# Patient Record
Sex: FEMALE | Race: WHITE | NOT HISPANIC OR LATINO | Employment: OTHER | URBAN - METROPOLITAN AREA
[De-identification: names, ages, dates, MRNs, and addresses within clinical notes are randomized per-mention and may not be internally consistent; named-entity substitution may affect disease eponyms.]

---

## 2017-01-03 ENCOUNTER — TELEPHONE (OUTPATIENT)
Dept: ENDOCRINOLOGY | Facility: CLINIC | Age: 67
End: 2017-01-03

## 2017-01-03 NOTE — TELEPHONE ENCOUNTER
Spoke to patient and let her know that her labs were not in yet, we will call as soon as they come in.

## 2017-01-03 NOTE — TELEPHONE ENCOUNTER
----- Message from David Amin sent at 1/3/2017  2:10 PM CST -----  Contact: Pt   Pt would like a call back from nurse in ref to results for most recent biopsy    Can be reached at [phone]

## 2017-01-03 NOTE — TELEPHONE ENCOUNTER
Please call pathology and ask for FNA result.  Also pleasse call patient and let know result has not yet returned

## 2017-01-04 ENCOUNTER — TELEPHONE (OUTPATIENT)
Dept: ENDOCRINOLOGY | Facility: CLINIC | Age: 67
End: 2017-01-04

## 2017-01-04 DIAGNOSIS — E04.1 THYROID NODULE: Primary | ICD-10-CM

## 2017-01-04 NOTE — TELEPHONE ENCOUNTER
----- Message from Gardenia Sánchez sent at 1/4/2017  3:55 PM CST -----  Contact: pt  Biopsy  Results      Pt is very very upset and  Worried    -  Biopsy done 12/23     Has called multiple times  To get results   Said she has had no  Response        Also another biopsy schedule for 1/11          Pt is very very  Upset     Please call   thanks

## 2017-01-04 NOTE — TELEPHONE ENCOUNTER
Contacted patient and notified about cytology results.  FLUS  15% risk of malignancy in published series  [facility] may be as high as 30%.  Options  Surgery, Repeat FNA and genetic studies  Patient is very concerned about malignancy and desires surgery  Please schedule appt ASAP  Have me call her with exact time tomorrow

## 2017-01-05 ENCOUNTER — TELEPHONE (OUTPATIENT)
Dept: ENDOCRINOLOGY | Facility: CLINIC | Age: 67
End: 2017-01-05

## 2017-01-09 ENCOUNTER — OFFICE VISIT (OUTPATIENT)
Dept: PULMONOLOGY | Facility: CLINIC | Age: 67
End: 2017-01-09
Payer: MEDICARE

## 2017-01-09 ENCOUNTER — INFUSION (OUTPATIENT)
Dept: INFECTIOUS DISEASES | Facility: HOSPITAL | Age: 67
End: 2017-01-09
Attending: INTERNAL MEDICINE
Payer: MEDICARE

## 2017-01-09 VITALS
BODY MASS INDEX: 14.34 KG/M2 | HEART RATE: 72 BPM | SYSTOLIC BLOOD PRESSURE: 122 MMHG | DIASTOLIC BLOOD PRESSURE: 66 MMHG | HEIGHT: 64 IN | WEIGHT: 84 LBS | RESPIRATION RATE: 14 BRPM | OXYGEN SATURATION: 99 %

## 2017-01-09 VITALS — HEIGHT: 64 IN | BODY MASS INDEX: 14 KG/M2 | WEIGHT: 82 LBS

## 2017-01-09 DIAGNOSIS — M81.0 SENILE OSTEOPOROSIS: Primary | ICD-10-CM

## 2017-01-09 DIAGNOSIS — J47.9 ADULT BRONCHIECTASIS: Primary | ICD-10-CM

## 2017-01-09 DIAGNOSIS — E04.1 RIGHT THYROID NODULE: ICD-10-CM

## 2017-01-09 DIAGNOSIS — I47.10 SVT (SUPRAVENTRICULAR TACHYCARDIA): ICD-10-CM

## 2017-01-09 DIAGNOSIS — D80.1 HYPOGAMMAGLOBULINEMIA: ICD-10-CM

## 2017-01-09 DIAGNOSIS — J47.9 ADULT BRONCHIECTASIS: ICD-10-CM

## 2017-01-09 PROCEDURE — 96365 THER/PROPH/DIAG IV INF INIT: CPT

## 2017-01-09 PROCEDURE — 99213 OFFICE O/P EST LOW 20 MIN: CPT | Mod: S$PBB,,, | Performed by: INTERNAL MEDICINE

## 2017-01-09 PROCEDURE — 96366 THER/PROPH/DIAG IV INF ADDON: CPT

## 2017-01-09 PROCEDURE — 99999 PR PBB SHADOW E&M-EST. PATIENT-LVL III: CPT | Mod: PBBFAC,,, | Performed by: INTERNAL MEDICINE

## 2017-01-09 PROCEDURE — 63600175 PHARM REV CODE 636 W HCPCS: Performed by: INTERNAL MEDICINE

## 2017-01-09 RX ADMIN — HUMAN IMMUNOGLOBULIN G 20 G: 20 LIQUID INTRAVENOUS at 10:01

## 2017-01-09 NOTE — PLAN OF CARE
Problem: Patient Care Overview  Goal: Individualization & Mutuality  Outcome: Ongoing (interventions implemented as appropriate)  Pt educated on hand washing and infection control.  Pt verbalized understanding.

## 2017-01-09 NOTE — PROGRESS NOTES
Pt received dose of IVIG.  Infusion completed at a rate of 99cc/hr.  Pt tolerated well and left in NAD.

## 2017-01-10 NOTE — PROGRESS NOTES
Subjective:       Patient ID: Yvette Crews is a 66 y.o. female.    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  She has been followed in this   clinic for management of bronchiectasis.  She is status post treatment of ANA CRISTINA   lung infection.  She also has a chronic immunoglobulin deficiency and is   currently maintained on replacement therapy.  Finally, she has the history of   paroxysmal supraventricular tachycardia.  This was controlled by radiofrequency   ablation.     Eloenora explains that at the time of her recent evaluation in the   Endocrinology Clinic, there was the finding of a nodule within the right side of   her thyroid.  She had a needle aspiration, which is reported to be   inconclusive.  There were atypical cells noted in this aspirate.  She has an   appointment later this week in the Surgery Clinic to discuss possible surgical   resection.  She has not decided how she wishes to manage this problem,   but is leaning toward having surgical resection.    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  She remains relatively active despite her multiple medical problems.      CB/HN  dd: 01/09/2017 20:52:24 (CST)  td: 01/10/2017 10:44:43 (CST)  Doc ID   #[id number]  Job ID #[id number]    CC:       Review of Systems    Objective:      Physical Exam  Personal Diagnostic Review    No flowsheet data found.      Assessment:       1. Adult bronchiectasis    2. Hypogammaglobulinemia    3. SVT (supraventricular tachycardia)    4. Right thyroid nodule        Outpatient Encounter Prescriptions as of 1/9/2017   Medication Sig Dispense Refill    calcium carbonate-vit D3-min (CALTRATE 600+D PLUS MINERALS) 600 mg calcium- 400 unit Tab Take by mouth. 1 Tablet Oral Twice a day      guaifenesin (MUCINEX) 600 mg 12 hr tablet Take 1,200 mg  by mouth 2 (two) times daily.        immune globulin, human, (POLYGAM) 5 gram injection Inject into the vein.        levothyroxine (SYNTHROID) 25 MCG tablet TAKE 1 AND 1/2 TABLET BY MOUTH ONCE DAILY BEFORE BREAKFAST 135 tablet 3    montelukast (SINGULAIR) 10 mg tablet Take 1 tablet (10 mg total) by mouth every evening. 90 tablet 4    neomycin-polymyxin-hydrocortisone (CORTISPORIN) otic solution   0    nitroGLYCERIN (NITROSTAT) 0.4 MG SL tablet Place 0.4 mg under the tongue every 5 (five) minutes as needed.        oxycodone-acetaminophen (PERCOCET) 5-325 mg per tablet Take 1 tablet by mouth every 4 (four) hours as needed for Pain. 20 tablet 0    polymyxin B sulf-trimethoprim (POLYTRIM) 10,000 unit- 1 mg/mL Drop   2    simethicone (MYLICON) 80 MG chewable tablet Take by mouth. 1 tablet, chewable Oral Before meals and at bedtime.  chew tablets       Facility-Administered Encounter Medications as of 1/9/2017   Medication Dose Route Frequency Provider Last Rate Last Dose    [COMPLETED] Immune Globulin G (IGG)-PRO-IGA 10 % injection (Privigen) 10 % injection 20 g  20 g Intravenous 1 time in Clinic/HOD Katherine L. Baumgarten, MD   Stopped at 01/09/17 1230    sodium chloride 0.9% flush 10 mL  10 mL Intravenous PRN Ashley Washington MD         No orders of the defined types were placed in this encounter.    Plan:     Patient encouraged to keep appointment for consult in surgery to discuss resection of the thyroid nodule.    NOTE:  Bronchiectasis and immunodeficiency are stable with current medical therapy,   and do not preclude consideration for anesthesia and surgery if this course chosen.       22 min visit with all time counseling regarding above issues.

## 2017-01-12 ENCOUNTER — INITIAL CONSULT (OUTPATIENT)
Dept: SURGERY | Facility: CLINIC | Age: 67
End: 2017-01-12
Payer: MEDICARE

## 2017-01-12 VITALS
HEIGHT: 64 IN | TEMPERATURE: 98 F | SYSTOLIC BLOOD PRESSURE: 130 MMHG | HEART RATE: 72 BPM | BODY MASS INDEX: 13.97 KG/M2 | DIASTOLIC BLOOD PRESSURE: 77 MMHG | WEIGHT: 81.81 LBS

## 2017-01-12 DIAGNOSIS — E04.1 THYROID NODULE: Primary | ICD-10-CM

## 2017-01-12 PROCEDURE — 99999 PR PBB SHADOW E&M-EST. PATIENT-LVL IV: CPT | Mod: PBBFAC,,, | Performed by: SURGERY

## 2017-01-12 PROCEDURE — 99204 OFFICE O/P NEW MOD 45 MIN: CPT | Mod: S$PBB,,, | Performed by: SURGERY

## 2017-01-12 PROCEDURE — 99214 OFFICE O/P EST MOD 30 MIN: CPT | Mod: PBBFAC | Performed by: SURGERY

## 2017-01-12 NOTE — LETTER
Endocrine/General Surgery  [address] [place] [address]  Phone: [phone]  Fax: [phone] January 16, 2017         Miah Gonzalez MD  [address] [facility] [address]    Patient: Yvette Crews   YOB: 1950   Date of Visit: 1/12/2017     Dear Dr. Gonzalez:    I saw Ms. Crews in clinic. Attached you will find a copy of my note.    As you know, she is a 66 y.o. female who presented with and dominant indeterminate thyroid nodule. I was able to discuss the treatment options including surgery and the expected roxann-operative course if surgery is chosen. The current plan includes thyroid surgery once the patient is medically optimized.  I will follow-up with you as things progress.    If you have any questions or concerns, please don't hesitate to contact my office. Again, thank you kindly for this referral.    Regards,    Tri Wiggins MD    CC  Sally Green MD  [address]  [place] [address]  VIA In Basket

## 2017-01-12 NOTE — Clinical Note
January 16, 2017      Miah Gonzalez MD  [address] [facility] [address]           [facility]  [address] [facility]  [facility] [address]  Phone: [phone]          Patient: Yvette Crews   MR Number: [id number]   YOB: 1950   Date of Visit: 1/12/2017       Dear Dr. Miah Gonzalez:    Thank you for referring Yvette Crews to me for evaluation. Attached you will find relevant portions of my assessment and plan of care.    If you have questions, please do not hesitate to call me. I look forward to following Yvette Crews along with you.    Sincerely,    Tri Wiggins MD    Enclosure  CC:  No Recipients    If you would like to receive this communication electronically, please contact [email or web address] or [phone] to request more information on [employer] access.    For providers and/or their staff who would like to refer a patient to [facility], please contact us through our one-stop-shop provider referral line, [facility], at [phone].    If you feel you have received this communication in error or would no longer like to receive these types of communications, please e-mail [email or web address]

## 2017-01-13 ENCOUNTER — TELEPHONE (OUTPATIENT)
Dept: SURGERY | Facility: CLINIC | Age: 67
End: 2017-01-13

## 2017-01-16 RX ORDER — SODIUM CHLORIDE 9 MG/ML
INJECTION, SOLUTION INTRAVENOUS CONTINUOUS
Status: CANCELLED | OUTPATIENT
Start: 2017-01-16

## 2017-01-16 NOTE — PROGRESS NOTES
Consult Note  Endocrine Surgery    Visit DiagnosisThyroid nodule [E04.1]    SUBJECTIVE:     Yvette Crews is a 66 y.o. female seen at the request of Dr. Miah Gonzalez today in the Endocrine Surgery Clinic for evaluation of thyroid nodule(s). Her history dates back to several years when patient sought evaluation for symptoms. Subsequent evaluation included referral to an endocrinologist, neck ultrasound and fine needle aspiration. Yvette Crews complains of palpable neck mass. The patient denies hot/cold intolerance, fatigue, unexplained weight changes and change in voice quality. She does not have a history of head and neck radiation therapy. She does have a family history of thyroid disease. She is taking thyroid hormone supplementation. She has not undergone radioactive iodine treatment.      Review of patient's allergies indicates:   Allergen Reactions    Bactrim [sulfamethoxazole-trimethoprim] Other (See Comments)     Toxic reaction    Naproxen Nausea Only    Red dye Rash     Pt states has allergy to red dye not iron     Rifampin Rash       Current Outpatient Prescriptions   Medication Sig Dispense Refill    calcium carbonate-vit D3-min (CALTRATE 600+D PLUS MINERALS) 600 mg calcium- 400 unit Tab Take by mouth. 1 Tablet Oral Twice a day      guaifenesin (MUCINEX) 600 mg 12 hr tablet Take 1,200 mg by mouth 2 (two) times daily.        immune globulin, human, (POLYGAM) 5 gram injection Inject into the vein.        levothyroxine (SYNTHROID) 25 MCG tablet TAKE 1 AND 1/2 TABLET BY MOUTH ONCE DAILY BEFORE BREAKFAST 135 tablet 3    montelukast (SINGULAIR) 10 mg tablet Take 1 tablet (10 mg total) by mouth every evening. 90 tablet 4    neomycin-polymyxin-hydrocortisone (CORTISPORIN) otic solution   0    nitroGLYCERIN (NITROSTAT) 0.4 MG SL tablet Place 0.4 mg under the tongue every 5 (five) minutes as needed.        oxycodone-acetaminophen (PERCOCET) 5-325 mg per tablet Take 1 tablet by  mouth every 4 (four) hours as needed for Pain. 20 tablet 0    polymyxin B sulf-trimethoprim (POLYTRIM) 10,000 unit- 1 mg/mL Drop   2    simethicone (MYLICON) 80 MG chewable tablet Take by mouth. 1 tablet, chewable Oral Before meals and at bedtime.  chew tablets       Current Facility-Administered Medications   Medication Dose Route Frequency Provider Last Rate Last Dose    sodium chloride 0.9% flush 10 mL  10 mL Intravenous PRN Ashley Washington MD           Past Medical History   Diagnosis Date    Adult bronchiectasis     Amblyopia     Anxiety     Arthritis     Cataract     CHF (congestive heart failure)     Chin laceration     COPD (chronic obstructive pulmonary disease)     Depression     Hypertension     Onychomycosis     Strabismus     Supraventricular tachycardia     Thyroid disease      Past Surgical History   Procedure Laterality Date    Nasal septum surgery      Tonsillectomy      Eye surgery      Fracture surgery      Colon surgery      Rectal prolapse repair  june 2013    Radiofrequency ablation      Strabismus surgery       Social History   Substance Use Topics    Smoking status: Never Smoker    Smokeless tobacco: Never Used    Alcohol use No      Review of Systems:    Review of Systems   Constitutional: negative for chills, fatigue, fevers, malaise and night sweats  Eyes: negative for icterus and irritation  Respiratory: negative for cough and dyspnea on exertion  Cardiovascular: negative for chest pain and palpitations  Gastrointestinal: negative for constipation, diarrhea and dysphagia  Genitourinary:negative for dysuria, hematuria and nocturia  Integument/breast: negative for rash and skin color change  Hematologic/lymphatic: negative for bleeding and easy bruising  Neurological: negative for gait problems, headaches and seizures  Behavioral/Psych: negative for anxiety and depression  Endocrine: negative    ENT ROS: negative  Endocrine ROS:   negative for - hair pattern  "changes, malaise/lethargy, mood swings, palpitations or polydipsia/polyuria      OBJECTIVE:     Vital Signs:  Visit Vitals    /77 (BP Location: Left arm, Patient Position: Sitting, BP Method: Automatic)    Pulse 72    Temp 97.6 °F (36.4 °C)    Ht 5' 4" (1.626 m)    Wt 37.1 kg (81 lb 12.8 oz)    BMI 14.04 kg/m2      Body mass index is 14.04 kg/(m^2).      Physical Exam:    General:  no distress, see vitals for BMI    Eyes:  conjunctivae/corneas clear   Neck: trachea midline and symmetric, no adenopathy    Thyroid:  thyroid enlarged and thyroid a 4 cm nodule in  the right lobe(s)   Lung: clear to auscultation bilaterally   Heart:  regular rate and rhythm   Abdomen: soft, non-tender; bowel sounds normal; no masses,  no organomegaly   Skin/Extremities: warm and well-perfused   Pulses: 2+ and symmetric   Neuro: normal without focal findings and mental status, speech normal, alert and oriented x3       Laboratory/Radiologic Studies    Studies:  Date:       Results:     DEXA:   12/23/2015 Spine T Score: -1.9, Femoral neck T Score: -2.1 (right) and -1.7 (left),    Ultrasound:  12/13/2016 THYROID GLAND is enlarged in right lobe.  Vascularity is normal.    RIGHT LOBE of the thyroid measures:5.64x2.57x2.97 cm.  Complex nodule measures 4.36x3x3.3 cm. Grade 2 vascularity. Microcalcifications and ill defined anterior superior border.    ISTHMUS:0.21 cm.    LEFT LOBE measures:3.52x1.1x0.91 cm.    LYMPH NODES:benign    COMPARISON:2/15/2012. FNA 2/16/2012 benign. Right lobe nodule measured 3.3x2.8x2.5 cm.   Pathology::  12/23/2016 THYROID GLAND, RIGHT LOBE NODULE, FNA:  [facility] Thyroid Cytology Category: Follicular Lesion of Undetermined Significance (FLUS).  The majority of the specimen consists of benign follicular epithelial cells and colloid and most likely represents a  benign follicular nodule. There are focal areas with architectural atypia; however, these areas cannot be definitively  evaluated due to " obscuring blood and clotting artifact. Repeat FNA after an appropriate interval of observation  might be helpful if clinically indicated.              Component Value Date   TSH 2.951 10/28/2016   Free T4 0.73 10/28/2016   T3, Free 1.8 (L) 10/01/2013   Vit D, 25-Hydroxy 48 12/23/2015   Sodium 138 10/30/2016   Potassium 4.7 10/30/2016   Chloride 105 10/30/2016   CO2 27 10/30/2016   Glucose 75 10/30/2016   BUN, Bld 16 10/30/2016   Creatinine 0.6 10/30/2016   Calcium 8.1 (L) 10/30/2016   Anion Gap 6 (L) 10/30/2016   eGFR if African American >60.0 10/30/2016   eGFR if non African American >60.0 10/30/2016       ASSESSMENT/PLAN:       Assessment    Ms. Crews is a 66 y.o. female who presents with evidence of a dominant Thyroid nodule [E04.1].    Plan    During this visit, lab and imaging results were reviewed with the patient and her spouse. Thyroid anatomy and physiology were reviewed and she was given a copy of our patient education booklet, Understanding Thyroid Disease. The above testing and examination support the diagnosis of dominant thyroid nodule.     The options include short interval follow-up with repeat FNA and possible molecular testing vs diagnostic lobectomy vs upfront total thyroidectomy.  Potential complications of this operation were reviewed with the patient.  Based on the [place] criteria the patient carries a slightly increased risk of malignancy.  At this point the patient is inclined to proceed with surgery but is unclear of the extent at this time.    The risks and benefits of my recommendations, as well as other treatment options were discussed with the patient today. In addition, I discussed the nature of the disease process and the risk of surgery including, temporary or permanent hypoparathyroidism resulting in low blood calcium levels that require extensive medication to avoid serious degenerative conditions such as cataracts, brittle bones, muscle weakness, and muscle irritability. Other  risks include bleeding, infection, injury to the recurrent laryngeal nerves resulting in hoarseness or impairment of speech and the risks of general anesthetic including MI, CVA, sudden death, or even reaction to anesthetic medications. The patient understands the risks, benefits, and treatment alternatives. Any and all questions were answered to the patient's satisfaction. Written consent was obtained. Patient's questions were answered and she wishes to proceed with surgery.    The patient is very anxious to proceed with surgery.  Thyroid surgery is scheduled in the near future. Prior to this we will obtain a voice evaluation as the patient has a prior remote history of an esophageal perforation.  We will also get updated labs, pre-op anesthesia appt, PCP clearance. We will also ensure that the patient is medically optimized prior to procedure.

## 2017-01-18 ENCOUNTER — ANESTHESIA EVENT (OUTPATIENT)
Dept: SURGERY | Facility: HOSPITAL | Age: 67
End: 2017-01-18
Payer: MEDICARE

## 2017-01-18 ENCOUNTER — HOSPITAL ENCOUNTER (OUTPATIENT)
Dept: PREADMISSION TESTING | Facility: HOSPITAL | Age: 67
Discharge: HOME | End: 2017-01-18
Attending: ANESTHESIOLOGY | Admitting: NURSE PRACTITIONER
Payer: MEDICARE

## 2017-01-18 ENCOUNTER — INITIAL CONSULT (OUTPATIENT)
Dept: OTOLARYNGOLOGY | Facility: CLINIC | Age: 67
End: 2017-01-18
Payer: MEDICARE

## 2017-01-18 VITALS
WEIGHT: 83.75 LBS | TEMPERATURE: 84 F | BODY MASS INDEX: 14.38 KG/M2 | DIASTOLIC BLOOD PRESSURE: 81 MMHG | HEART RATE: 69 BPM | SYSTOLIC BLOOD PRESSURE: 140 MMHG

## 2017-01-18 VITALS
WEIGHT: 84 LBS | TEMPERATURE: 98 F | BODY MASS INDEX: 14.34 KG/M2 | HEIGHT: 64 IN | DIASTOLIC BLOOD PRESSURE: 74 MMHG | RESPIRATION RATE: 18 BRPM | OXYGEN SATURATION: 100 % | HEART RATE: 60 BPM | SYSTOLIC BLOOD PRESSURE: 152 MMHG

## 2017-01-18 DIAGNOSIS — E04.1 RIGHT THYROID NODULE: Primary | ICD-10-CM

## 2017-01-18 PROCEDURE — 31575 DIAGNOSTIC LARYNGOSCOPY: CPT | Mod: S$PBB,,, | Performed by: NURSE PRACTITIONER

## 2017-01-18 PROCEDURE — 99999 PR PBB SHADOW E&M-EST. PATIENT-LVL III: CPT | Mod: PBBFAC,,, | Performed by: NURSE PRACTITIONER

## 2017-01-18 PROCEDURE — 31575 DIAGNOSTIC LARYNGOSCOPY: CPT | Mod: PBBFAC | Performed by: NURSE PRACTITIONER

## 2017-01-18 PROCEDURE — 99214 OFFICE O/P EST MOD 30 MIN: CPT | Mod: 25,S$PBB,, | Performed by: NURSE PRACTITIONER

## 2017-01-18 PROCEDURE — 99213 OFFICE O/P EST LOW 20 MIN: CPT | Mod: PBBFAC | Performed by: NURSE PRACTITIONER

## 2017-01-18 NOTE — DISCHARGE INSTRUCTIONS
Your surgery has been scheduled for:__________________________________________    You should report to:  ____[facility], located on the [place] side of the first floor of the           [facility] ([phone])  ____[facility], located on the [facility] side of the            Second floor of the [facility] ([phone])  ____3rd Floor [facility] located on the [facility] side of the [facility] [phone]  Please Note   - Tell your doctor if you take Aspirin, products containing Aspirin, herbal medications  or blood thinners, such as Coumadin, Ticlid, or Plavix.  (Consult your provider regarding holding or stopping before surgery).  - Arrange for someone to drive you home following surgery.  You will not be allowed to leave the surgical facility alone or drive yourself home following sedation and anesthesia.  Before Surgery  - Stop taking all herbal medications 14days prior to surgery  - No Motrin/Advil (Ibuprofen) 7 days before surgery  - No Aleve (Naproxen) 7 days before surgery  - Stop Taking Asprin, products containing Asprin _____days before surgery  - Stop taking blood thinners_______days before surgery  - Refrain from drinking alcoholic beverages for 24hours before and after surgery  - Stop or limit smoking _________days before surgery  Night before Surgery  - DO NOT EAT OR DRINK ANYTHING AFTER MIDNIGHT, INCLUDING GUM, HARD CANDY, MINTS, OR CHEWING TOBACCO.  - Take a shower or bath (shower is recommended).  Bathe with Hibiclens soap or an antibacterial soap from the neck down.  If not supplied by your surgeon, hibiclens soap will need to be purchased over the counter in pharmacy.  Rinse soap off thoroughly.  - Shampoo your hair with your regular shampoo  The Day of Surgery  - Take another bath or shower with hibiclens or any antibacterial soap, to reduce the chance of infection.  - Take heart and blood  pressure medications with a small sip of water, as advised by the perioperative team.  - Do not take fluid pills  - You may brush your teeth and rinse your mouth, but do not swall any additional water.   - Do not apply perfumes, powder, body lotions or deodorant on the day of surgery.  - Nail polish should be removed.  - Do not wear makeup or moisturizer  - Wear comfortable clothes, such as a button front shirt and loose fitting pants.  - Leave all jewelry, including body piercings, and valuables at home.    - Bring any devices you will neeed after surgery such as crutches or canes.  - If you have sleep apnea, please bring your CPAP machine  In the event that your physical condition changes including the onset of a cold or respiratory illness, or if you have to delay or cancel your surgery, please notify your surgeon.Anesthesia: General Anesthesia  Youre due to have surgery. During surgery, youll be given medication called anesthesia. (It is also called anesthetic.) This will keep you comfortable and pain-free. Your surgeon will use general anesthesia. This sheet tells you more about it.    What Is General Anesthesia?  General anesthesia puts you into a state like deep sleep. It goes into the bloodstream (IV anesthetics), into the lungs (gas anesthetics), or both. You feel nothing during the procedure. You will not remember it. During the procedure, the anesthesia provider monitors you. He or she checks your heart rate and rhythm, blood pressure, and blood oxygen.  · IV Anesthetics  IV anesthetics are given through an IV line in your arm. Theyre often given first. This is so you are asleep before a gas anesthetic is started. Some kinds of IV anesthetics relieve pain. Others relax you. Your doctor will decide which kind is best in your case.  · Gas Anesthetics  Gas anesthetics are breathed into the lungs. They are often used to keep you asleep. They can be given through a facemask, an endotracheal tube, or a  laryngeal mask airway.  ¨ If you have a facemask, your anesthesia provider will most likely place it over your nose and mouth while youre still awake. Youll breathe oxygen through the mask as your IV anesthetic is started. Gas anesthetic may be added through the mask.  ¨ If you have an endotracheal tube or a laryngeal mask airway, it will be inserted into your throat after youre asleep.  Anesthesia Tools and Medications  You will likely have:  · IV anesthetics sent through an IV line into your bloodstream.  · Gas anesthetics breathed into your lungs, where they pass into your bloodstream.  · A pulse oximeter on the end of your finger. This measures your blood oxygen level.  · Electrocardiography leads (electrodes) on your chest. These record your heart rate and rhythm.  · A blood pressure cuff. This reads your blood pressure.  Risks and Possible Complications  General anesthesia has some risks. These include:  · Breathing problems  · Nausea and vomiting  · Sore throat or hoarseness  · Allergic reaction to the anesthetic  · Ongoing numbness (rare)  · Irregular heartbeat (rare)  · Cardiac arrest (rare)   Anesthesia Safety  · Follow all instructions you are given for how long not to eat or drink before your procedure.  · Be sure your doctor knows what medications you take. This includes over-the-counter medications, herbs, and supplements.  · Have an adult family member or friend drive you home after the procedure.  · For the first 24 hours after your surgery:  ¨ Do not drive or use heavy equipment.  ¨ Do not make important decisions or sign documents.  ¨ Avoid alcohol.  ¨ Have someone stay with you, if possible. They can watch for problems and help keep you safe.  © 9683-9525 [employer], [address] [place] [address], [place] [address]. All rights reserved. This information is not intended as a substitute for professional medical care. Always follow your healthcare professional's instructions.

## 2017-01-18 NOTE — ANESTHESIA PREPROCEDURE EVALUATION
01/18/2017  Yvette Crews is a 66 y.o., female.    Pre-operative evaluation for THYROIDECTOMY (total) (Bilateral)    Previous Airway:  None in system      Past Surgical History   Procedure Laterality Date    Nasal septum surgery      Tonsillectomy      Eye surgery      Fracture surgery      Colon surgery      Rectal prolapse repair  june 2013    Radiofrequency ablation      Strabismus surgery           Vital Signs Range (Last 24H):  Temp:  [28.9 °C (84 °F)-36.7 °C (98.1 °F)]   Pulse:  [60-69]   Resp:  [18]   BP: (140-152)/(74-81)   SpO2:  [100 %]       CBC:       Recent Labs  Lab 01/18/17  1131   WBC 2.95*   RBC 4.08   HGB 13.3   HCT 38.7      MCV 95   MCH 32.6*   MCHC 34.4       CMP:     Recent Labs  Lab 01/18/17  1131   *   K 4.4   CL 96   CO2 26   BUN 12   CREATININE 0.7   GLU 85   CALCIUM 8.6*       INR:  No results for input(s): INR, PROTIME, APTT in the last 720 hours.    Invalid input(s): PT      Diagnostic Studies:  CT head :   Interval resolution of the occipital subarachnoid hemorrhage and bilateral subdural collections. No new hemorrhage or other acute intracranial pathology.  Healing fractures of the right zygomatic arch and maxilla, partially visualized. No new fractures are identified.    EKG 11/28/16:  Vent. Rate : 058 BPM     Atrial Rate : 058 BPM     P-R Int : 160 ms          QRS Dur : 078 ms      QT Int : 442 ms       P-R-T Axes : 270 082 068 degrees     QTc Int : 433 ms    Unusual P axis, possible ectopic atrial bradycardia  Otherwise normal ECG  When compared with ECG of 27-OCT-2016 10:43,  Ectopic atrial rhythm has replaced Sinus rhythm  Nonspecific T wave abnormality no longer evident in Lateral leads  Confirmed by KYLEIGH TIAN MD ([id number]) on 11/28/2016 5:46:09 PM    2D Echo:    CONCLUSIONS     1 - Normal left ventricular systolic function (EF 60-65%).     2 -  Normal left ventricular diastolic function.     3 - Normal right ventricular systolic function .     4 - The estimated PA systolic pressure is 29 mmHg.     5 - Mild tricuspid regurgitation.     6 - Trivial pericardial effusion.         [facility] Anesthesia Evaluation    I have reviewed the Patient Summary Reports.    I have reviewed the Nursing Notes.   I have reviewed the Medications.     Review of Systems  Anesthesia Hx:  History of prior surgery of interest to airway management or planning: Denies Family Hx of Anesthesia complications.   Denies Personal Hx of Anesthesia complications.   Social:  Non-Smoker, No Alcohol Use    Hematology/Oncology:     Oncology Normal    -- Immunodeficiency Disorder: Congenital B Cell Immunodeficiency   EENT/Dental:   denies chronic allergic rhinitis   Cardiovascular:   Denies Valvular problems/Murmurs.  Denies MI.  Denies CAD.    Denies CABG/stent. Dysrhythmias (SVT s/p RFA; no more tachyarrhythmias)   Denies Angina.      Functional Capacity 3 METS, walks 2 miles per hour for 2 miles most days of the week    Pulmonary:   Denies Asthma.  Denies Recent URI.  Denies Sleep Apnea. History of Chronic respiratory infections, now with scarring and bronchiectasis.    Patient was intubated for the severe respiratory infection ~9 years ago   Renal/:   Denies Chronic Renal Disease.     Hepatic/GI:   Denies GERD. Denies Liver Disease.  Denies Hepatitis.    Musculoskeletal:  Bone Disorders: Fracture , location of other.  Healing right zygomatic arch and maxilla fractures   Neurological:   Denies CVA. Denies Seizures.  Denies Dementia  Head Injury, Closed Head Injury  CNS Hemorrhage  (Central Nervous System), Intracranial Hemorrhage , IVH Grade Subarachnoid Hemorrhage, Subdural Hematoma, Chronic Subdural Hematoma (> 2 Weeks) stable occipital subarachnoid hemorrhage and bilateral subdural collections    Endocrine:   Denies Diabetes. Hypothyroidism  Thyroid Disease Hypothyroidism, Hx of Hypothyroidism,  Treated with Replacement Rx    Psych:   anxiety          Physical Exam  General:  Cachexia    Airway/Jaw/Neck:  Airway Findings: Mouth Opening: Normal Tongue: Normal  General Airway Assessment: Adult  Mallampati: II  Improves to II with phonation.  TM Distance: Normal, at least 6 cm  Jaw/Neck Findings:  Neck ROM: Normal ROM  Neck Findings:  Goiter, mod., Right      Dental:  Dental Findings: In tact   Chest/Lungs:  Chest/Lungs Findings: Clear to auscultation, Normal Respiratory Rate     Heart/Vascular:  Heart Findings: Sounds: Normal        Mental Status:  Mental Status Findings: (slow to answer questions)  Cooperative, Alert and Oriented           Addendum 1/19/17 1611: pt cleared by PCP, Dr Sally Green; per Dr Green:  Preoperative Clearance:     This patient has a history of SVT s/p ablation in 2012 with no further symptoms thereafter, she does not have any active cardiac conditions (does NOT have unstable coronary artery disease, decompensated heart failure, arrhythmia or severe valvular heart disease).  She walks 2 miles a day without difficulty, she has an exercise capacity of greater than 4 METS without symptoms.  She has no clinical risk factors of prior heart failure, stroke, TIA, renal or hepatic insufficiency. She has adult bronchiectasis and immunodeficiency which are stable, she was cleared for surgery by her pulmonary doctor. Chronic medical issues have been stable. She can proceed with surgery she would be considered intermediate risk for total thyroidectomy. No further testing is needed.     PER PULMONARY, DR ALVARADO, LAST APPT. 1/15/17:  Patient encouraged to keep appointment for consult in surgery to discuss resection of the thyroid nodule.     NOTE: Bronchiectasis and immunodeficiency are stable with current medical therapy,   and do not preclude consideration for anesthesia and surgery if this course chosen.    /Trisha Knapp, RN          Anesthesia Plan  Type of Anesthesia, risks & benefits  discussed:  Anesthesia Type:  general  Patient's Preference:   Intra-op Monitoring Plan: standard ASA monitors  Intra-op Monitoring Plan Comments:   Post Op Pain Control Plan:   Post Op Pain Control Plan Comments:   Induction:   IV  Beta Blocker:  Patient is not currently on a Beta-Blocker (No further documentation required).       Informed Consent: Patient understands risks and agrees with Anesthesia plan.  Questions answered. Anesthesia consent signed with patient.  ASA Score: 3     Day of Surgery Review of History & Physical:    H&P update referred to the surgeon.         Ready For Surgery From Anesthesia Perspective.

## 2017-01-18 NOTE — IP AVS SNAPSHOT
[facility]  [address]  [facility] [address]  Phone: [phone]           Patient Discharge Instructions    Our goal is to set you up for success. This packet includes information on your condition, medications, and your home care. It will help you to care for yourself so you don't get sicker.     Please ask your nurse if you have any questions.        There are many details to remember when preparing for your surgery. Here is what you will need to do, please ask your nurse if there are more specific instructions and if you have any questions:    1. 24 hours before procedure Do not smoke or drink alcoholic beverages 24 hours prior to your procedure    2. Eating before procedure Do not eat or drink anything 8 hours before your procedure - this includes gum, mints, and candy.     3. Day of procedure Please remove all jewelry for the procedure. If you wear contact lenses, dentures, hearing aids or glasses, bring a container to put them in during your surgery and give to a family member for safekeeping.  If your doctor has scheduled you for an overnight stay, bring a small overnight bag with any personal items that you need.    4. After procedure Make arrangements in advance for transportation home by a responsible adult. It is not safe to drive a vehicle during the 24 hours following surgery.     PLEASE NOTE: You may be contacted the day before your surgery to confirm your surgery date and arrival time. The Surgery schedule has many variables which may affect the time of your surgery case. Family members should be available if your surgery time changes.                [facility]  Unless otherwise directed by your provider, please contact [facility], our nurse care line that is available for 24/7 assistance.     [phone] (toll-[phone])    Registered nurses in the [facility] provide clinical advisement, health education, appointment booking, and other  advisory services.                    ** Verify the list of medication(s) below is accurate and up to date. Carry this with you in case of emergency. If your medications have changed, please notify your healthcare provider.             Medication List      TAKE these medications        Additional Info                      CALTRATE 600+D PLUS MINERALS 600 mg calcium- 400 unit Tab   Refills:  0   Generic drug:  calcium carbonate-vit D3-min    Instructions:  Take by mouth. 1 Tablet Oral Twice a day     Begin Date    AM    Noon    PM    Bedtime       guaifenesin 600 mg 12 hr tablet   Commonly known as:  MUCINEX   Refills:  0   Dose:  1200 mg    Instructions:  Take 1,200 mg by mouth 2 (two) times daily.     Begin Date    AM    Noon    PM    Bedtime       immune globulin (human) 5 gram injection   Commonly known as:  POLYGAM   Refills:  0    Instructions:  Inject into the vein.     Begin Date    AM    Noon    PM    Bedtime       levothyroxine 25 MCG tablet   Commonly known as:  SYNTHROID   Quantity:  135 tablet   Refills:  3    Instructions:  TAKE 1 AND 1/2 TABLET BY MOUTH ONCE DAILY BEFORE BREAKFAST     Begin Date    AM    Noon    PM    Bedtime       montelukast 10 mg tablet   Commonly known as:  SINGULAIR   Quantity:  90 tablet   Refills:  4   Dose:  10 mg    Instructions:  Take 1 tablet (10 mg total) by mouth every evening.     Begin Date    AM    Noon    PM    Bedtime       neomycin-polymyxin-hydrocortisone otic solution   Commonly known as:  CORTISPORIN   Refills:  0      Begin Date    AM    Noon    PM    Bedtime       nitroGLYCERIN 0.4 MG SL tablet   Commonly known as:  NITROSTAT   Refills:  0   Dose:  0.4 mg    Instructions:  Place 0.4 mg under the tongue every 5 (five) minutes as needed.     Begin Date    AM    Noon    PM    Bedtime       oxycodone-acetaminophen 5-325 mg per tablet   Commonly known as:  PERCOCET   Quantity:  20 tablet   Refills:  0   Dose:  1 tablet    Instructions:  Take 1 tablet by mouth every 4  (four) hours as needed for Pain.     Begin Date    AM    Noon    PM    Bedtime       polymyxin B sulf-trimethoprim 10,000 unit- 1 mg/mL Drop   Commonly known as:  POLYTRIM   Refills:  2      Begin Date    AM    Noon    PM    Bedtime       simethicone 80 MG chewable tablet   Commonly known as:  MYLICON   Refills:  0    Instructions:  Take by mouth. 1 tablet, chewable Oral Before meals and at bedtime.  chew tablets     Begin Date    AM    Noon    PM    Bedtime                  Please bring to all follow up appointments:    1. A copy of your discharge instructions.  2. All medicines you are currently taking in their original bottles.  3. Identification and insurance card.    Please arrive 15 minutes ahead of scheduled appointment time.    Please call 24 hours in advance if you must reschedule your appointment and/or time.        Your Scheduled Appointments     Feb 06, 2017  9:00 AM CST   Infusion 060 Min with INFECTIOUS INFUSION, CHAIR 7   [facility])    [address]  [place] [id number] [phone]            Mar 14, 2017  2:30 PM CDT   Established Patient Visit with Sally Green MD   [facility] ([facility])    [address]  [place] [id number] [phone]              Your Future Surgeries/Procedures     Jan 25, 2017   Surgery with Tri Wiggins MD   [facility])    [address] [facility] [id number] [phone]                  Discharge Instructions       Your surgery has been scheduled for:__________________________________________    You should report to:  ____[facility], located on the [place] side of the first floor of the           [facility] ([phone])  ____[facility], located on the [facility] side of the            Second floor of the [facility]  ([phone])  ____3rd Floor [facility] located on the [facility] side of the [facility] [phone]  Please Note   - Tell your doctor if you take Aspirin, products containing Aspirin, herbal medications  or blood thinners, such as Coumadin, Ticlid, or Plavix.  (Consult your provider regarding holding or stopping before surgery).  - Arrange for someone to drive you home following surgery.  You will not be allowed to leave the surgical facility alone or drive yourself home following sedation and anesthesia.  Before Surgery  - Stop taking all herbal medications 14days prior to surgery  - No Motrin/Advil (Ibuprofen) 7 days before surgery  - No Aleve (Naproxen) 7 days before surgery  - Stop Taking Asprin, products containing Asprin _____days before surgery  - Stop taking blood thinners_______days before surgery  - Refrain from drinking alcoholic beverages for 24hours before and after surgery  - Stop or limit smoking _________days before surgery  Night before Surgery  - DO NOT EAT OR DRINK ANYTHING AFTER MIDNIGHT, INCLUDING GUM, HARD CANDY, MINTS, OR CHEWING TOBACCO.  - Take a shower or bath (shower is recommended).  Bathe with Hibiclens soap or an antibacterial soap from the neck down.  If not supplied by your surgeon, hibiclens soap will need to be purchased over the counter in pharmacy.  Rinse soap off thoroughly.  - Shampoo your hair with your regular shampoo  The Day of Surgery  - Take another bath or shower with hibiclens or any antibacterial soap, to reduce the chance of infection.  - Take heart and blood pressure medications with a small sip of water, as advised by the perioperative team.  - Do not take fluid pills  - You may brush your teeth and rinse your mouth, but do not swall any additional water.   - Do not apply perfumes, powder, body lotions or deodorant on the day of surgery.  - Nail polish should be removed.  - Do not wear makeup or moisturizer  - Wear comfortable clothes,  such as a button front shirt and loose fitting pants.  - Leave all jewelry, including body piercings, and valuables at home.    - Bring any devices you will neeed after surgery such as crutches or canes.  - If you have sleep apnea, please bring your CPAP machine  In the event that your physical condition changes including the onset of a cold or respiratory illness, or if you have to delay or cancel your surgery, please notify your surgeon.Anesthesia: General Anesthesia  Youre due to have surgery. During surgery, youll be given medication called anesthesia. (It is also called anesthetic.) This will keep you comfortable and pain-free. Your surgeon will use general anesthesia. This sheet tells you more about it.    What Is General Anesthesia?  General anesthesia puts you into a state like deep sleep. It goes into the bloodstream (IV anesthetics), into the lungs (gas anesthetics), or both. You feel nothing during the procedure. You will not remember it. During the procedure, the anesthesia provider monitors you. He or she checks your heart rate and rhythm, blood pressure, and blood oxygen.  · IV Anesthetics  IV anesthetics are given through an IV line in your arm. Theyre often given first. This is so you are asleep before a gas anesthetic is started. Some kinds of IV anesthetics relieve pain. Others relax you. Your doctor will decide which kind is best in your case.  · Gas Anesthetics  Gas anesthetics are breathed into the lungs. They are often used to keep you asleep. They can be given through a facemask, an endotracheal tube, or a laryngeal mask airway.  ¨ If you have a facemask, your anesthesia provider will most likely place it over your nose and mouth while youre still awake. Youll breathe oxygen through the mask as your IV anesthetic is started. Gas anesthetic may be added through the mask.  ¨ If you have an endotracheal tube or a laryngeal mask airway, it will be inserted into your throat after youre  "asleep.  Anesthesia Tools and Medications  You will likely have:  · IV anesthetics sent through an IV line into your bloodstream.  · Gas anesthetics breathed into your lungs, where they pass into your bloodstream.  · A pulse oximeter on the end of your finger. This measures your blood oxygen level.  · Electrocardiography leads (electrodes) on your chest. These record your heart rate and rhythm.  · A blood pressure cuff. This reads your blood pressure.  Risks and Possible Complications  General anesthesia has some risks. These include:  · Breathing problems  · Nausea and vomiting  · Sore throat or hoarseness  · Allergic reaction to the anesthetic  · Ongoing numbness (rare)  · Irregular heartbeat (rare)  · Cardiac arrest (rare)   Anesthesia Safety  · Follow all instructions you are given for how long not to eat or drink before your procedure.  · Be sure your doctor knows what medications you take. This includes over-the-counter medications, herbs, and supplements.  · Have an adult family member or friend drive you home after the procedure.  · For the first 24 hours after your surgery:  ¨ Do not drive or use heavy equipment.  ¨ Do not make important decisions or sign documents.  ¨ Avoid alcohol.  ¨ Have someone stay with you, if possible. They can watch for problems and help keep you safe.  © 2124-2055 [facility], [address] [place] [address], [place] [address]. All rights reserved. This information is not intended as a substitute for professional medical care. Always follow your healthcare professional's instructions.      Admission Information     Date & Time Provider Department CSN    1/18/2017  1:00 PM Rhonda G Leopold, MD [facility] [id number]      Care Providers     Provider Role Specialty Primary office phone    Rhonda G Leopold, MD Attending Provider Anesthesiology [phone]      Your Vitals Were     BP Pulse Temp Resp Height Weight    152/74 60 98.1 °F (36.7 °C) 18 5' 4" (1.626 m) 38.1 kg " (83 lb 15.9 oz)    SpO2 BMI             100% 14.42 kg/m2         Recent Lab Values        10/29/2016                           7:19 PM           A1C 5.3           Comment for A1C at  7:19 PM on 10/29/2016:  According to ADA guidelines, hemoglobin A1C <7.0% represents  optimal control in non-pregnant diabetic patients.  Different  metrics may apply to specific populations.   Standards of Medical Care in Diabetes - 2016.  For the purpose of screening for the presence of diabetes:  <5.7%     Consistent with the absence of diabetes  5.7-6.4%  Consistent with increasing risk for diabetes   (prediabetes)  >or=6.5%  Consistent with diabetes  Currently no consensus exists for use of hemoglobin A1C  for diagnosis of diabetes for children.        Allergies as of 1/18/2017        Reactions    Bactrim [Sulfamethoxazole-trimethoprim] Other (See Comments)    Toxic reaction    Naproxen Nausea Only    Red Dye Rash    Pt states has allergy to red dye not iron     Rifampin Rash      Advance Directives     An advance directive is a document which, in the event you are no longer able to make decisions for yourself, tells your healthcare team what kind of treatment you do or do not want to receive, or who you would like to make those decisions for you.  If you do not currently have an advance directive, [facility] encourages you to create one.  For more information call:  [phone]), [phone] ([phone]),  or log on to [email or web address].        Language Assistance Services     ATTENTION: Language assistance services are available, free of charge. Please call [phone].      ATENCIÓN: Si habla español, tiene a chambers disposición servicios gratuitos de asistencia lingüística. Llame al [phone].     [facility] Ý: N?u b?n nói Ti?ng Vi?t, có các d?ch v? h? tr? ngôn ng? mi?n phí dành cho b?n. G?i s? [phone].        Stroke Education               [facility] complies with applicable Federal  civil rights laws and does not discriminate on the basis of race, color, national origin, age, disability, or sex.

## 2017-01-18 NOTE — LETTER
January 18, 2017      Tri Wiggins MD  [address]  [address]  [facility] [address]           [facility]  [address]  [facility] [address]  Phone: [phone]  Fax: [phone]          Patient: Yvette Crews   MR Number: [id number]   YOB: 1950   Date of Visit: 1/18/2017       Dear Dr. Tri Wiggins:    Thank you for referring Yvette Crews to me for evaluation. Attached you will find relevant portions of my assessment and plan of care.    If you have questions, please do not hesitate to call me. I look forward to following Yvette Crews along with you.    Sincerely,    Zahida Langley, NP    Enclosure  CC:  No Recipients    If you would like to receive this communication electronically, please contact [email or web address] or [phone] to request more information on [address] Link access.    For providers and/or their staff who would like to refer a patient to [facility], please contact us through our one-stop-shop provider referral line, [facility], at [phone].    If you feel you have received this communication in error or would no longer like to receive these types of communications, please e-mail [email or web address]

## 2017-01-18 NOTE — PROGRESS NOTES
Subjective:       Patient ID: Yvette Crews is a 66 y.o. female.    Chief Complaint: vocal cords    HPI     Yvette Crews is a 66 year old female who was referred by Dr. Ibarra [facility] for a vocal cord evaluation.  She is scheduled for a thyroidectomy on 1/25.  She has no voice complaints.  She denies sore throat, dysphagia, or odynophagia.  40 years ago, she experienced an esophageal perforation.  She underwent exploratory surgery and repair of this.  She did not experience and issues with her voice or swallowing following that surgery.  She is a non smoker.    Past Medical History   Diagnosis Date    Adult bronchiectasis     Amblyopia     Anxiety     Arthritis     Cataract     CHF (congestive heart failure)     Chin laceration     COPD (chronic obstructive pulmonary disease)     Depression     Hypertension     Onychomycosis     Strabismus     Supraventricular tachycardia     Thyroid disease        Past Surgical History   Procedure Laterality Date    Nasal septum surgery      Tonsillectomy      Eye surgery      Fracture surgery      Colon surgery      Rectal prolapse repair  june 2013    Radiofrequency ablation      Strabismus surgery           Current Outpatient Prescriptions:     calcium carbonate-vit D3-min (CALTRATE 600+D PLUS MINERALS) 600 mg calcium- 400 unit Tab, Take by mouth. 1 Tablet Oral Twice a day, Disp: , Rfl:     guaifenesin (MUCINEX) 600 mg 12 hr tablet, Take 1,200 mg by mouth 2 (two) times daily.  , Disp: , Rfl:     immune globulin, human, (POLYGAM) 5 gram injection, Inject into the vein.  , Disp: , Rfl:     levothyroxine (SYNTHROID) 25 MCG tablet, TAKE 1 AND 1/2 TABLET BY MOUTH ONCE DAILY BEFORE BREAKFAST, Disp: 135 tablet, Rfl: 3    montelukast (SINGULAIR) 10 mg tablet, Take 1 tablet (10 mg total) by mouth every evening., Disp: 90 tablet, Rfl: 4    neomycin-polymyxin-hydrocortisone (CORTISPORIN) otic solution, , Disp: , Rfl: 0     nitroGLYCERIN (NITROSTAT) 0.4 MG SL tablet, Place 0.4 mg under the tongue every 5 (five) minutes as needed.  , Disp: , Rfl:     oxycodone-acetaminophen (PERCOCET) 5-325 mg per tablet, Take 1 tablet by mouth every 4 (four) hours as needed for Pain., Disp: 20 tablet, Rfl: 0    polymyxin B sulf-trimethoprim (POLYTRIM) 10,000 unit- 1 mg/mL Drop, , Disp: , Rfl: 2    simethicone (MYLICON) 80 MG chewable tablet, Take by mouth. 1 tablet, chewable Oral Before meals and at bedtime.  chew tablets, Disp: , Rfl:     Current Facility-Administered Medications:     sodium chloride 0.9% flush 10 mL, 10 mL, Intravenous, PRN, Ashley Washington MD    Review of patient's allergies indicates:   Allergen Reactions    Bactrim [sulfamethoxazole-trimethoprim] Other (See Comments)     Toxic reaction    Naproxen Nausea Only    Red dye Rash     Pt states has allergy to red dye not iron     Rifampin Rash       Social History     Social History    Marital status:      Spouse name: N/A    Number of children: 2    Years of education: N/A     Occupational History    retired      Social History Main Topics    Smoking status: Never Smoker    Smokeless tobacco: Never Used    Alcohol use No    Drug use: No    Sexual activity: Not Currently     Other Topics Concern    Not on file     Social History Narrative       Family History   Problem Relation Age of Onset    Heart disease Father     Stroke Father     Heart disease Brother     Melanoma Neg Hx     Psoriasis Neg Hx     Lupus Neg Hx     Eczema Neg Hx            Review of Systems   Constitutional: Negative for appetite change, chills, diaphoresis, fatigue, fever and unexpected weight change.   HENT: Negative for congestion, dental problem, drooling, ear discharge, ear pain, facial swelling, hearing loss, mouth sores, nosebleeds, postnasal drip, rhinorrhea, sinus pressure, sneezing, sore throat, tinnitus, trouble swallowing and voice change.    Eyes: Negative for pain,  discharge, redness and itching.   Respiratory: Negative for cough and shortness of breath.    Cardiovascular: Negative for chest pain.   Gastrointestinal: Negative for abdominal distention, abdominal pain, diarrhea, nausea and vomiting.   Endocrine: Negative for cold intolerance and heat intolerance.   Genitourinary: Negative for difficulty urinating.   Musculoskeletal: Negative for neck pain and neck stiffness.   Skin: Negative for rash.   Neurological: Negative for dizziness, weakness and headaches.   Hematological: Negative for adenopathy.       Objective:      Physical Exam   Constitutional: She is oriented to person, place, and time. She appears well-developed and well-nourished. She is cooperative. She does not appear ill. No distress.   HENT:   Head: Normocephalic and atraumatic.   Right Ear: Hearing, tympanic membrane, external ear and ear canal normal.   Left Ear: Hearing, tympanic membrane, external ear and ear canal normal.   Nose: Nose normal. No mucosal edema, rhinorrhea or nasal deformity. No epistaxis.  No foreign bodies. Right sinus exhibits no maxillary sinus tenderness and no frontal sinus tenderness. Left sinus exhibits no maxillary sinus tenderness and no frontal sinus tenderness.   Mouth/Throat: Uvula is midline, oropharynx is clear and moist and mucous membranes are normal. Mucous membranes are not pale, not dry and not cyanotic. She does not have dentures. No oral lesions. No trismus in the jaw. Normal dentition. No uvula swelling or dental caries. No oropharyngeal exudate, posterior oropharyngeal edema, posterior oropharyngeal erythema or tonsillar abscesses.   Procedure: Flexible laryngoscopy  In order to fully examine the upper aerodigestive tract, including the larynx, in a patient with a hyperactive gag reflex, flexible endoscopy is required.  After explaining the procedure and obtaining verbal consent, a timeout was performed with the patient's participation according to the universal  protocol. Both nasal cavities were anesthetized with 4% Xylocaine spray mixed with Gerson-Synephrine. The flexible laryngoscope (#[id number]) was inserted into the nasal cavity and advanced to visualize the nasal cavity, nasopharynx, the posterior oropharynx, hypopharynx, and the endolarynx with the above findings noted. The scope was removed and the procedure terminated. The patient tolerated this procedure well without apparent complication.      FINDINGS  Nasopharynx - the torus is clear. There are no lesions of the posterior wall.   Oropharynx - no lesions of the tongue base. There is no obvious fullness or asymmetry.  Hypopharynx - there are no lesions of the pyriform sinuses or postcricoid region    Larynx - there are no lesions of the supraglottic or glottic larynx. Vocal fold mobility is normal with complete closure.      Eyes: Conjunctivae are normal. Right eye exhibits no discharge. Left eye exhibits no discharge. No scleral icterus.   Neck: Trachea normal, normal range of motion and phonation normal. Neck supple. No JVD present. No tracheal tenderness present. No tracheal deviation present. No thyroid mass and no thyromegaly present.   Salivary glands - there are no lesions or asymmetric findings in the submandibular or parotid glands     Cardiovascular: Normal rate.    Pulmonary/Chest: Effort normal. No stridor. No respiratory distress.   Lymphadenopathy:        Head (right side): No submental, no submandibular, no tonsillar and no preauricular adenopathy present.        Head (left side): No submental, no submandibular, no tonsillar and no preauricular adenopathy present.     She has no cervical adenopathy.   Neurological: She is alert and oriented to person, place, and time. No cranial nerve deficit.   Skin: Skin is warm and dry. No rash noted. She is not diaphoretic. No erythema. No pallor.   Psychiatric: She has a normal mood and affect. Her behavior is normal. Thought content normal.   Vitals reviewed.       Assessment:       1. Right thyroid nodule        Plan:       Yvette Crews has normal vocal cord function.  No vocal cord abnormalities are seen on flexible laryngoscopy.  We discussed the possibility of injury to the recurrent laryngeal nerve during surgery.  If she has any issues with his voice post op, she should follow up with our laryngologist, Dr. Jerry Harper. She verbalized understanding.  Questions answered. She will RTC prn.

## 2017-01-19 ENCOUNTER — TELEPHONE (OUTPATIENT)
Dept: INTERNAL MEDICINE | Facility: CLINIC | Age: 67
End: 2017-01-19

## 2017-01-24 ENCOUNTER — TELEPHONE (OUTPATIENT)
Dept: INTERNAL MEDICINE | Facility: CLINIC | Age: 67
End: 2017-01-24

## 2017-01-24 ENCOUNTER — TELEPHONE (OUTPATIENT)
Dept: SURGERY | Facility: CLINIC | Age: 67
End: 2017-01-24

## 2017-01-24 DIAGNOSIS — D80.1 HYPOGAMMAGLOBULINEMIA: ICD-10-CM

## 2017-01-24 DIAGNOSIS — M84.40XA: Primary | ICD-10-CM

## 2017-01-24 NOTE — TELEPHONE ENCOUNTER
Discussed with Dr. Woods regarding hypogammaglobulinemia, and recent low WBC count on labs, pt set for elective non-emergent thyroid surgery in the morning, he recommended drawing a Immunoglobulin panel and if IgG is low to proceed with infusion post op, ok to proceed with surgery. Spoke to Dr. Ibarra and she will have labs drawn in the morning. LM for patient.

## 2017-01-25 ENCOUNTER — HOSPITAL ENCOUNTER (OUTPATIENT)
Facility: HOSPITAL | Age: 67
Discharge: HOME | End: 2017-01-26
Attending: SURGERY | Admitting: SURGERY
Payer: MEDICARE

## 2017-01-25 ENCOUNTER — OUTPATIENT CASE MANAGEMENT (OUTPATIENT)
Dept: ADMINISTRATIVE | Facility: OTHER | Age: 67
End: 2017-01-25

## 2017-01-25 ENCOUNTER — ANESTHESIA (OUTPATIENT)
Dept: SURGERY | Facility: HOSPITAL | Age: 67
End: 2017-01-25
Payer: MEDICARE

## 2017-01-25 DIAGNOSIS — D80.1 HYPOGAMMAGLOBULINEMIA: ICD-10-CM

## 2017-01-25 DIAGNOSIS — E04.1 THYROID NODULE: Primary | ICD-10-CM

## 2017-01-25 LAB
CA-I BLDV-SCNC: 1.13 MMOL/L
IGA SERPL-MCNC: 160 MG/DL
IGG SERPL-MCNC: 1364 MG/DL
IGM SERPL-MCNC: 36 MG/DL
PTH-INTACT SERPL-MCNC: 27 PG/ML

## 2017-01-25 PROCEDURE — 63600175 PHARM REV CODE 636 W HCPCS: Performed by: NURSE ANESTHETIST, CERTIFIED REGISTERED

## 2017-01-25 PROCEDURE — 25000003 PHARM REV CODE 250: Performed by: NURSE ANESTHETIST, CERTIFIED REGISTERED

## 2017-01-25 PROCEDURE — 25000003 PHARM REV CODE 250: Performed by: SURGERY

## 2017-01-25 PROCEDURE — 36415 COLL VENOUS BLD VENIPUNCTURE: CPT

## 2017-01-25 PROCEDURE — 27000221 HC OXYGEN, UP TO 24 HOURS

## 2017-01-25 PROCEDURE — D9220A PRA ANESTHESIA: Mod: ANES,,, | Performed by: ANESTHESIOLOGY

## 2017-01-25 PROCEDURE — 82784 ASSAY IGA/IGD/IGG/IGM EACH: CPT | Mod: 59

## 2017-01-25 PROCEDURE — 36000706: Performed by: SURGERY

## 2017-01-25 PROCEDURE — 11000001 HC ACUTE MED/SURG PRIVATE ROOM

## 2017-01-25 PROCEDURE — 27201423 OPTIME MED/SURG SUP & DEVICES STERILE SUPPLY: Performed by: SURGERY

## 2017-01-25 PROCEDURE — 63600175 PHARM REV CODE 636 W HCPCS: Performed by: SURGERY

## 2017-01-25 PROCEDURE — 82330 ASSAY OF CALCIUM: CPT

## 2017-01-25 PROCEDURE — 60240 REMOVAL OF THYROID: CPT | Mod: GC,,, | Performed by: SURGERY

## 2017-01-25 PROCEDURE — 36000707: Performed by: SURGERY

## 2017-01-25 PROCEDURE — 37000008 HC ANESTHESIA 1ST 15 MINUTES: Performed by: SURGERY

## 2017-01-25 PROCEDURE — 88307 TISSUE EXAM BY PATHOLOGIST: CPT | Mod: 26,,, | Performed by: PATHOLOGY

## 2017-01-25 PROCEDURE — 37000009 HC ANESTHESIA EA ADD 15 MINS: Performed by: SURGERY

## 2017-01-25 PROCEDURE — 83970 ASSAY OF PARATHORMONE: CPT

## 2017-01-25 PROCEDURE — 71000033 HC RECOVERY, INTIAL HOUR: Performed by: SURGERY

## 2017-01-25 PROCEDURE — D9220A PRA ANESTHESIA: Mod: CRNA,,, | Performed by: NURSE ANESTHETIST, CERTIFIED REGISTERED

## 2017-01-25 PROCEDURE — 71000039 HC RECOVERY, EACH ADD'L HOUR: Performed by: SURGERY

## 2017-01-25 PROCEDURE — 88307 TISSUE EXAM BY PATHOLOGIST: CPT | Performed by: PATHOLOGY

## 2017-01-25 RX ORDER — MIDAZOLAM HYDROCHLORIDE 1 MG/ML
INJECTION, SOLUTION INTRAMUSCULAR; INTRAVENOUS
Status: DISCONTINUED | OUTPATIENT
Start: 2017-01-25 | End: 2017-01-25

## 2017-01-25 RX ORDER — FENTANYL CITRATE 50 UG/ML
INJECTION, SOLUTION INTRAMUSCULAR; INTRAVENOUS
Status: DISCONTINUED | OUTPATIENT
Start: 2017-01-25 | End: 2017-01-25

## 2017-01-25 RX ORDER — OXYCODONE HYDROCHLORIDE 5 MG/1
5 TABLET ORAL EVERY 4 HOURS PRN
Status: DISCONTINUED | OUTPATIENT
Start: 2017-01-25 | End: 2017-01-26 | Stop reason: HOSPADM

## 2017-01-25 RX ORDER — LEVOTHYROXINE SODIUM 50 UG/1
50 TABLET ORAL DAILY
Qty: 30 TABLET | Refills: 2 | Status: SHIPPED | OUTPATIENT
Start: 2017-01-25 | End: 2017-02-03

## 2017-01-25 RX ORDER — RAMELTEON 8 MG/1
8 TABLET ORAL NIGHTLY PRN
Status: DISCONTINUED | OUTPATIENT
Start: 2017-01-25 | End: 2017-01-26 | Stop reason: HOSPADM

## 2017-01-25 RX ORDER — ACETAMINOPHEN 10 MG/ML
15 INJECTION, SOLUTION INTRAVENOUS EVERY 8 HOURS
Status: DISPENSED | OUTPATIENT
Start: 2017-01-25 | End: 2017-01-26

## 2017-01-25 RX ORDER — OXYCODONE HYDROCHLORIDE 5 MG/1
10 TABLET ORAL EVERY 4 HOURS PRN
Status: DISCONTINUED | OUTPATIENT
Start: 2017-01-25 | End: 2017-01-26 | Stop reason: HOSPADM

## 2017-01-25 RX ORDER — LIDOCAINE HCL/PF 100 MG/5ML
SYRINGE (ML) INTRAVENOUS
Status: DISCONTINUED | OUTPATIENT
Start: 2017-01-25 | End: 2017-01-25

## 2017-01-25 RX ORDER — SUCCINYLCHOLINE CHLORIDE 20 MG/ML
INJECTION INTRAMUSCULAR; INTRAVENOUS
Status: DISCONTINUED | OUTPATIENT
Start: 2017-01-25 | End: 2017-01-25

## 2017-01-25 RX ORDER — PROPOFOL 10 MG/ML
VIAL (ML) INTRAVENOUS
Status: DISCONTINUED | OUTPATIENT
Start: 2017-01-25 | End: 2017-01-25

## 2017-01-25 RX ORDER — PHENYLEPHRINE HYDROCHLORIDE 10 MG/ML
INJECTION INTRAVENOUS
Status: DISCONTINUED | OUTPATIENT
Start: 2017-01-25 | End: 2017-01-25

## 2017-01-25 RX ORDER — ROCURONIUM BROMIDE 10 MG/ML
INJECTION, SOLUTION INTRAVENOUS
Status: DISCONTINUED | OUTPATIENT
Start: 2017-01-25 | End: 2017-01-25

## 2017-01-25 RX ORDER — LEVOTHYROXINE SODIUM 50 UG/1
50 TABLET ORAL
Status: DISCONTINUED | OUTPATIENT
Start: 2017-01-26 | End: 2017-01-26 | Stop reason: HOSPADM

## 2017-01-25 RX ORDER — ACETAMINOPHEN 10 MG/ML
INJECTION, SOLUTION INTRAVENOUS
Status: DISCONTINUED | OUTPATIENT
Start: 2017-01-25 | End: 2017-01-25

## 2017-01-25 RX ORDER — SODIUM CHLORIDE 9 MG/ML
INJECTION, SOLUTION INTRAVENOUS CONTINUOUS
Status: DISCONTINUED | OUTPATIENT
Start: 2017-01-25 | End: 2017-01-26 | Stop reason: HOSPADM

## 2017-01-25 RX ORDER — DEXAMETHASONE SODIUM PHOSPHATE 4 MG/ML
INJECTION, SOLUTION INTRA-ARTICULAR; INTRALESIONAL; INTRAMUSCULAR; INTRAVENOUS; SOFT TISSUE
Status: DISCONTINUED | OUTPATIENT
Start: 2017-01-25 | End: 2017-01-25

## 2017-01-25 RX ORDER — HYDROXYZINE HYDROCHLORIDE 25 MG/1
25 TABLET, FILM COATED ORAL EVERY 4 HOURS PRN
Status: DISCONTINUED | OUTPATIENT
Start: 2017-01-25 | End: 2017-01-26 | Stop reason: HOSPADM

## 2017-01-25 RX ORDER — ONDANSETRON 2 MG/ML
INJECTION INTRAMUSCULAR; INTRAVENOUS
Status: DISCONTINUED | OUTPATIENT
Start: 2017-01-25 | End: 2017-01-25

## 2017-01-25 RX ORDER — CEFAZOLIN SODIUM 1 G/3ML
INJECTION, POWDER, FOR SOLUTION INTRAMUSCULAR; INTRAVENOUS
Status: DISCONTINUED | OUTPATIENT
Start: 2017-01-25 | End: 2017-01-25

## 2017-01-25 RX ORDER — OXYCODONE AND ACETAMINOPHEN 5; 325 MG/1; MG/1
1 TABLET ORAL EVERY 6 HOURS PRN
Qty: 31 TABLET | Refills: 0 | Status: SHIPPED | OUTPATIENT
Start: 2017-01-25 | End: 2017-02-04

## 2017-01-25 RX ORDER — CALCIUM CARBONATE 400(1000)
TABLET,CHEWABLE ORAL
Refills: 0 | COMMUNITY
Start: 2017-01-25 | End: 2017-07-19 | Stop reason: CLARIF

## 2017-01-25 RX ADMIN — ONDANSETRON 4 MG: 2 INJECTION INTRAMUSCULAR; INTRAVENOUS at 11:01

## 2017-01-25 RX ADMIN — EPHEDRINE SULFATE 10 MG: 50 INJECTION, SOLUTION INTRAMUSCULAR; INTRAVENOUS; SUBCUTANEOUS at 08:01

## 2017-01-25 RX ADMIN — PROPOFOL 100 MG: 10 INJECTION, EMULSION INTRAVENOUS at 08:01

## 2017-01-25 RX ADMIN — DEXAMETHASONE SODIUM PHOSPHATE 4 MG: 4 INJECTION, SOLUTION INTRAMUSCULAR; INTRAVENOUS at 08:01

## 2017-01-25 RX ADMIN — SODIUM CHLORIDE, SODIUM ACETATE ANHYDROUS, SODIUM GLUCONATE, POTASSIUM CHLORIDE, AND MAGNESIUM CHLORIDE: 526; 222; 502; 37; 30 INJECTION, SOLUTION INTRAVENOUS at 10:01

## 2017-01-25 RX ADMIN — ROCURONIUM BROMIDE 5 MG: 10 INJECTION, SOLUTION INTRAVENOUS at 08:01

## 2017-01-25 RX ADMIN — FENTANYL CITRATE 25 MCG: 50 INJECTION, SOLUTION INTRAMUSCULAR; INTRAVENOUS at 10:01

## 2017-01-25 RX ADMIN — PHENYLEPHRINE HYDROCHLORIDE 100 MCG: 10 INJECTION INTRAVENOUS at 09:01

## 2017-01-25 RX ADMIN — FENTANYL CITRATE 25 MCG: 50 INJECTION, SOLUTION INTRAMUSCULAR; INTRAVENOUS at 08:01

## 2017-01-25 RX ADMIN — ACETAMINOPHEN 560 MG: 10 INJECTION, SOLUTION INTRAVENOUS at 01:01

## 2017-01-25 RX ADMIN — SODIUM CHLORIDE 70 ML/HR: 0.9 INJECTION, SOLUTION INTRAVENOUS at 07:01

## 2017-01-25 RX ADMIN — CEFAZOLIN 2 G: 1 INJECTION, POWDER, FOR SOLUTION INTRAVENOUS at 08:01

## 2017-01-25 RX ADMIN — LIDOCAINE HYDROCHLORIDE 60 MG: 20 INJECTION, SOLUTION INTRAVENOUS at 08:01

## 2017-01-25 RX ADMIN — SUCCINYLCHOLINE CHLORIDE 120 MG: 20 INJECTION, SOLUTION INTRAMUSCULAR; INTRAVENOUS at 08:01

## 2017-01-25 RX ADMIN — ACETAMINOPHEN 1000 MG: 10 INJECTION, SOLUTION INTRAVENOUS at 08:01

## 2017-01-25 RX ADMIN — OXYCODONE HYDROCHLORIDE 5 MG: 5 TABLET ORAL at 12:01

## 2017-01-25 RX ADMIN — PHENYLEPHRINE HYDROCHLORIDE 100 MCG: 10 INJECTION INTRAVENOUS at 08:01

## 2017-01-25 RX ADMIN — PHENYLEPHRINE HYDROCHLORIDE 100 MCG: 10 INJECTION INTRAVENOUS at 10:01

## 2017-01-25 RX ADMIN — EPHEDRINE SULFATE 5 MG: 50 INJECTION, SOLUTION INTRAMUSCULAR; INTRAVENOUS; SUBCUTANEOUS at 09:01

## 2017-01-25 RX ADMIN — MIDAZOLAM HYDROCHLORIDE 1 MG: 1 INJECTION, SOLUTION INTRAMUSCULAR; INTRAVENOUS at 08:01

## 2017-01-25 NOTE — H&P (VIEW-ONLY)
Consult Note  Endocrine Surgery    Visit DiagnosisThyroid nodule [E04.1]    SUBJECTIVE:     Yvette Crews is a 66 y.o. female seen at the request of Dr. Miah Gonzalez today in the [facility] for evaluation of thyroid nodule(s). Her history dates back to several years when patient sought evaluation for symptoms. Subsequent evaluation included referral to an endocrinologist, neck ultrasound and fine needle aspiration. Yvette Crews complains of palpable neck mass. The patient denies hot/cold intolerance, fatigue, unexplained weight changes and change in voice quality. She does not have a history of head and neck radiation therapy. She does have a family history of thyroid disease. She is taking thyroid hormone supplementation. She has not undergone radioactive iodine treatment.      Review of patient's allergies indicates:   Allergen Reactions    Bactrim [sulfamethoxazole-trimethoprim] Other (See Comments)     Toxic reaction    Naproxen Nausea Only    Red dye Rash     Pt states has allergy to red dye not iron     Rifampin Rash       Current Outpatient Prescriptions   Medication Sig Dispense Refill    calcium carbonate-vit D3-min (CALTRATE 600+D PLUS MINERALS) 600 mg calcium- 400 unit Tab Take by mouth. 1 Tablet Oral Twice a day      guaifenesin (MUCINEX) 600 mg 12 hr tablet Take 1,200 mg by mouth 2 (two) times daily.        immune globulin, human, (POLYGAM) 5 gram injection Inject into the vein.        levothyroxine (SYNTHROID) 25 MCG tablet TAKE 1 AND 1/2 TABLET BY MOUTH ONCE DAILY BEFORE BREAKFAST 135 tablet 3    montelukast (SINGULAIR) 10 mg tablet Take 1 tablet (10 mg total) by mouth every evening. 90 tablet 4    neomycin-polymyxin-hydrocortisone (CORTISPORIN) otic solution   0    nitroGLYCERIN (NITROSTAT) 0.4 MG SL tablet Place 0.4 mg under the tongue every 5 (five) minutes as needed.        oxycodone-acetaminophen (PERCOCET) 5-325 mg per tablet Take 1 tablet by  mouth every 4 (four) hours as needed for Pain. 20 tablet 0    polymyxin B sulf-trimethoprim (POLYTRIM) 10,000 unit- 1 mg/mL Drop   2    simethicone (MYLICON) 80 MG chewable tablet Take by mouth. 1 tablet, chewable Oral Before meals and at bedtime.  chew tablets       Current Facility-Administered Medications   Medication Dose Route Frequency Provider Last Rate Last Dose    sodium chloride 0.9% flush 10 mL  10 mL Intravenous PRN Ashley Washington MD           Past Medical History   Diagnosis Date    Adult bronchiectasis     Amblyopia     Anxiety     Arthritis     Cataract     CHF (congestive heart failure)     Chin laceration     COPD (chronic obstructive pulmonary disease)     Depression     Hypertension     Onychomycosis     Strabismus     Supraventricular tachycardia     Thyroid disease      Past Surgical History   Procedure Laterality Date    Nasal septum surgery      Tonsillectomy      Eye surgery      Fracture surgery      Colon surgery      Rectal prolapse repair  june 2013    Radiofrequency ablation      Strabismus surgery       Social History   Substance Use Topics    Smoking status: Never Smoker    Smokeless tobacco: Never Used    Alcohol use No      Review of Systems:    Review of Systems   Constitutional: negative for chills, fatigue, fevers, malaise and night sweats  Eyes: negative for icterus and irritation  Respiratory: negative for cough and dyspnea on exertion  Cardiovascular: negative for chest pain and palpitations  Gastrointestinal: negative for constipation, diarrhea and dysphagia  Genitourinary:negative for dysuria, hematuria and nocturia  Integument/breast: negative for rash and skin color change  Hematologic/lymphatic: negative for bleeding and easy bruising  Neurological: negative for gait problems, headaches and seizures  Behavioral/Psych: negative for anxiety and depression  Endocrine: negative    ENT ROS: negative  Endocrine ROS:   negative for - hair pattern  "changes, malaise/lethargy, mood swings, palpitations or polydipsia/polyuria      OBJECTIVE:     Vital Signs:  Visit Vitals    /77 (BP Location: Left arm, Patient Position: Sitting, BP Method: Automatic)    Pulse 72    Temp 97.6 °F (36.4 °C)    Ht 5' 4" (1.626 m)    Wt 37.1 kg (81 lb 12.8 oz)    BMI 14.04 kg/m2      Body mass index is 14.04 kg/(m^2).      Physical Exam:    General:  no distress, see vitals for BMI    Eyes:  conjunctivae/corneas clear   Neck: trachea midline and symmetric, no adenopathy    Thyroid:  thyroid enlarged and thyroid a 4 cm nodule in  the right lobe(s)   Lung: clear to auscultation bilaterally   Heart:  regular rate and rhythm   Abdomen: soft, non-tender; bowel sounds normal; no masses,  no organomegaly   Skin/Extremities: warm and well-perfused   Pulses: 2+ and symmetric   Neuro: normal without focal findings and mental status, speech normal, alert and oriented x3       Laboratory/Radiologic Studies    Studies:  Date:       Results:     DEXA:   12/23/2015 Spine T Score: -1.9, Femoral neck T Score: -2.1 (right) and -1.7 (left),    Ultrasound:  12/13/2016 THYROID GLAND is enlarged in right lobe.  Vascularity is normal.    RIGHT LOBE of the thyroid measures:5.64x2.57x2.97 cm.  Complex nodule measures 4.36x3x3.3 cm. Grade 2 vascularity. Microcalcifications and ill defined anterior superior border.    ISTHMUS:0.21 cm.    LEFT LOBE measures:3.52x1.1x0.91 cm.    LYMPH NODES:benign    COMPARISON:2/15/2012. FNA 2/16/2012 benign. Right lobe nodule measured 3.3x2.8x2.5 cm.   Pathology::  12/23/2016 THYROID GLAND, RIGHT LOBE NODULE, FNA:  [facility] Thyroid Cytology Category: Follicular Lesion of Undetermined Significance (FLUS).  The majority of the specimen consists of benign follicular epithelial cells and colloid and most likely represents a  benign follicular nodule. There are focal areas with architectural atypia; however, these areas cannot be definitively  evaluated due to " obscuring blood and clotting artifact. Repeat FNA after an appropriate interval of observation  might be helpful if clinically indicated.              Component Value Date   TSH 2.951 10/28/2016   Free T4 0.73 10/28/2016   T3, Free 1.8 (L) 10/01/2013   Vit D, 25-Hydroxy 48 12/23/2015   Sodium 138 10/30/2016   Potassium 4.7 10/30/2016   Chloride 105 10/30/2016   CO2 27 10/30/2016   Glucose 75 10/30/2016   BUN, Bld 16 10/30/2016   Creatinine 0.6 10/30/2016   Calcium 8.1 (L) 10/30/2016   Anion Gap 6 (L) 10/30/2016   eGFR if African American >60.0 10/30/2016   eGFR if non African American >60.0 10/30/2016       ASSESSMENT/PLAN:       Assessment    Ms. Crews is a 66 y.o. female who presents with evidence of a dominant Thyroid nodule [E04.1].    Plan    During this visit, lab and imaging results were reviewed with the patient and her spouse. Thyroid anatomy and physiology were reviewed and she was given a copy of our patient education booklet, Understanding Thyroid Disease. The above testing and examination support the diagnosis of dominant thyroid nodule.     The options include short interval follow-up with repeat FNA and possible molecular testing vs diagnostic lobectomy vs upfront total thyroidectomy.  Potential complications of this operation were reviewed with the patient.  Based on the [place] criteria the patient carries a slightly increased risk of malignancy.  At this point the patient is inclined to proceed with surgery but is unclear of the extent at this time.    The risks and benefits of my recommendations, as well as other treatment options were discussed with the patient today. In addition, I discussed the nature of the disease process and the risk of surgery including, temporary or permanent hypoparathyroidism resulting in low blood calcium levels that require extensive medication to avoid serious degenerative conditions such as cataracts, brittle bones, muscle weakness, and muscle irritability. Other  risks include bleeding, infection, injury to the recurrent laryngeal nerves resulting in hoarseness or impairment of speech and the risks of general anesthetic including MI, CVA, sudden death, or even reaction to anesthetic medications. The patient understands the risks, benefits, and treatment alternatives. Any and all questions were answered to the patient's satisfaction. Written consent was obtained. Patient's questions were answered and she wishes to proceed with surgery.    The patient is very anxious to proceed with surgery.  Thyroid surgery is scheduled in the near future. Prior to this we will obtain a voice evaluation as the patient has a prior remote history of an esophageal perforation.  We will also get updated labs, pre-op anesthesia appt, PCP clearance. We will also ensure that the patient is medically optimized prior to procedure.

## 2017-01-25 NOTE — NURSING TRANSFER
Nursing Transfer Note      1/25/2017     Transfer To: [facility]    Transfer via stretcher    Transfer with na    Transported by pct    Medicines sent: none    Chart send with patient: Yes    Notified: spouse    Patient reassessed at: 1/25/16 1410    Pt went to floor with trach tray on stretcher

## 2017-01-25 NOTE — PROGRESS NOTES
Unable to assess pt. Pt undressing in rm.   0730- Pt clothes put in locker. Purse w/ spouse. Approved by [facility].

## 2017-01-25 NOTE — PROGRESS NOTES
Pt settled in room, , no immediate needs, call light explained and given to patient, no SCD ,machines on floor waiting for them to arrive. I. S at bedside

## 2017-01-25 NOTE — ANESTHESIA RELEASE NOTE
"Anesthesia Release from PACU Note    Patient: Yvette Crews    Procedure(s) Performed: Procedure(s) (LRB):  THYROIDECTOMY (total) (Bilateral)    Anesthesia type: general    Post pain: Adequate analgesia    Post assessment: no apparent anesthetic complications    Last Vitals:   Visit Vitals    /70    Pulse 62    Temp 36.4 °C (97.6 °F) (Oral)    Resp 10    Ht 5' 4" (1.626 m)    Wt 37.2 kg (82 lb)    SpO2 100%    BMI 14.08 kg/m2       Post vital signs: stable    Level of consciousness: awake and alert     Nausea/Vomiting: no nausea/no vomiting    Complications: none    Airway Patency: patent    Respiratory: unassisted, spontaneous ventilation    Cardiovascular: stable and blood pressure at baseline    Hydration: euvolemic  "

## 2017-01-25 NOTE — DISCHARGE INSTRUCTIONS
Recovering after Endocrine Surgery    Type of Procedure: Thyroidectomy    Postoperative clinic appointment date: One week after surgery    Activity:  You may resume normal daily activities upon discharge.  This includes walking and climbing stairs.  Avoid heavy lifting and vigorous exercise for approximately 2 weeks.    Showering:  You may resume normal showering and bathing approximately 2 days after surgery.  Your incision does not require special care and it may get wet.    Incision:  Your neck incision is closed with absorbable sutures under the skin.  You will not need to have any stitches removed.  Small pieces of tape called Steri-Strips cover the outside of your incision.  These strips will be removed during your postoperative visit.    Pain:  After surgery you may experience pain at the incision, generalized neck pain, or a sore throat.  You will be given a prescription for pain relief.  Most patients will still have discomfort 2-3 days after surgery.  Many times, over the counter pain medications, such as Extra Strength Tylenol, are effective.    Driving:  Use your judgment in resuming to drive.  Avoid driving if you are still experiencing neck pain and are using prescription pain medications.      Return to Work:  Recovery after surgery depends on the individual.  Most patients can expect to return to work approximately 1-2 weeks after surgery.    Diet:  You may resume your normal diet after surgery without any restrictions.    Constipation:  Anesthesia and pain medication can cause a decrease in bowel motility.  If you experience constipation postoperatively, you may take over the counter laxatives such as Milk of Magnesia.    Calcium Supplements:  Calcium tablets may be recommended after your surgery.  Most often this is to promote bone health and prevent low blood calcium during recovery.  Numbness and tingling in your fingertips or around your mouth may be experienced when calcium levels are low.  If  tingling or numbness occurs, call you doctor.  You may chew up several extra tablets to relieve symptoms from low calcium.  Tums may be substituted for calcium tablets.    Medications:  You may resume taking medication as instructed by your MD at discharge from the hospital.     Questions/Concerns:  If you have any questions or concerns please call your doctor's office: Tri Wiggins MD,  [phone] or after hours call [phone] and ask for the General Surgery Resident on call.

## 2017-01-25 NOTE — INTERVAL H&P NOTE
The patient has been examined and the H&P has been reviewed:    I concur with the findings and no changes have occurred since H&P was written.    Anesthesia/Surgery risks, benefits and alternative options discussed and understood by patient/family.          Active Hospital Problems    Diagnosis  POA    Thyroid nodule [E04.1]  Yes      Resolved Hospital Problems    Diagnosis Date Resolved POA   No resolved problems to display.

## 2017-01-25 NOTE — IP AVS SNAPSHOT
[facility]  [address]  [facility] [address]  Phone: [phone]           Patient Discharge Instructions     Our goal is to set you up for success. This packet includes information on your condition, medications, and your home care. It will help you to care for yourself so you don't get sicker and need to go back to the hospital.     Please ask your nurse if you have any questions.        There are many details to remember when preparing to leave the hospital. Here is what you will need to do:    1. Take your medicine. If you are prescribed medications, review your Medication List in the following pages. You may have new medications to  at the pharmacy and others that you'll need to stop taking. Review the instructions for how and when to take your medications. Talk with your doctor or nurses if you are unsure of what to do.     2. Go to your follow-up appointments. Specific follow-up information is listed in the following pages. Your may be contacted by a transition nurse or clinical provider about future appointments. Be sure we have all of the phone numbers to reach you, if needed. Please contact your provider's office if you are unable to make an appointment.     3. Watch for warning signs. Your doctor or nurse will give you detailed warning signs to watch for and when to call for assistance. These instructions may also include educational information about your condition. If you experience any of warning signs to your health, call your doctor.               [facility]  Unless otherwise directed by your provider, please contact [facility], our nurse care line that is available for 24/7 assistance.     [phone] (toll-free)    Registered nurses in the [facility] provide clinical advisement, health education, appointment booking, and other advisory services.                    ** Verify the list of medication(s) below is accurate and up  to date. Carry this with you in case of emergency. If your medications have changed, please notify your healthcare provider.             Medication List      START taking these medications        Additional Info                      calcium carbonate 400 mg (1,000 mg) Chew   Refills:  0    Instructions:  Take 1000mg elemental calcium twice daily.     Begin Date    AM    Noon    PM    Bedtime       oxycodone 5 MG immediate release tablet   Commonly known as:  ROXICODONE   Quantity:  21 tablet   Refills:  0   Dose:  5 mg    Last time this was given:  5 mg on 1/25/2017 12:00 PM   Instructions:  Take 1 tablet (5 mg total) by mouth every 4 (four) hours as needed.     Begin Date    AM    Noon    PM    Bedtime       oxycodone-acetaminophen 5-325 mg per tablet   Commonly known as:  PERCOCET   Quantity:  31 tablet   Refills:  0   Dose:  1 tablet    Instructions:  Take 1 tablet by mouth every 6 (six) hours as needed for Pain. Maximum dose of acetaminophen is 3000 mg from all sources in 24 hours.     Begin Date    AM    Noon    PM    Bedtime         CHANGE how you take these medications        Additional Info                      * levothyroxine 50 MCG tablet   Commonly known as:  SYNTHROID   Quantity:  30 tablet   Refills:  2   Dose:  50 mcg   What changed:    - medication strength  - how much to take  - how to take this  - when to take this  - additional instructions    Last time this was given:  50 mcg on 1/26/2017  6:42 AM   Instructions:  Take 1 tablet (50 mcg total) by mouth once daily.     Begin Date    AM    Noon    PM    Bedtime       * levothyroxine 50 MCG tablet   Commonly known as:  SYNTHROID   Quantity:  30 tablet   Refills:  11   Dose:  50 mcg   What changed:  You were already taking a medication with the same name, and this prescription was added. Make sure you understand how and when to take each.    Last time this was given:  50 mcg on 1/26/2017  6:42 AM   Instructions:  Take 1 tablet (50 mcg total) by mouth  before breakfast.     Begin Date    AM    Noon    PM    Bedtime       * Notice:  This list has 2 medication(s) that are the same as other medications prescribed for you. Read the directions carefully, and ask your doctor or other care provider to review them with you.      CONTINUE taking these medications        Additional Info                      CALTRATE 600+D PLUS MINERALS 600 mg calcium- 400 unit Tab   Refills:  0   Generic drug:  calcium carbonate-vit D3-min    Instructions:  Take by mouth. 1 Tablet Oral Twice a day     Begin Date    AM    Noon    PM    Bedtime       guaifenesin 600 mg 12 hr tablet   Commonly known as:  MUCINEX   Refills:  0   Dose:  1200 mg    Instructions:  Take 1,200 mg by mouth 2 (two) times daily.     Begin Date    AM    Noon    PM    Bedtime       immune globulin (human) 5 gram injection   Commonly known as:  POLYGAM   Refills:  0    Instructions:  Inject into the vein.     Begin Date    AM    Noon    PM    Bedtime       montelukast 10 mg tablet   Commonly known as:  SINGULAIR   Quantity:  90 tablet   Refills:  4   Dose:  10 mg    Instructions:  Take 1 tablet (10 mg total) by mouth every evening.     Begin Date    AM    Noon    PM    Bedtime       neomycin-polymyxin-hydrocortisone otic solution   Commonly known as:  CORTISPORIN   Refills:  0      Begin Date    AM    Noon    PM    Bedtime       nitroGLYCERIN 0.4 MG SL tablet   Commonly known as:  NITROSTAT   Refills:  0   Dose:  0.4 mg    Instructions:  Place 0.4 mg under the tongue every 5 (five) minutes as needed.     Begin Date    AM    Noon    PM    Bedtime       polymyxin B sulf-trimethoprim 10,000 unit- 1 mg/mL Drop   Commonly known as:  POLYTRIM   Refills:  2      Begin Date    AM    Noon    PM    Bedtime       simethicone 80 MG chewable tablet   Commonly known as:  MYLICON   Refills:  0    Instructions:  Take by mouth. 1 tablet, chewable Oral Before meals and at bedtime.  chew tablets PRN     Begin Date    AM    Noon    PM     Bedtime            Where to Get Your Medications      These medications were sent to [facility] [address] [facility]  [address] [facility] [address]     Phone:  [phone]     levothyroxine 50 MCG tablet    levothyroxine 50 MCG tablet    oxycodone-acetaminophen 5-325 mg per tablet         You can get these medications from any pharmacy     Bring a paper prescription for each of these medications     oxycodone 5 MG immediate release tablet       You don't need a prescription for these medications     calcium carbonate 400 mg (1,000 mg) Chew                  Please bring to all follow up appointments:    1. A copy of your discharge instructions.  2. All medicines you are currently taking in their original bottles.  3. Identification and insurance card.    Please arrive 15 minutes ahead of scheduled appointment time.    Please call 24 hours in advance if you must reschedule your appointment and/or time.        Your Scheduled Appointments     Feb 06, 2017  9:00 AM CST   Infusion 060 Min with INFECTIOUS INFUSION, CHAIR 7   [facility] ([facility])    [address]  [place] [address] [phone]            Mar 14, 2017  2:30 PM CDT   Established Patient Visit with Sally Green MD   [facility] - Internal Medicine ([facility])    [address]  [place] [address] [phone]              Follow-up Information     Follow up with Tri Wiggins MD In 1 week.    Specialty:  General Surgery    Contact information:    [address]  [address]  [facility] [address] [phone]        Referrals     Future Orders    Ambulatory referral to Outpatient Case Management     Questions:    Does the patient have a chronic or uncontrolled disease process?:  Yes    Does the patient have a new diagnosis of a catastrophic or life altering illness/treatment?:  Yes    Does the patient have any psycho-social issues that may  affect their ability to adhere to treatment plan?:  No    Does patient have any behaviors or circumstances that may impede ability to adhere to treatment plan?:  No    Is patient at risk for admission/readmission?:  Yes        Discharge Instructions     Future Orders    Activity as tolerated     Call MD for:  difficulty breathing or increased cough     Call MD for:  increased confusion or weakness     Call MD for:  persistent dizziness, light-headedness, or visual disturbances     Call MD for:  persistent nausea and vomiting or diarrhea     Call MD for:  redness, tenderness, or signs of infection (pain, swelling, redness, odor or green/yellow discharge around incision site)     Call MD for:  severe persistent headache     Call MD for:  severe uncontrolled pain     Call MD for:  temperature >100.4     Call MD for:  worsening rash     Call MD for:     Comments:    Other concerns    Diet general     Questions:    Total calories:      Fat restriction, if any:      Protein restriction, if any:      Na restriction, if any:      Fluid restriction:      Additional restrictions:      Shower on day dressing removed (No bath)         Discharge Instructions       Recovering after Endocrine Surgery    Type of Procedure: Thyroidectomy    Postoperative clinic appointment date: One week after surgery    Activity:  You may resume normal daily activities upon discharge.  This includes walking and climbing stairs.  Avoid heavy lifting and vigorous exercise for approximately 2 weeks.    Showering:  You may resume normal showering and bathing approximately 2 days after surgery.  Your incision does not require special care and it may get wet.    Incision:  Your neck incision is closed with absorbable sutures under the skin.  You will not need to have any stitches removed.  Small pieces of tape called Steri-Strips cover the outside of your incision.  These strips will be removed during your postoperative visit.    Pain:  After surgery you  may experience pain at the incision, generalized neck pain, or a sore throat.  You will be given a prescription for pain relief.  Most patients will still have discomfort 2-3 days after surgery.  Many times, over the counter pain medications, such as Extra Strength Tylenol, are effective.    Driving:  Use your judgment in resuming to drive.  Avoid driving if you are still experiencing neck pain and are using prescription pain medications.      Return to Work:  Recovery after surgery depends on the individual.  Most patients can expect to return to work approximately 1-2 weeks after surgery.    Diet:  You may resume your normal diet after surgery without any restrictions.    Constipation:  Anesthesia and pain medication can cause a decrease in bowel motility.  If you experience constipation postoperatively, you may take over the counter laxatives such as Milk of Magnesia.    Calcium Supplements:  Calcium tablets may be recommended after your surgery.  Most often this is to promote bone health and prevent low blood calcium during recovery.  Numbness and tingling in your fingertips or around your mouth may be experienced when calcium levels are low.  If tingling or numbness occurs, call you doctor.  You may chew up several extra tablets to relieve symptoms from low calcium.  Tums may be substituted for calcium tablets.    Medications:  You may resume taking medication as instructed by your MD at discharge from the hospital.     Questions/Concerns:  If you have any questions or concerns please call your doctor's office: Tri Wiggins MD,  [phone] or after hours call [phone] and ask for the General Surgery Resident on call.        Primary Diagnosis     Your primary diagnosis was:  Thyroid Nodule      Admission Information     Date & Time Provider Department [facility]    1/25/2017  6:07 AM Tri Wiggins MD [facility] [id number]      Care Providers     Provider Role Specialty Primary  "office phone    Tri Wiggins MD Attending Provider General Surgery [phone]    Tri Wiggins MD Surgeon  General Surgery [phone]      Your Vitals Were     BP Pulse Temp Resp Height Weight    125/71 (BP Location: Left arm, Patient Position: Lying, BP Method: Automatic) 66 97 °F (36.1 °C) (Oral) 14 5' 4" (1.626 m) 37.6 kg (83 lb)    SpO2 BMI             99% 14.25 kg/m2         Recent Lab Values        10/29/2016                           7:19 PM           A1C 5.3           Comment for A1C at  7:19 PM on 10/29/2016:  According to ADA guidelines, hemoglobin A1C <7.0% represents  optimal control in non-pregnant diabetic patients.  Different  metrics may apply to specific populations.   Standards of Medical Care in Diabetes - 2016.  For the purpose of screening for the presence of diabetes:  <5.7%     Consistent with the absence of diabetes  5.7-6.4%  Consistent with increasing risk for diabetes   (prediabetes)  >or=6.5%  Consistent with diabetes  Currently no consensus exists for use of hemoglobin A1C  for diagnosis of diabetes for children.        Pending Labs     Order Current Status    PTH, intact In process    Specimen to Pathology - Surgery In process      Allergies as of 1/26/2017        Reactions    Bactrim [Sulfamethoxazole-trimethoprim] Other (See Comments)    Toxic reaction    Naproxen Nausea Only    Red Dye Rash    Pt states has allergy to red dye not iron     Rifampin Rash      Advance Directives     An advance directive is a document which, in the event you are no longer able to make decisions for yourself, tells your healthcare team what kind of treatment you do or do not want to receive, or who you would like to make those decisions for you.  If you do not currently have an advance directive, [facility] encourages you to create one.  For more information call:  [phone]), [phone] ([phone]),  or log on to [email or web address].        Language Assistance " Services     ATTENTION: Language assistance services are available, free of charge. Please call [phone].      ATENCIÓN: Si habla español, tiene a chambers disposición servicios gratuitos de asistencia lingüística. Llame al [phone].     CHÚ Ý: N?u b?n nói Ti?ng Vi?t, có các d?ch v? h? tr? ngôn ng? mi?n phí dành cho b?n. G?i s? [phone].        Stroke Education               [facility] complies with applicable Federal civil rights laws and does not discriminate on the basis of race, color, national origin, age, disability, or sex.

## 2017-01-25 NOTE — OP NOTE
[facility]  Endocrine Surgery  Operative Report    SUMMARY     Date of Procedure: 1/25/2017     Procedure: Procedure(s) (LRB):  THYROIDECTOMY (total) (Bilateral)     Indications: This patient presents with a palpable nodule on the right side of the neck. Fine needle aspiration cytology revealed findings consistent with a FLUS. The patient now presents for an upfront total thyroidectomy.     Surgeon(s) and Role:     * Tri Wiggins MD - Primary     * Jonathan Schoen, MD - Resident - Assisting        Pre-Operative Diagnosis: Thyroid nodule [E04.1]    Post-Operative Diagnosis: Thyroid nodule [E04.1]    Anesthesia: General    Intraoperative Findings:     1.  A nodule was found within the thyroid lobe, located in the lower pole.  This measured approximately 5 cm in diameter and mobile from surrounding structures, there was no adenopathy noted upon exploration of the neck..   2. The bilateral recurrent laryngeal nerve was identified.  Function was verified using the NIMS system.  3. Parathyroid tissue was identified and preserved with a viable blood supply.       Description of the Findings of the Procedure:    The patient was seen in the Holding Room. The risks, benefits, complications, treatment options, and expected outcomes were discussed with the patient. The possibilities of reaction to medication, pulmonary aspiration, perforation of viscus, bleeding, recurrent infection, finding a normal thyroid, recurrently laryngeal nerve damage, the need for additional procedures, failure to diagnose a condition, and creating a complication requiring transfusion or operation were discussed with the patient. The patient concurred with the proposed plan, giving informed consent.  The site of surgery properly noted/marked. The patient was taken to Operating Room, identified as Yvette Crews and the procedure verified as Thyroidectomy. A Time Out was held and the above information confirmed.    The  patient was placed supine after induction of a general anesthetic. The neck was extended and prepped and draped in standard fashion. An 6 cm transverse cervical incision was created above the sternal notch within a natural skin fold. Dissection was carried down through the platysma layer. Once this was completed, sub-platysmal flaps were raised superiorly to the thyroid cartilage and inferiorly to the sternal notch. The strap muscles were identified and divided at the midline. Sharp and blunt dissection were used to mobilize the right thyroid lobe in a medial direction. A nodule was found within the thyroid lobe, located in the lower pole.  This measured approximately 5 cm in diameter and mobile from surrounding structures, there was no adenopathy noted upon exploration of the neck.. Dissection continued posteriorly to expose the tracheoesophageal groove and right carotid artery. The right thyroid lobe was mobilized further and the superior and inferior pole vessels were divided with silk ties and the harmonic scalpel. The middle thyroid vein was similarly divided with the harmonic scalpel. The right recurrent laryngeal nerve was identified and preserved. Function was verified using the NIMS.The right inferior parathyroid gland was identified, and the right superior parathyroid was noted dorsal to the nerve and left in situ. Small vessels were likewise divided. The gland was rotated in a medial direction and taken off the trachea using the bipolar cautery. The isthmus was removed off the thyroid cartilage also using the bipolar cautery.     Attention was then give to the left lobe.  Sharp and blunt dissection were again used to mobilize the left thyroid lobe in a medial direction. Dissection continued posteriorly to expose the tracheoesophageal groove and left carotid artery. Similar to the right, the left thyroid lobe was mobilized further and the superior and inferior pole vessels were divided with silk ties and  the harmonic scalpel. The middle thyroid vein was similarly divided with the harmonic scalpel. The left recurrent laryngeal nerve was identified and preserved. Function was verified using the NIMS. Small vessels were likewise divided. The left superior and inferior parathyroid glands were identified and preserved in situ. The gland was rotated in a medial direction and taken off the trachea using the bipolar cautery. The specimen was submitted to pathology for permanent evaluation. A suture was placed for orientation purposes (stitch marks the right superior pole).     The wound was irrigated and inspected carefully. Multiple Valsalva maneuvers were performed at 30 cm of water and additional hemostasis was achieved as necessary with focal application of bipolar cautery. This was augmented with Fibrillar which was placed in the thyroid fossa. The parathyroid tissue was found to be viable and the bilateral recurrent laryngeal nerve were left intact in their anatomic locations. Function was again verified using the NIMS prior to closure. 20 mL of Exparel mixed with Marcaine was placed into the strap muscles and subcutaneous tissues for post-op analgesia. The strap muscles were then closed with interrupted 3-0 Vicryl suture.  The platysma was closed with interrupted 3-0 Vicryl suture, and the skin incision was closed with a 5-0 Monocryl subcuticular knot-less closure. Sterile dressings were was applied across the incision.    Instrument, sponge, and needle counts were correct prior to closure and at the conclusion of the case.     Significant Surgical Tasks Conducted by the Assistant(s), if Applicable: none    Complications: No    Total IV Fluids: 1000 ml    Estimated Blood Loss (EBL): 30 mL           Drains: none    Implants: none    Specimens: Total thyroid - stitch marks the right superior pole            Condition: stable    Disposition: PACU - hemodynamically stable.    Attestation: I was present and scrubbed for  the entire procedure.

## 2017-01-25 NOTE — ANESTHESIA POSTPROCEDURE EVALUATION
"Anesthesia Post Evaluation    Patient: Yvette Crews    Procedure(s) Performed: Procedure(s) (LRB):  THYROIDECTOMY (total) (Bilateral)    Final Anesthesia Type: general  Patient location during evaluation: PACU  Patient participation: Yes- Able to Participate  Level of consciousness: awake and alert and oriented  Post-procedure vital signs: reviewed and stable  Pain management: adequate  Airway patency: patent  PONV status at discharge: No PONV  Anesthetic complications: no      Cardiovascular status: blood pressure returned to baseline  Respiratory status: unassisted and spontaneous ventilation  Hydration status: euvolemic  Follow-up not needed.        Visit Vitals    /70    Pulse 62    Temp 36.4 °C (97.6 °F) (Oral)    Resp 10    Ht 5' 4" (1.626 m)    Wt 37.2 kg (82 lb)    SpO2 100%    BMI 14.08 kg/m2       Pain/Greg Score: Pain Assessment Performed: Yes (1/25/2017 12:15 PM)  Presence of Pain: denies (1/25/2017 12:15 PM)  Pain Rating Prior to Med Admin: 0 (1/25/2017  1:49 PM)  Pain Rating Post Med Admin: 0 (1/25/2017  6:30 AM)  Greg Score: 9 (1/25/2017 12:15 PM)      "

## 2017-01-25 NOTE — PROGRESS NOTES
Please note the following patient has been assigned to Dominique Gloria RN with Outpatient Complex Care Mgmt for screening.      Please contact [facility] at ext [phone] with questions.     Thank you.  Daniela Martinez

## 2017-01-25 NOTE — BRIEF OP NOTE
[facility]  Brief Operative Note    SUMMARY     Surgery Date: 1/25/2017     Surgeon(s) and Role:     * Tri Wiggins MD - Primary     * Jonathan Schoen, MD - Resident - Assisting        Pre-op Diagnosis:  Thyroid nodule [E04.1]    Post-op Diagnosis:  Post-Op Diagnosis Codes:     * Thyroid nodule [E04.1]    Procedure(s) (LRB):  THYROIDECTOMY (total) (Bilateral)    Anesthesia: General    Description of Procedure: total thyroidectomy completed without difficulty.    Description of the findings of the procedure: as above    Estimated Blood Loss: 30 mL         Specimens:   Specimen (12h ago through future)    Start     Ordered    01/25/17 1030  Specimen to Pathology - Surgery  Once     Comments:  1. Total thyroid gland-stitch marks right superior pole-perm    01/25/17 1030

## 2017-01-25 NOTE — TRANSFER OF CARE
"Anesthesia Transfer of Care Note    Patient: Yvette Crews    Procedure(s) Performed: Procedure(s) (LRB):  THYROIDECTOMY (total) (Bilateral)    Patient location: PACU    Anesthesia Type: general    Transport from OR: Transported from OR on 6-10 L/min O2 by face mask with adequate spontaneous ventilation    Post pain: adequate analgesia    Post assessment: no apparent anesthetic complications    Post vital signs: stable    Level of consciousness: sedated    Nausea/Vomiting: no nausea/vomiting    Complications: none          Last vitals:   Visit Vitals    /66 (BP Location: Right arm, Patient Position: Lying, BP Method: Automatic)    Pulse 71    Temp 36.4 °C (97.6 °F) (Axillary)    Resp 14    Ht 5' 4" (1.626 m)    Wt 37.2 kg (82 lb)    SpO2 100%    BMI 14.08 kg/m2     "

## 2017-01-26 ENCOUNTER — TELEPHONE (OUTPATIENT)
Dept: ENDOCRINOLOGY | Facility: CLINIC | Age: 67
End: 2017-01-26

## 2017-01-26 VITALS
DIASTOLIC BLOOD PRESSURE: 71 MMHG | BODY MASS INDEX: 14.17 KG/M2 | HEART RATE: 66 BPM | RESPIRATION RATE: 14 BRPM | WEIGHT: 83 LBS | TEMPERATURE: 97 F | OXYGEN SATURATION: 99 % | HEIGHT: 64 IN | SYSTOLIC BLOOD PRESSURE: 125 MMHG

## 2017-01-26 PROCEDURE — 25000003 PHARM REV CODE 250: Performed by: SURGERY

## 2017-01-26 RX ORDER — LEVOTHYROXINE SODIUM 50 UG/1
50 TABLET ORAL
Qty: 30 TABLET | Refills: 11 | Status: SHIPPED | OUTPATIENT
Start: 2017-01-26 | End: 2017-02-02 | Stop reason: SDUPTHER

## 2017-01-26 RX ORDER — OXYCODONE HYDROCHLORIDE 5 MG/1
5 TABLET ORAL EVERY 4 HOURS PRN
Qty: 21 TABLET | Refills: 0 | Status: SHIPPED | OUTPATIENT
Start: 2017-01-26 | End: 2017-03-14

## 2017-01-26 RX ADMIN — LEVOTHYROXINE SODIUM 50 MCG: 50 TABLET ORAL at 06:01

## 2017-01-26 NOTE — DISCHARGE SUMMARY
[facility]  Discharge Summary  General Surgery      Admit Date: 1/25/2017    Discharge Date and Time: 1/26/2017  9:53 AM    Attending Physician: Dr. Tri Wiggins     Discharge Provider: Jonathan Schoen    Reason for Admission: surgery    Procedures Performed: Procedure(s) (LRB):  THYROIDECTOMY (total) (Bilateral)    Hospital Course (synopsis of major diagnoses, care, treatment, and services provided during the course of the hospital stay): The patient came into the hospital for a total thyroidectomy, tolerated it well, and was observed on the floor overnight.  Her calcium and PTH levels remained within normal limits and she did well overnight with no dysphagic symptoms, tolerated clears, and was ambulating and voiding without issues.  She was discharged in good condition from the floor on POD 1 with the below follow-up, instructions, and medications.       Consults: none    Significant Diagnostic Studies: none    Final Diagnoses:   Principal Problem: Thyroid nodule   Secondary Diagnoses:   Active Hospital Problems    Diagnosis  POA    *Thyroid nodule [E04.1]  Yes      Resolved Hospital Problems    Diagnosis Date Resolved POA   No resolved problems to display.       Discharged Condition: good    Disposition: Home or Self Care -- assisted living facility    Follow Up/Patient Instructions:     Medications:  Reconciled Home Medications:   Discharge Medication List as of 1/26/2017  9:41 AM      START taking these medications    Details   calcium carbonate 400 mg (1,000 mg) Chew Take 1000mg elemental calcium twice daily., OTC      oxycodone (ROXICODONE) 5 MG immediate release tablet Take 1 tablet (5 mg total) by mouth every 4 (four) hours as needed., Starting 1/26/2017, Until Discontinued, Print      oxycodone-acetaminophen (PERCOCET) 5-325 mg per tablet Take 1 tablet by mouth every 6 (six) hours as needed for Pain. Maximum dose of acetaminophen is 3000 mg from all sources in 24 hours.,  Starting 1/25/2017, Until Sat 2/4/17, Normal         CONTINUE these medications which have CHANGED    Details   !! levothyroxine (SYNTHROID) 50 MCG tablet Take 1 tablet (50 mcg total) by mouth once daily., Starting 1/25/2017, Until Thu 1/25/18, Normal      !! levothyroxine (SYNTHROID) 50 MCG tablet Take 1 tablet (50 mcg total) by mouth before breakfast., Starting 1/26/2017, Until Fri 1/26/18, Normal       !! - Potential duplicate medications found. Please discuss with provider.      CONTINUE these medications which have NOT CHANGED    Details   calcium carbonate-vit D3-min (CALTRATE 600+D PLUS MINERALS) 600 mg calcium- 400 unit Tab Take by mouth. 1 Tablet Oral Twice a day, Until Discontinued, Historical Med      guaifenesin (MUCINEX) 600 mg 12 hr tablet Take 1,200 mg by mouth 2 (two) times daily.  , Until Discontinued, Historical Med      immune globulin, human, (POLYGAM) 5 gram injection Inject into the vein.  , Until Discontinued, Historical Med      montelukast (SINGULAIR) 10 mg tablet Take 1 tablet (10 mg total) by mouth every evening., Starting 12/8/2016, Until Discontinued, Normal      neomycin-polymyxin-hydrocortisone (CORTISPORIN) otic solution Starting 7/4/2015, Until Discontinued, Historical Med      polymyxin B sulf-trimethoprim (POLYTRIM) 10,000 unit- 1 mg/mL Drop Starting 7/10/2015, Until Discontinued, Historical Med      nitroGLYCERIN (NITROSTAT) 0.4 MG SL tablet Place 0.4 mg under the tongue every 5 (five) minutes as needed.  , Until Discontinued, Historical Med      simethicone (MYLICON) 80 MG chewable tablet Take by mouth. 1 tablet, chewable Oral Before meals and at bedtime.  chew tablets PRN, Until Discontinued, Historical Med             Discharge Procedure Orders  Ambulatory referral to Outpatient Case Management   Referral Priority: Routine Referral Type: Consultation   Referral Reason: Specialty Services Required    Number of Visits Requested: 1      Diet general     Activity as tolerated      Shower on day dressing removed (No bath)     Call MD for:  temperature >100.4     Call MD for:  persistent nausea and vomiting or diarrhea     Call MD for:  severe uncontrolled pain     Call MD for:  redness, tenderness, or signs of infection (pain, swelling, redness, odor or green/yellow discharge around incision site)     Call MD for:  difficulty breathing or increased cough     Call MD for:  severe persistent headache     Call MD for:  worsening rash     Call MD for:  persistent dizziness, light-headedness, or visual disturbances     Call MD for:  increased confusion or weakness     Call MD for:   Order Comments: Other concerns     Remove dressing in 24 hours       Follow-up Information     Follow up with Tri Wiggins MD In 1 week.    Specialty:  General Surgery    Contact information:    [address] [id number] [phone]

## 2017-01-26 NOTE — PROGRESS NOTES
Progress Note     Endocrine Surgery   Postoperative Thyroidectomy    SUBJECTIVE:     Diet:  tolerating by mouth  Patient reports:  no symptoms of hypocalcemia  Patient:  Patient is not taking calcium supplementation.  Patient:  Patient is not taking vitamin D supplementation  The patient reports that her voice is: raspy and improving since last night.  She is able to generate a forceful cough.    OBJECTIVE:     Vitals  Temp:  [97.1 °F (36.2 °C)-97.8 °F (36.6 °C)] 97.8 °F (36.6 °C) (01/26 0400)  Pulse:  [55-88] 55 (01/26 0400)  BP: (118-154)/(63-86) 125/69 (01/26 0400)  Resp:  [10-18] 16 (01/26 0400)  SpO2:  [97 %-100 %] 100 % (01/26 0400)  Intake/Output:    Intake/Output Summary (Last 24 hours) at 01/26/17 0738  Last data filed at 01/25/17 1108   Gross per 24 hour   Intake             1000 ml   Output               50 ml   Net              950 ml     DEXTER present: no  Laboratory values:    No components found for: CA  No components found for: INTACTPTH  Physical Examination  Overall: no apparent distress  Neck exam: Neck is: flat. Swelling is minimal. There is no evidence of ecchymosis or hematoma formation. Incision is clean, dry and intact. Phonation is raspy and approaching baseline compared to last night.    ASSESSMENT/PLAN:     Assessment: patient is postoperative day # 1 s/p: Total thyroidectomy    Plan  Calcium supplementation: Patient is not taking calcium supplementation.  Vitamin D supplementation: Patient is not taking vitamin D supplementation  Other Medications: 50 mcg levothyroxine  daily  Patient may be discharged after seeing attending physician  Drain: Patient does not have a drain.   Follow-up visit with Tri Wiggins M.D.  in 1 week.

## 2017-01-26 NOTE — NURSING
Patient AAOX3, VSS. Patient discharge. Given discharge instructions. Refused Percocet prescription, nurse shredded prescription. Patient wheeled out by nurse with  to await transportation downstairs by [address] exit.

## 2017-01-26 NOTE — TELEPHONE ENCOUNTER
pls wants to know more information about her upcoming surgery.     pls can be reached at [phone]

## 2017-01-26 NOTE — PLAN OF CARE
Problem: Patient Care Overview  Goal: Plan of Care Review  Outcome: Ongoing (interventions implemented as appropriate)  Patient progressing with POC. Patient denies pain throughout shift. Vital signs stable. Afebrile. Safety and fall precautions active. Call light in reach. Will continue to monitor per frequent rounding.

## 2017-01-27 ENCOUNTER — TELEPHONE (OUTPATIENT)
Dept: ENDOCRINOLOGY | Facility: HOSPITAL | Age: 67
End: 2017-01-27

## 2017-01-30 ENCOUNTER — TELEPHONE (OUTPATIENT)
Dept: SURGERY | Facility: CLINIC | Age: 67
End: 2017-01-30

## 2017-01-30 NOTE — TELEPHONE ENCOUNTER
Attempted to call patient twice this am.  On Levo 50 mcg daily (based on weight).  Will plan to continue this until she has her TSH checked in 6-8 weeks per endocrine.  She will likely need a slight increase at that time.

## 2017-01-30 NOTE — TELEPHONE ENCOUNTER
----- Message from Luis Green MA sent at 1/30/2017  9:33 AM CST -----  Contact: self      ----- Message -----     From: Lanie Araiza     Sent: 1/30/2017   8:27 AM       To: Rosalie Bartlett Staff    Pt is calling in ref to a medication she was prescribed. She states the medication is the same dose as before and would like some reassurance that she is on the right track. Please call Yvette @[phone].Thank you.

## 2017-02-01 ENCOUNTER — OUTPATIENT CASE MANAGEMENT (OUTPATIENT)
Dept: ADMINISTRATIVE | Facility: OTHER | Age: 67
End: 2017-02-01

## 2017-02-01 ENCOUNTER — TELEPHONE (OUTPATIENT)
Dept: INTERNAL MEDICINE | Facility: CLINIC | Age: 67
End: 2017-02-01

## 2017-02-01 NOTE — PROGRESS NOTES
2/1/17  1st attempt to complete initial screen for Outpatient Case Management. Contacted Mrs. Yvette Crews who answers.  She reports presently walking around [place] and denies any needs at this time. She reports living in [facility]isted [facility] along with her spouse. She asks whether this call is due to her canceling her allergy appt.  Explained OPCM program.and that the referral was generated by her PCP & not related to her allergy appt. She denies barriers with med costs or access to her healthcare. S/p Total Thyroidectomy 1/25/17. She is aware of her post-op appt with surgery 2/2/17 and the need for ongoing labs & adjustments to her dosing for levothyroxine (Synthroid) currently at 50 mcg once daily. Pt with h/o falls, osteoporosis, multiple chronic conditions. She reports not using an AD while at the park but does use a walker for more crowded places like [facility]. Mrs. Crews indicates quickly that she is not interested in OPCM, that all needs are addressed at this time. Offered to call her at better time but again she declines. CM to mailed her information on OPCM and OOC contacts.     Notified Dr. Sally Green, PCP.   EPIC letter written and mailed to pt with contact information.   Referral closed.

## 2017-02-01 NOTE — LETTER
February 1, 2017    Yvette Crews  [address]  [place] [address]             [facility] [address] [place] [address] Dear Mrs. Crews:    I am the nurse  from Outpatient Case Management through [facility] who reached out to you this morning by phone---while you were enjoying the outdoors at the park.     Enclosed are brochures explaining [facility]'s free Outpatient Case Management program and the [facility], 24/7 nurse help line service.     If you have any questions or concerns, please don't hesitate to call.    Sincerely,        Dominique Gloria RN   Outpatient Case Management  TEL:  [phone]

## 2017-02-01 NOTE — TELEPHONE ENCOUNTER
----- Message from Dominique Gloria RN sent at 2/1/2017 11:01 AM CST -----  Contact: GOVIND Reece Dr./Staff:    Notification that Mrs. Yvette Crews has declined Outpatient Case Management services. She was referred through the new High Risk Report as eligible for OPCM.   She reports having all needs addressed.     Thank you,  ADONIS Reece, RN, CCM [facility]  TEL:  [phone]

## 2017-02-02 ENCOUNTER — PATIENT MESSAGE (OUTPATIENT)
Dept: SURGERY | Facility: CLINIC | Age: 67
End: 2017-02-02

## 2017-02-02 ENCOUNTER — OFFICE VISIT (OUTPATIENT)
Dept: SURGERY | Facility: CLINIC | Age: 67
End: 2017-02-02
Payer: MEDICARE

## 2017-02-02 VITALS
TEMPERATURE: 99 F | SYSTOLIC BLOOD PRESSURE: 123 MMHG | HEIGHT: 64 IN | BODY MASS INDEX: 13.78 KG/M2 | WEIGHT: 80.69 LBS | HEART RATE: 74 BPM | DIASTOLIC BLOOD PRESSURE: 72 MMHG

## 2017-02-02 DIAGNOSIS — Z09 POSTOP CHECK: Primary | ICD-10-CM

## 2017-02-02 DIAGNOSIS — E04.1 THYROID NODULE: ICD-10-CM

## 2017-02-02 PROCEDURE — 99024 POSTOP FOLLOW-UP VISIT: CPT | Mod: ,,, | Performed by: SURGERY

## 2017-02-02 PROCEDURE — 99213 OFFICE O/P EST LOW 20 MIN: CPT | Mod: PBBFAC | Performed by: SURGERY

## 2017-02-02 PROCEDURE — 99999 PR PBB SHADOW E&M-EST. PATIENT-LVL III: CPT | Mod: PBBFAC,,, | Performed by: SURGERY

## 2017-02-02 NOTE — Clinical Note
Patient s/p thyroid and will need TSH check in 6-8 weeks.  I know that this is close to Dr. Gonzalez's transition.  Please let me know if I need to send a new referral to another provider.  Alejandro,  alicia

## 2017-02-02 NOTE — MR AVS SNAPSHOT
[facility] - Endo Surgery  [address]  [place] [address]  Phone: [phone]                  Yvette Crews   2017 10:45 AM   Office Visit    Description:  Female : 1950   Provider:  Tri Wiggins MD   Department:  [facility] - Endo Surgery           Reason for Visit     Post-op Evaluation           Diagnoses this Visit        Comments    Postop check    -  Primary     Thyroid nodule                To Do List           Future Appointments        Provider Department Dept Phone    2017 9:00 AM INFECTIOUS INFUSION, CHAIR 7 [facility] [phone]    3/14/2017 2:30 PM Sally Green MD [facility] - Internal Medicine [phone]      Goals (5 Years of Data)     None      [facility]     [facility] Nurse Care Line -  Assistance  Registered nurses in the [facility] provide clinical advisement, health education, appointment booking, and other advisory services.  Call for this free service at [phone].             Medications           Message regarding Medications     Verify the changes and/or additions to your medication regime listed below are the same as discussed with your clinician today.  If any of these changes or additions are incorrect, please notify your healthcare provider.             Verify that the below list of medications is an accurate representation of the medications you are currently taking.  If none reported, the list may be blank. If incorrect, please contact your healthcare provider. Carry this list with you in case of emergency.           Current Medications     calcium carbonate 400 mg (1,000 mg) Chew Take 1000mg elemental calcium twice daily.    calcium carbonate-vit D3-min (CALTRATE 600+D PLUS MINERALS) 600 mg calcium- 400 unit Tab Take by mouth. 1 Tablet Oral Twice a day    guaifenesin (MUCINEX) 600 mg 12 hr tablet Take 1,200 mg by mouth 2 (two) times daily.      immune globulin, human,  "(POLYGAM) 5 gram injection Inject into the vein.      levothyroxine (SYNTHROID) 50 MCG tablet Take 1 tablet (50 mcg total) by mouth once daily.    montelukast (SINGULAIR) 10 mg tablet Take 1 tablet (10 mg total) by mouth every evening.    neomycin-polymyxin-hydrocortisone (CORTISPORIN) otic solution     nitroGLYCERIN (NITROSTAT) 0.4 MG SL tablet Place 0.4 mg under the tongue every 5 (five) minutes as needed.      oxycodone (ROXICODONE) 5 MG immediate release tablet Take 1 tablet (5 mg total) by mouth every 4 (four) hours as needed.    oxycodone-acetaminophen (PERCOCET) 5-325 mg per tablet Take 1 tablet by mouth every 6 (six) hours as needed for Pain. Maximum dose of acetaminophen is 3000 mg from all sources in 24 hours.    simethicone (MYLICON) 80 MG chewable tablet Take by mouth. 1 tablet, chewable Oral Before meals and at bedtime.  chew tablets PRN    polymyxin B sulf-trimethoprim (POLYTRIM) 10,000 unit- 1 mg/mL Drop            Clinical Reference Information           Your Vitals Were     BP Pulse Temp Height Weight BMI    123/72 (BP Location: Left arm, Patient Position: Sitting, BP Method: Automatic) 74 99.3 °F (37.4 °C) 5' 4" (1.626 m) 36.6 kg (80 lb 11.2 oz) 13.85 kg/m2      Blood Pressure          Most Recent Value    BP  123/72      Allergies as of 2/2/2017     Bactrim [Sulfamethoxazole-trimethoprim]    Naproxen    Red Dye    Rifampin      Immunizations Administered on Date of Encounter - 2/2/2017     None      Instructions    Questions About Your Scar    1. Why is my scar red?    You will notice during the first few weeks after surgery, the scar area will become red, firm, and hard. Scars often seem to become worse before they get better. This is normal. After about 6 weeks, the scar will begin to "mature." This means that the scar will soften and become less red. Over the next 4 months, the scar will slowly soft and will not be as red. It may take up to a year for the scar to mature.    2. What will the " "scar look like?    Most scars will become soft, flat, white lines over time.    3. When will the scars go away?    A scar is usually permanent. As the scar becomes softer and less red, it will begin to blend into the skin around the scar. It will become less visible.    4. Does putting Vitamin E oil on the scar help?    It is not known for sure if Vitamin E oil helps scars heal faster or makes them less visible. If you choose to use Vitamin E oil on the scar, it won't hurt. Applying any oil or lotion that can add moisture to the skin will help. Two weeks after surgery, it is a good idea to massage a scar gently but firmly for 5 minutes, two to four times each day. Use the oil or lotion of your choice when doing the massage.    5. What about sunlight and scars?    It is important to keep out of direct sunlight for up to one year after surgery. Too much direct sunlight makes a scar darker in color than the skin around the scar. You should use sunscreen with at least SPF #15 when outdoors.    6. What can I do to make the scar look better?    IT simply takes time for a scar to heal. If the scar is less than a year old, the scar may need to mature more. But some scars may get worse. If the scar line has become wide, more surgery can remove the wide area of the scar and make a more narrow line. If the scar has become higher, thicker, and redder, this may be a "keloidal" or "hypertrophic" scar.      Instructions for Scar Tissue Massage    The purpose of scar tissue massage is to soften the skin over scars and incision lines in order for the area to heal optimally. This exercise may also help prevent hypertrophic scarring (an area of excessive scar tissue).    A scar begins "maturing" at about three weeks after injury or surgery, and continues for 9 - 12 months.    You may begin performing scar tissue massage two to three weeks after your surgery. The pressure you apply initially should not be painful, rather a light " massage to promote circulation. As you are able to tolerate more pressure you may press more firmly on any little bumps or lumps along your incision.    Scar tissue massage is done by using your index and middle fingers. Begin by firmly and systematically applying the lotion in a circular motion across the scar/incision line. The doctor or nurse will demonstrate this method. Imagine your massage breaking up the little cells that have begun to collect at the incision site so that your body may reabsorb those cells.    This exercise should be done consistently for five minutes at a time; once in the morning and once in the evening.    You may use any skin moisturizer or cocoa butter and/or vitamin E cream to perform scar tissue massage.    It is important to remember that the effectiveness of scar tissue massage is obtained from the method in which it is done rather than the type of moisturizer used.    Scar tissue massage should be continued throughout the scar or incision's maturing process (9 - 12 months) as instructed.    It is necessary to protect your scar/incision from the sun's damaging rays for 9 - 12 months after surgery. A SPF (Sun Protection Factor) of 15 or 30 is adequate for most skin types.       Language Assistance Services     ATTENTION: Language assistance services are available, free of charge. Please call [phone].      ATENCIÓN: Si kathryn garcia, tiene a chambers disposición servicios gratuitos de asistencia lingüística. Llgadiel al [phone].     SESAR Ý: N?u b?n nói Ti?ng Vi?t, có các d?ch v? h? tr? ngôn ng? mi?n phí dành cho b?n. G?i s? [phone].         [facility] complies with applicable Federal civil rights laws and does not discriminate on the basis of race, color, national origin, age, disability, or sex.

## 2017-02-02 NOTE — PROGRESS NOTES
"Subjective:       Yvette Crews presents to the clinic 1 weeks following total thyroidectomy. Eating a regular diet without difficulty. Bowel movements are Normal.  Pain is controlled without any medications.. Patient admits mild confusion about how to take her thyroid medication (dosage). Patient denies trouble swallowing, trouble speaking and voice changes. She is currently on Calcium 1200 mg daily. She is not taking Rocaltrol. Patient is taking Levothyroxine (50 mcg daily).       Objective:        Visit Vitals    /83 (BP Location: Left arm, Patient Position: Sitting, BP Method: Automatic)    Pulse 84    Temp 99.3 °F (37.4 °C)    Ht 5' 4" (1.626 m)    Wt 36.6 kg (80 lb 11.2 oz)    BMI 13.85 kg/m2       General:   alert, appears stated age and cooperative. Chovstek's sign absent.   Incision:   well approximated, healing well, no signs of drainage, no erythema, no dehiscence, no swelling, no hematoma, no ecchymosis, no seroma and swelling (none)   Voice:  normal     FINAL PATHOLOGIC DIAGNOSIS  TOTAL THYROIDECTOMY SPECIMEN WEIGHING 77 G SHOWING:  A 4 CM BENIGN FOLLICULAR NODULE PRESENT IN THE RIGHT LOBE. THERE IS CONSIDERABLE OLD  AND RECENT HEMORRHAGE AS WELL AS REACTIVE CHANGES AND MICROCALCIFICATIONS PRESENT.  NO VASCULAR INVASION OR DEFINITIVE CAPSULAR INVASION IDENTIFIED. MULTIPLE SECTIONS FROM  THE ISTHMUS AND LEFT LOBE SHOW BENIGN THYROID PARENCHYMA.  NOTE: THIS CASE WAS REVIEWED BY DR. WARD WHO CONCURS WITH THE DIAGNOSIS.    Labs:  Lab Results   Component Value Date    CALCIUM 8.6 (L) 01/18/2017    CALCIUM 8.1 (L) 10/30/2016    CALCIUM 8.4 (L) 10/29/2016    TSH 2.951 10/28/2016    TSH 2.370 10/27/2016    TSH 2.433 08/09/2016    FREET4 0.73 10/28/2016    FREET4 0.88 10/01/2013    FREET4 0.83 06/18/2012    WZJOJFMI33HM 48 12/23/2015    AFZGNNCE38JM 45 02/15/2012    [id number] 58 09/17/2009    PTH 27.0 01/25/2017    PTH 54.0 01/18/2017    PTH 30 09/17/2009    PHOS 3.3 10/30/2016    " PHOS 3.2 10/29/2016    PHOS 4.2 10/28/2016    CAION 1.13 01/25/2017            Assessment:     Yvette Crews is doing well post-operatively after total thyroidectomy.         Plan:        1. Continue any current medications (Levo 50mcg daily).  I called and confirmed with the pharmacy that this was the only dosage and others were voided.  2. Wound care and scar massage discussed.  3. Pt is to increase activities as tolerated.  4. Follow up with endocrinology for the possibility for further treatment and adjustment of medication.

## 2017-02-02 NOTE — LETTER
Endocrine/General Surgery  [address] [place] [address]  Phone: [phone]  Fax: [phone] February 3, 2017         Miah Gonzalez MD  [address] [facility] [address]    Patient: Yvette Crews   YOB: 1950   Date of Visit: 2/2/2017     Dear Dr. Gonzalez:    I wanted to let you know that I operated on Yvette Crews. She underwent a total thyroidectomy on 1/25/2017. You will find a copy of the operative report and final pathology in the [employer] system for your review. She clinically is doing very well, and I suspect her recovery will be uneventful. She is on thyroid replacement (Levothyroxine 50 mcg daily), and is on calcium supplementation (pre-operative dosing).  She plans on seeing you in 6-8 weeks with repeat laboratory studies.     If you have any questions or concerns, please contact me. Again, thank you for allowing me to participate in the care of this patient.     Regards,        Tri Wiggins MD      CC  Sally Green MD  [address]  [place] [address]  VIA In Basket

## 2017-02-03 ENCOUNTER — TELEPHONE (OUTPATIENT)
Dept: ENDOCRINOLOGY | Facility: CLINIC | Age: 67
End: 2017-02-03

## 2017-02-03 DIAGNOSIS — E89.0 HYPOTHYROIDISM, POSTSURGICAL: Primary | ICD-10-CM

## 2017-02-03 RX ORDER — LEVOTHYROXINE SODIUM 50 UG/1
50 TABLET ORAL DAILY
Qty: 30 TABLET | Refills: 11 | Status: SHIPPED | OUTPATIENT
Start: 2017-02-03 | End: 2017-03-14 | Stop reason: SDUPTHER

## 2017-02-03 NOTE — TELEPHONE ENCOUNTER
----- Message from Tri Wiggins MD sent at 2/3/2017 11:47 AM CST -----  Patient s/p thyroid and will need TSH check in 6-8 weeks.  I know that this is close to Dr. Gonzalez's transition.  Please let me know if I need to send a new referral to another provider.    Best,    aob

## 2017-02-03 NOTE — TELEPHONE ENCOUNTER
TSH in 6 weeks  Called patient and congratulated benign diagnosis  She needs levothyroxine 50mcg  TSH 6 weeks  Told retiring soon

## 2017-02-06 ENCOUNTER — INFUSION (OUTPATIENT)
Dept: INFECTIOUS DISEASES | Facility: HOSPITAL | Age: 67
End: 2017-02-06
Attending: ALLERGY & IMMUNOLOGY
Payer: MEDICARE

## 2017-02-06 VITALS
HEIGHT: 64 IN | DIASTOLIC BLOOD PRESSURE: 73 MMHG | WEIGHT: 80 LBS | HEART RATE: 76 BPM | SYSTOLIC BLOOD PRESSURE: 126 MMHG | BODY MASS INDEX: 13.66 KG/M2 | TEMPERATURE: 98 F

## 2017-02-06 DIAGNOSIS — M81.0 SENILE OSTEOPOROSIS: Primary | ICD-10-CM

## 2017-02-06 PROCEDURE — 63600175 PHARM REV CODE 636 W HCPCS: Performed by: INTERNAL MEDICINE

## 2017-02-06 PROCEDURE — 96366 THER/PROPH/DIAG IV INF ADDON: CPT

## 2017-02-06 PROCEDURE — 96365 THER/PROPH/DIAG IV INF INIT: CPT

## 2017-02-06 RX ADMIN — HUMAN IMMUNOGLOBULIN G 20 G: 20 LIQUID INTRAVENOUS at 09:02

## 2017-03-06 ENCOUNTER — INFUSION (OUTPATIENT)
Dept: INFECTIOUS DISEASES | Facility: HOSPITAL | Age: 67
End: 2017-03-06
Attending: ALLERGY & IMMUNOLOGY
Payer: MEDICARE

## 2017-03-06 VITALS
DIASTOLIC BLOOD PRESSURE: 82 MMHG | RESPIRATION RATE: 18 BRPM | TEMPERATURE: 98 F | BODY MASS INDEX: 13.73 KG/M2 | HEART RATE: 83 BPM | SYSTOLIC BLOOD PRESSURE: 117 MMHG | WEIGHT: 80 LBS

## 2017-03-06 DIAGNOSIS — M81.0 SENILE OSTEOPOROSIS: Primary | ICD-10-CM

## 2017-03-06 PROCEDURE — 96366 THER/PROPH/DIAG IV INF ADDON: CPT

## 2017-03-06 PROCEDURE — 96365 THER/PROPH/DIAG IV INF INIT: CPT

## 2017-03-06 PROCEDURE — 63600175 PHARM REV CODE 636 W HCPCS: Performed by: INTERNAL MEDICINE

## 2017-03-06 RX ADMIN — HUMAN IMMUNOGLOBULIN G 20 G: 20 LIQUID INTRAVENOUS at 09:03

## 2017-03-06 NOTE — PROGRESS NOTES
Pt tolerated transfusion without difficulty, no C/O pain/discomfort noted at this time, pt escorted to [facility] entrance awaiting transportation at this time

## 2017-03-14 ENCOUNTER — OFFICE VISIT (OUTPATIENT)
Dept: INTERNAL MEDICINE | Facility: CLINIC | Age: 67
End: 2017-03-14
Payer: MEDICARE

## 2017-03-14 VITALS — BODY MASS INDEX: 13.7 KG/M2 | HEIGHT: 64 IN | OXYGEN SATURATION: 99 % | WEIGHT: 80.25 LBS | HEART RATE: 66 BPM

## 2017-03-14 DIAGNOSIS — R26.9 GAIT DISTURBANCE: Primary | ICD-10-CM

## 2017-03-14 DIAGNOSIS — E03.9 ACQUIRED HYPOTHYROIDISM: ICD-10-CM

## 2017-03-14 DIAGNOSIS — R14.0 ABDOMINAL BLOATING: ICD-10-CM

## 2017-03-14 DIAGNOSIS — R63.6 UNDERWEIGHT: ICD-10-CM

## 2017-03-14 DIAGNOSIS — K59.00 COLONIC CONSTIPATION: ICD-10-CM

## 2017-03-14 DIAGNOSIS — R29.6 FREQUENT FALLS: ICD-10-CM

## 2017-03-14 PROCEDURE — 99999 PR PBB SHADOW E&M-EST. PATIENT-LVL III: CPT | Mod: PBBFAC,,, | Performed by: FAMILY MEDICINE

## 2017-03-14 PROCEDURE — 99213 OFFICE O/P EST LOW 20 MIN: CPT | Mod: PBBFAC | Performed by: FAMILY MEDICINE

## 2017-03-14 PROCEDURE — 99214 OFFICE O/P EST MOD 30 MIN: CPT | Mod: S$PBB,,, | Performed by: FAMILY MEDICINE

## 2017-03-14 RX ORDER — LEVOTHYROXINE SODIUM 25 UG/1
25 TABLET ORAL DAILY
Qty: 90 TABLET | Refills: 0 | Status: SHIPPED | OUTPATIENT
Start: 2017-03-14 | End: 2017-03-16

## 2017-03-14 RX ORDER — LEVOTHYROXINE SODIUM 50 UG/1
50 TABLET ORAL DAILY
Qty: 90 TABLET | Refills: 0 | Status: SHIPPED | OUTPATIENT
Start: 2017-03-14 | End: 2017-03-16

## 2017-03-14 RX ORDER — POLYETHYLENE GLYCOL 3350 17 G/17G
POWDER, FOR SOLUTION ORAL
Status: ON HOLD | COMMUNITY
End: 2021-01-19 | Stop reason: SDUPTHER

## 2017-03-14 NOTE — MR AVS SNAPSHOT
[facility]  [address]  [facility] [address]  Phone: [phone]  Fax: [phone]                  Yvette Crews   3/14/2017 2:30 PM   Office Visit    Description:  Female : 1950   Provider:  Sally Green MD   Department:  [facility]           Reason for Visit     Follow-up           Diagnoses this Visit        Comments    Acquired hypothyroidism    -  Primary     Colonic constipation         Gait disturbance         Frequent falls         Underweight         Abdominal bloating                To Do List           Future Appointments        Provider Department Dept Phone    4/3/2017 9:00 AM INFECTIOUS INFUSION, CHAIR 6 [facility] [phone]      Goals (5 Years of Data)     None      Follow-Up and Disposition     Return in about 4 months (around 2017), or if symptoms worsen or fail to improve.       These Medications        Disp Refills Start End    levothyroxine (SYNTHROID) 50 MCG tablet 90 tablet 0 3/14/2017 3/14/2018    Take 1 tablet (50 mcg total) by mouth once daily. - Oral    Pharmacy: [address] Ph #: [phone]       levothyroxine (SYNTHROID) 25 MCG tablet 90 tablet 0 3/14/2017 3/14/2018    Take 1 tablet (25 mcg total) by mouth once daily. - Oral    Pharmacy: [address] Ph #: [phone]         [facility]     [facility] -  Assistance  Registered nurses in the [facility] provide clinical advisement, health education, appointment booking, and other advisory services.  Call for this free service at [phone].             Medications           Message regarding Medications     Verify the changes and/or additions to your medication regime listed below are the same as discussed with your clinician today.  If any of these changes or additions are incorrect, please notify your healthcare  "provider.        START taking these NEW medications        Refills    levothyroxine (SYNTHROID) 25 MCG tablet 0    Sig: Take 1 tablet (25 mcg total) by mouth once daily.    Class: Normal    Route: Oral      STOP taking these medications     oxycodone (ROXICODONE) 5 MG immediate release tablet Take 1 tablet (5 mg total) by mouth every 4 (four) hours as needed.           Verify that the below list of medications is an accurate representation of the medications you are currently taking.  If none reported, the list may be blank. If incorrect, please contact your healthcare provider. Carry this list with you in case of emergency.           Current Medications     calcium carbonate 400 mg (1,000 mg) Chew Take 1000mg elemental calcium twice daily.    calcium carbonate-vit D3-min (CALTRATE 600+D PLUS MINERALS) 600 mg calcium- 400 unit Tab Take by mouth. 1 Tablet Oral Twice a day    immune globulin, human, (POLYGAM) 5 gram injection Inject into the vein.      levothyroxine (SYNTHROID) 50 MCG tablet Take 1 tablet (50 mcg total) by mouth once daily.    montelukast (SINGULAIR) 10 mg tablet Take 1 tablet (10 mg total) by mouth every evening.    neomycin-polymyxin-hydrocortisone (CORTISPORIN) otic solution     polyethylene glycol (GLYCOLAX) 17 gram PwPk Take by mouth.    polymyxin B sulf-trimethoprim (POLYTRIM) 10,000 unit- 1 mg/mL Drop     simethicone (MYLICON) 80 MG chewable tablet Take by mouth. 1 tablet, chewable Oral Before meals and at bedtime.  chew tablets PRN    guaifenesin (MUCINEX) 600 mg 12 hr tablet Take 1,200 mg by mouth 2 (two) times daily.      levothyroxine (SYNTHROID) 25 MCG tablet Take 1 tablet (25 mcg total) by mouth once daily.    nitroGLYCERIN (NITROSTAT) 0.4 MG SL tablet Place 0.4 mg under the tongue every 5 (five) minutes as needed.             Clinical Reference Information           Your Vitals Were     Pulse Height Weight SpO2 BMI    66 5' 4" (1.626 m) 36.4 kg (80 lb 4 oz) 99% 13.77 kg/m2      Allergies " as of 3/14/2017     Bactrim [Sulfamethoxazole-trimethoprim]    Naproxen    Red Dye    Rifampin      Immunizations Administered on Date of Encounter - 3/14/2017     None      Orders Placed During Today's Visit      Normal Orders This Visit    Ambulatory Referral to Physical/Occupational Therapy     Future Labs/Procedures Expected by Expires    CT Abdomen Pelvis  Without Contrast  3/14/2017 3/14/2018      Language Assistance Services     ATTENTION: Language assistance services are available, free of charge. Please call [phone].      ATENCIÓN: Si habla radha, tiene a chambers disposición servicios gratuitos de asistencia lingüística. Llame al [phone].     CHÚ Ý: N?u b?n nói Ti?ng Vi?t, có các d?ch v? h? tr? ngôn ng? mi?n phí dành cho b?n. G?i s? [phone].         [facility] complies with applicable Federal civil rights laws and does not discriminate on the basis of race, color, national origin, age, disability, or sex.

## 2017-03-14 NOTE — PROGRESS NOTES
"Subjective:       Patient ID: Yvette Crews is a 66 y.o. female.    Chief Complaint: Follow-up    HPI 67 y/o female with adult bronchiectasis, hypogammaglobulinemia, acquired hypothyroidism with hx of MNG s/p total thyroidectomy 1/25/17, osteopenia, hx of SVT s/p ablation in the past, MDD and VICTORINO is here for follow up. She is having constipation, she is on stool softener bid for 3 days and it has not helped, she has the miralax but not taking it daily. She has been getting around with her walker, she last fell a week ago, she is no longer doing PT but would like to get back for gait training and balance.  She reports she feels tired and stressed out all the time, denies f/n/v/d/cp/palpitations/sob/urinary sx.  She is sleeping fair, she says she gets nervous and cannot sleep, appetite fair, she is eating 3 meals a day. She gets full easily.  She is currently on 50 mcg of levothyroxine.  No gyn evaluation in a "while"    Review of Systems   Constitutional: Positive for fatigue. Negative for activity change, appetite change and fever.   Respiratory: Negative for cough and shortness of breath.    Cardiovascular: Negative for chest pain, palpitations and leg swelling.   Gastrointestinal: Positive for constipation. Negative for diarrhea, nausea and vomiting.   Genitourinary: Negative for difficulty urinating and dysuria.   Musculoskeletal: Positive for gait problem.   Skin: Negative for rash and wound.   Neurological: Positive for weakness. Negative for dizziness and light-headedness.        Frequent falls   Psychiatric/Behavioral: Negative for sleep disturbance. The patient is nervous/anxious.        Objective:      Pulse 66  Ht 5' 4" (1.626 m)  Wt 36.4 kg (80 lb 4 oz)  SpO2 99%  BMI 13.77 kg/m2  Physical Exam   Constitutional: She appears well-developed.   Very thin with temporal wasting   HENT:   Head: Normocephalic and atraumatic.   Mouth/Throat: No oropharyngeal exudate.   Neck: Normal range of motion. " Neck supple. No thyromegaly present.   Cardiovascular: Normal rate, regular rhythm and normal heart sounds.    Pulmonary/Chest: Effort normal and breath sounds normal. No respiratory distress.   Abdominal: Soft. Bowel sounds are normal. She exhibits no distension and no mass. There is no tenderness. There is no rebound and no guarding. No hernia.   Musculoskeletal: She exhibits no edema.   Lymphadenopathy:     She has no cervical adenopathy.   Neurological: She is alert.   Skin: Skin is warm and dry.   Psychiatric: She has a normal mood and affect.   Nursing note and vitals reviewed.      Assessment:       1. Gait disturbance    2. Acquired hypothyroidism    3. Colonic constipation    4. Frequent falls    5. Underweight    6. Abdominal bloating        Plan:   Yvette was seen today for follow-up.    Diagnoses and all orders for this visit:    Gait disturbance  -     Ambulatory Referral to Physical/Occupational Therapy  -     CT Abdomen Pelvis  Without Contrast; Future    Acquired hypothyroidism  -     Discontinue: levothyroxine (SYNTHROID) 50 MCG tablet; Take 1 tablet (50 mcg total) by mouth once daily.  -     Discontinue: levothyroxine (SYNTHROID) 25 MCG tablet; Take 1 tablet (25 mcg total) by mouth once daily.    Colonic constipation  -     CT Abdomen Pelvis  Without Contrast; Future    Frequent falls  -     Ambulatory Referral to Physical/Occupational Therapy  -     CT Abdomen Pelvis  Without Contrast; Future    Underweight  -     CT Abdomen Pelvis  Without Contrast; Future    Abdominal bloating  -     CT Abdomen Pelvis  Without Contrast; Future

## 2017-03-16 ENCOUNTER — TELEPHONE (OUTPATIENT)
Dept: ENDOCRINOLOGY | Facility: CLINIC | Age: 67
End: 2017-03-16

## 2017-03-16 ENCOUNTER — LAB VISIT (OUTPATIENT)
Dept: LAB | Facility: HOSPITAL | Age: 67
End: 2017-03-16
Attending: INTERNAL MEDICINE
Payer: MEDICARE

## 2017-03-16 DIAGNOSIS — E03.9 ACQUIRED HYPOTHYROIDISM: Primary | ICD-10-CM

## 2017-03-16 DIAGNOSIS — E89.0 HYPOTHYROIDISM, POSTSURGICAL: Primary | ICD-10-CM

## 2017-03-16 DIAGNOSIS — E03.9 ACQUIRED HYPOTHYROIDISM: ICD-10-CM

## 2017-03-16 LAB
T4 FREE SERPL-MCNC: 0.73 NG/DL
TSH SERPL DL<=0.005 MIU/L-ACNC: 14.92 UIU/ML

## 2017-03-16 PROCEDURE — 36415 COLL VENOUS BLD VENIPUNCTURE: CPT

## 2017-03-16 PROCEDURE — 84443 ASSAY THYROID STIM HORMONE: CPT

## 2017-03-16 PROCEDURE — 84439 ASSAY OF FREE THYROXINE: CPT

## 2017-03-16 RX ORDER — LEVOTHYROXINE SODIUM 75 UG/1
75 TABLET ORAL DAILY
Qty: 30 TABLET | Refills: 11 | Status: SHIPPED | OUTPATIENT
Start: 2017-03-16 | End: 2017-06-29 | Stop reason: DRUGHIGH

## 2017-03-16 NOTE — TELEPHONE ENCOUNTER
The following lab tests are abnormal:  TSH elevated  Increase to levothyroxine 75mcg/d  TSH in 2 months  Checked with patinet and taking 50mcg now  Does not think has red dye allergy

## 2017-03-17 ENCOUNTER — TELEPHONE (OUTPATIENT)
Dept: INTERNAL MEDICINE | Facility: CLINIC | Age: 67
End: 2017-03-17

## 2017-03-17 NOTE — TELEPHONE ENCOUNTER
----- Message from Yee Maher sent at 3/16/2017  4:54 PM CDT -----  Contact: Patient  The patient would like to know if you faxed the referral to [facility], fax number [phone].  The patient is asking that you contact her whether yes or no about whether this was done. You can reach her at [phone].    Thanks!

## 2017-03-17 NOTE — TELEPHONE ENCOUNTER
----- Message from Coleen Arroyo sent at 3/16/2017 12:00 PM CDT -----  Contact: Self/[phone]  Pt said that she is calling in regards to needing to get a referral faxed to [facility] [phone]   AttnKun. Please call and advise            Thank you

## 2017-03-23 ENCOUNTER — HOSPITAL ENCOUNTER (OUTPATIENT)
Dept: RADIOLOGY | Facility: HOSPITAL | Age: 67
Discharge: HOME | End: 2017-03-23
Attending: FAMILY MEDICINE
Payer: MEDICARE

## 2017-03-23 DIAGNOSIS — R63.6 UNDERWEIGHT: ICD-10-CM

## 2017-03-23 DIAGNOSIS — R14.0 ABDOMINAL BLOATING: ICD-10-CM

## 2017-03-23 DIAGNOSIS — R29.6 FREQUENT FALLS: ICD-10-CM

## 2017-03-23 DIAGNOSIS — K59.00 COLONIC CONSTIPATION: ICD-10-CM

## 2017-03-23 DIAGNOSIS — R26.9 GAIT DISTURBANCE: ICD-10-CM

## 2017-03-23 PROCEDURE — 74176 CT ABD & PELVIS W/O CONTRAST: CPT | Mod: 26,,, | Performed by: RADIOLOGY

## 2017-03-23 PROCEDURE — 74176 CT ABD & PELVIS W/O CONTRAST: CPT | Mod: TC

## 2017-03-31 ENCOUNTER — TELEPHONE (OUTPATIENT)
Dept: SURGERY | Facility: CLINIC | Age: 67
End: 2017-03-31

## 2017-03-31 NOTE — TELEPHONE ENCOUNTER
----- Message from Jayleen Velasquez sent at 3/30/2017  2:42 PM CDT -----  Contact: self  Yvette States that she needs a call returned by a nurse in reference to some general questions she doesn't want any pain medications.   Please call Yvette @ [phone]  . Thanks :)

## 2017-03-31 NOTE — TELEPHONE ENCOUNTER
Pt reports that she is not on any pain meds any longer and does not ever want to be on any.  She asked me to take all pain meds off her list.  Informed her she did not have any pain meds on her med list. Asked her if her pharmacist if giving her a hard time.  She said they keep sending her pain meds.   Advised that it will not be from Dr. Wiggins and she needs to call pharmacist to discuss this and to have him not send.  She v/u.

## 2017-04-03 ENCOUNTER — INFUSION (OUTPATIENT)
Dept: INFECTIOUS DISEASES | Facility: HOSPITAL | Age: 67
End: 2017-04-03
Attending: INTERNAL MEDICINE
Payer: MEDICARE

## 2017-04-03 VITALS
HEART RATE: 87 BPM | OXYGEN SATURATION: 98 % | TEMPERATURE: 97 F | DIASTOLIC BLOOD PRESSURE: 79 MMHG | SYSTOLIC BLOOD PRESSURE: 120 MMHG | BODY MASS INDEX: 13.73 KG/M2 | WEIGHT: 80 LBS | RESPIRATION RATE: 20 BRPM

## 2017-04-03 DIAGNOSIS — M81.0 SENILE OSTEOPOROSIS: Primary | ICD-10-CM

## 2017-04-03 PROCEDURE — 96365 THER/PROPH/DIAG IV INF INIT: CPT

## 2017-04-03 PROCEDURE — 96366 THER/PROPH/DIAG IV INF ADDON: CPT

## 2017-04-03 PROCEDURE — 63600175 PHARM REV CODE 636 W HCPCS: Performed by: INTERNAL MEDICINE

## 2017-04-03 RX ADMIN — HUMAN IMMUNOGLOBULIN G 20 G: 20 LIQUID INTRAVENOUS at 09:04

## 2017-04-03 NOTE — PROGRESS NOTES
Pt tolerated Privigen infusion without difficulty, no C/O pain/discomfort noted at this time, pt escorted to [place] entrance awaiting transportation at this time

## 2017-04-06 ENCOUNTER — TELEPHONE (OUTPATIENT)
Dept: PULMONOLOGY | Facility: CLINIC | Age: 67
End: 2017-04-06

## 2017-04-06 DIAGNOSIS — J32.9 CHRONIC SINUSITIS, UNSPECIFIED LOCATION: ICD-10-CM

## 2017-04-06 RX ORDER — AMOXICILLIN AND CLAVULANATE POTASSIUM 875; 125 MG/1; MG/1
1 TABLET, FILM COATED ORAL 2 TIMES DAILY
Qty: 20 TABLET | Refills: 0 | Status: SHIPPED | OUTPATIENT
Start: 2017-04-06 | End: 2017-04-16

## 2017-04-06 NOTE — TELEPHONE ENCOUNTER
Pt reports pain in sinus area for the past 1-2 weeks.  She feels she is having a flare of sinusitis, and has had a good result with Augmentin in the past.  She is also having general malaise/fatigue.  Recent thyroid studies show elevated TSH, and she has recently increased her dose of synthroid.  This may help with her constitutional symptoms.

## 2017-04-06 NOTE — TELEPHONE ENCOUNTER
----- Message from Brii Araiza sent at 4/6/2017  9:27 AM CDT -----  Contact: Self  Pt is calling to speak with Ms. Wyman or Dr. Hilton regarding the office calling in medication; Augmentin.    She can be reached at [phone].    Thank you.

## 2017-04-26 ENCOUNTER — TELEPHONE (OUTPATIENT)
Dept: INTERNAL MEDICINE | Facility: CLINIC | Age: 67
End: 2017-04-26

## 2017-04-26 DIAGNOSIS — R26.9 GAIT DISTURBANCE: Primary | ICD-10-CM

## 2017-04-26 DIAGNOSIS — R29.6 FREQUENT FALLS: ICD-10-CM

## 2017-04-26 NOTE — TELEPHONE ENCOUNTER
----- Message from Kenyon Ca sent at 4/26/2017 11:21 AM CDT -----  Contact: self [phone]  Patient would like an order for PT for  strength and balance sent to [facility] .  Requesting 3 times away if possible . [facility] number [phone] call for fax .       Please advise ,Thanks !

## 2017-04-28 ENCOUNTER — TELEPHONE (OUTPATIENT)
Dept: INTERNAL MEDICINE | Facility: CLINIC | Age: 67
End: 2017-04-28

## 2017-04-28 NOTE — TELEPHONE ENCOUNTER
----- Message from Coleen Arroyo sent at 4/28/2017  1:59 PM CDT -----  Contact: [facility]/Yoko/[phone]    fax/[phone]  Yoko said that she is calling in regards to needing to get a copy of Face to face and office visit notes on pt. Please call and advise              Thank you

## 2017-05-01 ENCOUNTER — TELEPHONE (OUTPATIENT)
Dept: INTERNAL MEDICINE | Facility: CLINIC | Age: 67
End: 2017-05-01

## 2017-05-01 ENCOUNTER — INFUSION (OUTPATIENT)
Dept: INFECTIOUS DISEASES | Facility: HOSPITAL | Age: 67
End: 2017-05-01
Attending: INTERNAL MEDICINE
Payer: MEDICARE

## 2017-05-01 VITALS
SYSTOLIC BLOOD PRESSURE: 114 MMHG | HEIGHT: 64 IN | RESPIRATION RATE: 16 BRPM | WEIGHT: 80 LBS | OXYGEN SATURATION: 97 % | HEART RATE: 78 BPM | TEMPERATURE: 97 F | BODY MASS INDEX: 13.66 KG/M2 | DIASTOLIC BLOOD PRESSURE: 75 MMHG

## 2017-05-01 DIAGNOSIS — M81.0 SENILE OSTEOPOROSIS: Primary | ICD-10-CM

## 2017-05-01 PROCEDURE — 63600175 PHARM REV CODE 636 W HCPCS: Performed by: INTERNAL MEDICINE

## 2017-05-01 PROCEDURE — 96366 THER/PROPH/DIAG IV INF ADDON: CPT

## 2017-05-01 PROCEDURE — 96365 THER/PROPH/DIAG IV INF INIT: CPT

## 2017-05-01 RX ADMIN — HUMAN IMMUNOGLOBULIN G 20 G: 20 LIQUID INTRAVENOUS at 09:05

## 2017-05-02 ENCOUNTER — TELEPHONE (OUTPATIENT)
Dept: INTERNAL MEDICINE | Facility: CLINIC | Age: 67
End: 2017-05-02

## 2017-05-02 NOTE — TELEPHONE ENCOUNTER
----- Message from Janice Moreira MA sent at 5/2/2017 12:59 PM CDT -----  Contact: self - [phone]  Requesting to have her appt changed to an hour instead of 30 minutes for herself and her  on 7/19/17. Please call. Thanks!

## 2017-05-16 ENCOUNTER — PATIENT MESSAGE (OUTPATIENT)
Dept: INTERNAL MEDICINE | Facility: CLINIC | Age: 67
End: 2017-05-16

## 2017-05-17 ENCOUNTER — PATIENT MESSAGE (OUTPATIENT)
Dept: INTERNAL MEDICINE | Facility: CLINIC | Age: 67
End: 2017-05-17

## 2017-05-29 ENCOUNTER — LAB VISIT (OUTPATIENT)
Dept: LAB | Facility: HOSPITAL | Age: 67
End: 2017-05-29
Attending: INTERNAL MEDICINE
Payer: MEDICARE

## 2017-05-29 ENCOUNTER — INFUSION (OUTPATIENT)
Dept: INFECTIOUS DISEASES | Facility: HOSPITAL | Age: 67
End: 2017-05-29
Attending: ALLERGY & IMMUNOLOGY
Payer: MEDICARE

## 2017-05-29 VITALS
HEART RATE: 63 BPM | DIASTOLIC BLOOD PRESSURE: 71 MMHG | WEIGHT: 80 LBS | SYSTOLIC BLOOD PRESSURE: 117 MMHG | RESPIRATION RATE: 16 BRPM | HEIGHT: 64 IN | TEMPERATURE: 97 F | BODY MASS INDEX: 13.66 KG/M2

## 2017-05-29 DIAGNOSIS — D80.1 HYPOGAMMAGLOBULINEMIA: Primary | ICD-10-CM

## 2017-05-29 DIAGNOSIS — E89.0 HYPOTHYROIDISM, POSTSURGICAL: ICD-10-CM

## 2017-05-29 DIAGNOSIS — M81.0 SENILE OSTEOPOROSIS: ICD-10-CM

## 2017-05-29 LAB
T4 FREE SERPL-MCNC: 0.89 NG/DL
TSH SERPL DL<=0.005 MIU/L-ACNC: 5.82 UIU/ML

## 2017-05-29 PROCEDURE — 96366 THER/PROPH/DIAG IV INF ADDON: CPT

## 2017-05-29 PROCEDURE — 84443 ASSAY THYROID STIM HORMONE: CPT

## 2017-05-29 PROCEDURE — 36415 COLL VENOUS BLD VENIPUNCTURE: CPT

## 2017-05-29 PROCEDURE — 96365 THER/PROPH/DIAG IV INF INIT: CPT

## 2017-05-29 PROCEDURE — 84439 ASSAY OF FREE THYROXINE: CPT

## 2017-05-29 PROCEDURE — 63600175 PHARM REV CODE 636 W HCPCS: Performed by: INTERNAL MEDICINE

## 2017-05-29 RX ADMIN — HUMAN IMMUNOGLOBULIN G 20 G: 20 LIQUID INTRAVENOUS at 09:05

## 2017-05-31 ENCOUNTER — TELEPHONE (OUTPATIENT)
Dept: INTERNAL MEDICINE | Facility: CLINIC | Age: 67
End: 2017-05-31

## 2017-06-01 ENCOUNTER — OFFICE VISIT (OUTPATIENT)
Dept: PULMONOLOGY | Facility: CLINIC | Age: 67
End: 2017-06-01
Payer: MEDICARE

## 2017-06-01 VITALS
SYSTOLIC BLOOD PRESSURE: 142 MMHG | HEIGHT: 64 IN | DIASTOLIC BLOOD PRESSURE: 80 MMHG | BODY MASS INDEX: 13.43 KG/M2 | HEART RATE: 65 BPM | OXYGEN SATURATION: 99 % | WEIGHT: 78.69 LBS | RESPIRATION RATE: 16 BRPM

## 2017-06-01 DIAGNOSIS — J02.9 ACUTE PHARYNGITIS, UNSPECIFIED ETIOLOGY: ICD-10-CM

## 2017-06-01 DIAGNOSIS — E89.0 STATUS POST THYROIDECTOMY: ICD-10-CM

## 2017-06-01 DIAGNOSIS — J47.9 ADULT BRONCHIECTASIS: Primary | ICD-10-CM

## 2017-06-01 PROCEDURE — 99213 OFFICE O/P EST LOW 20 MIN: CPT | Mod: S$PBB,,, | Performed by: INTERNAL MEDICINE

## 2017-06-01 PROCEDURE — 99213 OFFICE O/P EST LOW 20 MIN: CPT | Mod: PBBFAC | Performed by: INTERNAL MEDICINE

## 2017-06-01 PROCEDURE — 99999 PR PBB SHADOW E&M-EST. PATIENT-LVL III: CPT | Mod: PBBFAC,,, | Performed by: INTERNAL MEDICINE

## 2017-06-02 NOTE — PROGRESS NOTES
"Subjective:       Patient ID: Yvette Crews is a 67 y.o. female.    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  She is here today for   evaluation of a recent sore throat.  She states that this has been present for 3   to 4 days.  She has not had fever.  She continues to have a chronic morning   cough with modest sputum production.  At present, her sputum is nonpurulent in   character.  In addition to the sore throat, she also feels a "fullness" in her   ears.    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  She feels that these symptoms have resolved.    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    The pathology reports from her surgery appear to indicate a benign histology.      CB/HN  dd: 06/01/2017 21:21:19 (CDT)  td: 06/02/2017 13:24:08 (CDT)  Doc ID   #[id number]  Job ID #[id number]    CC:       Review of Systems    Objective:      Physical Exam   Constitutional: She is oriented to person, place, and time. She appears well-developed. She appears cachectic.   HENT:   Head: Normocephalic.   Right Ear: External ear normal.   Left Ear: External ear normal.   Nose: Nose normal.   Mouth/Throat: Oropharynx is clear and moist. No oropharyngeal exudate.   Throat not erythematous and no exudate.  TMs clear.   Neck: Normal range of motion. No JVD present. No tracheal deviation present. No thyromegaly present.   Cardiovascular: Normal rate, regular rhythm and normal heart sounds.    No murmur heard.  Pulmonary/Chest: No stridor. She has no wheezes. She has no rhonchi. She has no rales. She exhibits no tenderness.   Abdominal: Soft. There is no tenderness.   Musculoskeletal: She exhibits no edema.   Lymphadenopathy:     She has no cervical adenopathy.   Neurological: She is alert and oriented to person, " place, and time. Gait abnormal.   Skin: Skin is warm and dry. No rash noted. No erythema. Nails show no clubbing.   Psychiatric: She has a normal mood and affect.   Vitals reviewed.    Personal Diagnostic Review    No flowsheet data found.      Assessment:       1. Adult bronchiectasis    2. Acute pharyngitis, unspecified etiology    3. Status post thyroidectomy        Outpatient Encounter Prescriptions as of 6/1/2017   Medication Sig Dispense Refill    calcium carbonate 400 mg (1,000 mg) Chew Take 1000mg elemental calcium twice daily.  0    calcium carbonate-vit D3-min (CALTRATE 600+D PLUS MINERALS) 600 mg calcium- 400 unit Tab Take by mouth. 1 Tablet Oral Twice a day      guaifenesin (MUCINEX) 600 mg 12 hr tablet Take 1,200 mg by mouth 2 (two) times daily.        immune globulin, human, (POLYGAM) 5 gram injection Inject into the vein.        levothyroxine (SYNTHROID) 75 MCG tablet Take 1 tablet (75 mcg total) by mouth once daily. 30 tablet 11    montelukast (SINGULAIR) 10 mg tablet Take 1 tablet (10 mg total) by mouth every evening. 90 tablet 4    neomycin-polymyxin-hydrocortisone (CORTISPORIN) otic solution   0    nitroGLYCERIN (NITROSTAT) 0.4 MG SL tablet Place 0.4 mg under the tongue every 5 (five) minutes as needed.        polyethylene glycol (GLYCOLAX) 17 gram PwPk Take by mouth.      polymyxin B sulf-trimethoprim (POLYTRIM) 10,000 unit- 1 mg/mL Drop   2    simethicone (MYLICON) 80 MG chewable tablet Take by mouth. 1 tablet, chewable Oral Before meals and at bedtime.  chew tablets PRN       Facility-Administered Encounter Medications as of 6/1/2017   Medication Dose Route Frequency Provider Last Rate Last Dose    sodium chloride 0.9% flush 10 mL  10 mL Intravenous PRN Ashley Washington MD         No orders of the defined types were placed in this encounter.    Plan:     Begin trial with topical analgesic for throat.  Call if symptom persists.  Discussed the early role for antibiotics for treatment  of respiratory infection.  Call/return as needed.

## 2017-06-02 NOTE — PATIENT INSTRUCTIONS
Begin trial with topical analgesic for throat.  Call if symptom persists.  Discussed the early role for antibiotics for treatment of respiratory infection.  Call/return as needed.

## 2017-06-11 ENCOUNTER — PATIENT MESSAGE (OUTPATIENT)
Dept: INTERNAL MEDICINE | Facility: CLINIC | Age: 67
End: 2017-06-11

## 2017-06-26 ENCOUNTER — INFUSION (OUTPATIENT)
Dept: INFECTIOUS DISEASES | Facility: HOSPITAL | Age: 67
End: 2017-06-26
Attending: INTERNAL MEDICINE
Payer: MEDICARE

## 2017-06-26 VITALS
HEIGHT: 64 IN | SYSTOLIC BLOOD PRESSURE: 122 MMHG | WEIGHT: 78 LBS | BODY MASS INDEX: 13.32 KG/M2 | TEMPERATURE: 99 F | HEART RATE: 76 BPM | OXYGEN SATURATION: 100 % | RESPIRATION RATE: 15 BRPM | DIASTOLIC BLOOD PRESSURE: 64 MMHG

## 2017-06-26 DIAGNOSIS — D80.1 HYPOGAMMAGLOBULINEMIA: Primary | ICD-10-CM

## 2017-06-26 DIAGNOSIS — M81.0 SENILE OSTEOPOROSIS: ICD-10-CM

## 2017-06-26 PROCEDURE — 96365 THER/PROPH/DIAG IV INF INIT: CPT

## 2017-06-26 PROCEDURE — 96366 THER/PROPH/DIAG IV INF ADDON: CPT

## 2017-06-26 PROCEDURE — 63600175 PHARM REV CODE 636 W HCPCS: Performed by: INTERNAL MEDICINE

## 2017-06-26 RX ADMIN — HUMAN IMMUNOGLOBULIN G 20 G: 20 LIQUID INTRAVENOUS at 09:06

## 2017-06-29 ENCOUNTER — OFFICE VISIT (OUTPATIENT)
Dept: ENDOCRINOLOGY | Facility: CLINIC | Age: 67
End: 2017-06-29
Payer: MEDICARE

## 2017-06-29 VITALS
HEART RATE: 78 BPM | DIASTOLIC BLOOD PRESSURE: 77 MMHG | WEIGHT: 81.13 LBS | HEIGHT: 64 IN | SYSTOLIC BLOOD PRESSURE: 117 MMHG | BODY MASS INDEX: 13.85 KG/M2

## 2017-06-29 DIAGNOSIS — E89.0 POSTOPERATIVE HYPOTHYROIDISM: ICD-10-CM

## 2017-06-29 DIAGNOSIS — R63.6 UNDERWEIGHT: ICD-10-CM

## 2017-06-29 DIAGNOSIS — J47.9 ADULT BRONCHIECTASIS: ICD-10-CM

## 2017-06-29 DIAGNOSIS — M81.0 SENILE OSTEOPOROSIS: Primary | ICD-10-CM

## 2017-06-29 PROBLEM — E04.1 RIGHT THYROID NODULE: Status: RESOLVED | Noted: 2017-01-09 | Resolved: 2017-06-29

## 2017-06-29 PROBLEM — E04.1 THYROID NODULE: Status: RESOLVED | Noted: 2017-01-25 | Resolved: 2017-06-29

## 2017-06-29 PROCEDURE — 1157F ADVNC CARE PLAN IN RCRD: CPT | Mod: ,,, | Performed by: INTERNAL MEDICINE

## 2017-06-29 PROCEDURE — 99999 PR PBB SHADOW E&M-EST. PATIENT-LVL III: CPT | Mod: PBBFAC,,, | Performed by: INTERNAL MEDICINE

## 2017-06-29 PROCEDURE — 1126F AMNT PAIN NOTED NONE PRSNT: CPT | Mod: ,,, | Performed by: INTERNAL MEDICINE

## 2017-06-29 PROCEDURE — 99213 OFFICE O/P EST LOW 20 MIN: CPT | Mod: PBBFAC | Performed by: INTERNAL MEDICINE

## 2017-06-29 PROCEDURE — 99214 OFFICE O/P EST MOD 30 MIN: CPT | Mod: S$PBB,,, | Performed by: INTERNAL MEDICINE

## 2017-06-29 PROCEDURE — 1159F MED LIST DOCD IN RCRD: CPT | Mod: ,,, | Performed by: INTERNAL MEDICINE

## 2017-06-29 RX ORDER — LEVOTHYROXINE SODIUM 88 UG/1
88 TABLET ORAL
Qty: 30 TABLET | Refills: 2 | Status: SHIPPED | OUTPATIENT
Start: 2017-06-29 | End: 2017-08-24 | Stop reason: SDUPTHER

## 2017-06-29 NOTE — PROGRESS NOTES
Subjective:      Patient ID: Yvette Crews is a 67 y.o. female.    Chief Complaint:  Hypothyroidism      History of Present Illness  Ms. Crews returns to clinic today for Hypothyroidism and Osteoporosis follow up     She is a prior patient of Dr. 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 THERE IS CONSIDERABLE OLD  AND RECENT HEMORRHAGE AS WELL AS REACTIVE CHANGES AND MICROCALCIFICATIONS PRESENT.  NO VASCULAR INVASION OR DEFINITIVE CAPSULAR INVASION IDENTIFIED. MULTIPLE SECTIONS FROM  THE ISTHMUS AND LEFT LOBE SHOW BENIGN THYROID PARENCHYMA.  NOTE: THIS CASE WAS REVIEWED BY DR. WARD WHO CONCURS WITH THE DIAGNOSIS.    She is currently taking Levothyroxine 75 mcg daily   She reports taking LT4 every morning with water and waiting 30-45 minutes before eating or taking other medications     She denies missing or skipping doses   Current labs show TSH - elevated at 10.082 uIu/mL from 06/26/2017    She endorses dry skin, fatigue, cold intolerance, and constipation     Regarding Osteoporosis:   Last BMD 12/2015 - showed Osteopenia to dual femur, right femoral neck and AP spine   Received Reclast in 10/2013, 12/2014 and 12/2015 , according to clinic record   Reclast discontinued in 2016     She reports frequent falls since last visit, but denies recent fractures     She is taking calcium and vitamin D daily   Also reports eating a calcium rich die including milk, broccoli and spinach     Bronchiectasis is the biggest problem    Review of Systems   Constitutional: Positive for fatigue.  Negative for unexpected weight change.   HENT: Negative for hearing loss, trouble swallowing and voice change.    Eyes: Positive for visual disturbance.        To get prism lenses   Respiratory: Positive for cough and shortness of breath.    Cardiovascular: Negative for chest pain, palpitations and leg swelling.   Gastrointestinal: Negative for constipation and diarrhea.   Endocrine: Positive for cold intolerance.   Musculoskeletal: Negative for arthralgias and back pain.        Neck pain and gets therapy   Skin: Negative for rash.        Dry skin    Neurological: Negative for headaches.   Hematological: Bruises/bleeds easily.       Objective:   Physical Exam   Constitutional: She is oriented to person, place, and time.   Very thin, frail appearing female    Neck: No thyromegaly present.   Cardiovascular: Normal rate and regular rhythm.    No murmur heard.  Pulmonary/Chest: Effort normal and breath sounds normal.   Musculoskeletal: She exhibits no edema.   Ambulates with walker   Tandem gait good     Lymphadenopathy:     She has no cervical adenopathy.   Neurological: She is alert and oriented to person, place, and time. She has normal reflexes.   Skin: Skin is warm and dry.   Psychiatric: Her behavior is normal. Thought content normal.   Nursing note and vitals reviewed.    Lab Review:   Results for orders placed or performed in visit on 06/26/17   TSH   Result Value Ref Range    TSH 10.082 (H) 0.400 - 4.000 uIU/mL   T4, free   Result Value Ref Range    Free T4 0.82 0.71 - 1.51 ng/dL     Assessment:     1. Senile osteoporosis  Vitamin D    DXA Bone Density Spine And Hip   2. Postoperative hypothyroidism  TSH    levothyroxine (SYNTHROID) 88 MCG tablet   3. Adult bronchiectasis     4. Underweight       Plan:   1. Osteoporosis   - denies recent fractures   - RDA of calcium and vitamin D, calcium from food sources are preferred   - emphasized fall safety and fall precautions   - weight bearing exercises as tolerated    - repeat BMD due 12/2017   - will determine need for treatment at that time     2. Postoperative hypothyroidism   - clinically and biochemically hypothyroid   - increase Levothyroxine to 88 mcg daily   - repeat TFTs in 8 weeks   - avoid exogenous hyperthyroidism as this can accelerate bone loss and increase risk of CV complications     3. Adult bronchiectasis   - stable   - followed by Pulmonology     4. Underweight   - encouraged to increase caloric intake   - fresh fruit smoothies ?   - increase Ensure to 2 cans daily     Orders Placed This Encounter   Procedures    DXA Bone Density Spine And Hip     Standing Status:   Future     Standing Expiration Date:   6/29/2018     Order Specific Question:   May the Radiologist modify the order per protocol to meet the clinical needs of the patient?     Answer:   Yes    TSH     Standing Status:   Future     Standing Expiration Date:   6/24/2018    Vitamin D     Standing Status:   Future     Standing Expiration Date:   8/28/2018

## 2017-07-06 ENCOUNTER — TELEPHONE (OUTPATIENT)
Dept: INFECTIOUS DISEASES | Facility: CLINIC | Age: 67
End: 2017-07-06

## 2017-07-06 DIAGNOSIS — D80.1 HYPOGAMMAGLOBULINEMIA: Primary | ICD-10-CM

## 2017-07-11 ENCOUNTER — HOSPITAL ENCOUNTER (OUTPATIENT)
Dept: RADIOLOGY | Facility: HOSPITAL | Age: 67
Discharge: HOME | End: 2017-07-11
Attending: FAMILY MEDICINE
Payer: MEDICARE

## 2017-07-11 VITALS — WEIGHT: 81 LBS | BODY MASS INDEX: 13.83 KG/M2 | HEIGHT: 64 IN

## 2017-07-11 DIAGNOSIS — Z12.31 VISIT FOR SCREENING MAMMOGRAM: ICD-10-CM

## 2017-07-11 PROCEDURE — 77067 SCR MAMMO BI INCL CAD: CPT | Mod: TC

## 2017-07-11 PROCEDURE — 77067 SCR MAMMO BI INCL CAD: CPT | Mod: 26,,, | Performed by: RADIOLOGY

## 2017-07-19 ENCOUNTER — OFFICE VISIT (OUTPATIENT)
Dept: INTERNAL MEDICINE | Facility: CLINIC | Age: 67
End: 2017-07-19
Payer: MEDICARE

## 2017-07-19 VITALS
WEIGHT: 79.81 LBS | OXYGEN SATURATION: 99 % | HEART RATE: 63 BPM | SYSTOLIC BLOOD PRESSURE: 130 MMHG | BODY MASS INDEX: 13.62 KG/M2 | HEIGHT: 64 IN | DIASTOLIC BLOOD PRESSURE: 78 MMHG

## 2017-07-19 DIAGNOSIS — M81.0 SENILE OSTEOPOROSIS: ICD-10-CM

## 2017-07-19 DIAGNOSIS — J32.9 CHRONIC SINUSITIS, UNSPECIFIED LOCATION: ICD-10-CM

## 2017-07-19 DIAGNOSIS — E89.0 POSTOPERATIVE HYPOTHYROIDISM: ICD-10-CM

## 2017-07-19 DIAGNOSIS — D72.819 LEUKOPENIA, UNSPECIFIED TYPE: ICD-10-CM

## 2017-07-19 DIAGNOSIS — K59.00 COLONIC CONSTIPATION: ICD-10-CM

## 2017-07-19 DIAGNOSIS — J47.9 ADULT BRONCHIECTASIS: ICD-10-CM

## 2017-07-19 DIAGNOSIS — Z12.4 SCREENING FOR CERVICAL CANCER: ICD-10-CM

## 2017-07-19 DIAGNOSIS — J44.9 CHRONIC OBSTRUCTIVE PULMONARY DISEASE, UNSPECIFIED COPD TYPE: ICD-10-CM

## 2017-07-19 DIAGNOSIS — D80.1 HYPOGAMMAGLOBULINEMIA: Primary | ICD-10-CM

## 2017-07-19 DIAGNOSIS — R63.6 UNDERWEIGHT: ICD-10-CM

## 2017-07-19 DIAGNOSIS — F33.42 MAJOR DEPRESSIVE DISORDER, RECURRENT EPISODE, IN FULL REMISSION: ICD-10-CM

## 2017-07-19 PROCEDURE — 1157F ADVNC CARE PLAN IN RCRD: CPT | Mod: ,,, | Performed by: FAMILY MEDICINE

## 2017-07-19 PROCEDURE — 99214 OFFICE O/P EST MOD 30 MIN: CPT | Mod: S$PBB,,, | Performed by: FAMILY MEDICINE

## 2017-07-19 PROCEDURE — 99999 PR PBB SHADOW E&M-EST. PATIENT-LVL III: CPT | Mod: PBBFAC,,, | Performed by: FAMILY MEDICINE

## 2017-07-19 PROCEDURE — 99213 OFFICE O/P EST LOW 20 MIN: CPT | Mod: PBBFAC | Performed by: FAMILY MEDICINE

## 2017-07-19 PROCEDURE — 87624 HPV HI-RISK TYP POOLED RSLT: CPT

## 2017-07-19 PROCEDURE — 1126F AMNT PAIN NOTED NONE PRSNT: CPT | Mod: ,,, | Performed by: FAMILY MEDICINE

## 2017-07-19 PROCEDURE — 1159F MED LIST DOCD IN RCRD: CPT | Mod: ,,, | Performed by: FAMILY MEDICINE

## 2017-07-19 PROCEDURE — 88175 CYTOPATH C/V AUTO FLUID REDO: CPT

## 2017-07-19 NOTE — PROGRESS NOTES
"Subjective:       Patient ID: Yvette Crews is a 67 y.o. female.    Chief Complaint: Follow-up    HPI 66 y/o female with adult bronchiectasis, hypogammaglobulinemia, acquired hypothyroidism with hx of MNG s/p total thyroidectomy 1/25/17, osteopenia, hx of SVT s/p ablation in the past, MDD and VICTORINO is here for follow up. She has been on increased levothyroxine dose for the past few weeks, tolerating change well, denies f/n/v, has some sinus pressure for the past week, denies d, still having constipation despite miralax, colace, glycerin, she reports she is eating and drinking well, denies cp, has some sob with her nasal congestion, denies urinary sx.  Sleeping fair.  She would like to have advance directives on file here at [facility]    Review of Systems   Constitutional: Negative for activity change, appetite change, fatigue and fever.   HENT: Positive for congestion and sinus pressure.    Respiratory: Negative for cough and shortness of breath.    Cardiovascular: Negative for chest pain, palpitations and leg swelling.   Gastrointestinal: Positive for constipation. Negative for diarrhea, nausea and vomiting.   Genitourinary: Negative for difficulty urinating and dysuria.   Skin: Negative for rash.   Neurological: Negative for dizziness and light-headedness.   Psychiatric/Behavioral: Negative for sleep disturbance.       Objective:      /78   Pulse 63   Ht 5' 4" (1.626 m)   Wt 36.2 kg (79 lb 12.8 oz)   SpO2 99%   BMI 13.70 kg/m²   Physical Exam   Constitutional: She appears well-developed and well-nourished.   HENT:   Head: Normocephalic and atraumatic.   Mouth/Throat: No oropharyngeal exudate.   Neck: Normal range of motion. Neck supple. No thyromegaly present.   Cardiovascular: Normal rate, regular rhythm and normal heart sounds.    Pulmonary/Chest: Effort normal and breath sounds normal. No respiratory distress.   Abdominal: Soft. Bowel sounds are normal. She exhibits no distension. There is no " tenderness. Hernia confirmed negative in the right inguinal area and confirmed negative in the left inguinal area.   Genitourinary: Vagina normal and uterus normal. No labial fusion. There is no rash, tenderness, lesion or injury on the right labia. There is no rash, tenderness, lesion or injury on the left labia. Cervix exhibits no motion tenderness, no discharge and no friability. Right adnexum displays no mass, no tenderness and no fullness. Left adnexum displays no mass, no tenderness and no fullness. No signs of injury around the vagina.   Genitourinary Comments: Atrophic changes   Musculoskeletal: She exhibits no edema.   Lymphadenopathy:     She has no cervical adenopathy.        Right: No inguinal adenopathy present.        Left: No inguinal adenopathy present.   Neurological: She is alert.   Skin: Skin is warm and dry.   Psychiatric: She has a normal mood and affect.   Nursing note and vitals reviewed.      Assessment:       1. Hypogammaglobulinemia    2. Underweight    3. Postoperative hypothyroidism    4. Colonic constipation    5. Adult bronchiectasis    6. Chronic sinusitis, unspecified location    7. Senile osteoporosis    8. Leukopenia, unspecified type    9. Screening for cervical cancer    10. Major depressive disorder, recurrent episode, in full remission    11. Chronic obstructive pulmonary disease, unspecified COPD type        Plan:   Yvette was seen today for follow-up.    Diagnoses and all orders for this visit:    Hypogammaglobulinemia  -     Protein electrophoresis, serum; Future  -     CBC auto differential; Future    Underweight  -     Protein electrophoresis, serum; Future  -     CBC auto differential; Future    Postoperative hypothyroidism    Colonic constipation    Adult bronchiectasis    Chronic sinusitis, unspecified location    Senile osteoporosis    Leukopenia, unspecified type  -     Protein electrophoresis, serum; Future  -     CBC auto differential; Future    Screening for  cervical cancer  -     Liquid-based pap smear, screening  -     HPV High Risk Genotypes, PCR    Major depressive disorder, recurrent episode, in full remission    Chronic obstructive pulmonary disease, unspecified COPD type

## 2017-07-24 ENCOUNTER — INFUSION (OUTPATIENT)
Dept: INFECTIOUS DISEASES | Facility: HOSPITAL | Age: 67
End: 2017-07-24
Attending: INTERNAL MEDICINE
Payer: MEDICARE

## 2017-07-24 VITALS
DIASTOLIC BLOOD PRESSURE: 62 MMHG | SYSTOLIC BLOOD PRESSURE: 130 MMHG | RESPIRATION RATE: 16 BRPM | BODY MASS INDEX: 13.51 KG/M2 | OXYGEN SATURATION: 99 % | HEART RATE: 76 BPM | TEMPERATURE: 98 F | HEIGHT: 64 IN | WEIGHT: 79.13 LBS

## 2017-07-24 DIAGNOSIS — D80.1 HYPOGAMMAGLOBULINEMIA: ICD-10-CM

## 2017-07-24 DIAGNOSIS — M81.0 SENILE OSTEOPOROSIS: Primary | ICD-10-CM

## 2017-07-24 PROBLEM — J02.9 ACUTE PHARYNGITIS: Status: RESOLVED | Noted: 2017-06-01 | Resolved: 2017-07-24

## 2017-07-24 PROCEDURE — 63600175 PHARM REV CODE 636 W HCPCS: Performed by: INTERNAL MEDICINE

## 2017-07-24 PROCEDURE — 96366 THER/PROPH/DIAG IV INF ADDON: CPT

## 2017-07-24 PROCEDURE — 96365 THER/PROPH/DIAG IV INF INIT: CPT

## 2017-07-24 RX ADMIN — HUMAN IMMUNOGLOBULIN G 20 G: 20 LIQUID INTRAVENOUS at 09:07

## 2017-07-27 LAB
HPV HR 12 DNA CVX QL NAA+PROBE: NEGATIVE
HPV16 DNA SPEC QL NAA+PROBE: NEGATIVE
HPV18 DNA SPEC QL NAA+PROBE: NEGATIVE

## 2017-08-08 ENCOUNTER — OFFICE VISIT (OUTPATIENT)
Dept: OPTOMETRY | Facility: CLINIC | Age: 67
End: 2017-08-08
Payer: MEDICARE

## 2017-08-08 DIAGNOSIS — H52.13 MYOPIA WITH ASTIGMATISM AND PRESBYOPIA, BILATERAL: ICD-10-CM

## 2017-08-08 DIAGNOSIS — H52.4 MYOPIA WITH ASTIGMATISM AND PRESBYOPIA, BILATERAL: ICD-10-CM

## 2017-08-08 DIAGNOSIS — H52.203 MYOPIA WITH ASTIGMATISM AND PRESBYOPIA, BILATERAL: ICD-10-CM

## 2017-08-08 DIAGNOSIS — H51.9 IMBALANCE OF MUSCLE OF EYE: Primary | ICD-10-CM

## 2017-08-08 DIAGNOSIS — H25.13 NUCLEAR SCLEROSIS OF BOTH EYES: ICD-10-CM

## 2017-08-08 PROCEDURE — 99499 UNLISTED E&M SERVICE: CPT | Mod: S$PBB,,, | Performed by: OPTOMETRIST

## 2017-08-08 PROCEDURE — 99212 OFFICE O/P EST SF 10 MIN: CPT | Mod: PBBFAC | Performed by: OPTOMETRIST

## 2017-08-08 PROCEDURE — 92015 DETERMINE REFRACTIVE STATE: CPT | Mod: ,,, | Performed by: OPTOMETRIST

## 2017-08-08 PROCEDURE — 99999 PR PBB SHADOW E&M-EST. PATIENT-LVL II: CPT | Mod: PBBFAC,,, | Performed by: OPTOMETRIST

## 2017-08-08 NOTE — PROGRESS NOTES
"HPI     Concerns About Ocular Health    Additional comments: Rx issue's            Comments   Patient is in complaining of double vision for a second in her vision then   it goes away . Patient states she's not wearing her last Rx because the   prizm make her vision worse.   Patient states after closing one eye the double vision go away. Patient   is wearing her older Rx.  Wonders if additional extraocular muscle surgery is indicated.    States that muscle surgery done on each eye when she was young, and she   states that Dr. Wilson did additional muscle surgery, which seemed to   help, but reports that problem with diplopia seems to have gotten slowly   worse over time.   States she cannot have additional muscle surgery done   at this time, because of 's health issues.       (-) blurry vision  (-) flashes of light   (-) floaters     PD 63/59  Reading distance = 13"       Last edited by Wallace Cespedes, OD on 8/8/2017 10:09 AM. (History)            Assessment /Plan     For exam results, see Encounter Report.    1. Imbalance of muscle of eye     2. Nuclear sclerosis of both eyes     3. Myopia with astigmatism and presbyopia, bilateral                    Early nuclear sclerosis of lens of both eyes, consistent with age.   Myopia with astigmatism in each eye, with very satisfactory correctable VA in each eye.  Presbyopia consistent with age.  New spectacle lens Rx issued.     Prior diagnosis of alternating esotropia, and hypertropia of the right eye, per Dr. Wilson.  S/p muscle surgery.  History of complaint of diplopia.  Mrs. Crews, however, feels that recent falls are secondary to the prism correction previously incorporated into spectacle lens Rx, and she requests that prism correction be removed.     Return for progress check in one month - defer dilated fundus exam until then.       "

## 2017-08-08 NOTE — PATIENT INSTRUCTIONS
Early nuclear sclerosis of lens of both eyes, consistent with age.   Myopia with astigmatism in each eye, with very satisfactory correctable VA in each eye.  Presbyopia consistent with age.  New spectacle lens Rx issued.     Prior diagnosis of alternating esotropia, and hypertropia of the right eye, per Dr. Wilson.  S/p muscle surgery.  History of complaint of diplopia.  Mrs. Crews, however, feels that recent falls are secondary to the prism correction previously incorporated into spectacle lens Rx, and she requests that prism correction be removed.     Return for progress check in one month - defer dilated fundus exam until then.

## 2017-08-21 ENCOUNTER — INFUSION (OUTPATIENT)
Dept: INFECTIOUS DISEASES | Facility: HOSPITAL | Age: 67
End: 2017-08-21
Payer: MEDICARE

## 2017-08-21 VITALS
HEART RATE: 88 BPM | TEMPERATURE: 99 F | HEIGHT: 64 IN | BODY MASS INDEX: 13.66 KG/M2 | OXYGEN SATURATION: 100 % | WEIGHT: 80 LBS | SYSTOLIC BLOOD PRESSURE: 106 MMHG | DIASTOLIC BLOOD PRESSURE: 57 MMHG | RESPIRATION RATE: 14 BRPM

## 2017-08-21 DIAGNOSIS — D80.1 HYPOGAMMAGLOBULINEMIA: Primary | ICD-10-CM

## 2017-08-21 PROCEDURE — 96366 THER/PROPH/DIAG IV INF ADDON: CPT

## 2017-08-21 PROCEDURE — 96365 THER/PROPH/DIAG IV INF INIT: CPT

## 2017-08-21 PROCEDURE — 63600175 PHARM REV CODE 636 W HCPCS: Performed by: INTERNAL MEDICINE

## 2017-08-21 RX ADMIN — HUMAN IMMUNOGLOBULIN G 20 G: 20 LIQUID INTRAVENOUS at 09:08

## 2017-08-24 DIAGNOSIS — E89.0 POSTOPERATIVE HYPOTHYROIDISM: ICD-10-CM

## 2017-08-24 RX ORDER — LEVOTHYROXINE SODIUM 88 UG/1
TABLET ORAL
Qty: 30 TABLET | Refills: 11 | Status: SHIPPED | OUTPATIENT
Start: 2017-08-24 | End: 2017-11-09 | Stop reason: SDUPTHER

## 2017-09-08 ENCOUNTER — OFFICE VISIT (OUTPATIENT)
Dept: OPTOMETRY | Facility: CLINIC | Age: 67
End: 2017-09-08
Payer: MEDICARE

## 2017-09-08 DIAGNOSIS — H51.9 IMBALANCE OF MUSCLE OF EYE: ICD-10-CM

## 2017-09-08 DIAGNOSIS — H43.393 VITREOUS FLOATERS OF BOTH EYES: ICD-10-CM

## 2017-09-08 DIAGNOSIS — Z13.5 SCREENING FOR OTHER EYE CONDITIONS: ICD-10-CM

## 2017-09-08 DIAGNOSIS — H25.13 NUCLEAR SCLEROSIS OF BOTH EYES: Primary | ICD-10-CM

## 2017-09-08 DIAGNOSIS — Z98.890 HISTORY OF STRABISMUS SURGERY: ICD-10-CM

## 2017-09-08 PROCEDURE — 99999 PR PBB SHADOW E&M-EST. PATIENT-LVL II: CPT | Mod: PBBFAC,,, | Performed by: OPTOMETRIST

## 2017-09-08 PROCEDURE — 99212 OFFICE O/P EST SF 10 MIN: CPT | Mod: PBBFAC | Performed by: OPTOMETRIST

## 2017-09-08 PROCEDURE — 99499 UNLISTED E&M SERVICE: CPT | Mod: S$PBB,,, | Performed by: OPTOMETRIST

## 2017-09-08 NOTE — PATIENT INSTRUCTIONS
Nuclear sclerosis of lens of both eyes.  Minimal peripheral cortical lens changes in both eyes.  Vitreous floaters in both eyes.  No evidence of retinal etiology.  Good ocular health in both eyes, otherwise.    Myopia with astigmatism in each eye, per refraction done on previous visit.  Presbyopia.  No change made to the last spectacle lens Rx issued.  However, Rx for single vision reading lenses added into record for use, if Dr. Kraft feels need for reading lenses, but if she is unhappy with near VA with progressive add lenses.  Note: no prismatic correction added to spectacle lens Rx, per Dr. Kraft's prior request.

## 2017-09-08 NOTE — PROGRESS NOTES
HPI     Concerns About Ocular Health    Additional comments: 1 month f/u -   Wearing new lenses prescribed on 08/08/2017, without prismatic correction   (per patient request).  Notes problems persist, but states that she feels that VA with glasses is   as good as it will be.             Comments   Patient in for progress check.  Patient is in for a 1 month f/u , DFE   Being followed for (diagnosis):  Imbalance of muscle of eye     Date last seen: 08/08/2017    Doctor last seen: Dr. Cespedes    Prescribed eye medications(s) using:  None     OTC eye medication(s) using:  None     Signs/symptoms of condition resolved/better/stable/worse?:      Allergies/Medications reviewed today?  Yes              Last edited by Wallace Cespedes, OD on 9/8/2017 10:21 AM. (History)            Assessment /Plan     For exam results, see Encounter Report.    1. Nuclear sclerosis of both eyes     2. Imbalance of muscle of eye     3. History of strabismus surgery     4. Vitreous floaters of both eyes     5. Screening for other eye conditions                    Nuclear sclerosis of lens of both eyes.  Minimal peripheral cortical lens changes in both eyes.  Vitreous floaters in both eyes.  No evidence of retinal etiology.  Good ocular health in both eyes, otherwise.    Myopia with astigmatism in each eye, per refraction done on previous visit.  Presbyopia.  No change made to the last spectacle lens Rx issued.  However, Rx for single vision reading lenses added into record for use, if Dr. Kraft feels need for reading lenses, but if she is unhappy with near VA with progressive add lenses.  Note: no prismatic correction added to spectacle lens Rx, per Dr. Kraft's prior request.

## 2017-09-12 ENCOUNTER — PATIENT MESSAGE (OUTPATIENT)
Dept: INTERNAL MEDICINE | Facility: CLINIC | Age: 67
End: 2017-09-12

## 2017-09-12 DIAGNOSIS — R26.9 GAIT DISTURBANCE: Primary | ICD-10-CM

## 2017-09-13 ENCOUNTER — INFUSION (OUTPATIENT)
Dept: INFECTIOUS DISEASES | Facility: HOSPITAL | Age: 67
End: 2017-09-13
Attending: INTERNAL MEDICINE
Payer: MEDICARE

## 2017-09-13 VITALS
DIASTOLIC BLOOD PRESSURE: 68 MMHG | HEIGHT: 64 IN | OXYGEN SATURATION: 99 % | HEART RATE: 68 BPM | RESPIRATION RATE: 18 BRPM | TEMPERATURE: 98 F | BODY MASS INDEX: 13.51 KG/M2 | WEIGHT: 79.13 LBS | SYSTOLIC BLOOD PRESSURE: 127 MMHG

## 2017-09-13 DIAGNOSIS — D80.1 HYPOGAMMAGLOBULINEMIA: Primary | ICD-10-CM

## 2017-09-13 PROCEDURE — A4216 STERILE WATER/SALINE, 10 ML: HCPCS | Performed by: INTERNAL MEDICINE

## 2017-09-13 PROCEDURE — 96365 THER/PROPH/DIAG IV INF INIT: CPT

## 2017-09-13 PROCEDURE — 96366 THER/PROPH/DIAG IV INF ADDON: CPT

## 2017-09-13 PROCEDURE — 25000003 PHARM REV CODE 250: Performed by: INTERNAL MEDICINE

## 2017-09-13 PROCEDURE — 63600175 PHARM REV CODE 636 W HCPCS: Performed by: INTERNAL MEDICINE

## 2017-09-13 RX ADMIN — HUMAN IMMUNOGLOBULIN G 20 G: 20 LIQUID INTRAVENOUS at 10:09

## 2017-09-13 RX ADMIN — Medication 10 ML: at 09:09

## 2017-09-13 NOTE — PROGRESS NOTES
Pt received dose of IVIG. No pre-medications ordered. Infusion completed at a rate of 99cc/hr.  Pt tolerated well and left in NAD.

## 2017-09-13 NOTE — PATIENT INSTRUCTIONS
"Immune Globulin (Patient Education - Adult Medication)  You must carefully read the "Consumer Information Use and Disclaimer" below in order to understand and correctly use this information   Pronunciation  (i MYUN DONOVAN loi rogel)  Brand Names: USBivigam; Carimune NF; Cuvitru; Flebogamma DIF; GamaSTAN S/D; Gammagard; Gammagard S/D Less IgA; Gammagard S/D [DSC]; Gammaked; Gammaplex; Gamunex-C; Hizentra; Hyqvia; Octagam; Privigen  Brand Names: CanadaCuvitru; Gamastan S/D; Gammagard Liquid; Gammagard S/D; Gamunex; Hizentra; IGIVnex; Octagam 10%; Panzyga; Privigen  Warning   The chance of blood clots may be raised with this drug. The chance may be higher in older people, if you have to be in a bed or chair for a long time, if you take estrogen products, or if you have certain catheters. Some health problems like thick blood, heart problems, or a history of blood clots raise the chance of having blood clots. Blood clots can happen if you do not have any of these health problems. Call your doctor right away if you have numbness or weakness on 1 side of your body; pain, redness, tenderness, warmth, or swelling in the arms or legs; change in color of an arm or leg; chest pain or pressure; shortness of breath; fast heartbeat; or coughing up blood. Talk with your doctor.   Very bad and sometimes deadly kidney problems have happened with human immune globulin products. Kidney problems are more common in people using products that have sucrose. The chance may be raised if you have kidney problems, high blood sugar (diabetes), fluid loss (dehydration) or low blood volume, a blood infection, or proteins in the blood that are not normal. The chance may also be raised if you are 65 or older, or if you take other drugs that may harm the kidneys. Talk with your doctor.    What is this drug used for?   It is used to stop or lower the harshness of other infections in people with a weak immune system.   It is used to treat immune " thrombocytopenia (ITP).   It is used treat chronic inflammatory demyelinating polyneuropathy (CIDP).   It is used to stop or lower the harshness of infection by hepatitis A, measles, chickenpox (varicella), and rubella.   It is used to treat multifocal muscle neuropathy.   It is used to treat Kawasaki disease.   It may be given to you for other reasons. Talk with the doctor.    What do I need to tell my doctor BEFORE I take this drug?   All products:   If you have an allergy to immune globulin or any other part of this drug.   If you are allergic to any drugs like this one, any other drugs, foods, or other substances. Tell your doctor about the allergy and what signs you had, like rash; hives; itching; shortness of breath; wheezing; cough; swelling of face, lips, tongue, or throat; or any other signs.   If you have IgA deficiency.   If you have too much proline in your blood (hyperprolinemia).   I.M.   If you have low platelet levels.   Injection (I.V.):   If you are not able to break down fructose, talk with the doctor. Some of these products have sorbitol.   Children:   If the patient is an infant or baby and it is not known if they are able to break down sucrose or fructose. Do not give this drug to your child if this is the case.   This is not a list of all drugs or health problems that interact with this drug.   Tell your doctor and pharmacist about all of your drugs (prescription or OTC, natural products, vitamins) and health problems. You must check to make sure that it is safe for you to take this drug with all of your drugs and health problems. Do not start, stop, or change the dose of any drug without checking with your doctor.    What are some things I need to know or do while I take this drug?   All products:   Tell all of your health care providers that you take this drug. This includes your doctors, nurses, pharmacists, and dentists.   If you have a latex allergy, talk with your  doctor.   Talk with your doctor before getting any vaccines. Use with this drug may either raise the chance of an infection or make the vaccine not work as well.   Have blood work checked as you have been told by the doctor. Talk with the doctor.   This drug may affect certain lab tests. Tell all of your health care providers and lab workers that you take this drug.   This drug is made from human plasma (part of the blood) and may have viruses that may cause disease. This drug is screened, tested, and treated to lower the chance that it carries an infection. Talk with the doctor.   If you are 65 or older, use this drug with care. You could have more side effects.   Tell your doctor if you are pregnant or plan on getting pregnant. You will need to talk about the benefits and risks of using this drug while you are pregnant.   Tell your doctor if you are breast-feeding. You will need to talk about any risks to your baby.   Injection (I.V.):   If you are on a low-sodium or sodium-free diet, talk with your doctor. Some of these products have sodium.   If you have high blood sugar (diabetes), talk with your doctor about which glucose tests are best to use.   Some patients who have immune globulin therapy for the first time or who have not had it within the past 8 weeks may have a risk for certain side effects. These may be fever, chills, nausea, or vomiting. This may also happen in people who switch brands of immune globulin. Tell the doctor right away if any of these side effects occur.    What are some side effects that I need to call my doctor about right away?   WARNING/CAUTION: Even though it may be rare, some people may have very bad and sometimes deadly side effects when taking a drug. Tell your doctor or get medical help right away if you have any of the following signs or symptoms that may be related to a very bad side effect:   All products:   Signs of an allergic reaction, like rash; hives; itching;  red, swollen, blistered, or peeling skin with or without fever; wheezing; tightness in the chest or throat; trouble breathing or talking; unusual hoarseness; or swelling of the mouth, face, lips, tongue, or throat.   Injection (I.V. and subcutaneous):   Signs of kidney problems like unable to pass urine, change in how much urine is passed, blood in the urine, or a big weight gain.   Fever or chills.   Change in color of skin to a bluish color like on the lips, nail beds, fingers, or toes.   Feeling very tired or weak.   Seizures.   Bloating.   Feeling confused.   Swelling.   Very bad dizziness or passing out.   A heartbeat that does not feel normal.   Any unexplained bruising or bleeding.   Mood changes.   Muscle or joint pain.   Change in speech.   Change in eyesight.   Blurred eyesight.   Shakiness.   Sweating a lot.   Very bad belly pain.   Dark urine or yellow skin or eyes.   Very bad irritation where the shot was given.   Lung problems have happened with this drug. Call your doctor right away if you have lung or breathing problems like trouble breathing, shortness of breath, or a cough that is new or worse.   This drug may raise the chance of a very bad brain problem called aseptic meningitis. Call your doctor right away if you have a headache, fever, chills, very upset stomach or throwing up, stiff neck, rash, bright lights bother your eyes, feeling sleepy, or feeling confused.    What are some other side effects of this drug?   All drugs may cause side effects. However, many people have no side effects or only have minor side effects. Call your doctor or get medical help if any of these side effects or any other side effects bother you or do not go away:   All products:   Irritation where this drug is given.   HyQvia®:   Headache.   Upset stomach or throwing up.   Feeling tired or weak.   All other injection products (I.V. and subcutaneous):   Headache.   Loose stools  (diarrhea).   Feeling tired or weak.   Back pain.   Sore throat.   Stuffy nose.   Dizziness.   Flushing.   Cramps.   Upset stomach or throwing up.   Belly pain.   These are not all of the side effects that may occur. If you have questions about side effects, call your doctor. Call your doctor for medical advice about side effects.   You may report side effects to your national health agency.    How is this drug best taken?   Use this drug as ordered by your doctor. Read all information given to you. Follow all instructions closely.   All subcutaneous products:   It is given as an infusion under the skin over a period of time.   Your doctor may teach you how to use.   Follow how to use carefully.   Do not shake the solution.   Wash your hands before and after use.   Do not use if the solution is cloudy, leaking, or has particles.   Do not use if solution changes color.   Do not use if it has been frozen.   Do not mix with any other liquid drugs.   Do not give into skin that is irritated, bruised, red, infected, or scarred.   Move the site where you give this drug as you were told by the doctor.   Throw away needles in a needle/sharp disposal box. Do not reuse needles or other items. When the box is full, follow all local rules for getting rid of it. Talk with a doctor or pharmacist if you have any questions.   HyQvia®:   Before giving the shot, let it come to room temperature. Do not heat this drug.   Do not mix the immune globulin and the hyaluronidase before using.   If you need to use 2 infusion sites, use sites on the opposite sides of the body.   Injection (I.V.):   It is given as an infusion into a vein over a period of time.   I.M.   It is given as a shot into a muscle.    What do I do if I miss a dose?   Call your doctor to find out what to do.    How do I store and/or throw out this drug?   Subcutaneous:   Most of the time, this drug will be given in a hospital or doctor's office.  If stored at home, follow how to store as you were told by the doctor.   Do not freeze.   Injection (I.M., I.V.):   If you need to store this drug at home, talk with your doctor, nurse, or pharmacist about how to store it.   All products:   Keep all drugs in a safe place. Keep all drugs out of the reach of children and pets.   Check with your pharmacist about how to throw out unused drugs.    General drug facts   If your symptoms or health problems do not get better or if they become worse, call your doctor.   Do not share your drugs with others and do not take anyone else's drugs.   Keep a list of all your drugs (prescription, natural products, vitamins, OTC) with you. Give this list to your doctor.   Talk with the doctor before starting any new drug, including prescription or OTC, natural products, or vitamins.   Some drugs may have another patient information leaflet. If you have any questions about this drug, please talk with your doctor, nurse, pharmacist, or other health care provider.   If you think there has been an overdose, call your poison control center or get medical care right away. Be ready to tell or show what was taken, how much, and when it happened.

## 2017-09-15 ENCOUNTER — TELEPHONE (OUTPATIENT)
Dept: INTERNAL MEDICINE | Facility: CLINIC | Age: 67
End: 2017-09-15

## 2017-09-15 ENCOUNTER — PATIENT MESSAGE (OUTPATIENT)
Dept: INTERNAL MEDICINE | Facility: CLINIC | Age: 67
End: 2017-09-15

## 2017-09-15 NOTE — TELEPHONE ENCOUNTER
----- Message from Landon Green sent at 9/15/2017  1:50 PM CDT -----  Contact: pt  _x 1st Request   _ 2nd Request   _ 3rd Request     Who: JUSTIN DE JESUS [[id number]]    Why: pt is requesting a call back in reference to getting orders for a walker.    What Number to Call Back: [phone]    When to Expect a call back: (Before the end of the day)   -- if call after 3:00 call back will be tomorrow.

## 2017-09-15 NOTE — TELEPHONE ENCOUNTER
Informed pt that the order for walker was sent to [facility] to be filled. Pt verbalized understanding. Pt has no further questions or concerns.

## 2017-09-15 NOTE — TELEPHONE ENCOUNTER
----- Message from Genesis Jarvis sent at 9/15/2017  2:43 PM CDT -----  Contact: Pt  X_  1st Request  _  2nd Request  _  3rd Request    Who:JUSTIN DE JESUS [[id number]]    Why: Patient states she would like to speak with the staff in regards to getting orders for a walker... Please contact to further discuss and advise     What Number to Call Back: [phone]    When to Expect a call back: (Before the end of the day)   -- if call after 3:00 call back will be tomorrow.

## 2017-10-11 ENCOUNTER — INFUSION (OUTPATIENT)
Dept: INFECTIOUS DISEASES | Facility: HOSPITAL | Age: 67
End: 2017-10-11
Attending: INTERNAL MEDICINE
Payer: MEDICARE

## 2017-10-11 VITALS
HEIGHT: 64 IN | DIASTOLIC BLOOD PRESSURE: 67 MMHG | WEIGHT: 79 LBS | BODY MASS INDEX: 13.49 KG/M2 | HEART RATE: 71 BPM | TEMPERATURE: 98 F | SYSTOLIC BLOOD PRESSURE: 133 MMHG

## 2017-10-11 DIAGNOSIS — D80.1 HYPOGAMMAGLOBULINEMIA: Primary | ICD-10-CM

## 2017-10-11 PROCEDURE — 63600175 PHARM REV CODE 636 W HCPCS: Performed by: INTERNAL MEDICINE

## 2017-10-11 PROCEDURE — 96365 THER/PROPH/DIAG IV INF INIT: CPT

## 2017-10-11 PROCEDURE — 96366 THER/PROPH/DIAG IV INF ADDON: CPT

## 2017-10-11 RX ADMIN — HUMAN IMMUNOGLOBULIN G 20 G: 20 LIQUID INTRAVENOUS at 09:10

## 2017-10-19 ENCOUNTER — PATIENT OUTREACH (OUTPATIENT)
Dept: INTERNAL MEDICINE | Facility: CLINIC | Age: 67
End: 2017-10-19

## 2017-10-19 NOTE — PROGRESS NOTES
[facility] is committed to your overall health.  To help you get the most out of each of your visits, we will review your information to make sure you are up to date on all of your recommended tests and/or procedures.       Your PCP  Sally Green MD   found that you may be due for:       Health Maintenance Due   Topic Date Due    Influenza Vaccine  08/01/2017             If you have had any of the above done at another facility, please bring the records or information with you so that your record at [facility] will be complete.  If you would like to schedule any of these, please contact me.     If you are currently taking medication, please bring it with you to your appointment for review.     Also, if you have any type of Advanced Directives, please bring them with you to your office visit so we may scan them into your chart.       Thank you for choosing [facility] for your healthcare needs.

## 2017-10-29 ENCOUNTER — PATIENT MESSAGE (OUTPATIENT)
Dept: INTERNAL MEDICINE | Facility: CLINIC | Age: 67
End: 2017-10-29

## 2017-10-30 ENCOUNTER — PATIENT MESSAGE (OUTPATIENT)
Dept: INTERNAL MEDICINE | Facility: CLINIC | Age: 67
End: 2017-10-30

## 2017-11-02 ENCOUNTER — OFFICE VISIT (OUTPATIENT)
Dept: INTERNAL MEDICINE | Facility: CLINIC | Age: 67
End: 2017-11-02
Payer: MEDICARE

## 2017-11-02 VITALS
DIASTOLIC BLOOD PRESSURE: 67 MMHG | HEIGHT: 64 IN | WEIGHT: 81 LBS | SYSTOLIC BLOOD PRESSURE: 111 MMHG | BODY MASS INDEX: 13.83 KG/M2 | HEART RATE: 85 BPM | OXYGEN SATURATION: 100 %

## 2017-11-02 DIAGNOSIS — Z13.6 ENCOUNTER FOR LIPID SCREENING FOR CARDIOVASCULAR DISEASE: ICD-10-CM

## 2017-11-02 DIAGNOSIS — E89.0 POSTOPERATIVE HYPOTHYROIDISM: Primary | ICD-10-CM

## 2017-11-02 DIAGNOSIS — E55.9 VITAMIN D DEFICIENCY: ICD-10-CM

## 2017-11-02 DIAGNOSIS — Z13.220 ENCOUNTER FOR LIPID SCREENING FOR CARDIOVASCULAR DISEASE: ICD-10-CM

## 2017-11-02 DIAGNOSIS — M85.80 OSTEOPENIA, UNSPECIFIED LOCATION: ICD-10-CM

## 2017-11-02 DIAGNOSIS — Z78.0 POSTMENOPAUSAL: ICD-10-CM

## 2017-11-02 PROCEDURE — 99213 OFFICE O/P EST LOW 20 MIN: CPT | Mod: PBBFAC,PN | Performed by: FAMILY MEDICINE

## 2017-11-02 PROCEDURE — 99999 PR PBB SHADOW E&M-EST. PATIENT-LVL III: CPT | Mod: PBBFAC,,, | Performed by: FAMILY MEDICINE

## 2017-11-02 PROCEDURE — 99214 OFFICE O/P EST MOD 30 MIN: CPT | Mod: S$PBB,,, | Performed by: FAMILY MEDICINE

## 2017-11-02 PROCEDURE — G0008 ADMIN INFLUENZA VIRUS VAC: HCPCS | Mod: PBBFAC,PN

## 2017-11-02 NOTE — PROGRESS NOTES
After obtaining consent, and per orders of Dr. Green, injection of fluzone Lot [id number] 4/18/18 given in the LD by MIKE WORTHINGTON. Patient tolerated well and band aid applied. Patient instructed to remain in clinic for 15 minutes afterwards, and to report any adverse reaction to me immediately.

## 2017-11-02 NOTE — PROGRESS NOTES
"Subjective:       Patient ID: Yvette Crews is a 67 y.o. female.    Chief Complaint: Follow-up    HPI  66 y/o female with adult bronchiectasis, hypogammaglobulinemia, acquired hypothyroidism with hx of MNG s/p total thyroidectomy 1/25/17, osteopenia, hx of SVT s/p ablation in the past, MDD and VICTORINO is here for follow up. Denies f/n/v, endorses ear pressure, says she feels like she is getting over a cold, has trouble breathing through her nose, denies d, still having constipation despite miralax, colace, glycerin, she reports she is eating and drinking well, her weight is up 2 pounds, denies cp/sob/urinary sx.  Sleeping fair.  Eye exam utd    Review of Systems   Constitutional: Negative for activity change, appetite change, fatigue and fever.   HENT: Positive for congestion.    Respiratory: Negative for cough and shortness of breath.    Cardiovascular: Negative for chest pain, palpitations and leg swelling.   Gastrointestinal: Positive for constipation. Negative for diarrhea, nausea and vomiting.   Genitourinary: Negative for difficulty urinating and dysuria.   Skin: Negative for rash.   Neurological: Negative for dizziness and light-headedness.   Psychiatric/Behavioral: Positive for sleep disturbance.       Objective:      /67 (BP Location: Left arm, Patient Position: Sitting, BP Method: Small (Automatic))   Pulse 85   Ht 5' 4" (1.626 m)   Wt 36.7 kg (81 lb 0.3 oz)   SpO2 100%   BMI 13.91 kg/m²   Physical Exam   Constitutional: She appears well-developed and well-nourished.   HENT:   Head: Normocephalic and atraumatic.   Mouth/Throat: No oropharyngeal exudate.   Neck: Normal range of motion. Neck supple. No thyromegaly present.   Cardiovascular: Normal rate, regular rhythm and normal heart sounds.    Pulmonary/Chest: Effort normal and breath sounds normal. No respiratory distress.   Abdominal: Soft. Bowel sounds are normal. She exhibits no distension. There is no tenderness.   Musculoskeletal: She " exhibits no edema.   Lymphadenopathy:     She has no cervical adenopathy.   Neurological: She is alert.   Skin: Skin is warm and dry.   Psychiatric: She has a normal mood and affect.   Nursing note and vitals reviewed.      Assessment:       1. Postoperative hypothyroidism    2. Encounter for lipid screening for cardiovascular disease    3. Postmenopausal    4. Vitamin D deficiency    5. Osteopenia, unspecified location        Plan:   Yvette was seen today for follow-up.    Diagnoses and all orders for this visit:    Postoperative hypothyroidism  -     Comprehensive metabolic panel; Future  -     TSH; Future    Encounter for lipid screening for cardiovascular disease  -     Lipid panel; Future    Postmenopausal    Vitamin D deficiency  -     Vitamin D; Future    Osteopenia, unspecified location    Other orders  -     Cancel: Lipid panel; Future  -     Cancel: DXA Bone Density Spine And Hip; Future  -     Influenza - High Dose (65+) (PF) (IM)

## 2017-11-08 ENCOUNTER — TELEPHONE (OUTPATIENT)
Dept: INTERNAL MEDICINE | Facility: CLINIC | Age: 67
End: 2017-11-08

## 2017-11-08 ENCOUNTER — INFUSION (OUTPATIENT)
Dept: INFECTIOUS DISEASES | Facility: HOSPITAL | Age: 67
End: 2017-11-08
Attending: INTERNAL MEDICINE
Payer: MEDICARE

## 2017-11-08 VITALS
HEIGHT: 64 IN | BODY MASS INDEX: 13.78 KG/M2 | DIASTOLIC BLOOD PRESSURE: 70 MMHG | SYSTOLIC BLOOD PRESSURE: 131 MMHG | TEMPERATURE: 98 F | RESPIRATION RATE: 15 BRPM | HEART RATE: 74 BPM | OXYGEN SATURATION: 100 % | WEIGHT: 80.69 LBS

## 2017-11-08 DIAGNOSIS — D80.1 HYPOGAMMAGLOBULINEMIA: Primary | ICD-10-CM

## 2017-11-08 DIAGNOSIS — E89.0 POSTOPERATIVE HYPOTHYROIDISM: ICD-10-CM

## 2017-11-08 PROCEDURE — 96366 THER/PROPH/DIAG IV INF ADDON: CPT

## 2017-11-08 PROCEDURE — 63600175 PHARM REV CODE 636 W HCPCS: Performed by: INTERNAL MEDICINE

## 2017-11-08 PROCEDURE — 96365 THER/PROPH/DIAG IV INF INIT: CPT

## 2017-11-08 RX ADMIN — HUMAN IMMUNOGLOBULIN G 20 G: 10 LIQUID INTRAVENOUS at 09:11

## 2017-11-08 NOTE — TELEPHONE ENCOUNTER
Her TSH is too high please verify what dose of Synthroid she is taking and how long she has been on that dose.  Her other labs look good.

## 2017-11-08 NOTE — TELEPHONE ENCOUNTER
Pt states that she has been taking 88 mcg of synthroid for a couple of months. Please advise and authorize.       Informed pt that her labs looked good. Pt verbalized understanding.

## 2017-11-09 RX ORDER — LEVOTHYROXINE SODIUM 100 UG/1
88 TABLET ORAL
Qty: 90 TABLET | Refills: 1 | Status: SHIPPED | OUTPATIENT
Start: 2017-11-09 | End: 2018-04-13 | Stop reason: SDUPTHER

## 2017-11-09 NOTE — TELEPHONE ENCOUNTER
Informed pt that the synthroid is increased to 100 mcg and schedule repeat lab for 1/9/17. Pt verbalized understanding. Pt has no further questions or concerns.

## 2017-11-29 ENCOUNTER — PATIENT MESSAGE (OUTPATIENT)
Dept: INTERNAL MEDICINE | Facility: CLINIC | Age: 67
End: 2017-11-29

## 2017-11-29 ENCOUNTER — TELEPHONE (OUTPATIENT)
Dept: INTERNAL MEDICINE | Facility: CLINIC | Age: 67
End: 2017-11-29

## 2017-11-29 DIAGNOSIS — R73.01 ELEVATED FASTING GLUCOSE: Primary | ICD-10-CM

## 2017-12-02 ENCOUNTER — PATIENT MESSAGE (OUTPATIENT)
Dept: INTERNAL MEDICINE | Facility: CLINIC | Age: 67
End: 2017-12-02

## 2017-12-06 ENCOUNTER — INFUSION (OUTPATIENT)
Dept: INFECTIOUS DISEASES | Facility: HOSPITAL | Age: 67
End: 2017-12-06
Attending: INTERNAL MEDICINE
Payer: MEDICARE

## 2017-12-06 VITALS
HEIGHT: 64 IN | WEIGHT: 81.81 LBS | TEMPERATURE: 97 F | SYSTOLIC BLOOD PRESSURE: 132 MMHG | HEART RATE: 80 BPM | DIASTOLIC BLOOD PRESSURE: 64 MMHG | BODY MASS INDEX: 13.97 KG/M2

## 2017-12-06 DIAGNOSIS — D80.1 HYPOGAMMAGLOBULINEMIA: Primary | ICD-10-CM

## 2017-12-06 PROCEDURE — 96366 THER/PROPH/DIAG IV INF ADDON: CPT

## 2017-12-06 PROCEDURE — 63600175 PHARM REV CODE 636 W HCPCS: Performed by: INTERNAL MEDICINE

## 2017-12-06 PROCEDURE — 96365 THER/PROPH/DIAG IV INF INIT: CPT

## 2017-12-06 RX ADMIN — HUMAN IMMUNOGLOBULIN G 20 G: 20 LIQUID INTRAVENOUS at 10:12

## 2017-12-08 ENCOUNTER — PATIENT MESSAGE (OUTPATIENT)
Dept: INTERNAL MEDICINE | Facility: CLINIC | Age: 67
End: 2017-12-08

## 2017-12-19 ENCOUNTER — PATIENT MESSAGE (OUTPATIENT)
Dept: INTERNAL MEDICINE | Facility: CLINIC | Age: 67
End: 2017-12-19

## 2017-12-26 ENCOUNTER — PATIENT MESSAGE (OUTPATIENT)
Dept: INTERNAL MEDICINE | Facility: CLINIC | Age: 67
End: 2017-12-26

## 2018-01-03 ENCOUNTER — INFUSION (OUTPATIENT)
Dept: INFECTIOUS DISEASES | Facility: HOSPITAL | Age: 68
End: 2018-01-03
Attending: INTERNAL MEDICINE
Payer: MEDICARE

## 2018-01-03 VITALS
DIASTOLIC BLOOD PRESSURE: 67 MMHG | RESPIRATION RATE: 16 BRPM | HEART RATE: 73 BPM | WEIGHT: 81 LBS | OXYGEN SATURATION: 100 % | BODY MASS INDEX: 13.83 KG/M2 | TEMPERATURE: 98 F | HEIGHT: 64 IN | SYSTOLIC BLOOD PRESSURE: 138 MMHG

## 2018-01-03 DIAGNOSIS — D80.1 HYPOGAMMAGLOBULINEMIA: Primary | ICD-10-CM

## 2018-01-03 PROCEDURE — 63600175 PHARM REV CODE 636 W HCPCS: Mod: JG | Performed by: INTERNAL MEDICINE

## 2018-01-03 PROCEDURE — 96366 THER/PROPH/DIAG IV INF ADDON: CPT

## 2018-01-03 PROCEDURE — 96365 THER/PROPH/DIAG IV INF INIT: CPT

## 2018-01-03 RX ADMIN — HUMAN IMMUNOGLOBULIN G 20 G: 20 LIQUID INTRAVENOUS at 10:01

## 2018-01-09 ENCOUNTER — HOSPITAL ENCOUNTER (OUTPATIENT)
Dept: RADIOLOGY | Facility: CLINIC | Age: 68
Discharge: HOME | End: 2018-01-09
Attending: INTERNAL MEDICINE
Payer: MEDICARE

## 2018-01-09 DIAGNOSIS — M81.0 SENILE OSTEOPOROSIS: ICD-10-CM

## 2018-01-09 PROCEDURE — 77080 DXA BONE DENSITY AXIAL: CPT | Mod: TC

## 2018-01-09 PROCEDURE — 77080 DXA BONE DENSITY AXIAL: CPT | Mod: 26,,, | Performed by: INTERNAL MEDICINE

## 2018-01-10 ENCOUNTER — PATIENT MESSAGE (OUTPATIENT)
Dept: INTERNAL MEDICINE | Facility: CLINIC | Age: 68
End: 2018-01-10

## 2018-01-13 ENCOUNTER — PATIENT MESSAGE (OUTPATIENT)
Dept: INTERNAL MEDICINE | Facility: CLINIC | Age: 68
End: 2018-01-13

## 2018-01-23 DIAGNOSIS — J47.9 ADULT BRONCHIECTASIS: ICD-10-CM

## 2018-01-23 RX ORDER — MONTELUKAST SODIUM 10 MG/1
10 TABLET ORAL NIGHTLY
Qty: 90 TABLET | Refills: 4 | Status: SHIPPED | OUTPATIENT
Start: 2018-01-23 | End: 2019-04-10 | Stop reason: SDUPTHER

## 2018-01-31 ENCOUNTER — INFUSION (OUTPATIENT)
Dept: INFECTIOUS DISEASES | Facility: HOSPITAL | Age: 68
End: 2018-01-31
Attending: INTERNAL MEDICINE
Payer: MEDICARE

## 2018-01-31 VITALS
BODY MASS INDEX: 13.73 KG/M2 | DIASTOLIC BLOOD PRESSURE: 69 MMHG | WEIGHT: 80.44 LBS | HEIGHT: 64 IN | HEART RATE: 67 BPM | TEMPERATURE: 99 F | SYSTOLIC BLOOD PRESSURE: 131 MMHG

## 2018-01-31 DIAGNOSIS — I49.9 CARDIAC ARRHYTHMIA, UNSPECIFIED CARDIAC ARRHYTHMIA TYPE: Primary | ICD-10-CM

## 2018-01-31 DIAGNOSIS — D80.1 HYPOGAMMAGLOBULINEMIA: Primary | ICD-10-CM

## 2018-01-31 PROCEDURE — 96366 THER/PROPH/DIAG IV INF ADDON: CPT

## 2018-01-31 PROCEDURE — 63600175 PHARM REV CODE 636 W HCPCS: Mod: JG | Performed by: INTERNAL MEDICINE

## 2018-01-31 PROCEDURE — 96365 THER/PROPH/DIAG IV INF INIT: CPT

## 2018-01-31 RX ADMIN — HUMAN IMMUNOGLOBULIN G 20 G: 20 LIQUID INTRAVENOUS at 09:01

## 2018-02-02 ENCOUNTER — HOSPITAL ENCOUNTER (OUTPATIENT)
Dept: CARDIOLOGY | Facility: CLINIC | Age: 68
Discharge: HOME | End: 2018-02-02
Payer: MEDICARE

## 2018-02-02 ENCOUNTER — OFFICE VISIT (OUTPATIENT)
Dept: ELECTROPHYSIOLOGY | Facility: CLINIC | Age: 68
End: 2018-02-02
Payer: MEDICARE

## 2018-02-02 VITALS
WEIGHT: 80.69 LBS | HEART RATE: 70 BPM | DIASTOLIC BLOOD PRESSURE: 62 MMHG | BODY MASS INDEX: 13.78 KG/M2 | HEIGHT: 64 IN | SYSTOLIC BLOOD PRESSURE: 104 MMHG

## 2018-02-02 DIAGNOSIS — J47.9 ADULT BRONCHIECTASIS: ICD-10-CM

## 2018-02-02 DIAGNOSIS — Z98.890 HISTORY OF CARDIAC RADIOFREQUENCY ABLATION (RFA): ICD-10-CM

## 2018-02-02 DIAGNOSIS — R26.9 GAIT DISTURBANCE: ICD-10-CM

## 2018-02-02 DIAGNOSIS — R63.6 UNDERWEIGHT: ICD-10-CM

## 2018-02-02 DIAGNOSIS — F33.42 MAJOR DEPRESSIVE DISORDER, RECURRENT EPISODE, IN FULL REMISSION: ICD-10-CM

## 2018-02-02 DIAGNOSIS — R29.6 FREQUENT FALLS: ICD-10-CM

## 2018-02-02 DIAGNOSIS — D80.1 HYPOGAMMAGLOBULINEMIA: ICD-10-CM

## 2018-02-02 DIAGNOSIS — I95.1 ORTHOSTATIC HYPOTENSION: ICD-10-CM

## 2018-02-02 DIAGNOSIS — Z86.79 HISTORY OF SUBARACHNOID HEMORRHAGE: ICD-10-CM

## 2018-02-02 DIAGNOSIS — I47.10 SVT (SUPRAVENTRICULAR TACHYCARDIA): Primary | ICD-10-CM

## 2018-02-02 DIAGNOSIS — I49.9 CARDIAC ARRHYTHMIA, UNSPECIFIED CARDIAC ARRHYTHMIA TYPE: ICD-10-CM

## 2018-02-02 PROCEDURE — 1126F AMNT PAIN NOTED NONE PRSNT: CPT | Mod: ,,, | Performed by: INTERNAL MEDICINE

## 2018-02-02 PROCEDURE — 1159F MED LIST DOCD IN RCRD: CPT | Mod: ,,, | Performed by: INTERNAL MEDICINE

## 2018-02-02 PROCEDURE — 99999 PR PBB SHADOW E&M-EST. PATIENT-LVL III: CPT | Mod: PBBFAC,,, | Performed by: INTERNAL MEDICINE

## 2018-02-02 PROCEDURE — 99214 OFFICE O/P EST MOD 30 MIN: CPT | Mod: S$PBB,,, | Performed by: INTERNAL MEDICINE

## 2018-02-02 PROCEDURE — 99213 OFFICE O/P EST LOW 20 MIN: CPT | Mod: PBBFAC,25 | Performed by: INTERNAL MEDICINE

## 2018-02-02 PROCEDURE — 93010 ELECTROCARDIOGRAM REPORT: CPT | Mod: S$PBB,,, | Performed by: INTERNAL MEDICINE

## 2018-02-02 PROCEDURE — 93005 ELECTROCARDIOGRAM TRACING: CPT | Mod: PBBFAC | Performed by: INTERNAL MEDICINE

## 2018-02-02 NOTE — PROGRESS NOTES
Subjective:   Patient ID:  Yvette Crews is a 67 y.o. female     Chief complaint:Tachycardia      HPI  Background as recorded in my last note (12/1/2016):  Since her RFA in 9/2012, she has done very well.  However last month she had a 30 sec episode of palps (HR estimated to be in the 140s) associated with L/H -- she felt the need to kneel down but Sx quickly aborted. This occurred after having been upset after a family tiff.   Otherwise has no real new issues -- just her usual tiredness etc --     Admitted end of October for syncope with facial fractures.  Patient is a 66 y.o. female who presents with confusion and amnesia, for events after breakfast until after arrival to ED. Per history, patient fell while walking and struck her head. EMD called and patient transported here because of the mental status changes and obvious right jaw injury. She recalls walking on the street and tripping on an uneven surface on the street. However she is not completely sure if that was the case. She has a remote history of feeling dizzy and lightheaded. Initial CTH negative, with maxillofacial imagining showing multiple fractures. ENT consulted, and recommended nasal precautions/abx and f/u when discharged, since LOS <3 days. MRI obtained to r/o CVA, showed left subdural hygroma, NeuroSurgery consulted. Repeat CT Head stable, so Neurosurgery recs f/u 1 month in clinic with [facility]. Cardiology consulted d/t possible syncope, HX SVT. Telemetry/EKG has been stable with no arrhythmias noted. Orthostatic BP performed, with no acute drop with body position changes.Echo normal. Cardiology believes f/u in 1 month. Exam has remained stable through out stay. Discharge plans discussed with all consults, no concerns voiced.      ER visit on 11/23  Patient presented to the ER with a complaint of a laceration to her chin after falling out of the bed last night. Patient has a recent history of intracranial bleed and facial fractures. She  "denies dizziness, blurry vision, neck pain, nausea or vomiting. She is neurologically intact on exam. Initially she did not really feel a scan was necessary. The wound was cleaned and sutured. She is up-to-date on her tetanus shot. We did CT her head and it was negative for acute intracranial abnormalities.      12/1/2016:  She is here with her   She tells me that prior to the fall she was very active taking daily long walks of up to 3 miles.  Trying to determine whether the fall was preceded by syncope or was simply a mechanical fall was very difficult. Her husabnd, Dr Crews is convinced that this was a mechanical fall followed by a concussion with retrograde amnesia and after extended questioning, i got the same impression  ECG today shows NSR with normal intervals   Beeper has shown no arrhythmias    Update since then:  She is in assisted living now and she has been usina a walker -- no more falls. She does not take her long walks anymore due to balance issues.  She has some N/S spells ~ once every 4 months-- "My body sorts of melts and I feel that I cannot stand up straight. I feel it's emotional". This happens only when standing.  She continues to lose wt and actually voluntarily restricts her diet/intake to some extent  I have reviewed the actual image of the ECG tracing obtained today and it shows NSR with normal intervals       Current Outpatient Prescriptions   Medication Sig    calcium carbonate-vit D3-min (CALTRATE 600+D PLUS MINERALS) 600 mg calcium- 400 unit Tab Take by mouth. 1 Tablet Oral Twice a day    guaifenesin (MUCINEX) 600 mg 12 hr tablet Take 1,200 mg by mouth 2 (two) times daily.      immune globulin, human, (POLYGAM) 5 gram injection Inject into the vein.      levothyroxine (SYNTHROID) 100 MCG tablet Take 1 tablet (100 mcg total) by mouth before breakfast.    montelukast (SINGULAIR) 10 mg tablet Take 1 tablet (10 mg total) by mouth every evening.    " neomycin-polymyxin-hydrocortisone (CORTISPORIN) otic solution     nitroGLYCERIN (NITROSTAT) 0.4 MG SL tablet Place 0.4 mg under the tongue every 5 (five) minutes as needed.      polyethylene glycol (GLYCOLAX) 17 gram PwPk Take by mouth.    polymyxin B sulf-trimethoprim (POLYTRIM) 10,000 unit- 1 mg/mL Drop      No current facility-administered medications for this visit.      Review of Systems   Constitution: Negative for decreased appetite, weakness, weight gain and weight loss.   HENT: Negative for nosebleeds.    Eyes: Negative for blurred vision and visual disturbance.   Cardiovascular: Negative for chest pain, claudication, cyanosis, dyspnea on exertion, irregular heartbeat, leg swelling, near-syncope, orthopnea, palpitations, paroxysmal nocturnal dyspnea and syncope.   Respiratory: Negative for cough, shortness of breath and wheezing.    Endocrine: Negative for heat intolerance.   Skin: Negative for rash.   Musculoskeletal: Negative for muscle weakness and myalgias.   Gastrointestinal: Negative for abdominal pain, anorexia, melena, nausea and vomiting.   Genitourinary: Negative for menorrhagia.   Neurological: Negative for excessive daytime sleepiness, dizziness, headaches, loss of balance, seizures and vertigo.   Psychiatric/Behavioral: Negative for altered mental status and depression. The patient is nervous/anxious.         Pt states she have anxiety attacks       Objective:   Physical Exam   Constitutional: She is oriented to person, place, and time. She appears well-developed and well-nourished.   Very thin , emaciated, likely undernourished   HENT:   Head: Normocephalic and atraumatic.   Right Ear: External ear normal.   Left Ear: External ear normal.   Neck: Normal range of motion. Neck supple. No thyromegaly present.   Cardiovascular: Normal rate, regular rhythm, normal heart sounds and intact distal pulses.  Exam reveals no gallop, no S3, no S4, no friction rub, no midsystolic click and no opening  "snap.    No murmur heard.  Pulses:       Carotid pulses are 2+ on the right side, and 2+ on the left side.       Radial pulses are 2+ on the right side, and 2+ on the left side.        Dorsalis pedis pulses are 2+ on the right side, and 2+ on the left side.        Posterior tibial pulses are 2+ on the right side, and 2+ on the left side.   Pulmonary/Chest: Effort normal and breath sounds normal.   Abdominal: Soft. She exhibits no distension. There is no tenderness.   Musculoskeletal:        Right ankle: She exhibits no swelling.        Left ankle: She exhibits no swelling.        Right lower leg: She exhibits no swelling.        Left lower leg: She exhibits no swelling.   Neurological: She is alert and oriented to person, place, and time. She has normal strength. No cranial nerve deficit. Gait normal.   Skin: Skin is warm. No rash noted. No cyanosis. Nails show no clubbing.   Psychiatric: She has a normal mood and affect. Her speech is normal and behavior is normal. Thought content normal. Cognition and memory are normal.   Nursing note and vitals reviewed.    /62   Pulse 70   Ht 5' 4" (1.626 m)   Wt 36.6 kg (80 lb 11 oz)   BMI 13.85 kg/m²      Assessment:      1. SVT (supraventricular tachycardia) -- currently cured post RFA   2. Underweight -- this is her major issue in my opinion   3. Major depressive disorder, recurrent episode, in full remission    4. Hypogammaglobulinemia    5. Gait disturbance    6. History of subarachnoid hemorrhage    7. Frequent falls -- now better   8. Adult bronchiectasis    9. History of cardiac radiofrequency ablation (RFA)    10. Orthostatic hypotension -- likely contributing to her past Hx of falls       Plan:    I spent a long time with her and her  Dr Crews over the need to respond to dizzy spells with protective maneuvers of sitting or lying down immediately as opposed to what she currently does.   I have also spent a long time going over the need to increase " her caloric and protein intake.  No orders of the defined types were placed in this encounter.    Follow-up if symptoms worsen or fail to improve.  Medications Discontinued During This Encounter   Medication Reason    simethicone (MYLICON) 80 MG chewable tablet Patient no longer taking     New Prescriptions    No medications on file     Modified Medications    No medications on file

## 2018-02-03 PROBLEM — I95.1 ORTHOSTATIC HYPOTENSION: Status: ACTIVE | Noted: 2018-02-03

## 2018-02-03 PROBLEM — Z98.890 HISTORY OF CARDIAC RADIOFREQUENCY ABLATION (RFA): Status: ACTIVE | Noted: 2018-02-03

## 2018-02-18 ENCOUNTER — PATIENT MESSAGE (OUTPATIENT)
Dept: INTERNAL MEDICINE | Facility: CLINIC | Age: 68
End: 2018-02-18

## 2018-02-19 ENCOUNTER — PATIENT MESSAGE (OUTPATIENT)
Dept: INTERNAL MEDICINE | Facility: CLINIC | Age: 68
End: 2018-02-19

## 2018-02-28 ENCOUNTER — INFUSION (OUTPATIENT)
Dept: INFECTIOUS DISEASES | Facility: HOSPITAL | Age: 68
End: 2018-02-28
Attending: FAMILY MEDICINE
Payer: MEDICARE

## 2018-02-28 VITALS
SYSTOLIC BLOOD PRESSURE: 125 MMHG | DIASTOLIC BLOOD PRESSURE: 73 MMHG | BODY MASS INDEX: 13.43 KG/M2 | HEIGHT: 64 IN | HEART RATE: 78 BPM | TEMPERATURE: 99 F | WEIGHT: 78.69 LBS

## 2018-02-28 DIAGNOSIS — D80.1 HYPOGAMMAGLOBULINEMIA: Primary | ICD-10-CM

## 2018-02-28 PROCEDURE — 63600175 PHARM REV CODE 636 W HCPCS: Mod: JG | Performed by: INTERNAL MEDICINE

## 2018-02-28 PROCEDURE — 96366 THER/PROPH/DIAG IV INF ADDON: CPT

## 2018-02-28 PROCEDURE — 96365 THER/PROPH/DIAG IV INF INIT: CPT

## 2018-02-28 RX ADMIN — HUMAN IMMUNOGLOBULIN G 20 G: 20 LIQUID INTRAVENOUS at 10:02

## 2018-02-28 NOTE — PROGRESS NOTES
Pt received dose of IVIG PRIVIGEN 20 GRAMS.  Infusion completed at a rate of 99cc/hr.  Pt tolerated well and left in NAD.

## 2018-03-04 ENCOUNTER — PATIENT MESSAGE (OUTPATIENT)
Dept: INTERNAL MEDICINE | Facility: CLINIC | Age: 68
End: 2018-03-04

## 2018-03-12 ENCOUNTER — PATIENT MESSAGE (OUTPATIENT)
Dept: INTERNAL MEDICINE | Facility: CLINIC | Age: 68
End: 2018-03-12

## 2018-03-14 ENCOUNTER — PATIENT MESSAGE (OUTPATIENT)
Dept: ENDOCRINOLOGY | Facility: CLINIC | Age: 68
End: 2018-03-14

## 2018-03-23 ENCOUNTER — PATIENT MESSAGE (OUTPATIENT)
Dept: INTERNAL MEDICINE | Facility: CLINIC | Age: 68
End: 2018-03-23

## 2018-03-23 NOTE — TELEPHONE ENCOUNTER
Inform pt that miralax ok to try at night but if still not with BM then needs to d/w Dr. Green or come in for appt.

## 2018-03-28 ENCOUNTER — INFUSION (OUTPATIENT)
Dept: INFECTIOUS DISEASES | Facility: HOSPITAL | Age: 68
End: 2018-03-28
Attending: FAMILY MEDICINE
Payer: MEDICARE

## 2018-03-28 VITALS
DIASTOLIC BLOOD PRESSURE: 61 MMHG | RESPIRATION RATE: 17 BRPM | HEIGHT: 64 IN | OXYGEN SATURATION: 99 % | HEART RATE: 71 BPM | BODY MASS INDEX: 13.51 KG/M2 | WEIGHT: 79.13 LBS | TEMPERATURE: 98 F | SYSTOLIC BLOOD PRESSURE: 112 MMHG

## 2018-03-28 DIAGNOSIS — D80.1 HYPOGAMMAGLOBULINEMIA: Primary | ICD-10-CM

## 2018-03-28 PROCEDURE — 63600175 PHARM REV CODE 636 W HCPCS: Mod: JG | Performed by: INTERNAL MEDICINE

## 2018-03-28 PROCEDURE — 96365 THER/PROPH/DIAG IV INF INIT: CPT

## 2018-03-28 PROCEDURE — 96366 THER/PROPH/DIAG IV INF ADDON: CPT

## 2018-03-28 RX ADMIN — HUMAN IMMUNOGLOBULIN G 20 G: 20 LIQUID INTRAVENOUS at 09:03

## 2018-04-13 DIAGNOSIS — E89.0 POSTOPERATIVE HYPOTHYROIDISM: ICD-10-CM

## 2018-04-13 RX ORDER — LEVOTHYROXINE SODIUM 100 UG/1
100 TABLET ORAL
Qty: 90 TABLET | Refills: 1 | Status: SHIPPED | OUTPATIENT
Start: 2018-04-13 | End: 2018-10-16 | Stop reason: SDUPTHER

## 2018-04-25 ENCOUNTER — INFUSION (OUTPATIENT)
Dept: INFECTIOUS DISEASES | Facility: HOSPITAL | Age: 68
End: 2018-04-25
Attending: FAMILY MEDICINE
Payer: MEDICARE

## 2018-04-25 VITALS
HEART RATE: 80 BPM | OXYGEN SATURATION: 100 % | DIASTOLIC BLOOD PRESSURE: 75 MMHG | TEMPERATURE: 99 F | WEIGHT: 79.69 LBS | HEIGHT: 64 IN | SYSTOLIC BLOOD PRESSURE: 144 MMHG | RESPIRATION RATE: 16 BRPM | BODY MASS INDEX: 13.61 KG/M2

## 2018-04-25 DIAGNOSIS — D80.1 HYPOGAMMAGLOBULINEMIA: Primary | ICD-10-CM

## 2018-04-25 PROCEDURE — 63600175 PHARM REV CODE 636 W HCPCS: Mod: JG | Performed by: INTERNAL MEDICINE

## 2018-04-25 PROCEDURE — 96366 THER/PROPH/DIAG IV INF ADDON: CPT

## 2018-04-25 PROCEDURE — 96365 THER/PROPH/DIAG IV INF INIT: CPT

## 2018-04-25 RX ADMIN — HUMAN IMMUNOGLOBULIN G 20 G: 20 LIQUID INTRAVENOUS at 09:04

## 2018-05-01 ENCOUNTER — PATIENT MESSAGE (OUTPATIENT)
Dept: INTERNAL MEDICINE | Facility: CLINIC | Age: 68
End: 2018-05-01

## 2018-05-01 ENCOUNTER — TELEPHONE (OUTPATIENT)
Dept: INTERNAL MEDICINE | Facility: CLINIC | Age: 68
End: 2018-05-01

## 2018-05-01 DIAGNOSIS — Z12.31 SCREENING MAMMOGRAM, ENCOUNTER FOR: Primary | ICD-10-CM

## 2018-05-01 DIAGNOSIS — Z12.31 ENCOUNTER FOR SCREENING MAMMOGRAM FOR BREAST CANCER: Primary | ICD-10-CM

## 2018-05-01 NOTE — TELEPHONE ENCOUNTER
Called and discussed mammogram and she will proceed with screening mammogram. Discussed that at any point she can discuss with a breast surgeon to see if she wants a diagnostic mammogram.  Changed her mammogram to screening with zabrina and cad, please schedule.

## 2018-05-01 NOTE — TELEPHONE ENCOUNTER
Tried to schedule pt for mammo. Pt states that she only wants a diagnostic mammo and was told that she has to have a diagnostic mammo in the future. Informed pt that the last report for mammo was for a screening mammo and that a diagnostic is usually not used for the screening. Pt states that she would like to talk with Dr. Green about the order for mammogram.

## 2018-05-01 NOTE — TELEPHONE ENCOUNTER
Call to patient and mammogram was scheduled for July 12, 2018. No further questions.  Val Rizzo RN

## 2018-05-08 ENCOUNTER — OFFICE VISIT (OUTPATIENT)
Dept: URGENT CARE | Facility: CLINIC | Age: 68
End: 2018-05-08
Payer: MEDICARE

## 2018-05-08 VITALS
BODY MASS INDEX: 13.49 KG/M2 | HEART RATE: 70 BPM | WEIGHT: 79 LBS | HEIGHT: 64 IN | OXYGEN SATURATION: 95 % | SYSTOLIC BLOOD PRESSURE: 137 MMHG | RESPIRATION RATE: 18 BRPM | TEMPERATURE: 98 F | DIASTOLIC BLOOD PRESSURE: 82 MMHG

## 2018-05-08 DIAGNOSIS — L02.419 ABSCESS OF WRIST: Primary | ICD-10-CM

## 2018-05-08 PROCEDURE — 10060 I&D ABSCESS SIMPLE/SINGLE: CPT | Mod: S$GLB,,, | Performed by: NURSE PRACTITIONER

## 2018-05-08 PROCEDURE — 99213 OFFICE O/P EST LOW 20 MIN: CPT | Mod: 25,S$GLB,, | Performed by: NURSE PRACTITIONER

## 2018-05-08 PROCEDURE — 96372 THER/PROPH/DIAG INJ SC/IM: CPT | Mod: S$GLB,,, | Performed by: EMERGENCY MEDICINE

## 2018-05-08 RX ORDER — LIDOCAINE HYDROCHLORIDE 10 MG/ML
5 INJECTION INFILTRATION; PERINEURAL
Status: COMPLETED | OUTPATIENT
Start: 2018-05-08 | End: 2018-05-08

## 2018-05-08 RX ORDER — CEPHALEXIN 500 MG/1
500 CAPSULE ORAL 3 TIMES DAILY
Qty: 14 CAPSULE | Refills: 0 | Status: SHIPPED | OUTPATIENT
Start: 2018-05-08 | End: 2018-05-18

## 2018-05-08 RX ORDER — CEFAZOLIN SODIUM 1 G/3ML
500 INJECTION, POWDER, FOR SOLUTION INTRAMUSCULAR; INTRAVENOUS
Status: COMPLETED | OUTPATIENT
Start: 2018-05-08 | End: 2018-05-08

## 2018-05-08 RX ORDER — MUPIROCIN 20 MG/G
OINTMENT TOPICAL
Qty: 22 G | Refills: 1 | Status: SHIPPED | OUTPATIENT
Start: 2018-05-08 | End: 2018-05-14 | Stop reason: SDUPTHER

## 2018-05-08 RX ADMIN — LIDOCAINE HYDROCHLORIDE 5 ML: 10 INJECTION INFILTRATION; PERINEURAL at 03:05

## 2018-05-08 RX ADMIN — CEFAZOLIN SODIUM 500 MG: 1 INJECTION, POWDER, FOR SOLUTION INTRAMUSCULAR; INTRAVENOUS at 03:05

## 2018-05-08 NOTE — PROCEDURES
Incision & Drainage  Date/Time: 5/8/2018 3:24 PM  Performed by: MIRIAN EDWARDS  Authorized by: MIRIAN EDWARDS     Consent Done?:  Yes (Verbal)    Type:  Abscess  Body area:  Upper extremity  Location details:  Left arm  Anesthesia:  Local infiltration  Local anesthetic:  Lidocaine 1% without epinephrinelidocaine 1% without epinephrine  Anesthetic total (ml):  1  Risk factor:  Underlying major vessel, underlying major nerve and coagulopathy  Scalpel size:  11  Incision type:  Single straight  Complexity:  Simple  Drainage:  Pus and bloody  Drainage amount:  Scant  Wound treatment:  Incision, wound left open and deloculation  Packing material:  None  Patient tolerance:  Patient tolerated the procedure well with no immediate complications

## 2018-05-08 NOTE — PATIENT INSTRUCTIONS
Discharge Instructions for Cellulitis  You have been diagnosed with cellulitis. This is an infection in the deepest layer of the skin. In some cases, the infection also affects the muscle. Cellulitis is caused by bacteria. The bacteria can enter the body through broken skin. This can happen with a cut, scratch, animal bite, or an insect bite that has been scratched. You may have been treated in the hospital with antibiotics and fluids. You will likely be given a prescription for antibiotics to take at home. This sheet will help you take care of yourself at home.  Home care  When you are home:  · Take the prescribed antibiotic medicine you are given as directed until it is gone. Take it even if you feel better. It treats the infection and stops it from returning. Not taking all the medicine can make future infections hard to treat.  · Keep the infected area clean.  · When possible, raise the infected area above the level of your heart. This helps keep swelling down.  · Talk with your healthcare provider if you are in pain. Ask what kind of over-the-counter medicine you can take for pain.  · Apply clean bandages as advised.  · Take your temperature once a day for a week.  · Wash your hands often to prevent spreading the infection.  In the future, wash your hands before and after you touch cuts, scratches, or bandages. This will help prevent infection.   When to call your healthcare provider  Call your healthcare provider immediately if you have any of the following:  · Difficulty or pain when moving the joints above or below the infected area  · Discharge or pus draining from the area  · Fever of 100.4°F (38°C) or higher, or as directed by your healthcare provider  · Pain that gets worse in or around the infected   · Redness that gets worse in or around the infected area, particularly if the area of redness expands to a wider area  · Shaking chills  · Swelling of the infected area  · Vomiting   Date Last Reviewed:  8/1/2016  © 3489-3055 [employer]. [address] [place] [address], [place] [address]. All rights reserved. This information is not intended as a substitute for professional medical care. Always follow your healthcare professional's instructions.          Abscess (Incision & Drainage)  An abscess is sometimes called a boil. It happens when bacteria get trapped under the skin and start to grow. Pus forms inside the abscess as the body responds to the bacteria. An abscess can happen with an insect bite, ingrown hair, blocked oil gland, pimple, cyst, or puncture wound.  Your healthcare provider has drained the pus from your abscess. If the abscess pocket was large, your healthcare provider may have put in gauze packing. Your provider will need to remove it on your next visit. He or she may also replace it at that time. You may not need antibiotics to treat a simple abscess, unless the infection is spreading into the skin around the wound (cellulitis).  The wound will take about 1 to 2 weeks to heal, depending on the size of the abscess. Healthy tissue will grow from the bottom and sides of the opening until it seals over.  Home care  These tips can help your wound heal:  · The wound may drain for the first 2 days. Cover the wound with a clean dry dressing. Change the dressing if it becomes soaked with blood or pus.  · If a gauze packing was placed inside the abscess pocket, you may be told to remove it yourself. You may do this in the shower. Once the packing is removed, you should wash the area in the shower, or clean the area as directed by your provider. Continue to do this until the skin opening has closed. Make sure you wash your hands after changing the packing or cleaning the wound.  · If you were prescribed antibiotics, take them as directed until they are all gone.  · You may use acetaminophen or ibuprofen to control pain, unless another pain medicine was prescribed. If you have liver disease or ever had  a stomach ulcer, talk with your doctor before using these medicines.  Follow-up care  Follow up with your healthcare provider, or as advised. If a gauze packing was put in your wound, it should be removed in 1 to 2 days. Check your wound every day for any signs that the infection is getting worse. The signs are listed below.  When to seek medical advice  Call your healthcare provider right away if any of these occur:  · Increasing redness or swelling  · Red streaks in the skin leading away from the wound  · Increasing local pain or swelling  · Continued pus draining from the wound 2 days after treatment  · Fever of 100.4ºF (38ºC) or higher, or as directed by your healthcare provider  · Boil returns when you are at home  Date Last Reviewed: 9/1/2016 © 2000-2017 [employer]. [address] [place] [address], [place] [id number]. All rights reserved. This information is not intended as a substitute for professional medical care. Always follow your healthcare professional's instructions.    Please drink plenty of fluids.  Please get plenty of rest.  Please return here or go to the Emergency Department for any concerns or worsening of condition.  If you were prescribed antibiotics, please take them to completion.  Please return here in 1-3 days for a recheck of your wound if not improving.  If not allergic, please take over the counter Tylenol (Acetaminophen) and/or Motrin (Ibuprofen) as directed for control of pain and/or fever.  Please follow up with your primary care doctor or specialist as needed.    If you  smoke, please stop smoking.

## 2018-05-08 NOTE — PROGRESS NOTES
"Subjective:       Patient ID: Yvette Crews is a 68 y.o. female.    Vitals:  height is 5' 4" (1.626 m) and weight is 35.8 kg (79 lb). Her oral temperature is 97.5 °F (36.4 °C). Her blood pressure is 137/82 and her pulse is 70. Her respiration is 18 and oxygen saturation is 95%.     Chief Complaint: Insect Bite    Patient states she have an insect bite to her left lower arm for 2 days. Patient states she have some swelling to her left lower arm.    She stays at [facility] and was sitting out in the garden where she believes she was bitten by an insect. She did not see or recall getting bitten. She does not remember when she noticed the swelling. Denies f/c. No body aches. +tenderness to touch. No other associated symptoms to report. No attempts made for symptom relief.      Insect Bite   This is a new problem. The current episode started in the past 7 days. The problem occurs constantly. The problem has been gradually worsening. Pertinent negatives include no abdominal pain, chest pain, chills, fever, headaches, joint swelling, nausea, rash, sore throat or vomiting. Nothing aggravates the symptoms. Treatments tried: onitment. The treatment provided mild relief.     Review of Systems   Constitution: Negative for chills and fever.   HENT: Negative for sore throat.    Eyes: Negative for blurred vision.   Cardiovascular: Negative for chest pain.   Respiratory: Negative for shortness of breath.    Skin: Positive for color change and dry skin. Negative for itching and rash.   Musculoskeletal: Negative for back pain, joint pain and joint swelling.   Gastrointestinal: Negative for abdominal pain, diarrhea, nausea and vomiting.   Neurological: Negative for headaches.   Psychiatric/Behavioral: The patient is not nervous/anxious.        Objective:      Physical Exam   Constitutional: She is oriented to person, place, and time. Vital signs are normal. She appears cachectic.   Frail appearing   HENT:   Head: " Normocephalic and atraumatic. Head is without abrasion, without contusion and without laceration.   Right Ear: External ear normal.   Left Ear: External ear normal.   Nose: Nose normal.   Mouth/Throat: Oropharynx is clear and moist.   Eyes: Conjunctivae, EOM and lids are normal. Pupils are equal, round, and reactive to light.   Neck: Trachea normal, full passive range of motion without pain and phonation normal. Neck supple.   Cardiovascular: Normal rate, regular rhythm and normal heart sounds.    Pulmonary/Chest: Effort normal and breath sounds normal. No stridor. No respiratory distress.   Musculoskeletal: Normal range of motion.   Neurological: She is alert and oriented to person, place, and time.   Skin: Skin is warm, dry and intact. Capillary refill takes less than 2 seconds. No abrasion, no bruising, no burn, no ecchymosis, no laceration, no lesion and no rash noted. There is erythema.        Psychiatric: She has a normal mood and affect. Her speech is normal and behavior is normal. Judgment and thought content normal. Cognition and memory are normal.   Nursing note and vitals reviewed.      Assessment:       1. Abscess of wrist        Plan:       Keflex prescription had an error in the amount of capsules to be dispensed, 14 instead of 30. The pharmacy was called and the error has been corrected so the patient will receive the correct amount.  Abscess of wrist  -     Aerobic culture    Other orders  -     cephALEXin (KEFLEX) 500 MG capsule; Take 1 capsule (500 mg total) by mouth 3 (three) times daily.  Dispense: 14 capsule; Refill: 0  -     ceFAZolin injection 500 mg; Inject 500 mg into the muscle one time.  -     lidocaine HCL 10 mg/ml (1%) injection 5 mL; 5 mLs by Other route one time.

## 2018-05-10 ENCOUNTER — CLINICAL SUPPORT (OUTPATIENT)
Dept: URGENT CARE | Facility: CLINIC | Age: 68
End: 2018-05-10
Payer: MEDICARE

## 2018-05-10 VITALS
RESPIRATION RATE: 19 BRPM | HEIGHT: 64 IN | WEIGHT: 79 LBS | SYSTOLIC BLOOD PRESSURE: 129 MMHG | TEMPERATURE: 100 F | OXYGEN SATURATION: 96 % | DIASTOLIC BLOOD PRESSURE: 73 MMHG | HEART RATE: 74 BPM | BODY MASS INDEX: 13.49 KG/M2

## 2018-05-10 DIAGNOSIS — L02.419 ABSCESS OF WRIST: Primary | ICD-10-CM

## 2018-05-10 PROCEDURE — 10060 I&D ABSCESS SIMPLE/SINGLE: CPT | Mod: S$GLB,,, | Performed by: NURSE PRACTITIONER

## 2018-05-10 PROCEDURE — 99214 OFFICE O/P EST MOD 30 MIN: CPT | Mod: 25,S$GLB,, | Performed by: NURSE PRACTITIONER

## 2018-05-10 NOTE — PROGRESS NOTES
"Subjective:       Patient ID: Yvette Crews is a 68 y.o. female.    Vitals:  height is 5' 4" (1.626 m) and weight is 35.8 kg (79 lb). Her oral temperature is 99.6 °F (37.6 °C). Her blood pressure is 129/73 and her pulse is 74. Her respiration is 19 and oxygen saturation is 96%.     Chief Complaint: Wound Check    Patient was here 2 days ago (5/8/18)  for an abscess. Patient states she is here for wound recheck on her left wrist.  Patient states she still have some redness and swelling to her left wrist, but it has significantly improved since her visit. Patient states no pain at the present time. She reports pain is gone completely. She reports compliance with the Keflex. She has taken 5 doses since her visit.   Wound culture results are still pending.   She denies fever or drainage from wound.       Wound Check   She was originally treated 2 to 3 days ago. Previous treatment included oral antibiotics and I&D of abscess. There has been no drainage from the wound. The redness has improved. The swelling has improved. There is no pain present. She has no difficulty moving the affected extremity or digit.     Review of Systems   Constitution: Negative for chills and fever.   HENT: Negative for sore throat.    Eyes: Negative for blurred vision.   Cardiovascular: Negative for chest pain.   Respiratory: Negative for shortness of breath.    Skin: Positive for color change and dry skin. Negative for rash.   Musculoskeletal: Negative for back pain and joint pain.   Gastrointestinal: Negative for abdominal pain, diarrhea, nausea and vomiting.   Neurological: Negative for headaches.   Psychiatric/Behavioral: The patient is not nervous/anxious.        Objective:      Physical Exam   Constitutional: She is oriented to person, place, and time. She appears well-developed. She appears cachectic. No distress.   Very frail   Presents with rolling walker.    HENT:   Head: Normocephalic and atraumatic. Head is without abrasion, " "without contusion and without laceration.   Right Ear: External ear normal.   Left Ear: External ear normal.   Nose: Nose normal.   Mouth/Throat: Oropharynx is clear and moist.   Eyes: Conjunctivae, EOM and lids are normal. Pupils are equal, round, and reactive to light.   Neck: Trachea normal, full passive range of motion without pain and phonation normal. Neck supple.   Cardiovascular: Normal rate, regular rhythm and normal heart sounds.    Pulmonary/Chest: Effort normal and breath sounds normal. No stridor. No respiratory distress.   Musculoskeletal: Normal range of motion.   Neurological: She is alert and oriented to person, place, and time.   Skin: Skin is warm, dry and intact. Capillary refill takes less than 2 seconds. Lesion (pustule head. + induration and mild fluctuance. non tender ) noted. No abrasion, no bruising, no burn, no ecchymosis and no laceration noted. She is not diaphoretic. There is erythema (with warmth ).        Psychiatric: She has a normal mood and affect. Her speech is normal and behavior is normal. Judgment and thought content normal. Cognition and memory are normal.   Nursing note and vitals reviewed.                Assessment:       1. Abscess of wrist        Plan:         Abscess of wrist  -     INCISION AND DRAINAGE      Incision & Drainage  Date/Time: 5/10/2018 3:54 PM  Performed by: GUADALUPE DURAN  Authorized by: GUADALUPE DURAN     Time out: Immediately prior to procedure a "time out" was called to verify the correct patient, procedure, equipment, support staff and site/side marked as required.    Consent Done?:  Yes (Verbal)    Type:  Abscess  Body area:  Upper extremity  Location details:  Left wrist  Anesthesia:  Local infiltration  Local anesthetic: lidocaine 1% without epinephrine  Anesthetic total (ml):  2  Risk factor:  Underlying major vessel, underlying major nerve and coagulopathy  Scalpel size:  11  Incision type:  Single straight  Complexity:  Simple  Drainage:  " Bloody and pus  Drainage amount: small amount   Wound treatment:  Incision, drainage, wound packed and expression of material  Packing material:  1/4 in iodoform gauze  Patient tolerance:  Patient tolerated the procedure well with no immediate complications    Wound packed after irrigating the wound with NS and hydrogen peroxide  Gauze dressing with Koban placed.               Dicussed case with Dr. Blanco who original saw the patient the NP Yolette 2 days ago. Dr. Blanco reports marked improvement with cellulitis and would like to keep patient on Keflex for now until wound culture results.   Wound was packed and patient instructed to come back in 2 days for packing removal and wound recheck.   Discussed warning s.s for reevaluation sooner than planned. She verbalized understanding.         Patient Instructions   Wound packed today.   Come back to clinic in 2 days (Saturday) to have packing removed and wound rechecked.   Wound culture is pending. We will call you when it results.   Continue antibiotics.       Please drink plenty of fluids.  Please get plenty of rest.  Please return here or go to the Emergency Department for any concerns or worsening of condition.  If you were prescribed antibiotics, please take them to completion.  If you were prescribed a narcotic medication, do not drive or operate heavy equipment or machinery while taking these medications.  Please return here in 1-3 days for a recheck of your wound.  If not allergic, please take over the counter Tylenol (Acetaminophen) and/or Motrin (Ibuprofen) as directed for control of pain and/or fever.  Please follow up with your primary care doctor or specialist as needed.    If you  smoke, please stop smoking.        Abscess (Incision & Drainage)  An abscess is sometimes called a boil. It happens when bacteria get trapped under the skin and start to grow. Pus forms inside the abscess as the body responds to the bacteria. An abscess can happen with an insect  bite, ingrown hair, blocked oil gland, pimple, cyst, or puncture wound.  Your healthcare provider has drained the pus from your abscess. If the abscess pocket was large, your healthcare provider may have put in gauze packing. Your provider will need to remove it on your next visit. He or she may also replace it at that time. You may not need antibiotics to treat a simple abscess, unless the infection is spreading into the skin around the wound (cellulitis).  The wound will take about 1 to 2 weeks to heal, depending on the size of the abscess. Healthy tissue will grow from the bottom and sides of the opening until it seals over.  Home care  These tips can help your wound heal:  · The wound may drain for the first 2 days. Cover the wound with a clean dry dressing. Change the dressing if it becomes soaked with blood or pus.  · If a gauze packing was placed inside the abscess pocket, you may be told to remove it yourself. You may do this in the shower. Once the packing is removed, you should wash the area in the shower, or clean the area as directed by your provider. Continue to do this until the skin opening has closed. Make sure you wash your hands after changing the packing or cleaning the wound.  · If you were prescribed antibiotics, take them as directed until they are all gone.  · You may use acetaminophen or ibuprofen to control pain, unless another pain medicine was prescribed. If you have liver disease or ever had a stomach ulcer, talk with your doctor before using these medicines.  Follow-up care  Follow up with your healthcare provider, or as advised. If a gauze packing was put in your wound, it should be removed in 1 to 2 days. Check your wound every day for any signs that the infection is getting worse. The signs are listed below.  When to seek medical advice  Call your healthcare provider right away if any of these occur:  · Increasing redness or swelling  · Red streaks in the skin leading away from the  wound  · Increasing local pain or swelling  · Continued pus draining from the wound 2 days after treatment  · Fever of 100.4ºF (38ºC) or higher, or as directed by your healthcare provider  · Boil returns when you are at home  Date Last Reviewed: 9/1/2016  © 0611-8256 [employer]. [address] [place] [address], [place] [address]. All rights reserved. This information is not intended as a substitute for professional medical care. Always follow your healthcare professional's instructions.

## 2018-05-10 NOTE — PATIENT INSTRUCTIONS
Wound packed today.   Come back to clinic in 2 days (Saturday) to have packing removed and wound rechecked.   Wound culture is pending. We will call you when it results.   Continue antibiotics.       Please drink plenty of fluids.  Please get plenty of rest.  Please return here or go to the Emergency Department for any concerns or worsening of condition.  If you were prescribed antibiotics, please take them to completion.  If you were prescribed a narcotic medication, do not drive or operate heavy equipment or machinery while taking these medications.  Please return here in 1-3 days for a recheck of your wound.  If not allergic, please take over the counter Tylenol (Acetaminophen) and/or Motrin (Ibuprofen) as directed for control of pain and/or fever.  Please follow up with your primary care doctor or specialist as needed.    If you  smoke, please stop smoking.        Abscess (Incision & Drainage)  An abscess is sometimes called a boil. It happens when bacteria get trapped under the skin and start to grow. Pus forms inside the abscess as the body responds to the bacteria. An abscess can happen with an insect bite, ingrown hair, blocked oil gland, pimple, cyst, or puncture wound.  Your healthcare provider has drained the pus from your abscess. If the abscess pocket was large, your healthcare provider may have put in gauze packing. Your provider will need to remove it on your next visit. He or she may also replace it at that time. You may not need antibiotics to treat a simple abscess, unless the infection is spreading into the skin around the wound (cellulitis).  The wound will take about 1 to 2 weeks to heal, depending on the size of the abscess. Healthy tissue will grow from the bottom and sides of the opening until it seals over.  Home care  These tips can help your wound heal:  · The wound may drain for the first 2 days. Cover the wound with a clean dry dressing. Change the dressing if it becomes soaked with  blood or pus.  · If a gauze packing was placed inside the abscess pocket, you may be told to remove it yourself. You may do this in the shower. Once the packing is removed, you should wash the area in the shower, or clean the area as directed by your provider. Continue to do this until the skin opening has closed. Make sure you wash your hands after changing the packing or cleaning the wound.  · If you were prescribed antibiotics, take them as directed until they are all gone.  · You may use acetaminophen or ibuprofen to control pain, unless another pain medicine was prescribed. If you have liver disease or ever had a stomach ulcer, talk with your doctor before using these medicines.  Follow-up care  Follow up with your healthcare provider, or as advised. If a gauze packing was put in your wound, it should be removed in 1 to 2 days. Check your wound every day for any signs that the infection is getting worse. The signs are listed below.  When to seek medical advice  Call your healthcare provider right away if any of these occur:  · Increasing redness or swelling  · Red streaks in the skin leading away from the wound  · Increasing local pain or swelling  · Continued pus draining from the wound 2 days after treatment  · Fever of 100.4ºF (38ºC) or higher, or as directed by your healthcare provider  · Boil returns when you are at home  Date Last Reviewed: 9/1/2016  © 2762-2378 [employer]. [address] [place] [address], [place] [address]. All rights reserved. This information is not intended as a substitute for professional medical care. Always follow your healthcare professional's instructions.

## 2018-05-12 ENCOUNTER — CLINICAL SUPPORT (OUTPATIENT)
Dept: URGENT CARE | Facility: CLINIC | Age: 68
End: 2018-05-12
Payer: MEDICARE

## 2018-05-12 VITALS
SYSTOLIC BLOOD PRESSURE: 129 MMHG | HEIGHT: 64 IN | HEART RATE: 70 BPM | DIASTOLIC BLOOD PRESSURE: 76 MMHG | WEIGHT: 79 LBS | OXYGEN SATURATION: 98 % | TEMPERATURE: 100 F | RESPIRATION RATE: 18 BRPM | BODY MASS INDEX: 13.49 KG/M2

## 2018-05-12 DIAGNOSIS — L02.91 ABSCESS: Primary | ICD-10-CM

## 2018-05-12 PROCEDURE — 99499 UNLISTED E&M SERVICE: CPT | Mod: S$GLB,,, | Performed by: EMERGENCY MEDICINE

## 2018-05-12 NOTE — PROCEDURES
ProceduresAbscess recheck. Would markedly iimproved. No surrounding cellulitis. The packing had already fallen out. Still has some induration but no drainage. Pt did tear the skin on dorsum of the wrist changing her bandage.. Some old healing ecchymosis to her skin  Culture still pending at the time of the visit but suspect the Keflex is working

## 2018-05-12 NOTE — PROGRESS NOTES
"Subjective:       Patient ID: Yvette Crews is a 68 y.o. female.    Vitals:  height is 5' 4" (1.626 m) and weight is 35.8 kg (79 lb). Her tympanic temperature is 99.6 °F (37.6 °C). Her blood pressure is 129/76 and her pulse is 70. Her respiration is 18 and oxygen saturation is 98%.     Chief Complaint: Wound Check (left arm)    Pt initially seen 5/8/18 for an abscess with cellulitis to the left volar wrist. It was I and D and she returned for followup. This is her second visit and it is much improved and no pain. She is on keflex. Her cultures are supposed to result this afternoon and I discussed it with her      Wound Check   She was originally treated 3 to 5 days ago. Previous treatment included oral antibiotics. Her temperature was unmeasured prior to arrival. There has been no drainage from the wound. There is no redness present. There is no swelling present. There is no pain present. She has no difficulty moving the affected extremity or digit.     Review of Systems   Constitution: Negative for chills and fever.   HENT: Negative for sore throat.    Eyes: Negative for blurred vision.   Cardiovascular: Negative for chest pain.   Respiratory: Negative for shortness of breath.    Skin: Negative for rash.   Musculoskeletal: Negative for back pain and joint pain.   Gastrointestinal: Negative for abdominal pain, diarrhea, nausea and vomiting.   Neurological: Negative for headaches.   Psychiatric/Behavioral: The patient is not nervous/anxious.        Objective:      Physical Exam   Constitutional: She is oriented to person, place, and time.   cachetic   Neurological: She is alert and oriented to person, place, and time.   Skin: There is erythema.   Abscess that has been drained left wrist area. Decreasing cellulitis and improved from last visit. No drainage       Assessment:       1. Abscess        Plan:         Abscess          Patient Instructions   Continue all the previous wound care instructions  Change " the bandage twice a day , put the  bactroban ointment and put non-stick gauze on it and then the 4X4 and wrap with coban. Don't put it too tight.   I will call you later today if the culture report comes back. If I can't reach you call me before 6 pm [phone]  You don't need to come back unless it fails to heal.

## 2018-05-12 NOTE — PATIENT INSTRUCTIONS
Continue all the previous wound care instructions  Change the bandage twice a day , put the  bactroban ointment and put non-stick gauze on it and then the 4X4 and wrap with coban. Don't put it too tight.   I will call you later today if the culture report comes back. If I can't reach you call me before 6 pm [phone]  You don't need to come back unless it fails to heal.

## 2018-05-13 ENCOUNTER — TELEPHONE (OUTPATIENT)
Dept: URGENT CARE | Facility: CLINIC | Age: 68
End: 2018-05-13

## 2018-05-13 LAB
BACTERIA SPEC AEROBE CULT: ABNORMAL
BACTERIA SPEC CULT: ABNORMAL
OTHER ANTIBIOTIC SUSC ISLT: ABNORMAL

## 2018-05-13 NOTE — TELEPHONE ENCOUNTER
Spoke with patient today and told her the prelim was staph with the full report to follow. However the wound is getting better and no MRSA cultured so continue the course with Keflex

## 2018-05-14 ENCOUNTER — TELEPHONE (OUTPATIENT)
Dept: URGENT CARE | Facility: CLINIC | Age: 68
End: 2018-05-14

## 2018-05-14 DIAGNOSIS — L02.419 ABSCESS OF WRIST: ICD-10-CM

## 2018-05-14 RX ORDER — MUPIROCIN 20 MG/G
OINTMENT TOPICAL
Qty: 22 G | Refills: 0 | Status: SHIPPED | OUTPATIENT
Start: 2018-05-14 | End: 2018-06-18 | Stop reason: SDUPTHER

## 2018-05-23 ENCOUNTER — INFUSION (OUTPATIENT)
Dept: INFECTIOUS DISEASES | Facility: HOSPITAL | Age: 68
End: 2018-05-23
Attending: INTERNAL MEDICINE
Payer: MEDICARE

## 2018-05-23 VITALS
TEMPERATURE: 99 F | OXYGEN SATURATION: 100 % | HEART RATE: 87 BPM | BODY MASS INDEX: 13.41 KG/M2 | SYSTOLIC BLOOD PRESSURE: 141 MMHG | WEIGHT: 78.13 LBS | RESPIRATION RATE: 18 BRPM | DIASTOLIC BLOOD PRESSURE: 68 MMHG

## 2018-05-23 DIAGNOSIS — D80.1 HYPOGAMMAGLOBULINEMIA: Primary | ICD-10-CM

## 2018-05-23 PROCEDURE — 96365 THER/PROPH/DIAG IV INF INIT: CPT

## 2018-05-23 PROCEDURE — 96366 THER/PROPH/DIAG IV INF ADDON: CPT

## 2018-05-23 PROCEDURE — 63600175 PHARM REV CODE 636 W HCPCS: Mod: JG | Performed by: INTERNAL MEDICINE

## 2018-05-23 RX ADMIN — HUMAN IMMUNOGLOBULIN G 20 G: 20 LIQUID INTRAVENOUS at 09:05

## 2018-06-13 ENCOUNTER — TELEPHONE (OUTPATIENT)
Dept: INTERNAL MEDICINE | Facility: CLINIC | Age: 68
End: 2018-06-13

## 2018-06-13 ENCOUNTER — LAB VISIT (OUTPATIENT)
Dept: LAB | Facility: HOSPITAL | Age: 68
End: 2018-06-13
Payer: MEDICARE

## 2018-06-13 DIAGNOSIS — E03.9 HYPOTHYROIDISM, UNSPECIFIED TYPE: ICD-10-CM

## 2018-06-13 DIAGNOSIS — M81.0 SENILE OSTEOPOROSIS: ICD-10-CM

## 2018-06-13 DIAGNOSIS — E03.9 HYPOTHYROIDISM, UNSPECIFIED TYPE: Primary | ICD-10-CM

## 2018-06-13 LAB
25(OH)D3+25(OH)D2 SERPL-MCNC: 67 NG/ML
TSH SERPL DL<=0.005 MIU/L-ACNC: 3.42 UIU/ML
TSH SERPL DL<=0.005 MIU/L-ACNC: 3.75 UIU/ML

## 2018-06-13 PROCEDURE — 36415 COLL VENOUS BLD VENIPUNCTURE: CPT

## 2018-06-13 PROCEDURE — 84443 ASSAY THYROID STIM HORMONE: CPT | Mod: 91

## 2018-06-13 PROCEDURE — 82306 VITAMIN D 25 HYDROXY: CPT

## 2018-06-13 PROCEDURE — 84443 ASSAY THYROID STIM HORMONE: CPT

## 2018-06-13 NOTE — TELEPHONE ENCOUNTER
----- Message from Emmy Mendez sent at 6/13/2018 10:07 AM CDT -----  Contact: JUSTIN DE JESUS [[id number]]            Name of Who is Calling:JUSTIN DE JESUS [[id number]]    What is the request in detail: pt would like TSH added to orders patient says she is at the lab waiting. Please call     Can the clinic reply by [facility]: no    What Number to [facility] Back [facility]: [phone]

## 2018-06-14 ENCOUNTER — TELEPHONE (OUTPATIENT)
Dept: ENDOCRINOLOGY | Facility: CLINIC | Age: 68
End: 2018-06-14

## 2018-06-14 ENCOUNTER — PATIENT MESSAGE (OUTPATIENT)
Dept: ENDOCRINOLOGY | Facility: CLINIC | Age: 68
End: 2018-06-14

## 2018-06-14 NOTE — TELEPHONE ENCOUNTER
----- Message from Corazon Carpenter sent at 6/14/2018  9:28 AM CDT -----  Contact: Self [phone]  PT had a vitamin d & tsh lab done on yesterday. She wants to know if she needs additional labs done. pls respond via [facility].

## 2018-06-18 ENCOUNTER — PATIENT MESSAGE (OUTPATIENT)
Dept: INTERNAL MEDICINE | Facility: CLINIC | Age: 68
End: 2018-06-18

## 2018-06-18 DIAGNOSIS — L02.419 ABSCESS OF WRIST: ICD-10-CM

## 2018-06-18 RX ORDER — MUPIROCIN 20 MG/G
OINTMENT TOPICAL
Qty: 22 G | Refills: 0 | Status: SHIPPED | OUTPATIENT
Start: 2018-06-18 | End: 2018-08-15 | Stop reason: SDUPTHER

## 2018-06-20 ENCOUNTER — INFUSION (OUTPATIENT)
Dept: INFECTIOUS DISEASES | Facility: HOSPITAL | Age: 68
End: 2018-06-20
Attending: FAMILY MEDICINE
Payer: MEDICARE

## 2018-06-20 VITALS
TEMPERATURE: 99 F | WEIGHT: 79.38 LBS | BODY MASS INDEX: 13.62 KG/M2 | DIASTOLIC BLOOD PRESSURE: 72 MMHG | HEART RATE: 80 BPM | OXYGEN SATURATION: 100 % | RESPIRATION RATE: 18 BRPM | SYSTOLIC BLOOD PRESSURE: 130 MMHG

## 2018-06-20 DIAGNOSIS — D80.1 HYPOGAMMAGLOBULINEMIA: Primary | ICD-10-CM

## 2018-06-20 PROCEDURE — 96366 THER/PROPH/DIAG IV INF ADDON: CPT

## 2018-06-20 PROCEDURE — 63600175 PHARM REV CODE 636 W HCPCS: Mod: JG | Performed by: INTERNAL MEDICINE

## 2018-06-20 PROCEDURE — 96365 THER/PROPH/DIAG IV INF INIT: CPT

## 2018-06-20 RX ADMIN — HUMAN IMMUNOGLOBULIN G 20 G: 20 LIQUID INTRAVENOUS at 09:06

## 2018-07-02 ENCOUNTER — PATIENT MESSAGE (OUTPATIENT)
Dept: INTERNAL MEDICINE | Facility: CLINIC | Age: 68
End: 2018-07-02

## 2018-07-12 ENCOUNTER — HOSPITAL ENCOUNTER (OUTPATIENT)
Dept: RADIOLOGY | Facility: HOSPITAL | Age: 68
Discharge: HOME | End: 2018-07-12
Attending: FAMILY MEDICINE
Payer: MEDICARE

## 2018-07-12 VITALS — BODY MASS INDEX: 13.49 KG/M2 | WEIGHT: 79 LBS | HEIGHT: 64 IN

## 2018-07-12 DIAGNOSIS — Z12.31 SCREENING MAMMOGRAM, ENCOUNTER FOR: ICD-10-CM

## 2018-07-12 PROCEDURE — 77067 SCR MAMMO BI INCL CAD: CPT | Mod: 26,,, | Performed by: RADIOLOGY

## 2018-07-12 PROCEDURE — 77067 SCR MAMMO BI INCL CAD: CPT | Mod: TC

## 2018-07-13 ENCOUNTER — PATIENT MESSAGE (OUTPATIENT)
Dept: INTERNAL MEDICINE | Facility: CLINIC | Age: 68
End: 2018-07-13

## 2018-07-16 ENCOUNTER — PATIENT MESSAGE (OUTPATIENT)
Dept: INTERNAL MEDICINE | Facility: CLINIC | Age: 68
End: 2018-07-16

## 2018-07-23 ENCOUNTER — INFUSION (OUTPATIENT)
Dept: INFECTIOUS DISEASES | Facility: HOSPITAL | Age: 68
End: 2018-07-23
Attending: FAMILY MEDICINE
Payer: MEDICARE

## 2018-07-23 VITALS
TEMPERATURE: 99 F | SYSTOLIC BLOOD PRESSURE: 111 MMHG | BODY MASS INDEX: 13.43 KG/M2 | HEART RATE: 100 BPM | WEIGHT: 78.63 LBS | DIASTOLIC BLOOD PRESSURE: 62 MMHG | HEIGHT: 64 IN

## 2018-07-23 DIAGNOSIS — D80.1 HYPOGAMMAGLOBULINEMIA: Primary | ICD-10-CM

## 2018-07-23 PROCEDURE — 63600175 PHARM REV CODE 636 W HCPCS: Mod: JG | Performed by: INTERNAL MEDICINE

## 2018-07-23 PROCEDURE — 96365 THER/PROPH/DIAG IV INF INIT: CPT

## 2018-07-23 PROCEDURE — 96366 THER/PROPH/DIAG IV INF ADDON: CPT

## 2018-07-23 RX ADMIN — HUMAN IMMUNOGLOBULIN G 20 G: 20 LIQUID INTRAVENOUS at 09:07

## 2018-07-23 NOTE — PLAN OF CARE
Problem: Health Knowledge, Opportunity to Enhance (Adult,NICU,Ivel,Obstetrics,Pediatric)  Goal: Identify Related Risk Factors and Signs and Symptoms  Related risk factors and signs and symptoms are identified upon initiation of Human Response Clinical Practice Guideline (CPG)   Outcome: Ongoing (interventions implemented as appropriate)  Pt instructed on infection control and hand hygiene. Pt verbalized understanding.

## 2018-07-23 NOTE — PROGRESS NOTES
"  Infusion medication:  Today's weight:    Wt Readings from Last 1 Encounters:   07/21/17 109.8 kg (242 lb)         Checklist prior to infusion:  Is the Biologic RX (therapy plan) up to date within the past one year? Yes  Are the most recent labs within the last 3 - 6 months ? Yes  Has the patient had an appointment with the MD/HELENA that is prescribing the biologic infusion within the last 3 - 6 months? Yes  Documentation of safety questions prior to infusion:   RN TO NOTIFY MD IF "YES" answered to any of below questions prior to infusion:    Symptoms in past 2 weeks? (fever, night sweats, URI or Flu-like symptoms, cough, painful urination, warm/red/painful skin, skin ulcers/wounds, tooth infection/abscess): No  Current symptoms of an active infection? No  Temperature greater than 100.4 in the last 48 hours? No  Recent infections that required antibiotic use in the last 10-14 days?No  If patient has had surgery, any problems with the surgical wound (drainage, redness, tenderness)? N/A If yes then has surgeon cleared patient prior to getting this infusion? N/A  Pregnant? No  If yes, is prescribing provider aware of this pregnancy?  No  NEW or WORSENING abdominal pain, diarrhea, nausea/vomiting? No  Any SOB, ankle/feet swelling, or sudden weight gain? No    Patient tolerated infusion well today, vital signs remained normal throughout infusion and patient left with no apparent distress:  Yes  6  Received next infusion date prior to discharge: Yes    Additional note to provider:  Yes            "

## 2018-08-15 ENCOUNTER — CLINICAL SUPPORT (OUTPATIENT)
Dept: INTERNAL MEDICINE | Facility: CLINIC | Age: 68
End: 2018-08-15
Payer: MEDICARE

## 2018-08-15 ENCOUNTER — OFFICE VISIT (OUTPATIENT)
Dept: INTERNAL MEDICINE | Facility: CLINIC | Age: 68
End: 2018-08-15
Payer: MEDICARE

## 2018-08-15 VITALS
SYSTOLIC BLOOD PRESSURE: 112 MMHG | DIASTOLIC BLOOD PRESSURE: 72 MMHG | OXYGEN SATURATION: 99 % | BODY MASS INDEX: 13.46 KG/M2 | HEART RATE: 77 BPM | HEIGHT: 64 IN | WEIGHT: 78.81 LBS

## 2018-08-15 DIAGNOSIS — N64.2 BREAST ATROPHY: ICD-10-CM

## 2018-08-15 DIAGNOSIS — L02.419 ABSCESS OF AXILLA: Primary | ICD-10-CM

## 2018-08-15 DIAGNOSIS — F33.42 MAJOR DEPRESSIVE DISORDER, RECURRENT EPISODE, IN FULL REMISSION: ICD-10-CM

## 2018-08-15 DIAGNOSIS — R63.6 UNDERWEIGHT: ICD-10-CM

## 2018-08-15 DIAGNOSIS — H92.03 OTALGIA OF BOTH EARS: ICD-10-CM

## 2018-08-15 DIAGNOSIS — J47.9 ADULT BRONCHIECTASIS: ICD-10-CM

## 2018-08-15 LAB
ALBUMIN SERPL BCP-MCNC: 3.8 G/DL
ALP SERPL-CCNC: 49 U/L
ALT SERPL W/O P-5'-P-CCNC: 19 U/L
ANION GAP SERPL CALC-SCNC: 6 MMOL/L
AST SERPL-CCNC: 24 U/L
BASOPHILS # BLD AUTO: 0.01 K/UL
BASOPHILS NFR BLD: 0.2 %
BILIRUB SERPL-MCNC: 0.3 MG/DL
BUN SERPL-MCNC: 22 MG/DL
CALCIUM SERPL-MCNC: 9.4 MG/DL
CHLORIDE SERPL-SCNC: 97 MMOL/L
CO2 SERPL-SCNC: 29 MMOL/L
CREAT SERPL-MCNC: 0.6 MG/DL
DIFFERENTIAL METHOD: ABNORMAL
EOSINOPHIL # BLD AUTO: 0 K/UL
EOSINOPHIL NFR BLD: 0 %
ERYTHROCYTE [DISTWIDTH] IN BLOOD BY AUTOMATED COUNT: 12.7 %
EST. GFR  (AFRICAN AMERICAN): >60 ML/MIN/1.73 M^2
EST. GFR  (NON AFRICAN AMERICAN): >60 ML/MIN/1.73 M^2
GLUCOSE SERPL-MCNC: 82 MG/DL
HCT VFR BLD AUTO: 37.2 %
HGB BLD-MCNC: 12.9 G/DL
IMM GRANULOCYTES # BLD AUTO: 0.01 K/UL
IMM GRANULOCYTES NFR BLD AUTO: 0.2 %
LYMPHOCYTES # BLD AUTO: 0.8 K/UL
LYMPHOCYTES NFR BLD: 13.2 %
MCH RBC QN AUTO: 34.1 PG
MCHC RBC AUTO-ENTMCNC: 34.7 G/DL
MCV RBC AUTO: 98 FL
MONOCYTES # BLD AUTO: 0.3 K/UL
MONOCYTES NFR BLD: 5 %
NEUTROPHILS # BLD AUTO: 4.8 K/UL
NEUTROPHILS NFR BLD: 81.4 %
NRBC BLD-RTO: 0 /100 WBC
PLATELET # BLD AUTO: 210 K/UL
PMV BLD AUTO: 10.3 FL
POTASSIUM SERPL-SCNC: 5 MMOL/L
PREALB SERPL-MCNC: 29 MG/DL
PROT SERPL-MCNC: 7.1 G/DL
RBC # BLD AUTO: 3.78 M/UL
SODIUM SERPL-SCNC: 132 MMOL/L
WBC # BLD AUTO: 5.83 K/UL

## 2018-08-15 PROCEDURE — 80053 COMPREHEN METABOLIC PANEL: CPT

## 2018-08-15 PROCEDURE — 84134 ASSAY OF PREALBUMIN: CPT

## 2018-08-15 PROCEDURE — 99214 OFFICE O/P EST MOD 30 MIN: CPT | Mod: PBBFAC,PN | Performed by: FAMILY MEDICINE

## 2018-08-15 PROCEDURE — 99999 PR PBB SHADOW E&M-EST. PATIENT-LVL IV: CPT | Mod: PBBFAC,,, | Performed by: FAMILY MEDICINE

## 2018-08-15 PROCEDURE — 85025 COMPLETE CBC W/AUTO DIFF WBC: CPT

## 2018-08-15 PROCEDURE — 99214 OFFICE O/P EST MOD 30 MIN: CPT | Mod: S$PBB,,, | Performed by: FAMILY MEDICINE

## 2018-08-15 RX ORDER — MUPIROCIN 20 MG/G
OINTMENT TOPICAL
Qty: 30 G | Refills: 3 | Status: SHIPPED | OUTPATIENT
Start: 2018-08-15 | End: 2018-09-05 | Stop reason: SDUPTHER

## 2018-08-15 RX ORDER — NEOMYCIN SULFATE, POLYMYXIN B SULFATE, HYDROCORTISONE 3.5; 10000; 1 MG/ML; [USP'U]/ML; MG/ML
3 SOLUTION/ DROPS AURICULAR (OTIC) 2 TIMES DAILY
Qty: 10 ML | Refills: 0 | Status: SHIPPED | OUTPATIENT
Start: 2018-08-15 | End: 2018-12-13

## 2018-08-15 RX ORDER — POLYMYXIN B SULFATE AND TRIMETHOPRIM 1; 10000 MG/ML; [USP'U]/ML
SOLUTION OPHTHALMIC
Refills: 2 | Status: CANCELLED | OUTPATIENT
Start: 2018-08-15

## 2018-08-15 RX ORDER — CEPHALEXIN 500 MG/1
500 CAPSULE ORAL EVERY 8 HOURS
Qty: 21 CAPSULE | Refills: 0 | Status: SHIPPED | OUTPATIENT
Start: 2018-08-15 | End: 2018-10-02

## 2018-08-15 NOTE — PROGRESS NOTES
"Subjective:       Patient ID: Yvette Crews is a 68 y.o. female.    Chief Complaint: Annual Exam and Gynecologic Exam    HPI  67 y/o female with adult bronchiectasis, hypogammaglobulinemia, acquired hypothyroidism with hx of MNG s/p total thyroidectomy 1/25/17, osteopenia, hx of SVT s/p ablation in the past, MDD and VICTORINO is here for annual and gyn exam.  R axilla abscess for the past few days, its sore and draining, she has been using bactroban.   Denies f/n/v, endorses ear pressure about 1 time a week, she would like cortisporin otic for prn use, feels like she gets cold symptoms frequently, has nasal congestion, runny nose, ear fullness, sinus press that is mild, denies d, still having occasional constipation relieved with miralax, colace, glycerin, she reports she is eating and drinking well, her weight is down 3 pounds, denies cp/sob/urinary sx.  Sleeping fair, she has a lot of worry and anxiety, she does not want medications or counseling.  Eye exam utd  Hypothyroid: stable on meds  Recent mammogram with absince of breast tissue, patient concerned about recommendation against further mammograms, will get opinion from breast surgery    Review of Systems   Constitutional: Negative for activity change, appetite change, fatigue and fever.   HENT: Positive for congestion, ear pain, rhinorrhea and sinus pressure.    Respiratory: Negative for cough and shortness of breath.    Cardiovascular: Negative for chest pain, palpitations and leg swelling.   Gastrointestinal: Positive for constipation. Negative for diarrhea, nausea and vomiting.   Genitourinary: Negative for difficulty urinating and dysuria.   Skin: Positive for rash.        abscess   Neurological: Negative for dizziness and light-headedness.   Psychiatric/Behavioral: Negative for sleep disturbance. The patient is nervous/anxious.        Objective:      /72   Pulse 77   Ht 5' 4" (1.626 m)   Wt 35.7 kg (78 lb 13 oz)   SpO2 99%   BMI 13.53 " kg/m²   Physical Exam   Constitutional: She appears well-developed and well-nourished.   HENT:   Head: Normocephalic and atraumatic.   Mouth/Throat: No oropharyngeal exudate.   Neck: Normal range of motion. Neck supple. No thyromegaly present.   Cardiovascular: Normal rate, regular rhythm and normal heart sounds.   Pulmonary/Chest: Effort normal and breath sounds normal. No respiratory distress.   Abdominal: Soft. Bowel sounds are normal. She exhibits no distension. There is no tenderness. Hernia confirmed negative in the right inguinal area and confirmed negative in the left inguinal area.   Genitourinary: Vagina normal. No labial fusion. There is no rash, tenderness, lesion or injury on the right labia. There is no rash, tenderness, lesion or injury on the left labia. Cervix exhibits no motion tenderness, no discharge and no friability. Right adnexum displays no mass, no tenderness and no fullness. Left adnexum displays no mass, no tenderness and no fullness.   Genitourinary Comments: Atrophic changes   Musculoskeletal: She exhibits no edema.   Lymphadenopathy:     She has no cervical adenopathy. No inguinal adenopathy noted on the right or left side.   Neurological: She is alert.   Skin: Skin is warm and dry.   Psychiatric: She has a normal mood and affect.   Nursing note and vitals reviewed.      Assessment:       1. Abscess of axilla    2. Underweight    3. Major depressive disorder, recurrent episode, in full remission    4. Adult bronchiectasis    5. Breast atrophy    6. Otalgia of both ears        Plan:   Yvette was seen today for annual exam and gynecologic exam.    Diagnoses and all orders for this visit:    Abscess of axilla  -     mupirocin (BACTROBAN) 2 % ointment; Apply to affected area 2-3 times daily  -     cephALEXin (KEFLEX) 500 MG capsule; Take 1 capsule (500 mg total) by mouth every 8 (eight) hours.    Underweight  -     CBC auto differential; Future  -     Comprehensive metabolic panel;  Future  -     PREALBUMIN; Future    Major depressive disorder, recurrent episode, in full remission  -     PREALBUMIN; Future    Adult bronchiectasis    Breast atrophy  -     Ambulatory Referral to Breast Surgery    Otalgia of both ears  -     neomycin-polymyxin-hydrocortisone (CORTISPORIN) otic solution; Place 3 drops into both ears 2 (two) times daily.    Other orders  -     Cancel: polymyxin B sulf-trimethoprim (POLYTRIM) 10,000 unit- 1 mg/mL Drop;

## 2018-08-20 ENCOUNTER — INFUSION (OUTPATIENT)
Dept: INFECTIOUS DISEASES | Facility: HOSPITAL | Age: 68
End: 2018-08-20
Attending: FAMILY MEDICINE
Payer: MEDICARE

## 2018-08-20 VITALS
HEART RATE: 72 BPM | BODY MASS INDEX: 13.86 KG/M2 | TEMPERATURE: 99 F | DIASTOLIC BLOOD PRESSURE: 70 MMHG | WEIGHT: 78.25 LBS | OXYGEN SATURATION: 100 % | SYSTOLIC BLOOD PRESSURE: 136 MMHG | RESPIRATION RATE: 16 BRPM | HEIGHT: 63 IN

## 2018-08-20 DIAGNOSIS — D80.1 HYPOGAMMAGLOBULINEMIA: Primary | ICD-10-CM

## 2018-08-20 PROCEDURE — 96366 THER/PROPH/DIAG IV INF ADDON: CPT

## 2018-08-20 PROCEDURE — 63600175 PHARM REV CODE 636 W HCPCS: Mod: JG | Performed by: FAMILY MEDICINE

## 2018-08-20 PROCEDURE — 96365 THER/PROPH/DIAG IV INF INIT: CPT

## 2018-08-20 RX ADMIN — HUMAN IMMUNOGLOBULIN G 20 G: 20 LIQUID INTRAVENOUS at 09:08

## 2018-08-20 NOTE — PLAN OF CARE
Problem: Patient Care Overview (Adult)  Goal: Plan of Care Review  Outcome: Ongoing (interventions implemented as appropriate)  Pt understands use of hand washing for infection prevention

## 2018-08-21 ENCOUNTER — HOSPITAL ENCOUNTER (OUTPATIENT)
Dept: RADIOLOGY | Facility: HOSPITAL | Age: 68
Discharge: HOME | End: 2018-08-21
Attending: INTERNAL MEDICINE
Payer: MEDICARE

## 2018-08-21 ENCOUNTER — OFFICE VISIT (OUTPATIENT)
Dept: PULMONOLOGY | Facility: CLINIC | Age: 68
End: 2018-08-21
Payer: MEDICARE

## 2018-08-21 VITALS
SYSTOLIC BLOOD PRESSURE: 115 MMHG | DIASTOLIC BLOOD PRESSURE: 78 MMHG | WEIGHT: 77.63 LBS | RESPIRATION RATE: 14 BRPM | HEIGHT: 63 IN | OXYGEN SATURATION: 98 % | BODY MASS INDEX: 13.75 KG/M2 | HEART RATE: 78 BPM

## 2018-08-21 DIAGNOSIS — J47.9 ADULT BRONCHIECTASIS: Primary | ICD-10-CM

## 2018-08-21 DIAGNOSIS — R53.83 FATIGUE, UNSPECIFIED TYPE: ICD-10-CM

## 2018-08-21 DIAGNOSIS — E89.0 STATUS POST THYROIDECTOMY: ICD-10-CM

## 2018-08-21 DIAGNOSIS — J47.9 ADULT BRONCHIECTASIS: ICD-10-CM

## 2018-08-21 DIAGNOSIS — R63.6 UNDERWEIGHT: ICD-10-CM

## 2018-08-21 PROCEDURE — 99213 OFFICE O/P EST LOW 20 MIN: CPT | Mod: PBBFAC,25 | Performed by: INTERNAL MEDICINE

## 2018-08-21 PROCEDURE — 71046 X-RAY EXAM CHEST 2 VIEWS: CPT | Mod: TC,FY

## 2018-08-21 PROCEDURE — 99214 OFFICE O/P EST MOD 30 MIN: CPT | Mod: S$PBB,,, | Performed by: INTERNAL MEDICINE

## 2018-08-21 PROCEDURE — 99999 PR PBB SHADOW E&M-EST. PATIENT-LVL III: CPT | Mod: PBBFAC,,, | Performed by: INTERNAL MEDICINE

## 2018-08-21 PROCEDURE — 71046 X-RAY EXAM CHEST 2 VIEWS: CPT | Mod: 26,,, | Performed by: RADIOLOGY

## 2018-08-22 ENCOUNTER — TELEPHONE (OUTPATIENT)
Dept: PULMONOLOGY | Facility: CLINIC | Age: 68
End: 2018-08-22

## 2018-08-22 NOTE — TELEPHONE ENCOUNTER
----- Message from Nena Carlson MA sent at 8/22/2018  1:58 PM CDT -----  Contact: self [phone]      ----- Message -----  From: Trina Green  Sent: 8/22/2018   1:46 PM  To: [facility] Staff    ..Needs Advice    Reason for call:      Communication Preference:phone   Additional Information:pt states she need dr to explain her results

## 2018-08-22 NOTE — PROGRESS NOTES
Subjective:       Patient ID: Yvette Crews is a 68 y.o. female.    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  Her most recent previous visit   here was in June of last year.  She has not had any acute respiratory infections   in recent months.  She is currently using Mucinex and Singulair as a part of   her daily medical therapy.  She has not recognized any adverse effects related   to these medications.    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  She is now on long-term maintenance therapy to support thyroid   function.  Nevertheless, she has had difficulty with a poor appetite and the   inability to maintain her weight.  She has not had any ongoing fever or   pleuritic pain.  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      CB/HN  dd: 08/21/2018 21:01:22 (CDT)  td: 08/22/2018 04:53:10 (CDT)  Doc ID   #[id number]  Job ID #[id number]    CC:       Review of Systems   Constitutional: Positive for weight loss and fatigue. Negative for fever.   HENT: Negative for postnasal drip, sinus pressure, voice change and congestion.    Respiratory: Positive for cough and dyspnea on extertion. Negative for sputum production, shortness of breath and wheezing.    Cardiovascular: Positive for palpitations. Negative for chest pain and leg swelling.   Genitourinary: Negative for difficulty urinating.   Musculoskeletal: Negative for arthralgias and back pain.   Skin: Negative for rash.   Gastrointestinal: Negative for abdominal pain and acid reflux.   Neurological: Negative for dizziness and weakness.   Hematological: Negative for adenopathy.       Objective:      Physical Exam   Constitutional: She is oriented to person, place, and time. She appears well-developed. She appears  cachectic.   HENT:   Head: Normocephalic.   Nose: Nose normal.   Mouth/Throat: No oropharyngeal exudate.   Neck: Normal range of motion. No JVD present. No tracheal deviation present. No thyromegaly present.   Cardiovascular: Normal rate, regular rhythm and normal heart sounds.   No murmur heard.  Pulmonary/Chest: Symmetric chest wall expansion. No stridor. She has no wheezes. She has no rhonchi. She has no rales. She exhibits no tenderness.   Abdominal: Soft. There is no tenderness.   Musculoskeletal: She exhibits no edema.   Lymphadenopathy:     She has no cervical adenopathy.   Neurological: She is alert and oriented to person, place, and time.   Skin: Skin is warm and dry. No rash noted. No erythema. Nails show no clubbing.   Psychiatric: She has a normal mood and affect.   Vitals reviewed.    Personal Diagnostic Review    No flowsheet data found.      Assessment:       1. Adult bronchiectasis    2. Underweight    3. Fatigue, unspecified type    4. Status post thyroidectomy        Outpatient Encounter Medications as of 8/21/2018   Medication Sig Dispense Refill    calcium carbonate-vit D3-min (CALTRATE 600+D PLUS MINERALS) 600 mg calcium- 400 unit Tab Take by mouth. 1 Tablet Oral Twice a day      cephALEXin (KEFLEX) 500 MG capsule Take 1 capsule (500 mg total) by mouth every 8 (eight) hours. 21 capsule 0    guaifenesin (MUCINEX) 600 mg 12 hr tablet Take 1,200 mg by mouth 2 (two) times daily.        immune globulin, human, (POLYGAM) 5 gram injection Inject into the vein.        levothyroxine (SYNTHROID) 100 MCG tablet Take 1 tablet (100 mcg total) by mouth before breakfast. 90 tablet 1    montelukast (SINGULAIR) 10 mg tablet Take 1 tablet (10 mg total) by mouth every evening. 90 tablet 4    mupirocin (BACTROBAN) 2 % ointment Apply to affected area 2-3 times daily 30 g 3    neomycin-polymyxin-hydrocortisone (CORTISPORIN) otic solution Place 3 drops into both ears 2 (two) times daily. 10 mL 0     nitroGLYCERIN (NITROSTAT) 0.4 MG SL tablet Place 0.4 mg under the tongue every 5 (five) minutes as needed.        polyethylene glycol (GLYCOLAX) 17 gram PwPk Take by mouth.      polymyxin B sulf-trimethoprim (POLYTRIM) 10,000 unit- 1 mg/mL Drop   2     No facility-administered encounter medications on file as of 8/21/2018.      Orders Placed This Encounter   Procedures    X-Ray Chest PA And Lateral     Standing Status:   Future     Number of Occurrences:   1     Standing Expiration Date:   8/21/2019     Plan:     CXR (phone report).  Continue present respiratory medications (roles reviewed at today's visit).  If CXR shows stable findings, then plan return visit in 6 months.

## 2018-08-22 NOTE — PATIENT INSTRUCTIONS
CXR (phone report).  Continue present respiratory medications (roles reviewed at today's visit).  If CXR shows stable findings, then plan return visit in 6 months.

## 2018-08-22 NOTE — TELEPHONE ENCOUNTER
Pt informed that the CXR shows minimal fibrotic abnormalities in RUL.  This has been present in prior CXRs, and corresponds to her previous lung abscess/pneumonia.  No findings to indicate an active pulmonary process.

## 2018-08-31 ENCOUNTER — TELEPHONE (OUTPATIENT)
Dept: INTERNAL MEDICINE | Facility: CLINIC | Age: 68
End: 2018-08-31

## 2018-08-31 NOTE — TELEPHONE ENCOUNTER
----- Message from Ori Amin sent at 8/30/2018  4:13 PM CDT -----  Contact: JUSTIN DE JESUS [[id number]]            Name of Who is Calling: UJSTIN DE JESUS [[id number]]      What is the request in detail: Patient would like to know if she received her flu shot this year. Please advise      Can the clinic reply by [facility]: no      What Number to [facility] Back [facility]: [phone]

## 2018-08-31 NOTE — TELEPHONE ENCOUNTER
Informed the pt that she did not get the shot. Pt states that she will get the shot from the [facility]. Pt has no further questions or concerns.

## 2018-09-05 ENCOUNTER — PATIENT MESSAGE (OUTPATIENT)
Dept: INTERNAL MEDICINE | Facility: CLINIC | Age: 68
End: 2018-09-05

## 2018-09-05 DIAGNOSIS — L02.419 ABSCESS OF AXILLA: ICD-10-CM

## 2018-09-05 RX ORDER — MUPIROCIN 20 MG/G
OINTMENT TOPICAL
Qty: 30 G | Refills: 3 | Status: SHIPPED | OUTPATIENT
Start: 2018-09-05 | End: 2018-11-27 | Stop reason: SDUPTHER

## 2018-09-08 ENCOUNTER — PATIENT MESSAGE (OUTPATIENT)
Dept: INTERNAL MEDICINE | Facility: CLINIC | Age: 68
End: 2018-09-08

## 2018-09-17 ENCOUNTER — INFUSION (OUTPATIENT)
Dept: INFECTIOUS DISEASES | Facility: HOSPITAL | Age: 68
End: 2018-09-17
Attending: FAMILY MEDICINE
Payer: MEDICARE

## 2018-09-17 VITALS
RESPIRATION RATE: 16 BRPM | SYSTOLIC BLOOD PRESSURE: 128 MMHG | BODY MASS INDEX: 13.75 KG/M2 | TEMPERATURE: 98 F | WEIGHT: 76.38 LBS | OXYGEN SATURATION: 100 % | HEART RATE: 85 BPM | DIASTOLIC BLOOD PRESSURE: 61 MMHG

## 2018-09-17 DIAGNOSIS — D80.1 HYPOGAMMAGLOBULINEMIA: Primary | ICD-10-CM

## 2018-09-17 PROCEDURE — 63600175 PHARM REV CODE 636 W HCPCS: Mod: JG | Performed by: FAMILY MEDICINE

## 2018-09-17 PROCEDURE — 96365 THER/PROPH/DIAG IV INF INIT: CPT

## 2018-09-17 PROCEDURE — 96366 THER/PROPH/DIAG IV INF ADDON: CPT

## 2018-09-17 RX ADMIN — HUMAN IMMUNOGLOBULIN G 20 G: 20 LIQUID INTRAVENOUS at 09:09

## 2018-09-28 ENCOUNTER — TELEPHONE (OUTPATIENT)
Dept: INTERNAL MEDICINE | Facility: CLINIC | Age: 68
End: 2018-09-28

## 2018-09-28 ENCOUNTER — IMMUNIZATION (OUTPATIENT)
Dept: INTERNAL MEDICINE | Facility: CLINIC | Age: 68
End: 2018-09-28
Payer: MEDICARE

## 2018-09-28 PROCEDURE — 90662 IIV NO PRSV INCREASED AG IM: CPT | Mod: PBBFAC

## 2018-10-02 ENCOUNTER — OFFICE VISIT (OUTPATIENT)
Dept: SURGERY | Facility: CLINIC | Age: 68
End: 2018-10-02
Payer: MEDICARE

## 2018-10-02 VITALS
DIASTOLIC BLOOD PRESSURE: 74 MMHG | SYSTOLIC BLOOD PRESSURE: 125 MMHG | HEIGHT: 62 IN | TEMPERATURE: 99 F | BODY MASS INDEX: 13.28 KG/M2 | HEART RATE: 69 BPM | WEIGHT: 72.19 LBS

## 2018-10-02 DIAGNOSIS — Z12.39 BREAST CANCER SCREENING: ICD-10-CM

## 2018-10-02 DIAGNOSIS — R63.6 UNDERWEIGHT: Primary | ICD-10-CM

## 2018-10-02 PROCEDURE — 99999 PR PBB SHADOW E&M-EST. PATIENT-LVL III: CPT | Mod: PBBFAC,,, | Performed by: SURGERY

## 2018-10-02 PROCEDURE — 99213 OFFICE O/P EST LOW 20 MIN: CPT | Mod: S$PBB,,, | Performed by: SURGERY

## 2018-10-02 PROCEDURE — 99213 OFFICE O/P EST LOW 20 MIN: CPT | Mod: PBBFAC,PO | Performed by: SURGERY

## 2018-10-02 NOTE — PROGRESS NOTES
BREAST SURGERY CLINIC  HISTORY AND PHYSICAL    CC:  Breast atrophy     HPI:  Yvette Crews is a 68 y.o.  female with Hx of CHF, anxiety/depression, bronchiectasis, SVT who presents to clinic for evaluation of breast atrophy.  She states that on her MMG this year they were unable to identify any breast tissue proving an insufficient study.  She has no history of abnormal MMGs or biopsies of the breast.  She denies any palpable masses, nipple changes, nipple discharge, subjective LAD bilaterally.  Her PCP recommended evaluation to see if MRI was required for screening.      GYN Hx:  , postmenopausal    Fam Hx:  Paternal grandmother with breast cancer    ROS: A 10-point review of systems is negative except for the above mentioned in the HPI.     Past Medical History:   Diagnosis Date    Adult bronchiectasis     Amblyopia     Anxiety     Arthritis     Cataract     CHF (congestive heart failure)     Chin laceration     Concussion 10/27/2016    Depression     Hypertension     Onychomycosis     Strabismus     Supraventricular tachycardia     Thyroid disease        Past Surgical History:   Procedure Laterality Date    BREAST CYST ASPIRATION      x2    COLON SURGERY      EYE SURGERY      FRACTURE SURGERY      NASAL SEPTUM SURGERY      RADIOFREQUENCY ABLATION      RECTAL PROLAPSE REPAIR  2013    STRABISMUS SURGERY      THYROIDECTOMY (total) Bilateral 2017    Performed by Tri Wiggins MD at [facility] OR [address] [place] [address]    TONSILLECTOMY         Social History     Socioeconomic History    Marital status:      Spouse name: Not on file    Number of children: 2    Years of education: Not on file    Highest education level: Not on file   Social Needs    Financial resource strain: Not on file    Food insecurity - worry: Not on file    Food insecurity - inability: Not on file    Transportation needs - medical: Not on file    Transportation needs - non-medical: Not on  file   Occupational History    Occupation: retired   Tobacco Use    Smoking status: Never Smoker    Smokeless tobacco: Never Used   Substance and Sexual Activity    Alcohol use: No    Drug use: No    Sexual activity: Not Currently   Other Topics Concern    Are you pregnant or think you may be? Not Asked    Breast-feeding Not Asked   Social History Narrative    Not on file       Review of patient's allergies indicates:   Allergen Reactions    Bactrim [sulfamethoxazole-trimethoprim] Other (See Comments)     Toxic reaction    Naproxen Nausea Only    Rifampin Rash         PHYSICAL EXAM:  Vitals:    10/02/18 1152   BP: 125/74   Pulse: 69   Temp: 98.5 °F (36.9 °C)       General: NAD  Neuro: AAOx3  Cardio: RRR  Resp: Breathing even and unlabored  Abd: Soft, ND, NT, no palpable mass, BS+  Ext: Warm and well perfused  Breast: Minimal breast tissue bilaterally.  No masses.  No nipple discharge         PERTINENT IMAGING:  MMG    Narrative & Impression     Today's examination is of doubtful clinical utility secondary to the lack of breast tissue and the inability to see any thing more than the patient's nipple. This represents a change from the patient's previous exams. If the physical state remain stable, future mammograms will also likely be nondiagnostic.            ASSESSMENT/PLAN:  Yvette Crews is a 68 y.o. female with breast atrophy     - Recommend proceeding with clinical exam only      Isaiah Sahu M.D.  General Surgery PGY3  [phone]  I have personally taken the history and examined this patient and agree with the resident's note as stated above.  Pt seen and examined.  Pt with extremely small amount of breast tissue, likely due to breast atrophy and weight loss (patient weighs about 73 lbs.)  Breasts are too small to even perform standard mammography so patient was referred to consider alternate forms of screening.  B CBE is WNL.  Patient does not want annual screening MRI and I agree with that.   Her breasts are so small that if there is not enough tissue for screening mammogram to be useful, then I think annual screening CBE (clinical breast exams) and monthly SBE (self breast examinations) would be sufficient clinical screening for her at this point.  I think it would be OK to defer screening MRI in this case.  Mammogram images reviewed by me and I agree that radiographic screening may not be useful.  Data to support this would be that men (even high risk men with BRCA mutations or clinical hx of breast CA) are typically followed with clinical exams only concentrated around exam of the nipple areolar complex (CBE) without radiographic screening.  She will f/u with me prn at this point.

## 2018-10-02 NOTE — LETTER
October 5, 2018      Sally Green MD  [address] [place] [address]  [address]  [place] [address]           [facility]  [address]  [facility] [address]  Phone: [phone]  Fax: [phone]          Patient: Yvette Crews   MR Number: [id number]   YOB: 1950   Date of Visit: 10/2/2018       Dear Dr. Sally Green:    Thank you for referring Yvette Crews to me for evaluation. Attached you will find relevant portions of my assessment and plan of care.    If you have questions, please do not hesitate to call me. I look forward to following Yvette Crews along with you.    Sincerely,    Andriy Mccoy MD    Enclosure  CC:  No Recipients    If you would like to receive this communication electronically, please contact [email or web address] or [phone] to request more information on [facility] access.    For providers and/or their staff who would like to refer a patient to [facility], please contact us through our one-stop-shop provider referral line, [facility], at [phone].    If you feel you have received this communication in error or would no longer like to receive these types of communications, please e-mail [email or web address]

## 2018-10-15 ENCOUNTER — INFUSION (OUTPATIENT)
Dept: INFECTIOUS DISEASES | Facility: HOSPITAL | Age: 68
End: 2018-10-15
Attending: FAMILY MEDICINE
Payer: MEDICARE

## 2018-10-15 VITALS
RESPIRATION RATE: 15 BRPM | DIASTOLIC BLOOD PRESSURE: 67 MMHG | HEART RATE: 83 BPM | OXYGEN SATURATION: 100 % | HEIGHT: 62 IN | BODY MASS INDEX: 14.5 KG/M2 | WEIGHT: 78.81 LBS | SYSTOLIC BLOOD PRESSURE: 136 MMHG | TEMPERATURE: 98 F

## 2018-10-15 DIAGNOSIS — D80.1 HYPOGAMMAGLOBULINEMIA: Primary | ICD-10-CM

## 2018-10-15 PROCEDURE — 63600175 PHARM REV CODE 636 W HCPCS: Mod: JG | Performed by: FAMILY MEDICINE

## 2018-10-15 PROCEDURE — 96366 THER/PROPH/DIAG IV INF ADDON: CPT

## 2018-10-15 PROCEDURE — 96365 THER/PROPH/DIAG IV INF INIT: CPT

## 2018-10-15 RX ADMIN — HUMAN IMMUNOGLOBULIN G 20 G: 20 LIQUID INTRAVENOUS at 09:10

## 2018-10-16 DIAGNOSIS — E89.0 POSTOPERATIVE HYPOTHYROIDISM: ICD-10-CM

## 2018-10-16 RX ORDER — LEVOTHYROXINE SODIUM 100 UG/1
TABLET ORAL
Qty: 90 TABLET | Refills: 1 | Status: SHIPPED | OUTPATIENT
Start: 2018-10-16 | End: 2019-01-17

## 2018-11-12 ENCOUNTER — TELEPHONE (OUTPATIENT)
Dept: INTERNAL MEDICINE | Facility: CLINIC | Age: 68
End: 2018-11-12

## 2018-11-12 ENCOUNTER — INFUSION (OUTPATIENT)
Dept: INFECTIOUS DISEASES | Facility: HOSPITAL | Age: 68
End: 2018-11-12
Attending: INTERNAL MEDICINE
Payer: MEDICARE

## 2018-11-12 VITALS
TEMPERATURE: 98 F | HEART RATE: 89 BPM | HEIGHT: 62 IN | DIASTOLIC BLOOD PRESSURE: 72 MMHG | WEIGHT: 79.56 LBS | BODY MASS INDEX: 14.64 KG/M2 | SYSTOLIC BLOOD PRESSURE: 134 MMHG

## 2018-11-12 DIAGNOSIS — Z13.220 ENCOUNTER FOR LIPID SCREENING FOR CARDIOVASCULAR DISEASE: Primary | ICD-10-CM

## 2018-11-12 DIAGNOSIS — D80.1 HYPOGAMMAGLOBULINEMIA: Primary | ICD-10-CM

## 2018-11-12 DIAGNOSIS — Z13.6 ENCOUNTER FOR LIPID SCREENING FOR CARDIOVASCULAR DISEASE: Primary | ICD-10-CM

## 2018-11-12 PROCEDURE — 63600175 PHARM REV CODE 636 W HCPCS: Mod: JG | Performed by: INTERNAL MEDICINE

## 2018-11-12 PROCEDURE — 96365 THER/PROPH/DIAG IV INF INIT: CPT

## 2018-11-12 PROCEDURE — 96366 THER/PROPH/DIAG IV INF ADDON: CPT

## 2018-11-12 RX ADMIN — HUMAN IMMUNOGLOBULIN G 20 G: 20 LIQUID INTRAVENOUS at 09:11

## 2018-11-12 NOTE — TELEPHONE ENCOUNTER
----- Message from Carol Nguyen sent at 11/12/2018  8:47 AM CST -----  Regarding: Missing Lab Orders   Ms. Yvette Crews is in the lab today at [facility] stating she has a lipid panel to complete.  I do not see any orders in for this specific test.  Could you please look into this for me.  My extension is [phone].  Thank you!    Carol Nguyen

## 2018-11-12 NOTE — TELEPHONE ENCOUNTER
----- Message from Margo King sent at 11/12/2018  8:52 AM CST -----  Contact: tammy from [facility]   Name of Who is Calling: tammy from [facility]     What is the request in detail:tammy from [facility] is requesting to speak with staff in regards to patient lab orders.....Please contact to further discuss and advise      Can the clinic reply by [facility]: No     What Number to Call Back if not in [facility]: [phone]

## 2018-11-26 ENCOUNTER — PATIENT MESSAGE (OUTPATIENT)
Dept: INTERNAL MEDICINE | Facility: CLINIC | Age: 68
End: 2018-11-26

## 2018-11-26 DIAGNOSIS — L02.419 ABSCESS OF AXILLA: ICD-10-CM

## 2018-11-27 ENCOUNTER — PATIENT MESSAGE (OUTPATIENT)
Dept: INTERNAL MEDICINE | Facility: CLINIC | Age: 68
End: 2018-11-27

## 2018-11-27 RX ORDER — MUPIROCIN 20 MG/G
OINTMENT TOPICAL
Qty: 30 G | Refills: 3 | Status: SHIPPED | OUTPATIENT
Start: 2018-11-27 | End: 2019-01-16 | Stop reason: SDUPTHER

## 2018-11-30 ENCOUNTER — OFFICE VISIT (OUTPATIENT)
Dept: OPTOMETRY | Facility: CLINIC | Age: 68
End: 2018-11-30
Payer: MEDICARE

## 2018-11-30 DIAGNOSIS — H25.13 NUCLEAR SCLEROSIS OF BOTH EYES: Primary | ICD-10-CM

## 2018-11-30 DIAGNOSIS — H43.393 VITREOUS FLOATERS OF BOTH EYES: ICD-10-CM

## 2018-11-30 DIAGNOSIS — H52.203 MYOPIA WITH ASTIGMATISM AND PRESBYOPIA, BILATERAL: ICD-10-CM

## 2018-11-30 DIAGNOSIS — H51.9 IMBALANCE OF MUSCLE OF EYE: ICD-10-CM

## 2018-11-30 DIAGNOSIS — H52.13 MYOPIA WITH ASTIGMATISM AND PRESBYOPIA, BILATERAL: ICD-10-CM

## 2018-11-30 DIAGNOSIS — H52.4 MYOPIA WITH ASTIGMATISM AND PRESBYOPIA, BILATERAL: ICD-10-CM

## 2018-11-30 DIAGNOSIS — Z98.890 HISTORY OF STRABISMUS SURGERY: ICD-10-CM

## 2018-11-30 PROCEDURE — 92014 COMPRE OPH EXAM EST PT 1/>: CPT | Mod: S$PBB,,, | Performed by: OPTOMETRIST

## 2018-11-30 PROCEDURE — 99212 OFFICE O/P EST SF 10 MIN: CPT | Mod: PBBFAC | Performed by: OPTOMETRIST

## 2018-11-30 PROCEDURE — 99999 PR PBB SHADOW E&M-EST. PATIENT-LVL II: CPT | Mod: PBBFAC,,, | Performed by: OPTOMETRIST

## 2018-11-30 NOTE — PROGRESS NOTES
HPI     68yr old female present for Annual eye exam. Patient states on occasion   she see floaters OU, with no flashes of light. Patient denies changes with   her vision OU, since last seen. Patient denies problems with bright lights   at night. Patient says sometimes her head hurt due to sinus problems. No   eye pain.     Last edited by Kishan Rodriguez MA on 11/30/2018  1:24 PM. (History)            Assessment /Plan     For exam results, see Encounter Report.    Nuclear sclerosis of both eyes    Imbalance of muscle of eye    History of strabismus surgery    Vitreous floaters of both eyes    Myopia with astigmatism and presbyopia, bilateral    No improvement in vision with MRx, consult for cataract eval    RTC 1 year

## 2018-12-10 ENCOUNTER — INFUSION (OUTPATIENT)
Dept: INFECTIOUS DISEASES | Facility: HOSPITAL | Age: 68
End: 2018-12-10
Attending: INTERNAL MEDICINE
Payer: MEDICARE

## 2018-12-10 VITALS
WEIGHT: 80 LBS | RESPIRATION RATE: 16 BRPM | HEIGHT: 62 IN | SYSTOLIC BLOOD PRESSURE: 126 MMHG | BODY MASS INDEX: 14.72 KG/M2 | HEART RATE: 77 BPM | TEMPERATURE: 98 F | DIASTOLIC BLOOD PRESSURE: 66 MMHG | OXYGEN SATURATION: 100 %

## 2018-12-10 DIAGNOSIS — D80.1 HYPOGAMMAGLOBULINEMIA: Primary | ICD-10-CM

## 2018-12-10 PROCEDURE — 63600175 PHARM REV CODE 636 W HCPCS: Mod: JG | Performed by: FAMILY MEDICINE

## 2018-12-10 PROCEDURE — 96366 THER/PROPH/DIAG IV INF ADDON: CPT

## 2018-12-10 PROCEDURE — 96365 THER/PROPH/DIAG IV INF INIT: CPT

## 2018-12-10 RX ADMIN — HUMAN IMMUNOGLOBULIN G 20 G: 20 LIQUID INTRAVENOUS at 10:12

## 2018-12-11 ENCOUNTER — CLINICAL SUPPORT (OUTPATIENT)
Dept: INTERNAL MEDICINE | Facility: CLINIC | Age: 68
End: 2018-12-11
Payer: MEDICARE

## 2018-12-11 ENCOUNTER — OFFICE VISIT (OUTPATIENT)
Dept: INTERNAL MEDICINE | Facility: CLINIC | Age: 68
End: 2018-12-11
Payer: MEDICARE

## 2018-12-11 ENCOUNTER — TELEPHONE (OUTPATIENT)
Dept: INTERNAL MEDICINE | Facility: CLINIC | Age: 68
End: 2018-12-11

## 2018-12-11 VITALS
OXYGEN SATURATION: 99 % | DIASTOLIC BLOOD PRESSURE: 70 MMHG | BODY MASS INDEX: 14.02 KG/M2 | SYSTOLIC BLOOD PRESSURE: 120 MMHG | HEART RATE: 79 BPM | WEIGHT: 79.13 LBS | HEIGHT: 63 IN

## 2018-12-11 DIAGNOSIS — Z13.220 ENCOUNTER FOR LIPID SCREENING FOR CARDIOVASCULAR DISEASE: ICD-10-CM

## 2018-12-11 DIAGNOSIS — R41.3 MEMORY LOSS: Primary | ICD-10-CM

## 2018-12-11 DIAGNOSIS — Z13.6 ENCOUNTER FOR LIPID SCREENING FOR CARDIOVASCULAR DISEASE: ICD-10-CM

## 2018-12-11 DIAGNOSIS — R41.3 MEMORY LOSS: ICD-10-CM

## 2018-12-11 LAB
CHOLEST SERPL-MCNC: 204 MG/DL
CHOLEST/HDLC SERPL: 2.4 {RATIO}
HDLC SERPL-MCNC: 86 MG/DL
HDLC SERPL: 42.2 %
LDLC SERPL CALC-MCNC: 107.2 MG/DL
NONHDLC SERPL-MCNC: 118 MG/DL
TRIGL SERPL-MCNC: 54 MG/DL
VIT B12 SERPL-MCNC: 712 PG/ML

## 2018-12-11 PROCEDURE — 86592 SYPHILIS TEST NON-TREP QUAL: CPT

## 2018-12-11 PROCEDURE — 80061 LIPID PANEL: CPT

## 2018-12-11 PROCEDURE — 99999 PR PBB SHADOW E&M-EST. PATIENT-LVL IV: CPT | Mod: PBBFAC,,, | Performed by: FAMILY MEDICINE

## 2018-12-11 PROCEDURE — 99213 OFFICE O/P EST LOW 20 MIN: CPT | Mod: S$PBB,,, | Performed by: FAMILY MEDICINE

## 2018-12-11 PROCEDURE — 99214 OFFICE O/P EST MOD 30 MIN: CPT | Mod: PBBFAC,PN | Performed by: FAMILY MEDICINE

## 2018-12-11 PROCEDURE — 82607 VITAMIN B-12: CPT

## 2018-12-11 NOTE — PROGRESS NOTES
"Subjective:       Patient ID: Yvette Crews is a 68 y.o. female.    Chief Complaint: Altered Mental Status    HPI  67 y/o female with adult bronchiectasis, hypogammaglobulinemia, acquired hypothyroidism with hx of MNG s/p total thyroidectomy 1/25/17, osteopenia, hx of SVT s/p ablation in the past, MDD and VICTORINO is here for evaluation of her memory.  She is here with her daughter and her  who say that this has been going on for some time but has gotten worse recently. She has been more forgetful, cannot remember whats happening in tv show. No behaviors that have caused danger.  She reports she is doing ok otherwise, she is self aware of her anxiety and depression, she is firm that she does not want to try medications or counseling.  She is agreeable to a memory evaluation in clinic today.        MOCA 23/30    Review of Systems    Objective:      /70 (BP Location: Left arm, Patient Position: Sitting, BP Method: Small (Manual))   Pulse 79   Ht 5' 2.5" (1.588 m)   Wt 35.9 kg (79 lb 2.3 oz)   SpO2 99%   BMI 14.25 kg/m²   Physical Exam   Constitutional: She is oriented to person, place, and time. No distress.   Thin with temporal wasting   HENT:   Head: Normocephalic and atraumatic.   Neurological: She is alert and oriented to person, place, and time.   Skin: She is not diaphoretic.   Vitals reviewed.      Assessment:       1. Memory loss    2. Encounter for lipid screening for cardiovascular disease        Plan:   Yvette was seen today for altered mental status.    Diagnoses and all orders for this visit:    Memory loss  -     Ambulatory referral to Neurology  -     Cancel: Vitamin B12; Future  -     Folate; Future  -     Cancel: RPR; Future  -     Neuropsychological testing; Future  -     RPR; Future  -     Vitamin B12; Future  -     Urinalysis; Future    Encounter for lipid screening for cardiovascular disease  -     Cancel: Lipid panel; Future  -     Lipid panel; Future     I discussed with " the patient and her family members that her memory difficulties are likely multifactorial.  She has some anxiety and depression and poor nutritional status.  She is very reluctant to try medications or participate in counseling.  She has agreed to see a neurologist and have an evaluation with neuropsychiatric testing.  I have asked her to keep an open mind with regard to trying a medication.  We briefly discussed Remeron as a good example of a medication that would help with weight gain and mood and she expressed that her fear is that she would have a fall if she were to take a medication like Remeron or any mood medication.  Time spent with this patient was over 25 minutes.  Patient was counseled for over 50% of the visit.

## 2018-12-12 LAB — RPR SER QL: NORMAL

## 2018-12-12 NOTE — TELEPHONE ENCOUNTER
Patient does not want to schedule, she will have labs done at her next appointment at [facility].

## 2018-12-13 ENCOUNTER — OFFICE VISIT (OUTPATIENT)
Dept: NEUROLOGY | Facility: CLINIC | Age: 68
End: 2018-12-13
Payer: MEDICARE

## 2018-12-13 VITALS
WEIGHT: 79.13 LBS | SYSTOLIC BLOOD PRESSURE: 112 MMHG | HEIGHT: 63 IN | DIASTOLIC BLOOD PRESSURE: 67 MMHG | HEART RATE: 93 BPM | BODY MASS INDEX: 14.02 KG/M2

## 2018-12-13 DIAGNOSIS — J47.9 ADULT BRONCHIECTASIS: ICD-10-CM

## 2018-12-13 DIAGNOSIS — S09.90XS: ICD-10-CM

## 2018-12-13 DIAGNOSIS — R41.89 OTHER SYMPTOMS AND SIGNS INVOLVING COGNITIVE FUNCTIONS AND AWARENESS: Primary | ICD-10-CM

## 2018-12-13 DIAGNOSIS — R26.9 GAIT DISTURBANCE: ICD-10-CM

## 2018-12-13 DIAGNOSIS — F33.42 MAJOR DEPRESSIVE DISORDER, RECURRENT EPISODE, IN FULL REMISSION: ICD-10-CM

## 2018-12-13 DIAGNOSIS — Z86.79 HISTORY OF SUBARACHNOID HEMORRHAGE: ICD-10-CM

## 2018-12-13 DIAGNOSIS — E89.0 POSTOPERATIVE HYPOTHYROIDISM: ICD-10-CM

## 2018-12-13 PROBLEM — S09.90XA TRAUMATIC HEAD INJURY LESS THAN 3 MONTHS AGO: Status: ACTIVE | Noted: 2018-12-13

## 2018-12-13 PROCEDURE — 99214 OFFICE O/P EST MOD 30 MIN: CPT | Mod: PBBFAC | Performed by: PSYCHIATRY & NEUROLOGY

## 2018-12-13 PROCEDURE — 99213 OFFICE O/P EST LOW 20 MIN: CPT | Mod: S$PBB,,, | Performed by: PSYCHIATRY & NEUROLOGY

## 2018-12-13 PROCEDURE — 99999 PR PBB SHADOW E&M-EST. PATIENT-LVL IV: CPT | Mod: PBBFAC,,, | Performed by: PSYCHIATRY & NEUROLOGY

## 2018-12-13 NOTE — PATIENT INSTRUCTIONS
Discussed with patient and family.  She has cognitive difficulties that would be multifactorial with a history of significant head trauma with associated traumatic subarachnoid hemorrhage in 2016 being a major contributing factor.  In addition, underlying depression/anxiety would also be contributing.  Will get an MRI scan of the brain, noncontrast and neuropsychological testing done.  Patient will follow-up after neuropsychological testing results are available.

## 2018-12-13 NOTE — PROGRESS NOTES
Subjective:       Patient ID: Yvette Crews is a 68 y.o. female.    Chief Complaint:  Memory Loss      History of Present Illness  HPI   This is a 68-year-old female who was referred for evaluation of intermittent memory difficulties.  The patient was accompanied by her  and her daughter, who collaborated with the history.  Her  is a retired neurologist.  They both live at [facility], and assisted living facility.  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  In 2016 she had a fall when she may have tripped on a tree root or broken pavement.  She briefly lost consciousness and was seen at the [facility] ED and subsequently admitted to the hospital.  She was noted to be confused and disoriented when she arrived in ED.  An initial CT scan of the head revealed right maxillary blood products concerning for facial fracture, and questionable SAH in bilat occipital lobes.  A CT scan done prior to discharge on 10/29/2016 revealed subarachnoid hemorrhage within the bilateral occipital lobes, left parietal lobe and along the left sylvian fissure.  Left cerebral convexity extra-axial fluid collection, increased in conspicuity compared to prior imaging examinations. There is minimal associated mass effect on the left cerebral convexity without significant midline shift. Increased prominence of the extra-axial space along the right frontal convexity slight with slight increased density. This could possibly be artifactual, however, interval development of new extra-axial fluid collection not excluded.     It is reported that she may have had some difficulty with her memory even prior to the fall however after the fall she has significant worsening in her memory primarily with retention of recent information, unable to recall conversations she may have had and getting confused very easily.  However, she was able  to take care of her day-to-day needs at home, including personal hygiene without any problems.  She was using the fitness center on a regular basis.  She had no further falls however continues to have problems with her balance.  The daughter also reports that she has significant problem with depression and anxiety in the past but refuses to take any medications.  She has had poor appetite having lost weight over the past 1 year.    Past medical history significant for adult bronchiectasis, hypogammaglobulinemia, acquired hypothyroidism with history of MNG s/p total thyroidectomy 1/25/17, osteopenia, history of SVT s/p ablation in the past, MDD and VICTORINO.  Labs done by her primary care physician were available for review and she was noted to have a normal B12 level.       Review of Systems  Review of Systems   Constitutional: Negative.    HENT: Negative.  Negative for hearing loss.    Eyes: Negative.  Negative for visual disturbance.   Respiratory: Negative.  Negative for shortness of breath.    Cardiovascular: Negative.  Negative for chest pain and palpitations.   Gastrointestinal: Negative.    Genitourinary: Negative.    Musculoskeletal: Negative.  Negative for back pain, gait problem and neck pain.   Skin: Negative.    Neurological: Negative.  Negative for tremors, seizures, syncope, speech difficulty, weakness, numbness and headaches.   Psychiatric/Behavioral: Positive for decreased concentration and sleep disturbance. Negative for confusion. The patient is nervous/anxious.        Objective:      Neurologic Exam     Cranial Nerves     CN III, IV, VI   Pupils are equal, round, and reactive to light.  Extraocular motions are normal.       Physical Exam   Constitutional:   Somewhat cachectic, right-handed female   HENT:   Head: Normocephalic and atraumatic.   Right Ear: Hearing normal.   Left Ear: Hearing normal.   Eyes: EOM are normal. Pupils are equal, round, and reactive to light.   Early cataracts limit fundus  examination   Neck: Normal range of motion. Neck supple. Carotid bruit is not present.   Cardiovascular: Normal rate and regular rhythm.   Neurological: She is alert. She has normal reflexes. She displays no atrophy. No cranial nerve deficit (Visual fields at bedside testing essentially normal.  No facial asymmetry noted with facial movements and sensory exam being normal/symmetrical.  Corneals/gag reflexes normal.  Tongue & palate movements normal.  Shoulder shrug was normal.) or sensory deficit (Primary sensation symmetrical). She exhibits normal muscle tone. She displays a negative Romberg sign. Coordination and gait normal. She displays no Babinski's sign on the right side. She displays no Babinski's sign on the left side.   Mental status examination: Patient is fully oriented and able to give an adequate history.  Recall of recent and past information is good.  Immediate recall is normal.  Judgment and insight is normal.  Language functions are intact with no evidence of aphasia or dysarthria.  Comprehension is unimpaired.  Processing of information is a little slow and has a tendency to repeat herself.  Some difficulty with attention span and concentration.  Affect is appropriate, mood was even.  No thought disorder is noted.         Assessment:        1. Other symptoms and signs involving cognitive functions and awareness    2. Traumatic head injury    3. History of subarachnoid hemorrhage    4. Major depressive disorder, recurrent episode, in full remission    5. Adult bronchiectasis    6. Gait disturbance    7. Postoperative hypothyroidism          Plan:       Discussed with patient and family.  She has cognitive difficulties that would be multifactorial with a history of significant head trauma with associated traumatic subarachnoid hemorrhage in 2016 being a major contributing factor.  In addition, underlying depression/anxiety would also be contributing.  Will get an MRI scan of the brain, noncontrast  and neuropsychological testing done.  Patient will follow-up after neuropsychological testing results are available.

## 2018-12-14 ENCOUNTER — PATIENT MESSAGE (OUTPATIENT)
Dept: INTERNAL MEDICINE | Facility: CLINIC | Age: 68
End: 2018-12-14

## 2018-12-14 NOTE — TELEPHONE ENCOUNTER
"Pt states that she Fell in the bathroom this morning after taking her bath. Pt did not lose consciousness, and feels that she tripped over towels in the bathroom. She states that she is dizzy now and that her eye is twitching. pt also states that she can't stand up at this time without being dizzy. Pt's  states that the pt ate breakfast this morning and did an hour on the elliptical. He states that the pt's  "pulse is fine and that she is nervous and anxious." pt states that she does not want to go to ER right now. Informed pt that she should go to ER if symptoms do not improve and get checked out. Pt agreed and will go to ER is still feeling bad in an hour. CNA at the [facility] home was present with pt and will assist the pt to get up and back in bed.      "

## 2018-12-17 ENCOUNTER — TELEPHONE (OUTPATIENT)
Dept: NEUROLOGY | Facility: CLINIC | Age: 68
End: 2018-12-17

## 2018-12-17 NOTE — TELEPHONE ENCOUNTER
Spoke with Pt  he confirmed his wife appt date and time   ----- Message from Ebony Carpenter sent at 12/14/2018  1:21 PM CST -----  Contact: Pavel () @ [phone]  Calling to schedule an appt for the patient with Neuro psychology. Please call.

## 2018-12-18 ENCOUNTER — TELEPHONE (OUTPATIENT)
Dept: OPHTHALMOLOGY | Facility: CLINIC | Age: 68
End: 2018-12-18

## 2018-12-18 NOTE — TELEPHONE ENCOUNTER
Spoke to pt  and he requested the timeline of surgery.  Advised once pt sees Dr Sams and schedules her eye surgery (which we are currently booking into February 2019)for the first eye Dr Sams usually waits about 4-6 weeks after the first eye is done to do the 2nd eye.

## 2018-12-19 ENCOUNTER — LAB VISIT (OUTPATIENT)
Dept: LAB | Facility: HOSPITAL | Age: 68
End: 2018-12-19
Attending: FAMILY MEDICINE
Payer: MEDICARE

## 2018-12-19 DIAGNOSIS — R41.3 MEMORY LOSS: ICD-10-CM

## 2018-12-19 LAB
BACTERIA #/AREA URNS AUTO: ABNORMAL /HPF
BILIRUB UR QL STRIP: ABNORMAL
CLARITY UR REFRACT.AUTO: CLEAR
COLOR UR AUTO: YELLOW
GLUCOSE UR QL STRIP: NEGATIVE
HGB UR QL STRIP: NEGATIVE
KETONES UR QL STRIP: NEGATIVE
LEUKOCYTE ESTERASE UR QL STRIP: ABNORMAL
MICROSCOPIC COMMENT: ABNORMAL
NITRITE UR QL STRIP: NEGATIVE
PH UR STRIP: 5 [PH] (ref 5–8)
PROT UR QL STRIP: NEGATIVE
RBC #/AREA URNS AUTO: 1 /HPF (ref 0–4)
SP GR UR STRIP: 1.02 (ref 1–1.03)
SQUAMOUS #/AREA URNS AUTO: 0 /HPF
URN SPEC COLLECT METH UR: ABNORMAL
WBC #/AREA URNS AUTO: 7 /HPF (ref 0–5)

## 2018-12-19 PROCEDURE — 81001 URINALYSIS AUTO W/SCOPE: CPT

## 2018-12-20 ENCOUNTER — HOSPITAL ENCOUNTER (OUTPATIENT)
Dept: RADIOLOGY | Facility: HOSPITAL | Age: 68
Discharge: HOME | End: 2018-12-20
Attending: PSYCHIATRY & NEUROLOGY
Payer: MEDICARE

## 2018-12-20 DIAGNOSIS — F33.42 MAJOR DEPRESSIVE DISORDER, RECURRENT EPISODE, IN FULL REMISSION: ICD-10-CM

## 2018-12-20 DIAGNOSIS — R41.89 OTHER SYMPTOMS AND SIGNS INVOLVING COGNITIVE FUNCTIONS AND AWARENESS: ICD-10-CM

## 2018-12-20 DIAGNOSIS — Z86.79 HISTORY OF SUBARACHNOID HEMORRHAGE: ICD-10-CM

## 2018-12-20 PROCEDURE — 70551 MRI BRAIN STEM W/O DYE: CPT | Mod: 26,,, | Performed by: RADIOLOGY

## 2018-12-20 PROCEDURE — 70551 MRI BRAIN STEM W/O DYE: CPT | Mod: TC

## 2019-01-08 ENCOUNTER — INFUSION (OUTPATIENT)
Dept: INFECTIOUS DISEASES | Facility: HOSPITAL | Age: 69
End: 2019-01-08
Attending: INTERNAL MEDICINE
Payer: MEDICARE

## 2019-01-08 ENCOUNTER — OFFICE VISIT (OUTPATIENT)
Dept: URGENT CARE | Facility: CLINIC | Age: 69
End: 2019-01-08
Payer: MEDICARE

## 2019-01-08 VITALS
HEART RATE: 76 BPM | OXYGEN SATURATION: 99 % | RESPIRATION RATE: 18 BRPM | BODY MASS INDEX: 14.54 KG/M2 | HEIGHT: 62 IN | TEMPERATURE: 99 F | DIASTOLIC BLOOD PRESSURE: 75 MMHG | SYSTOLIC BLOOD PRESSURE: 134 MMHG | WEIGHT: 79 LBS

## 2019-01-08 VITALS
TEMPERATURE: 99 F | HEART RATE: 99 BPM | WEIGHT: 79.81 LBS | DIASTOLIC BLOOD PRESSURE: 69 MMHG | RESPIRATION RATE: 16 BRPM | SYSTOLIC BLOOD PRESSURE: 128 MMHG | OXYGEN SATURATION: 100 % | BODY MASS INDEX: 14.36 KG/M2

## 2019-01-08 DIAGNOSIS — S81.812A NONINFECTED SKIN TEAR OF LEFT LOWER EXTREMITY, INITIAL ENCOUNTER: ICD-10-CM

## 2019-01-08 DIAGNOSIS — D80.1 HYPOGAMMAGLOBULINEMIA: Primary | ICD-10-CM

## 2019-01-08 DIAGNOSIS — S90.112A CONTUSION OF LEFT GREAT TOE WITHOUT DAMAGE TO NAIL, INITIAL ENCOUNTER: Primary | ICD-10-CM

## 2019-01-08 PROCEDURE — 73660 X-RAY EXAM OF TOE(S): CPT | Mod: FY,LT,S$GLB, | Performed by: RADIOLOGY

## 2019-01-08 PROCEDURE — 96366 THER/PROPH/DIAG IV INF ADDON: CPT

## 2019-01-08 PROCEDURE — 73660 XR TOE 2 OR MORE VIEWS LEFT: ICD-10-PCS | Mod: FY,LT,S$GLB, | Performed by: RADIOLOGY

## 2019-01-08 PROCEDURE — 96365 THER/PROPH/DIAG IV INF INIT: CPT

## 2019-01-08 PROCEDURE — 63600175 PHARM REV CODE 636 W HCPCS: Mod: JG | Performed by: INTERNAL MEDICINE

## 2019-01-08 PROCEDURE — 99214 OFFICE O/P EST MOD 30 MIN: CPT | Mod: S$GLB,,, | Performed by: FAMILY MEDICINE

## 2019-01-08 PROCEDURE — 99214 PR OFFICE/OUTPT VISIT, EST, LEVL IV, 30-39 MIN: ICD-10-PCS | Mod: S$GLB,,, | Performed by: FAMILY MEDICINE

## 2019-01-08 RX ORDER — MUPIROCIN 20 MG/G
OINTMENT TOPICAL 3 TIMES DAILY
Qty: 30 G | Refills: 1 | Status: SHIPPED | OUTPATIENT
Start: 2019-01-08 | End: 2019-03-01 | Stop reason: SDUPTHER

## 2019-01-08 RX ADMIN — HUMAN IMMUNOGLOBULIN G 20 G: 20 LIQUID INTRAVENOUS at 09:01

## 2019-01-08 NOTE — PATIENT INSTRUCTIONS
PLEASE READ YOUR DISCHARGE INSTRUCTIONS ENTIRELY AS IT CONTAINS IMPORTANT INFORMATION.    Tylenol and ibuprofen for pain    Rest, ice, compress, and elevate at home    Avoid prolonged use of the affected area until better.     Use the bactroban on the skin tear  Monitor for purulent drainage, redness, fever, swelling    Please return or see your primary care doctor if you develop new or worsening symptoms.     Please arrange follow up with your primary medical clinic as soon as possible. You must understand that you've received an Urgent Care treatment only and that you may be released before all of your medical problems are known or treated. You, the patient, will arrange for follow up as instructed. If your symptoms worsen or fail to improve you should go to the Emergency Room.    Soft Tissue Contusion  You have a contusion. This is also called a bruise. There is swelling and some bleeding under the skin. This injury generally takes a few days to a few weeks to heal.  During that time, the bruise will typically change in color from reddish, to purple-blue, to greenish-yellow, then to yellow-brown.  Home care  · Elevate the injured area to reduce pain and swelling. As much as possible, sit or lie down with the injured area raised about the level of your heart. This is especially important during the first 48 hours.  · Ice the injured area to help reduce pain and swelling. Wrap a cold source (ice pack or ice cubes in a plastic bag) in a thin towel. Apply to the bruised area for 20 minutes every 1 to 2 hours the first day. Continue this 3 to 4 times a day until the pain and swelling goes away.  · Unless another medication was prescribed, you can take acetaminophen, ibuprofen, or naproxen to control pain. (If you have chronic liver or kidney disease or ever had a stomach ulcer or GI bleeding, talk with your doctor before using these medicines.)  Follow up  Follow up with your health care provider or our staff as  advised. Call if you are not better in 1 to 2 weeks.  When to seek medical advice   Call your health care provider right away if you have any of the following:  · Increased pain or swelling  · Bruise is on an arm or leg and arm or leg becomes cold, blue, numb or tingly  · Signs of infection: Warmth, drainage, or increased redness or pain around the contusion  · Inability to move the injured area or body part   · Bruise is near your eye and you have problems with your eyesight or eye   · Frequent bruising for unknown reasons  Date Last Reviewed: 4/29/2015  © 3269-0851 [employer]. [address] [place] [address], [place] [address]. All rights reserved. This information is not intended as a substitute for professional medical care. Always follow your healthcare professional's instructions.

## 2019-01-08 NOTE — PLAN OF CARE
Problem: Adult Inpatient Plan of Care  Goal: Plan of Care Review  Outcome: Ongoing (interventions implemented as appropriate)  Pt educated on handwashing and infection control.  Pt verbalized understanding.

## 2019-01-08 NOTE — PROGRESS NOTES
"Subjective:       Patient ID: Yvette Crews is a 68 y.o. female.    Vitals:  height is 5' 2" (1.575 m) and weight is 35.8 kg (79 lb). Her oral temperature is 98.8 °F (37.1 °C). Her blood pressure is 134/75 and her pulse is 76. Her respiration is 18 and oxygen saturation is 99%.     Chief Complaint: Toe Pain    Patient states a bottle of shampoo fell on her left big toe 2 days ago. Patient states she has some swelling to the area.      Toe Pain    The incident occurred 2 days ago. The incident occurred at home. The injury mechanism was a direct blow. The pain is present in the left toes. The quality of the pain is described as aching. The pain is at a severity of 5/10. The pain is moderate. The pain has been constant since onset. Pertinent negatives include no inability to bear weight, loss of motion, loss of sensation, muscle weakness, numbness or tingling. She reports no foreign bodies present. The symptoms are aggravated by movement. She has tried NSAIDs for the symptoms. The treatment provided mild relief.       Constitution: Negative for fatigue.   HENT: Negative for facial swelling and facial trauma.    Neck: Negative for neck stiffness.   Cardiovascular: Negative for chest trauma.   Eyes: Negative for eye trauma, double vision and blurred vision.   Gastrointestinal: Negative for abdominal trauma, abdominal pain and rectal bleeding.   Genitourinary: Negative for hematuria, missed menses, genital trauma and pelvic pain.   Musculoskeletal: Negative for pain, trauma, joint swelling and abnormal ROM of joint.   Skin: Positive for color change, pale, wound, abrasion and bruising. Negative for laceration.   Neurological: Negative for dizziness, history of vertigo, light-headedness, coordination disturbances, altered mental status, loss of consciousness and numbness.   Hematologic/Lymphatic: Negative for history of bleeding disorder.   Psychiatric/Behavioral: Negative for altered mental status.     "   Objective:      Physical Exam   Constitutional: She is oriented to person, place, and time. She appears well-developed and well-nourished. She is cooperative.  Non-toxic appearance. She does not appear ill. No distress.   HENT:   Head: Normocephalic and atraumatic. Head is without abrasion, without contusion and without laceration.   Right Ear: Hearing, tympanic membrane, external ear and ear canal normal. No hemotympanum.   Left Ear: Hearing, tympanic membrane, external ear and ear canal normal. No hemotympanum.   Nose: Nose normal. No mucosal edema, rhinorrhea or nasal deformity. No epistaxis. Right sinus exhibits no maxillary sinus tenderness and no frontal sinus tenderness. Left sinus exhibits no maxillary sinus tenderness and no frontal sinus tenderness.   Mouth/Throat: Uvula is midline, oropharynx is clear and moist and mucous membranes are normal. No trismus in the jaw. Normal dentition. No uvula swelling. No posterior oropharyngeal erythema.   Eyes: Conjunctivae, EOM and lids are normal. Pupils are equal, round, and reactive to light. Right eye exhibits no discharge. Left eye exhibits no discharge. No scleral icterus.   Sclera clear bilat   Neck: Trachea normal, normal range of motion, full passive range of motion without pain and phonation normal. Neck supple. No spinous process tenderness and no muscular tenderness present. No neck rigidity. No tracheal deviation present.   Cardiovascular: Normal rate, regular rhythm, normal heart sounds, intact distal pulses and normal pulses.   Pulmonary/Chest: Effort normal and breath sounds normal. No respiratory distress.   Abdominal: Soft. Normal appearance and bowel sounds are normal. She exhibits no distension, no pulsatile midline mass and no mass. There is no tenderness.   Musculoskeletal: Normal range of motion. She exhibits no edema or deformity.        Left foot: There is tenderness and swelling. There is normal capillary refill, no crepitus and no  deformity.        Feet:    Cap refill 2 seconds, pedal pulse 2+, sensation intact     Neurological: She is alert and oriented to person, place, and time. She has normal strength. No cranial nerve deficit or sensory deficit. She exhibits normal muscle tone. She displays no seizure activity. Coordination normal. GCS eye subscore is 4. GCS verbal subscore is 5. GCS motor subscore is 6.   Skin: Skin is warm, dry and intact. Capillary refill takes less than 2 seconds. No abrasion, no bruising, no burn, no ecchymosis and no laceration noted. She is not diaphoretic. No pallor.   Psychiatric: She has a normal mood and affect. Her speech is normal and behavior is normal. Judgment and thought content normal. Cognition and memory are normal.   Nursing note and vitals reviewed.    Mri Brain Without Contrast    Result Date: 12/20/2018  EXAMINATION: MRI BRAIN WITHOUT CONTRAST CLINICAL HISTORY: memory loss;Other symptoms and signs involving cognitive functions and awareness TECHNIQUE: Multiplanar multisequence MR imaging of the brain was performed without intravenous contrast. COMPARISON: Head CT 11/23/2016 4, brain MRI 10/27/2016 the of FINDINGS: Intracranial Compartment: Prominence of the ventricles and sulci compatible with mild generalized cerebral volume loss.  No hydrocephalus. Mild degree of patchy T2/all caps FLAIR signal hyperintensity in the periventricular supratentorial white matter thought to reflect chronic microvascular ischemic disease.  Punctate foci of susceptibility artifact in the subcortical white matter of posterior right frontal lobe compatible with prior microhemorrhage.  No mass or acute hemorrhage.  No recent or remote major vascular distribution infarct. No extra-axial blood or fluid collections. Normal vascular flow voids are preserved. Skull/Extracranial Contents (limited evaluation): Bone marrow signal intensity is normal.  Partial visualization of the cervical spine with degenerative change including  grade 2 anterolisthesis of C2 relative to C3.  There is apparent spinal stenosis at C2-3 and C3-4.     No evidence of acute intracranial pathology. Mild chronic microvascular ischemic change and generalized cerebral volume loss.  Remote microhemorrhage in the right frontal lobe. Moderately advanced degenerative change in the partially visualized cervical spine noting significant anterolisthesis at C2-3 and apparent spinal stenosis at C2-3 and C3-4.  Clinical correlation advised with dedicated imaging if warranted. This report was flagged in Epic as abnormal. Electronically signed by: Juan Alberto Vargas MD Date:    12/20/2018 Time:    10:32    X-ray Toe 2 Or More Views Left    Result Date: 1/8/2019  EXAMINATION: XR TOE 2 OR MORE VIEWS LEFT CLINICAL HISTORY: great toe, left distal;  Unspecified injury of left foot, initial encounter TECHNIQUE: Three views of the left toes were performed COMPARISON: None. FINDINGS: Two views left great toe. Degenerative changes are noted about the 1st metatarsophalangeal joint, correlation with any history of gout recommended.  No acute displaced fracture or dislocation of the great toe.  No significant edema.     As above Electronically signed by: Marc Amor MD Date:    01/08/2019 Time:    16:48    Assessment:       1. Contusion of left great toe without damage to nail, initial encounter    2. Noninfected skin tear of left lower extremity, initial encounter        Plan:         Contusion of left great toe without damage to nail, initial encounter  -     X-Ray Toe 2 or More Views Left; Future; Expected date: 01/08/2019    Noninfected skin tear of left lower extremity, initial encounter  -     mupirocin (BACTROBAN) 2 % ointment; Apply topically 3 (three) times daily.  Dispense: 30 g; Refill: 1          Patient Instructions   PLEASE READ YOUR DISCHARGE INSTRUCTIONS ENTIRELY AS IT CONTAINS IMPORTANT INFORMATION.    Tylenol and ibuprofen for pain    Rest, ice, compress, and elevate at  home    Avoid prolonged use of the affected area until better.     Use the bactroban on the skin tear  Monitor for purulent drainage, redness, fever, swelling    Please return or see your primary care doctor if you develop new or worsening symptoms.     Please arrange follow up with your primary medical clinic as soon as possible. You must understand that you've received an Urgent Care treatment only and that you may be released before all of your medical problems are known or treated. You, the patient, will arrange for follow up as instructed. If your symptoms worsen or fail to improve you should go to the Emergency Room.    Soft Tissue Contusion  You have a contusion. This is also called a bruise. There is swelling and some bleeding under the skin. This injury generally takes a few days to a few weeks to heal.  During that time, the bruise will typically change in color from reddish, to purple-blue, to greenish-yellow, then to yellow-brown.  Home care  · Elevate the injured area to reduce pain and swelling. As much as possible, sit or lie down with the injured area raised about the level of your heart. This is especially important during the first 48 hours.  · Ice the injured area to help reduce pain and swelling. Wrap a cold source (ice pack or ice cubes in a plastic bag) in a thin towel. Apply to the bruised area for 20 minutes every 1 to 2 hours the first day. Continue this 3 to 4 times a day until the pain and swelling goes away.  · Unless another medication was prescribed, you can take acetaminophen, ibuprofen, or naproxen to control pain. (If you have chronic liver or kidney disease or ever had a stomach ulcer or GI bleeding, talk with your doctor before using these medicines.)  Follow up  Follow up with your health care provider or our staff as advised. Call if you are not better in 1 to 2 weeks.  When to seek medical advice   Call your health care provider right away if you have any of the  following:  · Increased pain or swelling  · Bruise is on an arm or leg and arm or leg becomes cold, blue, numb or tingly  · Signs of infection: Warmth, drainage, or increased redness or pain around the contusion  · Inability to move the injured area or body part   · Bruise is near your eye and you have problems with your eyesight or eye   · Frequent bruising for unknown reasons  Date Last Reviewed: 4/29/2015  © 1583-8398 [employer]. [address] [place] [id number]. All rights reserved. This information is not intended as a substitute for professional medical care. Always follow your healthcare professional's instructions.

## 2019-01-16 ENCOUNTER — CLINICAL SUPPORT (OUTPATIENT)
Dept: INTERNAL MEDICINE | Facility: CLINIC | Age: 69
End: 2019-01-16
Payer: MEDICARE

## 2019-01-16 ENCOUNTER — OFFICE VISIT (OUTPATIENT)
Dept: INTERNAL MEDICINE | Facility: CLINIC | Age: 69
End: 2019-01-16
Payer: MEDICARE

## 2019-01-16 VITALS
DIASTOLIC BLOOD PRESSURE: 60 MMHG | WEIGHT: 80.81 LBS | OXYGEN SATURATION: 98 % | BODY MASS INDEX: 14.87 KG/M2 | SYSTOLIC BLOOD PRESSURE: 102 MMHG | HEART RATE: 93 BPM | HEIGHT: 62 IN

## 2019-01-16 DIAGNOSIS — E89.0 STATUS POST THYROIDECTOMY: ICD-10-CM

## 2019-01-16 DIAGNOSIS — F41.9 ANXIETY: ICD-10-CM

## 2019-01-16 DIAGNOSIS — R63.6 UNDERWEIGHT: ICD-10-CM

## 2019-01-16 DIAGNOSIS — E89.0 POSTOPERATIVE HYPOTHYROIDISM: Primary | ICD-10-CM

## 2019-01-16 DIAGNOSIS — E89.0 POSTOPERATIVE HYPOTHYROIDISM: ICD-10-CM

## 2019-01-16 DIAGNOSIS — D80.1 HYPOGAMMAGLOBULINEMIA: ICD-10-CM

## 2019-01-16 LAB
T4 FREE SERPL-MCNC: 1.08 NG/DL
TSH SERPL DL<=0.005 MIU/L-ACNC: 0.35 UIU/ML

## 2019-01-16 PROCEDURE — 84439 ASSAY OF FREE THYROXINE: CPT

## 2019-01-16 PROCEDURE — 99214 PR OFFICE/OUTPT VISIT, EST, LEVL IV, 30-39 MIN: ICD-10-PCS | Mod: S$PBB,,, | Performed by: FAMILY MEDICINE

## 2019-01-16 PROCEDURE — 99999 PR PBB SHADOW E&M-EST. PATIENT-LVL III: CPT | Mod: PBBFAC,,, | Performed by: FAMILY MEDICINE

## 2019-01-16 PROCEDURE — 99213 OFFICE O/P EST LOW 20 MIN: CPT | Mod: PBBFAC,PN | Performed by: FAMILY MEDICINE

## 2019-01-16 PROCEDURE — 84443 ASSAY THYROID STIM HORMONE: CPT

## 2019-01-16 PROCEDURE — 99999 PR PBB SHADOW E&M-EST. PATIENT-LVL III: ICD-10-PCS | Mod: PBBFAC,,, | Performed by: FAMILY MEDICINE

## 2019-01-16 PROCEDURE — 99214 OFFICE O/P EST MOD 30 MIN: CPT | Mod: S$PBB,,, | Performed by: FAMILY MEDICINE

## 2019-01-16 NOTE — PROGRESS NOTES
"Subjective:       Patient ID: Yvette Crews is a 68 y.o. female.    Chief Complaint: Follow-up    HPI  69 y/o female with adult bronchiectasis, hypogammaglobulinemia, acquired hypothyroidism with hx of MNG s/p total thyroidectomy 1/25/17, osteopenia, hx of SVT s/p ablation in the past, MDD and VICTORINO is here for follow up. She endorsed increased worry and anxiety, she is reluctant to have neuropsych testing but given her daughters concern will proceed, set for March, she reports she does not which to be on any "psychotropic drug", she says she will try to keep an open mind.  She is eating well, she is sleeping fair.  Recent MRI brain with chronic changes.  Denies f/n/v, has alternating d and constipation, she gets constipated takes miralax and then gets diarrhea, she is using a whole capful at a time and has not tried decreased amount of time, denies urinary sx.      Review of Systems   Constitutional: Positive for fatigue. Negative for activity change, appetite change and fever.   Respiratory: Negative for cough and shortness of breath.    Cardiovascular: Negative for chest pain, palpitations and leg swelling.   Gastrointestinal: Positive for constipation and diarrhea. Negative for nausea and vomiting.   Genitourinary: Negative for difficulty urinating and dysuria.   Skin: Negative for rash.   Neurological: Negative for dizziness and light-headedness.   Psychiatric/Behavioral: Negative for sleep disturbance. The patient is nervous/anxious.        Objective:      /60   Pulse 93   Ht 5' 2" (1.575 m)   Wt 36.7 kg (80 lb 12.8 oz)   SpO2 98%   BMI 14.78 kg/m²   Physical Exam   Constitutional: She appears well-developed.   HENT:   Head: Normocephalic and atraumatic.   Mouth/Throat: No oropharyngeal exudate.   Neck: Normal range of motion. Neck supple. No thyromegaly present.   Cardiovascular: Normal rate, regular rhythm and normal heart sounds.   Pulmonary/Chest: Effort normal and breath sounds normal. " No respiratory distress.   Abdominal: Soft. Bowel sounds are normal. She exhibits no distension. There is no tenderness.   Musculoskeletal: She exhibits no edema.   Lymphadenopathy:     She has no cervical adenopathy.   Neurological: She is alert.   Skin: Skin is warm and dry.   Psychiatric: She has a normal mood and affect.   Nursing note and vitals reviewed.      Assessment:       1. Postoperative hypothyroidism    2. Hypogammaglobulinemia    3. Underweight    4. Status post thyroidectomy    5. Anxiety        Plan:   Yvette was seen today for follow-up.    Diagnoses and all orders for this visit:    Postoperative hypothyroidism  -     TSH; Future    Hypogammaglobulinemia    Underweight    Status post thyroidectomy  -     TSH; Future    Anxiety

## 2019-01-17 ENCOUNTER — TELEPHONE (OUTPATIENT)
Dept: INTERNAL MEDICINE | Facility: CLINIC | Age: 69
End: 2019-01-17

## 2019-01-17 ENCOUNTER — PATIENT MESSAGE (OUTPATIENT)
Dept: INTERNAL MEDICINE | Facility: CLINIC | Age: 69
End: 2019-01-17

## 2019-01-17 DIAGNOSIS — E89.0 POSTOPERATIVE HYPOTHYROIDISM: ICD-10-CM

## 2019-01-17 RX ORDER — LEVOTHYROXINE SODIUM 88 UG/1
88 TABLET ORAL
Qty: 90 TABLET | Refills: 0 | Status: SHIPPED | OUTPATIENT
Start: 2019-01-17 | End: 2019-04-16 | Stop reason: SDUPTHER

## 2019-01-21 ENCOUNTER — OFFICE VISIT (OUTPATIENT)
Dept: OPHTHALMOLOGY | Facility: CLINIC | Age: 69
End: 2019-01-21
Payer: MEDICARE

## 2019-01-21 DIAGNOSIS — H25.11 NUCLEAR SCLEROTIC CATARACT OF RIGHT EYE: ICD-10-CM

## 2019-01-21 DIAGNOSIS — Z98.890 HISTORY OF STRABISMUS SURGERY: ICD-10-CM

## 2019-01-21 DIAGNOSIS — H50.05 ALTERNATING ESOTROPIA: ICD-10-CM

## 2019-01-21 DIAGNOSIS — H25.12 NUCLEAR SCLEROTIC CATARACT OF LEFT EYE: Primary | ICD-10-CM

## 2019-01-21 DIAGNOSIS — H50.21 HYPERTROPIA OF RIGHT EYE: ICD-10-CM

## 2019-01-21 PROCEDURE — 99999 PR PBB SHADOW E&M-EST. PATIENT-LVL III: ICD-10-PCS | Mod: PBBFAC,,, | Performed by: OPHTHALMOLOGY

## 2019-01-21 PROCEDURE — 99213 OFFICE O/P EST LOW 20 MIN: CPT | Mod: PBBFAC | Performed by: OPHTHALMOLOGY

## 2019-01-21 PROCEDURE — 92014 COMPRE OPH EXAM EST PT 1/>: CPT | Mod: S$PBB,,, | Performed by: OPHTHALMOLOGY

## 2019-01-21 PROCEDURE — 99999 PR PBB SHADOW E&M-EST. PATIENT-LVL III: CPT | Mod: PBBFAC,,, | Performed by: OPHTHALMOLOGY

## 2019-01-21 PROCEDURE — 92025 CPTRIZED CORNEAL TOPOGRAPHY: CPT | Mod: PBBFAC | Performed by: OPHTHALMOLOGY

## 2019-01-21 PROCEDURE — 92014 PR EYE EXAM, EST PATIENT,COMPREHESV: ICD-10-PCS | Mod: S$PBB,,, | Performed by: OPHTHALMOLOGY

## 2019-01-21 PROCEDURE — 92136 OPHTHALMIC BIOMETRY: CPT | Mod: PBBFAC | Performed by: OPHTHALMOLOGY

## 2019-01-21 PROCEDURE — 92136 IOL MASTER - OU - BOTH EYES: ICD-10-PCS | Mod: 26,S$PBB,LT, | Performed by: OPHTHALMOLOGY

## 2019-01-21 PROCEDURE — 92025 COMPUTERIZED CORNEAL TOPOGRAPHY: ICD-10-PCS | Mod: 26,S$PBB,, | Performed by: OPHTHALMOLOGY

## 2019-01-21 NOTE — PROGRESS NOTES
HPI     Dr. Lloyd    Strabismus s/p Strabismus sx  Amblyopia OS  Ethambutol off since 2010   PVD OU  Diplopia OD     Pt was referred by Dr. Lloyd for cataract evaluation OU.   Pt states that vision has decreased in vision for both distance and near.   Pt complains of seeing double vision but has seen it for years. The double   vision is in her OD and pt states they are on top of each other. Pt did   have prism in her glasses but reports had them removed a few months ago.   Denies any signs of flashes or floaters OU. No pain or discomfort OU. No   use of eye gtts at this time.       Last edited by Vera Sams MD on 1/21/2019  9:40 AM. (History)            Assessment /Plan     For exam results, see Encounter Report.    Nuclear sclerotic cataract of left eye  -     IOL Master - OU - Both Eyes  -     Computerized corneal topography    Nuclear sclerotic cataract of right eye    Alternating esotropia    Hypertropia of right eye    History of strabismus surgery      Visually significant nuclear sclerotic cataract   - Interfering with activities of daily living.  Pt desires cataract surgery for Va rehabilitation.   - R/B/A discussed and pt agrees to proceed with surgery.   - IOL options discussed according to patient's goals and concomitant ocular pathology; and pt content with monofocal vs. MFIOL lens.    - Target: plano.    Discussed EDOF OU vs. monofocal / TORIC.    The patient expresses a desire to reduce spectacle dependence. I reviewed various IOL and LASER refractive surgical options and we will attempt to minimize spectacle dependence by managing astigmatism and optimizing IOL selection. Femtosecond LASER assisted cataract surgery (FLACS) technology and Intraoperative aberrometer (ORA) was explained to the patient.  The patient voices understanding and wishes to implement this technology during the cataract procedure.  I explained the increased precision of the LASER versus manual techniques, especially as  it relates to astigmatism reduction with arcuate incisions.  I emphasized that although our goal is to reduce the need for refractive correction after surgery, there may still be a need for spectacle correction to achieve optimal visual acuity, and that a reasonable range of functional vision should be the expectation.  No guarantees are made about post operative refraction or visual acuity, as the eye may heal in unpredictable ways, and the standard risks, benefits, and alternatives to cataract surgery were explained.  The patient understands that the refractive portions of this cataract procedure are not covered by insurance, and that there is an out of pocket expense of $ 2250 per eye. I also explained that even though our pre-operative plan is to utilize advanced refractive technologies during surgery, that I may decide to eliminate part or all of this plan if surgical challenges or complications arise, or I feel that it is not in the patient's best interest. Consent forms were given to the patient to review.     OS first    symfony toric OS [id number] 16.5 RANGE  symfony toric OS [id number] 16.5 RANGE  *ORA    (SYMFONY ZXROO 16.0 RANGE OD)  *ora  CATALYS Parameters:    Right Eye:   JOSI:  12mm              Need to jose patient sitting up: NO  Capsulotomy: Scanned Capsule  rdGrdrrdarddrderd:rd rd3rd Arcuate: OFF  Incisions: OFF  Left Eye:              JOSI:  12mm              Need to jose patient sitting up: YES  Capsulotomy: Scanned Capsule  rdGrdrrdarddrderd:rd rd3rd Arcuate: OFF  Incisions:  OFF

## 2019-01-23 ENCOUNTER — TELEPHONE (OUTPATIENT)
Dept: INTERNAL MEDICINE | Facility: CLINIC | Age: 69
End: 2019-01-23

## 2019-01-23 NOTE — TELEPHONE ENCOUNTER
Please call and find out what the question is that the patient has.  Her thyroid blood test that we did on January 16th was too low.  On January 17th Trisha called her to let her know this and set up a repeat TSH blood test in 8 weeks.  She also let her know that I decreased her thyroid medication from 100 mcg daily down to 88 mcg daily and sent it in to her pharmacy.

## 2019-01-23 NOTE — TELEPHONE ENCOUNTER
Pt states that she has no new questions about her thyroid results. She understands her lab results and has started the 88 mcg of levothyroxine.

## 2019-01-25 ENCOUNTER — TELEPHONE (OUTPATIENT)
Dept: OPHTHALMOLOGY | Facility: CLINIC | Age: 69
End: 2019-01-25

## 2019-01-25 DIAGNOSIS — H25.12 NUCLEAR SCLEROTIC CATARACT OF LEFT EYE: Primary | ICD-10-CM

## 2019-02-05 ENCOUNTER — INFUSION (OUTPATIENT)
Dept: INFECTIOUS DISEASES | Facility: HOSPITAL | Age: 69
End: 2019-02-05
Attending: INTERNAL MEDICINE
Payer: MEDICARE

## 2019-02-05 VITALS
OXYGEN SATURATION: 100 % | WEIGHT: 80.13 LBS | TEMPERATURE: 98 F | BODY MASS INDEX: 14.66 KG/M2 | RESPIRATION RATE: 16 BRPM | DIASTOLIC BLOOD PRESSURE: 61 MMHG | HEART RATE: 80 BPM | SYSTOLIC BLOOD PRESSURE: 115 MMHG

## 2019-02-05 DIAGNOSIS — D80.1 HYPOGAMMAGLOBULINEMIA: Primary | ICD-10-CM

## 2019-02-05 PROCEDURE — 96366 THER/PROPH/DIAG IV INF ADDON: CPT

## 2019-02-05 PROCEDURE — 63600175 PHARM REV CODE 636 W HCPCS: Mod: JG | Performed by: INTERNAL MEDICINE

## 2019-02-05 PROCEDURE — 96365 THER/PROPH/DIAG IV INF INIT: CPT

## 2019-02-05 RX ADMIN — HUMAN IMMUNOGLOBULIN G 20 G: 20 LIQUID INTRAVENOUS at 09:02

## 2019-02-05 NOTE — PROGRESS NOTES
Pt received dose of IVIG PRIVIGEN 20 GRAMS.  Infusion started and completed at a rate of 99cc/hr.  Pt tolerated well and left in NAD.

## 2019-02-13 ENCOUNTER — LAB VISIT (OUTPATIENT)
Dept: LAB | Facility: HOSPITAL | Age: 69
End: 2019-02-13
Attending: FAMILY MEDICINE
Payer: MEDICARE

## 2019-02-13 DIAGNOSIS — D80.1 HYPOGAMMAGLOBULINEMIA: ICD-10-CM

## 2019-02-13 LAB
IGA SERPL-MCNC: 170 MG/DL
IGG SERPL-MCNC: 1605 MG/DL
IGM SERPL-MCNC: 37 MG/DL

## 2019-02-13 PROCEDURE — 82784 ASSAY IGA/IGD/IGG/IGM EACH: CPT | Mod: 59

## 2019-02-13 PROCEDURE — 36415 COLL VENOUS BLD VENIPUNCTURE: CPT

## 2019-02-14 ENCOUNTER — PATIENT MESSAGE (OUTPATIENT)
Dept: INTERNAL MEDICINE | Facility: CLINIC | Age: 69
End: 2019-02-14

## 2019-02-25 ENCOUNTER — TELEPHONE (OUTPATIENT)
Dept: OPHTHALMOLOGY | Facility: CLINIC | Age: 69
End: 2019-02-25

## 2019-02-25 DIAGNOSIS — H25.11 NUCLEAR SCLEROTIC CATARACT OF RIGHT EYE: Primary | ICD-10-CM

## 2019-02-27 ENCOUNTER — TELEPHONE (OUTPATIENT)
Dept: OPHTHALMOLOGY | Facility: CLINIC | Age: 69
End: 2019-02-27

## 2019-03-01 ENCOUNTER — PATIENT MESSAGE (OUTPATIENT)
Dept: INTERNAL MEDICINE | Facility: CLINIC | Age: 69
End: 2019-03-01

## 2019-03-01 DIAGNOSIS — S81.812A NONINFECTED SKIN TEAR OF LEFT LOWER EXTREMITY, INITIAL ENCOUNTER: ICD-10-CM

## 2019-03-01 RX ORDER — MUPIROCIN 20 MG/G
OINTMENT TOPICAL 3 TIMES DAILY
Qty: 30 G | Refills: 0 | Status: SHIPPED | OUTPATIENT
Start: 2019-03-01 | End: 2019-04-08 | Stop reason: SDUPTHER

## 2019-03-01 NOTE — TELEPHONE ENCOUNTER
----- Message from Margo King sent at 3/1/2019 10:06 AM CST -----  Contact: pt   Can the clinic reply in [facility]: No     Please refill the medication(s) listed below. The patient can be reached at this phone number once it is called into the pharmacy [phone].    Medication #1 mupirocin (BACTROBAN) 2 % ointment    Medication #2    Preferred Pharmacy: [address]

## 2019-03-06 ENCOUNTER — INFUSION (OUTPATIENT)
Dept: INFECTIOUS DISEASES | Facility: HOSPITAL | Age: 69
End: 2019-03-06
Attending: INTERNAL MEDICINE
Payer: MEDICARE

## 2019-03-06 VITALS
BODY MASS INDEX: 14.92 KG/M2 | DIASTOLIC BLOOD PRESSURE: 60 MMHG | WEIGHT: 81.56 LBS | HEART RATE: 81 BPM | SYSTOLIC BLOOD PRESSURE: 133 MMHG | OXYGEN SATURATION: 100 % | RESPIRATION RATE: 16 BRPM | TEMPERATURE: 99 F

## 2019-03-06 DIAGNOSIS — D80.1 HYPOGAMMAGLOBULINEMIA: Primary | ICD-10-CM

## 2019-03-06 PROCEDURE — 63600175 PHARM REV CODE 636 W HCPCS: Mod: JG | Performed by: INTERNAL MEDICINE

## 2019-03-06 PROCEDURE — 96365 THER/PROPH/DIAG IV INF INIT: CPT

## 2019-03-06 PROCEDURE — 96366 THER/PROPH/DIAG IV INF ADDON: CPT

## 2019-03-06 RX ADMIN — HUMAN IMMUNOGLOBULIN G 20 G: 20 LIQUID INTRAVENOUS at 10:03

## 2019-03-06 NOTE — PLAN OF CARE
Problem: Adult Inpatient Plan of Care  Goal: Patient-Specific Goal (Individualization)  Outcome: Ongoing (interventions implemented as appropriate)  Patient education given on hygiene, pain management, and medication/treatment plan. The patient expresses understanding and acceptance of instructions. Vero Lloyd 3/6/2019 9:41 AM

## 2019-03-06 NOTE — H&P
History    Chief complaint:  Painless progressive vision loss    Present Ilness/Diagnosis: Nuclear sclerotic Cataract    Past Medical History:  has a past medical history of Adult bronchiectasis, Amblyopia, Anxiety, Arthritis, Cataract, CHF (congestive heart failure), Chin laceration, Concussion (10/27/2016), Depression, Hypertension, Onychomycosis, Strabismus, Supraventricular tachycardia, and Thyroid disease.    Family History/Social History: refer to chart    Allergies:   Review of patient's allergies indicates:   Allergen Reactions    Bactrim [sulfamethoxazole-trimethoprim] Other (See Comments)     Toxic reaction    Naproxen Nausea Only    Rifampin Rash       Current Medications: No current facility-administered medications for this encounter.     Current Outpatient Medications:     calcium carbonate-vit D3-min (CALTRATE 600+D PLUS MINERALS) 600 mg calcium- 400 unit Tab, Take by mouth. 1 Tablet Oral Twice a day, Disp: , Rfl:     guaifenesin (MUCINEX) 600 mg 12 hr tablet, Take 1,200 mg by mouth 2 (two) times daily.  , Disp: , Rfl:     immune globulin, human, (POLYGAM) 5 gram injection, Inject into the vein.  , Disp: , Rfl:     levothyroxine (SYNTHROID) 88 MCG tablet, Take 1 tablet (88 mcg total) by mouth before breakfast., Disp: 90 tablet, Rfl: 0    montelukast (SINGULAIR) 10 mg tablet, Take 1 tablet (10 mg total) by mouth every evening., Disp: 90 tablet, Rfl: 4    mupirocin (BACTROBAN) 2 % ointment, Apply topically 3 (three) times daily., Disp: 30 g, Rfl: 0    polyethylene glycol (GLYCOLAX) 17 gram PwPk, Take by mouth., Disp: , Rfl:     Physical Exam    BP: Vital signs stable  General: No apparent distress  HEENT: nuclear sclerotic cataract  Lungs: adequate respirations  Heart: + pulses  Abdomen: soft  Rectal/pelvic: deferred    Impression: Visually significant Cataract    Plan: Phacoemulsification with implantation of Intraocular lens

## 2019-03-06 NOTE — PROGRESS NOTES
.Yvette Crews here today for Infusion Meds: Privigen. IVIG titrated per  protocol to max rate of 96 mls.hr. Patient tolerated infusion well, vital signs remained stable throughout visit and patient discharged from clinic in no apparent distress. Received next infusion date prior to discharge.

## 2019-03-07 ENCOUNTER — TELEPHONE (OUTPATIENT)
Dept: OPTOMETRY | Facility: CLINIC | Age: 69
End: 2019-03-07

## 2019-03-11 ENCOUNTER — ANESTHESIA (OUTPATIENT)
Dept: SURGERY | Facility: OTHER | Age: 69
End: 2019-03-11
Payer: MEDICARE

## 2019-03-11 ENCOUNTER — ANESTHESIA EVENT (OUTPATIENT)
Dept: SURGERY | Facility: OTHER | Age: 69
End: 2019-03-11
Payer: MEDICARE

## 2019-03-11 ENCOUNTER — HOSPITAL ENCOUNTER (OUTPATIENT)
Facility: OTHER | Age: 69
Discharge: HOME | End: 2019-03-11
Attending: OPHTHALMOLOGY | Admitting: OPHTHALMOLOGY
Payer: MEDICARE

## 2019-03-11 VITALS
HEIGHT: 63 IN | RESPIRATION RATE: 18 BRPM | DIASTOLIC BLOOD PRESSURE: 74 MMHG | TEMPERATURE: 98 F | HEART RATE: 66 BPM | WEIGHT: 82 LBS | SYSTOLIC BLOOD PRESSURE: 127 MMHG | OXYGEN SATURATION: 100 % | BODY MASS INDEX: 14.53 KG/M2

## 2019-03-11 DIAGNOSIS — H25.12 NUCLEAR SENILE CATARACT OF LEFT EYE: Primary | ICD-10-CM

## 2019-03-11 DIAGNOSIS — H25.12 AGE-RELATED NUCLEAR CATARACT, LEFT: ICD-10-CM

## 2019-03-11 PROCEDURE — 63600175 PHARM REV CODE 636 W HCPCS: Performed by: NURSE ANESTHETIST, CERTIFIED REGISTERED

## 2019-03-11 PROCEDURE — 36000707: Performed by: OPHTHALMOLOGY

## 2019-03-11 PROCEDURE — 36000706: Performed by: OPHTHALMOLOGY

## 2019-03-11 PROCEDURE — V2787 ASTIGMATISM-CORRECT FUNCTION: HCPCS | Performed by: OPHTHALMOLOGY

## 2019-03-11 PROCEDURE — 25000003 PHARM REV CODE 250: Performed by: OPHTHALMOLOGY

## 2019-03-11 PROCEDURE — 37000008 HC ANESTHESIA 1ST 15 MINUTES: Performed by: OPHTHALMOLOGY

## 2019-03-11 PROCEDURE — 66999 UNLISTED PX ANT SEGMENT EYE: CPT | Mod: ,,, | Performed by: OPHTHALMOLOGY

## 2019-03-11 PROCEDURE — 37000009 HC ANESTHESIA EA ADD 15 MINS: Performed by: OPHTHALMOLOGY

## 2019-03-11 PROCEDURE — 71000015 HC POSTOP RECOV 1ST HR: Performed by: OPHTHALMOLOGY

## 2019-03-11 PROCEDURE — 66984 PR REMOVAL, CATARACT, W/INSRT INTRAOC LENS, W/O ENDO CYCLO: ICD-10-PCS | Mod: LT,,, | Performed by: OPHTHALMOLOGY

## 2019-03-11 PROCEDURE — 66999 PR FEMTO MFIOL: ICD-10-PCS | Mod: ,,, | Performed by: OPHTHALMOLOGY

## 2019-03-11 PROCEDURE — 66984 XCAPSL CTRC RMVL W/O ECP: CPT | Mod: LT,,, | Performed by: OPHTHALMOLOGY

## 2019-03-11 RX ORDER — MOXIFLOXACIN 5 MG/ML
1 SOLUTION/ DROPS OPHTHALMIC
Status: COMPLETED | OUTPATIENT
Start: 2019-03-11 | End: 2019-03-11

## 2019-03-11 RX ORDER — TROPICAMIDE 10 MG/ML
1 SOLUTION/ DROPS OPHTHALMIC
Status: DISCONTINUED | OUTPATIENT
Start: 2019-03-11 | End: 2019-03-11 | Stop reason: HOSPADM

## 2019-03-11 RX ORDER — TETRACAINE HYDROCHLORIDE 5 MG/ML
1 SOLUTION OPHTHALMIC
Status: COMPLETED | OUTPATIENT
Start: 2019-03-11 | End: 2019-03-11

## 2019-03-11 RX ORDER — PROPARACAINE HYDROCHLORIDE 5 MG/ML
1 SOLUTION/ DROPS OPHTHALMIC
Status: DISCONTINUED | OUTPATIENT
Start: 2019-03-11 | End: 2019-03-11 | Stop reason: HOSPADM

## 2019-03-11 RX ORDER — PHENYLEPHRINE HYDROCHLORIDE 25 MG/ML
1 SOLUTION/ DROPS OPHTHALMIC
Status: DISCONTINUED | OUTPATIENT
Start: 2019-03-11 | End: 2019-03-11 | Stop reason: HOSPADM

## 2019-03-11 RX ORDER — MOXIFLOXACIN 5 MG/ML
SOLUTION/ DROPS OPHTHALMIC
Status: DISCONTINUED | OUTPATIENT
Start: 2019-03-11 | End: 2019-03-11 | Stop reason: HOSPADM

## 2019-03-11 RX ORDER — PHENYLEPHRINE HYDROCHLORIDE 100 MG/ML
SOLUTION/ DROPS OPHTHALMIC
Status: DISCONTINUED | OUTPATIENT
Start: 2019-03-11 | End: 2019-03-11 | Stop reason: HOSPADM

## 2019-03-11 RX ORDER — ACETAMINOPHEN 325 MG/1
650 TABLET ORAL EVERY 4 HOURS PRN
Status: DISCONTINUED | OUTPATIENT
Start: 2019-03-11 | End: 2019-03-11 | Stop reason: HOSPADM

## 2019-03-11 RX ORDER — MIDAZOLAM HYDROCHLORIDE 1 MG/ML
INJECTION INTRAMUSCULAR; INTRAVENOUS
Status: DISCONTINUED | OUTPATIENT
Start: 2019-03-11 | End: 2019-03-11

## 2019-03-11 RX ORDER — LIDOCAINE HYDROCHLORIDE 40 MG/ML
INJECTION, SOLUTION RETROBULBAR
Status: DISCONTINUED | OUTPATIENT
Start: 2019-03-11 | End: 2019-03-11 | Stop reason: HOSPADM

## 2019-03-11 RX ORDER — LIDOCAINE HYDROCHLORIDE 10 MG/ML
INJECTION, SOLUTION EPIDURAL; INFILTRATION; INTRACAUDAL; PERINEURAL
Status: DISCONTINUED | OUTPATIENT
Start: 2019-03-11 | End: 2019-03-11 | Stop reason: HOSPADM

## 2019-03-11 RX ORDER — TETRACAINE HYDROCHLORIDE 5 MG/ML
SOLUTION OPHTHALMIC
Status: DISCONTINUED | OUTPATIENT
Start: 2019-03-11 | End: 2019-03-11 | Stop reason: HOSPADM

## 2019-03-11 RX ADMIN — TROPICAMIDE 1 DROP: 10 SOLUTION/ DROPS OPHTHALMIC at 08:03

## 2019-03-11 RX ADMIN — MOXIFLOXACIN 1 DROP: 5 SOLUTION/ DROPS OPHTHALMIC at 08:03

## 2019-03-11 RX ADMIN — MIDAZOLAM HYDROCHLORIDE 1 MG: 1 INJECTION, SOLUTION INTRAMUSCULAR; INTRAVENOUS at 10:03

## 2019-03-11 RX ADMIN — PHENYLEPHRINE HYDROCHLORIDE 1 DROP: 25 SOLUTION/ DROPS OPHTHALMIC at 08:03

## 2019-03-11 RX ADMIN — TETRACAINE HYDROCHLORIDE 1 DROP: 5 SOLUTION OPHTHALMIC at 08:03

## 2019-03-11 RX ADMIN — MOXIFLOXACIN 1 DROP: 5 SOLUTION/ DROPS OPHTHALMIC at 11:03

## 2019-03-11 NOTE — ANESTHESIA POSTPROCEDURE EVALUATION
"Anesthesia Post Evaluation    Patient: Yvette Crews    Procedure(s) Performed: Procedure(s) (LRB):  EXTRACTION, CATARACT, WITH IOL INSERTION (Left)    Final Anesthesia Type: MAC  Patient location during evaluation: [facility]  Patient participation: Yes- Able to Participate  Level of consciousness: awake and alert  Post-procedure vital signs: reviewed and stable  Pain management: adequate  PONV status at discharge: No PONV  Anesthetic complications: no      Cardiovascular status: blood pressure returned to baseline  Respiratory status: unassisted and spontaneous ventilation  Hydration status: euvolemic  Follow-up not needed.        Visit Vitals  BP (!) 145/77 (BP Location: Left arm, Patient Position: Lying)   Pulse 60   Temp 36.4 °C (97.6 °F) (Oral)   Resp 18   Ht 5' 2.5" (1.588 m)   Wt 37.2 kg (82 lb)   SpO2 100%   Breastfeeding? No   BMI 14.76 kg/m²       Pain/Greg Score: No Data Recorded      "

## 2019-03-11 NOTE — DISCHARGE SUMMARY
BRIEF DISCHARGE NOTE:    Reason for hospitalization -  Cataract surgery     Final Diagnosis - Visually significant Cataract    Procedures and treatment provided - Status post phacoemulsification with placement of intraocular lens     Diet - Advance to regular as tolerated    Activity - as tolerated    Disposition at the end of the case - Good.    Discharge: to home    The patient tolerated the procedure well and knows to follow up with me tomorrow morning in the eye clinic, sooner if needed.    Patient and family instructions (as appropriate) - Given to patient on discharge    Vera Sams MD

## 2019-03-11 NOTE — ANESTHESIA PREPROCEDURE EVALUATION
03/11/2019  Yvette Crews is a 68 y.o., female.    Anesthesia Evaluation    I have reviewed the Patient Summary Reports.    I have reviewed the Nursing Notes.   I have reviewed the Medications.     Review of Systems  Anesthesia Hx:  No problems with previous Anesthesia  Denies Family Hx of Anesthesia complications.   Denies Personal Hx of Anesthesia complications.   Social:  Non-Smoker    Cardiovascular:   Hypertension CHF    Endocrine:   Hypothyroidism        Physical Exam  General:  Cachexia    Airway/Jaw/Neck:  Airway Findings: Mallampati: II     Dental:  Dental Findings: In tact        Mental Status:  Mental Status Findings:  Cooperative, Alert and Oriented         Anesthesia Plan  Type of Anesthesia, risks & benefits discussed:  Anesthesia Type:  MAC  Patient's Preference:   Intra-op Monitoring Plan: standard ASA monitors  Intra-op Monitoring Plan Comments:   Post Op Pain Control Plan:   Post Op Pain Control Plan Comments:   Induction:   IV  Beta Blocker:         Informed Consent: Patient understands risks and agrees with Anesthesia plan.  Questions answered. Anesthesia consent signed with patient.  ASA Score: 3     Day of Surgery Review of History & Physical:    H&P update referred to the surgeon.         Ready For Surgery From Anesthesia Perspective.

## 2019-03-11 NOTE — DISCHARGE INSTRUCTIONS
Vera Sams MD  [facility]  Department of Opthamology          AFTER: Cataract Surgery:  Relax at home and DO NOT exert yourself for the rest of the day.  Plan to see Dr. Sams tomorrow at the eye clinic:   [facility]  [place] [address]    Refer to attached eye drop instruction sheet     Precautions:  DO NOT rub your eye.  You may resume moderate activity the day after surgery.  Wear protective sunglasses during the day and a shield at night for 1(one) week.  If you have pain, redness and decreased vision, call Dr. Sams (or the on-call doctor after hours) @[phone].            Home Care Instructions for Eye Surgeries    1. ACTIVITY:  Limit your activity today. Relax at home and DO NOT exert yourself for the rest of the day. Increase activity gradually. You may return to work or school as directed by your physician.    2. DIET:  Drink plenty of fluids. Resume your normal diet unless instructed otherwise.    3. PAIN:  Expect a moderate amount of pain. If a prescription for pain is not sent home with you, you may take your commonly used pain reliever as directed. If this is not sufficient, call your physician. You may resume any other prescription medication unless otherwise directed by your physician.     Discuss any problem with your physician as soon as it arises. Do not Delay.      EMERGENCY- If you are unable to contact your physician, please go to the nearest Emergency Room.       Anesthesia: Monitored Anesthesia Care (MAC)    Anesthesia Safety  · Have an adult family member or friend drive you home after the procedure.  · For the first 24 hours after your surgery:  · Do not drive or use heavy equipment.  · Do not make important decisions or sign documents.  · Avoid alcohol.  · Have someone stay with you, if possible. They can watch for problems and help keep you safe.

## 2019-03-11 NOTE — OP NOTE
SURGEON: SHANELLE SPAIN MD    DATE OF PROCEDURE: 03/11/2019    PREOPERATIVE DIAGNOSIS:  1. Senile nuclear sclerotic cataract left eye.  2. Presbyopia left eye    POSTOPERATIVE DIAGNOSIS: 1.Senile nuclear sclerotic cataract left eye. 2. Presbyopia left eye    PROCEDURE PERFORMED:  Phacoemulsification with placement of MULTIFOCAL intraocular lens, left eye.    IMPLANT:   [id number] 17.0 D    ANESTHESIA:  Topical and MAC    COMPLICATIONS: none    ESTIMATED BLOOD LOSS: <1cc    SPECIMENS: none    INDICATIONS FOR PROCEDURE:  This patient presented to the clinic with decreased vision in the left eye and was found to have a cataract.  The risks, benefits, and alternatives were discussed and the patient agreed to proceed with phacoemulsification and implantation of a lens in the left eye.     PROCEDURE IN DETAIL:  The patient was met in the preop holding area.  Consent was confirmed to be signed.  The operative site was marked.  The patient was brought into the operating room by the anesthesia team and placed under monitored anesthesia care.  The left eye was prepped and draped in a sterile ophthalmic fashion.  A Antonio speculum was placed into the left eye.   A paracentesis site was made and 1% preservative-free lidocaine was injected into the anterior chamber.  Viscoelastic  material was injected into the anterior chamber.  A keratome blade was used to make a clear corneal incision.  A cystotome was used to initiate the continuous curvilinear capsulorrhexis which was completed with Utrata forceps.  BSS on a doll cannula was used to perform hydrodissection.  The phacoemulsification tip was introduced into the eye and the nucleus was removed in a standard divide-and-conquer fashion.  Remaining cortical material was removed from the eye using irrigation-aspiration.  The capsular bag was filled with viscoelastic material and the intraocular lens was injected and positioned into place. Remaining viscoelastic material was  removed from the eye using irrigation and aspiration.  The corneal wounds were hydrated.  The eye was filled to physiologic pressure. The wounds were found to be watertight. Drops of Vigamox and prednisilone were placed into the eye.  The eye was washed, dried, and shielded.  The patient tolerated the procedure well and knows to follow up with me tomorrow morning, sooner if needed.    The [employer] femtosecond laser was used prior to the cataract surgery portion in the laser suite to perform the capsulorhexis and lens fragmentation.

## 2019-03-12 ENCOUNTER — OFFICE VISIT (OUTPATIENT)
Dept: OPHTHALMOLOGY | Facility: CLINIC | Age: 69
End: 2019-03-12
Payer: MEDICARE

## 2019-03-12 DIAGNOSIS — Z98.42 STATUS POST CATARACT EXTRACTION AND INSERTION OF INTRAOCULAR LENS, LEFT: Primary | ICD-10-CM

## 2019-03-12 DIAGNOSIS — Z96.1 STATUS POST CATARACT EXTRACTION AND INSERTION OF INTRAOCULAR LENS, LEFT: Primary | ICD-10-CM

## 2019-03-12 PROCEDURE — 99024 PR POST-OP FOLLOW-UP VISIT: ICD-10-PCS | Mod: POP,,, | Performed by: OPHTHALMOLOGY

## 2019-03-12 PROCEDURE — 99999 PR PBB SHADOW E&M-EST. PATIENT-LVL II: CPT | Mod: PBBFAC,,, | Performed by: OPHTHALMOLOGY

## 2019-03-12 PROCEDURE — 99999 PR PBB SHADOW E&M-EST. PATIENT-LVL II: ICD-10-PCS | Mod: PBBFAC,,, | Performed by: OPHTHALMOLOGY

## 2019-03-12 PROCEDURE — 99212 OFFICE O/P EST SF 10 MIN: CPT | Mod: PBBFAC | Performed by: OPHTHALMOLOGY

## 2019-03-12 PROCEDURE — 99024 POSTOP FOLLOW-UP VISIT: CPT | Mod: POP,,, | Performed by: OPHTHALMOLOGY

## 2019-03-12 NOTE — PROGRESS NOTES
HPI     Dr. Lloyd    S/p phaco w/IOL OS 3/11/19 - symfony  Strabismus s/p Strabismus sx  Amblyopia OS  Ethambutol off since 2010   PVD OU  Diplopia OD     PF TID OS  Ocuflox TID OS    Pt here for 1 day post op OS.  Pt states she is not satisfied with her   surgery. Pt denies eye pain OS. Pt states the adhesive pulled off her   skin.     Last edited by Vera Sams MD on 3/12/2019  3:33 PM. (History)            Assessment /Plan     For exam results, see Encounter Report.    Status post cataract extraction and insertion of intraocular lens, left      Slit Lamp Exam  L/L - normal  C/s - quiet  Cornea - clear  A/C - 1+ cell  Lens - PCIOL    POD #1 s/p phaco/IOL  - doing well  - continue the following drops:    vigamox or ocuflox TID x 1 wk then stop  Pred forte or durezol or dexamethasone TID x  4 wks  Ketorolac TID until runs out    Versus:    Combination drop - 1 drop TID x total of 1 month    Appropriate precautions and post op medications reviewed.  Patient instructed to call or come in if symptoms of redness, decreased vision, or pain are experienced.    -f/up 1-2wks, sooner PRN.       Cat OD - can sign consent next if ready to proceed. -has date.

## 2019-03-13 ENCOUNTER — LAB VISIT (OUTPATIENT)
Dept: LAB | Facility: HOSPITAL | Age: 69
End: 2019-03-13
Attending: FAMILY MEDICINE
Payer: MEDICARE

## 2019-03-13 DIAGNOSIS — E89.0 POSTOPERATIVE HYPOTHYROIDISM: ICD-10-CM

## 2019-03-13 LAB — TSH SERPL DL<=0.005 MIU/L-ACNC: 1.34 UIU/ML

## 2019-03-13 PROCEDURE — 84443 ASSAY THYROID STIM HORMONE: CPT

## 2019-03-13 PROCEDURE — 36415 COLL VENOUS BLD VENIPUNCTURE: CPT

## 2019-03-14 ENCOUNTER — TELEPHONE (OUTPATIENT)
Dept: NEUROLOGY | Facility: CLINIC | Age: 69
End: 2019-03-14

## 2019-03-15 ENCOUNTER — TELEPHONE (OUTPATIENT)
Dept: NEUROLOGY | Facility: CLINIC | Age: 69
End: 2019-03-15

## 2019-03-15 NOTE — TELEPHONE ENCOUNTER
----- Message from Harjeet Hilton sent at 3/14/2019  4:47 PM CDT -----  Needs Advice    Reason for call: Pt is asking to speak w/ the doctor about rescheduling appt on 03/20/19        Communication Preference: [phone]    Additional Information:

## 2019-03-18 ENCOUNTER — PATIENT MESSAGE (OUTPATIENT)
Dept: INTERNAL MEDICINE | Facility: CLINIC | Age: 69
End: 2019-03-18

## 2019-03-19 ENCOUNTER — OFFICE VISIT (OUTPATIENT)
Dept: OPHTHALMOLOGY | Facility: CLINIC | Age: 69
End: 2019-03-19
Payer: MEDICARE

## 2019-03-19 DIAGNOSIS — Z96.1 STATUS POST CATARACT EXTRACTION AND INSERTION OF INTRAOCULAR LENS, LEFT: Primary | ICD-10-CM

## 2019-03-19 DIAGNOSIS — H25.11 NUCLEAR SCLEROTIC CATARACT OF RIGHT EYE: ICD-10-CM

## 2019-03-19 DIAGNOSIS — Z98.42 STATUS POST CATARACT EXTRACTION AND INSERTION OF INTRAOCULAR LENS, LEFT: Primary | ICD-10-CM

## 2019-03-19 PROCEDURE — 99212 OFFICE O/P EST SF 10 MIN: CPT | Mod: PBBFAC | Performed by: OPHTHALMOLOGY

## 2019-03-19 PROCEDURE — 99024 PR POST-OP FOLLOW-UP VISIT: ICD-10-PCS | Mod: POP,,, | Performed by: OPHTHALMOLOGY

## 2019-03-19 PROCEDURE — 92136 IOL MASTER - OD - RIGHT EYE: ICD-10-PCS | Mod: 26,S$PBB,RT, | Performed by: OPHTHALMOLOGY

## 2019-03-19 PROCEDURE — 92136 OPHTHALMIC BIOMETRY: CPT | Mod: PBBFAC,RT | Performed by: OPHTHALMOLOGY

## 2019-03-19 PROCEDURE — 99999 PR PBB SHADOW E&M-EST. PATIENT-LVL II: CPT | Mod: PBBFAC,,, | Performed by: OPHTHALMOLOGY

## 2019-03-19 PROCEDURE — 99999 PR PBB SHADOW E&M-EST. PATIENT-LVL II: ICD-10-PCS | Mod: PBBFAC,,, | Performed by: OPHTHALMOLOGY

## 2019-03-19 PROCEDURE — 92025 CPTRIZED CORNEAL TOPOGRAPHY: CPT | Mod: PBBFAC | Performed by: OPHTHALMOLOGY

## 2019-03-19 PROCEDURE — 99024 POSTOP FOLLOW-UP VISIT: CPT | Mod: POP,,, | Performed by: OPHTHALMOLOGY

## 2019-03-19 PROCEDURE — 92025 COMPUTERIZED CORNEAL TOPOGRAPHY: ICD-10-PCS | Mod: 26,S$PBB,, | Performed by: OPHTHALMOLOGY

## 2019-03-19 NOTE — PROGRESS NOTES
HPI     Dr. Lloyd    S/p phaco w/IOL OS 3/11/19 - symfony toric  Strabismus s/p Strabismus sx  Amblyopia OS  Ethambutol off since 2010   PVD OU  Diplopia OD     PF TID OS  Ocuflox TID OS    Pt here for 1 week post op OS.    Vision doing better  No pains  No flashes or floater    Last edited by Vera Sams MD on 3/19/2019  8:32 AM. (History)            Assessment /Plan     For exam results, see Encounter Report.    Status post cataract extraction and insertion of intraocular lens, left    Nuclear sclerotic cataract of right eye  -     IOL Master - OD - Right Eye  -     Computerized corneal topography      PO week #1 s/p phaco/IOL -    - doing well, no issues    Continue combo drops for a total of 1 month versus: d/c abx gtt, continue PF/ketorolocTID for total of 1 month    Visually significant nuclear sclerotic cataract   - Interfering with activities of daily living.  Pt desires cataract surgery for [facility] rehabilitation.   - R/B/A discussed and pt agrees to proceed with surgery.   - IOL options discussed according to patient's goals and concomitant ocular pathology; and pt content with monofocal vs. MFIOL lens.    - Target: plano.    Discussed EDOF OU vs. monofocal / TORIC.    The patient expresses a desire to reduce spectacle dependence. I reviewed various IOL and LASER refractive surgical options and we will attempt to minimize spectacle dependence by managing astigmatism and optimizing IOL selection. Femtosecond LASER assisted cataract surgery (FLACS) technology and Intraoperative aberrometer (ORA) was explained to the patient.  The patient voices understanding and wishes to implement this technology during the cataract procedure.  I explained the increased precision of the LASER versus manual techniques, especially as it relates to astigmatism reduction with arcuate incisions.  I emphasized that although our goal is to reduce the need for refractive correction after surgery, there may still be a need for  spectacle correction to achieve optimal visual acuity, and that a reasonable range of functional vision should be the expectation.  No guarantees are made about post operative refraction or visual acuity, as the eye may heal in unpredictable ways, and the standard risks, benefits, and alternatives to cataract surgery were explained.  The patient understands that the refractive portions of this cataract procedure are not covered by insurance, and that there is an out of pocket expense of $ 2250 per eye. I also explained that even though our pre-operative plan is to utilize advanced refractive technologies during surgery, that I may decide to eliminate part or all of this plan if surgical challenges or complications arise, or I feel that it is not in the patient's best interest. Consent forms were given to the patient to review.        (SYMFONY ZXROO 16.0 RANGE OD)  *ora    CATALYS Parameters:    Right Eye:   JOSI:  12mm              Need to jose patient sitting up: NO  Capsulotomy: Scanned Capsule  rdGrdrrdarddrderd:rd rd3rd Arcuate: OFF  Incisions: OFF  Left Eye:              JOSI:  12mm              Need to jose patient sitting up: YES  Capsulotomy: Scanned Capsule  rdGrdrrdarddrderd:rd rd3rd Arcuate: OFF  Incisions:  OFF

## 2019-03-27 ENCOUNTER — TELEPHONE (OUTPATIENT)
Dept: INTERNAL MEDICINE | Facility: CLINIC | Age: 69
End: 2019-03-27

## 2019-03-27 DIAGNOSIS — D80.1 HYPOGAMMAGLOBULINEMIA: Primary | ICD-10-CM

## 2019-03-27 NOTE — TELEPHONE ENCOUNTER
----- Message from Olivia Dunham RN sent at 3/27/2019 11:04 AM CDT -----  Regarding: RE: Therapy plan  I do not know perhaps Dr. Baumgarten may have a suggestion.    Olivia  ----- Message -----  From: Sally Green MD  Sent: 3/26/2019   3:52 PM  To: Olivia Dunham RN  Subject: RE: Therapy plan                                 I am not comfortable following these orders, looks like Dr. Baumgarten has been signing them, who do I need to get her in with?  ----- Message -----  From: Olivia Dunham RN  Sent: 3/26/2019  11:30 AM  To: Sally rGeen MD  Subject: Therapy plan                                     Good morning,     This patient has an infusion appt on 4/3/19 the therapy plan contains  orders. Please sign these prior to the appt to avoid delays. I do not see an Immunologist in her chart and am not sure who should be handling her orders.     Thanks   Olivia Dunham

## 2019-03-27 NOTE — TELEPHONE ENCOUNTER
Spoke with Olivia in the infusion suite, she states that she needs an order for the infusion set for April 3. Olivia states that patient will need to be seen by allergy before the infusion can take place.   Spoke to patient and gave number to allergy in order for her to schedule appointment with Dr Woods.

## 2019-04-03 ENCOUNTER — INFUSION (OUTPATIENT)
Dept: INFECTIOUS DISEASES | Facility: HOSPITAL | Age: 69
End: 2019-04-03
Attending: INTERNAL MEDICINE
Payer: MEDICARE

## 2019-04-03 VITALS
WEIGHT: 80.81 LBS | DIASTOLIC BLOOD PRESSURE: 73 MMHG | TEMPERATURE: 98 F | BODY MASS INDEX: 14.54 KG/M2 | OXYGEN SATURATION: 99 % | RESPIRATION RATE: 18 BRPM | HEART RATE: 80 BPM | SYSTOLIC BLOOD PRESSURE: 144 MMHG

## 2019-04-03 DIAGNOSIS — D80.1 HYPOGAMMAGLOBULINEMIA: Primary | ICD-10-CM

## 2019-04-03 PROCEDURE — 96366 THER/PROPH/DIAG IV INF ADDON: CPT

## 2019-04-03 PROCEDURE — 96365 THER/PROPH/DIAG IV INF INIT: CPT

## 2019-04-03 PROCEDURE — 63600175 PHARM REV CODE 636 W HCPCS: Mod: JG | Performed by: INTERNAL MEDICINE

## 2019-04-03 RX ADMIN — HUMAN IMMUNOGLOBULIN G 20 G: 20 LIQUID INTRAVENOUS at 09:04

## 2019-04-04 ENCOUNTER — TELEPHONE (OUTPATIENT)
Dept: OPHTHALMOLOGY | Facility: CLINIC | Age: 69
End: 2019-04-04

## 2019-04-04 NOTE — TELEPHONE ENCOUNTER
----- Message from Nica Hollingsworth sent at 4/4/2019  4:38 PM CDT -----  Contact: Yvette   Needs Advice    Reason for call:Pt called to f/u on further instructions and a confirmed time before surgery.        Communication Preference:[phone]    Additional Information:

## 2019-04-04 NOTE — H&P
History    Chief complaint:  Painless progressive vision loss    Present Ilness/Diagnosis: Nuclear sclerotic Cataract    Past Medical History:  has a past medical history of Adult bronchiectasis, Amblyopia, Anxiety, Arthritis, Cataract, CHF (congestive heart failure), Chin laceration, Concussion (10/27/2016), Depression, Hypertension, Onychomycosis, Strabismus, Supraventricular tachycardia, and Thyroid disease.    Family History/Social History: refer to chart    Allergies:   Review of patient's allergies indicates:   Allergen Reactions    Adhesive      Tape tears skin    Bactrim [sulfamethoxazole-trimethoprim] Other (See Comments)     Toxic reaction    Naproxen Nausea Only    Rifampin Rash       Current Medications: No current facility-administered medications for this encounter.     Current Outpatient Medications:     calcium carbonate-vit D3-min (CALTRATE 600+D PLUS MINERALS) 600 mg calcium- 400 unit Tab, Take by mouth. 1 Tablet Oral Twice a day, Disp: , Rfl:     guaifenesin (MUCINEX) 600 mg 12 hr tablet, Take 1,200 mg by mouth 2 (two) times daily.  , Disp: , Rfl:     immune globulin, human, (POLYGAM) 5 gram injection, Inject into the vein.  , Disp: , Rfl:     levothyroxine (SYNTHROID) 88 MCG tablet, Take 1 tablet (88 mcg total) by mouth before breakfast., Disp: 90 tablet, Rfl: 0    montelukast (SINGULAIR) 10 mg tablet, Take 1 tablet (10 mg total) by mouth every evening., Disp: 90 tablet, Rfl: 4    mupirocin (BACTROBAN) 2 % ointment, Apply topically 3 (three) times daily., Disp: 30 g, Rfl: 0    polyethylene glycol (GLYCOLAX) 17 gram PwPk, Take by mouth., Disp: , Rfl:     Physical Exam    BP: Vital signs stable  General: No apparent distress  HEENT: nuclear sclerotic cataract  Lungs: adequate respirations  Heart: + pulses  Abdomen: soft  Rectal/pelvic: deferred    Impression: Visually significant Cataract    Plan: Phacoemulsification with implantation of Intraocular lens

## 2019-04-04 NOTE — TELEPHONE ENCOUNTER
Pt wanted to know how long she needed to wear the patch after surgery.  Advised pt she needed to wear the patch at night for 1 week after surgery.  Pt understood.

## 2019-04-06 ENCOUNTER — TELEPHONE (OUTPATIENT)
Dept: ALLERGY | Facility: CLINIC | Age: 69
End: 2019-04-06

## 2019-04-06 NOTE — TELEPHONE ENCOUNTER
"This note was dictated using voice recognition software and may contain errors.    About 10:00 a.m. on Saturday April 6, 2019 I received a phone call from the [facility] answering.  She informed me Dr. Crews was calling and requesting that I be contacted so that I could speak with her.  The  was able to connect me with Dr. Crews and I spoke with her.    I told her I would make this note in her medical record regarding her telephone conversation with me today.    She told me her last infusion of intravenous immune globulin occurred on Wednesday April 3, 2019.  Her next infusion of intravenous immunoglobulin is scheduled for May 1, 2019.    She told me she has an appointment to meet Dr. Salgado on Monday April 29, 2019.    In her electronic medical record, please refer to the " Legacy Documents" section under "Encounters".  In that section of her electronic medical record will be found multiple notes which I made in the past.    I believe her initial appointment with me occurred December 3, 2007.  I believe her last appointment with me occurred December 18, 2015.  My last contact with her was when I spoke with her in person on May 19, 2016.  At that time she was here at the clinic in the infusion suite receiving intravenous immunoglobulin.  She informed me that she was moving to [place] in June of 2016.    I told her I will speak with Dr. Salgado prior to her initial appointment with him on April 29.  I told her I will review her history with him.  She stated she is most appreciative of the fact that I will speak with Dr. Salgado.  "

## 2019-04-08 ENCOUNTER — OFFICE VISIT (OUTPATIENT)
Dept: INFECTIOUS DISEASES | Facility: CLINIC | Age: 69
End: 2019-04-08
Payer: MEDICARE

## 2019-04-08 ENCOUNTER — TELEPHONE (OUTPATIENT)
Dept: ALLERGY | Facility: CLINIC | Age: 69
End: 2019-04-08

## 2019-04-08 VITALS
TEMPERATURE: 100 F | HEART RATE: 101 BPM | DIASTOLIC BLOOD PRESSURE: 79 MMHG | WEIGHT: 81.38 LBS | HEIGHT: 63 IN | BODY MASS INDEX: 14.42 KG/M2 | SYSTOLIC BLOOD PRESSURE: 118 MMHG

## 2019-04-08 DIAGNOSIS — S81.812A NONINFECTED SKIN TEAR OF LEFT LOWER EXTREMITY, INITIAL ENCOUNTER: ICD-10-CM

## 2019-04-08 DIAGNOSIS — J47.9 ADULT BRONCHIECTASIS: ICD-10-CM

## 2019-04-08 DIAGNOSIS — B35.1 ONYCHOMYCOSIS: ICD-10-CM

## 2019-04-08 DIAGNOSIS — R63.6 UNDERWEIGHT: ICD-10-CM

## 2019-04-08 DIAGNOSIS — D80.1 HYPOGAMMAGLOBULINEMIA: Primary | ICD-10-CM

## 2019-04-08 PROCEDURE — 99213 OFFICE O/P EST LOW 20 MIN: CPT | Mod: PBBFAC | Performed by: INTERNAL MEDICINE

## 2019-04-08 PROCEDURE — 99215 PR OFFICE/OUTPT VISIT, EST, LEVL V, 40-54 MIN: ICD-10-PCS | Mod: S$PBB,,, | Performed by: INTERNAL MEDICINE

## 2019-04-08 PROCEDURE — 99999 PR PBB SHADOW E&M-EST. PATIENT-LVL III: ICD-10-PCS | Mod: PBBFAC,,, | Performed by: INTERNAL MEDICINE

## 2019-04-08 PROCEDURE — 99999 PR PBB SHADOW E&M-EST. PATIENT-LVL III: CPT | Mod: PBBFAC,,, | Performed by: INTERNAL MEDICINE

## 2019-04-08 PROCEDURE — 99215 OFFICE O/P EST HI 40 MIN: CPT | Mod: S$PBB,,, | Performed by: INTERNAL MEDICINE

## 2019-04-08 RX ORDER — MUPIROCIN 20 MG/G
OINTMENT TOPICAL 3 TIMES DAILY
Qty: 30 G | Refills: 3 | Status: SHIPPED | OUTPATIENT
Start: 2019-04-08 | End: 2019-07-08 | Stop reason: SDUPTHER

## 2019-04-08 NOTE — PROGRESS NOTES
Subjective:      Patient ID: Yvette Crews is a 68 y.o. female.    Chief Complaint:Follow-up      History of Present Illness    History of cavitary mycobacterial disease of the lung.  She will see Dr. Hilton today as well.    She completed two full years of a three-drug therapy for mycobacteria around 4006-7476.    Is feeling well. Has not had cough recently.   Says she is eating and her meals are prepared by [facility] staff where she lives. Concerned about 's health but sounds like he is status quo.   She does not drive.    No major infection issues. Has been very stable since starting IvIG treatment.  Had been evaluated by Dr. Rodriguez and told she had reduced IgM levels and that her immune cells did not work properly.   Has fungal infection of toenails on both feet but using OTC lamisil cream and symptoms are not progressing.  Had cyst of outer genital area- resolved.    Undergoing surgical removal of cataracts.    Daughter in psychology practice.  Son working in [employer].    Review of Systems   Constitution: Negative for chills, decreased appetite, fever, malaise/fatigue, night sweats, weight gain and weight loss.   HENT: Negative for congestion, ear pain, hearing loss, hoarse voice, sore throat and tinnitus.    Eyes: Positive for blurred vision. Negative for redness and visual disturbance.   Cardiovascular: Negative for chest pain, leg swelling and palpitations.   Respiratory: Negative for cough, hemoptysis, shortness of breath and sputum production.    Hematologic/Lymphatic: Negative for adenopathy. Does not bruise/bleed easily.   Skin: Negative for dry skin, itching, rash and suspicious lesions.   Musculoskeletal: Negative for back pain, joint pain, myalgias and neck pain.   Gastrointestinal: Negative for abdominal pain, constipation, diarrhea, heartburn, nausea and vomiting.   Genitourinary: Negative for dysuria, flank pain, frequency, hematuria, hesitancy and urgency.   Neurological:  Negative for dizziness, headaches, numbness, paresthesias and weakness.   Psychiatric/Behavioral: Negative for depression and memory loss. The patient does not have insomnia and is not nervous/anxious.      Objective:   Physical Exam   Constitutional: She is oriented to person, place, and time. She is cooperative.  Non-toxic appearance. No distress.   thin   HENT:   Head: Normocephalic and atraumatic.   Right Ear: Hearing and external ear normal.   Left Ear: Hearing and external ear normal.   Nose: Nose normal.   Mouth/Throat: Oropharynx is clear and moist.   Eyes: Pupils are equal, round, and reactive to light. Conjunctivae, EOM and lids are normal. Right eye exhibits no discharge. Left eye exhibits no discharge. Right conjunctiva is not injected. Left conjunctiva is not injected. No scleral icterus.   Neck: Normal range of motion. No tracheal deviation present.   Cardiovascular: Normal rate, regular rhythm and normal heart sounds.   Pulmonary/Chest: Effort normal and breath sounds normal. No accessory muscle usage or stridor. No respiratory distress. She has no wheezes.   Abdominal: Soft. Normal appearance. She exhibits no distension.   Musculoskeletal: Normal range of motion. She exhibits no edema.   Neurological: She is alert and oriented to person, place, and time. Coordination normal.   Skin: Skin is warm and dry. She is not diaphoretic. No erythema.   Great toenail on left with hyperkeratosis. No surrounding erythema.   Psychiatric: She has a normal mood and affect. Her speech is normal and behavior is normal. Judgment and thought content normal. Cognition and memory are normal.   Nursing note and vitals reviewed.    Assessment:       1. Hypogammaglobulinemia    2. Noninfected skin tear of left lower extremity, initial encounter    3. Underweight    4. Adult bronchiectasis    5. Onychomycosis          Plan:       CXR was done August 2018. No acute findings.  Her respiratory status is stable- continue IGG  infusions.  Would like her to maintain/gain weight. Looked back and looks like it was about the same a year ago.  Continue lamisil cream to bilat toes/feet.  I have discussed with her that I will order IgG until she can establish care with immunologist. She has an appointment coming up.     RTC in 1 year.

## 2019-04-08 NOTE — TELEPHONE ENCOUNTER
Dr. Salgado contacted me today.  We spoke on the telephone.  I reviewed with him her history.  She has an appointment to see him on April 29, 2019.  I called her at 6:34 p.m. on Monday April 29, 2019 and spoke with her.  I made her aware of the fact that Dr. Salgado and I had spoken today.

## 2019-04-10 ENCOUNTER — TELEPHONE (OUTPATIENT)
Dept: OPHTHALMOLOGY | Facility: CLINIC | Age: 69
End: 2019-04-10

## 2019-04-10 DIAGNOSIS — J47.9 ADULT BRONCHIECTASIS: ICD-10-CM

## 2019-04-10 RX ORDER — MONTELUKAST SODIUM 10 MG/1
10 TABLET ORAL NIGHTLY
Qty: 90 TABLET | Refills: 4 | Status: SHIPPED | OUTPATIENT
Start: 2019-04-10 | End: 2020-01-22 | Stop reason: SDUPTHER

## 2019-04-10 NOTE — TELEPHONE ENCOUNTER
----- Message from Kalyani Palomino sent at 4/10/2019  9:53 AM CDT -----  Contact: Yvette  Please note in record for her cataract surgery that any bandage that is placed on her face after surgery must be a paper bandage and not anything adhesive.  If you need to speak to her again she can be reached at [phone]

## 2019-04-12 ENCOUNTER — TELEPHONE (OUTPATIENT)
Dept: OPHTHALMOLOGY | Facility: CLINIC | Age: 69
End: 2019-04-12

## 2019-04-12 ENCOUNTER — TELEPHONE (OUTPATIENT)
Dept: OPTOMETRY | Facility: CLINIC | Age: 69
End: 2019-04-12

## 2019-04-15 ENCOUNTER — HOSPITAL ENCOUNTER (OUTPATIENT)
Facility: OTHER | Age: 69
Discharge: HOME | End: 2019-04-15
Attending: OPHTHALMOLOGY | Admitting: OPHTHALMOLOGY
Payer: MEDICARE

## 2019-04-15 ENCOUNTER — ANESTHESIA (OUTPATIENT)
Dept: SURGERY | Facility: OTHER | Age: 69
End: 2019-04-15
Payer: MEDICARE

## 2019-04-15 ENCOUNTER — ANESTHESIA EVENT (OUTPATIENT)
Dept: SURGERY | Facility: OTHER | Age: 69
End: 2019-04-15
Payer: MEDICARE

## 2019-04-15 VITALS
HEIGHT: 62 IN | WEIGHT: 80 LBS | TEMPERATURE: 97 F | DIASTOLIC BLOOD PRESSURE: 70 MMHG | RESPIRATION RATE: 16 BRPM | OXYGEN SATURATION: 100 % | HEART RATE: 62 BPM | BODY MASS INDEX: 14.72 KG/M2 | SYSTOLIC BLOOD PRESSURE: 128 MMHG

## 2019-04-15 DIAGNOSIS — H25.10 AGE-RELATED NUCLEAR CATARACT: ICD-10-CM

## 2019-04-15 DIAGNOSIS — H25.12 NUCLEAR SENILE CATARACT OF LEFT EYE: Primary | ICD-10-CM

## 2019-04-15 DIAGNOSIS — H25.11 AGE-RELATED NUCLEAR CATARACT OF RIGHT EYE: ICD-10-CM

## 2019-04-15 PROCEDURE — 36000707: Performed by: OPHTHALMOLOGY

## 2019-04-15 PROCEDURE — 71000015 HC POSTOP RECOV 1ST HR: Performed by: OPHTHALMOLOGY

## 2019-04-15 PROCEDURE — 66999 PR FEMTO MFIOL: ICD-10-PCS | Mod: ,,, | Performed by: OPHTHALMOLOGY

## 2019-04-15 PROCEDURE — 66984 XCAPSL CTRC RMVL W/O ECP: CPT | Mod: 79,RT,, | Performed by: OPHTHALMOLOGY

## 2019-04-15 PROCEDURE — 25000003 PHARM REV CODE 250: Performed by: OPHTHALMOLOGY

## 2019-04-15 PROCEDURE — 37000009 HC ANESTHESIA EA ADD 15 MINS: Performed by: OPHTHALMOLOGY

## 2019-04-15 PROCEDURE — 36000706: Performed by: OPHTHALMOLOGY

## 2019-04-15 PROCEDURE — A4216 STERILE WATER/SALINE, 10 ML: HCPCS | Performed by: NURSE ANESTHETIST, CERTIFIED REGISTERED

## 2019-04-15 PROCEDURE — V2788 PRESBYOPIA-CORRECT FUNCTION: HCPCS | Performed by: OPHTHALMOLOGY

## 2019-04-15 PROCEDURE — 66999 UNLISTED PX ANT SEGMENT EYE: CPT | Mod: ,,, | Performed by: OPHTHALMOLOGY

## 2019-04-15 PROCEDURE — 63600175 PHARM REV CODE 636 W HCPCS: Performed by: NURSE ANESTHETIST, CERTIFIED REGISTERED

## 2019-04-15 PROCEDURE — 66984 PR REMOVAL, CATARACT, W/INSRT INTRAOC LENS, W/O ENDO CYCLO: ICD-10-PCS | Mod: 79,RT,, | Performed by: OPHTHALMOLOGY

## 2019-04-15 PROCEDURE — 37000008 HC ANESTHESIA 1ST 15 MINUTES: Performed by: OPHTHALMOLOGY

## 2019-04-15 PROCEDURE — 25000003 PHARM REV CODE 250: Performed by: NURSE ANESTHETIST, CERTIFIED REGISTERED

## 2019-04-15 DEVICE — IMPLANTABLE DEVICE: Type: IMPLANTABLE DEVICE | Site: EYE | Status: FUNCTIONAL

## 2019-04-15 RX ORDER — LIDOCAINE HYDROCHLORIDE 10 MG/ML
1 INJECTION, SOLUTION EPIDURAL; INFILTRATION; INTRACAUDAL; PERINEURAL ONCE
Status: DISCONTINUED | OUTPATIENT
Start: 2019-04-15 | End: 2022-07-03

## 2019-04-15 RX ORDER — SODIUM CHLORIDE 0.9 % (FLUSH) 0.9 %
SYRINGE (ML) INJECTION
Status: DISCONTINUED | OUTPATIENT
Start: 2019-04-15 | End: 2019-04-15

## 2019-04-15 RX ORDER — TETRACAINE HYDROCHLORIDE 5 MG/ML
SOLUTION OPHTHALMIC
Status: DISCONTINUED | OUTPATIENT
Start: 2019-04-15 | End: 2019-04-15 | Stop reason: HOSPADM

## 2019-04-15 RX ORDER — PROPARACAINE HYDROCHLORIDE 5 MG/ML
1 SOLUTION/ DROPS OPHTHALMIC
Status: DISCONTINUED | OUTPATIENT
Start: 2019-04-15 | End: 2019-04-15 | Stop reason: HOSPADM

## 2019-04-15 RX ORDER — LIDOCAINE HYDROCHLORIDE 40 MG/ML
INJECTION, SOLUTION RETROBULBAR
Status: DISCONTINUED | OUTPATIENT
Start: 2019-04-15 | End: 2019-04-15 | Stop reason: HOSPADM

## 2019-04-15 RX ORDER — PHENYLEPHRINE HYDROCHLORIDE 100 MG/ML
SOLUTION/ DROPS OPHTHALMIC
Status: DISCONTINUED | OUTPATIENT
Start: 2019-04-15 | End: 2019-04-15 | Stop reason: HOSPADM

## 2019-04-15 RX ORDER — MIDAZOLAM HYDROCHLORIDE 1 MG/ML
INJECTION INTRAMUSCULAR; INTRAVENOUS
Status: DISCONTINUED | OUTPATIENT
Start: 2019-04-15 | End: 2019-04-15

## 2019-04-15 RX ORDER — MOXIFLOXACIN 5 MG/ML
SOLUTION/ DROPS OPHTHALMIC
Status: DISCONTINUED | OUTPATIENT
Start: 2019-04-15 | End: 2019-04-15 | Stop reason: HOSPADM

## 2019-04-15 RX ORDER — PHENYLEPHRINE HYDROCHLORIDE 25 MG/ML
1 SOLUTION/ DROPS OPHTHALMIC
Status: DISPENSED | OUTPATIENT
Start: 2019-04-15

## 2019-04-15 RX ORDER — ACETAMINOPHEN 325 MG/1
650 TABLET ORAL EVERY 4 HOURS PRN
Status: DISCONTINUED | OUTPATIENT
Start: 2019-04-15 | End: 2019-04-15 | Stop reason: HOSPADM

## 2019-04-15 RX ORDER — MOXIFLOXACIN 5 MG/ML
1 SOLUTION/ DROPS OPHTHALMIC
Status: DISPENSED | OUTPATIENT
Start: 2019-04-15

## 2019-04-15 RX ORDER — TETRACAINE HYDROCHLORIDE 5 MG/ML
1 SOLUTION OPHTHALMIC
Status: COMPLETED | OUTPATIENT
Start: 2019-04-15 | End: 2019-04-15

## 2019-04-15 RX ORDER — TROPICAMIDE 10 MG/ML
1 SOLUTION/ DROPS OPHTHALMIC
Status: DISPENSED | OUTPATIENT
Start: 2019-04-15

## 2019-04-15 RX ORDER — MOXIFLOXACIN 5 MG/ML
1 SOLUTION/ DROPS OPHTHALMIC
Status: COMPLETED | OUTPATIENT
Start: 2019-04-15 | End: 2019-04-15

## 2019-04-15 RX ORDER — LIDOCAINE HYDROCHLORIDE 10 MG/ML
INJECTION, SOLUTION EPIDURAL; INFILTRATION; INTRACAUDAL; PERINEURAL
Status: DISCONTINUED | OUTPATIENT
Start: 2019-04-15 | End: 2019-04-15 | Stop reason: HOSPADM

## 2019-04-15 RX ADMIN — PHENYLEPHRINE HYDROCHLORIDE 1 DROP: 25 SOLUTION/ DROPS OPHTHALMIC at 09:04

## 2019-04-15 RX ADMIN — TETRACAINE HYDROCHLORIDE 1 DROP: 5 SOLUTION OPHTHALMIC at 09:04

## 2019-04-15 RX ADMIN — MOXIFLOXACIN 1 DROP: 5 SOLUTION/ DROPS OPHTHALMIC at 12:04

## 2019-04-15 RX ADMIN — TROPICAMIDE 1 DROP: 10 SOLUTION/ DROPS OPHTHALMIC at 09:04

## 2019-04-15 RX ADMIN — MOXIFLOXACIN 1 DROP: 5 SOLUTION/ DROPS OPHTHALMIC at 09:04

## 2019-04-15 RX ADMIN — MIDAZOLAM HYDROCHLORIDE 0.5 MG: 1 INJECTION, SOLUTION INTRAMUSCULAR; INTRAVENOUS at 11:04

## 2019-04-15 RX ADMIN — SODIUM CHLORIDE, PRESERVATIVE FREE 10 ML: 5 INJECTION INTRAVENOUS at 11:04

## 2019-04-15 NOTE — ANESTHESIA POSTPROCEDURE EVALUATION
Anesthesia Post Evaluation    Patient: Yvette Crews    Procedure(s) Performed: Procedure(s) (LRB):  EXTRACTION, CATARACT, WITH IOL INSERTION LASER (Right)    Final Anesthesia Type: MAC  Patient location during evaluation: [facility]  Patient participation: Yes- Able to Participate  Level of consciousness: awake and alert  Post-procedure vital signs: reviewed and stable  Pain management: adequate  Airway patency: patent  PONV status at discharge: No PONV  Anesthetic complications: no      Cardiovascular status: blood pressure returned to baseline, hemodynamically stable and stable  Respiratory status: unassisted, spontaneous ventilation and room air  Hydration status: euvolemic  Follow-up not needed.          Vitals Value Taken Time   /71 4/15/2019  9:11 AM   Temp 36.1 °C (97 °F) 4/15/2019  9:11 AM   Pulse 67 4/15/2019  9:11 AM   Resp 16 4/15/2019  9:11 AM   SpO2 100 % 4/15/2019  9:11 AM         No case tracking events are documented in the log.      Pain/Greg Score: No data recorded

## 2019-04-15 NOTE — DISCHARGE INSTRUCTIONS
Vera Sams MD  [facility]  Department of Opthamology          AFTER: Cataract Surgery:  Relax at home and DO NOT exert yourself for the rest of the day.  Plan to see Dr. Sams tomorrow at the eye clinic:   [facility] [address]  [place] [address]    Refer to attached eye drop instruction sheet     Precautions:  DO NOT rub your eye.  You may resume moderate activity the day after surgery.  Wear protective sunglasses during the day and a shield at night for 1(one) week.  If you have pain, redness and decreased vision, call Dr. Sams (or the on-call doctor after hours) @[phone].            Home Care Instructions for Eye Surgeries    1. ACTIVITY:  Limit your activity today. Relax at home and DO NOT exert yourself for the rest of the day. Increase activity gradually. You may return to work or school as directed by your physician.    2. DIET:  Drink plenty of fluids. Resume your normal diet unless instructed otherwise.    3. PAIN:  Expect a moderate amount of pain. If a prescription for pain is not sent home with you, you may take your commonly used pain reliever as directed. If this is not sufficient, call your physician. You may resume any other prescription medication unless otherwise directed by your physician.     Discuss any problem with your physician as soon as it arises. Do not Delay.      EMERGENCY- If you are unable to contact your physician, please go to the nearest Emergency Room.       Anesthesia: Monitored Anesthesia Care (MAC)    Anesthesia Safety  · Have an adult family member or friend drive you home after the procedure.  · For the first 24 hours after your surgery:  · Do not drive or use heavy equipment.  · Do not make important decisions or sign documents.  · Avoid alcohol.  · Have someone stay with you, if possible. They can watch for problems and help keep you safe.

## 2019-04-15 NOTE — ANESTHESIA PREPROCEDURE EVALUATION
04/15/2019  Yvette Crews is a 68 y.o., female.    Pre-op Assessment    I have reviewed the Patient Summary Reports.     I have reviewed the Nursing Notes.   I have reviewed the Medications.     Review of Systems  Anesthesia Hx:  No problems with previous Anesthesia  Denies Family Hx of Anesthesia complications.   Denies Personal Hx of Anesthesia complications.   Social:  Non-Smoker    Cardiovascular:   Hypertension CHF    Endocrine:   Hypothyroidism        Physical Exam  General:  Cachexia    Airway/Jaw/Neck:  Airway Findings: Mallampati: II     Dental:  Dental Findings: In tact        Mental Status:  Mental Status Findings:  Cooperative, Alert and Oriented         Anesthesia Plan  Type of Anesthesia, risks & benefits discussed:  Anesthesia Type:  MAC  Patient's Preference:   Intra-op Monitoring Plan: standard ASA monitors  Intra-op Monitoring Plan Comments:   Post Op Pain Control Plan:   Post Op Pain Control Plan Comments:   Induction:   IV  Beta Blocker:         Informed Consent: Patient understands risks and agrees with Anesthesia plan.  Questions answered. Anesthesia consent signed with patient.  ASA Score: 3     Day of Surgery Review of History & Physical:    H&P update referred to the surgeon.         Ready For Surgery From Anesthesia Perspective.

## 2019-04-15 NOTE — PLAN OF CARE
Yvette Crews has met all discharge criteria from Phase II. Vital Signs are stable, ambulating  without difficulty. Discharge instructions given, patient verbalized understanding. Discharged from facility via wheelchair in stable condition.

## 2019-04-15 NOTE — OP NOTE
SURGEON: SHANELLE SPAIN MD    DATE OF PROCEDURE: 04/15/2019    PREOPERATIVE DIAGNOSIS:  1. Senile nuclear sclerotic cataract right eye.  2. Presbyopia right eye    POSTOPERATIVE DIAGNOSIS: Senile nuclear sclerotic cataract right eye. 2. Presbyopia right eye    PROCEDURE PERFORMED:  Phacoemulsification with placement of MULTIFOCAL intraocular lens, right eye.    IMPLANT:  ZXROO 16.5  D    ANESTHESIA:  Topical and MAC    COMPLICATIONS: none    ESTIMATED BLOOD LOSS: <1cc    SPECIMENS: none    INDICATIONS FOR PROCEDURE:  This patient presented to the clinic with decreased vision in the right eye and was found to have a cataract.  The risks, benefits, and alternatives were discussed and the patient agreed to proceed with phacoemulsification and implantation of a lens in the right eye.     PROCEDURE IN DETAIL:  The patient was met in the preop holding area.  Consent was confirmed to be signed.  The operative site was marked.  The patient was brought into the operating room by the anesthesia team and placed under monitored anesthesia care.  The right eye was prepped and draped in a sterile ophthalmic fashion.  A Antonio speculum was placed into the right eye.   A paracentesis site was made and 1% preservative-free lidocaine was injected into the anterior chamber.  Viscoelastic  material was injected into the anterior chamber.  A keratome blade was used to make a clear corneal incision.  A cystotome was used to initiate the continuous curvilinear capsulorrhexis which was completed with Utrata forceps.  BSS on a doll cannula was used to perform hydrodissection.  The phacoemulsification tip was introduced into the eye and the nucleus was removed in a standard divide-and-conquer fashion.  Remaining cortical material was removed from the eye using irrigation-aspiration.  The capsular bag was filled with viscoelastic material and the intraocular lens was injected and positioned into place. Remaining viscoelastic material was  removed from the eye using irrigation and aspiration.  The corneal wounds were hydrated.  The eye was filled to physiologic pressure. The wounds were found to be watertight. Drops of Vigamox and prednisilone were placed into the eye.  The eye was washed, dried, and shielded.  The patient tolerated the procedure well and knows to follow up with me tomorrow morning, sooner if needed.      The Catalys femtosecond laser was used prior to the cataract surgery portion in the laser suite to perform the capsulorhexis and lens fragmentation.    Intraoperative aberrometer (ORA) was used to confirm calculations.

## 2019-04-16 ENCOUNTER — OFFICE VISIT (OUTPATIENT)
Dept: OPHTHALMOLOGY | Facility: CLINIC | Age: 69
End: 2019-04-16
Payer: MEDICARE

## 2019-04-16 ENCOUNTER — PATIENT MESSAGE (OUTPATIENT)
Dept: INTERNAL MEDICINE | Facility: CLINIC | Age: 69
End: 2019-04-16

## 2019-04-16 DIAGNOSIS — E89.0 POSTOPERATIVE HYPOTHYROIDISM: ICD-10-CM

## 2019-04-16 DIAGNOSIS — Z98.41 STATUS POST CATARACT EXTRACTION AND INSERTION OF INTRAOCULAR LENS, RIGHT: Primary | ICD-10-CM

## 2019-04-16 DIAGNOSIS — Z96.1 STATUS POST CATARACT EXTRACTION AND INSERTION OF INTRAOCULAR LENS, RIGHT: Primary | ICD-10-CM

## 2019-04-16 DIAGNOSIS — Z96.1 STATUS POST CATARACT EXTRACTION AND INSERTION OF INTRAOCULAR LENS, LEFT: ICD-10-CM

## 2019-04-16 DIAGNOSIS — Z98.42 STATUS POST CATARACT EXTRACTION AND INSERTION OF INTRAOCULAR LENS, LEFT: ICD-10-CM

## 2019-04-16 PROCEDURE — 99024 PR POST-OP FOLLOW-UP VISIT: ICD-10-PCS | Mod: POP,,, | Performed by: OPHTHALMOLOGY

## 2019-04-16 PROCEDURE — 99212 OFFICE O/P EST SF 10 MIN: CPT | Mod: PBBFAC | Performed by: OPHTHALMOLOGY

## 2019-04-16 PROCEDURE — 99024 POSTOP FOLLOW-UP VISIT: CPT | Mod: POP,,, | Performed by: OPHTHALMOLOGY

## 2019-04-16 PROCEDURE — 99999 PR PBB SHADOW E&M-EST. PATIENT-LVL II: CPT | Mod: PBBFAC,,, | Performed by: OPHTHALMOLOGY

## 2019-04-16 PROCEDURE — 99999 PR PBB SHADOW E&M-EST. PATIENT-LVL II: ICD-10-PCS | Mod: PBBFAC,,, | Performed by: OPHTHALMOLOGY

## 2019-04-16 RX ORDER — PREDNISOLONE ACETATE 10 MG/ML
1 SUSPENSION/ DROPS OPHTHALMIC 3 TIMES DAILY
COMMUNITY
End: 2019-07-22

## 2019-04-16 RX ORDER — OFLOXACIN 3 MG/ML
1 SOLUTION/ DROPS OPHTHALMIC 3 TIMES DAILY
COMMUNITY
End: 2020-01-22

## 2019-04-16 RX ORDER — LEVOTHYROXINE SODIUM 88 UG/1
88 TABLET ORAL
Qty: 90 TABLET | Refills: 0 | Status: SHIPPED | OUTPATIENT
Start: 2019-04-16 | End: 2019-07-12 | Stop reason: SDUPTHER

## 2019-04-16 NOTE — PROGRESS NOTES
HPI     Dr. Lloyd    S/p phaco w/IOL OD 4/15/19 - symfony  S/p phaco w/IOL OS 3/11/19 - symfony toric  Strabismus s/p Strabismus sx  Amblyopia OS  Ethambutol off since 2010   PVD OU  Diplopia OD     PF TID OD  Ocuflox TID OD    Pt here for 1 day post op OD.  Pt states she is not able to read like she   would like to.  Pt states she has some sensitivity to light OD. Pt denies   eye pain today OD.     Last edited by Vera Sams MD on 4/16/2019  2:27 PM. (History)            Assessment /Plan     For exam results, see Encounter Report.    Status post cataract extraction and insertion of intraocular lens, right    Status post cataract extraction and insertion of intraocular lens, left      Slit Lamp Exam  L/L - normal  C/s - quiet  Cornea - clear  A/C - 1+ cell  Lens - PCIOL    POD #1 s/p phaco/IOL  - doing well  - continue the following drops:    vigamox or ocuflox TID x 1 wk then stop  Pred forte or durezol or dexamethasone TID x  4 wks  Ketorolac TID until runs out    Versus:    Combination drop - 1 drop TID x total of 1 month    Appropriate precautions and post op medications reviewed.  Patient instructed to call or come in if symptoms of redness, decreased vision, or pain are experienced.    -f/up 4 wks, sooner PRN. With dr. Lloyd    Reassure pt - can use +150 readers for small print.

## 2019-04-17 ENCOUNTER — PATIENT MESSAGE (OUTPATIENT)
Dept: INTERNAL MEDICINE | Facility: CLINIC | Age: 69
End: 2019-04-17

## 2019-04-29 ENCOUNTER — LAB VISIT (OUTPATIENT)
Dept: LAB | Facility: HOSPITAL | Age: 69
End: 2019-04-29
Attending: ALLERGY & IMMUNOLOGY
Payer: MEDICARE

## 2019-04-29 ENCOUNTER — OFFICE VISIT (OUTPATIENT)
Dept: ALLERGY | Facility: CLINIC | Age: 69
End: 2019-04-29
Payer: MEDICARE

## 2019-04-29 VITALS
SYSTOLIC BLOOD PRESSURE: 108 MMHG | WEIGHT: 81.38 LBS | BODY MASS INDEX: 14.97 KG/M2 | DIASTOLIC BLOOD PRESSURE: 64 MMHG | HEIGHT: 62 IN

## 2019-04-29 DIAGNOSIS — D83.9 CVID (COMMON VARIABLE IMMUNODEFICIENCY): Primary | ICD-10-CM

## 2019-04-29 DIAGNOSIS — J47.9 BRONCHIECTASIS WITHOUT COMPLICATION: ICD-10-CM

## 2019-04-29 DIAGNOSIS — D89.89 OTHER SPECIFIED DISORDERS INVOLVING THE IMMUNE MECHANISM, NOT ELSEWHERE CLASSIFIED: ICD-10-CM

## 2019-04-29 LAB — IGG SERPL-MCNC: 1402 MG/DL (ref 650–1600)

## 2019-04-29 PROCEDURE — 36415 COLL VENOUS BLD VENIPUNCTURE: CPT

## 2019-04-29 PROCEDURE — 99214 OFFICE O/P EST MOD 30 MIN: CPT | Mod: PBBFAC | Performed by: ALLERGY & IMMUNOLOGY

## 2019-04-29 PROCEDURE — 99999 PR PBB SHADOW E&M-EST. PATIENT-LVL IV: CPT | Mod: PBBFAC,,, | Performed by: ALLERGY & IMMUNOLOGY

## 2019-04-29 PROCEDURE — 99204 OFFICE O/P NEW MOD 45 MIN: CPT | Mod: S$PBB,,, | Performed by: ALLERGY & IMMUNOLOGY

## 2019-04-29 PROCEDURE — 82784 ASSAY IGA/IGD/IGG/IGM EACH: CPT

## 2019-04-29 PROCEDURE — 99204 PR OFFICE/OUTPT VISIT, NEW, LEVL IV, 45-59 MIN: ICD-10-PCS | Mod: S$PBB,,, | Performed by: ALLERGY & IMMUNOLOGY

## 2019-04-29 PROCEDURE — 99999 PR PBB SHADOW E&M-EST. PATIENT-LVL IV: ICD-10-PCS | Mod: PBBFAC,,, | Performed by: ALLERGY & IMMUNOLOGY

## 2019-04-29 NOTE — Clinical Note
I'm happy to now sign Dr. Crews's IVIG treatment plans. I just signed 20g q 4 wk IVIG plan. Thank you.

## 2019-04-29 NOTE — PROGRESS NOTES
Subjective:       Patient ID: Yvette Crwes is a 69 y.o. female.    Chief Complaint:  Other (Discuss IVIG)  est care    HPI    Pt presents w . Dr. Crews has hx of immune deficiency, diagnosed by MARIA LUISA Stewart and TOYA around 2007. She has had consistently low IgM, and was found to have poor B-cell function. Hypogammaglobulinemia is a also reported.   Has been on IgG replacement since 2007, initially started in [facility]. Moved back to  about 3 years ago and for last 3 years has been getting IVIG, 20 mg q 4 weeks, at [facility].    Prior to starting IgG replacement she reports extensive hx of recurrent sinus infection, recurrent lower respiratory infections, and recurrent ear infections as an adult.   She also has a history of bronchiectasis.  And around the time of diagnosis of immune deficiency, was also dx'd with ANA CRISTINA with cavitation, finished tx't 2010.    She has done well from an infection standpoint on IVIG. Her dose has always been 20 g q 4 weeks (542 mg/kg/mo). Doesn't recall needing antibiotics over the last 2-3 years.    She also has distant hx chronic urticaria which has been quiescent over last few years.      Past Medical History:   Diagnosis Date    Adult bronchiectasis     Amblyopia     Anxiety     Arthritis     Cataract     CHF (congestive heart failure)     Chin laceration     Concussion 10/27/2016    Depression     Hypertension     Onychomycosis     Strabismus     Supraventricular tachycardia     Thyroid disease    underweight    Family History   Problem Relation Age of Onset    Heart disease Father     Stroke Father     Heart disease Brother     Breast cancer Paternal Grandmother     Melanoma Neg Hx     Psoriasis Neg Hx     Lupus Neg Hx     Eczema Neg Hx     Amblyopia Neg Hx     Blindness Neg Hx     Cataracts Neg Hx     Glaucoma Neg Hx     Macular degeneration Neg Hx     Retinal detachment Neg Hx     Strabismus Neg Hx           Review of Systems   Constitutional: Negative for activity change, fatigue and fever.   HENT: Negative for congestion, postnasal drip, rhinorrhea, sinus pressure and sneezing.    Eyes: Negative for discharge, redness and itching.   Respiratory: Negative for cough, shortness of breath and wheezing.    Cardiovascular: Negative for chest pain.   Gastrointestinal: Negative for diarrhea, nausea and vomiting.   Genitourinary: Negative for dysuria.   Musculoskeletal: Negative for joint swelling and neck pain.   Skin: Negative for rash.   Neurological: Negative for headaches.   Hematological: Does not bruise/bleed easily.   Psychiatric/Behavioral: Negative for behavioral problems.        Objective:   Physical Exam   Constitutional: She is oriented to person, place, and time. No distress.   Thin-appearing   HENT:   Right Ear: Tympanic membrane and external ear normal.   Left Ear: Tympanic membrane and external ear normal.   Nose: No mucosal edema, rhinorrhea, sinus tenderness or septal deviation. Right sinus exhibits no maxillary sinus tenderness and no frontal sinus tenderness. Left sinus exhibits no maxillary sinus tenderness and no frontal sinus tenderness.   Mouth/Throat: Uvula is midline, oropharynx is clear and moist and mucous membranes are normal. No uvula swelling.   Eyes: Conjunctivae are normal. Right eye exhibits no discharge. Left eye exhibits no discharge.   Neck: Neck supple.   Cardiovascular: Normal rate and regular rhythm.   Pulmonary/Chest: Effort normal and breath sounds normal. No respiratory distress. She has no wheezes.   Abdominal: Soft. Bowel sounds are normal. There is no tenderness.   Musculoskeletal: She exhibits no edema or tenderness.   Lymphadenopathy:     She has no cervical adenopathy.   Neurological: She is alert and oriented to person, place, and time.   Skin: Skin is warm. No rash noted. No erythema.   Psychiatric: She has a normal mood and affect. Her behavior is normal. Judgment  and thought content normal.   Nursing note and vitals reviewed.        Assessment:       1. CVID (common variable immunodeficiency) --low IgM, poor B-cell function, and hx low IgG   2. Bronchiectasis without complication         Plan:       Yvette was seen today for other.    Diagnoses and all orders for this visit:    CVID (common variable immunodeficiency)  -     IgG; Future --check trough    - Continue IVIG, 20 grams q 4 weeks (542 mg/kg/mo)    Bronchiectasis without complication       CT Chest Without Contrast; Future   Surveillance    FU 6 mo

## 2019-04-30 RX ORDER — ACETAMINOPHEN 325 MG/1
650 TABLET ORAL
Status: CANCELLED | OUTPATIENT
Start: 2019-05-01

## 2019-04-30 RX ORDER — SODIUM CHLORIDE 0.9 % (FLUSH) 0.9 %
10 SYRINGE (ML) INJECTION
Status: CANCELLED | OUTPATIENT
Start: 2019-05-01

## 2019-05-01 ENCOUNTER — INFUSION (OUTPATIENT)
Dept: INFECTIOUS DISEASES | Facility: HOSPITAL | Age: 69
End: 2019-05-01
Attending: INTERNAL MEDICINE
Payer: MEDICARE

## 2019-05-01 VITALS
WEIGHT: 79.81 LBS | HEIGHT: 62 IN | BODY MASS INDEX: 14.69 KG/M2 | OXYGEN SATURATION: 99 % | SYSTOLIC BLOOD PRESSURE: 128 MMHG | HEART RATE: 78 BPM | TEMPERATURE: 98 F | DIASTOLIC BLOOD PRESSURE: 57 MMHG

## 2019-05-01 DIAGNOSIS — J47.9 ADULT BRONCHIECTASIS: Primary | ICD-10-CM

## 2019-05-01 DIAGNOSIS — D80.1 HYPOGAMMAGLOBULINEMIA: ICD-10-CM

## 2019-05-01 PROCEDURE — 96366 THER/PROPH/DIAG IV INF ADDON: CPT

## 2019-05-01 PROCEDURE — 96365 THER/PROPH/DIAG IV INF INIT: CPT

## 2019-05-01 PROCEDURE — 63600175 PHARM REV CODE 636 W HCPCS: Mod: JG | Performed by: ALLERGY & IMMUNOLOGY

## 2019-05-01 RX ORDER — SODIUM CHLORIDE 0.9 % (FLUSH) 0.9 %
10 SYRINGE (ML) INJECTION
Status: DISCONTINUED | OUTPATIENT
Start: 2019-05-01 | End: 2019-05-01 | Stop reason: HOSPADM

## 2019-05-01 RX ORDER — ACETAMINOPHEN 325 MG/1
650 TABLET ORAL
Status: DISCONTINUED | OUTPATIENT
Start: 2019-05-01 | End: 2019-05-01 | Stop reason: HOSPADM

## 2019-05-01 RX ORDER — SODIUM CHLORIDE 0.9 % (FLUSH) 0.9 %
10 SYRINGE (ML) INJECTION
Status: CANCELLED | OUTPATIENT
Start: 2019-05-29

## 2019-05-01 RX ORDER — ACETAMINOPHEN 325 MG/1
650 TABLET ORAL
Status: CANCELLED | OUTPATIENT
Start: 2019-05-29

## 2019-05-01 RX ADMIN — HUMAN IMMUNOGLOBULIN G 20 G: 20 LIQUID INTRAVENOUS at 09:05

## 2019-05-01 NOTE — PROGRESS NOTES
Pt refused premeds or both tylenol and benadryl.  Pt received dose of IVIG PRIVIGEN 20 GRAMS.  Infusion started and completed at a rate of 99cc/hr.  Pt tolerated well and left in NAD.

## 2019-05-10 ENCOUNTER — HOSPITAL ENCOUNTER (OUTPATIENT)
Dept: RADIOLOGY | Facility: HOSPITAL | Age: 69
Discharge: HOME | End: 2019-05-10
Attending: ALLERGY & IMMUNOLOGY
Payer: MEDICARE

## 2019-05-10 DIAGNOSIS — D83.9 CVID (COMMON VARIABLE IMMUNODEFICIENCY): ICD-10-CM

## 2019-05-10 PROCEDURE — 71250 CT THORAX DX C-: CPT | Mod: 26,,, | Performed by: RADIOLOGY

## 2019-05-10 PROCEDURE — 71250 CT THORAX DX C-: CPT | Mod: TC

## 2019-05-10 PROCEDURE — 71250 CT CHEST WITHOUT CONTRAST: ICD-10-PCS | Mod: 26,,, | Performed by: RADIOLOGY

## 2019-05-20 ENCOUNTER — TELEPHONE (OUTPATIENT)
Dept: ALLERGY | Facility: CLINIC | Age: 69
End: 2019-05-20

## 2019-05-20 NOTE — TELEPHONE ENCOUNTER
----- Message from Kenyon Ca sent at 5/20/2019 12:48 PM CDT -----  Contact: [phone]  Type: Test Results    What test was performed? CT scan     Who ordered the test?    When and where were the test performed?  05/10    Comments: please call and advise Thanks

## 2019-05-21 ENCOUNTER — OFFICE VISIT (OUTPATIENT)
Dept: OPTOMETRY | Facility: CLINIC | Age: 69
End: 2019-05-21
Payer: MEDICARE

## 2019-05-21 DIAGNOSIS — H52.202 ASTIGMATISM OF LEFT EYE, UNSPECIFIED TYPE: ICD-10-CM

## 2019-05-21 DIAGNOSIS — Z96.1 PSEUDOPHAKIA OF BOTH EYES: Primary | ICD-10-CM

## 2019-05-21 DIAGNOSIS — H26.493 PCO (POSTERIOR CAPSULAR OPACIFICATION), BILATERAL: ICD-10-CM

## 2019-05-21 PROBLEM — H25.10 AGE-RELATED NUCLEAR CATARACT: Status: RESOLVED | Noted: 2019-04-15 | Resolved: 2019-05-21

## 2019-05-21 PROBLEM — H25.12 AGE-RELATED NUCLEAR CATARACT, LEFT: Status: RESOLVED | Noted: 2019-03-11 | Resolved: 2019-05-21

## 2019-05-21 PROCEDURE — 99024 POSTOP FOLLOW-UP VISIT: CPT | Mod: POP,,, | Performed by: OPTOMETRIST

## 2019-05-21 PROCEDURE — 99999 PR PBB SHADOW E&M-EST. PATIENT-LVL II: CPT | Mod: PBBFAC,,, | Performed by: OPTOMETRIST

## 2019-05-21 PROCEDURE — 99999 PR PBB SHADOW E&M-EST. PATIENT-LVL II: ICD-10-PCS | Mod: PBBFAC,,, | Performed by: OPTOMETRIST

## 2019-05-21 PROCEDURE — 99212 OFFICE O/P EST SF 10 MIN: CPT | Mod: PBBFAC | Performed by: OPTOMETRIST

## 2019-05-21 PROCEDURE — 99024 PR POST-OP FOLLOW-UP VISIT: ICD-10-PCS | Mod: POP,,, | Performed by: OPTOMETRIST

## 2019-05-21 NOTE — PROGRESS NOTES
HPI     Dls: [birth year]    S/p phaco w/IOL OD 4/15/19 - symfony  S/p phaco w/IOL OS 3/11/19 - symfony toric  Strabismus s/p Strabismus sx  Amblyopia OS  Ethambutol off since 2010   PVD OU  Diplopia OD       1 month post op.  Pt states she is having trouble reading. Pt uses readers +1.50. Pt not   happy about vision. Pt want to see if she can get progressive lens  +eye pain- entire life.   +double vision had entire life- thinks it might be getting a little better  +light sensative- wear hat, sunglasses  -no flashes  +possible floaters    No eye dropss          Last edited by Kerry Mederos on 5/21/2019  2:53 PM. (History)            Assessment /Plan     For exam results, see Encounter Report.    Pseudophakia of both eyes    PCO (posterior capsular opacification), bilateral    Astigmatism of left eye, unspecified type      Consult Dr. Sams for YAG OU    Pt appreciates improvement in vision with MRx for distance  Consider Rechecking MRx post YAG before ordering specs

## 2019-05-23 ENCOUNTER — TELEPHONE (OUTPATIENT)
Dept: ALLERGY | Facility: CLINIC | Age: 69
End: 2019-05-23

## 2019-05-23 NOTE — TELEPHONE ENCOUNTER
Notified patient of results.  Scheduled 6 month follow up and mailed appointment reminder to patient.    ----- Message from Atif Salgado MD sent at 5/22/2019  5:15 PM CDT -----  Contact: [phone]  Please see pt result comments w radiology report written 5/16/19.  LM    ----- Message -----  From: Fanta Moya LPN  Sent: 5/22/2019   3:57 PM  To: Atif Salgado MD        ----- Message -----  From: Kenyon Ca  Sent: 5/22/2019  12:44 PM  To: Damian Srivastava Staff    Type: Test Results    What test was performed?  CT scan       Who ordered the test?     When and where were the test performed?  05/10    Comments: please call and advise, Thanks

## 2019-05-23 NOTE — TELEPHONE ENCOUNTER
----- Message from Atif Salgado MD sent at 5/22/2019  5:15 PM CDT -----  Contact: [phone]  Please see pt result comments w radiology report written 5/16/19.  LM    ----- Message -----  From: Fanta Moya LPN  Sent: 5/22/2019   3:57 PM  To: Atif Salgado MD        ----- Message -----  From: Kenyon Ca  Sent: 5/22/2019  12:44 PM  To: Damian Srivastava Staff    Type: Test Results    What test was performed?  CT scan       Who ordered the test?     When and where were the test performed?  05/10    Comments: please call and advise, Thanks

## 2019-05-29 ENCOUNTER — INFUSION (OUTPATIENT)
Dept: INFECTIOUS DISEASES | Facility: HOSPITAL | Age: 69
End: 2019-05-29
Attending: INTERNAL MEDICINE
Payer: MEDICARE

## 2019-05-29 VITALS
WEIGHT: 80.44 LBS | SYSTOLIC BLOOD PRESSURE: 146 MMHG | RESPIRATION RATE: 16 BRPM | BODY MASS INDEX: 14.72 KG/M2 | DIASTOLIC BLOOD PRESSURE: 72 MMHG | TEMPERATURE: 98 F | OXYGEN SATURATION: 100 % | HEART RATE: 81 BPM

## 2019-05-29 DIAGNOSIS — D80.1 HYPOGAMMAGLOBULINEMIA: ICD-10-CM

## 2019-05-29 DIAGNOSIS — J47.9 ADULT BRONCHIECTASIS: Primary | ICD-10-CM

## 2019-05-29 PROCEDURE — 96366 THER/PROPH/DIAG IV INF ADDON: CPT

## 2019-05-29 PROCEDURE — 63600175 PHARM REV CODE 636 W HCPCS: Performed by: ALLERGY & IMMUNOLOGY

## 2019-05-29 PROCEDURE — 96365 THER/PROPH/DIAG IV INF INIT: CPT

## 2019-05-29 PROCEDURE — 25000003 PHARM REV CODE 250: Performed by: ALLERGY & IMMUNOLOGY

## 2019-05-29 RX ORDER — ACETAMINOPHEN 325 MG/1
650 TABLET ORAL
Status: CANCELLED | OUTPATIENT
Start: 2019-06-26

## 2019-05-29 RX ORDER — ACETAMINOPHEN 325 MG/1
650 TABLET ORAL
Status: DISCONTINUED | OUTPATIENT
Start: 2019-05-29 | End: 2019-05-29 | Stop reason: HOSPADM

## 2019-05-29 RX ORDER — SODIUM CHLORIDE 0.9 % (FLUSH) 0.9 %
10 SYRINGE (ML) INJECTION
Status: CANCELLED | OUTPATIENT
Start: 2019-06-26

## 2019-05-29 RX ADMIN — HUMAN IMMUNOGLOBULIN G 20 G: 20 LIQUID INTRAVENOUS at 09:05

## 2019-06-26 ENCOUNTER — INFUSION (OUTPATIENT)
Dept: INFECTIOUS DISEASES | Facility: HOSPITAL | Age: 69
End: 2019-06-26
Attending: INTERNAL MEDICINE
Payer: MEDICARE

## 2019-06-26 VITALS
SYSTOLIC BLOOD PRESSURE: 128 MMHG | WEIGHT: 79.94 LBS | DIASTOLIC BLOOD PRESSURE: 73 MMHG | BODY MASS INDEX: 14.71 KG/M2 | RESPIRATION RATE: 19 BRPM | HEART RATE: 99 BPM | HEIGHT: 62 IN | TEMPERATURE: 99 F | OXYGEN SATURATION: 99 %

## 2019-06-26 DIAGNOSIS — J47.9 ADULT BRONCHIECTASIS: Primary | ICD-10-CM

## 2019-06-26 DIAGNOSIS — D80.1 HYPOGAMMAGLOBULINEMIA: ICD-10-CM

## 2019-06-26 PROCEDURE — 63600175 PHARM REV CODE 636 W HCPCS: Mod: JG | Performed by: ALLERGY & IMMUNOLOGY

## 2019-06-26 PROCEDURE — 96366 THER/PROPH/DIAG IV INF ADDON: CPT

## 2019-06-26 PROCEDURE — 96365 THER/PROPH/DIAG IV INF INIT: CPT

## 2019-06-26 RX ORDER — ACETAMINOPHEN 325 MG/1
650 TABLET ORAL
Status: DISCONTINUED | OUTPATIENT
Start: 2019-06-26 | End: 2019-06-26 | Stop reason: HOSPADM

## 2019-06-26 RX ORDER — SODIUM CHLORIDE 0.9 % (FLUSH) 0.9 %
10 SYRINGE (ML) INJECTION
Status: CANCELLED | OUTPATIENT
Start: 2019-07-24

## 2019-06-26 RX ORDER — SODIUM CHLORIDE 0.9 % (FLUSH) 0.9 %
10 SYRINGE (ML) INJECTION
Status: DISCONTINUED | OUTPATIENT
Start: 2019-06-26 | End: 2019-06-26 | Stop reason: HOSPADM

## 2019-06-26 RX ORDER — ACETAMINOPHEN 325 MG/1
650 TABLET ORAL
Status: CANCELLED | OUTPATIENT
Start: 2019-07-24

## 2019-06-26 RX ADMIN — HUMAN IMMUNOGLOBULIN G 20 G: 20 LIQUID INTRAVENOUS at 09:06

## 2019-06-26 NOTE — PROGRESS NOTES
Pt refused premeds of both tylenol and benadryl.  Pt received dose of IVIG PRIVIGEN 20 GRAMS.  Infusion started and completed at a rate of 99cc/hr.  Pt tolerated well and left in NAD.

## 2019-06-28 ENCOUNTER — OFFICE VISIT (OUTPATIENT)
Dept: OPHTHALMOLOGY | Facility: CLINIC | Age: 69
End: 2019-06-28
Payer: MEDICARE

## 2019-06-28 DIAGNOSIS — H26.491 POSTERIOR CAPSULAR OPACIFICATION VISUALLY SIGNIFICANT, RIGHT EYE: Primary | ICD-10-CM

## 2019-06-28 DIAGNOSIS — H26.492 POSTERIOR CAPSULAR OPACIFICATION VISUALLY SIGNIFICANT, LEFT EYE: ICD-10-CM

## 2019-06-28 PROCEDURE — 66821 AFTER CATARACT LASER SURGERY: CPT | Mod: PBBFAC,LT | Performed by: OPHTHALMOLOGY

## 2019-06-28 PROCEDURE — 66821 AFTER CATARACT LASER SURGERY: CPT | Mod: PBBFAC,RT | Performed by: OPHTHALMOLOGY

## 2019-06-28 PROCEDURE — 66821 YAG CAPSULOTOMY - OS - LEFT EYE: ICD-10-PCS | Mod: S$PBB,50,79, | Performed by: OPHTHALMOLOGY

## 2019-06-28 PROCEDURE — 92014 PR EYE EXAM, EST PATIENT,COMPREHESV: ICD-10-PCS | Mod: S$PBB,,, | Performed by: OPHTHALMOLOGY

## 2019-06-28 PROCEDURE — 92014 COMPRE OPH EXAM EST PT 1/>: CPT | Mod: S$PBB,,, | Performed by: OPHTHALMOLOGY

## 2019-06-28 PROCEDURE — 99999 PR PBB SHADOW E&M-EST. PATIENT-LVL III: CPT | Mod: PBBFAC,,, | Performed by: OPHTHALMOLOGY

## 2019-06-28 PROCEDURE — 99213 OFFICE O/P EST LOW 20 MIN: CPT | Mod: PBBFAC,25 | Performed by: OPHTHALMOLOGY

## 2019-06-28 PROCEDURE — 99999 PR PBB SHADOW E&M-EST. PATIENT-LVL III: ICD-10-PCS | Mod: PBBFAC,,, | Performed by: OPHTHALMOLOGY

## 2019-06-28 NOTE — PROGRESS NOTES
HPI     Referred by: Dr. Lloyd     S/p phaco w/IOL OD 4/15/19 - symfony  S/p phaco w/IOL OS 3/11/19 - symfony toric  Strabismus s/p Strabismus sx  Amblyopia OS  Ethambutol off since 2010   PVD OU  Diplopia OD   PCO OU    Eye meds- none     Pt states that she noticed that at her last exam with Dr. Lloyd that her   vision was not as good as it was previous visit. Pt states that it was for   distance. Pt denies any glare or halos at this time. Denies any signs of   flashes or floaters OU. No pain or discomfort OU.         Last edited by Vera Sams MD on 6/28/2019  9:26 AM. (History)            Assessment /Plan     For exam results, see Encounter Report.    Posterior capsular opacification visually significant, right eye  -     Yag Capsulotomy - OS - Left Eye  -     Yag Capsulotomy - OD - Right Eye    Posterior capsular opacification visually significant, left eye  -     Yag Capsulotomy - OS - Left Eye  -     Yag Capsulotomy - OD - Right Eye    S/p phaco w/IOL OD 4/15/19 - symfony  S/p phaco w/IOL OS 3/11/19 - symfony toric  Strabismus s/p Strabismus sx  Amblyopia OS    Visually significant posterior capsular opacity present.  OU  - discussed risks, benefits, and alternatives to laser surgery - pt wishes to proceed with yag laser  - Informed consent obtained and correct eye(s) verified with patient.  - Intraocular Pressure to be taken 10- 30 minutes post procedure.   - PF QID x 4 days then d/c  - f/up as scheduled    DIAGNOSIS: Visually significant posterior capsular opacity    PROCEDURE: YAG Laser Capsulotomy     COMPLICATIONS: none     DESCRIPTION OF PROCEDURE IN DETAIL:  1 drop of topical Proparacaine and Iopidine instilled, and eye(s) dilated with 1% Tropicamide 2.5% Phenylephrine. YAG laser applied to posterior capsule in cruciate pattern.      DISPOSITION:  Patient tolerated procedure well.      Will call pt next wk to ensure doing well s/p yag cap    F/up dr lloyd to check rx 2 wks.

## 2019-07-08 ENCOUNTER — PATIENT OUTREACH (OUTPATIENT)
Dept: ADMINISTRATIVE | Facility: HOSPITAL | Age: 69
End: 2019-07-08

## 2019-07-08 ENCOUNTER — PATIENT MESSAGE (OUTPATIENT)
Dept: ADMINISTRATIVE | Facility: HOSPITAL | Age: 69
End: 2019-07-08

## 2019-07-08 DIAGNOSIS — S81.812A NONINFECTED SKIN TEAR OF LEFT LOWER EXTREMITY, INITIAL ENCOUNTER: ICD-10-CM

## 2019-07-08 DIAGNOSIS — Z12.11 SCREENING FOR COLON CANCER: Primary | ICD-10-CM

## 2019-07-08 DIAGNOSIS — Z12.39 SCREENING FOR BREAST CANCER: ICD-10-CM

## 2019-07-08 RX ORDER — MUPIROCIN 20 MG/G
OINTMENT TOPICAL 3 TIMES DAILY
Qty: 30 G | Refills: 3 | Status: SHIPPED | OUTPATIENT
Start: 2019-07-08 | End: 2019-09-12 | Stop reason: SDUPTHER

## 2019-07-10 ENCOUNTER — TELEPHONE (OUTPATIENT)
Dept: OPHTHALMOLOGY | Facility: CLINIC | Age: 69
End: 2019-07-10

## 2019-07-10 NOTE — TELEPHONE ENCOUNTER
----- Message from Kerry Cano sent at 6/28/2019  2:02 PM CDT -----  Will call pt next wk to ensure doing well s/p yag cap 7/5  F/up brian if wants updated mrx.

## 2019-07-12 DIAGNOSIS — E89.0 POSTOPERATIVE HYPOTHYROIDISM: ICD-10-CM

## 2019-07-12 RX ORDER — LEVOTHYROXINE SODIUM 88 UG/1
88 TABLET ORAL
Qty: 90 TABLET | Refills: 0 | Status: SHIPPED | OUTPATIENT
Start: 2019-07-12 | End: 2019-10-10 | Stop reason: SDUPTHER

## 2019-07-22 ENCOUNTER — TELEPHONE (OUTPATIENT)
Dept: OPTOMETRY | Facility: CLINIC | Age: 69
End: 2019-07-22

## 2019-07-22 ENCOUNTER — OFFICE VISIT (OUTPATIENT)
Dept: INTERNAL MEDICINE | Facility: CLINIC | Age: 69
End: 2019-07-22
Payer: MEDICARE

## 2019-07-22 VITALS
DIASTOLIC BLOOD PRESSURE: 70 MMHG | BODY MASS INDEX: 14.77 KG/M2 | WEIGHT: 80.25 LBS | HEART RATE: 78 BPM | OXYGEN SATURATION: 99 % | SYSTOLIC BLOOD PRESSURE: 124 MMHG | HEIGHT: 62 IN

## 2019-07-22 DIAGNOSIS — K59.00 COLONIC CONSTIPATION: ICD-10-CM

## 2019-07-22 DIAGNOSIS — D80.1 HYPOGAMMAGLOBULINEMIA: ICD-10-CM

## 2019-07-22 DIAGNOSIS — E89.0 POSTOPERATIVE HYPOTHYROIDISM: Primary | ICD-10-CM

## 2019-07-22 DIAGNOSIS — F33.42 MAJOR DEPRESSIVE DISORDER, RECURRENT EPISODE, IN FULL REMISSION: ICD-10-CM

## 2019-07-22 DIAGNOSIS — K58.9 IRRITABLE BOWEL SYNDROME, UNSPECIFIED TYPE: ICD-10-CM

## 2019-07-22 PROCEDURE — 99999 PR PBB SHADOW E&M-EST. PATIENT-LVL III: CPT | Mod: PBBFAC,,, | Performed by: FAMILY MEDICINE

## 2019-07-22 PROCEDURE — 99213 PR OFFICE/OUTPT VISIT, EST, LEVL III, 20-29 MIN: ICD-10-PCS | Mod: S$PBB,,, | Performed by: FAMILY MEDICINE

## 2019-07-22 PROCEDURE — 99999 PR PBB SHADOW E&M-EST. PATIENT-LVL III: ICD-10-PCS | Mod: PBBFAC,,, | Performed by: FAMILY MEDICINE

## 2019-07-22 PROCEDURE — 99213 OFFICE O/P EST LOW 20 MIN: CPT | Mod: PBBFAC,PN | Performed by: FAMILY MEDICINE

## 2019-07-22 PROCEDURE — 99213 OFFICE O/P EST LOW 20 MIN: CPT | Mod: S$PBB,,, | Performed by: FAMILY MEDICINE

## 2019-07-22 NOTE — PROGRESS NOTES
"Subjective:       Patient ID: Yvette Crews is a 69 y.o. female.    Chief Complaint: Hypothyroidism (6 months follow up)    HPI  70 y/o female with adult bronchiectasis, hypogammaglobulinemia, acquired hypothyroidism with hx of MNG s/p total thyroidectomy 1/25/17, osteopenia, hx of SVT s/p ablation in the past, MDD and VICTORINO is here for follow up.     She is feeling ok, her weight is stable. She is s/p bilateral cataract surgery in March and April. She denies f/n/v, has alternating d and constipation, she gets constipated takes miralax prn, denies urinary sx. Mood good, she is active at the assisted living, she is doing daily exercises and elliptical.  She is sleeping ok at night.    CVID/Bronchiectasis: following with immunology, IVIG monthly, recent CT chest stable  Hypothyroidism: levothyroxine 88mcg daily  MDD/VICTORINO: declined medications and therapy  Colonscopy normal in 2012, repeat due in 10 years  MMG, declined, will due breast exam in October    Review of Systems   Constitutional: Negative for activity change, appetite change, fatigue and fever.   Respiratory: Negative for cough and shortness of breath.    Cardiovascular: Negative for chest pain, palpitations and leg swelling.   Gastrointestinal: Negative for constipation, diarrhea, nausea and vomiting.   Genitourinary: Negative for difficulty urinating and dysuria.   Skin: Negative for rash.   Psychiatric/Behavioral: Negative for sleep disturbance.       Objective:      /70 (BP Location: Left arm, Patient Position: Sitting, BP Method: Small (Manual))   Pulse 78   Ht 5' 2" (1.575 m)   Wt 36.4 kg (80 lb 4 oz)   SpO2 99%   BMI 14.68 kg/m²   Physical Exam   Constitutional: She appears well-developed.   HENT:   Head: Normocephalic and atraumatic.   Mouth/Throat: No oropharyngeal exudate.   Neck: Normal range of motion. Neck supple. No thyromegaly present.   Cardiovascular: Normal rate, regular rhythm and normal heart sounds.   Pulmonary/Chest: " Effort normal and breath sounds normal. No respiratory distress.   Musculoskeletal: She exhibits no edema.   Lymphadenopathy:     She has no cervical adenopathy.   Neurological: She is alert.   Skin: Skin is warm and dry.   Psychiatric: She has a normal mood and affect.   Nursing note and vitals reviewed.      Assessment:       1. Postoperative hypothyroidism    2. Colonic constipation    3. Irritable bowel syndrome, unspecified type    4. Hypogammaglobulinemia    5. Major depressive disorder, recurrent episode, in full remission        Plan:   Yvette was seen today for hypothyroidism.    Diagnoses and all orders for this visit:    Postoperative hypothyroidism  -     Comprehensive metabolic panel; Future  -     CBC auto differential; Future    Colonic constipation    Irritable bowel syndrome, unspecified type    Hypogammaglobulinemia    Major depressive disorder, recurrent episode, in full remission

## 2019-07-22 NOTE — TELEPHONE ENCOUNTER
----- Message from Nica Hollingsworth sent at 7/22/2019 12:51 PM CDT -----  Contact: Yvette  Patient Requesting Sooner Appointment.     Reason for sooner appt.:Pt called to f/u on getting rescheduled a later in day or still in July.  When is the first available appointment?September  Communication Preference:[phone]  Additional Information:

## 2019-07-25 ENCOUNTER — INFUSION (OUTPATIENT)
Dept: INFECTIOUS DISEASES | Facility: HOSPITAL | Age: 69
End: 2019-07-25
Attending: INTERNAL MEDICINE
Payer: MEDICARE

## 2019-07-25 VITALS
RESPIRATION RATE: 16 BRPM | HEART RATE: 84 BPM | OXYGEN SATURATION: 100 % | TEMPERATURE: 99 F | BODY MASS INDEX: 14.58 KG/M2 | WEIGHT: 79.69 LBS | SYSTOLIC BLOOD PRESSURE: 132 MMHG | DIASTOLIC BLOOD PRESSURE: 64 MMHG

## 2019-07-25 DIAGNOSIS — D80.1 HYPOGAMMAGLOBULINEMIA: ICD-10-CM

## 2019-07-25 DIAGNOSIS — J47.9 ADULT BRONCHIECTASIS: Primary | ICD-10-CM

## 2019-07-25 PROCEDURE — 63600175 PHARM REV CODE 636 W HCPCS: Mod: JG | Performed by: ALLERGY & IMMUNOLOGY

## 2019-07-25 PROCEDURE — 96365 THER/PROPH/DIAG IV INF INIT: CPT

## 2019-07-25 PROCEDURE — 96366 THER/PROPH/DIAG IV INF ADDON: CPT

## 2019-07-25 RX ORDER — SODIUM CHLORIDE 0.9 % (FLUSH) 0.9 %
10 SYRINGE (ML) INJECTION
Status: DISCONTINUED | OUTPATIENT
Start: 2019-07-25 | End: 2019-07-25 | Stop reason: HOSPADM

## 2019-07-25 RX ORDER — ACETAMINOPHEN 325 MG/1
650 TABLET ORAL
Status: CANCELLED | OUTPATIENT
Start: 2019-08-22

## 2019-07-25 RX ORDER — SODIUM CHLORIDE 0.9 % (FLUSH) 0.9 %
10 SYRINGE (ML) INJECTION
Status: CANCELLED | OUTPATIENT
Start: 2019-08-22

## 2019-07-25 RX ORDER — ACETAMINOPHEN 325 MG/1
650 TABLET ORAL
Status: DISCONTINUED | OUTPATIENT
Start: 2019-07-25 | End: 2019-07-25 | Stop reason: HOSPADM

## 2019-07-25 RX ADMIN — HUMAN IMMUNOGLOBULIN G 20 G: 20 LIQUID INTRAVENOUS at 09:07

## 2019-07-29 ENCOUNTER — LAB VISIT (OUTPATIENT)
Dept: LAB | Facility: HOSPITAL | Age: 69
End: 2019-07-29
Attending: FAMILY MEDICINE
Payer: MEDICARE

## 2019-07-29 ENCOUNTER — OFFICE VISIT (OUTPATIENT)
Dept: OPTOMETRY | Facility: CLINIC | Age: 69
End: 2019-07-29
Payer: MEDICARE

## 2019-07-29 DIAGNOSIS — H52.223 REGULAR ASTIGMATISM OF BOTH EYES: Primary | ICD-10-CM

## 2019-07-29 DIAGNOSIS — E89.0 POSTOPERATIVE HYPOTHYROIDISM: ICD-10-CM

## 2019-07-29 LAB
ALBUMIN SERPL BCP-MCNC: 3.5 G/DL (ref 3.5–5.2)
ALP SERPL-CCNC: 63 U/L (ref 55–135)
ALT SERPL W/O P-5'-P-CCNC: 22 U/L (ref 10–44)
ANION GAP SERPL CALC-SCNC: 6 MMOL/L (ref 8–16)
AST SERPL-CCNC: 25 U/L (ref 10–40)
BASOPHILS # BLD AUTO: 0.01 K/UL (ref 0–0.2)
BASOPHILS NFR BLD: 0.2 % (ref 0–1.9)
BILIRUB SERPL-MCNC: 0.3 MG/DL (ref 0.1–1)
BUN SERPL-MCNC: 20 MG/DL (ref 8–23)
CALCIUM SERPL-MCNC: 9 MG/DL (ref 8.7–10.5)
CHLORIDE SERPL-SCNC: 94 MMOL/L (ref 95–110)
CO2 SERPL-SCNC: 31 MMOL/L (ref 23–29)
CREAT SERPL-MCNC: 0.6 MG/DL (ref 0.5–1.4)
DIFFERENTIAL METHOD: ABNORMAL
EOSINOPHIL # BLD AUTO: 0 K/UL (ref 0–0.5)
EOSINOPHIL NFR BLD: 0 % (ref 0–8)
ERYTHROCYTE [DISTWIDTH] IN BLOOD BY AUTOMATED COUNT: 13 % (ref 11.5–14.5)
EST. GFR  (AFRICAN AMERICAN): >60 ML/MIN/1.73 M^2
EST. GFR  (NON AFRICAN AMERICAN): >60 ML/MIN/1.73 M^2
GLUCOSE SERPL-MCNC: 62 MG/DL (ref 70–110)
HCT VFR BLD AUTO: 38.2 % (ref 37–48.5)
HGB BLD-MCNC: 12.7 G/DL (ref 12–16)
IMM GRANULOCYTES # BLD AUTO: 0.01 K/UL (ref 0–0.04)
IMM GRANULOCYTES NFR BLD AUTO: 0.2 % (ref 0–0.5)
LYMPHOCYTES # BLD AUTO: 0.8 K/UL (ref 1–4.8)
LYMPHOCYTES NFR BLD: 20.2 % (ref 18–48)
MCH RBC QN AUTO: 32 PG (ref 27–31)
MCHC RBC AUTO-ENTMCNC: 33.2 G/DL (ref 32–36)
MCV RBC AUTO: 96 FL (ref 82–98)
MONOCYTES # BLD AUTO: 0.3 K/UL (ref 0.3–1)
MONOCYTES NFR BLD: 8 % (ref 4–15)
NEUTROPHILS # BLD AUTO: 2.9 K/UL (ref 1.8–7.7)
NEUTROPHILS NFR BLD: 71.4 % (ref 38–73)
NRBC BLD-RTO: 0 /100 WBC
PLATELET # BLD AUTO: 212 K/UL (ref 150–350)
PMV BLD AUTO: 9.5 FL (ref 9.2–12.9)
POTASSIUM SERPL-SCNC: 3.9 MMOL/L (ref 3.5–5.1)
PROT SERPL-MCNC: 7.4 G/DL (ref 6–8.4)
RBC # BLD AUTO: 3.97 M/UL (ref 4–5.4)
SODIUM SERPL-SCNC: 131 MMOL/L (ref 136–145)
WBC # BLD AUTO: 4.1 K/UL (ref 3.9–12.7)

## 2019-07-29 PROCEDURE — 92015 DETERMINE REFRACTIVE STATE: CPT | Mod: ,,, | Performed by: OPTOMETRIST

## 2019-07-29 PROCEDURE — 85025 COMPLETE CBC W/AUTO DIFF WBC: CPT

## 2019-07-29 PROCEDURE — 92015 PR REFRACTION: ICD-10-PCS | Mod: ,,, | Performed by: OPTOMETRIST

## 2019-07-29 PROCEDURE — 80053 COMPREHEN METABOLIC PANEL: CPT

## 2019-07-29 PROCEDURE — 99999 PR PBB SHADOW E&M-EST. PATIENT-LVL I: CPT | Mod: PBBFAC,,, | Performed by: OPTOMETRIST

## 2019-07-29 PROCEDURE — 99211 OFF/OP EST MAY X REQ PHY/QHP: CPT | Mod: PBBFAC | Performed by: OPTOMETRIST

## 2019-07-29 PROCEDURE — 99999 PR PBB SHADOW E&M-EST. PATIENT-LVL I: ICD-10-PCS | Mod: PBBFAC,,, | Performed by: OPTOMETRIST

## 2019-07-29 PROCEDURE — 36415 COLL VENOUS BLD VENIPUNCTURE: CPT

## 2019-07-29 NOTE — PROGRESS NOTES
Assessment /Plan     For exam results, see Encounter Report.    Regular astigmatism of both eyes      Rx specs     Return 1 year

## 2019-08-20 ENCOUNTER — OFFICE VISIT (OUTPATIENT)
Dept: URGENT CARE | Facility: CLINIC | Age: 69
End: 2019-08-20
Payer: MEDICARE

## 2019-08-20 VITALS
SYSTOLIC BLOOD PRESSURE: 152 MMHG | RESPIRATION RATE: 18 BRPM | TEMPERATURE: 99 F | DIASTOLIC BLOOD PRESSURE: 85 MMHG | HEART RATE: 81 BPM

## 2019-08-20 DIAGNOSIS — L02.91 ABSCESS: Primary | ICD-10-CM

## 2019-08-20 PROCEDURE — 99214 OFFICE O/P EST MOD 30 MIN: CPT | Mod: S$GLB,,, | Performed by: FAMILY MEDICINE

## 2019-08-20 PROCEDURE — 99214 PR OFFICE/OUTPT VISIT, EST, LEVL IV, 30-39 MIN: ICD-10-PCS | Mod: S$GLB,,, | Performed by: FAMILY MEDICINE

## 2019-08-20 RX ORDER — SULFAMETHOXAZOLE AND TRIMETHOPRIM 800; 160 MG/1; MG/1
1 TABLET ORAL 2 TIMES DAILY
Qty: 14 TABLET | Refills: 0 | Status: SHIPPED | OUTPATIENT
Start: 2019-08-20 | End: 2019-08-27

## 2019-08-20 NOTE — PATIENT INSTRUCTIONS
Abscess (Incision & Drainage)  An abscess is sometimes called a boil. It happens when bacteria get trapped under the skin and start to grow. Pus forms inside the abscess as the body responds to the bacteria. An abscess can happen with an insect bite, ingrown hair, blocked oil gland, pimple, cyst, or puncture wound.  Your healthcare provider has drained the pus from your abscess. If the abscess pocket was large, your healthcare provider may have put in gauze packing. Your provider will need to remove it on your next visit. He or she may also replace it at that time. You may not need antibiotics to treat a simple abscess, unless the infection is spreading into the skin around the wound (cellulitis).  The wound will take about 1 to 2 weeks to heal, depending on the size of the abscess. Healthy tissue will grow from the bottom and sides of the opening until it seals over.  Home care  These tips can help your wound heal:  · The wound may drain for the first 2 days. Cover the wound with a clean dry dressing. Change the dressing if it becomes soaked with blood or pus.  · If a gauze packing was placed inside the abscess pocket, you may be told to remove it yourself. You may do this in the shower. Once the packing is removed, you should wash the area in the shower, or clean the area as directed by your provider. Continue to do this until the skin opening has closed. Make sure you wash your hands after changing the packing or cleaning the wound.  · If you were prescribed antibiotics, take them as directed until they are all gone.  · You may use acetaminophen or ibuprofen to control pain, unless another pain medicine was prescribed. If you have liver disease or ever had a stomach ulcer, talk with your doctor before using these medicines.  Follow-up care  Follow up with your healthcare provider, or as advised. If a gauze packing was put in your wound, it should be removed in 1 to 2 days. Check your wound every day for any  signs that the infection is getting worse. The signs are listed below.  When to seek medical advice  Call your healthcare provider right away if any of these occur:  · Increasing redness or swelling  · Red streaks in the skin leading away from the wound  · Increasing local pain or swelling  · Continued pus draining from the wound 2 days after treatment  · Fever of 100.4ºF (38ºC) or higher, or as directed by your healthcare provider  · Boil returns when you are at home  Date Last Reviewed: 9/1/2016  © 5379-9869 [employer]. [address] [place] [address]. All rights reserved. This information is not intended as a substitute for professional medical care. Always follow your healthcare professional's instructions.

## 2019-08-20 NOTE — PROGRESS NOTES
Subjective:       Patient ID: Yvette Crews is a 69 y.o. female.    Vitals:  oral temperature is 98.7 °F (37.1 °C). Her blood pressure is 152/85 (abnormal) and her pulse is 81. Her respiration is 18.     Chief Complaint: Abscess    Patient presents with c/o nasal pain x 4 days. There is a cyst like area that is red and swollen. Denies fever. No discharge.     Abscess   Chronicity:  New  Incident onset: 4 days.  Progression Since Onset: unchanged since onset  Associated Symptoms: no fever, no chills  Characteristics: redness    Treatments Tried:  Nothing  Relieved by:  Nothing  Worsened by:  Nothing      Constitution: Negative for chills and fever.   HENT: Negative for facial swelling and sore throat.    Neck: Negative for painful lymph nodes.   Eyes: Negative for eye itching and eyelid swelling.   Respiratory: Negative for cough.    Musculoskeletal: Negative for joint pain and joint swelling.   Skin: Positive for abscess. Negative for color change, pale, rash, wound, laceration, lesion, skin thickening/induration, puncture wound, bruising, avulsion and hives.   Allergic/Immunologic: Negative for environmental allergies, immunocompromised state and hives.   Hematologic/Lymphatic: Negative for swollen lymph nodes.       Objective:      Physical Exam  nasal abscess of left nares  Incision & Drainage  Date/Time: 8/20/2019 2:07 PM  Performed by: Angela Villavicencio DO  Authorized by: Angela Villavicencio, DO     Consent Done?:  Yes (Verbal)    Type:  Abscess  Body area:  Head/neck  Local anesthetic: LET (lido,epi,tetracaine)  Complexity:  Simple  Drainage:  Pus  Drainage amount:  Scant  Wound treatment:  Incision  Patient tolerance:  Patient tolerated the procedure well with no immediate complications        Assessment:       1. Abscess        Plan:         Abscess  -     sulfamethoxazole-trimethoprim 800-160mg (BACTRIM DS) 800-160 mg Tab; Take 1 tablet by mouth 2 (two) times daily. for 7 days  Dispense: 14 tablet; Refill:  0      Patient Instructions     Abscess (Incision & Drainage)  An abscess is sometimes called a boil. It happens when bacteria get trapped under the skin and start to grow. Pus forms inside the abscess as the body responds to the bacteria. An abscess can happen with an insect bite, ingrown hair, blocked oil gland, pimple, cyst, or puncture wound.  Your healthcare provider has drained the pus from your abscess. If the abscess pocket was large, your healthcare provider may have put in gauze packing. Your provider will need to remove it on your next visit. He or she may also replace it at that time. You may not need antibiotics to treat a simple abscess, unless the infection is spreading into the skin around the wound (cellulitis).  The wound will take about 1 to 2 weeks to heal, depending on the size of the abscess. Healthy tissue will grow from the bottom and sides of the opening until it seals over.  Home care  These tips can help your wound heal:  · The wound may drain for the first 2 days. Cover the wound with a clean dry dressing. Change the dressing if it becomes soaked with blood or pus.  · If a gauze packing was placed inside the abscess pocket, you may be told to remove it yourself. You may do this in the shower. Once the packing is removed, you should wash the area in the shower, or clean the area as directed by your provider. Continue to do this until the skin opening has closed. Make sure you wash your hands after changing the packing or cleaning the wound.  · If you were prescribed antibiotics, take them as directed until they are all gone.  · You may use acetaminophen or ibuprofen to control pain, unless another pain medicine was prescribed. If you have liver disease or ever had a stomach ulcer, talk with your doctor before using these medicines.  Follow-up care  Follow up with your healthcare provider, or as advised. If a gauze packing was put in your wound, it should be removed in 1 to 2 days. Check  your wound every day for any signs that the infection is getting worse. The signs are listed below.  When to seek medical advice  Call your healthcare provider right away if any of these occur:  · Increasing redness or swelling  · Red streaks in the skin leading away from the wound  · Increasing local pain or swelling  · Continued pus draining from the wound 2 days after treatment  · Fever of 100.4ºF (38ºC) or higher, or as directed by your healthcare provider  · Boil returns when you are at home  Date Last Reviewed: 9/1/2016  © 3484-6956 [employer]. [address] [place] [address]. All rights reserved. This information is not intended as a substitute for professional medical care. Always follow your healthcare professional's instructions.

## 2019-08-21 ENCOUNTER — INFUSION (OUTPATIENT)
Dept: INFECTIOUS DISEASES | Facility: HOSPITAL | Age: 69
End: 2019-08-21
Attending: INTERNAL MEDICINE
Payer: MEDICARE

## 2019-08-21 VITALS
OXYGEN SATURATION: 100 % | BODY MASS INDEX: 14.95 KG/M2 | HEIGHT: 62 IN | TEMPERATURE: 98 F | SYSTOLIC BLOOD PRESSURE: 135 MMHG | WEIGHT: 81.25 LBS | DIASTOLIC BLOOD PRESSURE: 60 MMHG | HEART RATE: 77 BPM

## 2019-08-21 DIAGNOSIS — D80.1 HYPOGAMMAGLOBULINEMIA: ICD-10-CM

## 2019-08-21 DIAGNOSIS — J47.9 ADULT BRONCHIECTASIS: Primary | ICD-10-CM

## 2019-08-21 PROCEDURE — 96365 THER/PROPH/DIAG IV INF INIT: CPT

## 2019-08-21 PROCEDURE — 96366 THER/PROPH/DIAG IV INF ADDON: CPT

## 2019-08-21 PROCEDURE — 63600175 PHARM REV CODE 636 W HCPCS: Mod: JG | Performed by: ALLERGY & IMMUNOLOGY

## 2019-08-21 RX ORDER — SODIUM CHLORIDE 0.9 % (FLUSH) 0.9 %
10 SYRINGE (ML) INJECTION
Status: DISCONTINUED | OUTPATIENT
Start: 2019-08-21 | End: 2019-08-21 | Stop reason: HOSPADM

## 2019-08-21 RX ORDER — ACETAMINOPHEN 325 MG/1
650 TABLET ORAL
Status: DISCONTINUED | OUTPATIENT
Start: 2019-08-21 | End: 2019-08-21 | Stop reason: HOSPADM

## 2019-08-21 RX ORDER — SODIUM CHLORIDE 0.9 % (FLUSH) 0.9 %
10 SYRINGE (ML) INJECTION
Status: CANCELLED | OUTPATIENT
Start: 2019-09-18

## 2019-08-21 RX ORDER — ACETAMINOPHEN 325 MG/1
650 TABLET ORAL
Status: CANCELLED | OUTPATIENT
Start: 2019-09-18

## 2019-08-21 RX ADMIN — HUMAN IMMUNOGLOBULIN G 20 G: 20 LIQUID INTRAVENOUS at 09:08

## 2019-09-12 DIAGNOSIS — S81.812A NONINFECTED SKIN TEAR OF LEFT LOWER EXTREMITY, INITIAL ENCOUNTER: ICD-10-CM

## 2019-09-12 RX ORDER — MUPIROCIN 20 MG/G
OINTMENT TOPICAL 3 TIMES DAILY
Qty: 30 G | Refills: 3 | Status: SHIPPED | OUTPATIENT
Start: 2019-09-12 | End: 2020-01-22 | Stop reason: SDUPTHER

## 2019-09-18 ENCOUNTER — INFUSION (OUTPATIENT)
Dept: INFECTIOUS DISEASES | Facility: HOSPITAL | Age: 69
End: 2019-09-18
Attending: INTERNAL MEDICINE
Payer: MEDICARE

## 2019-09-18 VITALS
HEIGHT: 62 IN | WEIGHT: 79.81 LBS | DIASTOLIC BLOOD PRESSURE: 65 MMHG | HEART RATE: 78 BPM | TEMPERATURE: 98 F | SYSTOLIC BLOOD PRESSURE: 136 MMHG | BODY MASS INDEX: 14.69 KG/M2

## 2019-09-18 DIAGNOSIS — D80.1 HYPOGAMMAGLOBULINEMIA: ICD-10-CM

## 2019-09-18 DIAGNOSIS — J47.9 ADULT BRONCHIECTASIS: Primary | ICD-10-CM

## 2019-09-18 PROCEDURE — 63600175 PHARM REV CODE 636 W HCPCS: Mod: JG | Performed by: ALLERGY & IMMUNOLOGY

## 2019-09-18 PROCEDURE — 96366 THER/PROPH/DIAG IV INF ADDON: CPT

## 2019-09-18 PROCEDURE — 96365 THER/PROPH/DIAG IV INF INIT: CPT

## 2019-09-18 RX ORDER — SODIUM CHLORIDE 0.9 % (FLUSH) 0.9 %
10 SYRINGE (ML) INJECTION
Status: CANCELLED | OUTPATIENT
Start: 2019-10-16

## 2019-09-18 RX ORDER — SODIUM CHLORIDE 0.9 % (FLUSH) 0.9 %
10 SYRINGE (ML) INJECTION
Status: DISCONTINUED | OUTPATIENT
Start: 2019-09-18 | End: 2019-09-18 | Stop reason: HOSPADM

## 2019-09-18 RX ORDER — ACETAMINOPHEN 325 MG/1
650 TABLET ORAL
Status: DISCONTINUED | OUTPATIENT
Start: 2019-09-18 | End: 2019-09-18 | Stop reason: HOSPADM

## 2019-09-18 RX ORDER — ACETAMINOPHEN 325 MG/1
650 TABLET ORAL
Status: CANCELLED | OUTPATIENT
Start: 2019-10-16

## 2019-09-18 RX ADMIN — HUMAN IMMUNOGLOBULIN G 20 G: 20 LIQUID INTRAVENOUS at 09:09

## 2019-10-07 ENCOUNTER — PATIENT MESSAGE (OUTPATIENT)
Dept: ALLERGY | Facility: CLINIC | Age: 69
End: 2019-10-07

## 2019-10-10 DIAGNOSIS — E89.0 POSTOPERATIVE HYPOTHYROIDISM: ICD-10-CM

## 2019-10-13 RX ORDER — LEVOTHYROXINE SODIUM 88 UG/1
88 TABLET ORAL
Qty: 90 TABLET | Refills: 1 | Status: SHIPPED | OUTPATIENT
Start: 2019-10-13 | End: 2020-01-22 | Stop reason: SDUPTHER

## 2019-10-16 ENCOUNTER — INFUSION (OUTPATIENT)
Dept: INFECTIOUS DISEASES | Facility: HOSPITAL | Age: 69
End: 2019-10-16
Attending: INTERNAL MEDICINE
Payer: MEDICARE

## 2019-10-16 ENCOUNTER — PATIENT MESSAGE (OUTPATIENT)
Dept: ALLERGY | Facility: CLINIC | Age: 69
End: 2019-10-16

## 2019-10-16 VITALS
BODY MASS INDEX: 15.06 KG/M2 | RESPIRATION RATE: 16 BRPM | HEIGHT: 62 IN | HEART RATE: 91 BPM | WEIGHT: 81.81 LBS | TEMPERATURE: 99 F | SYSTOLIC BLOOD PRESSURE: 122 MMHG | DIASTOLIC BLOOD PRESSURE: 64 MMHG | OXYGEN SATURATION: 100 %

## 2019-10-16 DIAGNOSIS — J47.9 ADULT BRONCHIECTASIS: Primary | ICD-10-CM

## 2019-10-16 DIAGNOSIS — D80.1 HYPOGAMMAGLOBULINEMIA: ICD-10-CM

## 2019-10-16 PROCEDURE — 63600175 PHARM REV CODE 636 W HCPCS: Mod: JG | Performed by: ALLERGY & IMMUNOLOGY

## 2019-10-16 PROCEDURE — 96366 THER/PROPH/DIAG IV INF ADDON: CPT

## 2019-10-16 PROCEDURE — 96365 THER/PROPH/DIAG IV INF INIT: CPT

## 2019-10-16 RX ORDER — ACETAMINOPHEN 325 MG/1
650 TABLET ORAL
Status: CANCELLED | OUTPATIENT
Start: 2019-11-13

## 2019-10-16 RX ORDER — SODIUM CHLORIDE 0.9 % (FLUSH) 0.9 %
10 SYRINGE (ML) INJECTION
Status: CANCELLED | OUTPATIENT
Start: 2019-11-13

## 2019-10-16 RX ORDER — ACETAMINOPHEN 325 MG/1
650 TABLET ORAL
Status: DISCONTINUED | OUTPATIENT
Start: 2019-10-16 | End: 2019-10-16 | Stop reason: HOSPADM

## 2019-10-16 RX ORDER — SODIUM CHLORIDE 0.9 % (FLUSH) 0.9 %
10 SYRINGE (ML) INJECTION
Status: DISCONTINUED | OUTPATIENT
Start: 2019-10-16 | End: 2019-10-16 | Stop reason: HOSPADM

## 2019-10-16 RX ADMIN — SODIUM CHLORIDE: 0.9 INJECTION, SOLUTION INTRAVENOUS at 09:10

## 2019-10-16 RX ADMIN — HUMAN IMMUNOGLOBULIN G 20 G: 20 LIQUID INTRAVENOUS at 09:10

## 2019-10-16 NOTE — PLAN OF CARE
Patient received Privigen infusion and received education and counseling, instructed to call with any questions or concerns.

## 2019-10-18 ENCOUNTER — PATIENT MESSAGE (OUTPATIENT)
Dept: ALLERGY | Facility: CLINIC | Age: 69
End: 2019-10-18

## 2019-10-24 ENCOUNTER — CLINICAL SUPPORT (OUTPATIENT)
Dept: INTERNAL MEDICINE | Facility: CLINIC | Age: 69
End: 2019-10-24
Payer: MEDICARE

## 2019-10-24 PROCEDURE — 90662 IIV NO PRSV INCREASED AG IM: CPT | Mod: PBBFAC

## 2019-10-25 ENCOUNTER — PES CALL (OUTPATIENT)
Dept: ADMINISTRATIVE | Facility: CLINIC | Age: 69
End: 2019-10-25

## 2019-11-05 ENCOUNTER — HOSPITAL ENCOUNTER (OUTPATIENT)
Dept: RADIOLOGY | Facility: HOSPITAL | Age: 69
Discharge: HOME | End: 2019-11-05
Attending: ALLERGY & IMMUNOLOGY
Payer: MEDICARE

## 2019-11-05 DIAGNOSIS — D83.9 CVID (COMMON VARIABLE IMMUNODEFICIENCY): ICD-10-CM

## 2019-11-05 PROCEDURE — 71250 CT THORAX DX C-: CPT | Mod: 26,,, | Performed by: RADIOLOGY

## 2019-11-05 PROCEDURE — 71250 CT CHEST WITHOUT CONTRAST: ICD-10-PCS | Mod: 26,,, | Performed by: RADIOLOGY

## 2019-11-05 PROCEDURE — 71250 CT THORAX DX C-: CPT | Mod: TC

## 2019-11-13 ENCOUNTER — INFUSION (OUTPATIENT)
Dept: INFECTIOUS DISEASES | Facility: HOSPITAL | Age: 69
End: 2019-11-13
Attending: INTERNAL MEDICINE
Payer: MEDICARE

## 2019-11-13 VITALS
TEMPERATURE: 98 F | RESPIRATION RATE: 16 BRPM | HEIGHT: 62 IN | BODY MASS INDEX: 14.95 KG/M2 | SYSTOLIC BLOOD PRESSURE: 130 MMHG | WEIGHT: 81.25 LBS | DIASTOLIC BLOOD PRESSURE: 63 MMHG | HEART RATE: 95 BPM | OXYGEN SATURATION: 97 %

## 2019-11-13 DIAGNOSIS — D80.1 HYPOGAMMAGLOBULINEMIA: ICD-10-CM

## 2019-11-13 DIAGNOSIS — J47.9 ADULT BRONCHIECTASIS: Primary | ICD-10-CM

## 2019-11-13 PROCEDURE — 96366 THER/PROPH/DIAG IV INF ADDON: CPT

## 2019-11-13 PROCEDURE — 63600175 PHARM REV CODE 636 W HCPCS: Performed by: ALLERGY & IMMUNOLOGY

## 2019-11-13 PROCEDURE — 96365 THER/PROPH/DIAG IV INF INIT: CPT

## 2019-11-13 RX ORDER — SODIUM CHLORIDE 0.9 % (FLUSH) 0.9 %
10 SYRINGE (ML) INJECTION
Status: DISCONTINUED | OUTPATIENT
Start: 2019-11-13 | End: 2019-11-13 | Stop reason: HOSPADM

## 2019-11-13 RX ORDER — ACETAMINOPHEN 325 MG/1
650 TABLET ORAL
Status: CANCELLED | OUTPATIENT
Start: 2019-12-11

## 2019-11-13 RX ORDER — SODIUM CHLORIDE 0.9 % (FLUSH) 0.9 %
10 SYRINGE (ML) INJECTION
Status: CANCELLED | OUTPATIENT
Start: 2019-12-11

## 2019-11-13 RX ORDER — ACETAMINOPHEN 325 MG/1
650 TABLET ORAL
Status: DISCONTINUED | OUTPATIENT
Start: 2019-11-13 | End: 2019-11-13 | Stop reason: HOSPADM

## 2019-11-13 RX ADMIN — HUMAN IMMUNOGLOBULIN G 20 G: 20 LIQUID INTRAVENOUS at 09:11

## 2019-11-13 RX ADMIN — SODIUM CHLORIDE: 0.9 INJECTION, SOLUTION INTRAVENOUS at 09:11

## 2019-11-13 NOTE — PROGRESS NOTES
Pt refused premeds of both tylenol and benadryl.  Pt received dose of IVIG PRIVIGEN 20 GRAMS.  Infusion started and completed at a rate of 99cc/hr. IV NS ran concurrently at 25 ml/hr.  Pt tolerated well and left in NAD.

## 2019-11-18 ENCOUNTER — PATIENT OUTREACH (OUTPATIENT)
Dept: ADMINISTRATIVE | Facility: OTHER | Age: 69
End: 2019-11-18

## 2019-11-18 DIAGNOSIS — Z12.31 ENCOUNTER FOR SCREENING MAMMOGRAM FOR MALIGNANT NEOPLASM OF BREAST: Primary | ICD-10-CM

## 2019-11-21 ENCOUNTER — OFFICE VISIT (OUTPATIENT)
Dept: ALLERGY | Facility: CLINIC | Age: 69
End: 2019-11-21
Payer: MEDICARE

## 2019-11-21 VITALS
WEIGHT: 81.56 LBS | BODY MASS INDEX: 15.01 KG/M2 | SYSTOLIC BLOOD PRESSURE: 124 MMHG | DIASTOLIC BLOOD PRESSURE: 68 MMHG | HEIGHT: 62 IN

## 2019-11-21 DIAGNOSIS — J47.9 ADULT BRONCHIECTASIS: ICD-10-CM

## 2019-11-21 DIAGNOSIS — D83.9 CVID (COMMON VARIABLE IMMUNODEFICIENCY): Primary | ICD-10-CM

## 2019-11-21 PROCEDURE — 1126F PR PAIN SEVERITY QUANTIFIED, NO PAIN PRESENT: ICD-10-PCS | Mod: ,,, | Performed by: ALLERGY & IMMUNOLOGY

## 2019-11-21 PROCEDURE — 99214 PR OFFICE/OUTPT VISIT, EST, LEVL IV, 30-39 MIN: ICD-10-PCS | Mod: S$PBB,,, | Performed by: ALLERGY & IMMUNOLOGY

## 2019-11-21 PROCEDURE — 99999 PR PBB SHADOW E&M-EST. PATIENT-LVL III: CPT | Mod: PBBFAC,,, | Performed by: ALLERGY & IMMUNOLOGY

## 2019-11-21 PROCEDURE — 99213 OFFICE O/P EST LOW 20 MIN: CPT | Mod: PBBFAC | Performed by: ALLERGY & IMMUNOLOGY

## 2019-11-21 PROCEDURE — 1159F MED LIST DOCD IN RCRD: CPT | Mod: ,,, | Performed by: ALLERGY & IMMUNOLOGY

## 2019-11-21 PROCEDURE — 1126F AMNT PAIN NOTED NONE PRSNT: CPT | Mod: ,,, | Performed by: ALLERGY & IMMUNOLOGY

## 2019-11-21 PROCEDURE — 1159F PR MEDICATION LIST DOCUMENTED IN MEDICAL RECORD: ICD-10-PCS | Mod: ,,, | Performed by: ALLERGY & IMMUNOLOGY

## 2019-11-21 PROCEDURE — 99214 OFFICE O/P EST MOD 30 MIN: CPT | Mod: S$PBB,,, | Performed by: ALLERGY & IMMUNOLOGY

## 2019-11-21 PROCEDURE — 99999 PR PBB SHADOW E&M-EST. PATIENT-LVL III: ICD-10-PCS | Mod: PBBFAC,,, | Performed by: ALLERGY & IMMUNOLOGY

## 2019-11-21 NOTE — PROGRESS NOTES
Subjective:       Patient ID: Yvette Crews is a 69 y.o. female.    LV 4/29/19    Chief Complaint:  Follow-up  FU CVID      Follow-up   Pertinent negatives include no chest pain, congestion, coughing, fatigue, fever, headaches, joint swelling, nausea, neck pain, rash or vomiting.       Pt presents w . Dr. Crews has hx of immune deficiency, diagnosed by [facility] LU[facility] Stewart and TOYA around 2007. She has had consistently low IgM, and was found to have poor B-cell function. Hypogammaglobulinemia is a also reported.   Has been on IgG replacement since 2007, initially started in [facility] Since ~2014 has been getting IVIG, 20 mg q 4 weeks, at [facility].   Since LV here in April, has continued same 20 g dose (540 mg/kg/mo). Tolerates infusions well. Denies any need for abx over last year, or last 3-4 years.  Denies any increased cough, rhinitis, fatigue prior to scheduled infusions.    Prior to starting IgG replacement she reports extensive hx of recurrent sinus infection, recurrent lower respiratory infections, and recurrent ear infections as an adult.   She also has a history of bronchiectasis.  And around the time of diagnosis of immune deficiency, was also dx'd with ANA CRISTINA with cavitation, finished tx't 2010.    She  has distant hx chronic urticaria which has been quiescent over last few years.        Past Medical History:   Diagnosis Date    Adult bronchiectasis     Amblyopia     Anxiety     Arthritis     Cataract     CHF (congestive heart failure)     Chin laceration     Concussion 10/27/2016    Depression     Hypertension     Onychomycosis     Strabismus     Supraventricular tachycardia     Thyroid disease    underweight    Family History   Problem Relation Age of Onset    Heart disease Father     Stroke Father     Heart disease Brother     Breast cancer Paternal Grandmother     Melanoma Neg Hx     Psoriasis Neg Hx     Lupus Neg Hx     Eczema Neg Hx      Amblyopia Neg Hx     Blindness Neg Hx     Cataracts Neg Hx     Glaucoma Neg Hx     Macular degeneration Neg Hx     Retinal detachment Neg Hx     Strabismus Neg Hx          Review of Systems   Constitutional: Negative for activity change, fatigue and fever.   HENT: Negative for congestion, postnasal drip, rhinorrhea, sinus pressure and sneezing.    Eyes: Negative for discharge, redness and itching.   Respiratory: Negative for cough, shortness of breath and wheezing.    Cardiovascular: Negative for chest pain.   Gastrointestinal: Negative for diarrhea, nausea and vomiting.   Genitourinary: Negative for dysuria.   Musculoskeletal: Negative for joint swelling and neck pain.   Skin: Negative for rash.   Neurological: Negative for headaches.   Hematological: Does not bruise/bleed easily.   Psychiatric/Behavioral: Negative for behavioral problems.        Objective:   Physical Exam   Constitutional: She is oriented to person, place, and time. No distress.   Thin-appearing   HENT:   Right Ear: Tympanic membrane and external ear normal.   Left Ear: Tympanic membrane and external ear normal.   Nose: No mucosal edema, rhinorrhea, sinus tenderness or septal deviation. Right sinus exhibits no maxillary sinus tenderness and no frontal sinus tenderness. Left sinus exhibits no maxillary sinus tenderness and no frontal sinus tenderness.   Mouth/Throat: Uvula is midline, oropharynx is clear and moist and mucous membranes are normal. No uvula swelling.   Eyes: Conjunctivae are normal. Right eye exhibits no discharge. Left eye exhibits no discharge.   Neck: Neck supple.   Cardiovascular: Normal rate and regular rhythm.   Pulmonary/Chest: Effort normal and breath sounds normal. No respiratory distress. She has no wheezes.   Abdominal: Soft. Bowel sounds are normal. There is no tenderness.   Musculoskeletal: She exhibits no edema or tenderness.   Lymphadenopathy:     She has no cervical adenopathy.   Neurological: She is alert and  oriented to person, place, and time.   Skin: Skin is warm. No rash noted. No erythema.   Psychiatric: She has a normal mood and affect. Her behavior is normal. Judgment and thought content normal.   Nursing note and vitals reviewed.    Chest CT 11/5/19--no interval changes    Assessment:       1. CVID (common variable immunodeficiency) --low IgM, poor B-cell function, and hx low IgG   2. Bronchiectasis without complication         Plan:       Yvette was seen today for other.    Diagnoses and all orders for this visit:    CVID (common variable immunodeficiency)      - Continue IVIG, 20 grams q 4 weeks (540 mg/kg/mo)    Check trough IgG, IgA, IgM, prior to next infusion  12/12/19

## 2019-11-30 ENCOUNTER — PATIENT MESSAGE (OUTPATIENT)
Dept: ALLERGY | Facility: CLINIC | Age: 69
End: 2019-11-30

## 2019-12-01 ENCOUNTER — PATIENT MESSAGE (OUTPATIENT)
Dept: ALLERGY | Facility: CLINIC | Age: 69
End: 2019-12-01

## 2019-12-03 ENCOUNTER — PATIENT MESSAGE (OUTPATIENT)
Dept: ALLERGY | Facility: CLINIC | Age: 69
End: 2019-12-03

## 2019-12-12 ENCOUNTER — PATIENT MESSAGE (OUTPATIENT)
Dept: ALLERGY | Facility: CLINIC | Age: 69
End: 2019-12-12

## 2019-12-12 ENCOUNTER — INFUSION (OUTPATIENT)
Dept: INFECTIOUS DISEASES | Facility: HOSPITAL | Age: 69
End: 2019-12-12
Attending: INTERNAL MEDICINE
Payer: MEDICARE

## 2019-12-12 VITALS
DIASTOLIC BLOOD PRESSURE: 68 MMHG | SYSTOLIC BLOOD PRESSURE: 133 MMHG | BODY MASS INDEX: 14.86 KG/M2 | WEIGHT: 81.25 LBS | RESPIRATION RATE: 16 BRPM | OXYGEN SATURATION: 100 % | HEART RATE: 82 BPM | TEMPERATURE: 99 F

## 2019-12-12 DIAGNOSIS — J47.9 ADULT BRONCHIECTASIS: Primary | ICD-10-CM

## 2019-12-12 DIAGNOSIS — D80.1 HYPOGAMMAGLOBULINEMIA: ICD-10-CM

## 2019-12-12 PROCEDURE — 96366 THER/PROPH/DIAG IV INF ADDON: CPT

## 2019-12-12 PROCEDURE — 96365 THER/PROPH/DIAG IV INF INIT: CPT

## 2019-12-12 PROCEDURE — 63600175 PHARM REV CODE 636 W HCPCS: Mod: JG | Performed by: ALLERGY & IMMUNOLOGY

## 2019-12-12 RX ORDER — ACETAMINOPHEN 325 MG/1
650 TABLET ORAL
Status: CANCELLED | OUTPATIENT
Start: 2020-01-09

## 2019-12-12 RX ORDER — SODIUM CHLORIDE 0.9 % (FLUSH) 0.9 %
10 SYRINGE (ML) INJECTION
Status: DISCONTINUED | OUTPATIENT
Start: 2019-12-12 | End: 2019-12-12 | Stop reason: HOSPADM

## 2019-12-12 RX ORDER — SODIUM CHLORIDE 0.9 % (FLUSH) 0.9 %
10 SYRINGE (ML) INJECTION
Status: CANCELLED | OUTPATIENT
Start: 2020-01-09

## 2019-12-12 RX ADMIN — SODIUM CHLORIDE: 0.9 INJECTION, SOLUTION INTRAVENOUS at 09:12

## 2019-12-12 RX ADMIN — HUMAN IMMUNOGLOBULIN G 20 G: 20 LIQUID INTRAVENOUS at 09:12

## 2020-01-02 ENCOUNTER — PATIENT MESSAGE (OUTPATIENT)
Dept: INTERNAL MEDICINE | Facility: CLINIC | Age: 70
End: 2020-01-02

## 2020-01-08 ENCOUNTER — PATIENT OUTREACH (OUTPATIENT)
Dept: ADMINISTRATIVE | Facility: HOSPITAL | Age: 70
End: 2020-01-08

## 2020-01-08 DIAGNOSIS — M80.00XD AGE-RELATED OSTEOPOROSIS WITH CURRENT PATHOLOGICAL FRACTURE WITH ROUTINE HEALING, SUBSEQUENT ENCOUNTER: Primary | ICD-10-CM

## 2020-01-08 DIAGNOSIS — Z78.0 POST-MENOPAUSAL: ICD-10-CM

## 2020-01-08 DIAGNOSIS — M94.9 DISORDER OF BONE AND ARTICULAR CARTILAGE: ICD-10-CM

## 2020-01-08 DIAGNOSIS — M89.9 DISORDER OF BONE AND ARTICULAR CARTILAGE: ICD-10-CM

## 2020-01-09 ENCOUNTER — INFUSION (OUTPATIENT)
Dept: INFECTIOUS DISEASES | Facility: HOSPITAL | Age: 70
End: 2020-01-09
Attending: INTERNAL MEDICINE
Payer: MEDICARE

## 2020-01-09 VITALS
BODY MASS INDEX: 14.84 KG/M2 | WEIGHT: 81.13 LBS | TEMPERATURE: 98 F | HEART RATE: 80 BPM | OXYGEN SATURATION: 99 % | SYSTOLIC BLOOD PRESSURE: 137 MMHG | DIASTOLIC BLOOD PRESSURE: 68 MMHG | RESPIRATION RATE: 16 BRPM

## 2020-01-09 DIAGNOSIS — J47.9 ADULT BRONCHIECTASIS: Primary | ICD-10-CM

## 2020-01-09 DIAGNOSIS — D80.1 HYPOGAMMAGLOBULINEMIA: ICD-10-CM

## 2020-01-09 PROCEDURE — 96365 THER/PROPH/DIAG IV INF INIT: CPT

## 2020-01-09 PROCEDURE — 96366 THER/PROPH/DIAG IV INF ADDON: CPT

## 2020-01-09 PROCEDURE — 63600175 PHARM REV CODE 636 W HCPCS: Mod: JG | Performed by: ALLERGY & IMMUNOLOGY

## 2020-01-09 RX ORDER — ACETAMINOPHEN 325 MG/1
650 TABLET ORAL
Status: DISCONTINUED | OUTPATIENT
Start: 2020-01-09 | End: 2020-01-09 | Stop reason: HOSPADM

## 2020-01-09 RX ORDER — ACETAMINOPHEN 325 MG/1
650 TABLET ORAL
Status: CANCELLED | OUTPATIENT
Start: 2020-02-06

## 2020-01-09 RX ORDER — SODIUM CHLORIDE 0.9 % (FLUSH) 0.9 %
10 SYRINGE (ML) INJECTION
Status: CANCELLED | OUTPATIENT
Start: 2020-02-06

## 2020-01-09 RX ORDER — SODIUM CHLORIDE 0.9 % (FLUSH) 0.9 %
10 SYRINGE (ML) INJECTION
Status: DISCONTINUED | OUTPATIENT
Start: 2020-01-09 | End: 2020-01-09 | Stop reason: HOSPADM

## 2020-01-09 RX ADMIN — SODIUM CHLORIDE: 0.9 INJECTION, SOLUTION INTRAVENOUS at 09:01

## 2020-01-09 RX ADMIN — HUMAN IMMUNOGLOBULIN G 20 G: 20 LIQUID INTRAVENOUS at 09:01

## 2020-01-14 ENCOUNTER — PES CALL (OUTPATIENT)
Dept: ADMINISTRATIVE | Facility: CLINIC | Age: 70
End: 2020-01-14

## 2020-01-21 ENCOUNTER — TELEPHONE (OUTPATIENT)
Dept: INTERNAL MEDICINE | Facility: CLINIC | Age: 70
End: 2020-01-21

## 2020-01-21 NOTE — TELEPHONE ENCOUNTER
----- Message from Elizabet Romero sent at 1/21/2020  8:43 AM CST -----  Contact: JUSTIN DE JESUS [[id number]]  Name of Who is Calling: JUSTIN DE JESUS [[id number]]    What is the request in detail: Would like to know if she will be having a pap smear done tomorrow. Please contact to further discuss and advise      Can the clinic reply by [facility]: no    What Number to Call Back if not in [facility]: [phone]

## 2020-01-21 NOTE — TELEPHONE ENCOUNTER
See below patient message. Patient would like to know if we are doing a pap tomorrow? Please advise.

## 2020-01-21 NOTE — TELEPHONE ENCOUNTER
LM for the patient to call the office. According to Dr Green, patient does not need a pap at this time.     Sent portal message.

## 2020-01-22 ENCOUNTER — OFFICE VISIT (OUTPATIENT)
Dept: INTERNAL MEDICINE | Facility: CLINIC | Age: 70
End: 2020-01-22
Payer: MEDICARE

## 2020-01-22 VITALS
BODY MASS INDEX: 14.14 KG/M2 | HEART RATE: 84 BPM | TEMPERATURE: 98 F | SYSTOLIC BLOOD PRESSURE: 106 MMHG | WEIGHT: 79.81 LBS | HEIGHT: 63 IN | DIASTOLIC BLOOD PRESSURE: 64 MMHG | OXYGEN SATURATION: 99 %

## 2020-01-22 DIAGNOSIS — K59.00 COLONIC CONSTIPATION: ICD-10-CM

## 2020-01-22 DIAGNOSIS — T14.8XXA ABRASION: ICD-10-CM

## 2020-01-22 DIAGNOSIS — I47.10 SVT (SUPRAVENTRICULAR TACHYCARDIA): ICD-10-CM

## 2020-01-22 DIAGNOSIS — Z13.220 ENCOUNTER FOR LIPID SCREENING FOR CARDIOVASCULAR DISEASE: ICD-10-CM

## 2020-01-22 DIAGNOSIS — D83.9 CVID (COMMON VARIABLE IMMUNODEFICIENCY): ICD-10-CM

## 2020-01-22 DIAGNOSIS — J47.9 ADULT BRONCHIECTASIS: ICD-10-CM

## 2020-01-22 DIAGNOSIS — E89.0 POSTOPERATIVE HYPOTHYROIDISM: Primary | ICD-10-CM

## 2020-01-22 DIAGNOSIS — F33.42 MAJOR DEPRESSIVE DISORDER, RECURRENT EPISODE, IN FULL REMISSION: ICD-10-CM

## 2020-01-22 DIAGNOSIS — Z13.6 ENCOUNTER FOR LIPID SCREENING FOR CARDIOVASCULAR DISEASE: ICD-10-CM

## 2020-01-22 LAB
ALBUMIN SERPL BCP-MCNC: 3.4 G/DL (ref 3.5–5.2)
ALP SERPL-CCNC: 67 U/L (ref 55–135)
ALT SERPL W/O P-5'-P-CCNC: 23 U/L (ref 10–44)
ANION GAP SERPL CALC-SCNC: 6 MMOL/L (ref 8–16)
AST SERPL-CCNC: 24 U/L (ref 10–40)
BASOPHILS # BLD AUTO: 0.01 K/UL (ref 0–0.2)
BASOPHILS NFR BLD: 0.2 % (ref 0–1.9)
BILIRUB SERPL-MCNC: 0.2 MG/DL (ref 0.1–1)
BUN SERPL-MCNC: 21 MG/DL (ref 8–23)
CALCIUM SERPL-MCNC: 8.5 MG/DL (ref 8.7–10.5)
CHLORIDE SERPL-SCNC: 96 MMOL/L (ref 95–110)
CHOLEST SERPL-MCNC: 189 MG/DL (ref 120–199)
CHOLEST/HDLC SERPL: 2.5 {RATIO} (ref 2–5)
CO2 SERPL-SCNC: 28 MMOL/L (ref 23–29)
CREAT SERPL-MCNC: 0.7 MG/DL (ref 0.5–1.4)
DIFFERENTIAL METHOD: ABNORMAL
EOSINOPHIL # BLD AUTO: 0 K/UL (ref 0–0.5)
EOSINOPHIL NFR BLD: 0 % (ref 0–8)
ERYTHROCYTE [DISTWIDTH] IN BLOOD BY AUTOMATED COUNT: 12.3 % (ref 11.5–14.5)
EST. GFR  (AFRICAN AMERICAN): >60 ML/MIN/1.73 M^2
EST. GFR  (NON AFRICAN AMERICAN): >60 ML/MIN/1.73 M^2
GLUCOSE SERPL-MCNC: 74 MG/DL (ref 70–110)
HCT VFR BLD AUTO: 38.9 % (ref 37–48.5)
HDLC SERPL-MCNC: 77 MG/DL (ref 40–75)
HDLC SERPL: 40.7 % (ref 20–50)
HGB BLD-MCNC: 12.9 G/DL (ref 12–16)
IMM GRANULOCYTES # BLD AUTO: 0.01 K/UL (ref 0–0.04)
IMM GRANULOCYTES NFR BLD AUTO: 0.2 % (ref 0–0.5)
LDLC SERPL CALC-MCNC: 102.6 MG/DL (ref 63–159)
LYMPHOCYTES # BLD AUTO: 0.6 K/UL (ref 1–4.8)
LYMPHOCYTES NFR BLD: 15.2 % (ref 18–48)
MCH RBC QN AUTO: 32 PG (ref 27–31)
MCHC RBC AUTO-ENTMCNC: 33.2 G/DL (ref 32–36)
MCV RBC AUTO: 97 FL (ref 82–98)
MONOCYTES # BLD AUTO: 0.3 K/UL (ref 0.3–1)
MONOCYTES NFR BLD: 6.7 % (ref 4–15)
NEUTROPHILS # BLD AUTO: 3.3 K/UL (ref 1.8–7.7)
NEUTROPHILS NFR BLD: 77.7 % (ref 38–73)
NONHDLC SERPL-MCNC: 112 MG/DL
NRBC BLD-RTO: 0 /100 WBC
PLATELET # BLD AUTO: 227 K/UL (ref 150–350)
PMV BLD AUTO: 9.9 FL (ref 9.2–12.9)
POTASSIUM SERPL-SCNC: 4.2 MMOL/L (ref 3.5–5.1)
PROT SERPL-MCNC: 7.2 G/DL (ref 6–8.4)
RBC # BLD AUTO: 4.03 M/UL (ref 4–5.4)
SODIUM SERPL-SCNC: 130 MMOL/L (ref 136–145)
TRIGL SERPL-MCNC: 47 MG/DL (ref 30–150)
TSH SERPL DL<=0.005 MIU/L-ACNC: 2.99 UIU/ML (ref 0.4–4)
WBC # BLD AUTO: 4.2 K/UL (ref 3.9–12.7)

## 2020-01-22 PROCEDURE — 1126F PR PAIN SEVERITY QUANTIFIED, NO PAIN PRESENT: ICD-10-PCS | Mod: ,,, | Performed by: FAMILY MEDICINE

## 2020-01-22 PROCEDURE — 80053 COMPREHEN METABOLIC PANEL: CPT

## 2020-01-22 PROCEDURE — 84443 ASSAY THYROID STIM HORMONE: CPT

## 2020-01-22 PROCEDURE — 1159F PR MEDICATION LIST DOCUMENTED IN MEDICAL RECORD: ICD-10-PCS | Mod: ,,, | Performed by: FAMILY MEDICINE

## 2020-01-22 PROCEDURE — 99999 PR PBB SHADOW E&M-EST. PATIENT-LVL III: CPT | Mod: PBBFAC,,, | Performed by: FAMILY MEDICINE

## 2020-01-22 PROCEDURE — 36415 COLL VENOUS BLD VENIPUNCTURE: CPT

## 2020-01-22 PROCEDURE — 99999 PR PBB SHADOW E&M-EST. PATIENT-LVL III: ICD-10-PCS | Mod: PBBFAC,,, | Performed by: FAMILY MEDICINE

## 2020-01-22 PROCEDURE — 80061 LIPID PANEL: CPT

## 2020-01-22 PROCEDURE — 85025 COMPLETE CBC W/AUTO DIFF WBC: CPT

## 2020-01-22 PROCEDURE — 1159F MED LIST DOCD IN RCRD: CPT | Mod: ,,, | Performed by: FAMILY MEDICINE

## 2020-01-22 PROCEDURE — 99213 OFFICE O/P EST LOW 20 MIN: CPT | Mod: PBBFAC,PN | Performed by: FAMILY MEDICINE

## 2020-01-22 PROCEDURE — 99214 OFFICE O/P EST MOD 30 MIN: CPT | Mod: S$PBB,,, | Performed by: FAMILY MEDICINE

## 2020-01-22 PROCEDURE — 1126F AMNT PAIN NOTED NONE PRSNT: CPT | Mod: ,,, | Performed by: FAMILY MEDICINE

## 2020-01-22 PROCEDURE — 99214 PR OFFICE/OUTPT VISIT, EST, LEVL IV, 30-39 MIN: ICD-10-PCS | Mod: S$PBB,,, | Performed by: FAMILY MEDICINE

## 2020-01-22 RX ORDER — MONTELUKAST SODIUM 10 MG/1
10 TABLET ORAL NIGHTLY
Qty: 90 TABLET | Refills: 4 | Status: SHIPPED | OUTPATIENT
Start: 2020-01-22 | End: 2020-07-22 | Stop reason: SDUPTHER

## 2020-01-22 RX ORDER — LEVOTHYROXINE SODIUM 88 UG/1
88 TABLET ORAL
Qty: 90 TABLET | Refills: 1 | Status: SHIPPED | OUTPATIENT
Start: 2020-01-22 | End: 2020-07-22 | Stop reason: SDUPTHER

## 2020-01-22 RX ORDER — MUPIROCIN 20 MG/G
OINTMENT TOPICAL 3 TIMES DAILY
Qty: 30 G | Refills: 3 | Status: SHIPPED | OUTPATIENT
Start: 2020-01-22 | End: 2020-08-13 | Stop reason: SDUPTHER

## 2020-01-22 NOTE — PROGRESS NOTES
"Subjective:       Patient ID: Yvette Crews is a 69 y.o. female.    Chief Complaint: Postoperative Hypothyroidism    HPI  68 y/o female with adult bronchiectasis, hypogammaglobulinemia, acquired hypothyroidism with hx of MNG s/p total thyroidectomy 1/25/17, osteopenia, hx of SVT s/p ablation in the past, MDD and VICTORINO is here for follow up.      She is feeling ok, weight fairly stable.  She is trying to stay active, she takes balance and strength classes and does the elliptical for an hour a day.  She denies f/n/v, has alternating d and constipation, she takes miralax prn, denies urinary sx. Mood good. She is sleeping ok at night.     CVID/Bronchiectasis: following with immunology, CT chest 11/2019, Singulair 10 mg daily, IVIG monthly  Hypothyroidism: levothyroxine 88 mcg daily  MDD/VICTORINO: declined medications and therapy  Eye exam utd  Colonscopy normal in 2012, repeat due in 10 years  MMG no longer needed    Review of Systems   Constitutional: Negative for activity change, appetite change, fatigue and fever.   Respiratory: Negative for shortness of breath.    Cardiovascular: Negative for chest pain, palpitations and leg swelling.   Gastrointestinal: Positive for constipation and diarrhea. Negative for nausea and vomiting.   Genitourinary: Negative for difficulty urinating.   Skin: Negative for rash.   Neurological: Negative for dizziness and light-headedness.   Psychiatric/Behavioral: Negative for sleep disturbance.       Objective:      /64 (BP Location: Left arm, Patient Position: Sitting, BP Method: Pediatric (Manual))   Pulse 84   Temp 97.7 °F (36.5 °C) (Oral)   Ht 5' 2.5" (1.588 m)   Wt 36.2 kg (79 lb 12.9 oz)   SpO2 99%   BMI 14.36 kg/m²   Physical Exam   Constitutional: She appears well-developed and well-nourished.   HENT:   Head: Normocephalic and atraumatic.   Mouth/Throat: No oropharyngeal exudate.   Neck: Normal range of motion. Neck supple. No thyromegaly present.   Cardiovascular: " Normal rate, regular rhythm and normal heart sounds.   Pulmonary/Chest: Effort normal and breath sounds normal. No respiratory distress. Right breast exhibits no inverted nipple, no mass, no nipple discharge, no skin change and no tenderness. Left breast exhibits no inverted nipple, no mass, no nipple discharge, no skin change and no tenderness.   Abdominal: Soft. Bowel sounds are normal. She exhibits no distension. There is no tenderness.   Musculoskeletal: She exhibits no edema.   Lymphadenopathy:     She has no cervical adenopathy.   Neurological: She is alert.   Skin: Skin is warm and dry.   Psychiatric: She has a normal mood and affect.   Nursing note and vitals reviewed.      Assessment:       1. Postoperative hypothyroidism    2. Adult bronchiectasis    3. Abrasion    4. Encounter for lipid screening for cardiovascular disease    5. CVID (common variable immunodeficiency)    6. Major depressive disorder, recurrent episode, in full remission    7. SVT (supraventricular tachycardia)    8. Colonic constipation        Plan:   Yvette was seen today for postoperative hypothyroidism.    Diagnoses and all orders for this visit:    Postoperative hypothyroidism  -     levothyroxine (SYNTHROID) 88 MCG tablet; Take 1 tablet (88 mcg total) by mouth before breakfast.  -     TSH  -     Comprehensive metabolic panel    Adult bronchiectasis  -     montelukast (SINGULAIR) 10 mg tablet; Take 1 tablet (10 mg total) by mouth every evening.  -     Comprehensive metabolic panel    Abrasion  -     mupirocin (BACTROBAN) 2 % ointment; Apply topically 3 (three) times daily.    Encounter for lipid screening for cardiovascular disease  -     Lipid panel    CVID (common variable immunodeficiency)  -     TSH  -     Comprehensive metabolic panel    Major depressive disorder, recurrent episode, in full remission  -     TSH    SVT (supraventricular tachycardia)    Colonic constipation

## 2020-02-07 ENCOUNTER — INFUSION (OUTPATIENT)
Dept: INFECTIOUS DISEASES | Facility: HOSPITAL | Age: 70
End: 2020-02-07
Attending: INTERNAL MEDICINE
Payer: MEDICARE

## 2020-02-07 VITALS
WEIGHT: 82 LBS | DIASTOLIC BLOOD PRESSURE: 72 MMHG | HEIGHT: 62 IN | SYSTOLIC BLOOD PRESSURE: 141 MMHG | HEART RATE: 78 BPM | BODY MASS INDEX: 15.09 KG/M2 | TEMPERATURE: 98 F

## 2020-02-07 DIAGNOSIS — D80.1 HYPOGAMMAGLOBULINEMIA: ICD-10-CM

## 2020-02-07 DIAGNOSIS — J47.9 ADULT BRONCHIECTASIS: Primary | ICD-10-CM

## 2020-02-07 PROCEDURE — 63600175 PHARM REV CODE 636 W HCPCS: Mod: JG | Performed by: ALLERGY & IMMUNOLOGY

## 2020-02-07 PROCEDURE — 96365 THER/PROPH/DIAG IV INF INIT: CPT

## 2020-02-07 PROCEDURE — 96366 THER/PROPH/DIAG IV INF ADDON: CPT

## 2020-02-07 RX ORDER — SODIUM CHLORIDE 0.9 % (FLUSH) 0.9 %
10 SYRINGE (ML) INJECTION
Status: DISCONTINUED | OUTPATIENT
Start: 2020-02-07 | End: 2020-02-07 | Stop reason: HOSPADM

## 2020-02-07 RX ORDER — SODIUM CHLORIDE 0.9 % (FLUSH) 0.9 %
10 SYRINGE (ML) INJECTION
Status: CANCELLED | OUTPATIENT
Start: 2020-03-06

## 2020-02-07 RX ORDER — ACETAMINOPHEN 325 MG/1
650 TABLET ORAL
Status: DISCONTINUED | OUTPATIENT
Start: 2020-02-07 | End: 2020-02-07 | Stop reason: HOSPADM

## 2020-02-07 RX ORDER — ACETAMINOPHEN 325 MG/1
650 TABLET ORAL
Status: CANCELLED | OUTPATIENT
Start: 2020-03-06

## 2020-02-07 RX ADMIN — HUMAN IMMUNOGLOBULIN G 20 G: 20 LIQUID INTRAVENOUS at 09:02

## 2020-03-06 ENCOUNTER — INFUSION (OUTPATIENT)
Dept: INFECTIOUS DISEASES | Facility: HOSPITAL | Age: 70
End: 2020-03-06
Attending: INTERNAL MEDICINE
Payer: MEDICARE

## 2020-03-06 VITALS
RESPIRATION RATE: 15 BRPM | TEMPERATURE: 99 F | HEIGHT: 62 IN | OXYGEN SATURATION: 96 % | WEIGHT: 82.44 LBS | BODY MASS INDEX: 15.17 KG/M2 | DIASTOLIC BLOOD PRESSURE: 74 MMHG | HEART RATE: 73 BPM | SYSTOLIC BLOOD PRESSURE: 121 MMHG

## 2020-03-06 DIAGNOSIS — J47.9 ADULT BRONCHIECTASIS: Primary | ICD-10-CM

## 2020-03-06 DIAGNOSIS — D80.1 HYPOGAMMAGLOBULINEMIA: ICD-10-CM

## 2020-03-06 PROCEDURE — A4216 STERILE WATER/SALINE, 10 ML: HCPCS | Performed by: ALLERGY & IMMUNOLOGY

## 2020-03-06 PROCEDURE — 96365 THER/PROPH/DIAG IV INF INIT: CPT

## 2020-03-06 PROCEDURE — 25000003 PHARM REV CODE 250: Performed by: ALLERGY & IMMUNOLOGY

## 2020-03-06 PROCEDURE — 96366 THER/PROPH/DIAG IV INF ADDON: CPT

## 2020-03-06 PROCEDURE — 63600175 PHARM REV CODE 636 W HCPCS: Mod: JG | Performed by: ALLERGY & IMMUNOLOGY

## 2020-03-06 RX ORDER — SODIUM CHLORIDE 0.9 % (FLUSH) 0.9 %
10 SYRINGE (ML) INJECTION
Status: CANCELLED | OUTPATIENT
Start: 2020-04-03

## 2020-03-06 RX ORDER — SODIUM CHLORIDE 0.9 % (FLUSH) 0.9 %
10 SYRINGE (ML) INJECTION
Status: DISCONTINUED | OUTPATIENT
Start: 2020-03-06 | End: 2020-03-06 | Stop reason: HOSPADM

## 2020-03-06 RX ORDER — ACETAMINOPHEN 325 MG/1
650 TABLET ORAL
Status: DISCONTINUED | OUTPATIENT
Start: 2020-03-06 | End: 2020-03-06 | Stop reason: HOSPADM

## 2020-03-06 RX ORDER — ACETAMINOPHEN 325 MG/1
650 TABLET ORAL
Status: CANCELLED | OUTPATIENT
Start: 2020-04-03

## 2020-03-06 RX ADMIN — Medication 10 ML: at 08:03

## 2020-03-06 RX ADMIN — SODIUM CHLORIDE: 0.9 INJECTION, SOLUTION INTRAVENOUS at 08:03

## 2020-03-06 RX ADMIN — HUMAN IMMUNOGLOBULIN G 20 G: 20 LIQUID INTRAVENOUS at 08:03

## 2020-03-13 ENCOUNTER — PATIENT MESSAGE (OUTPATIENT)
Dept: INTERNAL MEDICINE | Facility: CLINIC | Age: 70
End: 2020-03-13

## 2020-03-16 ENCOUNTER — PATIENT MESSAGE (OUTPATIENT)
Dept: INTERNAL MEDICINE | Facility: CLINIC | Age: 70
End: 2020-03-16

## 2020-03-17 ENCOUNTER — PATIENT MESSAGE (OUTPATIENT)
Dept: INTERNAL MEDICINE | Facility: CLINIC | Age: 70
End: 2020-03-17

## 2020-03-17 ENCOUNTER — TELEPHONE (OUTPATIENT)
Dept: ALLERGY | Facility: CLINIC | Age: 70
End: 2020-03-17

## 2020-03-17 NOTE — TELEPHONE ENCOUNTER
----- Message from Tammy Aldridgelata sent at 3/17/2020  4:24 PM CDT -----  Contact: pt at [phone]  Damian pt-Pt lives at [facility] and cannot leave the facility.  Pt is calling in ref to an infusion that she gets once a month.  Pt is asking if someone can go there to give her the infusion.  Pt states that she will die w/o her medication.  Please call pt at her personal number above.  Due in 2 weeks for her infusion.

## 2020-03-17 NOTE — TELEPHONE ENCOUNTER
Spoke to patient who lives and is quarantined at the [facility]. She is due for her IVIG in 2 weeks at [facility] and as of right now can not leave. Asked patient if there is a doctor on staff there that we can talk to. Patient stated no. Informed patient that I will contact [facility] to see if we can send in someone from home health, if so we will work on getting that approved through your insurance.    Patient verbalized understanding.

## 2020-03-18 ENCOUNTER — PATIENT MESSAGE (OUTPATIENT)
Dept: INTERNAL MEDICINE | Facility: CLINIC | Age: 70
End: 2020-03-18

## 2020-03-18 NOTE — TELEPHONE ENCOUNTER
----- Message from Genesis Amaya sent at 3/18/2020 11:11 AM CDT -----  Contact: Karen Jones is calling to speak with the nurse to see if the pt needs to come to her infusion appt in April pt lives in a home and they are seeing if it is necessary for the pt to come. Can you please call Karen Brower [phone] ext [phone].    LINETTE

## 2020-03-18 NOTE — TELEPHONE ENCOUNTER
Spoke to Yoko at [facility] who spoke to the  at [facility] and it was okayed for a home health nurse to come in to give Privigen.  I contacted [facility] since [facility] uses them, I had to leave a message.  Still awaiting on call. I also called Lety Shaikh in [facility] to see the Privigen can be approved through pharmacy benefits.  I reached out to patient who stated that [facility] reached out to her but she was unsure why. Will reach out to [facility] tomorrow.

## 2020-03-18 NOTE — TELEPHONE ENCOUNTER
----- Message from Tammy Dickey sent at 3/18/2020 11:14 AM CDT -----  Contact: pt at [phone]  Damian pt-Pt left a message late yesterday about getting her  Infusion.  Please call pt to discuss.  Thanks.  Pt very concerned.

## 2020-03-19 ENCOUNTER — TELEPHONE (OUTPATIENT)
Dept: PHARMACY | Facility: CLINIC | Age: 70
End: 2020-03-19

## 2020-03-20 ENCOUNTER — TELEPHONE (OUTPATIENT)
Dept: ALLERGY | Facility: CLINIC | Age: 70
End: 2020-03-20

## 2020-03-20 NOTE — TELEPHONE ENCOUNTER
Notified patient that insurance denied her infusion at this time.  Patient states that she will call Medicare to see if she can get approved.      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  States that she has left 2 messages.  She is on lockdown at [facility] and can't leave.  Please call pt and let her know something asap.  Doesn't want to miss her treatment and not sure if anyone can go there.  Treatment is due on Wednesday April 3 at 0930.  Needs to have someone to go there and call [facility] and let them know that she needs this treatment. This is her 3rd call to us.  Extremely urgent per pt.  Call with update please.

## 2020-03-23 ENCOUNTER — TELEPHONE (OUTPATIENT)
Dept: ALLERGY | Facility: CLINIC | Age: 70
End: 2020-03-23

## 2020-03-23 ENCOUNTER — PATIENT MESSAGE (OUTPATIENT)
Dept: ALLERGY | Facility: CLINIC | Age: 70
End: 2020-03-23

## 2020-03-23 NOTE — TELEPHONE ENCOUNTER
Notified patient that we have recently sent orders to a home infusion company to try to get infusion approved.      ----- Message from Delmy Cadena sent at 3/23/2020  9:39 AM CDT -----  Contact: patient   Please call above patient at [phone] said she need to speak with the doctor has been calling for days and no one will return her call,said she is getting very sick waiting on a call back concerning infusion thanks.

## 2020-03-23 NOTE — TELEPHONE ENCOUNTER
Notified Mihir that at this time we are awaiting insurance approval from a home infusion company.      ----- Message from Genesis Amaya sent at 3/23/2020 10:38 AM CDT -----  Contact: [facility]   Mihir Connors is calling to speak with the nurse in ref to the pts infusion therapy treatments they are trying to see if home health can come out to have the treatments at the [facility] they have left several messages and with the pt has left messages. Can you please call Lanie at [phone] ext [phone]    LINETTE

## 2020-03-25 ENCOUNTER — PATIENT MESSAGE (OUTPATIENT)
Dept: INTERNAL MEDICINE | Facility: CLINIC | Age: 70
End: 2020-03-25

## 2020-03-25 ENCOUNTER — TELEPHONE (OUTPATIENT)
Dept: ALLERGY | Facility: CLINIC | Age: 70
End: 2020-03-25

## 2020-03-25 NOTE — LETTER
March 25, 2020      [facility]  [address], [address]  [place] [address]  Phone: [phone]  Fax: [phone]       Patient: Yvette Crews   YOB: 1950  Date of Visit: 03/25/2020    To Whom It May Concern:    Mark Crews  Is my patient at  [facility] .  Please excuse her from jury duty due to her multiple medical comorbidities.  If you have any questions or concerns, or if I can be of further assistance, please do not hesitate to contact me.    Sincerely,    Sally Green MD

## 2020-03-25 NOTE — TELEPHONE ENCOUNTER
Notified patient that at this time the approval for her infusion is still pending from the insurance.          ----- Message from Genesis Amaya sent at 3/25/2020  8:36 AM CDT -----  Contact: pt  Pt is calling to speak with the nurse about her infusion therapy treatments pt needs to know who will be in charge pt is asking if its acceptable to get an iv in her arm. Pt would like this to be addressed and explained to her pt has been calling since last week along with the [facility]. Can you please call pt at [phone].    LINETTE

## 2020-03-26 ENCOUNTER — TELEPHONE (OUTPATIENT)
Dept: ALLERGY | Facility: CLINIC | Age: 70
End: 2020-03-26

## 2020-03-26 NOTE — TELEPHONE ENCOUNTER
Left message for patient to notify that at this time, the infusion company is closed and I will follow up with them tomorrow.    ----- Message from Tammy Dickey sent at 3/26/2020  2:49 PM CDT -----  Contact: pt at [phone]  Damian helton-Fanta Castro-Pt has an appt April 3 for an infusion.  Pt is calling to confirm that this will take place at [facility] where she lives.  Pt thinks that this will  happen in her appt.

## 2020-03-27 ENCOUNTER — TELEPHONE (OUTPATIENT)
Dept: ALLERGY | Facility: CLINIC | Age: 70
End: 2020-03-27

## 2020-03-27 ENCOUNTER — TELEPHONE (OUTPATIENT)
Dept: INTERNAL MEDICINE | Facility: CLINIC | Age: 70
End: 2020-03-27

## 2020-03-27 NOTE — TELEPHONE ENCOUNTER
Spoke with Karen, she was inquiring about an infusion. I told her I will send this to the correct staff to call her back.    ----- Message from Elizabet Romero sent at 3/27/2020 10:36 AM CDT -----  Contact: Yoko Montgomery  Name of Who is Calling: Yoko Montgomery    What is the request in detail: Would like to speak with staff in regards to the patient's upcoming infusion appointment. Please contact to further discuss and advise      What Number [facility]: [phone] ext. [phone]

## 2020-03-27 NOTE — TELEPHONE ENCOUNTER
As of now, they are going to keep the infusion at the infusion center until we hear back from [facility].  I will call [facility] at [phone] to speak with Adali Yanes or Toni Silva.  Notified Yoko that I tried to call [facility] multiple times yesterday and today, but their phone lines were down.  I will email Yoko at [email or web address] on Monday with a status.

## 2020-03-27 NOTE — TELEPHONE ENCOUNTER
----- Message from Genesis Amaya sent at 3/27/2020  2:42 PM CDT -----  Contact: pt   Pt is calling to speak with Fanta in ref to her infusion treatments can you please thania Jewell at [phone].    LINETTE

## 2020-03-27 NOTE — TELEPHONE ENCOUNTER
----- Message from Madiha Helm sent at 3/27/2020  9:47 AM CDT -----  Contact: Patient  Reason: Patient would like a call. She have a few questions about the up coming treatment      Contact: [phone]

## 2020-03-27 NOTE — TELEPHONE ENCOUNTER
----- Message from Tammy Dickey sent at 3/27/2020  9:32 AM CDT -----  Contact: pt at [phone]  Lana pt-Pt has an infusion apopt next Friday in [place] and does not wanty to go there.  Wants them to come to her in the [facility].  Pt has called numerous times.  Pt cna be reached at above number.  Thanks

## 2020-03-30 ENCOUNTER — TELEPHONE (OUTPATIENT)
Dept: ALLERGY | Facility: CLINIC | Age: 70
End: 2020-03-30

## 2020-03-30 NOTE — TELEPHONE ENCOUNTER
Patient and  (Pavel) will call to schedule infusion [phone], attn: Adali to set up infusion.  Infusion has been approved.        ----- Message from Genesis Amaya sent at 3/30/2020 10:51 AM CDT -----  Contact: pts pati Pavel Zhao is calling to speak with the nurse about a new issue that had popped up from the pts pharmacy about a medication. The pt was given an over the counter medication instead of the medication she was suppose to be given. The pts  said the pharmacy called her this morning.  This medication is about her IV infusion treatments. Can you please call pts  Pavel at [phone]. Pts  would like to speak with Dr Salgado about this issue.    LINETTE

## 2020-03-31 ENCOUNTER — TELEPHONE (OUTPATIENT)
Dept: ALLERGY | Facility: CLINIC | Age: 70
End: 2020-03-31

## 2020-03-31 NOTE — TELEPHONE ENCOUNTER
----- Message from Tammy Dickey sent at 3/31/2020  3:32 PM CDT -----  Contact: pt at [phone]  Damian-Pt is asking for Adali in ref to her infusion that she will be having.

## 2020-04-02 ENCOUNTER — TELEPHONE (OUTPATIENT)
Dept: INFECTIOUS DISEASES | Facility: HOSPITAL | Age: 70
End: 2020-04-02

## 2020-04-02 NOTE — TELEPHONE ENCOUNTER
Spoke with patient, pt asymptomatic and feeling okay.  Will be wearing a mask upon arrival tomorrow.

## 2020-04-03 ENCOUNTER — INFUSION (OUTPATIENT)
Dept: INFECTIOUS DISEASES | Facility: HOSPITAL | Age: 70
End: 2020-04-03
Attending: INTERNAL MEDICINE
Payer: MEDICARE

## 2020-04-03 VITALS
BODY MASS INDEX: 14.69 KG/M2 | HEART RATE: 95 BPM | SYSTOLIC BLOOD PRESSURE: 130 MMHG | DIASTOLIC BLOOD PRESSURE: 70 MMHG | TEMPERATURE: 99 F | HEIGHT: 62 IN | OXYGEN SATURATION: 99 % | RESPIRATION RATE: 15 BRPM | WEIGHT: 79.81 LBS

## 2020-04-03 DIAGNOSIS — J47.9 ADULT BRONCHIECTASIS: Primary | ICD-10-CM

## 2020-04-03 DIAGNOSIS — D80.1 HYPOGAMMAGLOBULINEMIA: ICD-10-CM

## 2020-04-03 PROCEDURE — 96365 THER/PROPH/DIAG IV INF INIT: CPT

## 2020-04-03 PROCEDURE — 96366 THER/PROPH/DIAG IV INF ADDON: CPT

## 2020-04-03 PROCEDURE — 63600175 PHARM REV CODE 636 W HCPCS: Performed by: ALLERGY & IMMUNOLOGY

## 2020-04-03 RX ORDER — SODIUM CHLORIDE 0.9 % (FLUSH) 0.9 %
10 SYRINGE (ML) INJECTION
Status: DISCONTINUED | OUTPATIENT
Start: 2020-04-03 | End: 2020-04-03 | Stop reason: HOSPADM

## 2020-04-03 RX ORDER — SODIUM CHLORIDE 0.9 % (FLUSH) 0.9 %
10 SYRINGE (ML) INJECTION
Status: CANCELLED | OUTPATIENT
Start: 2020-05-01

## 2020-04-03 RX ORDER — ACETAMINOPHEN 325 MG/1
650 TABLET ORAL
Status: CANCELLED | OUTPATIENT
Start: 2020-05-01

## 2020-04-03 RX ORDER — ACETAMINOPHEN 325 MG/1
650 TABLET ORAL
Status: DISCONTINUED | OUTPATIENT
Start: 2020-04-03 | End: 2020-04-03 | Stop reason: HOSPADM

## 2020-04-03 RX ADMIN — SODIUM CHLORIDE: 0.9 INJECTION, SOLUTION INTRAVENOUS at 01:04

## 2020-04-03 RX ADMIN — HUMAN IMMUNOGLOBULIN G 20 G: 20 LIQUID INTRAVENOUS at 01:04

## 2020-04-03 NOTE — PROGRESS NOTES
Pt refused premeds of both tylenol and benadryl.  Pt received dose of IVIG PRIVIGEN 20 GRAMS.  Infusion started and completed at a rate of 99cc/hr. IV NS ran concurrently at 25 ml/hr.  Report given to pt's nurse Lavonne at [place]. Pt tolerated well and left in NAD.

## 2020-04-13 ENCOUNTER — TELEPHONE (OUTPATIENT)
Dept: ALLERGY | Facility: CLINIC | Age: 70
End: 2020-04-13

## 2020-04-13 NOTE — TELEPHONE ENCOUNTER
Notified patient that she should still come to [facility] to receive her next infusion in May.      ----- Message from Tammy Dickey sent at 4/13/2020  2:30 PM CDT -----  Contact: PT [phone]  JUANITO PT-Pt is calling to set up her infusion for May 1 at 0130.    Pt wants to be called back.  Thanks

## 2020-04-28 ENCOUNTER — TELEPHONE (OUTPATIENT)
Dept: INFECTIOUS DISEASES | Facility: HOSPITAL | Age: 70
End: 2020-04-28

## 2020-04-28 DIAGNOSIS — D80.1 HYPOGAMMAGLOBULINEMIA: Primary | ICD-10-CM

## 2020-04-29 ENCOUNTER — LAB VISIT (OUTPATIENT)
Dept: INTERNAL MEDICINE | Facility: CLINIC | Age: 70
End: 2020-04-29
Payer: MEDICARE

## 2020-04-29 DIAGNOSIS — D80.1 HYPOGAMMAGLOBULINEMIA: ICD-10-CM

## 2020-04-29 LAB — SARS-COV-2 RNA RESP QL NAA+PROBE: NOT DETECTED

## 2020-04-29 PROCEDURE — U0002 COVID-19 LAB TEST NON-CDC: HCPCS

## 2020-05-01 ENCOUNTER — INFUSION (OUTPATIENT)
Dept: INFECTIOUS DISEASES | Facility: HOSPITAL | Age: 70
End: 2020-05-01
Payer: MEDICARE

## 2020-05-01 VITALS
DIASTOLIC BLOOD PRESSURE: 70 MMHG | HEIGHT: 62 IN | SYSTOLIC BLOOD PRESSURE: 126 MMHG | HEART RATE: 83 BPM | BODY MASS INDEX: 14.69 KG/M2 | RESPIRATION RATE: 14 BRPM | OXYGEN SATURATION: 98 % | WEIGHT: 79.81 LBS | TEMPERATURE: 99 F

## 2020-05-01 DIAGNOSIS — D80.1 HYPOGAMMAGLOBULINEMIA: ICD-10-CM

## 2020-05-01 DIAGNOSIS — J47.9 ADULT BRONCHIECTASIS: Primary | ICD-10-CM

## 2020-05-01 PROCEDURE — 96365 THER/PROPH/DIAG IV INF INIT: CPT

## 2020-05-01 PROCEDURE — 63600175 PHARM REV CODE 636 W HCPCS: Mod: JG | Performed by: ALLERGY & IMMUNOLOGY

## 2020-05-01 PROCEDURE — 25000003 PHARM REV CODE 250: Performed by: ALLERGY & IMMUNOLOGY

## 2020-05-01 PROCEDURE — 96366 THER/PROPH/DIAG IV INF ADDON: CPT

## 2020-05-01 RX ORDER — SODIUM CHLORIDE 0.9 % (FLUSH) 0.9 %
10 SYRINGE (ML) INJECTION
Status: DISCONTINUED | OUTPATIENT
Start: 2020-05-01 | End: 2020-05-01 | Stop reason: HOSPADM

## 2020-05-01 RX ADMIN — SODIUM CHLORIDE: 9 INJECTION, SOLUTION INTRAVENOUS at 01:05

## 2020-05-01 RX ADMIN — HUMAN IMMUNOGLOBULIN G 20 G: 20 LIQUID INTRAVENOUS at 01:05

## 2020-05-01 NOTE — PROGRESS NOTES
Pt refused premeds of both tylenol and benadryl.  Pt received dose of IVIG PRIVIGEN 20 GRAMS.  Infusion started and completed at a rate of 99cc/hr. IV NS ran concurrently at 25 ml/hr. Pt tolerated well and left in NAD. Pt refused premeds of both tylenol and benadryl.  Pt received dose of IVIG PRIVIGEN 20 GRAMS.  Infusion started and completed at a rate of 99cc/hr. IV NS ran concurrently at 25 ml/hr.  Pt tolerated well and left in NAD.

## 2020-05-21 ENCOUNTER — TELEPHONE (OUTPATIENT)
Dept: INFECTIOUS DISEASES | Facility: HOSPITAL | Age: 70
End: 2020-05-21

## 2020-05-25 ENCOUNTER — TELEPHONE (OUTPATIENT)
Dept: INFECTIOUS DISEASES | Facility: HOSPITAL | Age: 70
End: 2020-05-25

## 2020-05-28 ENCOUNTER — LAB VISIT (OUTPATIENT)
Dept: INTERNAL MEDICINE | Facility: CLINIC | Age: 70
End: 2020-05-28
Payer: MEDICARE

## 2020-05-28 DIAGNOSIS — D80.1 HYPOGAMMAGLOBULINEMIA: ICD-10-CM

## 2020-05-28 LAB — SARS-COV-2 RNA RESP QL NAA+PROBE: NOT DETECTED

## 2020-05-28 PROCEDURE — U0003 INFECTIOUS AGENT DETECTION BY NUCLEIC ACID (DNA OR RNA); SEVERE ACUTE RESPIRATORY SYNDROME CORONAVIRUS 2 (SARS-COV-2) (CORONAVIRUS DISEASE [COVID-19]), AMPLIFIED PROBE TECHNIQUE, MAKING USE OF HIGH THROUGHPUT TECHNOLOGIES AS DESCRIBED BY CMS-2020-01-R: HCPCS

## 2020-05-29 ENCOUNTER — INFUSION (OUTPATIENT)
Dept: INFECTIOUS DISEASES | Facility: HOSPITAL | Age: 70
End: 2020-05-29
Attending: OPHTHALMOLOGY
Payer: MEDICARE

## 2020-05-29 ENCOUNTER — PATIENT MESSAGE (OUTPATIENT)
Dept: INFECTIOUS DISEASES | Facility: HOSPITAL | Age: 70
End: 2020-05-29

## 2020-05-29 VITALS
TEMPERATURE: 99 F | BODY MASS INDEX: 14.82 KG/M2 | OXYGEN SATURATION: 98 % | DIASTOLIC BLOOD PRESSURE: 65 MMHG | RESPIRATION RATE: 15 BRPM | HEIGHT: 62 IN | WEIGHT: 80.56 LBS | SYSTOLIC BLOOD PRESSURE: 134 MMHG | HEART RATE: 77 BPM

## 2020-05-29 DIAGNOSIS — J47.9 ADULT BRONCHIECTASIS: Primary | ICD-10-CM

## 2020-05-29 DIAGNOSIS — D80.1 HYPOGAMMAGLOBULINEMIA: ICD-10-CM

## 2020-05-29 PROCEDURE — 96366 THER/PROPH/DIAG IV INF ADDON: CPT

## 2020-05-29 PROCEDURE — 63600175 PHARM REV CODE 636 W HCPCS: Mod: JG | Performed by: ALLERGY & IMMUNOLOGY

## 2020-05-29 PROCEDURE — 25000003 PHARM REV CODE 250: Performed by: ALLERGY & IMMUNOLOGY

## 2020-05-29 PROCEDURE — 96365 THER/PROPH/DIAG IV INF INIT: CPT

## 2020-05-29 RX ORDER — SODIUM CHLORIDE 0.9 % (FLUSH) 0.9 %
10 SYRINGE (ML) INJECTION
Status: DISCONTINUED | OUTPATIENT
Start: 2020-05-29 | End: 2020-05-29 | Stop reason: HOSPADM

## 2020-05-29 RX ADMIN — HUMAN IMMUNOGLOBULIN G 20 G: 20 LIQUID INTRAVENOUS at 02:05

## 2020-05-29 RX ADMIN — SODIUM CHLORIDE: 0.9 INJECTION, SOLUTION INTRAVENOUS at 02:05

## 2020-06-25 ENCOUNTER — TELEPHONE (OUTPATIENT)
Dept: INFECTIOUS DISEASES | Facility: CLINIC | Age: 70
End: 2020-06-25

## 2020-06-25 RX ORDER — ACETAMINOPHEN 325 MG/1
650 TABLET ORAL
Status: CANCELLED | OUTPATIENT
Start: 2020-06-25

## 2020-06-25 RX ORDER — SODIUM CHLORIDE 0.9 % (FLUSH) 0.9 %
10 SYRINGE (ML) INJECTION
Status: CANCELLED | OUTPATIENT
Start: 2020-06-25

## 2020-06-25 NOTE — TELEPHONE ENCOUNTER
Tried calling patient = no answer, left voicemail.  (need to confirm appt and ask covid screening questions)

## 2020-06-26 ENCOUNTER — INFUSION (OUTPATIENT)
Dept: INFECTIOUS DISEASES | Facility: HOSPITAL | Age: 70
End: 2020-06-26
Attending: INTERNAL MEDICINE
Payer: MEDICARE

## 2020-06-26 VITALS
HEIGHT: 62 IN | SYSTOLIC BLOOD PRESSURE: 146 MMHG | WEIGHT: 81.69 LBS | DIASTOLIC BLOOD PRESSURE: 70 MMHG | RESPIRATION RATE: 16 BRPM | HEART RATE: 83 BPM | BODY MASS INDEX: 15.03 KG/M2 | TEMPERATURE: 99 F | OXYGEN SATURATION: 99 %

## 2020-06-26 DIAGNOSIS — D80.1 HYPOGAMMAGLOBULINEMIA: ICD-10-CM

## 2020-06-26 DIAGNOSIS — J47.9 ADULT BRONCHIECTASIS: Primary | ICD-10-CM

## 2020-06-26 PROCEDURE — 96366 THER/PROPH/DIAG IV INF ADDON: CPT

## 2020-06-26 PROCEDURE — 63600175 PHARM REV CODE 636 W HCPCS: Mod: JG | Performed by: ALLERGY & IMMUNOLOGY

## 2020-06-26 PROCEDURE — 96365 THER/PROPH/DIAG IV INF INIT: CPT

## 2020-06-26 PROCEDURE — 25000003 PHARM REV CODE 250: Performed by: ALLERGY & IMMUNOLOGY

## 2020-06-26 RX ORDER — SODIUM CHLORIDE 0.9 % (FLUSH) 0.9 %
10 SYRINGE (ML) INJECTION
Status: DISCONTINUED | OUTPATIENT
Start: 2020-06-26 | End: 2020-06-26 | Stop reason: HOSPADM

## 2020-06-26 RX ORDER — ACETAMINOPHEN 325 MG/1
650 TABLET ORAL
Status: CANCELLED | OUTPATIENT
Start: 2020-07-24

## 2020-06-26 RX ORDER — ACETAMINOPHEN 325 MG/1
650 TABLET ORAL
Status: DISCONTINUED | OUTPATIENT
Start: 2020-06-26 | End: 2020-06-26 | Stop reason: HOSPADM

## 2020-06-26 RX ORDER — SODIUM CHLORIDE 0.9 % (FLUSH) 0.9 %
10 SYRINGE (ML) INJECTION
Status: CANCELLED | OUTPATIENT
Start: 2020-07-24

## 2020-06-26 RX ADMIN — HUMAN IMMUNOGLOBULIN G 20 G: 20 LIQUID INTRAVENOUS at 11:06

## 2020-06-26 RX ADMIN — SODIUM CHLORIDE: 0.9 INJECTION, SOLUTION INTRAVENOUS at 11:06

## 2020-06-26 NOTE — PLAN OF CARE
Patient resting in [facility], VSS.  Education provided on handwashing and infection control.

## 2020-07-17 DIAGNOSIS — Z71.89 COMPLEX CARE COORDINATION: ICD-10-CM

## 2020-07-22 ENCOUNTER — OFFICE VISIT (OUTPATIENT)
Dept: INTERNAL MEDICINE | Facility: CLINIC | Age: 70
End: 2020-07-22
Payer: MEDICARE

## 2020-07-22 ENCOUNTER — TELEPHONE (OUTPATIENT)
Dept: INTERNAL MEDICINE | Facility: CLINIC | Age: 70
End: 2020-07-22

## 2020-07-22 DIAGNOSIS — F33.42 MAJOR DEPRESSIVE DISORDER, RECURRENT EPISODE, IN FULL REMISSION: ICD-10-CM

## 2020-07-22 DIAGNOSIS — E89.0 POSTOPERATIVE HYPOTHYROIDISM: Primary | ICD-10-CM

## 2020-07-22 DIAGNOSIS — D80.1 HYPOGAMMAGLOBULINEMIA: ICD-10-CM

## 2020-07-22 DIAGNOSIS — J47.9 ADULT BRONCHIECTASIS: ICD-10-CM

## 2020-07-22 DIAGNOSIS — K59.00 COLONIC CONSTIPATION: ICD-10-CM

## 2020-07-22 DIAGNOSIS — R79.89 LOW SERUM SODIUM: ICD-10-CM

## 2020-07-22 DIAGNOSIS — K58.9 IRRITABLE BOWEL SYNDROME, UNSPECIFIED TYPE: ICD-10-CM

## 2020-07-22 PROCEDURE — 99213 PR OFFICE/OUTPT VISIT, EST, LEVL III, 20-29 MIN: ICD-10-PCS | Mod: 95,,, | Performed by: FAMILY MEDICINE

## 2020-07-22 PROCEDURE — 99213 OFFICE O/P EST LOW 20 MIN: CPT | Mod: 95,,, | Performed by: FAMILY MEDICINE

## 2020-07-22 RX ORDER — MONTELUKAST SODIUM 10 MG/1
10 TABLET ORAL NIGHTLY
Qty: 90 TABLET | Refills: 4 | Status: SHIPPED | OUTPATIENT
Start: 2020-07-22 | End: 2021-09-23

## 2020-07-22 RX ORDER — GUAIFENESIN 600 MG/1
1200 TABLET, EXTENDED RELEASE ORAL 2 TIMES DAILY
Qty: 120 TABLET | Refills: 6 | Status: ON HOLD | OUTPATIENT
Start: 2020-07-22 | End: 2023-02-08 | Stop reason: HOSPADM

## 2020-07-22 RX ORDER — POLYETHYLENE GLYCOL 3350 17 G/17G
17 POWDER, FOR SOLUTION ORAL DAILY
Qty: 850 G | Refills: 3 | Status: ON HOLD | OUTPATIENT
Start: 2020-07-22 | End: 2021-01-21 | Stop reason: HOSPADM

## 2020-07-22 RX ORDER — LEVOTHYROXINE SODIUM 88 UG/1
88 TABLET ORAL
Qty: 90 TABLET | Refills: 1 | Status: SHIPPED | OUTPATIENT
Start: 2020-07-22 | End: 2021-03-31

## 2020-07-22 NOTE — PROGRESS NOTES
Subjective:       Patient ID: Yvette Crews is a 70 y.o. female.    Chief Complaint: No chief complaint on file.    HPI  69 y/o female with adult bronchiectasis, hypogammaglobulinemia, acquired hypothyroidism with hx of MNG s/p total thyroidectomy 1/25/17, osteopenia, hx of SVT s/p ablation in the past, MDD and VICTORINO is here for follow up.      She is feeling good, she reports she has been scared and anxious since the pandemic, her weight is up 1 pound she weighs 82 pounds, appetite good. She active and exercises daily, she does strength and balance exercises. She denies f/n/v, has alternating d and constipation, she takes miralax prn, denies cp/sob/urinary sx. Mood good. She is sleeping ok at night. She has infusion set for Friday.     CVID/Bronchiectasis: following with immunology, CT chest 11/2019, Singulair 10 mg daily, IVIG monthly  Hypothyroidism: levothyroxine 88 mcg daily  MDD/VICTORINO: declined medications and therapy  Eye exam utd  Colonscopy normal in 2012, repeat due in 10 years  MMG no longer needed    Review of Systems  see HPI  Objective:      There were no vitals taken for this visit.  Physical Exam  Constitutional:       General: She is not in acute distress.     Appearance: She is well-developed.   HENT:      Head: Normocephalic and atraumatic.   Pulmonary:      Effort: No respiratory distress.   Neurological:      Mental Status: She is alert.   Psychiatric:         Behavior: Behavior normal.         Assessment:       1. Postoperative hypothyroidism    2. Low serum sodium    3. Adult bronchiectasis    4. Colonic constipation    5. Hypogammaglobulinemia    6. Irritable bowel syndrome, unspecified type    7. Major depressive disorder, recurrent episode, in full remission        Plan:   Diagnoses and all orders for this visit:    Postoperative hypothyroidism  -     levothyroxine (SYNTHROID) 88 MCG tablet; Take 1 tablet (88 mcg total) by mouth before breakfast.    Low serum sodium  -     Basic  metabolic panel; Future    Adult bronchiectasis  -     montelukast (SINGULAIR) 10 mg tablet; Take 1 tablet (10 mg total) by mouth every evening.  -     guaiFENesin (MUCINEX) 600 mg 12 hr tablet; Take 2 tablets (1,200 mg total) by mouth 2 (two) times daily.    Colonic constipation  -     polyethylene glycol (GLYCOLAX) 17 gram/dose powder; Take 17 g by mouth once daily.    Hypogammaglobulinemia    Irritable bowel syndrome, unspecified type    Major depressive disorder, recurrent episode, in full remission    The patient location is: [place]  The chief complaint leading to consultation is: hypothyroid    Visit type: audiovisual    Face to Face time with patient: 20 minutes of total time spent on the encounter, which includes face to face time and non-face to face time preparing to see the patient (eg, review of tests), Obtaining and/or reviewing separately obtained history, Documenting clinical information in the electronic or other health record, Independently interpreting results (not separately reported) and communicating results to the patient/family/caregiver, or Care coordination (not separately reported).         Each patient to whom he or she provides medical services by telemedicine is:  (1) informed of the relationship between the physician and patient and the respective role of any other health care provider with respect to management of the patient; and (2) notified that he or she may decline to receive medical services by telemedicine and may withdraw from such care at any time.    Notes:

## 2020-07-24 ENCOUNTER — INFUSION (OUTPATIENT)
Dept: INFECTIOUS DISEASES | Facility: HOSPITAL | Age: 70
End: 2020-07-24
Attending: INTERNAL MEDICINE
Payer: MEDICARE

## 2020-07-24 VITALS
OXYGEN SATURATION: 99 % | DIASTOLIC BLOOD PRESSURE: 76 MMHG | BODY MASS INDEX: 15.25 KG/M2 | HEIGHT: 62 IN | HEART RATE: 65 BPM | WEIGHT: 82.88 LBS | SYSTOLIC BLOOD PRESSURE: 145 MMHG | TEMPERATURE: 98 F | RESPIRATION RATE: 15 BRPM

## 2020-07-24 DIAGNOSIS — J47.9 ADULT BRONCHIECTASIS: Primary | ICD-10-CM

## 2020-07-24 DIAGNOSIS — D80.1 HYPOGAMMAGLOBULINEMIA: ICD-10-CM

## 2020-07-24 PROCEDURE — 63600175 PHARM REV CODE 636 W HCPCS: Mod: JG | Performed by: ALLERGY & IMMUNOLOGY

## 2020-07-24 PROCEDURE — 96366 THER/PROPH/DIAG IV INF ADDON: CPT

## 2020-07-24 PROCEDURE — 96365 THER/PROPH/DIAG IV INF INIT: CPT

## 2020-07-24 PROCEDURE — 25000003 PHARM REV CODE 250: Performed by: ALLERGY & IMMUNOLOGY

## 2020-07-24 RX ORDER — SODIUM CHLORIDE 0.9 % (FLUSH) 0.9 %
10 SYRINGE (ML) INJECTION
Status: DISCONTINUED | OUTPATIENT
Start: 2020-07-24 | End: 2020-07-24 | Stop reason: HOSPADM

## 2020-07-24 RX ORDER — ACETAMINOPHEN 325 MG/1
650 TABLET ORAL
Status: CANCELLED | OUTPATIENT
Start: 2020-08-21

## 2020-07-24 RX ORDER — SODIUM CHLORIDE 0.9 % (FLUSH) 0.9 %
10 SYRINGE (ML) INJECTION
Status: CANCELLED | OUTPATIENT
Start: 2020-08-21

## 2020-07-24 RX ORDER — ACETAMINOPHEN 325 MG/1
650 TABLET ORAL
Status: DISCONTINUED | OUTPATIENT
Start: 2020-07-24 | End: 2020-07-24 | Stop reason: HOSPADM

## 2020-07-24 RX ADMIN — HUMAN IMMUNOGLOBULIN G 20 G: 20 LIQUID INTRAVENOUS at 11:07

## 2020-07-24 RX ADMIN — SODIUM CHLORIDE: 9 INJECTION, SOLUTION INTRAVENOUS at 11:07

## 2020-07-24 NOTE — PLAN OF CARE
Patient resting in [facility], VSS.  Education provided on handwashing and infection control.

## 2020-08-13 DIAGNOSIS — T14.8XXA ABRASION: ICD-10-CM

## 2020-08-13 RX ORDER — MUPIROCIN 20 MG/G
OINTMENT TOPICAL 3 TIMES DAILY
Qty: 30 G | Refills: 3 | Status: SHIPPED | OUTPATIENT
Start: 2020-08-13 | End: 2020-11-04 | Stop reason: SDUPTHER

## 2020-08-20 ENCOUNTER — TELEPHONE (OUTPATIENT)
Dept: INFECTIOUS DISEASES | Facility: HOSPITAL | Age: 70
End: 2020-08-20

## 2020-08-20 NOTE — TELEPHONE ENCOUNTER
Called patient for appt reminder; per patient, pt has not tested positive for covid-19 nor has any cough, fever, or SOB.

## 2020-08-21 ENCOUNTER — INFUSION (OUTPATIENT)
Dept: INFECTIOUS DISEASES | Facility: HOSPITAL | Age: 70
End: 2020-08-21
Attending: FAMILY MEDICINE
Payer: MEDICARE

## 2020-08-21 VITALS
SYSTOLIC BLOOD PRESSURE: 151 MMHG | HEART RATE: 75 BPM | TEMPERATURE: 98 F | HEIGHT: 62 IN | WEIGHT: 83.44 LBS | OXYGEN SATURATION: 100 % | BODY MASS INDEX: 15.36 KG/M2 | DIASTOLIC BLOOD PRESSURE: 68 MMHG | RESPIRATION RATE: 16 BRPM

## 2020-08-21 DIAGNOSIS — D80.1 HYPOGAMMAGLOBULINEMIA: ICD-10-CM

## 2020-08-21 DIAGNOSIS — J47.9 ADULT BRONCHIECTASIS: Primary | ICD-10-CM

## 2020-08-21 PROCEDURE — 63600175 PHARM REV CODE 636 W HCPCS: Mod: JG | Performed by: ALLERGY & IMMUNOLOGY

## 2020-08-21 PROCEDURE — 25000003 PHARM REV CODE 250: Performed by: ALLERGY & IMMUNOLOGY

## 2020-08-21 PROCEDURE — 96366 THER/PROPH/DIAG IV INF ADDON: CPT

## 2020-08-21 PROCEDURE — 96365 THER/PROPH/DIAG IV INF INIT: CPT

## 2020-08-21 RX ORDER — ACETAMINOPHEN 325 MG/1
650 TABLET ORAL
Status: CANCELLED | OUTPATIENT
Start: 2020-09-18

## 2020-08-21 RX ORDER — SODIUM CHLORIDE 0.9 % (FLUSH) 0.9 %
10 SYRINGE (ML) INJECTION
Status: DISCONTINUED | OUTPATIENT
Start: 2020-08-21 | End: 2020-08-21 | Stop reason: HOSPADM

## 2020-08-21 RX ORDER — SODIUM CHLORIDE 0.9 % (FLUSH) 0.9 %
10 SYRINGE (ML) INJECTION
Status: CANCELLED | OUTPATIENT
Start: 2020-09-18

## 2020-08-21 RX ORDER — ACETAMINOPHEN 325 MG/1
650 TABLET ORAL
Status: DISCONTINUED | OUTPATIENT
Start: 2020-08-21 | End: 2020-08-21 | Stop reason: HOSPADM

## 2020-08-21 RX ADMIN — HUMAN IMMUNOGLOBULIN G 20 G: 20 LIQUID INTRAVENOUS at 10:08

## 2020-08-21 RX ADMIN — SODIUM CHLORIDE: 9 INJECTION, SOLUTION INTRAVENOUS at 10:08

## 2020-08-26 ENCOUNTER — PATIENT OUTREACH (OUTPATIENT)
Dept: ADMINISTRATIVE | Facility: OTHER | Age: 70
End: 2020-08-26

## 2020-08-26 NOTE — PROGRESS NOTES
Requested updates within Care Everywhere.  Patient's chart was reviewed for overdue FRANNIE topics.  Immunizations reconciled.    Orders placed:n/a  Tasked appts:n/a  Labs Linked:n/a

## 2020-08-27 ENCOUNTER — TELEPHONE (OUTPATIENT)
Dept: INTERNAL MEDICINE | Facility: CLINIC | Age: 70
End: 2020-08-27

## 2020-08-27 ENCOUNTER — OFFICE VISIT (OUTPATIENT)
Dept: OPTOMETRY | Facility: CLINIC | Age: 70
End: 2020-08-27
Payer: MEDICARE

## 2020-08-27 DIAGNOSIS — H52.202 ASTIGMATISM OF LEFT EYE, UNSPECIFIED TYPE: ICD-10-CM

## 2020-08-27 DIAGNOSIS — H04.123 DRY EYE SYNDROME, BILATERAL: Primary | ICD-10-CM

## 2020-08-27 DIAGNOSIS — H52.203 MYOPIA WITH ASTIGMATISM AND PRESBYOPIA, BILATERAL: ICD-10-CM

## 2020-08-27 DIAGNOSIS — H43.393 VITREOUS FLOATERS OF BOTH EYES: ICD-10-CM

## 2020-08-27 DIAGNOSIS — H52.13 MYOPIA WITH ASTIGMATISM AND PRESBYOPIA, BILATERAL: ICD-10-CM

## 2020-08-27 DIAGNOSIS — Z96.1 PSEUDOPHAKIA OF BOTH EYES: ICD-10-CM

## 2020-08-27 DIAGNOSIS — H52.4 MYOPIA WITH ASTIGMATISM AND PRESBYOPIA, BILATERAL: ICD-10-CM

## 2020-08-27 PROCEDURE — 99212 OFFICE O/P EST SF 10 MIN: CPT | Mod: PBBFAC | Performed by: OPTOMETRIST

## 2020-08-27 PROCEDURE — 99999 PR PBB SHADOW E&M-EST. PATIENT-LVL II: CPT | Mod: PBBFAC,,, | Performed by: OPTOMETRIST

## 2020-08-27 PROCEDURE — 92015 PR REFRACTION: ICD-10-PCS | Mod: ,,, | Performed by: OPTOMETRIST

## 2020-08-27 PROCEDURE — 99999 PR PBB SHADOW E&M-EST. PATIENT-LVL II: ICD-10-PCS | Mod: PBBFAC,,, | Performed by: OPTOMETRIST

## 2020-08-27 PROCEDURE — 92014 PR EYE EXAM, EST PATIENT,COMPREHESV: ICD-10-PCS | Mod: S$PBB,,, | Performed by: OPTOMETRIST

## 2020-08-27 PROCEDURE — 92015 DETERMINE REFRACTIVE STATE: CPT | Mod: ,,, | Performed by: OPTOMETRIST

## 2020-08-27 PROCEDURE — 92014 COMPRE OPH EXAM EST PT 1/>: CPT | Mod: S$PBB,,, | Performed by: OPTOMETRIST

## 2020-08-27 NOTE — TELEPHONE ENCOUNTER
Pt states that she is not due for a colonoscopy until 2022 and would like to wait to do her colonoscopy at this time until 3/2022. Pt has no further questions or concerns.

## 2020-08-27 NOTE — PROGRESS NOTES
HPI     Pt here for annual visit  Would like to recheck glasses  Patient denies diplopia, headaches, flashes/floaters, pain, and   itching/burning/tearing.    Pt does not use any eye drops.      Last edited by Melissa Cedeño on 8/27/2020  9:32 AM. (History)            Assessment /Plan     For exam results, see Encounter Report.    Dry eye syndrome, bilateral   Use refresh liquigel BID OU    Pseudophakia of both eyes  S/p YAG June 2019    Astigmatism of left eye, unspecified type  Myopia with astigmatism and presbyopia, bilateral   Rx specs  Imbalance of muscle of eye   History of strabismus surgery    Vitreous floaters of both eyes   Stable, good overall ocular health    RTC 1 year, sooner PRN

## 2020-08-27 NOTE — TELEPHONE ENCOUNTER
----- Message from Sheba Tatum sent at 8/27/2020 12:49 PM CDT -----  Regarding: Pt self Mobile/Home [phone]  Patient would like a call back in regards to her wanting to speak with you about a letter that she have received about a colonoscopy please.

## 2020-08-27 NOTE — TELEPHONE ENCOUNTER
----- Message from Haley Barbosa sent at 8/27/2020  2:36 PM CDT -----  Name of Who is Calling: JUSTIN DE JESUS [[id number]]      What is the request in detail: Pt is calling to speak to staff in regards to a missed call.... Please call to further assist .       Can the clinic reply by [facility]: N      What [facility] Back [facility]: [phone]

## 2020-09-17 ENCOUNTER — TELEPHONE (OUTPATIENT)
Dept: INFECTIOUS DISEASES | Facility: HOSPITAL | Age: 70
End: 2020-09-17

## 2020-09-18 ENCOUNTER — INFUSION (OUTPATIENT)
Dept: INFECTIOUS DISEASES | Facility: HOSPITAL | Age: 70
End: 2020-09-18
Attending: INTERNAL MEDICINE
Payer: MEDICARE

## 2020-09-18 VITALS
HEART RATE: 99 BPM | RESPIRATION RATE: 18 BRPM | BODY MASS INDEX: 15.41 KG/M2 | DIASTOLIC BLOOD PRESSURE: 71 MMHG | SYSTOLIC BLOOD PRESSURE: 124 MMHG | HEIGHT: 62 IN | WEIGHT: 83.75 LBS | TEMPERATURE: 99 F | OXYGEN SATURATION: 94 %

## 2020-09-18 DIAGNOSIS — D80.1 HYPOGAMMAGLOBULINEMIA: ICD-10-CM

## 2020-09-18 DIAGNOSIS — J47.9 ADULT BRONCHIECTASIS: Primary | ICD-10-CM

## 2020-09-18 PROCEDURE — 63600175 PHARM REV CODE 636 W HCPCS: Mod: JG | Performed by: ALLERGY & IMMUNOLOGY

## 2020-09-18 PROCEDURE — 25000003 PHARM REV CODE 250: Performed by: ALLERGY & IMMUNOLOGY

## 2020-09-18 PROCEDURE — 96365 THER/PROPH/DIAG IV INF INIT: CPT

## 2020-09-18 PROCEDURE — 96366 THER/PROPH/DIAG IV INF ADDON: CPT

## 2020-09-18 RX ORDER — SODIUM CHLORIDE 0.9 % (FLUSH) 0.9 %
10 SYRINGE (ML) INJECTION
Status: CANCELLED | OUTPATIENT
Start: 2020-10-16

## 2020-09-18 RX ORDER — ACETAMINOPHEN 325 MG/1
650 TABLET ORAL
Status: CANCELLED | OUTPATIENT
Start: 2020-10-16

## 2020-09-18 RX ORDER — ACETAMINOPHEN 325 MG/1
650 TABLET ORAL
Status: DISCONTINUED | OUTPATIENT
Start: 2020-09-18 | End: 2020-09-18 | Stop reason: HOSPADM

## 2020-09-18 RX ORDER — SODIUM CHLORIDE 0.9 % (FLUSH) 0.9 %
10 SYRINGE (ML) INJECTION
Status: DISCONTINUED | OUTPATIENT
Start: 2020-09-18 | End: 2020-09-18 | Stop reason: HOSPADM

## 2020-09-18 RX ADMIN — HUMAN IMMUNOGLOBULIN G 20 G: 20 LIQUID INTRAVENOUS at 10:09

## 2020-09-18 RX ADMIN — SODIUM CHLORIDE: 0.9 INJECTION, SOLUTION INTRAVENOUS at 10:09

## 2020-10-15 ENCOUNTER — INFUSION (OUTPATIENT)
Dept: INFECTIOUS DISEASES | Facility: HOSPITAL | Age: 70
End: 2020-10-15
Attending: INTERNAL MEDICINE
Payer: MEDICARE

## 2020-10-15 VITALS
HEART RATE: 80 BPM | BODY MASS INDEX: 15.42 KG/M2 | SYSTOLIC BLOOD PRESSURE: 136 MMHG | TEMPERATURE: 99 F | WEIGHT: 84.31 LBS | OXYGEN SATURATION: 100 % | DIASTOLIC BLOOD PRESSURE: 67 MMHG | RESPIRATION RATE: 16 BRPM

## 2020-10-15 DIAGNOSIS — D80.1 HYPOGAMMAGLOBULINEMIA: ICD-10-CM

## 2020-10-15 DIAGNOSIS — J47.9 ADULT BRONCHIECTASIS: Primary | ICD-10-CM

## 2020-10-15 PROCEDURE — 96366 THER/PROPH/DIAG IV INF ADDON: CPT

## 2020-10-15 PROCEDURE — 25000003 PHARM REV CODE 250: Performed by: ALLERGY & IMMUNOLOGY

## 2020-10-15 PROCEDURE — 96365 THER/PROPH/DIAG IV INF INIT: CPT

## 2020-10-15 PROCEDURE — 63600175 PHARM REV CODE 636 W HCPCS: Mod: JG | Performed by: ALLERGY & IMMUNOLOGY

## 2020-10-15 RX ORDER — SODIUM CHLORIDE 0.9 % (FLUSH) 0.9 %
10 SYRINGE (ML) INJECTION
Status: DISCONTINUED | OUTPATIENT
Start: 2020-10-15 | End: 2020-10-15 | Stop reason: HOSPADM

## 2020-10-15 RX ORDER — SODIUM CHLORIDE 0.9 % (FLUSH) 0.9 %
10 SYRINGE (ML) INJECTION
Status: CANCELLED | OUTPATIENT
Start: 2020-11-12

## 2020-10-15 RX ORDER — ACETAMINOPHEN 325 MG/1
650 TABLET ORAL
Status: CANCELLED | OUTPATIENT
Start: 2020-11-12

## 2020-10-15 RX ORDER — ACETAMINOPHEN 325 MG/1
650 TABLET ORAL
Status: DISCONTINUED | OUTPATIENT
Start: 2020-10-15 | End: 2020-10-15 | Stop reason: HOSPADM

## 2020-10-15 RX ADMIN — SODIUM CHLORIDE: 0.9 INJECTION, SOLUTION INTRAVENOUS at 10:10

## 2020-10-15 RX ADMIN — HUMAN IMMUNOGLOBULIN G 20 G: 20 LIQUID INTRAVENOUS at 10:10

## 2020-11-04 DIAGNOSIS — T14.8XXA ABRASION: ICD-10-CM

## 2020-11-04 RX ORDER — MUPIROCIN 20 MG/G
OINTMENT TOPICAL 3 TIMES DAILY
Qty: 30 G | Refills: 3 | Status: SHIPPED | OUTPATIENT
Start: 2020-11-04 | End: 2020-12-24

## 2020-11-12 ENCOUNTER — INFUSION (OUTPATIENT)
Dept: INFECTIOUS DISEASES | Facility: HOSPITAL | Age: 70
End: 2020-11-12
Attending: INTERNAL MEDICINE
Payer: MEDICARE

## 2020-11-12 VITALS
WEIGHT: 84 LBS | RESPIRATION RATE: 15 BRPM | HEART RATE: 88 BPM | OXYGEN SATURATION: 99 % | TEMPERATURE: 98 F | SYSTOLIC BLOOD PRESSURE: 131 MMHG | HEIGHT: 62 IN | BODY MASS INDEX: 15.46 KG/M2 | DIASTOLIC BLOOD PRESSURE: 86 MMHG

## 2020-11-12 DIAGNOSIS — J47.9 ADULT BRONCHIECTASIS: Primary | ICD-10-CM

## 2020-11-12 DIAGNOSIS — D80.1 HYPOGAMMAGLOBULINEMIA: ICD-10-CM

## 2020-11-12 PROCEDURE — 25000003 PHARM REV CODE 250: Performed by: ALLERGY & IMMUNOLOGY

## 2020-11-12 PROCEDURE — 63600175 PHARM REV CODE 636 W HCPCS: Mod: JG | Performed by: ALLERGY & IMMUNOLOGY

## 2020-11-12 PROCEDURE — 96365 THER/PROPH/DIAG IV INF INIT: CPT

## 2020-11-12 PROCEDURE — 96366 THER/PROPH/DIAG IV INF ADDON: CPT

## 2020-11-12 RX ORDER — SODIUM CHLORIDE 0.9 % (FLUSH) 0.9 %
10 SYRINGE (ML) INJECTION
Status: CANCELLED | OUTPATIENT
Start: 2020-12-10

## 2020-11-12 RX ORDER — ACETAMINOPHEN 325 MG/1
650 TABLET ORAL
Status: CANCELLED | OUTPATIENT
Start: 2020-12-10

## 2020-11-12 RX ORDER — ACETAMINOPHEN 325 MG/1
650 TABLET ORAL
Status: DISCONTINUED | OUTPATIENT
Start: 2020-11-12 | End: 2020-11-12 | Stop reason: HOSPADM

## 2020-11-12 RX ORDER — SODIUM CHLORIDE 0.9 % (FLUSH) 0.9 %
10 SYRINGE (ML) INJECTION
Status: DISCONTINUED | OUTPATIENT
Start: 2020-11-12 | End: 2020-11-12 | Stop reason: HOSPADM

## 2020-11-12 RX ADMIN — SODIUM CHLORIDE: 0.9 INJECTION, SOLUTION INTRAVENOUS at 10:11

## 2020-11-12 RX ADMIN — HUMAN IMMUNOGLOBULIN G 20 G: 20 LIQUID INTRAVENOUS at 10:11

## 2020-11-12 NOTE — PLAN OF CARE
Problem: Infection  Goal: Infection Symptom Resolution  Outcome: Ongoing, Progressing  Intervention: Prevent or Manage Infection  Flowsheets (Taken 11/12/2020 1045)  Infection Management: aseptic technique maintained  Isolation Precautions:   protective environment maintained   protective environment initiated

## 2020-12-10 ENCOUNTER — INFUSION (OUTPATIENT)
Dept: INFECTIOUS DISEASES | Facility: HOSPITAL | Age: 70
End: 2020-12-10
Attending: INTERNAL MEDICINE
Payer: MEDICARE

## 2020-12-10 VITALS
OXYGEN SATURATION: 99 % | WEIGHT: 83.31 LBS | TEMPERATURE: 98 F | SYSTOLIC BLOOD PRESSURE: 130 MMHG | DIASTOLIC BLOOD PRESSURE: 69 MMHG | RESPIRATION RATE: 16 BRPM | HEART RATE: 97 BPM | BODY MASS INDEX: 15.24 KG/M2

## 2020-12-10 DIAGNOSIS — D80.1 HYPOGAMMAGLOBULINEMIA: ICD-10-CM

## 2020-12-10 DIAGNOSIS — J47.9 ADULT BRONCHIECTASIS: Primary | ICD-10-CM

## 2020-12-10 PROCEDURE — 96365 THER/PROPH/DIAG IV INF INIT: CPT

## 2020-12-10 PROCEDURE — 25000003 PHARM REV CODE 250: Performed by: ALLERGY & IMMUNOLOGY

## 2020-12-10 PROCEDURE — 63600175 PHARM REV CODE 636 W HCPCS: Mod: JG | Performed by: ALLERGY & IMMUNOLOGY

## 2020-12-10 PROCEDURE — 96366 THER/PROPH/DIAG IV INF ADDON: CPT

## 2020-12-10 RX ORDER — SODIUM CHLORIDE 0.9 % (FLUSH) 0.9 %
10 SYRINGE (ML) INJECTION
Status: CANCELLED | OUTPATIENT
Start: 2021-01-07

## 2020-12-10 RX ORDER — SODIUM CHLORIDE 0.9 % (FLUSH) 0.9 %
10 SYRINGE (ML) INJECTION
Status: DISCONTINUED | OUTPATIENT
Start: 2020-12-10 | End: 2020-12-10 | Stop reason: HOSPADM

## 2020-12-10 RX ORDER — ACETAMINOPHEN 325 MG/1
650 TABLET ORAL
Status: DISCONTINUED | OUTPATIENT
Start: 2020-12-10 | End: 2020-12-10 | Stop reason: HOSPADM

## 2020-12-10 RX ORDER — ACETAMINOPHEN 325 MG/1
650 TABLET ORAL
Status: CANCELLED | OUTPATIENT
Start: 2021-01-07

## 2020-12-10 RX ADMIN — SODIUM CHLORIDE: 0.9 INJECTION, SOLUTION INTRAVENOUS at 09:12

## 2020-12-10 RX ADMIN — HUMAN IMMUNOGLOBULIN G 20 G: 20 LIQUID INTRAVENOUS at 10:12

## 2021-01-07 ENCOUNTER — INFUSION (OUTPATIENT)
Dept: INFECTIOUS DISEASES | Facility: HOSPITAL | Age: 71
End: 2021-01-07
Attending: INTERNAL MEDICINE
Payer: MEDICARE

## 2021-01-07 VITALS
RESPIRATION RATE: 16 BRPM | HEART RATE: 92 BPM | TEMPERATURE: 98 F | DIASTOLIC BLOOD PRESSURE: 74 MMHG | SYSTOLIC BLOOD PRESSURE: 134 MMHG | WEIGHT: 84.19 LBS | BODY MASS INDEX: 15.4 KG/M2 | OXYGEN SATURATION: 99 %

## 2021-01-07 DIAGNOSIS — J47.9 ADULT BRONCHIECTASIS: Primary | ICD-10-CM

## 2021-01-07 DIAGNOSIS — D80.1 HYPOGAMMAGLOBULINEMIA: ICD-10-CM

## 2021-01-07 PROCEDURE — 96366 THER/PROPH/DIAG IV INF ADDON: CPT

## 2021-01-07 PROCEDURE — 25000003 PHARM REV CODE 250: Performed by: ALLERGY & IMMUNOLOGY

## 2021-01-07 PROCEDURE — 96365 THER/PROPH/DIAG IV INF INIT: CPT

## 2021-01-07 PROCEDURE — 63600175 PHARM REV CODE 636 W HCPCS: Mod: JG | Performed by: ALLERGY & IMMUNOLOGY

## 2021-01-07 RX ORDER — ACETAMINOPHEN 325 MG/1
650 TABLET ORAL
Status: DISCONTINUED | OUTPATIENT
Start: 2021-01-07 | End: 2021-01-07 | Stop reason: HOSPADM

## 2021-01-07 RX ORDER — ACETAMINOPHEN 325 MG/1
650 TABLET ORAL
Status: CANCELLED | OUTPATIENT
Start: 2021-02-04

## 2021-01-07 RX ORDER — SODIUM CHLORIDE 0.9 % (FLUSH) 0.9 %
10 SYRINGE (ML) INJECTION
Status: DISCONTINUED | OUTPATIENT
Start: 2021-01-07 | End: 2021-01-07 | Stop reason: HOSPADM

## 2021-01-07 RX ORDER — SODIUM CHLORIDE 0.9 % (FLUSH) 0.9 %
10 SYRINGE (ML) INJECTION
Status: CANCELLED | OUTPATIENT
Start: 2021-02-04

## 2021-01-07 RX ADMIN — HUMAN IMMUNOGLOBULIN G 20 G: 20 LIQUID INTRAVENOUS at 10:01

## 2021-01-07 RX ADMIN — SODIUM CHLORIDE: 0.9 INJECTION, SOLUTION INTRAVENOUS at 10:01

## 2021-01-19 ENCOUNTER — HOSPITAL ENCOUNTER (INPATIENT)
Facility: HOSPITAL | Age: 71
LOS: 2 days | Discharge: NURSING, REHAB OR LONG-TERM CARE | DRG: 515 | End: 2021-01-22
Attending: EMERGENCY MEDICINE | Admitting: EMERGENCY MEDICINE
Payer: MEDICARE

## 2021-01-19 ENCOUNTER — ANESTHESIA EVENT (OUTPATIENT)
Dept: SURGERY | Facility: HOSPITAL | Age: 71
DRG: 515 | End: 2021-01-19
Payer: MEDICARE

## 2021-01-19 DIAGNOSIS — I47.10 SVT (SUPRAVENTRICULAR TACHYCARDIA): ICD-10-CM

## 2021-01-19 DIAGNOSIS — E89.0 STATUS POST THYROIDECTOMY: ICD-10-CM

## 2021-01-19 DIAGNOSIS — J32.9 CHRONIC SINUSITIS, UNSPECIFIED LOCATION: ICD-10-CM

## 2021-01-19 DIAGNOSIS — K58.9 IRRITABLE BOWEL SYNDROME, UNSPECIFIED TYPE: ICD-10-CM

## 2021-01-19 DIAGNOSIS — R10.2 PELVIC PAIN: ICD-10-CM

## 2021-01-19 DIAGNOSIS — L03.90 CELLULITIS, UNSPECIFIED CELLULITIS SITE: Primary | ICD-10-CM

## 2021-01-19 DIAGNOSIS — R63.6 UNDERWEIGHT: ICD-10-CM

## 2021-01-19 DIAGNOSIS — M11.249: ICD-10-CM

## 2021-01-19 DIAGNOSIS — M80.00XD AGE-RELATED OSTEOPOROSIS WITH CURRENT PATHOLOGICAL FRACTURE WITH ROUTINE HEALING: ICD-10-CM

## 2021-01-19 DIAGNOSIS — R26.9 GAIT DISTURBANCE: ICD-10-CM

## 2021-01-19 DIAGNOSIS — E89.0 POSTOPERATIVE HYPOTHYROIDISM: ICD-10-CM

## 2021-01-19 DIAGNOSIS — L03.115 CELLULITIS OF RIGHT LOWER EXTREMITY: ICD-10-CM

## 2021-01-19 DIAGNOSIS — K59.00 COLONIC CONSTIPATION: ICD-10-CM

## 2021-01-19 DIAGNOSIS — F33.42 MAJOR DEPRESSIVE DISORDER, RECURRENT EPISODE, IN FULL REMISSION: ICD-10-CM

## 2021-01-19 DIAGNOSIS — D80.1 HYPOGAMMAGLOBULINEMIA: ICD-10-CM

## 2021-01-19 DIAGNOSIS — H50.21 HYPERTROPIA OF RIGHT EYE: ICD-10-CM

## 2021-01-19 DIAGNOSIS — S32.131A CLOSED MINIMALLY DISPLACED ZONE III FRACTURE OF SACRUM, INITIAL ENCOUNTER: ICD-10-CM

## 2021-01-19 DIAGNOSIS — R29.6 FREQUENT FALLS: ICD-10-CM

## 2021-01-19 DIAGNOSIS — K62.3 RECTAL PROLAPSE: ICD-10-CM

## 2021-01-19 DIAGNOSIS — S32.111D CLOSED MINIMALLY DISPLACED ZONE I FRACTURE OF SACRUM WITH ROUTINE HEALING, SUBSEQUENT ENCOUNTER: ICD-10-CM

## 2021-01-19 DIAGNOSIS — H52.7 REFRACTIVE ERROR: ICD-10-CM

## 2021-01-19 DIAGNOSIS — S32.111A: ICD-10-CM

## 2021-01-19 DIAGNOSIS — J47.9 ADULT BRONCHIECTASIS: ICD-10-CM

## 2021-01-19 DIAGNOSIS — Z98.890 HISTORY OF CARDIAC RADIOFREQUENCY ABLATION (RFA): ICD-10-CM

## 2021-01-19 DIAGNOSIS — H50.05 ALTERNATING ESOTROPIA: ICD-10-CM

## 2021-01-19 PROBLEM — I95.1 ORTHOSTATIC HYPOTENSION: Status: RESOLVED | Noted: 2018-02-03 | Resolved: 2021-01-19

## 2021-01-19 PROBLEM — R53.83 FATIGUE: Status: RESOLVED | Noted: 2018-08-21 | Resolved: 2021-01-19

## 2021-01-19 PROBLEM — S09.90XA TRAUMATIC HEAD INJURY LESS THAN 3 MONTHS AGO: Status: RESOLVED | Noted: 2018-12-13 | Resolved: 2021-01-19

## 2021-01-19 PROBLEM — R41.89 OTHER SYMPTOMS AND SIGNS INVOLVING COGNITIVE FUNCTIONS AND AWARENESS: Status: RESOLVED | Noted: 2018-12-13 | Resolved: 2021-01-19

## 2021-01-19 LAB
ALBUMIN SERPL BCP-MCNC: 3 G/DL (ref 3.5–5.2)
ALP SERPL-CCNC: 55 U/L (ref 55–135)
ALT SERPL W/O P-5'-P-CCNC: 24 U/L (ref 10–44)
ANION GAP SERPL CALC-SCNC: 10 MMOL/L (ref 8–16)
AST SERPL-CCNC: 31 U/L (ref 10–40)
BASOPHILS # BLD AUTO: 0.01 K/UL (ref 0–0.2)
BASOPHILS NFR BLD: 0.2 % (ref 0–1.9)
BILIRUB SERPL-MCNC: 0.4 MG/DL (ref 0.1–1)
BILIRUB UR QL STRIP: NEGATIVE
BUN SERPL-MCNC: 22 MG/DL (ref 8–23)
CALCIUM SERPL-MCNC: 8.7 MG/DL (ref 8.7–10.5)
CHLORIDE SERPL-SCNC: 102 MMOL/L (ref 95–110)
CK SERPL-CCNC: 215 U/L (ref 20–180)
CLARITY UR REFRACT.AUTO: ABNORMAL
CO2 SERPL-SCNC: 25 MMOL/L (ref 23–29)
COLOR UR AUTO: YELLOW
CREAT SERPL-MCNC: 0.6 MG/DL (ref 0.5–1.4)
CRP SERPL-MCNC: 140 MG/L (ref 0–8.2)
CTP QC/QA: YES
DIFFERENTIAL METHOD: ABNORMAL
EOSINOPHIL # BLD AUTO: 0 K/UL (ref 0–0.5)
EOSINOPHIL NFR BLD: 0.2 % (ref 0–8)
ERYTHROCYTE [DISTWIDTH] IN BLOOD BY AUTOMATED COUNT: 12.8 % (ref 11.5–14.5)
EST. GFR  (AFRICAN AMERICAN): >60 ML/MIN/1.73 M^2
EST. GFR  (NON AFRICAN AMERICAN): >60 ML/MIN/1.73 M^2
GLUCOSE SERPL-MCNC: 89 MG/DL (ref 70–110)
GLUCOSE UR QL STRIP: NEGATIVE
HCT VFR BLD AUTO: 36.1 % (ref 37–48.5)
HGB BLD-MCNC: 11.7 G/DL (ref 12–16)
HGB UR QL STRIP: NEGATIVE
IMM GRANULOCYTES # BLD AUTO: 0.01 K/UL (ref 0–0.04)
IMM GRANULOCYTES NFR BLD AUTO: 0.2 % (ref 0–0.5)
INR PPP: 1 (ref 0.8–1.2)
KETONES UR QL STRIP: NEGATIVE
LEUKOCYTE ESTERASE UR QL STRIP: NEGATIVE
LYMPHOCYTES # BLD AUTO: 0.5 K/UL (ref 1–4.8)
LYMPHOCYTES NFR BLD: 8.9 % (ref 18–48)
MCH RBC QN AUTO: 31.5 PG (ref 27–31)
MCHC RBC AUTO-ENTMCNC: 32.4 G/DL (ref 32–36)
MCV RBC AUTO: 97 FL (ref 82–98)
MICROSCOPIC COMMENT: NORMAL
MONOCYTES # BLD AUTO: 0.4 K/UL (ref 0.3–1)
MONOCYTES NFR BLD: 6.6 % (ref 4–15)
NEUTROPHILS # BLD AUTO: 4.7 K/UL (ref 1.8–7.7)
NEUTROPHILS NFR BLD: 83.9 % (ref 38–73)
NITRITE UR QL STRIP: NEGATIVE
NRBC BLD-RTO: 0 /100 WBC
PH UR STRIP: 6 [PH] (ref 5–8)
PLATELET # BLD AUTO: 175 K/UL (ref 150–350)
PMV BLD AUTO: 10.2 FL (ref 9.2–12.9)
POCT GLUCOSE: 105 MG/DL (ref 70–110)
POTASSIUM SERPL-SCNC: 3.8 MMOL/L (ref 3.5–5.1)
PREALB SERPL-MCNC: 14 MG/DL (ref 20–43)
PROT SERPL-MCNC: 6.6 G/DL (ref 6–8.4)
PROT UR QL STRIP: NEGATIVE
PROTHROMBIN TIME: 11 SEC (ref 9–12.5)
RBC # BLD AUTO: 3.72 M/UL (ref 4–5.4)
RBC #/AREA URNS AUTO: 0 /HPF (ref 0–4)
SARS-COV-2 RDRP RESP QL NAA+PROBE: NEGATIVE
SODIUM SERPL-SCNC: 137 MMOL/L (ref 136–145)
SP GR UR STRIP: 1.02 (ref 1–1.03)
TRANSFERRIN SERPL-MCNC: 149 MG/DL (ref 200–375)
URN SPEC COLLECT METH UR: ABNORMAL
WBC # BLD AUTO: 5.62 K/UL (ref 3.9–12.7)
WBC #/AREA URNS AUTO: 1 /HPF (ref 0–5)

## 2021-01-19 PROCEDURE — 81001 URINALYSIS AUTO W/SCOPE: CPT

## 2021-01-19 PROCEDURE — 84134 ASSAY OF PREALBUMIN: CPT

## 2021-01-19 PROCEDURE — 82550 ASSAY OF CK (CPK): CPT

## 2021-01-19 PROCEDURE — 99223 1ST HOSP IP/OBS HIGH 75: CPT | Mod: 57,AI,, | Performed by: ORTHOPAEDIC SURGERY

## 2021-01-19 PROCEDURE — U0002 COVID-19 LAB TEST NON-CDC: HCPCS | Performed by: EMERGENCY MEDICINE

## 2021-01-19 PROCEDURE — 90715 TDAP VACCINE 7 YRS/> IM: CPT | Performed by: EMERGENCY MEDICINE

## 2021-01-19 PROCEDURE — 96360 HYDRATION IV INFUSION INIT: CPT

## 2021-01-19 PROCEDURE — 99223 PR INITIAL HOSPITAL CARE,LEVL III: ICD-10-PCS | Mod: 57,AI,, | Performed by: ORTHOPAEDIC SURGERY

## 2021-01-19 PROCEDURE — 63600175 PHARM REV CODE 636 W HCPCS: Performed by: STUDENT IN AN ORGANIZED HEALTH CARE EDUCATION/TRAINING PROGRAM

## 2021-01-19 PROCEDURE — 85025 COMPLETE CBC W/AUTO DIFF WBC: CPT

## 2021-01-19 PROCEDURE — 25000003 PHARM REV CODE 250: Performed by: EMERGENCY MEDICINE

## 2021-01-19 PROCEDURE — 86140 C-REACTIVE PROTEIN: CPT

## 2021-01-19 PROCEDURE — 99220 PR INITIAL OBSERVATION CARE,LEVL III: ICD-10-PCS | Mod: ,,, | Performed by: INTERNAL MEDICINE

## 2021-01-19 PROCEDURE — 99285 EMERGENCY DEPT VISIT HI MDM: CPT | Mod: ,,, | Performed by: EMERGENCY MEDICINE

## 2021-01-19 PROCEDURE — G0378 HOSPITAL OBSERVATION PER HR: HCPCS

## 2021-01-19 PROCEDURE — 99220 PR INITIAL OBSERVATION CARE,LEVL III: CPT | Mod: ,,, | Performed by: INTERNAL MEDICINE

## 2021-01-19 PROCEDURE — 36415 COLL VENOUS BLD VENIPUNCTURE: CPT

## 2021-01-19 PROCEDURE — 96372 THER/PROPH/DIAG INJ SC/IM: CPT | Mod: 59

## 2021-01-19 PROCEDURE — 99285 EMERGENCY DEPT VISIT HI MDM: CPT | Mod: 25

## 2021-01-19 PROCEDURE — 99285 PR EMERGENCY DEPT VISIT,LEVEL V: ICD-10-PCS | Mod: ,,, | Performed by: EMERGENCY MEDICINE

## 2021-01-19 PROCEDURE — 90471 IMMUNIZATION ADMIN: CPT | Performed by: EMERGENCY MEDICINE

## 2021-01-19 PROCEDURE — 25000003 PHARM REV CODE 250: Performed by: INTERNAL MEDICINE

## 2021-01-19 PROCEDURE — 84466 ASSAY OF TRANSFERRIN: CPT

## 2021-01-19 PROCEDURE — 63600175 PHARM REV CODE 636 W HCPCS: Performed by: EMERGENCY MEDICINE

## 2021-01-19 PROCEDURE — 80053 COMPREHEN METABOLIC PANEL: CPT

## 2021-01-19 PROCEDURE — 85610 PROTHROMBIN TIME: CPT

## 2021-01-19 RX ORDER — FERROUS SULFATE, DRIED 160(50) MG
1 TABLET, EXTENDED RELEASE ORAL 2 TIMES DAILY
Status: DISCONTINUED | OUTPATIENT
Start: 2021-01-19 | End: 2021-01-22 | Stop reason: HOSPADM

## 2021-01-19 RX ORDER — LEVOTHYROXINE SODIUM 88 UG/1
88 TABLET ORAL
Status: DISCONTINUED | OUTPATIENT
Start: 2021-01-20 | End: 2021-01-22 | Stop reason: HOSPADM

## 2021-01-19 RX ORDER — MUPIROCIN 20 MG/G
OINTMENT TOPICAL 2 TIMES DAILY
Status: DISCONTINUED | OUTPATIENT
Start: 2021-01-19 | End: 2021-01-22 | Stop reason: HOSPADM

## 2021-01-19 RX ORDER — IBUPROFEN 200 MG
24 TABLET ORAL
Status: DISCONTINUED | OUTPATIENT
Start: 2021-01-19 | End: 2021-01-22 | Stop reason: HOSPADM

## 2021-01-19 RX ORDER — ACETAMINOPHEN 500 MG
500 TABLET ORAL
Status: COMPLETED | OUTPATIENT
Start: 2021-01-19 | End: 2021-01-19

## 2021-01-19 RX ORDER — HEPARIN SODIUM 5000 [USP'U]/ML
5000 INJECTION, SOLUTION INTRAVENOUS; SUBCUTANEOUS EVERY 12 HOURS
Status: DISCONTINUED | OUTPATIENT
Start: 2021-01-19 | End: 2021-01-20

## 2021-01-19 RX ORDER — POLYETHYLENE GLYCOL 3350 17 G/17G
17 POWDER, FOR SOLUTION ORAL DAILY
Status: DISCONTINUED | OUTPATIENT
Start: 2021-01-20 | End: 2021-01-22 | Stop reason: HOSPADM

## 2021-01-19 RX ORDER — TALC
6 POWDER (GRAM) TOPICAL NIGHTLY
Status: DISCONTINUED | OUTPATIENT
Start: 2021-01-19 | End: 2021-01-22 | Stop reason: HOSPADM

## 2021-01-19 RX ORDER — SODIUM CHLORIDE 0.9 % (FLUSH) 0.9 %
10 SYRINGE (ML) INJECTION
Status: DISCONTINUED | OUTPATIENT
Start: 2021-01-19 | End: 2021-01-22 | Stop reason: HOSPADM

## 2021-01-19 RX ORDER — METHOCARBAMOL 500 MG/1
500 TABLET, FILM COATED ORAL 4 TIMES DAILY
Status: DISCONTINUED | OUTPATIENT
Start: 2021-01-19 | End: 2021-01-22 | Stop reason: HOSPADM

## 2021-01-19 RX ORDER — GLUCAGON 1 MG
1 KIT INJECTION
Status: DISCONTINUED | OUTPATIENT
Start: 2021-01-19 | End: 2021-01-22 | Stop reason: HOSPADM

## 2021-01-19 RX ORDER — OXYCODONE HYDROCHLORIDE 5 MG/1
5 TABLET ORAL EVERY 4 HOURS PRN
Status: DISCONTINUED | OUTPATIENT
Start: 2021-01-19 | End: 2021-01-22 | Stop reason: HOSPADM

## 2021-01-19 RX ORDER — PREGABALIN 75 MG/1
75 CAPSULE ORAL NIGHTLY
Status: DISCONTINUED | OUTPATIENT
Start: 2021-01-19 | End: 2021-01-22 | Stop reason: HOSPADM

## 2021-01-19 RX ORDER — GUAIFENESIN 600 MG/1
1200 TABLET, EXTENDED RELEASE ORAL 2 TIMES DAILY
Status: DISCONTINUED | OUTPATIENT
Start: 2021-01-19 | End: 2021-01-22 | Stop reason: HOSPADM

## 2021-01-19 RX ORDER — CEPHALEXIN 250 MG/1
500 CAPSULE ORAL EVERY 6 HOURS
Qty: 56 CAPSULE | Refills: 0 | Status: SHIPPED | OUTPATIENT
Start: 2021-01-19 | End: 2021-01-21 | Stop reason: HOSPADM

## 2021-01-19 RX ORDER — CEPHALEXIN 500 MG/1
500 CAPSULE ORAL
Status: COMPLETED | OUTPATIENT
Start: 2021-01-19 | End: 2021-01-19

## 2021-01-19 RX ORDER — MONTELUKAST SODIUM 10 MG/1
10 TABLET ORAL NIGHTLY
Status: DISCONTINUED | OUTPATIENT
Start: 2021-01-20 | End: 2021-01-22 | Stop reason: HOSPADM

## 2021-01-19 RX ORDER — ONDANSETRON 8 MG/1
8 TABLET, ORALLY DISINTEGRATING ORAL EVERY 8 HOURS PRN
Status: DISCONTINUED | OUTPATIENT
Start: 2021-01-19 | End: 2021-01-22 | Stop reason: HOSPADM

## 2021-01-19 RX ORDER — METHOCARBAMOL 500 MG/1
500 TABLET, FILM COATED ORAL
Status: COMPLETED | OUTPATIENT
Start: 2021-01-19 | End: 2021-01-19

## 2021-01-19 RX ORDER — ENOXAPARIN SODIUM 100 MG/ML
40 INJECTION SUBCUTANEOUS EVERY 24 HOURS
Status: DISCONTINUED | OUTPATIENT
Start: 2021-01-19 | End: 2021-01-19

## 2021-01-19 RX ORDER — ACETAMINOPHEN 500 MG
1000 TABLET ORAL EVERY 8 HOURS
Status: DISCONTINUED | OUTPATIENT
Start: 2021-01-19 | End: 2021-01-22 | Stop reason: HOSPADM

## 2021-01-19 RX ORDER — TALC
6 POWDER (GRAM) TOPICAL NIGHTLY PRN
Status: DISCONTINUED | OUTPATIENT
Start: 2021-01-19 | End: 2021-01-22 | Stop reason: HOSPADM

## 2021-01-19 RX ORDER — METHOCARBAMOL 500 MG/1
1000 TABLET, FILM COATED ORAL 3 TIMES DAILY
Qty: 30 TABLET | Refills: 0 | Status: SHIPPED | OUTPATIENT
Start: 2021-01-19 | End: 2021-01-21 | Stop reason: HOSPADM

## 2021-01-19 RX ORDER — CEPHALEXIN 500 MG/1
500 CAPSULE ORAL EVERY 12 HOURS
Status: DISCONTINUED | OUTPATIENT
Start: 2021-01-19 | End: 2021-01-22 | Stop reason: HOSPADM

## 2021-01-19 RX ORDER — IBUPROFEN 200 MG
16 TABLET ORAL
Status: DISCONTINUED | OUTPATIENT
Start: 2021-01-19 | End: 2021-01-22 | Stop reason: HOSPADM

## 2021-01-19 RX ADMIN — GUAIFENESIN 1200 MG: 600 TABLET, EXTENDED RELEASE ORAL at 12:01

## 2021-01-19 RX ADMIN — METHOCARBAMOL 500 MG: 500 TABLET ORAL at 05:01

## 2021-01-19 RX ADMIN — METHOCARBAMOL 500 MG: 500 TABLET ORAL at 12:01

## 2021-01-19 RX ADMIN — ACETAMINOPHEN 1000 MG: 500 TABLET ORAL at 12:01

## 2021-01-19 RX ADMIN — CEPHALEXIN 500 MG: 500 CAPSULE ORAL at 05:01

## 2021-01-19 RX ADMIN — MUPIROCIN: 20 OINTMENT TOPICAL at 09:01

## 2021-01-19 RX ADMIN — Medication 1 TABLET: at 09:01

## 2021-01-19 RX ADMIN — ACETAMINOPHEN 500 MG: 500 TABLET ORAL at 05:01

## 2021-01-19 RX ADMIN — ACETAMINOPHEN 1000 MG: 500 TABLET ORAL at 09:01

## 2021-01-19 RX ADMIN — METHOCARBAMOL 500 MG: 500 TABLET ORAL at 09:01

## 2021-01-19 RX ADMIN — CLOSTRIDIUM TETANI TOXOID ANTIGEN (FORMALDEHYDE INACTIVATED), CORYNEBACTERIUM DIPHTHERIAE TOXOID ANTIGEN (FORMALDEHYDE INACTIVATED), BORDETELLA PERTUSSIS TOXOID ANTIGEN (GLUTARALDEHYDE INACTIVATED), BORDETELLA PERTUSSIS FILAMENTOUS HEMAGGLUTININ ANTIGEN (FORMALDEHYDE INACTIVATED), BORDETELLA PERTUSSIS PERTACTIN ANTIGEN, AND BORDETELLA PERTUSSIS FIMBRIAE 2/3 ANTIGEN 0.5 ML: 5; 2; 2.5; 5; 3; 5 INJECTION, SUSPENSION INTRAMUSCULAR at 04:01

## 2021-01-19 RX ADMIN — PREGABALIN 75 MG: 75 CAPSULE ORAL at 09:01

## 2021-01-19 RX ADMIN — MUPIROCIN: 20 OINTMENT TOPICAL at 12:01

## 2021-01-19 RX ADMIN — GUAIFENESIN 1200 MG: 600 TABLET, EXTENDED RELEASE ORAL at 09:01

## 2021-01-19 RX ADMIN — SODIUM CHLORIDE 500 ML: 0.9 INJECTION, SOLUTION INTRAVENOUS at 03:01

## 2021-01-19 RX ADMIN — HEPARIN SODIUM 5000 UNITS: 5000 INJECTION INTRAVENOUS; SUBCUTANEOUS at 09:01

## 2021-01-19 RX ADMIN — MELATONIN TAB 3 MG 6 MG: 3 TAB at 09:01

## 2021-01-20 ENCOUNTER — ANESTHESIA (OUTPATIENT)
Dept: SURGERY | Facility: HOSPITAL | Age: 71
DRG: 515 | End: 2021-01-20
Payer: MEDICARE

## 2021-01-20 LAB
25(OH)D3+25(OH)D2 SERPL-MCNC: 57 NG/ML (ref 30–96)
ABO + RH BLD: NORMAL
ANION GAP SERPL CALC-SCNC: 10 MMOL/L (ref 8–16)
BASOPHILS # BLD AUTO: 0.01 K/UL (ref 0–0.2)
BASOPHILS NFR BLD: 0.2 % (ref 0–1.9)
BLD GP AB SCN CELLS X3 SERPL QL: NORMAL
BUN SERPL-MCNC: 19 MG/DL (ref 8–23)
CALCIUM SERPL-MCNC: 8.6 MG/DL (ref 8.7–10.5)
CHLORIDE SERPL-SCNC: 102 MMOL/L (ref 95–110)
CO2 SERPL-SCNC: 23 MMOL/L (ref 23–29)
CREAT SERPL-MCNC: 0.6 MG/DL (ref 0.5–1.4)
DIFFERENTIAL METHOD: ABNORMAL
EOSINOPHIL # BLD AUTO: 0 K/UL (ref 0–0.5)
EOSINOPHIL NFR BLD: 0.6 % (ref 0–8)
ERYTHROCYTE [DISTWIDTH] IN BLOOD BY AUTOMATED COUNT: 12.8 % (ref 11.5–14.5)
EST. GFR  (AFRICAN AMERICAN): >60 ML/MIN/1.73 M^2
EST. GFR  (NON AFRICAN AMERICAN): >60 ML/MIN/1.73 M^2
GLUCOSE SERPL-MCNC: 80 MG/DL (ref 70–110)
HCT VFR BLD AUTO: 36.3 % (ref 37–48.5)
HGB BLD-MCNC: 11.6 G/DL (ref 12–16)
IMM GRANULOCYTES # BLD AUTO: 0.02 K/UL (ref 0–0.04)
IMM GRANULOCYTES NFR BLD AUTO: 0.4 % (ref 0–0.5)
LYMPHOCYTES # BLD AUTO: 1 K/UL (ref 1–4.8)
LYMPHOCYTES NFR BLD: 21 % (ref 18–48)
MAGNESIUM SERPL-MCNC: 1.8 MG/DL (ref 1.6–2.6)
MCH RBC QN AUTO: 31.4 PG (ref 27–31)
MCHC RBC AUTO-ENTMCNC: 32 G/DL (ref 32–36)
MCV RBC AUTO: 98 FL (ref 82–98)
MONOCYTES # BLD AUTO: 0.3 K/UL (ref 0.3–1)
MONOCYTES NFR BLD: 6.5 % (ref 4–15)
NEUTROPHILS # BLD AUTO: 3.3 K/UL (ref 1.8–7.7)
NEUTROPHILS NFR BLD: 71.3 % (ref 38–73)
NRBC BLD-RTO: 0 /100 WBC
PHOSPHATE SERPL-MCNC: 2.9 MG/DL (ref 2.7–4.5)
PLATELET # BLD AUTO: 186 K/UL (ref 150–350)
PMV BLD AUTO: 10 FL (ref 9.2–12.9)
POTASSIUM SERPL-SCNC: 3.9 MMOL/L (ref 3.5–5.1)
RBC # BLD AUTO: 3.7 M/UL (ref 4–5.4)
SODIUM SERPL-SCNC: 135 MMOL/L (ref 136–145)
WBC # BLD AUTO: 4.63 K/UL (ref 3.9–12.7)

## 2021-01-20 PROCEDURE — 37000008 HC ANESTHESIA 1ST 15 MINUTES: Performed by: ORTHOPAEDIC SURGERY

## 2021-01-20 PROCEDURE — 82306 VITAMIN D 25 HYDROXY: CPT

## 2021-01-20 PROCEDURE — 36000711: Performed by: ORTHOPAEDIC SURGERY

## 2021-01-20 PROCEDURE — 63600175 PHARM REV CODE 636 W HCPCS: Performed by: STUDENT IN AN ORGANIZED HEALTH CARE EDUCATION/TRAINING PROGRAM

## 2021-01-20 PROCEDURE — 86900 BLOOD TYPING SEROLOGIC ABO: CPT

## 2021-01-20 PROCEDURE — 36415 COLL VENOUS BLD VENIPUNCTURE: CPT

## 2021-01-20 PROCEDURE — 27216 PR PERCUT FIX POST PELV RING FX: ICD-10-PCS | Mod: 50,GC,, | Performed by: ORTHOPAEDIC SURGERY

## 2021-01-20 PROCEDURE — 25000003 PHARM REV CODE 250: Performed by: INTERNAL MEDICINE

## 2021-01-20 PROCEDURE — 27201037 HC PRESSURE MONITORING SET UP

## 2021-01-20 PROCEDURE — 80048 BASIC METABOLIC PNL TOTAL CA: CPT

## 2021-01-20 PROCEDURE — 63600175 PHARM REV CODE 636 W HCPCS: Performed by: NURSE ANESTHETIST, CERTIFIED REGISTERED

## 2021-01-20 PROCEDURE — D9220A PRA ANESTHESIA: Mod: ANES,,, | Performed by: ANESTHESIOLOGY

## 2021-01-20 PROCEDURE — 99232 SBSQ HOSP IP/OBS MODERATE 35: CPT | Mod: ,,, | Performed by: INTERNAL MEDICINE

## 2021-01-20 PROCEDURE — 36000710: Performed by: ORTHOPAEDIC SURGERY

## 2021-01-20 PROCEDURE — 71000033 HC RECOVERY, INTIAL HOUR: Performed by: ORTHOPAEDIC SURGERY

## 2021-01-20 PROCEDURE — 71000015 HC POSTOP RECOV 1ST HR: Performed by: ORTHOPAEDIC SURGERY

## 2021-01-20 PROCEDURE — 25000003 PHARM REV CODE 250: Performed by: NURSE ANESTHETIST, CERTIFIED REGISTERED

## 2021-01-20 PROCEDURE — D9220A PRA ANESTHESIA: ICD-10-PCS | Mod: ANES,,, | Performed by: ANESTHESIOLOGY

## 2021-01-20 PROCEDURE — 85025 COMPLETE CBC W/AUTO DIFF WBC: CPT

## 2021-01-20 PROCEDURE — 25000003 PHARM REV CODE 250: Performed by: STUDENT IN AN ORGANIZED HEALTH CARE EDUCATION/TRAINING PROGRAM

## 2021-01-20 PROCEDURE — 94761 N-INVAS EAR/PLS OXIMETRY MLT: CPT

## 2021-01-20 PROCEDURE — 83735 ASSAY OF MAGNESIUM: CPT

## 2021-01-20 PROCEDURE — 11000001 HC ACUTE MED/SURG PRIVATE ROOM

## 2021-01-20 PROCEDURE — 63600175 PHARM REV CODE 636 W HCPCS: Performed by: ANESTHESIOLOGY

## 2021-01-20 PROCEDURE — 37000009 HC ANESTHESIA EA ADD 15 MINS: Performed by: ORTHOPAEDIC SURGERY

## 2021-01-20 PROCEDURE — 84100 ASSAY OF PHOSPHORUS: CPT

## 2021-01-20 PROCEDURE — C1713 ANCHOR/SCREW BN/BN,TIS/BN: HCPCS | Performed by: ORTHOPAEDIC SURGERY

## 2021-01-20 PROCEDURE — 27216 TREAT PELVIC RING FRACTURE: CPT | Mod: 50,GC,, | Performed by: ORTHOPAEDIC SURGERY

## 2021-01-20 PROCEDURE — D9220A PRA ANESTHESIA: Mod: CRNA,,, | Performed by: NURSE ANESTHETIST, CERTIFIED REGISTERED

## 2021-01-20 PROCEDURE — 99232 PR SUBSEQUENT HOSPITAL CARE,LEVL II: ICD-10-PCS | Mod: ,,, | Performed by: INTERNAL MEDICINE

## 2021-01-20 PROCEDURE — 27000221 HC OXYGEN, UP TO 24 HOURS

## 2021-01-20 PROCEDURE — 71000016 HC POSTOP RECOV ADDL HR: Performed by: ORTHOPAEDIC SURGERY

## 2021-01-20 PROCEDURE — D9220A PRA ANESTHESIA: ICD-10-PCS | Mod: CRNA,,, | Performed by: NURSE ANESTHETIST, CERTIFIED REGISTERED

## 2021-01-20 PROCEDURE — 71000039 HC RECOVERY, EACH ADD'L HOUR: Performed by: ORTHOPAEDIC SURGERY

## 2021-01-20 DEVICE — WASHER 13MM DIA: Type: IMPLANTABLE DEVICE | Site: PELVIS | Status: FUNCTIONAL

## 2021-01-20 DEVICE — IMPLANTABLE DEVICE: Type: IMPLANTABLE DEVICE | Site: PELVIS | Status: FUNCTIONAL

## 2021-01-20 RX ORDER — DEXAMETHASONE SODIUM PHOSPHATE 4 MG/ML
INJECTION, SOLUTION INTRA-ARTICULAR; INTRALESIONAL; INTRAMUSCULAR; INTRAVENOUS; SOFT TISSUE
Status: DISCONTINUED | OUTPATIENT
Start: 2021-01-20 | End: 2021-01-20

## 2021-01-20 RX ORDER — CEFAZOLIN SODIUM 1 G/3ML
INJECTION, POWDER, FOR SOLUTION INTRAMUSCULAR; INTRAVENOUS
Status: DISCONTINUED | OUTPATIENT
Start: 2021-01-20 | End: 2021-01-20

## 2021-01-20 RX ORDER — HYDROMORPHONE HYDROCHLORIDE 1 MG/ML
0.2 INJECTION, SOLUTION INTRAMUSCULAR; INTRAVENOUS; SUBCUTANEOUS EVERY 5 MIN PRN
Status: DISCONTINUED | OUTPATIENT
Start: 2021-01-20 | End: 2021-01-20 | Stop reason: HOSPADM

## 2021-01-20 RX ORDER — MIDAZOLAM HYDROCHLORIDE 1 MG/ML
INJECTION, SOLUTION INTRAMUSCULAR; INTRAVENOUS
Status: DISCONTINUED | OUTPATIENT
Start: 2021-01-20 | End: 2021-01-20

## 2021-01-20 RX ORDER — ROCURONIUM BROMIDE 10 MG/ML
INJECTION, SOLUTION INTRAVENOUS
Status: DISCONTINUED | OUTPATIENT
Start: 2021-01-20 | End: 2021-01-20

## 2021-01-20 RX ORDER — CEFAZOLIN SODIUM 1 G/3ML
2 INJECTION, POWDER, FOR SOLUTION INTRAMUSCULAR; INTRAVENOUS
Status: DISCONTINUED | OUTPATIENT
Start: 2021-01-20 | End: 2021-01-20 | Stop reason: HOSPADM

## 2021-01-20 RX ORDER — CEFAZOLIN SODIUM 1 G/3ML
1 INJECTION, POWDER, FOR SOLUTION INTRAMUSCULAR; INTRAVENOUS
Status: DISCONTINUED | OUTPATIENT
Start: 2021-01-20 | End: 2021-01-20

## 2021-01-20 RX ORDER — ENOXAPARIN SODIUM 100 MG/ML
30 INJECTION SUBCUTANEOUS EVERY 24 HOURS
Status: DISCONTINUED | OUTPATIENT
Start: 2021-01-20 | End: 2021-01-22 | Stop reason: HOSPADM

## 2021-01-20 RX ORDER — PHENYLEPHRINE HYDROCHLORIDE 10 MG/ML
INJECTION INTRAVENOUS
Status: DISCONTINUED | OUTPATIENT
Start: 2021-01-20 | End: 2021-01-20

## 2021-01-20 RX ORDER — PROPOFOL 10 MG/ML
VIAL (ML) INTRAVENOUS
Status: DISCONTINUED | OUTPATIENT
Start: 2021-01-20 | End: 2021-01-20

## 2021-01-20 RX ORDER — NEOSTIGMINE METHYLSULFATE 0.5 MG/ML
INJECTION, SOLUTION INTRAVENOUS
Status: DISCONTINUED | OUTPATIENT
Start: 2021-01-20 | End: 2021-01-20

## 2021-01-20 RX ORDER — MUPIROCIN 20 MG/G
OINTMENT TOPICAL
Status: DISCONTINUED | OUTPATIENT
Start: 2021-01-20 | End: 2021-01-20 | Stop reason: HOSPADM

## 2021-01-20 RX ORDER — ACETAMINOPHEN 10 MG/ML
INJECTION, SOLUTION INTRAVENOUS
Status: DISCONTINUED | OUTPATIENT
Start: 2021-01-20 | End: 2021-01-20

## 2021-01-20 RX ORDER — KETAMINE HCL IN 0.9 % NACL 50 MG/5 ML
SYRINGE (ML) INTRAVENOUS
Status: DISCONTINUED | OUTPATIENT
Start: 2021-01-20 | End: 2021-01-20

## 2021-01-20 RX ORDER — FENTANYL CITRATE 50 UG/ML
INJECTION, SOLUTION INTRAMUSCULAR; INTRAVENOUS
Status: DISCONTINUED | OUTPATIENT
Start: 2021-01-20 | End: 2021-01-20

## 2021-01-20 RX ORDER — LIDOCAINE HYDROCHLORIDE 20 MG/ML
INJECTION, SOLUTION EPIDURAL; INFILTRATION; INTRACAUDAL; PERINEURAL
Status: DISCONTINUED | OUTPATIENT
Start: 2021-01-20 | End: 2021-01-20

## 2021-01-20 RX ORDER — SODIUM CHLORIDE 0.9 % (FLUSH) 0.9 %
10 SYRINGE (ML) INJECTION
Status: DISCONTINUED | OUTPATIENT
Start: 2021-01-20 | End: 2021-01-20 | Stop reason: HOSPADM

## 2021-01-20 RX ORDER — ENOXAPARIN SODIUM 100 MG/ML
40 INJECTION SUBCUTANEOUS EVERY 24 HOURS
Status: DISCONTINUED | OUTPATIENT
Start: 2021-01-20 | End: 2021-01-20

## 2021-01-20 RX ORDER — ONDANSETRON 2 MG/ML
INJECTION INTRAMUSCULAR; INTRAVENOUS
Status: DISCONTINUED | OUTPATIENT
Start: 2021-01-20 | End: 2021-01-20

## 2021-01-20 RX ADMIN — Medication 20 MG: at 09:01

## 2021-01-20 RX ADMIN — Medication 20 MG: at 10:01

## 2021-01-20 RX ADMIN — ENOXAPARIN SODIUM 30 MG: 30 INJECTION SUBCUTANEOUS at 06:01

## 2021-01-20 RX ADMIN — METHOCARBAMOL 500 MG: 500 TABLET ORAL at 01:01

## 2021-01-20 RX ADMIN — HYDROMORPHONE HYDROCHLORIDE 0.2 MG: 1 INJECTION, SOLUTION INTRAMUSCULAR; INTRAVENOUS; SUBCUTANEOUS at 12:01

## 2021-01-20 RX ADMIN — DEXAMETHASONE SODIUM PHOSPHATE 4 MG: 4 INJECTION INTRA-ARTICULAR; INTRALESIONAL; INTRAMUSCULAR; INTRAVENOUS; SOFT TISSUE at 08:01

## 2021-01-20 RX ADMIN — MELATONIN TAB 3 MG 6 MG: 3 TAB at 10:01

## 2021-01-20 RX ADMIN — GLYCOPYRROLATE 0.2 MG: 0.2 INJECTION, SOLUTION INTRAMUSCULAR; INTRAVITREAL at 09:01

## 2021-01-20 RX ADMIN — LIDOCAINE HYDROCHLORIDE 80 MG: 20 INJECTION, SOLUTION EPIDURAL; INFILTRATION; INTRACAUDAL at 08:01

## 2021-01-20 RX ADMIN — PHENYLEPHRINE HYDROCHLORIDE 100 MCG: 10 INJECTION INTRAVENOUS at 09:01

## 2021-01-20 RX ADMIN — CEPHALEXIN 500 MG: 500 CAPSULE ORAL at 10:01

## 2021-01-20 RX ADMIN — GUAIFENESIN 1200 MG: 600 TABLET, EXTENDED RELEASE ORAL at 10:01

## 2021-01-20 RX ADMIN — MUPIROCIN: 20 OINTMENT TOPICAL at 07:01

## 2021-01-20 RX ADMIN — SODIUM CHLORIDE: 0.9 INJECTION, SOLUTION INTRAVENOUS at 08:01

## 2021-01-20 RX ADMIN — NEOSTIGMINE METHYLSULFATE 2 MG: 0.5 INJECTION INTRAVENOUS at 10:01

## 2021-01-20 RX ADMIN — ACETAMINOPHEN 1000 MG: 500 TABLET ORAL at 10:01

## 2021-01-20 RX ADMIN — PROPOFOL 150 MG: 10 INJECTION, EMULSION INTRAVENOUS at 08:01

## 2021-01-20 RX ADMIN — ACETAMINOPHEN 1000 MG: 500 TABLET ORAL at 02:01

## 2021-01-20 RX ADMIN — MUPIROCIN: 20 OINTMENT TOPICAL at 10:01

## 2021-01-20 RX ADMIN — MIDAZOLAM 1 MG: 1 INJECTION INTRAMUSCULAR; INTRAVENOUS at 08:01

## 2021-01-20 RX ADMIN — GUAIFENESIN 1200 MG: 600 TABLET, EXTENDED RELEASE ORAL at 03:01

## 2021-01-20 RX ADMIN — GLYCOPYRROLATE 0.2 MG: 0.2 INJECTION, SOLUTION INTRAMUSCULAR; INTRAVITREAL at 10:01

## 2021-01-20 RX ADMIN — ONDANSETRON 4 MG: 2 INJECTION INTRAMUSCULAR; INTRAVENOUS at 10:01

## 2021-01-20 RX ADMIN — FENTANYL CITRATE 100 MCG: 50 INJECTION, SOLUTION INTRAMUSCULAR; INTRAVENOUS at 08:01

## 2021-01-20 RX ADMIN — HEPARIN SODIUM 5000 UNITS: 5000 INJECTION INTRAVENOUS; SUBCUTANEOUS at 01:01

## 2021-01-20 RX ADMIN — CEPHALEXIN 500 MG: 500 CAPSULE ORAL at 02:01

## 2021-01-20 RX ADMIN — ACETAMINOPHEN 340 MG: 10 INJECTION, SOLUTION INTRAVENOUS at 09:01

## 2021-01-20 RX ADMIN — MONTELUKAST 10 MG: 10 TABLET, FILM COATED ORAL at 10:01

## 2021-01-20 RX ADMIN — CEFAZOLIN 1 G: 330 INJECTION, POWDER, FOR SOLUTION INTRAMUSCULAR; INTRAVENOUS at 08:01

## 2021-01-20 RX ADMIN — SODIUM CHLORIDE 0.25 MCG/KG/MIN: 9 INJECTION, SOLUTION INTRAVENOUS at 08:01

## 2021-01-20 RX ADMIN — Medication 1 TABLET: at 02:01

## 2021-01-20 RX ADMIN — PREGABALIN 75 MG: 75 CAPSULE ORAL at 10:01

## 2021-01-20 RX ADMIN — ROCURONIUM BROMIDE 50 MG: 10 INJECTION, SOLUTION INTRAVENOUS at 08:01

## 2021-01-21 ENCOUNTER — DOCUMENTATION ONLY (OUTPATIENT)
Dept: ORTHOPEDICS | Facility: HOSPITAL | Age: 71
End: 2021-01-21

## 2021-01-21 DIAGNOSIS — R52 PAIN: Primary | ICD-10-CM

## 2021-01-21 LAB
ANION GAP SERPL CALC-SCNC: 5 MMOL/L (ref 8–16)
BASOPHILS # BLD AUTO: 0.01 K/UL (ref 0–0.2)
BASOPHILS NFR BLD: 0.2 % (ref 0–1.9)
BUN SERPL-MCNC: 28 MG/DL (ref 8–23)
CALCIUM SERPL-MCNC: 8 MG/DL (ref 8.7–10.5)
CHLORIDE SERPL-SCNC: 102 MMOL/L (ref 95–110)
CO2 SERPL-SCNC: 28 MMOL/L (ref 23–29)
CREAT SERPL-MCNC: 0.6 MG/DL (ref 0.5–1.4)
DIFFERENTIAL METHOD: ABNORMAL
EOSINOPHIL # BLD AUTO: 0 K/UL (ref 0–0.5)
EOSINOPHIL NFR BLD: 0.4 % (ref 0–8)
ERYTHROCYTE [DISTWIDTH] IN BLOOD BY AUTOMATED COUNT: 12.8 % (ref 11.5–14.5)
EST. GFR  (AFRICAN AMERICAN): >60 ML/MIN/1.73 M^2
EST. GFR  (NON AFRICAN AMERICAN): >60 ML/MIN/1.73 M^2
GLUCOSE SERPL-MCNC: 82 MG/DL (ref 70–110)
HCT VFR BLD AUTO: 29.9 % (ref 37–48.5)
HGB BLD-MCNC: 9.6 G/DL (ref 12–16)
IMM GRANULOCYTES # BLD AUTO: 0.03 K/UL (ref 0–0.04)
IMM GRANULOCYTES NFR BLD AUTO: 0.6 % (ref 0–0.5)
LYMPHOCYTES # BLD AUTO: 0.9 K/UL (ref 1–4.8)
LYMPHOCYTES NFR BLD: 16.6 % (ref 18–48)
MCH RBC QN AUTO: 31.2 PG (ref 27–31)
MCHC RBC AUTO-ENTMCNC: 32.1 G/DL (ref 32–36)
MCV RBC AUTO: 97 FL (ref 82–98)
MONOCYTES # BLD AUTO: 0.4 K/UL (ref 0.3–1)
MONOCYTES NFR BLD: 7.6 % (ref 4–15)
NEUTROPHILS # BLD AUTO: 3.8 K/UL (ref 1.8–7.7)
NEUTROPHILS NFR BLD: 74.6 % (ref 38–73)
NRBC BLD-RTO: 0 /100 WBC
PLATELET # BLD AUTO: 169 K/UL (ref 150–350)
PMV BLD AUTO: 9.9 FL (ref 9.2–12.9)
POTASSIUM SERPL-SCNC: 3.8 MMOL/L (ref 3.5–5.1)
RBC # BLD AUTO: 3.08 M/UL (ref 4–5.4)
SODIUM SERPL-SCNC: 135 MMOL/L (ref 136–145)
WBC # BLD AUTO: 5.12 K/UL (ref 3.9–12.7)

## 2021-01-21 PROCEDURE — 99233 SBSQ HOSP IP/OBS HIGH 50: CPT | Mod: ,,, | Performed by: HOSPITALIST

## 2021-01-21 PROCEDURE — 80048 BASIC METABOLIC PNL TOTAL CA: CPT

## 2021-01-21 PROCEDURE — 63600175 PHARM REV CODE 636 W HCPCS: Performed by: STUDENT IN AN ORGANIZED HEALTH CARE EDUCATION/TRAINING PROGRAM

## 2021-01-21 PROCEDURE — 11000001 HC ACUTE MED/SURG PRIVATE ROOM

## 2021-01-21 PROCEDURE — 25000003 PHARM REV CODE 250: Performed by: INTERNAL MEDICINE

## 2021-01-21 PROCEDURE — 85025 COMPLETE CBC W/AUTO DIFF WBC: CPT

## 2021-01-21 PROCEDURE — 97535 SELF CARE MNGMENT TRAINING: CPT

## 2021-01-21 PROCEDURE — 99233 PR SUBSEQUENT HOSPITAL CARE,LEVL III: ICD-10-PCS | Mod: ,,, | Performed by: HOSPITALIST

## 2021-01-21 PROCEDURE — 36415 COLL VENOUS BLD VENIPUNCTURE: CPT

## 2021-01-21 PROCEDURE — 97530 THERAPEUTIC ACTIVITIES: CPT

## 2021-01-21 PROCEDURE — 97161 PT EVAL LOW COMPLEX 20 MIN: CPT

## 2021-01-21 PROCEDURE — 97167 OT EVAL HIGH COMPLEX 60 MIN: CPT

## 2021-01-21 RX ORDER — PREGABALIN 75 MG/1
75 CAPSULE ORAL NIGHTLY
Qty: 30 CAPSULE | Refills: 0 | Status: SHIPPED | OUTPATIENT
Start: 2021-01-21 | End: 2021-04-22

## 2021-01-21 RX ORDER — FERROUS SULFATE, DRIED 160(50) MG
1 TABLET, EXTENDED RELEASE ORAL 2 TIMES DAILY
Qty: 60 TABLET | Refills: 11 | COMMUNITY
Start: 2021-01-21 | End: 2022-07-04

## 2021-01-21 RX ORDER — POLYETHYLENE GLYCOL 3350 17 G/17G
17 POWDER, FOR SOLUTION ORAL DAILY
Refills: 0
Start: 2021-01-22

## 2021-01-21 RX ORDER — OXYCODONE HYDROCHLORIDE 5 MG/1
5 TABLET ORAL EVERY 4 HOURS PRN
Qty: 10 TABLET | Refills: 0 | Status: SHIPPED | OUTPATIENT
Start: 2021-01-21 | End: 2021-04-22

## 2021-01-21 RX ORDER — ENOXAPARIN SODIUM 100 MG/ML
30 INJECTION SUBCUTANEOUS DAILY
Qty: 8.4 ML | Refills: 0
Start: 2021-01-21 | End: 2021-02-18

## 2021-01-21 RX ORDER — CEPHALEXIN 500 MG/1
500 CAPSULE ORAL EVERY 12 HOURS
Qty: 10 CAPSULE | Refills: 0
Start: 2021-01-21 | End: 2021-01-26

## 2021-01-21 RX ORDER — METHOCARBAMOL 500 MG/1
500 TABLET, FILM COATED ORAL 4 TIMES DAILY
Qty: 40 TABLET | Refills: 0
Start: 2021-01-21 | End: 2021-04-22

## 2021-01-21 RX ORDER — ACETAMINOPHEN 500 MG
500 TABLET ORAL EVERY 6 HOURS PRN
Refills: 0
Start: 2021-01-21 | End: 2021-12-17

## 2021-01-21 RX ADMIN — GUAIFENESIN 1200 MG: 600 TABLET, EXTENDED RELEASE ORAL at 09:01

## 2021-01-21 RX ADMIN — METHOCARBAMOL 500 MG: 500 TABLET ORAL at 09:01

## 2021-01-21 RX ADMIN — LEVOTHYROXINE SODIUM 88 MCG: 88 TABLET ORAL at 06:01

## 2021-01-21 RX ADMIN — METHOCARBAMOL 500 MG: 500 TABLET ORAL at 08:01

## 2021-01-21 RX ADMIN — MELATONIN TAB 3 MG 3 MG: 3 TAB at 09:01

## 2021-01-21 RX ADMIN — CEPHALEXIN 500 MG: 500 CAPSULE ORAL at 09:01

## 2021-01-21 RX ADMIN — MUPIROCIN: 20 OINTMENT TOPICAL at 09:01

## 2021-01-21 RX ADMIN — MELATONIN TAB 3 MG 6 MG: 3 TAB at 09:01

## 2021-01-21 RX ADMIN — Medication 1 TABLET: at 12:01

## 2021-01-21 RX ADMIN — ACETAMINOPHEN 1000 MG: 500 TABLET ORAL at 10:01

## 2021-01-21 RX ADMIN — METHOCARBAMOL 500 MG: 500 TABLET ORAL at 04:01

## 2021-01-21 RX ADMIN — PREGABALIN 75 MG: 75 CAPSULE ORAL at 09:01

## 2021-01-21 RX ADMIN — POLYETHYLENE GLYCOL 3350 17 G: 17 POWDER, FOR SOLUTION ORAL at 09:01

## 2021-01-21 RX ADMIN — ENOXAPARIN SODIUM 30 MG: 30 INJECTION SUBCUTANEOUS at 04:01

## 2021-01-21 RX ADMIN — MONTELUKAST 10 MG: 10 TABLET, FILM COATED ORAL at 08:01

## 2021-01-21 RX ADMIN — ACETAMINOPHEN 1000 MG: 500 TABLET ORAL at 01:01

## 2021-01-21 RX ADMIN — ACETAMINOPHEN 1000 MG: 500 TABLET ORAL at 08:01

## 2021-01-21 RX ADMIN — METHOCARBAMOL 500 MG: 500 TABLET ORAL at 01:01

## 2021-01-21 RX ADMIN — ACETAMINOPHEN 1000 MG: 500 TABLET ORAL at 06:01

## 2021-01-22 VITALS
HEIGHT: 62 IN | TEMPERATURE: 96 F | DIASTOLIC BLOOD PRESSURE: 80 MMHG | HEART RATE: 77 BPM | OXYGEN SATURATION: 95 % | RESPIRATION RATE: 14 BRPM | SYSTOLIC BLOOD PRESSURE: 136 MMHG | WEIGHT: 76.25 LBS | BODY MASS INDEX: 14.03 KG/M2

## 2021-01-22 LAB
ANION GAP SERPL CALC-SCNC: 8 MMOL/L (ref 8–16)
BASOPHILS # BLD AUTO: 0.02 K/UL (ref 0–0.2)
BASOPHILS NFR BLD: 0.4 % (ref 0–1.9)
BUN SERPL-MCNC: 14 MG/DL (ref 8–23)
CALCIUM SERPL-MCNC: 7.9 MG/DL (ref 8.7–10.5)
CHLORIDE SERPL-SCNC: 105 MMOL/L (ref 95–110)
CO2 SERPL-SCNC: 27 MMOL/L (ref 23–29)
CREAT SERPL-MCNC: 0.6 MG/DL (ref 0.5–1.4)
DIFFERENTIAL METHOD: ABNORMAL
EOSINOPHIL # BLD AUTO: 0 K/UL (ref 0–0.5)
EOSINOPHIL NFR BLD: 0.4 % (ref 0–8)
ERYTHROCYTE [DISTWIDTH] IN BLOOD BY AUTOMATED COUNT: 12.7 % (ref 11.5–14.5)
EST. GFR  (AFRICAN AMERICAN): >60 ML/MIN/1.73 M^2
EST. GFR  (NON AFRICAN AMERICAN): >60 ML/MIN/1.73 M^2
GLUCOSE SERPL-MCNC: 83 MG/DL (ref 70–110)
HCT VFR BLD AUTO: 28.4 % (ref 37–48.5)
HGB BLD-MCNC: 9.3 G/DL (ref 12–16)
IMM GRANULOCYTES # BLD AUTO: 0.02 K/UL (ref 0–0.04)
IMM GRANULOCYTES NFR BLD AUTO: 0.4 % (ref 0–0.5)
LYMPHOCYTES # BLD AUTO: 0.9 K/UL (ref 1–4.8)
LYMPHOCYTES NFR BLD: 19.2 % (ref 18–48)
MCH RBC QN AUTO: 31.8 PG (ref 27–31)
MCHC RBC AUTO-ENTMCNC: 32.7 G/DL (ref 32–36)
MCV RBC AUTO: 97 FL (ref 82–98)
MONOCYTES # BLD AUTO: 0.4 K/UL (ref 0.3–1)
MONOCYTES NFR BLD: 8 % (ref 4–15)
NEUTROPHILS # BLD AUTO: 3.3 K/UL (ref 1.8–7.7)
NEUTROPHILS NFR BLD: 71.6 % (ref 38–73)
NRBC BLD-RTO: 0 /100 WBC
PLATELET # BLD AUTO: 181 K/UL (ref 150–350)
PMV BLD AUTO: 9.9 FL (ref 9.2–12.9)
POTASSIUM SERPL-SCNC: 3.7 MMOL/L (ref 3.5–5.1)
RBC # BLD AUTO: 2.92 M/UL (ref 4–5.4)
SARS-COV-2 RNA RESP QL NAA+PROBE: NOT DETECTED
SODIUM SERPL-SCNC: 140 MMOL/L (ref 136–145)
WBC # BLD AUTO: 4.63 K/UL (ref 3.9–12.7)

## 2021-01-22 PROCEDURE — 99239 HOSP IP/OBS DSCHRG MGMT >30: CPT | Mod: ,,, | Performed by: HOSPITALIST

## 2021-01-22 PROCEDURE — 25000003 PHARM REV CODE 250: Performed by: INTERNAL MEDICINE

## 2021-01-22 PROCEDURE — 99239 PR HOSPITAL DISCHARGE DAY,>30 MIN: ICD-10-PCS | Mod: ,,, | Performed by: HOSPITALIST

## 2021-01-22 PROCEDURE — U0003 INFECTIOUS AGENT DETECTION BY NUCLEIC ACID (DNA OR RNA); SEVERE ACUTE RESPIRATORY SYNDROME CORONAVIRUS 2 (SARS-COV-2) (CORONAVIRUS DISEASE [COVID-19]), AMPLIFIED PROBE TECHNIQUE, MAKING USE OF HIGH THROUGHPUT TECHNOLOGIES AS DESCRIBED BY CMS-2020-01-R: HCPCS

## 2021-01-22 PROCEDURE — 63600175 PHARM REV CODE 636 W HCPCS: Performed by: STUDENT IN AN ORGANIZED HEALTH CARE EDUCATION/TRAINING PROGRAM

## 2021-01-22 PROCEDURE — 97530 THERAPEUTIC ACTIVITIES: CPT

## 2021-01-22 PROCEDURE — 97542 WHEELCHAIR MNGMENT TRAINING: CPT

## 2021-01-22 PROCEDURE — 80048 BASIC METABOLIC PNL TOTAL CA: CPT

## 2021-01-22 PROCEDURE — 36415 COLL VENOUS BLD VENIPUNCTURE: CPT

## 2021-01-22 PROCEDURE — 97535 SELF CARE MNGMENT TRAINING: CPT

## 2021-01-22 PROCEDURE — 85025 COMPLETE CBC W/AUTO DIFF WBC: CPT

## 2021-01-22 RX ADMIN — GUAIFENESIN 1200 MG: 600 TABLET, EXTENDED RELEASE ORAL at 09:01

## 2021-01-22 RX ADMIN — Medication 1 TABLET: at 11:01

## 2021-01-22 RX ADMIN — ENOXAPARIN SODIUM 30 MG: 30 INJECTION SUBCUTANEOUS at 04:01

## 2021-01-22 RX ADMIN — LEVOTHYROXINE SODIUM 88 MCG: 88 TABLET ORAL at 06:01

## 2021-01-22 RX ADMIN — POLYETHYLENE GLYCOL 3350 17 G: 17 POWDER, FOR SOLUTION ORAL at 09:01

## 2021-01-22 RX ADMIN — METHOCARBAMOL 500 MG: 500 TABLET ORAL at 01:01

## 2021-01-22 RX ADMIN — MUPIROCIN: 20 OINTMENT TOPICAL at 09:01

## 2021-01-22 RX ADMIN — METHOCARBAMOL 500 MG: 500 TABLET ORAL at 04:01

## 2021-01-22 RX ADMIN — ACETAMINOPHEN 1000 MG: 500 TABLET ORAL at 02:01

## 2021-01-22 RX ADMIN — CEPHALEXIN 500 MG: 500 CAPSULE ORAL at 09:01

## 2021-01-22 RX ADMIN — ACETAMINOPHEN 1000 MG: 500 TABLET ORAL at 06:01

## 2021-01-22 RX ADMIN — METHOCARBAMOL 500 MG: 500 TABLET ORAL at 09:01

## 2021-02-04 ENCOUNTER — HOSPITAL ENCOUNTER (OUTPATIENT)
Dept: RADIOLOGY | Facility: HOSPITAL | Age: 71
Discharge: HOME | End: 2021-02-04
Attending: ORTHOPAEDIC SURGERY
Payer: MEDICARE

## 2021-02-04 ENCOUNTER — OFFICE VISIT (OUTPATIENT)
Dept: ORTHOPEDICS | Facility: CLINIC | Age: 71
End: 2021-02-04
Payer: MEDICARE

## 2021-02-04 ENCOUNTER — INFUSION (OUTPATIENT)
Dept: INFECTIOUS DISEASES | Facility: HOSPITAL | Age: 71
End: 2021-02-04
Attending: ORTHOPAEDIC SURGERY
Payer: MEDICARE

## 2021-02-04 VITALS
WEIGHT: 78.5 LBS | DIASTOLIC BLOOD PRESSURE: 66 MMHG | SYSTOLIC BLOOD PRESSURE: 119 MMHG | RESPIRATION RATE: 18 BRPM | TEMPERATURE: 99 F | OXYGEN SATURATION: 98 % | HEIGHT: 62 IN | HEART RATE: 104 BPM | BODY MASS INDEX: 14.45 KG/M2

## 2021-02-04 DIAGNOSIS — S32.810D CLOSED PELVIC RING FRACTURE WITH ROUTINE HEALING, SUBSEQUENT ENCOUNTER: Primary | ICD-10-CM

## 2021-02-04 DIAGNOSIS — R52 PAIN: Primary | ICD-10-CM

## 2021-02-04 DIAGNOSIS — J47.9 ADULT BRONCHIECTASIS: Primary | ICD-10-CM

## 2021-02-04 DIAGNOSIS — D80.1 HYPOGAMMAGLOBULINEMIA: ICD-10-CM

## 2021-02-04 DIAGNOSIS — R52 PAIN: ICD-10-CM

## 2021-02-04 PROCEDURE — 72190 XR PELVIS AP INLET AND OUTLET: ICD-10-PCS | Mod: 26,,, | Performed by: RADIOLOGY

## 2021-02-04 PROCEDURE — 99211 OFF/OP EST MAY X REQ PHY/QHP: CPT | Mod: PBBFAC,25 | Performed by: ORTHOPAEDIC SURGERY

## 2021-02-04 PROCEDURE — 99999 PR PBB SHADOW E&M-EST. PATIENT-LVL I: CPT | Mod: PBBFAC,,, | Performed by: ORTHOPAEDIC SURGERY

## 2021-02-04 PROCEDURE — 96366 THER/PROPH/DIAG IV INF ADDON: CPT

## 2021-02-04 PROCEDURE — 99024 PR POST-OP FOLLOW-UP VISIT: ICD-10-PCS | Mod: POP,,, | Performed by: ORTHOPAEDIC SURGERY

## 2021-02-04 PROCEDURE — 72190 X-RAY EXAM OF PELVIS: CPT | Mod: TC

## 2021-02-04 PROCEDURE — 63600175 PHARM REV CODE 636 W HCPCS: Mod: JG | Performed by: ALLERGY & IMMUNOLOGY

## 2021-02-04 PROCEDURE — 99024 POSTOP FOLLOW-UP VISIT: CPT | Mod: POP,,, | Performed by: ORTHOPAEDIC SURGERY

## 2021-02-04 PROCEDURE — 96365 THER/PROPH/DIAG IV INF INIT: CPT

## 2021-02-04 PROCEDURE — 25000003 PHARM REV CODE 250: Performed by: ALLERGY & IMMUNOLOGY

## 2021-02-04 PROCEDURE — 72190 X-RAY EXAM OF PELVIS: CPT | Mod: 26,,, | Performed by: RADIOLOGY

## 2021-02-04 PROCEDURE — 99999 PR PBB SHADOW E&M-EST. PATIENT-LVL I: ICD-10-PCS | Mod: PBBFAC,,, | Performed by: ORTHOPAEDIC SURGERY

## 2021-02-04 RX ORDER — SODIUM CHLORIDE 0.9 % (FLUSH) 0.9 %
10 SYRINGE (ML) INJECTION
Status: DISCONTINUED | OUTPATIENT
Start: 2021-02-04 | End: 2021-02-04 | Stop reason: HOSPADM

## 2021-02-04 RX ORDER — ACETAMINOPHEN 325 MG/1
650 TABLET ORAL
Status: CANCELLED | OUTPATIENT
Start: 2021-03-04

## 2021-02-04 RX ORDER — SODIUM CHLORIDE 0.9 % (FLUSH) 0.9 %
10 SYRINGE (ML) INJECTION
Status: CANCELLED | OUTPATIENT
Start: 2021-03-04

## 2021-02-04 RX ADMIN — HUMAN IMMUNOGLOBULIN G 20 G: 20 LIQUID INTRAVENOUS at 11:02

## 2021-02-04 RX ADMIN — SODIUM CHLORIDE: 0.9 INJECTION, SOLUTION INTRAVENOUS at 11:02

## 2021-02-05 ENCOUNTER — TELEPHONE (OUTPATIENT)
Dept: ORTHOPEDICS | Facility: CLINIC | Age: 71
End: 2021-02-05

## 2021-02-09 ENCOUNTER — PATIENT MESSAGE (OUTPATIENT)
Dept: ORTHOPEDICS | Facility: CLINIC | Age: 71
End: 2021-02-09

## 2021-03-02 ENCOUNTER — PATIENT OUTREACH (OUTPATIENT)
Dept: ADMINISTRATIVE | Facility: OTHER | Age: 71
End: 2021-03-02

## 2021-03-04 ENCOUNTER — OFFICE VISIT (OUTPATIENT)
Dept: ORTHOPEDICS | Facility: CLINIC | Age: 71
End: 2021-03-04
Attending: ORTHOPAEDIC SURGERY
Payer: MEDICARE

## 2021-03-04 ENCOUNTER — HOSPITAL ENCOUNTER (OUTPATIENT)
Dept: RADIOLOGY | Facility: HOSPITAL | Age: 71
Discharge: HOME | End: 2021-03-04
Attending: ORTHOPAEDIC SURGERY
Payer: MEDICARE

## 2021-03-04 ENCOUNTER — INFUSION (OUTPATIENT)
Dept: INFECTIOUS DISEASES | Facility: HOSPITAL | Age: 71
End: 2021-03-04
Attending: INTERNAL MEDICINE
Payer: MEDICARE

## 2021-03-04 ENCOUNTER — OFFICE VISIT (OUTPATIENT)
Dept: ORTHOPEDICS | Facility: CLINIC | Age: 71
End: 2021-03-04
Payer: MEDICARE

## 2021-03-04 ENCOUNTER — PATIENT MESSAGE (OUTPATIENT)
Dept: ORTHOPEDICS | Facility: CLINIC | Age: 71
End: 2021-03-04

## 2021-03-04 VITALS
HEART RATE: 88 BPM | RESPIRATION RATE: 18 BRPM | WEIGHT: 86 LBS | DIASTOLIC BLOOD PRESSURE: 61 MMHG | HEIGHT: 62 IN | SYSTOLIC BLOOD PRESSURE: 129 MMHG | OXYGEN SATURATION: 98 % | TEMPERATURE: 99 F | BODY MASS INDEX: 15.83 KG/M2

## 2021-03-04 VITALS — BODY MASS INDEX: 14.35 KG/M2 | HEIGHT: 62 IN

## 2021-03-04 VITALS — SYSTOLIC BLOOD PRESSURE: 98 MMHG | DIASTOLIC BLOOD PRESSURE: 58 MMHG | HEART RATE: 83 BPM

## 2021-03-04 DIAGNOSIS — M80.80XA PATHOLOGICAL FRACTURE DUE TO OSTEOPOROSIS, UNSPECIFIED FRACTURE SITE, UNSPECIFIED OSTEOPOROSIS TYPE, INITIAL ENCOUNTER: Primary | ICD-10-CM

## 2021-03-04 DIAGNOSIS — M81.6 LOCALIZED OSTEOPOROSIS OF LEQUESNE: ICD-10-CM

## 2021-03-04 DIAGNOSIS — S32.810D CLOSED PELVIC RING FRACTURE WITH ROUTINE HEALING, SUBSEQUENT ENCOUNTER: Primary | ICD-10-CM

## 2021-03-04 DIAGNOSIS — D80.1 HYPOGAMMAGLOBULINEMIA: ICD-10-CM

## 2021-03-04 DIAGNOSIS — M81.0 OSTEOPOROSIS, UNSPECIFIED OSTEOPOROSIS TYPE, UNSPECIFIED PATHOLOGICAL FRACTURE PRESENCE: ICD-10-CM

## 2021-03-04 DIAGNOSIS — R52 PAIN: ICD-10-CM

## 2021-03-04 DIAGNOSIS — J47.9 ADULT BRONCHIECTASIS: Primary | ICD-10-CM

## 2021-03-04 PROCEDURE — 99999 PR PBB SHADOW E&M-EST. PATIENT-LVL III: CPT | Mod: PBBFAC,,, | Performed by: PHYSICIAN ASSISTANT

## 2021-03-04 PROCEDURE — 63600175 PHARM REV CODE 636 W HCPCS: Mod: JG | Performed by: ALLERGY & IMMUNOLOGY

## 2021-03-04 PROCEDURE — 99024 POSTOP FOLLOW-UP VISIT: CPT | Mod: POP,,, | Performed by: ORTHOPAEDIC SURGERY

## 2021-03-04 PROCEDURE — 25000003 PHARM REV CODE 250: Performed by: ALLERGY & IMMUNOLOGY

## 2021-03-04 PROCEDURE — 72190 X-RAY EXAM OF PELVIS: CPT | Mod: TC

## 2021-03-04 PROCEDURE — 99213 OFFICE O/P EST LOW 20 MIN: CPT | Mod: PBBFAC,25,27

## 2021-03-04 PROCEDURE — 99212 OFFICE O/P EST SF 10 MIN: CPT | Mod: PBBFAC,25 | Performed by: ORTHOPAEDIC SURGERY

## 2021-03-04 PROCEDURE — 99024 PR POST-OP FOLLOW-UP VISIT: ICD-10-PCS | Mod: POP,,, | Performed by: ORTHOPAEDIC SURGERY

## 2021-03-04 PROCEDURE — 72190 X-RAY EXAM OF PELVIS: CPT | Mod: 26,,, | Performed by: RADIOLOGY

## 2021-03-04 PROCEDURE — 99214 PR OFFICE/OUTPT VISIT, EST, LEVL IV, 30-39 MIN: ICD-10-PCS | Mod: 25,S$PBB,, | Performed by: PHYSICIAN ASSISTANT

## 2021-03-04 PROCEDURE — 99999 PR PBB SHADOW E&M-EST. PATIENT-LVL II: CPT | Mod: PBBFAC,,, | Performed by: ORTHOPAEDIC SURGERY

## 2021-03-04 PROCEDURE — 99999 PR PBB SHADOW E&M-EST. PATIENT-LVL II: ICD-10-PCS | Mod: PBBFAC,,, | Performed by: ORTHOPAEDIC SURGERY

## 2021-03-04 PROCEDURE — 72190 XR PELVIS AP INLET AND OUTLET: ICD-10-PCS | Mod: 26,,, | Performed by: RADIOLOGY

## 2021-03-04 PROCEDURE — 99999 PR PBB SHADOW E&M-EST. PATIENT-LVL III: ICD-10-PCS | Mod: PBBFAC,,, | Performed by: PHYSICIAN ASSISTANT

## 2021-03-04 PROCEDURE — 96365 THER/PROPH/DIAG IV INF INIT: CPT

## 2021-03-04 PROCEDURE — 99214 OFFICE O/P EST MOD 30 MIN: CPT | Mod: 25,S$PBB,, | Performed by: PHYSICIAN ASSISTANT

## 2021-03-04 PROCEDURE — 96366 THER/PROPH/DIAG IV INF ADDON: CPT

## 2021-03-04 RX ORDER — ACETAMINOPHEN 325 MG/1
650 TABLET ORAL
Status: CANCELLED | OUTPATIENT
Start: 2021-04-01

## 2021-03-04 RX ORDER — SODIUM CHLORIDE 0.9 % (FLUSH) 0.9 %
10 SYRINGE (ML) INJECTION
Status: CANCELLED | OUTPATIENT
Start: 2021-04-01

## 2021-03-04 RX ORDER — ACETAMINOPHEN 325 MG/1
650 TABLET ORAL
Status: DISCONTINUED | OUTPATIENT
Start: 2021-03-04 | End: 2021-03-04 | Stop reason: HOSPADM

## 2021-03-04 RX ORDER — SODIUM CHLORIDE 0.9 % (FLUSH) 0.9 %
10 SYRINGE (ML) INJECTION
Status: DISCONTINUED | OUTPATIENT
Start: 2021-03-04 | End: 2021-03-04 | Stop reason: HOSPADM

## 2021-03-04 RX ADMIN — SODIUM CHLORIDE: 0.9 INJECTION, SOLUTION INTRAVENOUS at 11:03

## 2021-03-04 RX ADMIN — HUMAN IMMUNOGLOBULIN G 20 G: 20 LIQUID INTRAVENOUS at 11:03

## 2021-03-05 ENCOUNTER — PATIENT MESSAGE (OUTPATIENT)
Dept: ORTHOPEDICS | Facility: CLINIC | Age: 71
End: 2021-03-05

## 2021-03-05 DIAGNOSIS — S32.810D CLOSED PELVIC RING FRACTURE WITH ROUTINE HEALING, SUBSEQUENT ENCOUNTER: Primary | ICD-10-CM

## 2021-03-05 NOTE — TELEPHONE ENCOUNTER
Established  patient    SUBJECTIVE:   Kerrie Thomas is a 44 year old female  who presents today for follow up us  for fibroid uterus.  S/p removal of sub mucous fibroid-   Has multiple fibroids.  OB History    Para Term  AB Living   2 1 1 0 1 1   SAB TAB Ectopic Molar Multiple Live Births   1 0 0 0 0 1   Obstetric Comments   1 normal delivery.     GYN History:   H/o d&c  aguilar sure removal of submucous fibroid.  Last 2 period- in  lasted 4 days heavy/ - 1 day heavy   not taking ocp.  No current outpatient medications on file.     No current facility-administered medications for this visit.      ALLERGIES:   Allergen Reactions   • Penicillins Other (See Comments)     Pt prefers not to use penicillins as she always gets a yeast infection from it.      There is no problem list on file for this patient.    Past Medical History:   Diagnosis Date   • Uterine fibroid       Past Surgical History:   Procedure Laterality Date   • Dilation and curettage of uterus     • Endometrial biopsy     • [place] tooth extraction       Family History   Problem Relation Age of Onset   • Cancer, Kidney Mother    • Heart Father    • Hypotension Father    • Dementia/Alzheimers Maternal Grandmother    • Cancer, Breast Maternal Great-Grandmother      Social History     Tobacco Use   • Smoking status: Never Smoker   • Smokeless tobacco: Never Used   Substance Use Topics   • Alcohol use: Never     Frequency: Never   • Drug use: Never     GYNE ROS:   Vaginal symptoms: abnormal bleeding described as heavy.  Vulvar symptoms: none.  Discharge described as: scant.  Other associated symptoms: none  ROS:   No headaches  No unexplained chest pain, dyspnea, SOB  No abdominal pain  No bowel or bladder complaints  All other systems reviewed are negative    OBJECTIVE:  Visit Vitals  /78 (BP Location: RUE - Right upper extremity, Patient Position: Sitting, Cuff Size: Large Adult)   Pulse 83   Temp 98.1 °F (36.7  Pt is requesting her TSH results. Please advise.     °C) (Temporal)   Resp 18   Ht 5' 6\" (1.676 m)   Wt 103.7 kg (228 lb 9.6 oz)   LMP 02/15/2021   SpO2 98%   BMI 36.90 kg/m²     Physical Exam  Alert and oriented x 3.   General exam: Patient appears well.  Neck supple   I have personally reviewed and analyzed:    Us pelvis-3/5/21.  EXAM: US PELVIS COMPLETE NON OB TRANSABDOMINAL AND TRANSVAGINAL     CLINICAL INDICATION:   Patient is evaluated because of fibroids.     COMPARISON: Patient had prior ultrasound 07/22/2020     Transabdominal and transvaginal examination was performed.     FINDINGS:   Uterus is 12.7 x 6.8 x 9.7 cm.  There are nabothian cysts in the cervix.     In the right side of uterus there is an exophytic lesion 6.0 x 6.3 x 5.8 cm  slightly increased from 5.9 x 6.3 x 5.2 cm on the prior study.  There is a  s left intramural lesion 3.8 x 3.9 x 3.8 cm not significantly changed from  3.8 x 3.8 x 3.8 on the prior study.  These lesions are hypoechoic and  consistent with fibroids.  A prior smaller fibroid is no longer seen.     The endometrium is 1.2 cm and normal in appearance.       The right and left ovaries are not visualized.     IMPRESSION:  Enlargement of the uterus with lesions compatible with a relatively large  fibroids one is unchanged the other is slightly larger than on prior  examination.  Neither ovary is seen in the endometrium appears normal  allowing for distortion from the fibroids.     Electronically Signed by: RASHAWN SOLIS MD   Signed on: 3/5/2021 4:29 PM         ASSESSMENT/PLAN:  Fibroid uterus intramural/  exophytic same size.  s/p d&c- removal of sub mucous fibroid.  Continue observation.  If symptomatic will consider  treatment.   rtc- 6 months or earlier with symptoms.    Shira Chiu MD    Total time spent: 30 minutes

## 2021-03-12 ENCOUNTER — PATIENT MESSAGE (OUTPATIENT)
Dept: ORTHOPEDICS | Facility: CLINIC | Age: 71
End: 2021-03-12

## 2021-03-13 ENCOUNTER — PATIENT MESSAGE (OUTPATIENT)
Dept: ORTHOPEDICS | Facility: CLINIC | Age: 71
End: 2021-03-13

## 2021-03-15 DIAGNOSIS — S32.810D CLOSED PELVIC RING FRACTURE WITH ROUTINE HEALING, SUBSEQUENT ENCOUNTER: Primary | ICD-10-CM

## 2021-03-16 ENCOUNTER — TELEPHONE (OUTPATIENT)
Dept: INTERNAL MEDICINE | Facility: CLINIC | Age: 71
End: 2021-03-16

## 2021-03-20 ENCOUNTER — PATIENT MESSAGE (OUTPATIENT)
Dept: ORTHOPEDICS | Facility: CLINIC | Age: 71
End: 2021-03-20

## 2021-03-21 ENCOUNTER — PATIENT MESSAGE (OUTPATIENT)
Dept: ORTHOPEDICS | Facility: CLINIC | Age: 71
End: 2021-03-21

## 2021-03-23 ENCOUNTER — PATIENT MESSAGE (OUTPATIENT)
Dept: ORTHOPEDICS | Facility: CLINIC | Age: 71
End: 2021-03-23

## 2021-03-24 ENCOUNTER — PATIENT MESSAGE (OUTPATIENT)
Dept: ORTHOPEDICS | Facility: CLINIC | Age: 71
End: 2021-03-24

## 2021-03-27 ENCOUNTER — PATIENT MESSAGE (OUTPATIENT)
Dept: ORTHOPEDICS | Facility: CLINIC | Age: 71
End: 2021-03-27

## 2021-03-29 ENCOUNTER — PATIENT MESSAGE (OUTPATIENT)
Dept: ORTHOPEDICS | Facility: CLINIC | Age: 71
End: 2021-03-29

## 2021-04-01 ENCOUNTER — INFUSION (OUTPATIENT)
Dept: INFECTIOUS DISEASES | Facility: HOSPITAL | Age: 71
End: 2021-04-01
Attending: INTERNAL MEDICINE
Payer: MEDICARE

## 2021-04-01 VITALS
BODY MASS INDEX: 15.75 KG/M2 | HEIGHT: 62 IN | WEIGHT: 85.56 LBS | HEART RATE: 91 BPM | OXYGEN SATURATION: 97 % | TEMPERATURE: 98 F | RESPIRATION RATE: 16 BRPM | DIASTOLIC BLOOD PRESSURE: 58 MMHG | SYSTOLIC BLOOD PRESSURE: 111 MMHG

## 2021-04-01 DIAGNOSIS — J47.9 ADULT BRONCHIECTASIS: Primary | ICD-10-CM

## 2021-04-01 DIAGNOSIS — D80.1 HYPOGAMMAGLOBULINEMIA: ICD-10-CM

## 2021-04-01 PROCEDURE — 25000003 PHARM REV CODE 250: Performed by: ALLERGY & IMMUNOLOGY

## 2021-04-01 PROCEDURE — 96366 THER/PROPH/DIAG IV INF ADDON: CPT

## 2021-04-01 PROCEDURE — 63600175 PHARM REV CODE 636 W HCPCS: Mod: JG | Performed by: ALLERGY & IMMUNOLOGY

## 2021-04-01 PROCEDURE — 96365 THER/PROPH/DIAG IV INF INIT: CPT

## 2021-04-01 RX ORDER — SODIUM CHLORIDE 0.9 % (FLUSH) 0.9 %
10 SYRINGE (ML) INJECTION
Status: CANCELLED | OUTPATIENT
Start: 2021-04-29

## 2021-04-01 RX ORDER — ACETAMINOPHEN 325 MG/1
650 TABLET ORAL
Status: DISCONTINUED | OUTPATIENT
Start: 2021-04-01 | End: 2021-04-01 | Stop reason: HOSPADM

## 2021-04-01 RX ORDER — SODIUM CHLORIDE 0.9 % (FLUSH) 0.9 %
10 SYRINGE (ML) INJECTION
Status: DISCONTINUED | OUTPATIENT
Start: 2021-04-01 | End: 2021-04-01 | Stop reason: HOSPADM

## 2021-04-01 RX ORDER — ACETAMINOPHEN 325 MG/1
650 TABLET ORAL
Status: CANCELLED | OUTPATIENT
Start: 2021-04-29

## 2021-04-01 RX ADMIN — SODIUM CHLORIDE: 0.9 INJECTION, SOLUTION INTRAVENOUS at 11:04

## 2021-04-01 RX ADMIN — HUMAN IMMUNOGLOBULIN G 20 G: 20 LIQUID INTRAVENOUS at 11:04

## 2021-04-02 ENCOUNTER — PATIENT MESSAGE (OUTPATIENT)
Dept: ORTHOPEDICS | Facility: CLINIC | Age: 71
End: 2021-04-02

## 2021-04-06 ENCOUNTER — PATIENT MESSAGE (OUTPATIENT)
Dept: ORTHOPEDICS | Facility: CLINIC | Age: 71
End: 2021-04-06

## 2021-04-09 ENCOUNTER — PATIENT MESSAGE (OUTPATIENT)
Dept: INTERNAL MEDICINE | Facility: CLINIC | Age: 71
End: 2021-04-09

## 2021-04-13 ENCOUNTER — PATIENT OUTREACH (OUTPATIENT)
Dept: ADMINISTRATIVE | Facility: OTHER | Age: 71
End: 2021-04-13

## 2021-04-15 ENCOUNTER — HOSPITAL ENCOUNTER (OUTPATIENT)
Dept: RADIOLOGY | Facility: CLINIC | Age: 71
Discharge: HOME | End: 2021-04-15
Attending: PHYSICIAN ASSISTANT
Payer: MEDICARE

## 2021-04-15 ENCOUNTER — OFFICE VISIT (OUTPATIENT)
Dept: ORTHOPEDICS | Facility: CLINIC | Age: 71
End: 2021-04-15
Payer: MEDICARE

## 2021-04-15 ENCOUNTER — HOSPITAL ENCOUNTER (OUTPATIENT)
Dept: RADIOLOGY | Facility: HOSPITAL | Age: 71
Discharge: HOME | End: 2021-04-15
Attending: ORTHOPAEDIC SURGERY
Payer: MEDICARE

## 2021-04-15 VITALS — HEIGHT: 62 IN | BODY MASS INDEX: 16.56 KG/M2 | WEIGHT: 90 LBS

## 2021-04-15 VITALS — SYSTOLIC BLOOD PRESSURE: 146 MMHG | HEART RATE: 79 BPM | DIASTOLIC BLOOD PRESSURE: 72 MMHG

## 2021-04-15 DIAGNOSIS — M25.512 CHRONIC LEFT SHOULDER PAIN: ICD-10-CM

## 2021-04-15 DIAGNOSIS — M81.6 LOCALIZED OSTEOPOROSIS OF LEQUESNE: ICD-10-CM

## 2021-04-15 DIAGNOSIS — S32.810D CLOSED PELVIC RING FRACTURE WITH ROUTINE HEALING, SUBSEQUENT ENCOUNTER: Primary | ICD-10-CM

## 2021-04-15 DIAGNOSIS — S32.810D CLOSED PELVIC RING FRACTURE WITH ROUTINE HEALING, SUBSEQUENT ENCOUNTER: ICD-10-CM

## 2021-04-15 DIAGNOSIS — M80.80XA PATHOLOGICAL FRACTURE DUE TO OSTEOPOROSIS, UNSPECIFIED FRACTURE SITE, UNSPECIFIED OSTEOPOROSIS TYPE, INITIAL ENCOUNTER: ICD-10-CM

## 2021-04-15 DIAGNOSIS — M81.0 OSTEOPOROSIS, UNSPECIFIED OSTEOPOROSIS TYPE, UNSPECIFIED PATHOLOGICAL FRACTURE PRESENCE: ICD-10-CM

## 2021-04-15 DIAGNOSIS — G89.29 CHRONIC LEFT SHOULDER PAIN: ICD-10-CM

## 2021-04-15 DIAGNOSIS — M80.80XA PATHOLOGICAL FRACTURE DUE TO OSTEOPOROSIS, UNSPECIFIED FRACTURE SITE, UNSPECIFIED OSTEOPOROSIS TYPE, INITIAL ENCOUNTER: Primary | ICD-10-CM

## 2021-04-15 PROCEDURE — 99999 PR PBB SHADOW E&M-EST. PATIENT-LVL III: ICD-10-PCS | Mod: PBBFAC,,, | Performed by: PHYSICIAN ASSISTANT

## 2021-04-15 PROCEDURE — 99213 OFFICE O/P EST LOW 20 MIN: CPT | Mod: PBBFAC,25,27 | Performed by: ORTHOPAEDIC SURGERY

## 2021-04-15 PROCEDURE — 99212 PR OFFICE/OUTPT VISIT, EST, LEVL II, 10-19 MIN: ICD-10-PCS | Mod: S$PBB,24,, | Performed by: ORTHOPAEDIC SURGERY

## 2021-04-15 PROCEDURE — 99999 PR PBB SHADOW E&M-EST. PATIENT-LVL III: CPT | Mod: PBBFAC,,, | Performed by: PHYSICIAN ASSISTANT

## 2021-04-15 PROCEDURE — 99024 PR POST-OP FOLLOW-UP VISIT: ICD-10-PCS | Mod: S$PBB,,, | Performed by: ORTHOPAEDIC SURGERY

## 2021-04-15 PROCEDURE — 99213 OFFICE O/P EST LOW 20 MIN: CPT | Mod: 24,S$PBB,, | Performed by: PHYSICIAN ASSISTANT

## 2021-04-15 PROCEDURE — 99213 PR OFFICE/OUTPT VISIT, EST, LEVL III, 20-29 MIN: ICD-10-PCS | Mod: 24,S$PBB,, | Performed by: PHYSICIAN ASSISTANT

## 2021-04-15 PROCEDURE — 99213 OFFICE O/P EST LOW 20 MIN: CPT | Mod: PBBFAC,25

## 2021-04-15 PROCEDURE — 77080 DXA BONE DENSITY AXIAL: CPT | Mod: 26,,, | Performed by: INTERNAL MEDICINE

## 2021-04-15 PROCEDURE — 77080 DEXA BONE DENSITY SPINE HIP: ICD-10-PCS | Mod: 26,,, | Performed by: INTERNAL MEDICINE

## 2021-04-15 PROCEDURE — 99999 PR PBB SHADOW E&M-EST. PATIENT-LVL III: ICD-10-PCS | Mod: PBBFAC,,, | Performed by: ORTHOPAEDIC SURGERY

## 2021-04-15 PROCEDURE — 72190 XR PELVIS AP INLET AND OUTLET: ICD-10-PCS | Mod: 26,,, | Performed by: RADIOLOGY

## 2021-04-15 PROCEDURE — 99999 PR PBB SHADOW E&M-EST. PATIENT-LVL III: CPT | Mod: PBBFAC,,, | Performed by: ORTHOPAEDIC SURGERY

## 2021-04-15 PROCEDURE — 72190 X-RAY EXAM OF PELVIS: CPT | Mod: 26,,, | Performed by: RADIOLOGY

## 2021-04-15 PROCEDURE — 99212 OFFICE O/P EST SF 10 MIN: CPT | Mod: S$PBB,24,, | Performed by: ORTHOPAEDIC SURGERY

## 2021-04-15 PROCEDURE — 72190 X-RAY EXAM OF PELVIS: CPT | Mod: TC

## 2021-04-15 PROCEDURE — 99024 POSTOP FOLLOW-UP VISIT: CPT | Mod: S$PBB,,, | Performed by: ORTHOPAEDIC SURGERY

## 2021-04-15 PROCEDURE — 77080 DXA BONE DENSITY AXIAL: CPT | Mod: TC

## 2021-04-15 RX ORDER — SODIUM CHLORIDE 0.9 % (FLUSH) 0.9 %
10 SYRINGE (ML) INJECTION
Status: CANCELLED | OUTPATIENT
Start: 2021-04-15

## 2021-04-15 RX ORDER — HEPARIN 100 UNIT/ML
500 SYRINGE INTRAVENOUS
Status: CANCELLED | OUTPATIENT
Start: 2021-04-15

## 2021-04-15 RX ORDER — ZOLEDRONIC ACID 5 MG/100ML
5 INJECTION, SOLUTION INTRAVENOUS
Status: CANCELLED | OUTPATIENT
Start: 2021-04-15

## 2021-04-16 ENCOUNTER — PATIENT MESSAGE (OUTPATIENT)
Dept: ORTHOPEDICS | Facility: CLINIC | Age: 71
End: 2021-04-16

## 2021-04-19 ENCOUNTER — PATIENT MESSAGE (OUTPATIENT)
Dept: ORTHOPEDICS | Facility: CLINIC | Age: 71
End: 2021-04-19

## 2021-04-19 ENCOUNTER — DOCUMENTATION ONLY (OUTPATIENT)
Dept: ORTHOPEDICS | Facility: CLINIC | Age: 71
End: 2021-04-19

## 2021-04-21 ENCOUNTER — PATIENT MESSAGE (OUTPATIENT)
Dept: ORTHOPEDICS | Facility: CLINIC | Age: 71
End: 2021-04-21

## 2021-04-21 ENCOUNTER — TELEPHONE (OUTPATIENT)
Dept: INFECTIOUS DISEASES | Facility: HOSPITAL | Age: 71
End: 2021-04-21

## 2021-04-22 ENCOUNTER — OFFICE VISIT (OUTPATIENT)
Dept: INTERNAL MEDICINE | Facility: CLINIC | Age: 71
End: 2021-04-22
Payer: MEDICARE

## 2021-04-22 VITALS
HEART RATE: 80 BPM | DIASTOLIC BLOOD PRESSURE: 70 MMHG | SYSTOLIC BLOOD PRESSURE: 120 MMHG | BODY MASS INDEX: 16.28 KG/M2 | OXYGEN SATURATION: 99 % | WEIGHT: 89 LBS

## 2021-04-22 DIAGNOSIS — D80.1 HYPOGAMMAGLOBULINEMIA: ICD-10-CM

## 2021-04-22 DIAGNOSIS — R05.9 COUGH: ICD-10-CM

## 2021-04-22 DIAGNOSIS — F33.42 MAJOR DEPRESSIVE DISORDER, RECURRENT EPISODE, IN FULL REMISSION: ICD-10-CM

## 2021-04-22 DIAGNOSIS — J47.9 ADULT BRONCHIECTASIS: ICD-10-CM

## 2021-04-22 DIAGNOSIS — Z13.220 ENCOUNTER FOR LIPID SCREENING FOR CARDIOVASCULAR DISEASE: ICD-10-CM

## 2021-04-22 DIAGNOSIS — Z13.6 ENCOUNTER FOR LIPID SCREENING FOR CARDIOVASCULAR DISEASE: ICD-10-CM

## 2021-04-22 DIAGNOSIS — S32.111D CLOSED MINIMALLY DISPLACED ZONE I FRACTURE OF SACRUM WITH ROUTINE HEALING, SUBSEQUENT ENCOUNTER: ICD-10-CM

## 2021-04-22 DIAGNOSIS — E03.9 HYPOTHYROIDISM, UNSPECIFIED TYPE: Primary | ICD-10-CM

## 2021-04-22 PROCEDURE — 99214 PR OFFICE/OUTPT VISIT, EST, LEVL IV, 30-39 MIN: ICD-10-PCS | Mod: 95,,, | Performed by: FAMILY MEDICINE

## 2021-04-22 PROCEDURE — 99214 OFFICE O/P EST MOD 30 MIN: CPT | Mod: 95,,, | Performed by: FAMILY MEDICINE

## 2021-04-22 RX ORDER — MUPIROCIN 20 MG/G
OINTMENT TOPICAL
COMMUNITY
Start: 2021-02-22 | End: 2021-06-01 | Stop reason: SDUPTHER

## 2021-04-23 ENCOUNTER — PATIENT MESSAGE (OUTPATIENT)
Dept: INTERNAL MEDICINE | Facility: CLINIC | Age: 71
End: 2021-04-23

## 2021-04-27 ENCOUNTER — PATIENT MESSAGE (OUTPATIENT)
Dept: INTERNAL MEDICINE | Facility: CLINIC | Age: 71
End: 2021-04-27

## 2021-04-27 ENCOUNTER — PATIENT MESSAGE (OUTPATIENT)
Dept: ORTHOPEDICS | Facility: CLINIC | Age: 71
End: 2021-04-27

## 2021-04-27 ENCOUNTER — TELEPHONE (OUTPATIENT)
Dept: ORTHOPEDICS | Facility: CLINIC | Age: 71
End: 2021-04-27

## 2021-04-27 DIAGNOSIS — M25.512 CHRONIC LEFT SHOULDER PAIN: Primary | ICD-10-CM

## 2021-04-27 DIAGNOSIS — G89.29 CHRONIC LEFT SHOULDER PAIN: Primary | ICD-10-CM

## 2021-04-29 ENCOUNTER — PATIENT MESSAGE (OUTPATIENT)
Dept: INTERNAL MEDICINE | Facility: CLINIC | Age: 71
End: 2021-04-29

## 2021-04-29 ENCOUNTER — INFUSION (OUTPATIENT)
Dept: INFECTIOUS DISEASES | Facility: HOSPITAL | Age: 71
End: 2021-04-29
Attending: INTERNAL MEDICINE
Payer: MEDICARE

## 2021-04-29 VITALS
TEMPERATURE: 98 F | BODY MASS INDEX: 17 KG/M2 | RESPIRATION RATE: 17 BRPM | WEIGHT: 92.38 LBS | HEIGHT: 62 IN | HEART RATE: 73 BPM | SYSTOLIC BLOOD PRESSURE: 149 MMHG | OXYGEN SATURATION: 100 % | DIASTOLIC BLOOD PRESSURE: 79 MMHG

## 2021-04-29 DIAGNOSIS — D80.1 HYPOGAMMAGLOBULINEMIA: ICD-10-CM

## 2021-04-29 DIAGNOSIS — J47.9 ADULT BRONCHIECTASIS: Primary | ICD-10-CM

## 2021-04-29 PROCEDURE — 96366 THER/PROPH/DIAG IV INF ADDON: CPT

## 2021-04-29 PROCEDURE — 25000003 PHARM REV CODE 250: Performed by: ALLERGY & IMMUNOLOGY

## 2021-04-29 PROCEDURE — 63600175 PHARM REV CODE 636 W HCPCS: Mod: JG | Performed by: ALLERGY & IMMUNOLOGY

## 2021-04-29 PROCEDURE — 96365 THER/PROPH/DIAG IV INF INIT: CPT

## 2021-04-29 RX ORDER — SODIUM CHLORIDE 0.9 % (FLUSH) 0.9 %
10 SYRINGE (ML) INJECTION
Status: DISCONTINUED | OUTPATIENT
Start: 2021-04-29 | End: 2021-04-29 | Stop reason: HOSPADM

## 2021-04-29 RX ORDER — SODIUM CHLORIDE 0.9 % (FLUSH) 0.9 %
10 SYRINGE (ML) INJECTION
Status: CANCELLED | OUTPATIENT
Start: 2021-05-27

## 2021-04-29 RX ORDER — ACETAMINOPHEN 325 MG/1
650 TABLET ORAL
Status: CANCELLED | OUTPATIENT
Start: 2021-05-27

## 2021-04-29 RX ORDER — ACETAMINOPHEN 325 MG/1
650 TABLET ORAL
Status: DISCONTINUED | OUTPATIENT
Start: 2021-04-29 | End: 2021-04-29 | Stop reason: HOSPADM

## 2021-04-29 RX ADMIN — SODIUM CHLORIDE: 0.9 INJECTION, SOLUTION INTRAVENOUS at 10:04

## 2021-04-29 RX ADMIN — HUMAN IMMUNOGLOBULIN G 20 G: 20 LIQUID INTRAVENOUS at 10:04

## 2021-04-30 ENCOUNTER — PATIENT MESSAGE (OUTPATIENT)
Dept: ORTHOPEDICS | Facility: CLINIC | Age: 71
End: 2021-04-30

## 2021-04-30 ENCOUNTER — TELEPHONE (OUTPATIENT)
Dept: INFECTIOUS DISEASES | Facility: HOSPITAL | Age: 71
End: 2021-04-30

## 2021-05-04 ENCOUNTER — INFUSION (OUTPATIENT)
Dept: INFECTIOUS DISEASES | Facility: HOSPITAL | Age: 71
End: 2021-05-04
Attending: INTERNAL MEDICINE
Payer: MEDICARE

## 2021-05-04 VITALS
HEART RATE: 82 BPM | DIASTOLIC BLOOD PRESSURE: 65 MMHG | WEIGHT: 91.06 LBS | TEMPERATURE: 97 F | HEIGHT: 62 IN | BODY MASS INDEX: 16.76 KG/M2 | SYSTOLIC BLOOD PRESSURE: 109 MMHG | RESPIRATION RATE: 18 BRPM | OXYGEN SATURATION: 100 %

## 2021-05-04 DIAGNOSIS — M81.0 OSTEOPOROSIS, UNSPECIFIED OSTEOPOROSIS TYPE, UNSPECIFIED PATHOLOGICAL FRACTURE PRESENCE: ICD-10-CM

## 2021-05-04 DIAGNOSIS — M81.6 LOCALIZED OSTEOPOROSIS OF LEQUESNE: ICD-10-CM

## 2021-05-04 DIAGNOSIS — M80.80XA PATHOLOGICAL FRACTURE DUE TO OSTEOPOROSIS, UNSPECIFIED FRACTURE SITE, UNSPECIFIED OSTEOPOROSIS TYPE, INITIAL ENCOUNTER: Primary | ICD-10-CM

## 2021-05-04 PROCEDURE — 96365 THER/PROPH/DIAG IV INF INIT: CPT

## 2021-05-04 PROCEDURE — 63600175 PHARM REV CODE 636 W HCPCS: Performed by: PHYSICIAN ASSISTANT

## 2021-05-04 RX ORDER — SODIUM CHLORIDE 0.9 % (FLUSH) 0.9 %
10 SYRINGE (ML) INJECTION
Status: DISCONTINUED | OUTPATIENT
Start: 2021-05-04 | End: 2021-05-04 | Stop reason: HOSPADM

## 2021-05-04 RX ORDER — ZOLEDRONIC ACID 5 MG/100ML
5 INJECTION, SOLUTION INTRAVENOUS
Status: CANCELLED | OUTPATIENT
Start: 2021-05-04

## 2021-05-04 RX ORDER — ZOLEDRONIC ACID 5 MG/100ML
5 INJECTION, SOLUTION INTRAVENOUS
Status: COMPLETED | OUTPATIENT
Start: 2021-05-04 | End: 2021-05-04

## 2021-05-04 RX ORDER — HEPARIN 100 UNIT/ML
500 SYRINGE INTRAVENOUS
Status: CANCELLED | OUTPATIENT
Start: 2021-05-04

## 2021-05-04 RX ORDER — SODIUM CHLORIDE 0.9 % (FLUSH) 0.9 %
10 SYRINGE (ML) INJECTION
Status: CANCELLED | OUTPATIENT
Start: 2021-05-04

## 2021-05-04 RX ADMIN — ZOLEDRONIC ACID 5 MG: 5 INJECTION, SOLUTION INTRAVENOUS at 02:05

## 2021-05-15 ENCOUNTER — PATIENT MESSAGE (OUTPATIENT)
Dept: INTERNAL MEDICINE | Facility: CLINIC | Age: 71
End: 2021-05-15

## 2021-05-15 DIAGNOSIS — J98.9 RESPIRATORY ILLNESS: Primary | ICD-10-CM

## 2021-05-18 ENCOUNTER — PATIENT MESSAGE (OUTPATIENT)
Dept: INTERNAL MEDICINE | Facility: CLINIC | Age: 71
End: 2021-05-18

## 2021-05-18 ENCOUNTER — PATIENT MESSAGE (OUTPATIENT)
Dept: ALLERGY | Facility: CLINIC | Age: 71
End: 2021-05-18

## 2021-05-18 ENCOUNTER — PES CALL (OUTPATIENT)
Dept: ADMINISTRATIVE | Facility: CLINIC | Age: 71
End: 2021-05-18

## 2021-05-18 RX ORDER — AZITHROMYCIN 250 MG/1
TABLET, FILM COATED ORAL
Qty: 6 TABLET | Refills: 0 | Status: SHIPPED | OUTPATIENT
Start: 2021-05-18 | End: 2021-05-23

## 2021-05-20 ENCOUNTER — OFFICE VISIT (OUTPATIENT)
Dept: INTERNAL MEDICINE | Facility: CLINIC | Age: 71
End: 2021-05-20
Payer: MEDICARE

## 2021-05-20 ENCOUNTER — PATIENT MESSAGE (OUTPATIENT)
Dept: ALLERGY | Facility: CLINIC | Age: 71
End: 2021-05-20

## 2021-05-20 VITALS
SYSTOLIC BLOOD PRESSURE: 109 MMHG | DIASTOLIC BLOOD PRESSURE: 65 MMHG | WEIGHT: 91 LBS | OXYGEN SATURATION: 100 % | HEART RATE: 82 BPM | BODY MASS INDEX: 16.64 KG/M2

## 2021-05-20 DIAGNOSIS — J47.9 ADULT BRONCHIECTASIS: Primary | ICD-10-CM

## 2021-05-20 DIAGNOSIS — D80.1 HYPOGAMMAGLOBULINEMIA: ICD-10-CM

## 2021-05-20 DIAGNOSIS — R05.9 COUGH: ICD-10-CM

## 2021-05-20 PROCEDURE — 99214 PR OFFICE/OUTPT VISIT, EST, LEVL IV, 30-39 MIN: ICD-10-PCS | Mod: 95,,, | Performed by: FAMILY MEDICINE

## 2021-05-20 PROCEDURE — 99214 OFFICE O/P EST MOD 30 MIN: CPT | Mod: 95,,, | Performed by: FAMILY MEDICINE

## 2021-05-20 RX ORDER — ALBUTEROL SULFATE 90 UG/1
2 AEROSOL, METERED RESPIRATORY (INHALATION) EVERY 6 HOURS PRN
Qty: 18 G | Refills: 0 | Status: SHIPPED | OUTPATIENT
Start: 2021-05-20 | End: 2021-07-07 | Stop reason: SDUPTHER

## 2021-05-20 RX ORDER — ALBUTEROL SULFATE 90 UG/1
2 AEROSOL, METERED RESPIRATORY (INHALATION) EVERY 6 HOURS PRN
COMMUNITY
End: 2021-05-20 | Stop reason: SDUPTHER

## 2021-05-28 ENCOUNTER — INFUSION (OUTPATIENT)
Dept: INFECTIOUS DISEASES | Facility: HOSPITAL | Age: 71
End: 2021-05-28
Attending: INTERNAL MEDICINE
Payer: MEDICARE

## 2021-05-28 VITALS
HEIGHT: 62 IN | WEIGHT: 91.81 LBS | HEART RATE: 92 BPM | BODY MASS INDEX: 16.89 KG/M2 | SYSTOLIC BLOOD PRESSURE: 130 MMHG | RESPIRATION RATE: 18 BRPM | OXYGEN SATURATION: 99 % | DIASTOLIC BLOOD PRESSURE: 67 MMHG | TEMPERATURE: 99 F

## 2021-05-28 DIAGNOSIS — J47.9 ADULT BRONCHIECTASIS: Primary | ICD-10-CM

## 2021-05-28 DIAGNOSIS — D80.1 HYPOGAMMAGLOBULINEMIA: ICD-10-CM

## 2021-05-28 PROCEDURE — 63600175 PHARM REV CODE 636 W HCPCS: Mod: JG | Performed by: ALLERGY & IMMUNOLOGY

## 2021-05-28 PROCEDURE — 25000003 PHARM REV CODE 250: Performed by: ALLERGY & IMMUNOLOGY

## 2021-05-28 PROCEDURE — 96366 THER/PROPH/DIAG IV INF ADDON: CPT

## 2021-05-28 PROCEDURE — 96365 THER/PROPH/DIAG IV INF INIT: CPT

## 2021-05-28 RX ORDER — SODIUM CHLORIDE 0.9 % (FLUSH) 0.9 %
10 SYRINGE (ML) INJECTION
Status: DISCONTINUED | OUTPATIENT
Start: 2021-05-28 | End: 2021-05-28 | Stop reason: HOSPADM

## 2021-05-28 RX ORDER — ACETAMINOPHEN 325 MG/1
650 TABLET ORAL
Status: DISCONTINUED | OUTPATIENT
Start: 2021-05-28 | End: 2021-05-28 | Stop reason: HOSPADM

## 2021-05-28 RX ORDER — ACETAMINOPHEN 325 MG/1
650 TABLET ORAL
Status: CANCELLED | OUTPATIENT
Start: 2021-06-25

## 2021-05-28 RX ORDER — SODIUM CHLORIDE 0.9 % (FLUSH) 0.9 %
10 SYRINGE (ML) INJECTION
Status: CANCELLED | OUTPATIENT
Start: 2021-06-25

## 2021-05-28 RX ADMIN — HUMAN IMMUNOGLOBULIN G 20 G: 20 LIQUID INTRAVENOUS at 10:05

## 2021-05-28 RX ADMIN — SODIUM CHLORIDE: 0.9 INJECTION, SOLUTION INTRAVENOUS at 10:05

## 2021-06-01 ENCOUNTER — PATIENT MESSAGE (OUTPATIENT)
Dept: INTERNAL MEDICINE | Facility: CLINIC | Age: 71
End: 2021-06-01

## 2021-06-01 DIAGNOSIS — T14.8XXA ABRASION: Primary | ICD-10-CM

## 2021-06-01 RX ORDER — MUPIROCIN 20 MG/G
OINTMENT TOPICAL
Qty: 30 G | Refills: 0 | Status: SHIPPED | OUTPATIENT
Start: 2021-06-01 | End: 2021-06-03 | Stop reason: SDUPTHER

## 2021-06-02 ENCOUNTER — TELEPHONE (OUTPATIENT)
Dept: INTERNAL MEDICINE | Facility: CLINIC | Age: 71
End: 2021-06-02

## 2021-06-03 ENCOUNTER — OFFICE VISIT (OUTPATIENT)
Dept: ALLERGY | Facility: CLINIC | Age: 71
End: 2021-06-03
Payer: MEDICARE

## 2021-06-03 DIAGNOSIS — T14.8XXA ABRASION: ICD-10-CM

## 2021-06-03 DIAGNOSIS — D83.9 CVID (COMMON VARIABLE IMMUNODEFICIENCY): Primary | ICD-10-CM

## 2021-06-03 PROCEDURE — 99214 PR OFFICE/OUTPT VISIT, EST, LEVL IV, 30-39 MIN: ICD-10-PCS | Mod: 95,,, | Performed by: ALLERGY & IMMUNOLOGY

## 2021-06-03 PROCEDURE — 99214 OFFICE O/P EST MOD 30 MIN: CPT | Mod: 95,,, | Performed by: ALLERGY & IMMUNOLOGY

## 2021-06-03 RX ORDER — MUPIROCIN 20 MG/G
OINTMENT TOPICAL 2 TIMES DAILY
Qty: 30 G | Refills: 6 | Status: SHIPPED | OUTPATIENT
Start: 2021-06-03 | End: 2021-10-12

## 2021-06-11 ENCOUNTER — TELEPHONE (OUTPATIENT)
Dept: NEUROLOGY | Facility: CLINIC | Age: 71
End: 2021-06-11

## 2021-06-14 ENCOUNTER — TELEPHONE (OUTPATIENT)
Dept: INTERNAL MEDICINE | Facility: CLINIC | Age: 71
End: 2021-06-14

## 2021-06-14 ENCOUNTER — PATIENT MESSAGE (OUTPATIENT)
Dept: INTERNAL MEDICINE | Facility: CLINIC | Age: 71
End: 2021-06-14

## 2021-06-14 ENCOUNTER — TELEPHONE (OUTPATIENT)
Dept: ALLERGY | Facility: CLINIC | Age: 71
End: 2021-06-14

## 2021-06-14 DIAGNOSIS — F33.42 MAJOR DEPRESSIVE DISORDER, RECURRENT EPISODE, IN FULL REMISSION: Primary | ICD-10-CM

## 2021-06-14 DIAGNOSIS — R41.82 ALTERED MENTAL STATUS, UNSPECIFIED ALTERED MENTAL STATUS TYPE: ICD-10-CM

## 2021-06-14 DIAGNOSIS — R46.89 SPELL OF BEHAVIOR CHANGE: ICD-10-CM

## 2021-06-15 ENCOUNTER — PATIENT MESSAGE (OUTPATIENT)
Dept: ALLERGY | Facility: CLINIC | Age: 71
End: 2021-06-15

## 2021-06-21 ENCOUNTER — LAB VISIT (OUTPATIENT)
Dept: LAB | Facility: HOSPITAL | Age: 71
End: 2021-06-21
Attending: FAMILY MEDICINE
Payer: MEDICARE

## 2021-06-21 ENCOUNTER — PATIENT MESSAGE (OUTPATIENT)
Dept: INTERNAL MEDICINE | Facility: CLINIC | Age: 71
End: 2021-06-21

## 2021-06-21 DIAGNOSIS — F33.42 MAJOR DEPRESSIVE DISORDER, RECURRENT EPISODE, IN FULL REMISSION: ICD-10-CM

## 2021-06-21 DIAGNOSIS — D83.9 CVID (COMMON VARIABLE IMMUNODEFICIENCY): ICD-10-CM

## 2021-06-21 DIAGNOSIS — R46.89 SPELL OF BEHAVIOR CHANGE: ICD-10-CM

## 2021-06-21 DIAGNOSIS — R41.82 ALTERED MENTAL STATUS, UNSPECIFIED ALTERED MENTAL STATUS TYPE: ICD-10-CM

## 2021-06-21 LAB
ALBUMIN SERPL BCP-MCNC: 3.6 G/DL (ref 3.5–5.2)
ALP SERPL-CCNC: 66 U/L (ref 55–135)
ALT SERPL W/O P-5'-P-CCNC: 33 U/L (ref 10–44)
ANION GAP SERPL CALC-SCNC: 11 MMOL/L (ref 8–16)
AST SERPL-CCNC: 27 U/L (ref 10–40)
BASOPHILS # BLD AUTO: 0.01 K/UL (ref 0–0.2)
BASOPHILS NFR BLD: 0.2 % (ref 0–1.9)
BILIRUB SERPL-MCNC: 0.4 MG/DL (ref 0.1–1)
BUN SERPL-MCNC: 21 MG/DL (ref 8–23)
CALCIUM SERPL-MCNC: 9 MG/DL (ref 8.7–10.5)
CHLORIDE SERPL-SCNC: 100 MMOL/L (ref 95–110)
CO2 SERPL-SCNC: 25 MMOL/L (ref 23–29)
CREAT SERPL-MCNC: 0.6 MG/DL (ref 0.5–1.4)
DIFFERENTIAL METHOD: ABNORMAL
EOSINOPHIL # BLD AUTO: 0 K/UL (ref 0–0.5)
EOSINOPHIL NFR BLD: 0 % (ref 0–8)
ERYTHROCYTE [DISTWIDTH] IN BLOOD BY AUTOMATED COUNT: 16.4 % (ref 11.5–14.5)
EST. GFR  (AFRICAN AMERICAN): >60 ML/MIN/1.73 M^2
EST. GFR  (NON AFRICAN AMERICAN): >60 ML/MIN/1.73 M^2
FOLATE SERPL-MCNC: 15.2 NG/ML (ref 4–24)
GLUCOSE SERPL-MCNC: 89 MG/DL (ref 70–110)
HCT VFR BLD AUTO: 36.1 % (ref 37–48.5)
HGB BLD-MCNC: 11 G/DL (ref 12–16)
IGA SERPL-MCNC: 172 MG/DL (ref 40–350)
IGG SERPL-MCNC: 1325 MG/DL (ref 650–1600)
IGM SERPL-MCNC: 33 MG/DL (ref 50–300)
IMM GRANULOCYTES # BLD AUTO: 0.01 K/UL (ref 0–0.04)
IMM GRANULOCYTES NFR BLD AUTO: 0.2 % (ref 0–0.5)
LYMPHOCYTES # BLD AUTO: 0.8 K/UL (ref 1–4.8)
LYMPHOCYTES NFR BLD: 17.3 % (ref 18–48)
MCH RBC QN AUTO: 28.2 PG (ref 27–31)
MCHC RBC AUTO-ENTMCNC: 30.5 G/DL (ref 32–36)
MCV RBC AUTO: 93 FL (ref 82–98)
MONOCYTES # BLD AUTO: 0.3 K/UL (ref 0.3–1)
MONOCYTES NFR BLD: 6.7 % (ref 4–15)
NEUTROPHILS # BLD AUTO: 3.5 K/UL (ref 1.8–7.7)
NEUTROPHILS NFR BLD: 75.6 % (ref 38–73)
NRBC BLD-RTO: 0 /100 WBC
PLATELET # BLD AUTO: 241 K/UL (ref 150–450)
PMV BLD AUTO: 10.7 FL (ref 9.2–12.9)
POTASSIUM SERPL-SCNC: 4 MMOL/L (ref 3.5–5.1)
PROT SERPL-MCNC: 6.9 G/DL (ref 6–8.4)
RBC # BLD AUTO: 3.9 M/UL (ref 4–5.4)
SODIUM SERPL-SCNC: 136 MMOL/L (ref 136–145)
T4 FREE SERPL-MCNC: 0.91 NG/DL (ref 0.71–1.51)
TSH SERPL DL<=0.005 MIU/L-ACNC: 4.49 UIU/ML (ref 0.4–4)
VIT B12 SERPL-MCNC: 822 PG/ML (ref 210–950)
WBC # BLD AUTO: 4.63 K/UL (ref 3.9–12.7)

## 2021-06-21 PROCEDURE — 82746 ASSAY OF FOLIC ACID SERUM: CPT | Performed by: FAMILY MEDICINE

## 2021-06-21 PROCEDURE — 82784 ASSAY IGA/IGD/IGG/IGM EACH: CPT | Mod: 59 | Performed by: ALLERGY & IMMUNOLOGY

## 2021-06-21 PROCEDURE — 82607 VITAMIN B-12: CPT | Performed by: FAMILY MEDICINE

## 2021-06-21 PROCEDURE — 85025 COMPLETE CBC W/AUTO DIFF WBC: CPT | Performed by: FAMILY MEDICINE

## 2021-06-21 PROCEDURE — 84443 ASSAY THYROID STIM HORMONE: CPT | Performed by: FAMILY MEDICINE

## 2021-06-21 PROCEDURE — 80053 COMPREHEN METABOLIC PANEL: CPT | Performed by: FAMILY MEDICINE

## 2021-06-21 PROCEDURE — 36415 COLL VENOUS BLD VENIPUNCTURE: CPT | Performed by: FAMILY MEDICINE

## 2021-06-21 PROCEDURE — 84439 ASSAY OF FREE THYROXINE: CPT | Performed by: FAMILY MEDICINE

## 2021-06-25 ENCOUNTER — INFUSION (OUTPATIENT)
Dept: INFECTIOUS DISEASES | Facility: HOSPITAL | Age: 71
End: 2021-06-25
Attending: INTERNAL MEDICINE
Payer: MEDICARE

## 2021-06-25 VITALS
BODY MASS INDEX: 16.33 KG/M2 | DIASTOLIC BLOOD PRESSURE: 59 MMHG | SYSTOLIC BLOOD PRESSURE: 115 MMHG | TEMPERATURE: 98 F | HEIGHT: 62 IN | OXYGEN SATURATION: 100 % | RESPIRATION RATE: 16 BRPM | WEIGHT: 88.75 LBS | HEART RATE: 94 BPM

## 2021-06-25 DIAGNOSIS — J47.9 ADULT BRONCHIECTASIS: ICD-10-CM

## 2021-06-25 DIAGNOSIS — D83.9 CVID (COMMON VARIABLE IMMUNODEFICIENCY): Primary | ICD-10-CM

## 2021-06-25 PROCEDURE — 96366 THER/PROPH/DIAG IV INF ADDON: CPT

## 2021-06-25 PROCEDURE — 63600175 PHARM REV CODE 636 W HCPCS: Mod: JG | Performed by: ALLERGY & IMMUNOLOGY

## 2021-06-25 PROCEDURE — 25000003 PHARM REV CODE 250: Performed by: ALLERGY & IMMUNOLOGY

## 2021-06-25 PROCEDURE — 96365 THER/PROPH/DIAG IV INF INIT: CPT

## 2021-06-25 RX ORDER — ACETAMINOPHEN 325 MG/1
650 TABLET ORAL
Status: DISCONTINUED | OUTPATIENT
Start: 2021-06-25 | End: 2021-06-25 | Stop reason: HOSPADM

## 2021-06-25 RX ORDER — ACETAMINOPHEN 325 MG/1
650 TABLET ORAL
Status: CANCELLED | OUTPATIENT
Start: 2021-07-23

## 2021-06-25 RX ORDER — SODIUM CHLORIDE 0.9 % (FLUSH) 0.9 %
10 SYRINGE (ML) INJECTION
Status: CANCELLED | OUTPATIENT
Start: 2021-07-23

## 2021-06-25 RX ORDER — SODIUM CHLORIDE 0.9 % (FLUSH) 0.9 %
10 SYRINGE (ML) INJECTION
Status: DISCONTINUED | OUTPATIENT
Start: 2021-06-25 | End: 2021-06-25 | Stop reason: HOSPADM

## 2021-06-25 RX ADMIN — HUMAN IMMUNOGLOBULIN G 25 G: 20 LIQUID INTRAVENOUS at 10:06

## 2021-06-25 RX ADMIN — SODIUM CHLORIDE: 0.9 INJECTION, SOLUTION INTRAVENOUS at 10:06

## 2021-07-07 ENCOUNTER — OFFICE VISIT (OUTPATIENT)
Dept: INTERNAL MEDICINE | Facility: CLINIC | Age: 71
End: 2021-07-07
Payer: MEDICARE

## 2021-07-07 VITALS
HEART RATE: 92 BPM | TEMPERATURE: 99 F | HEIGHT: 62 IN | OXYGEN SATURATION: 99 % | DIASTOLIC BLOOD PRESSURE: 62 MMHG | WEIGHT: 89.81 LBS | BODY MASS INDEX: 16.53 KG/M2 | SYSTOLIC BLOOD PRESSURE: 100 MMHG

## 2021-07-07 DIAGNOSIS — R05.9 COUGH: ICD-10-CM

## 2021-07-07 DIAGNOSIS — R41.3 MEMORY DIFFICULTIES: Primary | ICD-10-CM

## 2021-07-07 DIAGNOSIS — R41.82 ALTERED MENTAL STATUS, UNSPECIFIED ALTERED MENTAL STATUS TYPE: ICD-10-CM

## 2021-07-07 DIAGNOSIS — J47.9 ADULT BRONCHIECTASIS: ICD-10-CM

## 2021-07-07 LAB
BILIRUB SERPL-MCNC: ABNORMAL MG/DL
BLOOD URINE, POC: ABNORMAL
CLARITY, POC UA: CLEAR
COLOR, POC UA: YELLOW
GLUCOSE UR QL STRIP: NORMAL
KETONES UR QL STRIP: ABNORMAL
LEUKOCYTE ESTERASE URINE, POC: ABNORMAL
NITRITE, POC UA: ABNORMAL
PH, POC UA: 5
PROTEIN, POC: ABNORMAL
SPECIFIC GRAVITY, POC UA: 1.02
UROBILINOGEN, POC UA: NORMAL

## 2021-07-07 PROCEDURE — 99214 PR OFFICE/OUTPT VISIT, EST, LEVL IV, 30-39 MIN: ICD-10-PCS | Mod: S$PBB,,, | Performed by: FAMILY MEDICINE

## 2021-07-07 PROCEDURE — 99999 PR PBB SHADOW E&M-EST. PATIENT-LVL V: CPT | Mod: PBBFAC,,, | Performed by: FAMILY MEDICINE

## 2021-07-07 PROCEDURE — 99999 PR PBB SHADOW E&M-EST. PATIENT-LVL V: ICD-10-PCS | Mod: PBBFAC,,, | Performed by: FAMILY MEDICINE

## 2021-07-07 PROCEDURE — 81002 URINALYSIS NONAUTO W/O SCOPE: CPT | Mod: PBBFAC,PN | Performed by: FAMILY MEDICINE

## 2021-07-07 PROCEDURE — 99215 OFFICE O/P EST HI 40 MIN: CPT | Mod: PBBFAC,PN | Performed by: FAMILY MEDICINE

## 2021-07-07 PROCEDURE — 99214 OFFICE O/P EST MOD 30 MIN: CPT | Mod: S$PBB,,, | Performed by: FAMILY MEDICINE

## 2021-07-07 RX ORDER — ALBUTEROL SULFATE 90 UG/1
2 AEROSOL, METERED RESPIRATORY (INHALATION) EVERY 6 HOURS PRN
Qty: 18 G | Refills: 1 | Status: ON HOLD | OUTPATIENT
Start: 2021-07-07 | End: 2023-02-06

## 2021-07-16 ENCOUNTER — OFFICE VISIT (OUTPATIENT)
Dept: NEUROLOGY | Facility: CLINIC | Age: 71
End: 2021-07-16
Payer: MEDICARE

## 2021-07-16 VITALS
HEIGHT: 62 IN | DIASTOLIC BLOOD PRESSURE: 68 MMHG | SYSTOLIC BLOOD PRESSURE: 107 MMHG | BODY MASS INDEX: 16.38 KG/M2 | HEART RATE: 66 BPM | WEIGHT: 89 LBS

## 2021-07-16 DIAGNOSIS — R41.9 ALTERATION OF AWARENESS: Primary | ICD-10-CM

## 2021-07-16 DIAGNOSIS — F41.9 ANXIETY: ICD-10-CM

## 2021-07-16 PROCEDURE — 99215 OFFICE O/P EST HI 40 MIN: CPT | Mod: S$PBB,,, | Performed by: PSYCHIATRY & NEUROLOGY

## 2021-07-16 PROCEDURE — 99999 PR PBB SHADOW E&M-EST. PATIENT-LVL III: CPT | Mod: PBBFAC,,, | Performed by: PSYCHIATRY & NEUROLOGY

## 2021-07-16 PROCEDURE — 99215 PR OFFICE/OUTPT VISIT, EST, LEVL V, 40-54 MIN: ICD-10-PCS | Mod: S$PBB,,, | Performed by: PSYCHIATRY & NEUROLOGY

## 2021-07-16 PROCEDURE — 99999 PR PBB SHADOW E&M-EST. PATIENT-LVL III: ICD-10-PCS | Mod: PBBFAC,,, | Performed by: PSYCHIATRY & NEUROLOGY

## 2021-07-16 PROCEDURE — 99213 OFFICE O/P EST LOW 20 MIN: CPT | Mod: PBBFAC | Performed by: PSYCHIATRY & NEUROLOGY

## 2021-07-16 RX ORDER — VENLAFAXINE HYDROCHLORIDE 37.5 MG/1
37.5 CAPSULE, EXTENDED RELEASE ORAL DAILY
Qty: 90 CAPSULE | Refills: 3 | Status: SHIPPED | OUTPATIENT
Start: 2021-07-16 | End: 2021-10-26

## 2021-07-21 ENCOUNTER — PATIENT MESSAGE (OUTPATIENT)
Dept: NEUROLOGY | Facility: CLINIC | Age: 71
End: 2021-07-21

## 2021-07-22 ENCOUNTER — TELEPHONE (OUTPATIENT)
Dept: INTERNAL MEDICINE | Facility: CLINIC | Age: 71
End: 2021-07-22

## 2021-07-23 ENCOUNTER — INFUSION (OUTPATIENT)
Dept: INFECTIOUS DISEASES | Facility: HOSPITAL | Age: 71
End: 2021-07-23
Attending: INTERNAL MEDICINE
Payer: MEDICARE

## 2021-07-23 ENCOUNTER — PATIENT MESSAGE (OUTPATIENT)
Dept: NEUROLOGY | Facility: CLINIC | Age: 71
End: 2021-07-23

## 2021-07-23 ENCOUNTER — TELEPHONE (OUTPATIENT)
Dept: INTERNAL MEDICINE | Facility: CLINIC | Age: 71
End: 2021-07-23

## 2021-07-23 VITALS
BODY MASS INDEX: 15.75 KG/M2 | WEIGHT: 85.56 LBS | TEMPERATURE: 98 F | SYSTOLIC BLOOD PRESSURE: 103 MMHG | DIASTOLIC BLOOD PRESSURE: 60 MMHG | HEIGHT: 62 IN | RESPIRATION RATE: 18 BRPM | OXYGEN SATURATION: 97 % | HEART RATE: 87 BPM

## 2021-07-23 DIAGNOSIS — J47.9 ADULT BRONCHIECTASIS: ICD-10-CM

## 2021-07-23 DIAGNOSIS — D83.9 CVID (COMMON VARIABLE IMMUNODEFICIENCY): Primary | ICD-10-CM

## 2021-07-23 PROCEDURE — 96366 THER/PROPH/DIAG IV INF ADDON: CPT

## 2021-07-23 PROCEDURE — 96365 THER/PROPH/DIAG IV INF INIT: CPT

## 2021-07-23 PROCEDURE — 63600175 PHARM REV CODE 636 W HCPCS: Mod: JG | Performed by: ALLERGY & IMMUNOLOGY

## 2021-07-23 PROCEDURE — 25000003 PHARM REV CODE 250: Performed by: ALLERGY & IMMUNOLOGY

## 2021-07-23 RX ORDER — ACETAMINOPHEN 325 MG/1
650 TABLET ORAL
Status: CANCELLED | OUTPATIENT
Start: 2021-08-20

## 2021-07-23 RX ORDER — SODIUM CHLORIDE 0.9 % (FLUSH) 0.9 %
10 SYRINGE (ML) INJECTION
Status: CANCELLED | OUTPATIENT
Start: 2021-08-20

## 2021-07-23 RX ORDER — SODIUM CHLORIDE 0.9 % (FLUSH) 0.9 %
10 SYRINGE (ML) INJECTION
Status: DISCONTINUED | OUTPATIENT
Start: 2021-07-23 | End: 2021-07-23 | Stop reason: HOSPADM

## 2021-07-23 RX ORDER — ACETAMINOPHEN 325 MG/1
650 TABLET ORAL
Status: DISCONTINUED | OUTPATIENT
Start: 2021-07-23 | End: 2021-07-23 | Stop reason: HOSPADM

## 2021-07-23 RX ADMIN — SODIUM CHLORIDE: 0.9 INJECTION, SOLUTION INTRAVENOUS at 09:07

## 2021-07-23 RX ADMIN — HUMAN IMMUNOGLOBULIN G 25 G: 5 LIQUID INTRAVENOUS at 10:07

## 2021-07-26 ENCOUNTER — HOSPITAL ENCOUNTER (OUTPATIENT)
Dept: RADIOLOGY | Facility: OTHER | Age: 71
Discharge: HOME | End: 2021-07-26
Attending: FAMILY MEDICINE
Payer: MEDICARE

## 2021-07-26 DIAGNOSIS — R41.3 MEMORY DIFFICULTIES: ICD-10-CM

## 2021-07-26 DIAGNOSIS — R41.82 ALTERED MENTAL STATUS, UNSPECIFIED ALTERED MENTAL STATUS TYPE: ICD-10-CM

## 2021-07-26 PROCEDURE — 70551 MRI BRAIN WITHOUT CONTRAST: ICD-10-PCS | Mod: 26,,, | Performed by: RADIOLOGY

## 2021-07-26 PROCEDURE — 70551 MRI BRAIN STEM W/O DYE: CPT | Mod: TC

## 2021-07-26 PROCEDURE — 70551 MRI BRAIN STEM W/O DYE: CPT | Mod: 26,,, | Performed by: RADIOLOGY

## 2021-07-28 ENCOUNTER — OFFICE VISIT (OUTPATIENT)
Dept: OPTOMETRY | Facility: CLINIC | Age: 71
End: 2021-07-28
Payer: MEDICARE

## 2021-07-28 ENCOUNTER — PATIENT MESSAGE (OUTPATIENT)
Dept: INTERNAL MEDICINE | Facility: CLINIC | Age: 71
End: 2021-07-28

## 2021-07-28 DIAGNOSIS — H04.123 DRY EYE SYNDROME, BILATERAL: ICD-10-CM

## 2021-07-28 DIAGNOSIS — Z96.1 PSEUDOPHAKIA OF BOTH EYES: ICD-10-CM

## 2021-07-28 DIAGNOSIS — H52.202 ASTIGMATISM OF LEFT EYE, UNSPECIFIED TYPE: ICD-10-CM

## 2021-07-28 DIAGNOSIS — H43.393 VITREOUS FLOATERS OF BOTH EYES: Primary | ICD-10-CM

## 2021-07-28 PROCEDURE — 92015 PR REFRACTION: ICD-10-PCS | Mod: ,,, | Performed by: OPTOMETRIST

## 2021-07-28 PROCEDURE — 99213 OFFICE O/P EST LOW 20 MIN: CPT | Mod: PBBFAC | Performed by: OPTOMETRIST

## 2021-07-28 PROCEDURE — 92014 PR EYE EXAM, EST PATIENT,COMPREHESV: ICD-10-PCS | Mod: S$PBB,,, | Performed by: OPTOMETRIST

## 2021-07-28 PROCEDURE — 99999 PR PBB SHADOW E&M-EST. PATIENT-LVL III: ICD-10-PCS | Mod: PBBFAC,,, | Performed by: OPTOMETRIST

## 2021-07-28 PROCEDURE — 92015 DETERMINE REFRACTIVE STATE: CPT | Mod: ,,, | Performed by: OPTOMETRIST

## 2021-07-28 PROCEDURE — 99999 PR PBB SHADOW E&M-EST. PATIENT-LVL III: CPT | Mod: PBBFAC,,, | Performed by: OPTOMETRIST

## 2021-07-28 PROCEDURE — 92014 COMPRE OPH EXAM EST PT 1/>: CPT | Mod: S$PBB,,, | Performed by: OPTOMETRIST

## 2021-08-04 ENCOUNTER — HOSPITAL ENCOUNTER (OUTPATIENT)
Dept: NEUROLOGY | Facility: CLINIC | Age: 71
Discharge: HOME | End: 2021-08-04
Payer: MEDICARE

## 2021-08-04 DIAGNOSIS — R41.9 ALTERATION OF AWARENESS: ICD-10-CM

## 2021-08-04 PROCEDURE — 95812 PR EEG,EXTENDED MONITORING,41-60 MINUTES: ICD-10-PCS | Mod: 26,S$PBB,, | Performed by: PSYCHIATRY & NEUROLOGY

## 2021-08-04 PROCEDURE — 95812 EEG 41-60 MINUTES: CPT | Mod: PBBFAC | Performed by: PSYCHIATRY & NEUROLOGY

## 2021-08-04 PROCEDURE — 95812 EEG 41-60 MINUTES: CPT | Mod: 26,S$PBB,, | Performed by: PSYCHIATRY & NEUROLOGY

## 2021-08-15 ENCOUNTER — PATIENT MESSAGE (OUTPATIENT)
Dept: NEUROLOGY | Facility: CLINIC | Age: 71
End: 2021-08-15

## 2021-08-19 ENCOUNTER — INFUSION (OUTPATIENT)
Dept: INFECTIOUS DISEASES | Facility: HOSPITAL | Age: 71
End: 2021-08-19
Attending: INTERNAL MEDICINE
Payer: MEDICARE

## 2021-08-19 VITALS
HEART RATE: 62 BPM | WEIGHT: 88.63 LBS | DIASTOLIC BLOOD PRESSURE: 71 MMHG | SYSTOLIC BLOOD PRESSURE: 145 MMHG | TEMPERATURE: 98 F | HEIGHT: 62 IN | RESPIRATION RATE: 18 BRPM | BODY MASS INDEX: 16.31 KG/M2 | OXYGEN SATURATION: 95 %

## 2021-08-19 DIAGNOSIS — J47.9 ADULT BRONCHIECTASIS: ICD-10-CM

## 2021-08-19 DIAGNOSIS — D83.9 CVID (COMMON VARIABLE IMMUNODEFICIENCY): Primary | ICD-10-CM

## 2021-08-19 PROCEDURE — 96366 THER/PROPH/DIAG IV INF ADDON: CPT

## 2021-08-19 PROCEDURE — 96365 THER/PROPH/DIAG IV INF INIT: CPT

## 2021-08-19 PROCEDURE — 63600175 PHARM REV CODE 636 W HCPCS: Mod: JG | Performed by: ALLERGY & IMMUNOLOGY

## 2021-08-19 PROCEDURE — 25000003 PHARM REV CODE 250: Performed by: ALLERGY & IMMUNOLOGY

## 2021-08-19 RX ORDER — SODIUM CHLORIDE 0.9 % (FLUSH) 0.9 %
10 SYRINGE (ML) INJECTION
Status: CANCELLED | OUTPATIENT
Start: 2021-09-16

## 2021-08-19 RX ORDER — ACETAMINOPHEN 325 MG/1
650 TABLET ORAL
Status: CANCELLED | OUTPATIENT
Start: 2021-09-16

## 2021-08-19 RX ORDER — ACETAMINOPHEN 325 MG/1
650 TABLET ORAL
Status: DISCONTINUED | OUTPATIENT
Start: 2021-08-19 | End: 2021-08-19 | Stop reason: HOSPADM

## 2021-08-19 RX ORDER — SODIUM CHLORIDE 0.9 % (FLUSH) 0.9 %
10 SYRINGE (ML) INJECTION
Status: DISCONTINUED | OUTPATIENT
Start: 2021-08-19 | End: 2021-08-19 | Stop reason: HOSPADM

## 2021-08-19 RX ADMIN — HUMAN IMMUNOGLOBULIN G 25 G: 5 LIQUID INTRAVENOUS at 09:08

## 2021-08-19 RX ADMIN — SODIUM CHLORIDE: 0.9 INJECTION, SOLUTION INTRAVENOUS at 09:08

## 2021-09-03 ENCOUNTER — PATIENT MESSAGE (OUTPATIENT)
Dept: NEUROLOGY | Facility: CLINIC | Age: 71
End: 2021-09-03

## 2021-09-14 ENCOUNTER — PES CALL (OUTPATIENT)
Dept: ADMINISTRATIVE | Facility: CLINIC | Age: 71
End: 2021-09-14

## 2021-09-16 ENCOUNTER — INFUSION (OUTPATIENT)
Dept: INFECTIOUS DISEASES | Facility: HOSPITAL | Age: 71
End: 2021-09-16
Attending: INTERNAL MEDICINE
Payer: MEDICARE

## 2021-09-16 VITALS
SYSTOLIC BLOOD PRESSURE: 150 MMHG | RESPIRATION RATE: 16 BRPM | OXYGEN SATURATION: 100 % | HEIGHT: 62 IN | BODY MASS INDEX: 16.52 KG/M2 | HEART RATE: 86 BPM | DIASTOLIC BLOOD PRESSURE: 74 MMHG | WEIGHT: 89.75 LBS | TEMPERATURE: 98 F

## 2021-09-16 DIAGNOSIS — D83.9 CVID (COMMON VARIABLE IMMUNODEFICIENCY): Primary | ICD-10-CM

## 2021-09-16 DIAGNOSIS — J47.9 ADULT BRONCHIECTASIS: ICD-10-CM

## 2021-09-16 PROCEDURE — 96366 THER/PROPH/DIAG IV INF ADDON: CPT

## 2021-09-16 PROCEDURE — 25000003 PHARM REV CODE 250: Performed by: ALLERGY & IMMUNOLOGY

## 2021-09-16 PROCEDURE — 96365 THER/PROPH/DIAG IV INF INIT: CPT

## 2021-09-16 PROCEDURE — 63600175 PHARM REV CODE 636 W HCPCS: Mod: JG | Performed by: ALLERGY & IMMUNOLOGY

## 2021-09-16 RX ORDER — ACETAMINOPHEN 325 MG/1
650 TABLET ORAL
Status: DISCONTINUED | OUTPATIENT
Start: 2021-09-16 | End: 2021-09-16 | Stop reason: HOSPADM

## 2021-09-16 RX ORDER — ACETAMINOPHEN 325 MG/1
650 TABLET ORAL
Status: CANCELLED | OUTPATIENT
Start: 2021-10-14

## 2021-09-16 RX ORDER — SODIUM CHLORIDE 0.9 % (FLUSH) 0.9 %
10 SYRINGE (ML) INJECTION
Status: CANCELLED | OUTPATIENT
Start: 2021-10-14

## 2021-09-16 RX ORDER — SODIUM CHLORIDE 0.9 % (FLUSH) 0.9 %
10 SYRINGE (ML) INJECTION
Status: DISCONTINUED | OUTPATIENT
Start: 2021-09-16 | End: 2021-09-16 | Stop reason: HOSPADM

## 2021-09-16 RX ADMIN — SODIUM CHLORIDE: 0.9 INJECTION, SOLUTION INTRAVENOUS at 10:09

## 2021-09-16 RX ADMIN — HUMAN IMMUNOGLOBULIN G 25 G: 5 LIQUID INTRAVENOUS at 10:09

## 2021-09-20 ENCOUNTER — IMMUNIZATION (OUTPATIENT)
Dept: PHARMACY | Facility: CLINIC | Age: 71
End: 2021-09-20
Payer: MEDICARE

## 2021-09-20 DIAGNOSIS — Z23 NEED FOR VACCINATION: Primary | ICD-10-CM

## 2021-10-11 DIAGNOSIS — I48.0 PAROXYSMAL ATRIAL FIBRILLATION: Primary | ICD-10-CM

## 2021-10-11 DIAGNOSIS — S32.810D CLOSED PELVIC RING FRACTURE WITH ROUTINE HEALING, SUBSEQUENT ENCOUNTER: ICD-10-CM

## 2021-10-14 ENCOUNTER — OFFICE VISIT (OUTPATIENT)
Dept: ORTHOPEDICS | Facility: CLINIC | Age: 71
End: 2021-10-14
Payer: MEDICARE

## 2021-10-14 ENCOUNTER — HOSPITAL ENCOUNTER (OUTPATIENT)
Dept: RADIOLOGY | Facility: HOSPITAL | Age: 71
Discharge: HOME | End: 2021-10-14
Attending: ORTHOPAEDIC SURGERY
Payer: MEDICARE

## 2021-10-14 ENCOUNTER — INFUSION (OUTPATIENT)
Dept: INFECTIOUS DISEASES | Facility: HOSPITAL | Age: 71
End: 2021-10-14
Attending: ORTHOPAEDIC SURGERY
Payer: MEDICARE

## 2021-10-14 ENCOUNTER — HOSPITAL ENCOUNTER (OUTPATIENT)
Dept: RADIOLOGY | Facility: HOSPITAL | Age: 71
Discharge: HOME | End: 2021-10-14
Attending: STUDENT IN AN ORGANIZED HEALTH CARE EDUCATION/TRAINING PROGRAM
Payer: MEDICARE

## 2021-10-14 ENCOUNTER — TELEPHONE (OUTPATIENT)
Dept: INFECTIOUS DISEASES | Facility: HOSPITAL | Age: 71
End: 2021-10-14

## 2021-10-14 VITALS
DIASTOLIC BLOOD PRESSURE: 67 MMHG | RESPIRATION RATE: 15 BRPM | TEMPERATURE: 99 F | WEIGHT: 88.38 LBS | HEART RATE: 67 BPM | HEIGHT: 62 IN | OXYGEN SATURATION: 94 % | SYSTOLIC BLOOD PRESSURE: 146 MMHG | BODY MASS INDEX: 16.26 KG/M2

## 2021-10-14 VITALS — WEIGHT: 87 LBS | HEIGHT: 62 IN | BODY MASS INDEX: 16.01 KG/M2

## 2021-10-14 DIAGNOSIS — J47.9 ADULT BRONCHIECTASIS: ICD-10-CM

## 2021-10-14 DIAGNOSIS — S32.810D CLOSED PELVIC RING FRACTURE WITH ROUTINE HEALING, SUBSEQUENT ENCOUNTER: ICD-10-CM

## 2021-10-14 DIAGNOSIS — M25.512 CHRONIC LEFT SHOULDER PAIN: ICD-10-CM

## 2021-10-14 DIAGNOSIS — G89.29 CHRONIC LEFT SHOULDER PAIN: ICD-10-CM

## 2021-10-14 DIAGNOSIS — M25.512 CHRONIC LEFT SHOULDER PAIN: Primary | ICD-10-CM

## 2021-10-14 DIAGNOSIS — D83.9 CVID (COMMON VARIABLE IMMUNODEFICIENCY): Primary | ICD-10-CM

## 2021-10-14 DIAGNOSIS — G89.29 CHRONIC LEFT SHOULDER PAIN: Primary | ICD-10-CM

## 2021-10-14 PROCEDURE — 72040 X-RAY EXAM NECK SPINE 2-3 VW: CPT | Mod: 26,,, | Performed by: RADIOLOGY

## 2021-10-14 PROCEDURE — 96366 THER/PROPH/DIAG IV INF ADDON: CPT

## 2021-10-14 PROCEDURE — 73030 X-RAY EXAM OF SHOULDER: CPT | Mod: TC,50

## 2021-10-14 PROCEDURE — 63600175 PHARM REV CODE 636 W HCPCS: Mod: JG | Performed by: ALLERGY & IMMUNOLOGY

## 2021-10-14 PROCEDURE — 72190 XR PELVIS AP INLET AND OUTLET: ICD-10-PCS | Mod: 26,,, | Performed by: RADIOLOGY

## 2021-10-14 PROCEDURE — 25000003 PHARM REV CODE 250: Performed by: ALLERGY & IMMUNOLOGY

## 2021-10-14 PROCEDURE — 99213 OFFICE O/P EST LOW 20 MIN: CPT | Mod: PBBFAC | Performed by: ORTHOPAEDIC SURGERY

## 2021-10-14 PROCEDURE — 99999 PR PBB SHADOW E&M-EST. PATIENT-LVL III: CPT | Mod: PBBFAC,,, | Performed by: ORTHOPAEDIC SURGERY

## 2021-10-14 PROCEDURE — 72190 X-RAY EXAM OF PELVIS: CPT | Mod: TC

## 2021-10-14 PROCEDURE — 72190 X-RAY EXAM OF PELVIS: CPT | Mod: 26,,, | Performed by: RADIOLOGY

## 2021-10-14 PROCEDURE — 99213 OFFICE O/P EST LOW 20 MIN: CPT | Mod: S$PBB,,, | Performed by: ORTHOPAEDIC SURGERY

## 2021-10-14 PROCEDURE — 72040 X-RAY EXAM NECK SPINE 2-3 VW: CPT | Mod: TC

## 2021-10-14 PROCEDURE — 73030 X-RAY EXAM OF SHOULDER: CPT | Mod: 26,50,, | Performed by: RADIOLOGY

## 2021-10-14 PROCEDURE — 99999 PR PBB SHADOW E&M-EST. PATIENT-LVL III: ICD-10-PCS | Mod: PBBFAC,,, | Performed by: ORTHOPAEDIC SURGERY

## 2021-10-14 PROCEDURE — 96365 THER/PROPH/DIAG IV INF INIT: CPT

## 2021-10-14 PROCEDURE — 73030 XR SHOULDER TRAUMA 3 VIEW BILATERAL: ICD-10-PCS | Mod: 26,50,, | Performed by: RADIOLOGY

## 2021-10-14 PROCEDURE — 99213 PR OFFICE/OUTPT VISIT, EST, LEVL III, 20-29 MIN: ICD-10-PCS | Mod: S$PBB,,, | Performed by: ORTHOPAEDIC SURGERY

## 2021-10-14 PROCEDURE — 72040 XR CERVICAL SPINE 2 OR 3 VIEWS: ICD-10-PCS | Mod: 26,,, | Performed by: RADIOLOGY

## 2021-10-14 RX ORDER — ACETAMINOPHEN 325 MG/1
650 TABLET ORAL
Status: DISCONTINUED | OUTPATIENT
Start: 2021-10-14 | End: 2021-10-14 | Stop reason: HOSPADM

## 2021-10-14 RX ORDER — SODIUM CHLORIDE 0.9 % (FLUSH) 0.9 %
10 SYRINGE (ML) INJECTION
Status: DISCONTINUED | OUTPATIENT
Start: 2021-10-14 | End: 2021-10-14 | Stop reason: HOSPADM

## 2021-10-14 RX ORDER — ACETAMINOPHEN 325 MG/1
650 TABLET ORAL
Status: CANCELLED | OUTPATIENT
Start: 2021-11-11

## 2021-10-14 RX ORDER — SODIUM CHLORIDE 0.9 % (FLUSH) 0.9 %
10 SYRINGE (ML) INJECTION
Status: CANCELLED | OUTPATIENT
Start: 2021-11-11

## 2021-10-14 RX ADMIN — HUMAN IMMUNOGLOBULIN G 25 G: 5 LIQUID INTRAVENOUS at 10:10

## 2021-10-14 RX ADMIN — SODIUM CHLORIDE: 0.9 INJECTION, SOLUTION INTRAVENOUS at 10:10

## 2021-10-20 ENCOUNTER — TELEPHONE (OUTPATIENT)
Dept: INTERNAL MEDICINE | Facility: CLINIC | Age: 71
End: 2021-10-20
Payer: MEDICARE

## 2021-10-21 ENCOUNTER — TELEPHONE (OUTPATIENT)
Dept: INTERNAL MEDICINE | Facility: CLINIC | Age: 71
End: 2021-10-21

## 2021-10-21 ENCOUNTER — PATIENT MESSAGE (OUTPATIENT)
Dept: INTERNAL MEDICINE | Facility: CLINIC | Age: 71
End: 2021-10-21
Payer: MEDICARE

## 2021-10-25 ENCOUNTER — PATIENT OUTREACH (OUTPATIENT)
Dept: ADMINISTRATIVE | Facility: OTHER | Age: 71
End: 2021-10-25
Payer: MEDICARE

## 2021-10-26 ENCOUNTER — OFFICE VISIT (OUTPATIENT)
Dept: NEUROLOGY | Facility: CLINIC | Age: 71
End: 2021-10-26
Payer: MEDICARE

## 2021-10-26 ENCOUNTER — PATIENT MESSAGE (OUTPATIENT)
Dept: INTERNAL MEDICINE | Facility: CLINIC | Age: 71
End: 2021-10-26
Payer: MEDICARE

## 2021-10-26 VITALS
HEIGHT: 62 IN | WEIGHT: 88 LBS | DIASTOLIC BLOOD PRESSURE: 84 MMHG | BODY MASS INDEX: 16.2 KG/M2 | SYSTOLIC BLOOD PRESSURE: 154 MMHG | HEART RATE: 72 BPM

## 2021-10-26 DIAGNOSIS — R26.9 GAIT ABNORMALITY: Primary | ICD-10-CM

## 2021-10-26 PROCEDURE — 99999 PR PBB SHADOW E&M-EST. PATIENT-LVL IV: CPT | Mod: PBBFAC,,, | Performed by: PSYCHIATRY & NEUROLOGY

## 2021-10-26 PROCEDURE — 99999 PR PBB SHADOW E&M-EST. PATIENT-LVL IV: ICD-10-PCS | Mod: PBBFAC,,, | Performed by: PSYCHIATRY & NEUROLOGY

## 2021-10-26 PROCEDURE — 99214 PR OFFICE/OUTPT VISIT, EST, LEVL IV, 30-39 MIN: ICD-10-PCS | Mod: S$PBB,,, | Performed by: PSYCHIATRY & NEUROLOGY

## 2021-10-26 PROCEDURE — 99214 OFFICE O/P EST MOD 30 MIN: CPT | Mod: S$PBB,,, | Performed by: PSYCHIATRY & NEUROLOGY

## 2021-10-26 PROCEDURE — 99214 OFFICE O/P EST MOD 30 MIN: CPT | Mod: PBBFAC | Performed by: PSYCHIATRY & NEUROLOGY

## 2021-10-27 ENCOUNTER — TELEPHONE (OUTPATIENT)
Dept: NEUROLOGY | Facility: CLINIC | Age: 71
End: 2021-10-27
Payer: MEDICARE

## 2021-10-27 ENCOUNTER — PATIENT MESSAGE (OUTPATIENT)
Dept: NEUROLOGY | Facility: CLINIC | Age: 71
End: 2021-10-27
Payer: MEDICARE

## 2021-11-01 ENCOUNTER — OFFICE VISIT (OUTPATIENT)
Dept: NEUROLOGY | Facility: CLINIC | Age: 71
End: 2021-11-01
Payer: MEDICARE

## 2021-11-01 ENCOUNTER — PATIENT MESSAGE (OUTPATIENT)
Dept: NEUROLOGY | Facility: CLINIC | Age: 71
End: 2021-11-01
Payer: MEDICARE

## 2021-11-01 VITALS
HEART RATE: 70 BPM | SYSTOLIC BLOOD PRESSURE: 132 MMHG | HEIGHT: 62 IN | BODY MASS INDEX: 16.1 KG/M2 | DIASTOLIC BLOOD PRESSURE: 79 MMHG

## 2021-11-01 DIAGNOSIS — G30.1 LATE ONSET ALZHEIMER'S DEMENTIA WITHOUT BEHAVIORAL DISTURBANCE: Primary | ICD-10-CM

## 2021-11-01 DIAGNOSIS — Z76.89 ENCOUNTER PRIOR TO INITIATION OF MEDICATION: ICD-10-CM

## 2021-11-01 DIAGNOSIS — R41.841 COGNITIVE COMMUNICATION DEFICIT: ICD-10-CM

## 2021-11-01 DIAGNOSIS — F02.80 LATE ONSET ALZHEIMER'S DEMENTIA WITHOUT BEHAVIORAL DISTURBANCE: Primary | ICD-10-CM

## 2021-11-01 PROCEDURE — 99215 OFFICE O/P EST HI 40 MIN: CPT | Mod: S$PBB,,, | Performed by: PSYCHIATRY & NEUROLOGY

## 2021-11-01 PROCEDURE — 99999 PR PBB SHADOW E&M-EST. PATIENT-LVL III: ICD-10-PCS | Mod: PBBFAC,,, | Performed by: PSYCHIATRY & NEUROLOGY

## 2021-11-01 PROCEDURE — 99213 OFFICE O/P EST LOW 20 MIN: CPT | Mod: PBBFAC | Performed by: PSYCHIATRY & NEUROLOGY

## 2021-11-01 PROCEDURE — 99215 PR OFFICE/OUTPT VISIT, EST, LEVL V, 40-54 MIN: ICD-10-PCS | Mod: S$PBB,,, | Performed by: PSYCHIATRY & NEUROLOGY

## 2021-11-01 PROCEDURE — 99417 PR PROLONGED SVC, OUTPT, W/WO DIRECT PT CONTACT,  EA ADDTL 15 MIN: ICD-10-PCS | Mod: S$PBB,,, | Performed by: PSYCHIATRY & NEUROLOGY

## 2021-11-01 PROCEDURE — 99999 PR PBB SHADOW E&M-EST. PATIENT-LVL III: CPT | Mod: PBBFAC,,, | Performed by: PSYCHIATRY & NEUROLOGY

## 2021-11-01 PROCEDURE — 99417 PROLNG OP E/M EACH 15 MIN: CPT | Mod: S$PBB,,, | Performed by: PSYCHIATRY & NEUROLOGY

## 2021-11-01 RX ORDER — DONEPEZIL HYDROCHLORIDE 10 MG/1
10 TABLET, FILM COATED ORAL DAILY
Qty: 30 TABLET | Refills: 11 | Status: SHIPPED | OUTPATIENT
Start: 2021-12-13 | End: 2021-12-30 | Stop reason: SDUPTHER

## 2021-11-01 RX ORDER — DONEPEZIL HYDROCHLORIDE 5 MG/1
TABLET, FILM COATED ORAL
Qty: 42 TABLET | Refills: 0 | Status: SHIPPED | OUTPATIENT
Start: 2021-11-01 | End: 2021-12-13

## 2021-11-02 ENCOUNTER — OFFICE VISIT (OUTPATIENT)
Dept: HOME HEALTH SERVICES | Facility: CLINIC | Age: 71
End: 2021-11-02
Payer: MEDICARE

## 2021-11-02 ENCOUNTER — PATIENT MESSAGE (OUTPATIENT)
Dept: NEUROLOGY | Facility: CLINIC | Age: 71
End: 2021-11-02
Payer: MEDICARE

## 2021-11-02 VITALS
TEMPERATURE: 98 F | DIASTOLIC BLOOD PRESSURE: 63 MMHG | BODY MASS INDEX: 16.38 KG/M2 | SYSTOLIC BLOOD PRESSURE: 119 MMHG | HEART RATE: 82 BPM | HEIGHT: 62 IN | WEIGHT: 89 LBS

## 2021-11-02 DIAGNOSIS — R64 CACHEXIA: ICD-10-CM

## 2021-11-02 DIAGNOSIS — G30.1 LATE ONSET ALZHEIMER'S DEMENTIA WITHOUT BEHAVIORAL DISTURBANCE: ICD-10-CM

## 2021-11-02 DIAGNOSIS — E89.0 POSTOPERATIVE HYPOTHYROIDISM: ICD-10-CM

## 2021-11-02 DIAGNOSIS — Z74.09 OTHER REDUCED MOBILITY: ICD-10-CM

## 2021-11-02 DIAGNOSIS — D69.2 SENILE PURPURA: ICD-10-CM

## 2021-11-02 DIAGNOSIS — D83.9 CVID (COMMON VARIABLE IMMUNODEFICIENCY): ICD-10-CM

## 2021-11-02 DIAGNOSIS — R29.6 FREQUENT FALLS: ICD-10-CM

## 2021-11-02 DIAGNOSIS — F02.80 LATE ONSET ALZHEIMER'S DEMENTIA WITHOUT BEHAVIORAL DISTURBANCE: ICD-10-CM

## 2021-11-02 DIAGNOSIS — R63.6 UNDERWEIGHT: ICD-10-CM

## 2021-11-02 DIAGNOSIS — F33.42 MAJOR DEPRESSIVE DISORDER, RECURRENT EPISODE, IN FULL REMISSION: ICD-10-CM

## 2021-11-02 DIAGNOSIS — M80.00XD AGE-RELATED OSTEOPOROSIS WITH CURRENT PATHOLOGICAL FRACTURE WITH ROUTINE HEALING: ICD-10-CM

## 2021-11-02 DIAGNOSIS — D80.1 HYPOGAMMAGLOBULINEMIA: ICD-10-CM

## 2021-11-02 DIAGNOSIS — R26.9 ABNORMALITY OF GAIT AND MOBILITY: ICD-10-CM

## 2021-11-02 DIAGNOSIS — J47.9 ADULT BRONCHIECTASIS: ICD-10-CM

## 2021-11-02 DIAGNOSIS — Z99.89 DEPENDENCE ON OTHER ENABLING MACHINES AND DEVICES: ICD-10-CM

## 2021-11-02 DIAGNOSIS — Z00.00 ENCOUNTER FOR PREVENTIVE HEALTH EXAMINATION: Primary | ICD-10-CM

## 2021-11-02 DIAGNOSIS — J32.9 CHRONIC SINUSITIS, UNSPECIFIED LOCATION: ICD-10-CM

## 2021-11-02 DIAGNOSIS — R26.9 GAIT DISTURBANCE: ICD-10-CM

## 2021-11-02 PROBLEM — S32.111A CLOSED MINIMALLY DISPLACED ZONE I FRACTURE OF SACRUM: Status: RESOLVED | Noted: 2021-01-19 | Resolved: 2021-11-02

## 2021-11-02 PROCEDURE — G0439 PPPS, SUBSEQ VISIT: HCPCS | Mod: S$GLB,,, | Performed by: NURSE PRACTITIONER

## 2021-11-02 PROCEDURE — G0439 PR MEDICARE ANNUAL WELLNESS SUBSEQUENT VISIT: ICD-10-PCS | Mod: S$GLB,,, | Performed by: NURSE PRACTITIONER

## 2021-11-03 ENCOUNTER — PATIENT MESSAGE (OUTPATIENT)
Dept: NEUROLOGY | Facility: CLINIC | Age: 71
End: 2021-11-03
Payer: MEDICARE

## 2021-11-05 ENCOUNTER — TELEPHONE (OUTPATIENT)
Dept: ELECTROPHYSIOLOGY | Facility: CLINIC | Age: 71
End: 2021-11-05
Payer: MEDICARE

## 2021-11-05 ENCOUNTER — PATIENT MESSAGE (OUTPATIENT)
Dept: NEUROLOGY | Facility: CLINIC | Age: 71
End: 2021-11-05
Payer: MEDICARE

## 2021-11-09 ENCOUNTER — PATIENT MESSAGE (OUTPATIENT)
Dept: INTERNAL MEDICINE | Facility: CLINIC | Age: 71
End: 2021-11-09
Payer: MEDICARE

## 2021-11-10 ENCOUNTER — PATIENT MESSAGE (OUTPATIENT)
Dept: NEUROLOGY | Facility: CLINIC | Age: 71
End: 2021-11-10
Payer: MEDICARE

## 2021-11-10 ENCOUNTER — OFFICE VISIT (OUTPATIENT)
Dept: ELECTROPHYSIOLOGY | Facility: CLINIC | Age: 71
End: 2021-11-10
Payer: MEDICARE

## 2021-11-10 VITALS
DIASTOLIC BLOOD PRESSURE: 68 MMHG | OXYGEN SATURATION: 100 % | WEIGHT: 89 LBS | HEIGHT: 62 IN | HEART RATE: 80 BPM | SYSTOLIC BLOOD PRESSURE: 141 MMHG | BODY MASS INDEX: 16.38 KG/M2

## 2021-11-10 DIAGNOSIS — F32.A DEPRESSION, UNSPECIFIED DEPRESSION TYPE: ICD-10-CM

## 2021-11-10 DIAGNOSIS — F02.80 LATE ONSET ALZHEIMER'S DEMENTIA WITHOUT BEHAVIORAL DISTURBANCE: ICD-10-CM
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
## 2021-11-10 DIAGNOSIS — Z86.79 HISTORY OF SUBARACHNOID HEMORRHAGE: ICD-10-CM

## 2021-11-10 DIAGNOSIS — Z98.890 HISTORY OF CARDIAC RADIOFREQUENCY ABLATION (RFA): ICD-10-CM

## 2021-11-10 DIAGNOSIS — M19.90 OSTEOARTHRITIS, UNSPECIFIED OSTEOARTHRITIS TYPE, UNSPECIFIED SITE: ICD-10-CM

## 2021-11-10 DIAGNOSIS — R63.6 UNDERWEIGHT: ICD-10-CM

## 2021-11-10 DIAGNOSIS — J47.9 ADULT BRONCHIECTASIS: ICD-10-CM

## 2021-11-10 DIAGNOSIS — K58.1 IRRITABLE BOWEL SYNDROME WITH CONSTIPATION: ICD-10-CM

## 2021-11-10 DIAGNOSIS — K62.3 RECTAL PROLAPSE: ICD-10-CM

## 2021-11-10 DIAGNOSIS — G30.1 LATE ONSET ALZHEIMER'S DEMENTIA WITHOUT BEHAVIORAL DISTURBANCE: ICD-10-CM

## 2021-11-10 DIAGNOSIS — D80.1 HYPOGAMMAGLOBULINEMIA: ICD-10-CM

## 2021-11-10 DIAGNOSIS — I47.19 AVNRT (AV NODAL RE-ENTRY TACHYCARDIA): ICD-10-CM

## 2021-11-10 DIAGNOSIS — F41.9 ANXIETY: ICD-10-CM

## 2021-11-10 DIAGNOSIS — H26.9 CATARACT OF BOTH EYES, UNSPECIFIED CATARACT TYPE: ICD-10-CM

## 2021-11-10 DIAGNOSIS — K21.9 GASTROESOPHAGEAL REFLUX DISEASE, UNSPECIFIED WHETHER ESOPHAGITIS PRESENT: ICD-10-CM

## 2021-11-10 DIAGNOSIS — I47.10 SVT (SUPRAVENTRICULAR TACHYCARDIA): ICD-10-CM

## 2021-11-10 DIAGNOSIS — E03.9 HYPOTHYROIDISM, UNSPECIFIED TYPE: ICD-10-CM

## 2021-11-10 DIAGNOSIS — M81.0 OSTEOPOROSIS, UNSPECIFIED OSTEOPOROSIS TYPE, UNSPECIFIED PATHOLOGICAL FRACTURE PRESENCE: ICD-10-CM

## 2021-11-10 DIAGNOSIS — R00.2 PALPITATIONS: Primary | ICD-10-CM

## 2021-11-10 DIAGNOSIS — R41.89 COGNITIVE DECLINE: ICD-10-CM

## 2021-11-10 PROCEDURE — 99214 OFFICE O/P EST MOD 30 MIN: CPT | Mod: PBBFAC | Performed by: STUDENT IN AN ORGANIZED HEALTH CARE EDUCATION/TRAINING PROGRAM

## 2021-11-10 PROCEDURE — 99205 PR OFFICE/OUTPT VISIT, NEW, LEVL V, 60-74 MIN: ICD-10-PCS | Mod: S$PBB,,, | Performed by: STUDENT IN AN ORGANIZED HEALTH CARE EDUCATION/TRAINING PROGRAM

## 2021-11-10 PROCEDURE — 99999 PR PBB SHADOW E&M-EST. PATIENT-LVL IV: ICD-10-PCS | Mod: PBBFAC,,, | Performed by: STUDENT IN AN ORGANIZED HEALTH CARE EDUCATION/TRAINING PROGRAM

## 2021-11-10 PROCEDURE — 99999 PR PBB SHADOW E&M-EST. PATIENT-LVL IV: CPT | Mod: PBBFAC,,, | Performed by: STUDENT IN AN ORGANIZED HEALTH CARE EDUCATION/TRAINING PROGRAM

## 2021-11-10 PROCEDURE — 99205 OFFICE O/P NEW HI 60 MIN: CPT | Mod: S$PBB,,, | Performed by: STUDENT IN AN ORGANIZED HEALTH CARE EDUCATION/TRAINING PROGRAM

## 2021-11-10 PROCEDURE — 93005 ELECTROCARDIOGRAM TRACING: CPT | Mod: PBBFAC | Performed by: INTERNAL MEDICINE

## 2021-11-10 PROCEDURE — 93010 ELECTROCARDIOGRAM REPORT: CPT | Mod: S$PBB,,, | Performed by: INTERNAL MEDICINE

## 2021-11-10 PROCEDURE — 93010 RHYTHM STRIP: ICD-10-PCS | Mod: S$PBB,,, | Performed by: INTERNAL MEDICINE

## 2021-11-10 RX ORDER — VENLAFAXINE HYDROCHLORIDE 37.5 MG/1
1 CAPSULE, EXTENDED RELEASE ORAL
COMMUNITY
End: 2021-12-17

## 2021-11-11 ENCOUNTER — INFUSION (OUTPATIENT)
Dept: INFECTIOUS DISEASES | Facility: HOSPITAL | Age: 71
End: 2021-11-11
Attending: INTERNAL MEDICINE
Payer: MEDICARE

## 2021-11-11 VITALS
SYSTOLIC BLOOD PRESSURE: 125 MMHG | RESPIRATION RATE: 15 BRPM | WEIGHT: 91.38 LBS | HEIGHT: 62 IN | DIASTOLIC BLOOD PRESSURE: 71 MMHG | TEMPERATURE: 98 F | BODY MASS INDEX: 16.82 KG/M2 | HEART RATE: 95 BPM | OXYGEN SATURATION: 98 %

## 2021-11-11 DIAGNOSIS — J47.9 ADULT BRONCHIECTASIS: ICD-10-CM

## 2021-11-11 DIAGNOSIS — D83.9 CVID (COMMON VARIABLE IMMUNODEFICIENCY): Primary | ICD-10-CM

## 2021-11-11 PROCEDURE — 96365 THER/PROPH/DIAG IV INF INIT: CPT

## 2021-11-11 PROCEDURE — 96366 THER/PROPH/DIAG IV INF ADDON: CPT

## 2021-11-11 PROCEDURE — 25000003 PHARM REV CODE 250: Performed by: ALLERGY & IMMUNOLOGY

## 2021-11-11 PROCEDURE — 63600175 PHARM REV CODE 636 W HCPCS: Mod: JG | Performed by: ALLERGY & IMMUNOLOGY

## 2021-11-11 RX ORDER — ACETAMINOPHEN 325 MG/1
650 TABLET ORAL
Status: CANCELLED | OUTPATIENT
Start: 2021-12-09

## 2021-11-11 RX ORDER — SODIUM CHLORIDE 0.9 % (FLUSH) 0.9 %
10 SYRINGE (ML) INJECTION
Status: DISCONTINUED | OUTPATIENT
Start: 2021-11-11 | End: 2021-11-11 | Stop reason: HOSPADM

## 2021-11-11 RX ORDER — SODIUM CHLORIDE 0.9 % (FLUSH) 0.9 %
10 SYRINGE (ML) INJECTION
Status: CANCELLED | OUTPATIENT
Start: 2021-12-09

## 2021-11-11 RX ORDER — ACETAMINOPHEN 325 MG/1
650 TABLET ORAL
Status: DISCONTINUED | OUTPATIENT
Start: 2021-11-11 | End: 2021-11-11 | Stop reason: HOSPADM

## 2021-11-11 RX ADMIN — SODIUM CHLORIDE: 0.9 INJECTION, SOLUTION INTRAVENOUS at 09:11

## 2021-11-11 RX ADMIN — HUMAN IMMUNOGLOBULIN G 25 G: 20 LIQUID INTRAVENOUS at 09:11

## 2021-11-16 ENCOUNTER — CLINICAL SUPPORT (OUTPATIENT)
Dept: REHABILITATION | Facility: OTHER | Age: 71
End: 2021-11-16
Attending: PSYCHIATRY & NEUROLOGY
Payer: MEDICARE

## 2021-11-16 DIAGNOSIS — R41.841 COGNITIVE COMMUNICATION DEFICIT: ICD-10-CM

## 2021-11-16 DIAGNOSIS — F02.80 LATE ONSET ALZHEIMER'S DEMENTIA WITHOUT BEHAVIORAL DISTURBANCE: ICD-10-CM

## 2021-11-16 DIAGNOSIS — G30.1 LATE ONSET ALZHEIMER'S DEMENTIA WITHOUT BEHAVIORAL DISTURBANCE: ICD-10-CM

## 2021-11-16 PROCEDURE — 92523 SPEECH SOUND LANG COMPREHEN: CPT | Mod: PN

## 2021-11-19 ENCOUNTER — CLINICAL SUPPORT (OUTPATIENT)
Dept: CARDIOLOGY | Facility: HOSPITAL | Age: 71
End: 2021-11-19
Attending: STUDENT IN AN ORGANIZED HEALTH CARE EDUCATION/TRAINING PROGRAM
Payer: MEDICARE

## 2021-11-19 ENCOUNTER — HOSPITAL ENCOUNTER (OUTPATIENT)
Dept: CARDIOLOGY | Facility: HOSPITAL | Age: 71
Discharge: HOME | End: 2021-11-19
Attending: STUDENT IN AN ORGANIZED HEALTH CARE EDUCATION/TRAINING PROGRAM
Payer: MEDICARE

## 2021-11-19 VITALS
SYSTOLIC BLOOD PRESSURE: 108 MMHG | DIASTOLIC BLOOD PRESSURE: 57 MMHG | BODY MASS INDEX: 16.75 KG/M2 | HEIGHT: 62 IN | WEIGHT: 91 LBS | HEART RATE: 76 BPM

## 2021-11-19 DIAGNOSIS — I47.19 AVNRT (AV NODAL RE-ENTRY TACHYCARDIA): Primary | ICD-10-CM

## 2021-11-19 DIAGNOSIS — I47.19 AVNRT (AV NODAL RE-ENTRY TACHYCARDIA): ICD-10-CM

## 2021-11-19 DIAGNOSIS — I48.0 PAROXYSMAL ATRIAL FIBRILLATION: ICD-10-CM

## 2021-11-19 DIAGNOSIS — R00.2 PALPITATIONS: ICD-10-CM

## 2021-11-19 LAB
ASCENDING AORTA: 3.02 CM
AV INDEX (PROSTH): 0.95
AV MEAN GRADIENT: 3 MMHG
AV PEAK GRADIENT: 5 MMHG
AV VALVE AREA: 2.81 CM2
AV VELOCITY RATIO: 0.81
BSA FOR ECHO PROCEDURE: 1.34 M2
CV ECHO LV RWT: 0.39 CM
DOP CALC AO PEAK VEL: 1.16 M/S
DOP CALC AO VTI: 22.35 CM
DOP CALC LVOT AREA: 3 CM2
DOP CALC LVOT DIAMETER: 1.94 CM
DOP CALC LVOT PEAK VEL: 0.94 M/S
DOP CALC LVOT STROKE VOLUME: 62.78 CM3
DOP CALCLVOT PEAK VEL VTI: 21.25 CM
E WAVE DECELERATION TIME: 260.42 MSEC
E/A RATIO: 0.75
E/E' RATIO: 8.67 M/S
ECHO LV POSTERIOR WALL: 0.69 CM (ref 0.6–1.1)
EJECTION FRACTION: 60 %
FRACTIONAL SHORTENING: 27 % (ref 28–44)
INTERVENTRICULAR SEPTUM: 0.54 CM (ref 0.6–1.1)
IVRT: 94.2 MSEC
LA MAJOR: 4.91 CM
LA MINOR: 5.03 CM
LA WIDTH: 3.4 CM
LEFT ATRIUM SIZE: 2.32 CM
LEFT ATRIUM VOLUME INDEX: 24.3 ML/M2
LEFT ATRIUM VOLUME: 33.32 CM3
LEFT INTERNAL DIMENSION IN SYSTOLE: 2.57 CM (ref 2.1–4)
LEFT VENTRICLE DIASTOLIC VOLUME INDEX: 37.57 ML/M2
LEFT VENTRICLE DIASTOLIC VOLUME: 51.47 ML
LEFT VENTRICLE MASS INDEX: 39 G/M2
LEFT VENTRICLE SYSTOLIC VOLUME INDEX: 17.4 ML/M2
LEFT VENTRICLE SYSTOLIC VOLUME: 23.84 ML
LEFT VENTRICULAR INTERNAL DIMENSION IN DIASTOLE: 3.52 CM (ref 3.5–6)
LEFT VENTRICULAR MASS: 53.48 G
LV LATERAL E/E' RATIO: 6.5 M/S
LV SEPTAL E/E' RATIO: 13 M/S
MV A" WAVE DURATION": 8.85 MSEC
MV PEAK A VEL: 0.69 M/S
MV PEAK E VEL: 0.52 M/S
MV STENOSIS PRESSURE HALF TIME: 75.52 MS
MV VALVE AREA P 1/2 METHOD: 2.91 CM2
PISA TR MAX VEL: 2.22 M/S
PULM VEIN S/D RATIO: 1.75
PV PEAK D VEL: 0.32 M/S
PV PEAK S VEL: 0.56 M/S
RA MAJOR: 4.11 CM
RA PRESSURE: 3 MMHG
RA WIDTH: 3.32 CM
RIGHT VENTRICULAR END-DIASTOLIC DIMENSION: 3.24 CM
RV TISSUE DOPPLER FREE WALL SYSTOLIC VELOCITY 1 (APICAL 4 CHAMBER VIEW): 9.72 CM/S
SINUS: 3.19 CM
STJ: 2.88 CM
TDI LATERAL: 0.08 M/S
TDI SEPTAL: 0.04 M/S
TDI: 0.06 M/S
TR MAX PG: 20 MMHG
TRICUSPID ANNULAR PLANE SYSTOLIC EXCURSION: 2.01 CM
TV REST PULMONARY ARTERY PRESSURE: 23 MMHG

## 2021-11-19 PROCEDURE — 93306 TTE W/DOPPLER COMPLETE: CPT | Mod: 26,,, | Performed by: INTERNAL MEDICINE

## 2021-11-19 PROCEDURE — 93244 EXT ECG>48HR<7D REV&INTERPJ: CPT | Mod: ,,, | Performed by: STUDENT IN AN ORGANIZED HEALTH CARE EDUCATION/TRAINING PROGRAM

## 2021-11-19 PROCEDURE — 93306 TTE W/DOPPLER COMPLETE: CPT

## 2021-11-19 PROCEDURE — 93242 EXT ECG>48HR<7D RECORDING: CPT

## 2021-11-19 PROCEDURE — 93306 ECHO (CUPID ONLY): ICD-10-PCS | Mod: 26,,, | Performed by: INTERNAL MEDICINE

## 2021-11-19 PROCEDURE — 93244 CV CARDIAC MONITOR - 3-15 DAY ADULT (CUPID ONLY): ICD-10-PCS | Mod: ,,, | Performed by: STUDENT IN AN ORGANIZED HEALTH CARE EDUCATION/TRAINING PROGRAM

## 2021-11-29 ENCOUNTER — TELEPHONE (OUTPATIENT)
Dept: INTERNAL MEDICINE | Facility: CLINIC | Age: 71
End: 2021-11-29
Payer: MEDICARE

## 2021-12-07 ENCOUNTER — TELEPHONE (OUTPATIENT)
Dept: CARDIOLOGY | Facility: HOSPITAL | Age: 71
End: 2021-12-07
Payer: MEDICARE

## 2021-12-07 ENCOUNTER — PATIENT MESSAGE (OUTPATIENT)
Dept: INTERNAL MEDICINE | Facility: CLINIC | Age: 71
End: 2021-12-07
Payer: MEDICARE

## 2021-12-09 ENCOUNTER — INFUSION (OUTPATIENT)
Dept: INFECTIOUS DISEASES | Facility: HOSPITAL | Age: 71
End: 2021-12-09
Attending: INTERNAL MEDICINE
Payer: MEDICARE

## 2021-12-09 ENCOUNTER — TELEPHONE (OUTPATIENT)
Dept: INFECTIOUS DISEASES | Facility: HOSPITAL | Age: 71
End: 2021-12-09
Payer: MEDICARE

## 2021-12-09 VITALS
SYSTOLIC BLOOD PRESSURE: 157 MMHG | HEART RATE: 73 BPM | OXYGEN SATURATION: 99 % | RESPIRATION RATE: 16 BRPM | WEIGHT: 91.19 LBS | TEMPERATURE: 98 F | BODY MASS INDEX: 16.67 KG/M2 | DIASTOLIC BLOOD PRESSURE: 87 MMHG

## 2021-12-09 DIAGNOSIS — D83.9 CVID (COMMON VARIABLE IMMUNODEFICIENCY): Primary | ICD-10-CM

## 2021-12-09 DIAGNOSIS — J47.9 ADULT BRONCHIECTASIS: ICD-10-CM

## 2021-12-09 PROCEDURE — 96365 THER/PROPH/DIAG IV INF INIT: CPT

## 2021-12-09 PROCEDURE — 96366 THER/PROPH/DIAG IV INF ADDON: CPT

## 2021-12-09 PROCEDURE — 63600175 PHARM REV CODE 636 W HCPCS: Mod: JG | Performed by: ALLERGY & IMMUNOLOGY

## 2021-12-09 PROCEDURE — 25000003 PHARM REV CODE 250: Performed by: ALLERGY & IMMUNOLOGY

## 2021-12-09 RX ORDER — SODIUM CHLORIDE 0.9 % (FLUSH) 0.9 %
10 SYRINGE (ML) INJECTION
Status: CANCELLED | OUTPATIENT
Start: 2022-01-06

## 2021-12-09 RX ORDER — ACETAMINOPHEN 325 MG/1
650 TABLET ORAL
Status: DISCONTINUED | OUTPATIENT
Start: 2021-12-09 | End: 2021-12-09 | Stop reason: HOSPADM

## 2021-12-09 RX ORDER — ACETAMINOPHEN 325 MG/1
650 TABLET ORAL
Status: CANCELLED | OUTPATIENT
Start: 2022-01-06

## 2021-12-09 RX ORDER — SODIUM CHLORIDE 0.9 % (FLUSH) 0.9 %
10 SYRINGE (ML) INJECTION
Status: DISCONTINUED | OUTPATIENT
Start: 2021-12-09 | End: 2021-12-09 | Stop reason: HOSPADM

## 2021-12-09 RX ADMIN — HUMAN IMMUNOGLOBULIN G 25 G: 20 LIQUID INTRAVENOUS at 11:12

## 2021-12-09 RX ADMIN — SODIUM CHLORIDE: 0.9 INJECTION, SOLUTION INTRAVENOUS at 11:12

## 2021-12-14 ENCOUNTER — CLINICAL SUPPORT (OUTPATIENT)
Dept: REHABILITATION | Facility: OTHER | Age: 71
End: 2021-12-14
Attending: PSYCHIATRY & NEUROLOGY
Payer: MEDICARE

## 2021-12-14 DIAGNOSIS — G30.1 LATE ONSET ALZHEIMER'S DEMENTIA WITHOUT BEHAVIORAL DISTURBANCE: Primary | ICD-10-CM

## 2021-12-14 DIAGNOSIS — F02.80 LATE ONSET ALZHEIMER'S DEMENTIA WITHOUT BEHAVIORAL DISTURBANCE: Primary | ICD-10-CM

## 2021-12-14 PROCEDURE — 97129 THER IVNTJ 1ST 15 MIN: CPT | Mod: PN

## 2021-12-14 PROCEDURE — 97130 THER IVNTJ EA ADDL 15 MIN: CPT | Mod: PN

## 2021-12-15 ENCOUNTER — PATIENT MESSAGE (OUTPATIENT)
Dept: PRIMARY CARE CLINIC | Facility: CLINIC | Age: 71
End: 2021-12-15
Payer: MEDICARE

## 2021-12-15 DIAGNOSIS — M54.2 NECK PAIN: Primary | ICD-10-CM

## 2021-12-16 ENCOUNTER — OFFICE VISIT (OUTPATIENT)
Dept: ELECTROPHYSIOLOGY | Facility: CLINIC | Age: 71
End: 2021-12-16
Payer: MEDICARE

## 2021-12-16 ENCOUNTER — TELEPHONE (OUTPATIENT)
Dept: ELECTROPHYSIOLOGY | Facility: CLINIC | Age: 71
End: 2021-12-16
Payer: MEDICARE

## 2021-12-16 DIAGNOSIS — F02.80 LATE ONSET ALZHEIMER'S DEMENTIA WITHOUT BEHAVIORAL DISTURBANCE: ICD-10-CM

## 2021-12-16 DIAGNOSIS — H26.9 CATARACT OF BOTH EYES, UNSPECIFIED CATARACT TYPE: ICD-10-CM

## 2021-12-16 DIAGNOSIS — K58.1 IRRITABLE BOWEL SYNDROME WITH CONSTIPATION: ICD-10-CM

## 2021-12-16 DIAGNOSIS — J47.9 ADULT BRONCHIECTASIS: ICD-10-CM

## 2021-12-16 DIAGNOSIS — M81.0 OSTEOPOROSIS, UNSPECIFIED OSTEOPOROSIS TYPE, UNSPECIFIED PATHOLOGICAL FRACTURE PRESENCE: ICD-10-CM

## 2021-12-16 DIAGNOSIS — Z98.890 HISTORY OF CARDIAC RADIOFREQUENCY ABLATION (RFA): ICD-10-CM

## 2021-12-16 DIAGNOSIS — R41.89 COGNITIVE DECLINE: ICD-10-CM

## 2021-12-16 DIAGNOSIS — I10 PRIMARY HYPERTENSION: ICD-10-CM

## 2021-12-16 DIAGNOSIS — I48.0 PAROXYSMAL ATRIAL FIBRILLATION: Primary | ICD-10-CM

## 2021-12-16 DIAGNOSIS — Z86.79 HISTORY OF SUBARACHNOID HEMORRHAGE: ICD-10-CM

## 2021-12-16 DIAGNOSIS — R63.6 UNDERWEIGHT: ICD-10-CM

## 2021-12-16 DIAGNOSIS — I47.19 AVNRT (AV NODAL RE-ENTRY TACHYCARDIA): ICD-10-CM

## 2021-12-16 DIAGNOSIS — F41.9 ANXIETY: ICD-10-CM

## 2021-12-16 DIAGNOSIS — E03.9 HYPOTHYROIDISM, UNSPECIFIED TYPE: ICD-10-CM

## 2021-12-16 DIAGNOSIS — D80.1 HYPOGAMMAGLOBULINEMIA: ICD-10-CM

## 2021-12-16 DIAGNOSIS — K21.9 GASTROESOPHAGEAL REFLUX DISEASE, UNSPECIFIED WHETHER ESOPHAGITIS PRESENT: ICD-10-CM

## 2021-12-16 DIAGNOSIS — R00.2 PALPITATIONS: ICD-10-CM

## 2021-12-16 DIAGNOSIS — F32.A DEPRESSION, UNSPECIFIED DEPRESSION TYPE: ICD-10-CM

## 2021-12-16 DIAGNOSIS — G30.1 LATE ONSET ALZHEIMER'S DEMENTIA WITHOUT BEHAVIORAL DISTURBANCE: ICD-10-CM

## 2021-12-16 PROCEDURE — 99214 PR OFFICE/OUTPT VISIT, EST, LEVL IV, 30-39 MIN: ICD-10-PCS | Mod: 95,,, | Performed by: STUDENT IN AN ORGANIZED HEALTH CARE EDUCATION/TRAINING PROGRAM

## 2021-12-16 PROCEDURE — 99214 OFFICE O/P EST MOD 30 MIN: CPT | Mod: 95,,, | Performed by: STUDENT IN AN ORGANIZED HEALTH CARE EDUCATION/TRAINING PROGRAM

## 2021-12-17 ENCOUNTER — PATIENT MESSAGE (OUTPATIENT)
Dept: NEUROLOGY | Facility: CLINIC | Age: 71
End: 2021-12-17
Payer: MEDICARE

## 2021-12-17 PROBLEM — I10 PRIMARY HYPERTENSION: Status: ACTIVE | Noted: 2021-12-17

## 2021-12-17 PROBLEM — I48.0 PAROXYSMAL ATRIAL FIBRILLATION: Status: ACTIVE | Noted: 2021-12-17

## 2021-12-23 ENCOUNTER — PATIENT MESSAGE (OUTPATIENT)
Dept: ELECTROPHYSIOLOGY | Facility: CLINIC | Age: 71
End: 2021-12-23
Payer: MEDICARE

## 2021-12-23 ENCOUNTER — PATIENT MESSAGE (OUTPATIENT)
Dept: PRIMARY CARE CLINIC | Facility: CLINIC | Age: 71
End: 2021-12-23
Payer: MEDICARE

## 2021-12-23 ENCOUNTER — PATIENT MESSAGE (OUTPATIENT)
Dept: NEUROLOGY | Facility: CLINIC | Age: 71
End: 2021-12-23
Payer: MEDICARE

## 2021-12-27 ENCOUNTER — PATIENT MESSAGE (OUTPATIENT)
Dept: NEUROLOGY | Facility: CLINIC | Age: 71
End: 2021-12-27
Payer: MEDICARE

## 2021-12-27 DIAGNOSIS — G30.1 LATE ONSET ALZHEIMER'S DEMENTIA WITHOUT BEHAVIORAL DISTURBANCE: ICD-10-CM

## 2021-12-27 DIAGNOSIS — F02.80 LATE ONSET ALZHEIMER'S DEMENTIA WITHOUT BEHAVIORAL DISTURBANCE: ICD-10-CM

## 2021-12-29 ENCOUNTER — PATIENT MESSAGE (OUTPATIENT)
Dept: PRIMARY CARE CLINIC | Facility: CLINIC | Age: 71
End: 2021-12-29
Payer: MEDICARE

## 2021-12-30 NOTE — TELEPHONE ENCOUNTER
Call placed to [facility] and spoke with Jaki. States that pt is entering their med administration program and must have signed rx from ordering providers. Rx should be faxed to her attention at [phone].

## 2022-01-03 RX ORDER — DONEPEZIL HYDROCHLORIDE 10 MG/1
10 TABLET, FILM COATED ORAL DAILY
Qty: 30 TABLET | Refills: 11 | OUTPATIENT
Start: 2022-01-03

## 2022-01-04 ENCOUNTER — TELEPHONE (OUTPATIENT)
Dept: PRIMARY CARE CLINIC | Facility: CLINIC | Age: 72
End: 2022-01-04
Payer: MEDICARE

## 2022-01-04 ENCOUNTER — PATIENT MESSAGE (OUTPATIENT)
Dept: ELECTROPHYSIOLOGY | Facility: CLINIC | Age: 72
End: 2022-01-04
Payer: MEDICARE

## 2022-01-04 NOTE — TELEPHONE ENCOUNTER
Received a fax from [employer] re: needing a signed copy of the medications.   If you approve, you can do one signature at the bottom of each page  Nurse navarrete stated that she went through the meds with the spouse re: what she takes.   Pt not taking melatonin   Pt does take colace 250 mg once daily  please advise.. form on your desk for you to review

## 2022-01-06 ENCOUNTER — PATIENT MESSAGE (OUTPATIENT)
Dept: ELECTROPHYSIOLOGY | Facility: CLINIC | Age: 72
End: 2022-01-06
Payer: MEDICARE

## 2022-01-06 NOTE — TELEPHONE ENCOUNTER
Dr. Negron,     I see that saw this patient on 12/16 and wanted to schedule her for a Watchman. I wasn't aware of this and received a message from them on 1/4 inquiring about the delay in scheduling. I informed them that we needed her to see a cardiologist first and they are upset that they were not made aware of this. She has an appt scheduled for 1/11 with Dr. Claus Woods. I let them know that once we have the correct documentation I will be able to schedule her. Can you please reach out to Dr. Woods and let him know what is needed in his note as to not delay care further?    Thanks,   Ana María

## 2022-01-07 ENCOUNTER — INFUSION (OUTPATIENT)
Dept: INFECTIOUS DISEASES | Facility: HOSPITAL | Age: 72
End: 2022-01-07
Attending: INTERNAL MEDICINE
Payer: MEDICARE

## 2022-01-07 VITALS
WEIGHT: 91.81 LBS | TEMPERATURE: 99 F | RESPIRATION RATE: 16 BRPM | BODY MASS INDEX: 16.79 KG/M2 | OXYGEN SATURATION: 100 % | DIASTOLIC BLOOD PRESSURE: 58 MMHG | HEART RATE: 86 BPM | SYSTOLIC BLOOD PRESSURE: 119 MMHG

## 2022-01-07 DIAGNOSIS — D83.9 CVID (COMMON VARIABLE IMMUNODEFICIENCY): Primary | ICD-10-CM

## 2022-01-07 DIAGNOSIS — J47.9 ADULT BRONCHIECTASIS: ICD-10-CM

## 2022-01-07 PROCEDURE — 63600175 PHARM REV CODE 636 W HCPCS: Mod: JG | Performed by: ALLERGY & IMMUNOLOGY

## 2022-01-07 PROCEDURE — 25000003 PHARM REV CODE 250: Performed by: ALLERGY & IMMUNOLOGY

## 2022-01-07 PROCEDURE — 96365 THER/PROPH/DIAG IV INF INIT: CPT

## 2022-01-07 PROCEDURE — 96366 THER/PROPH/DIAG IV INF ADDON: CPT

## 2022-01-07 RX ORDER — ACETAMINOPHEN 325 MG/1
650 TABLET ORAL
Status: CANCELLED | OUTPATIENT
Start: 2022-02-04

## 2022-01-07 RX ORDER — SODIUM CHLORIDE 0.9 % (FLUSH) 0.9 %
10 SYRINGE (ML) INJECTION
Status: CANCELLED | OUTPATIENT
Start: 2022-02-04

## 2022-01-07 RX ORDER — ACETAMINOPHEN 325 MG/1
650 TABLET ORAL
Status: DISCONTINUED | OUTPATIENT
Start: 2022-01-07 | End: 2022-01-07 | Stop reason: HOSPADM

## 2022-01-07 RX ORDER — SODIUM CHLORIDE 0.9 % (FLUSH) 0.9 %
10 SYRINGE (ML) INJECTION
Status: DISCONTINUED | OUTPATIENT
Start: 2022-01-07 | End: 2022-01-07 | Stop reason: HOSPADM

## 2022-01-07 RX ADMIN — HUMAN IMMUNOGLOBULIN G 25 G: 20 LIQUID INTRAVENOUS at 09:01

## 2022-01-07 RX ADMIN — SODIUM CHLORIDE: 0.9 INJECTION, SOLUTION INTRAVENOUS at 09:01

## 2022-01-07 NOTE — PROGRESS NOTES
Pt refused premeds of both tylenol and benadryl. Received IVIG PRIVIGEN 25 GRAMS.  Infusion started @ 12 cc/hr and increased rate every 30 mins, 24 cc/hr., 48 cc /hr and max rate @ 96 cc/hr, IV NS ran concurrently at 25 ml/hr. Tolerated well and left in NAD. Sitter at side.

## 2022-01-10 ENCOUNTER — PATIENT MESSAGE (OUTPATIENT)
Dept: PRIMARY CARE CLINIC | Facility: CLINIC | Age: 72
End: 2022-01-10
Payer: MEDICARE

## 2022-01-10 DIAGNOSIS — T14.8XXA ABRASION: ICD-10-CM

## 2022-01-10 RX ORDER — MUPIROCIN 20 MG/G
OINTMENT TOPICAL
Qty: 44 G | Refills: 1 | Status: SHIPPED | OUTPATIENT
Start: 2022-01-10 | End: 2022-01-11 | Stop reason: SDUPTHER

## 2022-01-11 ENCOUNTER — OFFICE VISIT (OUTPATIENT)
Dept: CARDIOLOGY | Facility: CLINIC | Age: 72
End: 2022-01-11
Payer: MEDICARE

## 2022-01-11 VITALS
BODY MASS INDEX: 16.95 KG/M2 | WEIGHT: 92.13 LBS | SYSTOLIC BLOOD PRESSURE: 108 MMHG | HEIGHT: 62 IN | HEART RATE: 97 BPM | DIASTOLIC BLOOD PRESSURE: 64 MMHG

## 2022-01-11 DIAGNOSIS — Z98.890 HISTORY OF CARDIAC RADIOFREQUENCY ABLATION (RFA): Primary | ICD-10-CM

## 2022-01-11 DIAGNOSIS — I10 PRIMARY HYPERTENSION: ICD-10-CM

## 2022-01-11 DIAGNOSIS — R29.6 FREQUENT FALLS: ICD-10-CM

## 2022-01-11 DIAGNOSIS — I48.0 PAROXYSMAL ATRIAL FIBRILLATION: ICD-10-CM

## 2022-01-11 DIAGNOSIS — R26.9 GAIT DISTURBANCE: ICD-10-CM

## 2022-01-11 DIAGNOSIS — Z01.818 PREOP EXAMINATION: ICD-10-CM

## 2022-01-11 PROCEDURE — 99204 PR OFFICE/OUTPT VISIT, NEW, LEVL IV, 45-59 MIN: ICD-10-PCS | Mod: S$PBB,GC,, | Performed by: STUDENT IN AN ORGANIZED HEALTH CARE EDUCATION/TRAINING PROGRAM

## 2022-01-11 PROCEDURE — 99999 PR PBB SHADOW E&M-EST. PATIENT-LVL IV: ICD-10-PCS | Mod: PBBFAC,GC,, | Performed by: STUDENT IN AN ORGANIZED HEALTH CARE EDUCATION/TRAINING PROGRAM

## 2022-01-11 PROCEDURE — 99999 PR PBB SHADOW E&M-EST. PATIENT-LVL IV: CPT | Mod: PBBFAC,GC,, | Performed by: STUDENT IN AN ORGANIZED HEALTH CARE EDUCATION/TRAINING PROGRAM

## 2022-01-11 PROCEDURE — 99204 OFFICE O/P NEW MOD 45 MIN: CPT | Mod: S$PBB,GC,, | Performed by: STUDENT IN AN ORGANIZED HEALTH CARE EDUCATION/TRAINING PROGRAM

## 2022-01-11 PROCEDURE — 99214 OFFICE O/P EST MOD 30 MIN: CPT | Mod: PBBFAC | Performed by: STUDENT IN AN ORGANIZED HEALTH CARE EDUCATION/TRAINING PROGRAM

## 2022-01-11 RX ORDER — DOCUSATE SODIUM 250 MG
250 CAPSULE ORAL 2 TIMES DAILY PRN
COMMUNITY
End: 2022-07-04

## 2022-01-11 RX ORDER — MUPIROCIN 20 MG/G
OINTMENT TOPICAL
Qty: 44 G | Refills: 1 | Status: SHIPPED | OUTPATIENT
Start: 2022-01-11 | End: 2022-02-09 | Stop reason: SDUPTHER

## 2022-01-12 ENCOUNTER — PATIENT MESSAGE (OUTPATIENT)
Dept: ELECTROPHYSIOLOGY | Facility: CLINIC | Age: 72
End: 2022-01-12
Payer: MEDICARE

## 2022-01-12 NOTE — PROGRESS NOTES
" Patient ID:  Yvette Crews is a 71 y.o. y.o. 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  She is accompanied by her , who is retired neurologist and provides most of the history given her dementia.  He states that over the past year she has had frequent falls, totaling more than 10. She has not had any major bleeding episodes requiring blood transfusions or hospitalizations, and states most of her falls were mechanical in nature.  Denies prior history of coronary disease, CHF, recent fevers, chills or any other acute events.      Review of Systems   Constitutional: Positive for malaise/fatigue.   Cardiovascular: Positive for palpitations. Negative for chest pain, claudication and leg swelling.   Musculoskeletal: Positive for falls.   All other systems reviewed and are negative.       Objective:     /64 (BP Location: Left arm, Patient Position: Sitting, BP Method: Pediatric (Automatic))   Pulse 97   Ht 5' 2" (1.575 m)   Wt 41.8 kg (92 lb 2.4 oz)   BMI 16.85 kg/m²     Physical Exam  Vitals and nursing note reviewed.   Constitutional:       Appearance: Normal appearance.   HENT:      Head: Normocephalic and atraumatic.      Right Ear: External ear normal.      Left Ear: External ear normal.      Nose: Nose normal.   Cardiovascular:      Rate and Rhythm: Normal rate and regular rhythm.      Pulses: Normal pulses.   Pulmonary:      Effort: Pulmonary effort is normal.   Musculoskeletal:         General: Normal range of motion.      Cervical back: Normal range of motion.      Right lower leg: No edema.      Left lower leg: No edema.   Skin:     General: Skin is warm and dry.      Capillary Refill: Capillary refill takes less " than 2 seconds.   Neurological:      General: No focal deficit present.      Mental Status: She is alert.         Labs:     Lab Results   Component Value Date     06/21/2021    K 4.0 06/21/2021     06/21/2021    CO2 25 06/21/2021    BUN 21 06/21/2021    CREATININE 0.6 06/21/2021    ANIONGAP 11 06/21/2021     Lab Results   Component Value Date    HGBA1C 5.1 12/06/2017     No results found for: BNP, BNPTRIAGEBLO    Lab Results   Component Value Date    WBC 4.63 06/21/2021    HGB 11.0 (L) 06/21/2021    HCT 36.1 (L) 06/21/2021     06/21/2021    GRAN 3.5 06/21/2021    GRAN 75.6 (H) 06/21/2021     Lab Results   Component Value Date    CHOL 201 (H) 04/29/2021    HDL 69 04/29/2021    LDLCALC 117.8 04/29/2021    TRIG 71 04/29/2021       Meds:     Current Outpatient Medications:     acetaminophen (TYLENOL ORAL), Take by mouth once daily., Disp: , Rfl:     albuterol (PROVENTIL/VENTOLIN HFA) 90 mcg/actuation inhaler, Inhale 2 puffs into the lungs every 6 (six) hours as needed for Wheezing. Rescue, Disp: 18 g, Rfl: 1    apixaban (ELIQUIS) 5 mg Tab, Take 1 tablet (5 mg total) by mouth 2 (two) times daily., Disp: 60 tablet, Rfl: 11    calcium-vitamin D3 (OS-ELAINE 500 + D3) 500 mg(1,250mg) -200 unit per tablet, Take 1 tablet by mouth 2 (two) times daily., Disp: 60 tablet, Rfl: 11    diphth,pertuss,acell,,tet vac (BOOSTRIX TDAP IM), Inject into the muscle., Disp: , Rfl:     docusate sodium (COLACE) 250 MG capsule, Take 250 mg by mouth 2 (two) times daily as needed for Constipation., Disp: , Rfl:     donepeziL (ARICEPT) 10 MG tablet, Take 1 tablet (10 mg total) by mouth once daily., Disp: 30 tablet, Rfl: 11    flu vac 2021 65up-trrPR70F,PF, (FLUAD QUAD 2021-22,65Y UP,,PF,) 60 mcg (15 mcg x 4)/0.5 mL Syrg, inject into the muscle, Disp: 0.5 mL, Rfl: 0    guaiFENesin (MUCINEX) 600 mg 12 hr tablet, Take 2 tablets (1,200 mg total) by mouth 2 (two) times daily., Disp: 120 tablet, Rfl: 6    Immune Globulin G,  IGG,-PRO-IGA 10 % injection, Privigen, (PRIVIGEN) 10 % Soln, Infuse 20 g into the vein every 4 weeks., Disp: 200 mL, Rfl: 0    levothyroxine (SYNTHROID) 88 MCG tablet, TAKE ONE TABLET BY MOUTH BEFORE breakfast, Disp: 90 tablet, Rfl: 1    montelukast (SINGULAIR) 10 mg tablet, TAKE ONE TABLET BY MOUTH EVERY EVENING, Disp: 90 tablet, Rfl: 1    multivit with calcium,iron,min (MULTIPLE VITAMIN, WOMENS ORAL), Take by mouth once daily., Disp: , Rfl:     mupirocin (BACTROBAN) 2 % ointment, APPLY ONE application TWICE DAILY FOR 7 DAYS, Disp: 44 g, Rfl: 1    naproxen (NAPROSYN ORAL), Take 200 mg by mouth. As needed every other day, Disp: , Rfl:     polyethylene glycol (GLYCOLAX) 17 gram PwPk, Take 17 g by mouth once daily. (Patient taking differently: Take 17 g by mouth as needed. As needed once a week or so), Disp: , Rfl: 0  No current facility-administered medications for this visit.    Facility-Administered Medications Ordered in Other Visits:     balanced salt irrigation solution 1 drop, 1 drop, Right Eye, On Call Procedure, Vera Sams MD    lidocaine (PF) 10 mg/ml (1%) injection 10 mg, 1 mL, Intradermal, Once, Vera Sams MD    moxifloxacin 0.5 % ophthalmic solution 1 drop, 1 drop, Right Eye, On Call Procedure, Vera Sams MD, 1 drop at 04/15/19 0922    phenylephrine HCL 2.5% ophthalmic solution 1 drop, 1 drop, Right Eye, On Call Procedure, Vera Sams MD, 1 drop at 04/15/19 0922    tropicamide 1% ophthalmic solution 1 drop, 1 drop, Right Eye, On Call Procedure, Vera Sams MD, 1 drop at 04/15/19 0922      Assessment & Plan:     Primary hypertension  -BP under control and no longer on medications  -monitor    Paroxysmal atrial fibrillation  -followed by EP, Dr. Negron  -pending MILO closure device  -CHADSVASC 3, HASBLED 3  -frequent falls at home (>10 in the last year) and is a high bleeding risk, agree with EP assessment she would benefit from MILO closure device and off of  anticoagulation  -no further cardiac workup prior to watchman from general cardiology standpoint   -continue eliquis for now    History of cardiac radiofrequency ablation (RFA)  -followed by EP    Gait disturbance  -contributing to mechanical falls at home    Frequent falls  -elevated bleeding risk while on eliquis, pending watchman implantation

## 2022-01-12 NOTE — ASSESSMENT & PLAN NOTE
-followed by EP, Dr. Negron  -pending MILO closure device  -CHADSVASC 3, HASBLED 3  -frequent falls at home (>10 in the last year) and is a high bleeding risk, agree with EP assessment she would benefit from MILO closure device and off of anticoagulation  -no further cardiac workup prior to watchman from general cardiology standpoint   -continue eliquis for now

## 2022-01-13 ENCOUNTER — PATIENT MESSAGE (OUTPATIENT)
Dept: ELECTROPHYSIOLOGY | Facility: CLINIC | Age: 72
End: 2022-01-13
Payer: MEDICARE

## 2022-01-14 ENCOUNTER — TELEPHONE (OUTPATIENT)
Dept: ELECTROPHYSIOLOGY | Facility: CLINIC | Age: 72
End: 2022-01-14
Payer: MEDICARE

## 2022-01-14 NOTE — TELEPHONE ENCOUNTER
Spoke with Dr. VETO Crews, patient's , and scheduled her procedure for 2/18/22. Procedure details reviewed and instructions will be sent via portal and fax as requested as requested.

## 2022-01-18 ENCOUNTER — PATIENT MESSAGE (OUTPATIENT)
Dept: ELECTROPHYSIOLOGY | Facility: CLINIC | Age: 72
End: 2022-01-18
Payer: MEDICARE

## 2022-01-19 ENCOUNTER — TELEPHONE (OUTPATIENT)
Dept: PRIMARY CARE CLINIC | Facility: CLINIC | Age: 72
End: 2022-01-19
Payer: MEDICARE

## 2022-01-19 NOTE — TELEPHONE ENCOUNTER
Pt currently one a MVI one daily  However pt wants to take gummy MVI & it is recommended dosage is 2 gummies per day.  Can we fax a new order to change MVI to 2 gummies MVI po q day.  Fax on your desk to review

## 2022-01-20 ENCOUNTER — PATIENT MESSAGE (OUTPATIENT)
Dept: ELECTROPHYSIOLOGY | Facility: CLINIC | Age: 72
End: 2022-01-20
Payer: MEDICARE

## 2022-01-21 ENCOUNTER — TELEPHONE (OUTPATIENT)
Dept: ELECTROPHYSIOLOGY | Facility: CLINIC | Age: 72
End: 2022-01-21
Payer: MEDICARE

## 2022-01-21 NOTE — TELEPHONE ENCOUNTER
----- Message from Mark Gonsales MA sent at 1/20/2022  9:49 AM CST -----  Regarding: FW: Eliquis  Please advise.  ----- Message -----  From: Venecia Crews  Sent: 1/20/2022   9:15 AM CST  To: Mig[facility] Garcia Staff  Subject: Joselin Zhao called to inform Ana María that Eliquis is not covered by insurance and they need to know what to do.  PT can be reached @ [phone]        Thanks

## 2022-01-21 NOTE — TELEPHONE ENCOUNTER
Spoke with Dr. Zhao to get more information about this. He said that they switched insurance coverage on the 1st of the year and they are saying that a PA is needed for Eliquis. He had phone numbers for them and I let him know that I would call and see what I could do. He verbalized understanding and appreciated call.

## 2022-01-24 ENCOUNTER — PATIENT MESSAGE (OUTPATIENT)
Dept: ELECTROPHYSIOLOGY | Facility: CLINIC | Age: 72
End: 2022-01-24
Payer: MEDICARE

## 2022-01-24 DIAGNOSIS — I48.0 PAROXYSMAL ATRIAL FIBRILLATION: Primary | ICD-10-CM

## 2022-01-31 ENCOUNTER — TELEPHONE (OUTPATIENT)
Dept: PRIMARY CARE CLINIC | Facility: CLINIC | Age: 72
End: 2022-01-31

## 2022-01-31 ENCOUNTER — PATIENT MESSAGE (OUTPATIENT)
Dept: ELECTROPHYSIOLOGY | Facility: CLINIC | Age: 72
End: 2022-01-31
Payer: MEDICARE

## 2022-01-31 DIAGNOSIS — H00.016 HORDEOLUM EXTERNUM OF LEFT EYE, UNSPECIFIED EYELID: Primary | ICD-10-CM

## 2022-01-31 NOTE — TELEPHONE ENCOUNTER
----- Message from Malcolm Cadena sent at 1/31/2022  1:17 PM CST -----  Contact: [phone]  [facility] assisted [facility] calling to get an order for this pt she has a stye on her left eye.Please advise also would like an order for a Rx faxed to [phone]

## 2022-01-31 NOTE — TELEPHONE ENCOUNTER
Pt has a stye on her eye.   [facility] the [facility]  Please advise

## 2022-02-01 ENCOUNTER — LAB VISIT (OUTPATIENT)
Dept: LAB | Facility: HOSPITAL | Age: 72
End: 2022-02-01
Attending: INTERNAL MEDICINE
Payer: MEDICARE

## 2022-02-01 ENCOUNTER — OFFICE VISIT (OUTPATIENT)
Dept: NEUROLOGY | Facility: CLINIC | Age: 72
End: 2022-02-01
Payer: MEDICARE

## 2022-02-01 VITALS
SYSTOLIC BLOOD PRESSURE: 123 MMHG | HEIGHT: 62 IN | HEART RATE: 68 BPM | DIASTOLIC BLOOD PRESSURE: 78 MMHG | BODY MASS INDEX: 16.85 KG/M2

## 2022-02-01 DIAGNOSIS — G30.1 LATE ONSET ALZHEIMER'S DEMENTIA WITHOUT BEHAVIORAL DISTURBANCE: Primary | ICD-10-CM

## 2022-02-01 DIAGNOSIS — I48.0 PAROXYSMAL ATRIAL FIBRILLATION: ICD-10-CM

## 2022-02-01 DIAGNOSIS — F02.80 LATE ONSET ALZHEIMER'S DEMENTIA WITHOUT BEHAVIORAL DISTURBANCE: Primary | ICD-10-CM

## 2022-02-01 PROBLEM — E03.9 HYPOTHYROIDISM: Status: ACTIVE | Noted: 2022-02-01

## 2022-02-01 PROBLEM — F41.9 ANXIETY DISORDER, UNSPECIFIED: Status: ACTIVE | Noted: 2022-02-01

## 2022-02-01 PROBLEM — F03.90 DEMENTIA: Status: ACTIVE | Noted: 2018-12-13

## 2022-02-01 LAB
ANION GAP SERPL CALC-SCNC: 11 MMOL/L (ref 8–16)
APTT BLDCRRT: 29.3 SEC (ref 21–32)
BUN SERPL-MCNC: 24 MG/DL (ref 8–23)
CALCIUM SERPL-MCNC: 9.4 MG/DL (ref 8.7–10.5)
CHLORIDE SERPL-SCNC: 101 MMOL/L (ref 95–110)
CO2 SERPL-SCNC: 25 MMOL/L (ref 23–29)
CREAT SERPL-MCNC: 0.7 MG/DL (ref 0.5–1.4)
ERYTHROCYTE [DISTWIDTH] IN BLOOD BY AUTOMATED COUNT: 14.3 % (ref 11.5–14.5)
EST. GFR  (AFRICAN AMERICAN): >60 ML/MIN/1.73 M^2
EST. GFR  (NON AFRICAN AMERICAN): >60 ML/MIN/1.73 M^2
GLUCOSE SERPL-MCNC: 77 MG/DL (ref 70–110)
HCT VFR BLD AUTO: 38.6 % (ref 37–48.5)
HGB BLD-MCNC: 12.1 G/DL (ref 12–16)
INR PPP: 1 (ref 0.8–1.2)
MCH RBC QN AUTO: 30.3 PG (ref 27–31)
MCHC RBC AUTO-ENTMCNC: 31.3 G/DL (ref 32–36)
MCV RBC AUTO: 97 FL (ref 82–98)
PLATELET # BLD AUTO: 269 K/UL (ref 150–450)
PMV BLD AUTO: 10.9 FL (ref 9.2–12.9)
POTASSIUM SERPL-SCNC: 5 MMOL/L (ref 3.5–5.1)
PROTHROMBIN TIME: 11.4 SEC (ref 9–12.5)
RBC # BLD AUTO: 4 M/UL (ref 4–5.4)
SODIUM SERPL-SCNC: 137 MMOL/L (ref 136–145)
WBC # BLD AUTO: 5.52 K/UL (ref 3.9–12.7)

## 2022-02-01 PROCEDURE — 99999 PR PBB SHADOW E&M-EST. PATIENT-LVL III: CPT | Mod: PBBFAC,,, | Performed by: PSYCHIATRY & NEUROLOGY

## 2022-02-01 PROCEDURE — 99999 PR PBB SHADOW E&M-EST. PATIENT-LVL III: ICD-10-PCS | Mod: PBBFAC,,, | Performed by: PSYCHIATRY & NEUROLOGY

## 2022-02-01 PROCEDURE — 99213 OFFICE O/P EST LOW 20 MIN: CPT | Mod: S$PBB,,, | Performed by: PSYCHIATRY & NEUROLOGY

## 2022-02-01 PROCEDURE — 99213 OFFICE O/P EST LOW 20 MIN: CPT | Mod: PBBFAC | Performed by: PSYCHIATRY & NEUROLOGY

## 2022-02-01 PROCEDURE — 85730 THROMBOPLASTIN TIME PARTIAL: CPT | Performed by: INTERNAL MEDICINE

## 2022-02-01 PROCEDURE — 80048 BASIC METABOLIC PNL TOTAL CA: CPT | Performed by: INTERNAL MEDICINE

## 2022-02-01 PROCEDURE — 85027 COMPLETE CBC AUTOMATED: CPT | Performed by: INTERNAL MEDICINE

## 2022-02-01 PROCEDURE — 85610 PROTHROMBIN TIME: CPT | Performed by: INTERNAL MEDICINE

## 2022-02-01 PROCEDURE — 99213 PR OFFICE/OUTPT VISIT, EST, LEVL III, 20-29 MIN: ICD-10-PCS | Mod: S$PBB,,, | Performed by: PSYCHIATRY & NEUROLOGY

## 2022-02-01 PROCEDURE — 36415 COLL VENOUS BLD VENIPUNCTURE: CPT | Performed by: INTERNAL MEDICINE

## 2022-02-01 RX ORDER — BACITRACIN 500 [USP'U]/G
OINTMENT OPHTHALMIC 3 TIMES DAILY
Qty: 3.5 G | Refills: 0 | Status: SHIPPED | OUTPATIENT
Start: 2022-02-01 | End: 2022-02-01

## 2022-02-01 NOTE — PROGRESS NOTES
[facility]    Name: Yvette Crews  : 1950  MRN: [id number]    Date: 2022      Assessment:     Ms. Crews is a 71 y.o. female with a history of SAH (2016), mTBI, AVNRT, CVID, MDD, IBS, osteoporosis, hypothyroidism who presents to the [facility] due to memory deficits.  Patient began experiencing memory symptoms in 2016, which have progressively worsened over time and now involve all cognitive domains.  In addition cognitive impairment, patient has experienced multiple recent falls.  On evaluation today, neurologic exam was notable for stooped posture and slow gait.  Patient required walker.  Patient had some difficulty with performing tasks that were asked of her but was able to perform with coaching.  She scored 12 30 on the MMSE deficits in orientation, attention, naming, 3 step command, delayed recall, and visuoconstructual ability.  MRI from 2021 was reviewed and is notable for moderate volume loss disproportionately affecting the medial temporal lobes and posterior parietal lobes.  Additionally, there is patchy T2/FLAIR hyperintensities throughout the supratentorial white matter consistent with a moderate chronic microvascular ischemia.  Ms. Crews's diagnosis is consistent with dementia as cognitive impairment has resulted in functional decline and inability to live independently.  The most likely underlying etiology is Alzheimer's disease given her early memory impairment which has progressed to involve most cognitive domains, age, and findings on MRI.  Will consider amyloid PET through [employer] study to confirm diagnosis of AD.    Recommendations:     Workup   Amyloid PET to confirm diagnosis of Alzheimer's disease - available through [employer] study.     Treatment   Continue Donepezil 10 mg PO daily.    Safety-related   We recommend that a medication management system be put in place to avoid errors in taking medication. Helpful services  include PillPack (pillpack.com; free service) and MedMinder (medminder.com; paid subscription service).   We recommend that Ms. Crews not drive.     Health maintenance    Continue to follow-up with primary care doctor to monitor and treat vascular risk factors.   Recommend staying socially and cognitively active.   Recommend eating a healthy and balanced diet (Mediterranean or DASH diet).    Follow-up: Follow up in about 6 months (around 8/1/2022). I provided Ms. Crews and her , Pavel, with our contact information should they have any questions or concerns.      Devin Gtz MD   Behavioral Neurology & Neuropsychiatry  [facility]    Subjective:      Chief complaint:  Memory Deficits    History of present illness:  Ms. Crews is a 71 y.o. female with a history of SAH (2016), mTBI, AVNRT, CVID, MDD, IBS, osteoporosis, hypothyroidism who presents today to the [facility] due to concerns regarding memory deficits. Ms. Crews is accompanied by her , Pavel (who is a retired neurologist), who participates in providing history. Additional information is obtained by reviewing available medical records.  Patient was initially seen in neurology clinic by Dr. Dixon followed by Dr. Shukla.    HPI by Dr. Dixon from 12/13/2018:  The patient was accompanied by her  and her daughter, who collaborated with the history.  Her  is a retired neurologist.  They both live at [facility], and assisted living facility.  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  In 2016 she had a fall when she may have tripped on a tree root or broken pavement.  She briefly lost consciousness and was seen at the [facility] ED and subsequently admitted to the hospital.  She was noted to be confused and disoriented when she arrived in ED.  An initial CT scan of the head revealed  right maxillary blood products concerning for facial fracture, and questionable SAH in bilat occipital lobes.  A CT scan done prior to discharge on 10/29/2016 revealed subarachnoid hemorrhage within the bilateral occipital lobes, left parietal lobe and along the left sylvian fissure.  Left cerebral convexity extra-axial fluid collection, increased in conspicuity compared to prior imaging examinations. There is minimal associated mass effect on the left cerebral convexity without significant midline shift. Increased prominence of the extra-axial space along the right frontal convexity slight with slight increased density. This could possibly be artifactual, however, interval development of new extra-axial fluid collection not excluded.      It is reported that she may have had some difficulty with her memory even prior to the fall however after the fall she has significant worsening in her memory primarily with retention of recent information, unable to recall conversations she may have had and getting confused very easily.  However, she was able to take care of her day-to-day needs at home, including personal hygiene without any problems.  She was using the fitness center on a regular basis.  She had no further falls however continues to have problems with her balance.  The daughter also reports that she has significant problem with depression and anxiety in the past but refuses to take any medications.  She has had poor appetite having lost weight over the past 1 year.    Interval history by Dr. Shukla from 07/16/2021:  Retired from work as psychiatrist in 2007 and moved into [facility] with  who is a retired neurologist in 2015, stopped driving around that time.  Fall with SAH as below in 2016 and family noted worsening difficulty recalling conversations and retaining information.  Recurrent fall when getting up to use the bathroom at night earlier this year with coxyx fracture - began using walker  about 2018.   is not present today as he was admitted to the hospital but he has noted several recent episodes in which she acutely becomes more confused.  She has long history of anxiety and depression which remains uncontrolled, repeatedly very self critical and endorses belief that family and caregivers will abandon her or that she is being put on display throughout testing and interview.  Continues engage in some activities at the home, sleep is variable.    Interval history (11/01/21):  Ms. Crews's daughter reports that since the patient's sacral fracture in January 2021, she has gone downhill.  Patient has experienced significant worsening of cognitive impairment over the past year.  There have been several episodes of confusion and on one occasion she thought that she needed of car to come pick her up, though she was actually near the door to her apartment at [facility].  Her  reports that she frequently mixes up up clothes and is unsure what to put on.  She is having trouble dressing herself and will sometimes put on clothes backwards or inside out.  Her memory has substantially worsened and she has difficulty remembering recent events and conversations.  She often forgets to take medications and is unable to recall if she had eaten earlier in the day.  She endorses lightheadedness when getting up from a chair or out of bed and had several falls last week.  Her , who is a physician, thought that her Effexor might be contributing to her falls and stop the medication.    Interval history (02/01/22):  Patient's cognition is relatively unchanged since last appointment. She is busy for much of the day going to activities, exercise classes, etc.  Will wake in the middle of the night and will get dressed and the go back to bed.  Has had episodes of difficulty getting around [facility] and even within the apartment. Patient fell recently and hit her arm which led to significant  bleeding due to being on a blood thinner.  Tolerating donepezil well.    Review of systems  · Negative except as noted in the HPI    Past Medical History:   Diagnosis Date    Adult bronchiectasis     Age-related osteoporosis with current pathological fracture with routine healing 8/28/2013    Amblyopia     Anxiety     Cataract     Chondrocalcinosis, cause unspecified, involving hand(712.34) 7/12/2011     Dx updated per 2019 IMO Load    Chronic sinusitis 4/6/2017    Colonic constipation 6/5/2013    Concussion 10/27/2016    Frequent falls 3/14/2017    Gait disturbance 3/14/2017    History of cardiac radiofrequency ablation (RFA) 2/3/2018    History of subarachnoid hemorrhage 10/30/2016    Hypogammaglobulinemia 9/7/2011    Irritable bowel syndrome 12/18/2015    Major depressive disorder, recurrent episode, in full remission 8/19/2010    Onychomycosis     Osteoarthritis     Postoperative hypothyroidism 12/18/2015    Rectal prolapse 6/5/2013    Reflux esophagitis 2/7/2010    Status post thyroidectomy 6/1/2017    Strabismus     SVT (supraventricular tachycardia) 11/22/2013    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     Underweight 1/23/2013     Past Surgical History:   Procedure Laterality Date    BREAST CYST ASPIRATION      x2    CATARACT EXTRACTION W/  INTRAOCULAR LENS IMPLANT Left 3/11/2019    Procedure: EXTRACTION, CATARACT, WITH IOL INSERTION;  Surgeon: Vera Sams MD;  Location: [facility], operated by [facility] OR;  Service: Ophthalmology;  Laterality: Left;    CATARACT EXTRACTION W/  INTRAOCULAR LENS IMPLANT Right 4/15/2019    Procedure: EXTRACTION, CATARACT, WITH IOL INSERTION LASER;  Surgeon: Vera Sams MD;  Location: [facility], operated by [facility] OR;  Service: Ophthalmology;  Laterality: Right;    COLON SURGERY      EYE SURGERY      FRACTURE SURGERY      NASAL SEPTUM SURGERY      OPEN REDUCTION AND INTERNAL FIXATION (ORIF) OF INJURY OF SACROILIAC JOINT Bilateral 1/20/2021    Procedure: ORIF, SACROILIAC JOINT;  Surgeon:  Alcides Tamez MD;  Location: [facility] OR [address] [place] [address];  Service: Orthopedics;  Laterality: Bilateral;    RADIOFREQUENCY ABLATION      RECTAL PROLAPSE REPAIR  june 2013    STRABISMUS SURGERY      TONSILLECTOMY       Family History   Problem Relation Age of Onset    Heart disease Father     Stroke Father     Heart disease Brother     Breast cancer Paternal Grandmother     Melanoma Neg Hx     Psoriasis Neg Hx     Lupus Neg Hx     Eczema Neg Hx     Amblyopia Neg Hx     Blindness Neg Hx     Cataracts Neg Hx     Glaucoma Neg Hx     Macular degeneration Neg Hx     Retinal detachment Neg Hx     Strabismus Neg Hx      Social History     Socioeconomic History    Marital status:     Number of children: 2   Occupational History    Occupation: retired   Tobacco Use    Smoking status: Never Smoker    Smokeless tobacco: Never Used   Substance and Sexual Activity    Alcohol use: No    Drug use: No    Sexual activity: Not Currently     Social Determinants of Health     Financial Resource Strain: Low Risk     Difficulty of Paying Living Expenses: Not hard at all   Food Insecurity: No Food Insecurity    Worried About Running Out of Food in the Last Year: Never true    Ran Out of Food in the Last Year: Never true   Transportation Needs: No Transportation Needs    Lack of Transportation (Medical): No    Lack of Transportation (Non-Medical): No   Physical Activity: Sufficiently Active    Days of Exercise per Week: 5 days    Minutes of Exercise per Session: 30 min   Stress: No Stress Concern Present    Feeling of Stress : Not at all   Social Connections: Moderately Isolated    Frequency of Communication with Friends and Family: Twice a week    Frequency of Social Gatherings with Friends and Family: Twice a week    Attends [employer] Services: Never    Active Member of Clubs or Organizations: No    Attends Club or Organization Meetings: Never    Marital Status:    Housing Stability:  Low Risk     Unable to Pay for Housing in the Last Year: No    Number of Places Lived in the Last Year: 1    Unstable Housing in the Last Year: No     Current Outpatient Medications:     acetaminophen (TYLENOL ORAL), Take by mouth once daily., Disp: , Rfl:     albuterol (PROVENTIL/VENTOLIN HFA) 90 mcg/actuation inhaler, Inhale 2 puffs into the lungs every 6 (six) hours as needed for Wheezing. Rescue, Disp: 18 g, Rfl: 1    apixaban (ELIQUIS) 5 mg Tab, Take 1 tablet (5 mg total) by mouth 2 (two) times daily., Disp: 60 tablet, Rfl: 11    calcium-vitamin D3 (OS-ELAINE 500 + D3) 500 mg(1,250mg) -200 unit per tablet, Take 1 tablet by mouth 2 (two) times daily., Disp: 60 tablet, Rfl: 11    docusate sodium (COLACE) 250 MG capsule, Take 250 mg by mouth 2 (two) times daily as needed for Constipation., Disp: , Rfl:     donepeziL (ARICEPT) 10 MG tablet, Take 1 tablet (10 mg total) by mouth once daily., Disp: 30 tablet, Rfl: 11    guaiFENesin (MUCINEX) 600 mg 12 hr tablet, Take 2 tablets (1,200 mg total) by mouth 2 (two) times daily., Disp: 120 tablet, Rfl: 6    Immune Globulin G, IGG,-PRO-IGA 10 % injection, Privigen, (PRIVIGEN) 10 % Soln, Infuse 20 g into the vein every 4 weeks., Disp: 200 mL, Rfl: 0    levothyroxine (SYNTHROID) 88 MCG tablet, TAKE ONE TABLET BY MOUTH BEFORE breakfast, Disp: 90 tablet, Rfl: 1    montelukast (SINGULAIR) 10 mg tablet, TAKE ONE TABLET BY MOUTH EVERY EVENING, Disp: 90 tablet, Rfl: 1    multivit with calcium,iron,min (MULTIPLE VITAMIN, WOMENS ORAL), Take by mouth once daily., Disp: , Rfl:     mupirocin (BACTROBAN) 2 % ointment, APPLY ONE application TWICE DAILY FOR 7 DAYS, Disp: 44 g, Rfl: 1    naproxen (NAPROSYN ORAL), Take 200 mg by mouth. As needed every other day, Disp: , Rfl:     polyethylene glycol (GLYCOLAX) 17 gram PwPk, Take 17 g by mouth once daily. (Patient taking differently: Take 17 g by mouth as needed. As needed once a week or so), Disp: , Rfl: 0    bacitracin  "(AK-TRACIN) ophthalmic ointment, Place into the left eye 3 (three) times daily. for 7 days (Patient not taking: Reported on 2/1/2022), Disp: 3.5 g, Rfl: 0    diphth,pertuss,acell,,tet vac (BOOSTRIX TDAP IM), Inject into the muscle., Disp: , Rfl:     flu vac 2021 65up-[id number],PF, (FLUAD QUAD 2021-22,65Y UP,,PF,) 60 mcg (15 mcg x 4)/0.5 mL Syrg, inject into the muscle (Patient not taking: Reported on 2/1/2022), Disp: 0.5 mL, Rfl: 0  No current facility-administered medications for this visit.    Facility-Administered Medications Ordered in Other Visits:     balanced salt irrigation solution 1 drop, 1 drop, Right Eye, On Call Procedure, Vera Sams MD    lidocaine (PF) 10 mg/ml (1%) injection 10 mg, 1 mL, Intradermal, Once, Vera Sams MD    moxifloxacin 0.5 % ophthalmic solution 1 drop, 1 drop, Right Eye, On Call Procedure, Vera Sams MD, 1 drop at 04/15/19 0922    phenylephrine HCL 2.5% ophthalmic solution 1 drop, 1 drop, Right Eye, On Call Procedure, Vera Sams MD, 1 drop at 04/15/19 0922    tropicamide 1% ophthalmic solution 1 drop, 1 drop, Right Eye, On Call Procedure, Vera Sams MD, 1 drop at 04/15/19 0922    Allergies:  Adhesive, Buspar [buspirone], Escitalopram oxalate, and Rifampin    Objective:     Vital signs:  Blood pressure 123/78, pulse 68, height 5' 2" (1.575 m).    Neurological exam:  The neurological exam was administered using real-time telehealth tools. Due to the inherent limitations of telemedicine, some parts of a complete neurological exam could not be performed.  Mental status: Ms. Crews was noted to be appropriate. She was alert and oriented to person and place, but disoriented to time. Attention and concentration were intact. Knowledge of recent autobiographical events was relatively intact.  Language: Ms. Crews's speech was fluent, non-effortful, and grammatically intact. She did not have word-finding difficulty or make word-substitutions. Her " comprehension was intact to syntactically complex sentences.  Cranial nerves: Extraocular movements were full.  Her expressions were symmetric, but blunted. Hearing was intact to voice. She did not have slurring or other speech abnormalities.  Motor examination: Muscle bulk was normal in the upper and lower extremities. She moved all limbs symmetrically, and these movements were at least anti-gravity. She did not have resting tremor or other involuntary movements.  Coordination/sensory:  Deferred  Station/Gait:  Deferred.  Patient wheelchair.    Neuroimaging:  Results for orders placed or performed during the hospital encounter of 07/26/21   MRI Brain Without Contrast    Narrative    EXAMINATION:  MRI BRAIN WITHOUT CONTRAST    CLINICAL HISTORY:  Altered mental status;.  Altered mental status, unspecified    TECHNIQUE:  Multiplanar multisequence MR imaging of the brain was performed without contrast.    COMPARISON:  12/20/2018    FINDINGS:  Mild generalized cerebral volume loss with mild compensatory dilatation of the lateral ventricles, similar compared to previous.  Ventricles are midline.  No acute extra-axial fluid collections.    No acute hemorrhage, edema or acute infarct. Patchy periventricular and subcortical foci of increased T2 and FLAIR signal.    Normal vascular flow voids are preserved.    Corpus callosum is intact.  Craniocervical junction is intact.    Mastoid air cells are clear.  Visualized paranasal sinuses are clear.      Impression    No acute intracranial abnormality identified.  Moderate chronic small vessel ischemic change.    There appears to be narrowing of the cervical spinal canal at C3, findings which can be further evaluated with cervical spine MRI.      Electronically signed by: Amy Ramos  Date:    07/26/2021  Time:    10:27   Results for orders placed or performed during the hospital encounter of 11/23/16   CT Head Without Contrast    Narrative    CT head without  contrast    11/23/16 10:25:00    Accession# [id number]    CLINICAL INDICATION: 66 year old F with Injury head (959.01)     TECHNIQUE: Axial CT images obtained throughout the region of the head without the use of intravenous contrast. Axial, sagittal and coronal reconstructions were performed.    COMPARISON: Comparison made to multiple prior studies, the most recent dated 10/29/2016    FINDINGS:    The ventricles are stable in size without evidence of hydrocephalus.    The brain appears within normal limits. No parenchymal mass, hemorrhage, edema or major vascular distribution infarct.    Previous subarachnoid hemorrhage over the occipital regions is no longer apparent. Neither are the subdural collections. No new extra-axial blood or fluid collections.      Healing fracture of the right zygomatic arch and maxilla, partially visualized. Cranium appears intact on today's study. No new fractures are apparent. Mastoid air cells and paranasal sinuses are essentially clear.    Degenerative changes at the craniocervical junction with prominent calcification of the ligaments around the dens.    Impression           Interval resolution of the occipital subarachnoid hemorrhage and bilateral subdural collections. No new hemorrhage or other acute intracranial pathology.    Healing fractures of the right zygomatic arch and maxilla, partially visualized. No new fractures are identified.      Electronically signed by: KIRILL VINCENT MD  Date:     11/23/16  Time:    11:14      *Note: Due to a large number of results and/or encounters for the requested time period, some results have not been displayed. A complete set of results can be found in Results Review.     Laboratory studies:  No visits with results within 6 Week(s) from this visit.   Latest known visit with results is:   Hospital Outpatient Visit on 11/19/2021   Component Date Value Ref Range Status    BSA 11/19/2021 1.34  m2 Final    TDI SEPTAL 11/19/2021 0.04  m/s Final     "LV LATERAL E/E' RATIO 11/19/2021 6.50  m/s Final    LV SEPTAL E/E' RATIO 11/19/2021 13.00  m/s Final    LA WIDTH 11/19/2021 3.40  cm Final    TDI LATERAL 11/19/2021 0.08  m/s Final    LVIDd 11/19/2021 3.52  3.5 - 6.0 cm Final    IVS 11/19/2021 0.54* 0.6 - 1.1 cm Final    Posterior Wall 11/19/2021 0.69  0.6 - 1.1 cm Final    LVIDs 11/19/2021 2.57  2.1 - 4.0 cm Final    FS 11/19/2021 27  28 - 44 % Final    LA volume 11/19/2021 33.32  cm3 Final    Sinus 11/19/2021 3.19  cm Final    STJ 11/19/2021 2.88  cm Final    Ascending aorta 11/19/2021 3.02  cm Final    LV mass 11/19/2021 53.48  g Final    LA size 11/19/2021 2.32  cm Final    RVDD 11/19/2021 3.24  cm Final    TAPSE 11/19/2021 2.01  cm Final    RV S' 11/19/2021 9.72  cm/s Final    Left Ventricle Relative Wall Thick* 11/19/2021 0.39  cm Final    AV mean gradient 11/19/2021 3  mmHg Final    AV valve area 11/19/2021 2.81  cm2 Final    AV Velocity Ratio 11/19/2021 0.81   Final    AV index (prosthetic) 11/19/2021 0.95   Final    MV valve area p 1/2 method 11/19/2021 2.91  cm2 Final    E/A ratio 11/19/2021 0.75   Final    Mean e' 11/19/2021 0.06  m/s Final    E wave deceleration time 11/19/2021 260.42  msec Final    IVRT 11/19/2021 94.20  msec Final    MV "A" wave duration 11/19/2021 8.85  msec Final    Pulm vein S/D ratio 11/19/2021 1.75   Final    LVOT diameter 11/19/2021 1.94  cm Final    LVOT area 11/19/2021 3.0  cm2 Final    LVOT peak glen 11/19/2021 0.94  m/s Final    LVOT peak VTI 11/19/2021 21.25  cm Final    Ao peak glen 11/19/2021 1.16  m/s Final    Ao VTI 11/19/2021 22.35  cm Final    LVOT stroke volume 11/19/2021 62.78  cm3 Final    AV peak gradient 11/19/2021 5  mmHg Final    E/E' ratio 11/19/2021 8.67  m/s Final    MV Peak E Glen 11/19/2021 0.52  m/s Final    TR Max Glen 11/19/2021 2.22  m/s Final    MV stenosis pressure 1/2 time 11/19/2021 75.52  ms Final    MV Peak A Glen 11/19/2021 0.69  m/s Final    PV Peak S Glen " 11/19/2021 0.56  m/s Final    PV Peak D Glen 11/19/2021 0.32  m/s Final    LV Systolic Volume 11/19/2021 23.84  mL Final    LV Systolic Volume Index 11/19/2021 17.4  mL/m2 Final    LV Diastolic Volume 11/19/2021 51.47  mL Final    LV Diastolic Volume Index 11/19/2021 37.57  mL/m2 Final    LA Volume Index 11/19/2021 24.3  mL/m2 Final    LV Mass Index 11/19/2021 39  g/m2 Final    RA Major Axis 11/19/2021 4.11  cm Final    Left Atrium Minor Axis 11/19/2021 5.03  cm Final    Left Atrium Major Axis 11/19/2021 4.91  cm Final    Triscuspid Valve Regurgitation Pea* 11/19/2021 20  mmHg Final    RA Width 11/19/2021 3.32  cm Final    Right Atrial Pressure (from IVC) 11/19/2021 3  mmHg Final    EF 11/19/2021 60  % Final    TV rest pulmonary artery pressure 11/19/2021 23  mmHg Final       I performed a total of 25minutes on Ms. Crews's care on the day of their visit excluding time spent related to any billed procedures. This time includes time spent with the patient as well as time spent documenting in the medical record, reviewing patient's records and tests, obtaining history, placing orders, communicating with other healthcare professionals, counseling the patient, family, or caregiver, and/or care coordination for the diagnoses above.    This note was dictated using [employer] speech recognition software. Please excuse any spelling or grammatical errors. Word recognition mistakes are occasionally missed on review.

## 2022-02-01 NOTE — TELEPHONE ENCOUNTER
I sent in bacitracin ointment.  Please fax.  She can also do warm compresses.  If she is not doing better in 2-3 days I would like for her to be evaluated either in person or virtually or with an eye doctor, we may need to do a course of oral antibiotics.

## 2022-02-03 ENCOUNTER — PATIENT MESSAGE (OUTPATIENT)
Dept: MEDSURG UNIT | Facility: HOSPITAL | Age: 72
End: 2022-02-03
Payer: MEDICARE

## 2022-02-04 ENCOUNTER — INFUSION (OUTPATIENT)
Dept: INFECTIOUS DISEASES | Facility: HOSPITAL | Age: 72
End: 2022-02-04
Attending: INTERNAL MEDICINE
Payer: MEDICARE

## 2022-02-04 VITALS
RESPIRATION RATE: 16 BRPM | DIASTOLIC BLOOD PRESSURE: 73 MMHG | OXYGEN SATURATION: 99 % | TEMPERATURE: 98 F | HEART RATE: 71 BPM | BODY MASS INDEX: 16.49 KG/M2 | SYSTOLIC BLOOD PRESSURE: 130 MMHG | WEIGHT: 90.19 LBS

## 2022-02-04 DIAGNOSIS — D83.9 CVID (COMMON VARIABLE IMMUNODEFICIENCY): Primary | ICD-10-CM

## 2022-02-04 DIAGNOSIS — J47.9 ADULT BRONCHIECTASIS: ICD-10-CM

## 2022-02-04 DIAGNOSIS — I48.0 PAROXYSMAL ATRIAL FIBRILLATION: Primary | ICD-10-CM

## 2022-02-04 PROCEDURE — 96366 THER/PROPH/DIAG IV INF ADDON: CPT

## 2022-02-04 PROCEDURE — 63600175 PHARM REV CODE 636 W HCPCS: Mod: JG | Performed by: ALLERGY & IMMUNOLOGY

## 2022-02-04 PROCEDURE — 96365 THER/PROPH/DIAG IV INF INIT: CPT

## 2022-02-04 PROCEDURE — 25000003 PHARM REV CODE 250: Performed by: ALLERGY & IMMUNOLOGY

## 2022-02-04 RX ORDER — SODIUM CHLORIDE 0.9 % (FLUSH) 0.9 %
10 SYRINGE (ML) INJECTION
Status: CANCELLED | OUTPATIENT
Start: 2022-03-04

## 2022-02-04 RX ORDER — ACETAMINOPHEN 325 MG/1
650 TABLET ORAL
Status: DISCONTINUED | OUTPATIENT
Start: 2022-02-04 | End: 2022-02-04 | Stop reason: HOSPADM

## 2022-02-04 RX ORDER — ACETAMINOPHEN 325 MG/1
650 TABLET ORAL
Status: CANCELLED | OUTPATIENT
Start: 2022-03-04

## 2022-02-04 RX ORDER — SODIUM CHLORIDE 0.9 % (FLUSH) 0.9 %
10 SYRINGE (ML) INJECTION
Status: DISCONTINUED | OUTPATIENT
Start: 2022-02-04 | End: 2022-02-04 | Stop reason: HOSPADM

## 2022-02-04 RX ADMIN — HUMAN IMMUNOGLOBULIN G 25 G: 20 LIQUID INTRAVENOUS at 10:02

## 2022-02-04 RX ADMIN — SODIUM CHLORIDE: 0.9 INJECTION, SOLUTION INTRAVENOUS at 09:02

## 2022-02-09 ENCOUNTER — PATIENT MESSAGE (OUTPATIENT)
Dept: PRIMARY CARE CLINIC | Facility: CLINIC | Age: 72
End: 2022-02-09
Payer: MEDICARE

## 2022-02-09 DIAGNOSIS — T14.8XXA ABRASION: ICD-10-CM

## 2022-02-09 RX ORDER — MUPIROCIN 20 MG/G
OINTMENT TOPICAL
Qty: 44 G | Refills: 6 | Status: SHIPPED | OUTPATIENT
Start: 2022-02-09 | End: 2022-07-04

## 2022-02-09 NOTE — TELEPHONE ENCOUNTER
----- Message from Mary Kay Sky sent at 2/9/2022  1:56 PM CST -----  Contact: Lisa carpenter/ [facility]   Office is requesting orders for pt's Bactroban (mupirocin). Please advise

## 2022-02-16 ENCOUNTER — PATIENT MESSAGE (OUTPATIENT)
Dept: MEDSURG UNIT | Facility: HOSPITAL | Age: 72
End: 2022-02-16
Payer: MEDICARE

## 2022-02-17 ENCOUNTER — TELEPHONE (OUTPATIENT)
Dept: ELECTROPHYSIOLOGY | Facility: CLINIC | Age: 72
End: 2022-02-17
Payer: MEDICARE

## 2022-02-17 NOTE — TELEPHONE ENCOUNTER
Spoke to Dr. Pavel Crews, patient's     CONFIRMED procedure arrival time of 8:30am    Reiterated instructions including:  -Directions to check in desk  -NPO after midnight night prior to procedure  -High importance of HOLDING Xarelto am of procedure   -Confirmed compliance of Xarelto, confirms takes with evening meal  -Pre-procedure LABS done, no alerts  -COVID test on admit  -Confirmed no fever, cough, or shortness of breath in the past 30 days  -Confirmed no redness, rash, irritation, or yeast infection to groin area.       Patient verbalizes understanding of above and appreciates call.

## 2022-02-18 ENCOUNTER — HOSPITAL ENCOUNTER (OUTPATIENT)
Dept: CARDIOLOGY | Facility: HOSPITAL | Age: 72
Discharge: HOME | DRG: 274 | End: 2022-02-18
Attending: INTERNAL MEDICINE
Payer: MEDICARE

## 2022-02-18 ENCOUNTER — HOSPITAL ENCOUNTER (INPATIENT)
Facility: HOSPITAL | Age: 72
LOS: 1 days | Discharge: HOME | DRG: 274 | End: 2022-02-19
Attending: INTERNAL MEDICINE | Admitting: INTERNAL MEDICINE
Payer: MEDICARE

## 2022-02-18 ENCOUNTER — ANESTHESIA EVENT (OUTPATIENT)
Dept: MEDSURG UNIT | Facility: HOSPITAL | Age: 72
DRG: 274 | End: 2022-02-18
Payer: MEDICARE

## 2022-02-18 ENCOUNTER — ANESTHESIA (OUTPATIENT)
Dept: MEDSURG UNIT | Facility: HOSPITAL | Age: 72
DRG: 274 | End: 2022-02-18
Payer: MEDICARE

## 2022-02-18 VITALS
HEIGHT: 62 IN | WEIGHT: 90 LBS | SYSTOLIC BLOOD PRESSURE: 153 MMHG | DIASTOLIC BLOOD PRESSURE: 69 MMHG | BODY MASS INDEX: 16.56 KG/M2

## 2022-02-18 DIAGNOSIS — Z95.818 PRESENCE OF WATCHMAN LEFT ATRIAL APPENDAGE CLOSURE DEVICE: ICD-10-CM

## 2022-02-18 DIAGNOSIS — I48.0 PAROXYSMAL ATRIAL FIBRILLATION: ICD-10-CM

## 2022-02-18 DIAGNOSIS — I49.9 ARRHYTHMIA: ICD-10-CM

## 2022-02-18 LAB
ABO + RH BLD: NORMAL
BLD GP AB SCN CELLS X3 SERPL QL: NORMAL
BSA FOR ECHO PROCEDURE: 1.34 M2
CTP QC/QA: YES
EJECTION FRACTION: 60 %
SARS-COV-2 AG RESP QL IA.RAPID: NEGATIVE

## 2022-02-18 PROCEDURE — 37000008 HC ANESTHESIA 1ST 15 MINUTES: Performed by: INTERNAL MEDICINE

## 2022-02-18 PROCEDURE — 93355 ECHO TRANSESOPHAGEAL (TEE): CPT | Mod: ,,, | Performed by: INTERNAL MEDICINE

## 2022-02-18 PROCEDURE — 86920 COMPATIBILITY TEST SPIN: CPT | Performed by: INTERNAL MEDICINE

## 2022-02-18 PROCEDURE — 63600175 PHARM REV CODE 636 W HCPCS: Performed by: ANESTHESIOLOGY

## 2022-02-18 PROCEDURE — C1894 INTRO/SHEATH, NON-LASER: HCPCS | Performed by: INTERNAL MEDICINE

## 2022-02-18 PROCEDURE — 93355 TRANSESOPHAGEAL ECHO (TEE) (CUPID ONLY): ICD-10-PCS | Mod: ,,, | Performed by: INTERNAL MEDICINE

## 2022-02-18 PROCEDURE — 86850 RBC ANTIBODY SCREEN: CPT | Performed by: INTERNAL MEDICINE

## 2022-02-18 PROCEDURE — 63600175 PHARM REV CODE 636 W HCPCS: Performed by: STUDENT IN AN ORGANIZED HEALTH CARE EDUCATION/TRAINING PROGRAM

## 2022-02-18 PROCEDURE — 25000003 PHARM REV CODE 250: Performed by: INTERNAL MEDICINE

## 2022-02-18 PROCEDURE — 93005 ELECTROCARDIOGRAM TRACING: CPT

## 2022-02-18 PROCEDURE — D9220A PRA ANESTHESIA: ICD-10-PCS | Mod: Q0,CRNA,, | Performed by: STUDENT IN AN ORGANIZED HEALTH CARE EDUCATION/TRAINING PROGRAM

## 2022-02-18 PROCEDURE — D9220A PRA ANESTHESIA: Mod: Q0,CRNA,, | Performed by: STUDENT IN AN ORGANIZED HEALTH CARE EDUCATION/TRAINING PROGRAM

## 2022-02-18 PROCEDURE — 94761 N-INVAS EAR/PLS OXIMETRY MLT: CPT

## 2022-02-18 PROCEDURE — 93010 EKG 12-LEAD: ICD-10-PCS | Mod: 76,,, | Performed by: INTERNAL MEDICINE

## 2022-02-18 PROCEDURE — C1769 GUIDE WIRE: HCPCS | Performed by: INTERNAL MEDICINE

## 2022-02-18 PROCEDURE — 63600175 PHARM REV CODE 636 W HCPCS: Performed by: INTERNAL MEDICINE

## 2022-02-18 PROCEDURE — 33340 PERQ CLSR TCAT L ATR APNDGE: CPT | Mod: Q0 | Performed by: INTERNAL MEDICINE

## 2022-02-18 PROCEDURE — 27800903 OPTIME MED/SURG SUP & DEVICES OTHER IMPLANTS: Performed by: INTERNAL MEDICINE

## 2022-02-18 PROCEDURE — 25000003 PHARM REV CODE 250: Performed by: STUDENT IN AN ORGANIZED HEALTH CARE EDUCATION/TRAINING PROGRAM

## 2022-02-18 PROCEDURE — 27201423 OPTIME MED/SURG SUP & DEVICES STERILE SUPPLY: Performed by: INTERNAL MEDICINE

## 2022-02-18 PROCEDURE — 93355 ECHO TRANSESOPHAGEAL (TEE): CPT

## 2022-02-18 PROCEDURE — 25000003 PHARM REV CODE 250: Performed by: ANESTHESIOLOGY

## 2022-02-18 PROCEDURE — 51702 INSERT TEMP BLADDER CATH: CPT

## 2022-02-18 PROCEDURE — 93010 ELECTROCARDIOGRAM REPORT: CPT | Mod: 76,,, | Performed by: INTERNAL MEDICINE

## 2022-02-18 PROCEDURE — 51702 INSERT TEMP BLADDER CATH: CPT | Performed by: INTERNAL MEDICINE

## 2022-02-18 PROCEDURE — D9220A PRA ANESTHESIA: ICD-10-PCS | Mod: Q0,ANES,, | Performed by: ANESTHESIOLOGY

## 2022-02-18 PROCEDURE — 33340 PR TRANSCATH CLOSURE, PERC, LEFT ATRIAL APPENDAGE, W/IMPLANT: ICD-10-PCS | Mod: Q0,GC,, | Performed by: INTERNAL MEDICINE

## 2022-02-18 PROCEDURE — D9220A PRA ANESTHESIA: Mod: Q0,ANES,, | Performed by: ANESTHESIOLOGY

## 2022-02-18 PROCEDURE — 25000003 PHARM REV CODE 250: Performed by: NURSE PRACTITIONER

## 2022-02-18 PROCEDURE — 20600001 HC STEP DOWN PRIVATE ROOM

## 2022-02-18 PROCEDURE — 25500020 PHARM REV CODE 255: Performed by: INTERNAL MEDICINE

## 2022-02-18 PROCEDURE — 93010 ELECTROCARDIOGRAM REPORT: CPT | Mod: ,,, | Performed by: INTERNAL MEDICINE

## 2022-02-18 PROCEDURE — 33340 PERQ CLSR TCAT L ATR APNDGE: CPT | Mod: Q0,GC,, | Performed by: INTERNAL MEDICINE

## 2022-02-18 PROCEDURE — 37000009 HC ANESTHESIA EA ADD 15 MINS: Performed by: INTERNAL MEDICINE

## 2022-02-18 DEVICE — LEFT ATRIAL APPENDAGE CLOSURE DEVICE WITH DELIVERY SYSTEM
Type: IMPLANTABLE DEVICE | Site: HEART | Status: FUNCTIONAL
Brand: WATCHMAN FLX™

## 2022-02-18 RX ORDER — ACETAMINOPHEN 325 MG/1
650 TABLET ORAL EVERY 4 HOURS PRN
Status: DISCONTINUED | OUTPATIENT
Start: 2022-02-18 | End: 2022-02-19 | Stop reason: HOSPADM

## 2022-02-18 RX ORDER — DEXAMETHASONE SODIUM PHOSPHATE 4 MG/ML
INJECTION, SOLUTION INTRA-ARTICULAR; INTRALESIONAL; INTRAMUSCULAR; INTRAVENOUS; SOFT TISSUE
Status: DISCONTINUED | OUTPATIENT
Start: 2022-02-18 | End: 2022-02-18

## 2022-02-18 RX ORDER — LEVOTHYROXINE SODIUM 88 UG/1
88 TABLET ORAL
Status: DISCONTINUED | OUTPATIENT
Start: 2022-02-19 | End: 2022-02-19 | Stop reason: HOSPADM

## 2022-02-18 RX ORDER — HEPARIN SOD,PORCINE/0.9 % NACL 1000/500ML
INTRAVENOUS SOLUTION INTRAVENOUS
Status: DISCONTINUED | OUTPATIENT
Start: 2022-02-18 | End: 2022-02-19 | Stop reason: HOSPADM

## 2022-02-18 RX ORDER — MONTELUKAST SODIUM 10 MG/1
10 TABLET ORAL NIGHTLY
Status: DISCONTINUED | OUTPATIENT
Start: 2022-02-18 | End: 2022-02-19 | Stop reason: HOSPADM

## 2022-02-18 RX ORDER — PHENYLEPHRINE HCL IN 0.9% NACL 1 MG/10 ML
100 SYRINGE (ML) INTRAVENOUS EVERY 5 MIN PRN
Status: DISCONTINUED | OUTPATIENT
Start: 2022-02-18 | End: 2022-02-19 | Stop reason: HOSPADM

## 2022-02-18 RX ORDER — CEFAZOLIN SODIUM/D5W 2 G/100 ML
2 PLASTIC BAG, INJECTION (ML) INTRAVENOUS
Status: COMPLETED | OUTPATIENT
Start: 2022-02-18 | End: 2022-02-18

## 2022-02-18 RX ORDER — SODIUM CHLORIDE 0.9 % (FLUSH) 0.9 %
3 SYRINGE (ML) INJECTION
Status: DISCONTINUED | OUTPATIENT
Start: 2022-02-18 | End: 2022-02-19 | Stop reason: HOSPADM

## 2022-02-18 RX ORDER — DEXMEDETOMIDINE HYDROCHLORIDE 100 UG/ML
INJECTION, SOLUTION INTRAVENOUS
Status: DISCONTINUED | OUTPATIENT
Start: 2022-02-18 | End: 2022-02-18

## 2022-02-18 RX ORDER — HEPARIN SODIUM 1000 [USP'U]/ML
INJECTION, SOLUTION INTRAVENOUS; SUBCUTANEOUS
Status: DISCONTINUED | OUTPATIENT
Start: 2022-02-18 | End: 2022-02-18

## 2022-02-18 RX ORDER — LIDOCAINE HCL/PF 100 MG/5ML
SYRINGE (ML) INTRAVENOUS
Status: DISCONTINUED | OUTPATIENT
Start: 2022-02-18 | End: 2022-02-18

## 2022-02-18 RX ORDER — IODIXANOL 320 MG/ML
INJECTION, SOLUTION INTRAVASCULAR
Status: DISCONTINUED | OUTPATIENT
Start: 2022-02-18 | End: 2022-02-19 | Stop reason: HOSPADM

## 2022-02-18 RX ORDER — ONDANSETRON 2 MG/ML
INJECTION INTRAMUSCULAR; INTRAVENOUS
Status: DISCONTINUED | OUTPATIENT
Start: 2022-02-18 | End: 2022-02-18

## 2022-02-18 RX ORDER — DONEPEZIL HYDROCHLORIDE 5 MG/1
10 TABLET, FILM COATED ORAL DAILY
Status: DISCONTINUED | OUTPATIENT
Start: 2022-02-19 | End: 2022-02-19 | Stop reason: HOSPADM

## 2022-02-18 RX ORDER — PHENYLEPHRINE HYDROCHLORIDE 10 MG/ML
INJECTION INTRAVENOUS
Status: DISCONTINUED | OUTPATIENT
Start: 2022-02-18 | End: 2022-02-18

## 2022-02-18 RX ORDER — PROPOFOL 10 MG/ML
VIAL (ML) INTRAVENOUS
Status: DISCONTINUED | OUTPATIENT
Start: 2022-02-18 | End: 2022-02-18

## 2022-02-18 RX ORDER — ROCURONIUM BROMIDE 10 MG/ML
INJECTION, SOLUTION INTRAVENOUS
Status: DISCONTINUED | OUTPATIENT
Start: 2022-02-18 | End: 2022-02-18

## 2022-02-18 RX ORDER — LIDOCAINE HYDROCHLORIDE 20 MG/ML
INJECTION, SOLUTION INFILTRATION; PERINEURAL
Status: DISCONTINUED | OUTPATIENT
Start: 2022-02-18 | End: 2022-02-19 | Stop reason: HOSPADM

## 2022-02-18 RX ORDER — PROTAMINE SULFATE 10 MG/ML
INJECTION, SOLUTION INTRAVENOUS
Status: DISCONTINUED | OUTPATIENT
Start: 2022-02-18 | End: 2022-02-18

## 2022-02-18 RX ORDER — FENTANYL CITRATE 50 UG/ML
25 INJECTION, SOLUTION INTRAMUSCULAR; INTRAVENOUS EVERY 5 MIN PRN
Status: COMPLETED | OUTPATIENT
Start: 2022-02-18 | End: 2022-02-18

## 2022-02-18 RX ADMIN — PROPOFOL 20 MG: 10 INJECTION, EMULSION INTRAVENOUS at 11:02

## 2022-02-18 RX ADMIN — FENTANYL CITRATE 25 MCG: 50 INJECTION INTRAMUSCULAR; INTRAVENOUS at 02:02

## 2022-02-18 RX ADMIN — SODIUM CHLORIDE 0.25 MCG/KG/MIN: 9 INJECTION, SOLUTION INTRAVENOUS at 12:02

## 2022-02-18 RX ADMIN — PHENYLEPHRINE HYDROCHLORIDE 100 MCG: 10 INJECTION, SOLUTION INTRAMUSCULAR; INTRAVENOUS; SUBCUTANEOUS at 12:02

## 2022-02-18 RX ADMIN — CEFAZOLIN 1 G: 330 INJECTION, POWDER, FOR SOLUTION INTRAMUSCULAR; INTRAVENOUS at 11:02

## 2022-02-18 RX ADMIN — SODIUM CHLORIDE, SODIUM GLUCONATE, SODIUM ACETATE, POTASSIUM CHLORIDE, MAGNESIUM CHLORIDE, SODIUM PHOSPHATE, DIBASIC, AND POTASSIUM PHOSPHATE: .53; .5; .37; .037; .03; .012; .00082 INJECTION, SOLUTION INTRAVENOUS at 11:02

## 2022-02-18 RX ADMIN — HEPARIN SODIUM 4000 UNITS: 1000 INJECTION INTRAVENOUS; SUBCUTANEOUS at 12:02

## 2022-02-18 RX ADMIN — PROTAMINE SULFATE 40 MG: 10 INJECTION, SOLUTION INTRAVENOUS at 12:02

## 2022-02-18 RX ADMIN — PROPOFOL 30 MG: 10 INJECTION, EMULSION INTRAVENOUS at 11:02

## 2022-02-18 RX ADMIN — ONDANSETRON HYDROCHLORIDE 4 MG: 2 INJECTION INTRAMUSCULAR; INTRAVENOUS at 12:02

## 2022-02-18 RX ADMIN — MONTELUKAST 10 MG: 10 TABLET, FILM COATED ORAL at 09:02

## 2022-02-18 RX ADMIN — FENTANYL CITRATE 25 MCG: 50 INJECTION INTRAMUSCULAR; INTRAVENOUS at 01:02

## 2022-02-18 RX ADMIN — Medication 100 MCG: at 06:02

## 2022-02-18 RX ADMIN — GLYCOPYRROLATE 0.2 MG: 0.2 INJECTION, SOLUTION INTRAMUSCULAR; INTRAVENOUS at 11:02

## 2022-02-18 RX ADMIN — DEXMEDETOMIDINE HYDROCHLORIDE 20 MCG: 100 INJECTION, SOLUTION, CONCENTRATE INTRAVENOUS at 03:02

## 2022-02-18 RX ADMIN — SUGAMMADEX 80 MG: 100 INJECTION, SOLUTION INTRAVENOUS at 12:02

## 2022-02-18 RX ADMIN — DEXAMETHASONE SODIUM PHOSPHATE 8 MG: 4 INJECTION, SOLUTION INTRAMUSCULAR; INTRAVENOUS at 11:02

## 2022-02-18 RX ADMIN — ROCURONIUM BROMIDE 25 MG: 10 INJECTION, SOLUTION INTRAVENOUS at 11:02

## 2022-02-18 RX ADMIN — ROCURONIUM BROMIDE 10 MG: 10 INJECTION, SOLUTION INTRAVENOUS at 11:02

## 2022-02-18 RX ADMIN — LIDOCAINE HYDROCHLORIDE 80 MG: 20 INJECTION, SOLUTION INTRAVENOUS at 11:02

## 2022-02-18 RX ADMIN — PHENYLEPHRINE HYDROCHLORIDE 100 MCG: 10 INJECTION, SOLUTION INTRAMUSCULAR; INTRAVENOUS; SUBCUTANEOUS at 11:02

## 2022-02-18 RX ADMIN — DEXMEDETOMIDINE HYDROCHLORIDE 0.5 MCG/KG/HR: 4 INJECTION, SOLUTION INTRAVENOUS at 03:02

## 2022-02-18 RX ADMIN — PROPOFOL 100 MG: 10 INJECTION, EMULSION INTRAVENOUS at 11:02

## 2022-02-18 RX ADMIN — PHENYLEPHRINE HYDROCHLORIDE 200 MCG: 10 INJECTION, SOLUTION INTRAMUSCULAR; INTRAVENOUS; SUBCUTANEOUS at 11:02

## 2022-02-18 RX ADMIN — GLYCOPYRROLATE 0.2 MG: 0.2 INJECTION, SOLUTION INTRAMUSCULAR; INTRAVENOUS at 12:02

## 2022-02-18 NOTE — ADDENDUM NOTE
Addendum  created 02/18/22 1511 by Cholo Peacock MD    Order list changed, Pharmacy for encounter modified

## 2022-02-18 NOTE — ANESTHESIA PREPROCEDURE EVALUATION
02/18/2022  Yvette Crews is a 71 y.o., female.    Anesthesia Evaluation    I have reviewed the Patient Summary Reports.      I have reviewed the Medications.     Review of Systems  Anesthesia Hx:  History of prior surgery of interest to airway management or planning:  Denies Personal Hx of Anesthesia complications.   Cardiovascular:   Hypertension Dysrhythmias atrial fibrillation NL BiV Fxn   Pulmonary:   COPD    Musculoskeletal:   Arthritis     Neurological:   CVA    Endocrine:   Hypothyroidism    Psych:   Psychiatric History anxiety          Physical Exam  General:  Cachexia    Airway/Jaw/Neck:  Airway Findings: Mouth Opening: Normal Tongue: Normal  General Airway Assessment: Adult  Mallampati: III  Improves to II with phonation.  TM Distance: Normal, at least 6 cm  Jaw/Neck Findings:  Neck ROM: Extension Decreased, Severe       Chest/Lungs:  Chest/Lungs Findings: Normal Respiratory Rate     Heart/Vascular:  Heart Findings: Rate: Normal        Mental Status:  Mental Status Findings:  Alert and Oriented         Anesthesia Plan  Type of Anesthesia, risks & benefits discussed:  Anesthesia Type:  general    Patient's Preference:   Plan Factors:          Intra-op Monitoring Plan: standard ASA monitors  Intra-op Monitoring Plan Comments:   Post Op Pain Control Plan: multimodal analgesia  Post Op Pain Control Plan Comments:     Induction:   IV  Beta Blocker:         Informed Consent: Patient understands risks and agrees with Anesthesia plan.  Questions answered. Anesthesia consent signed with patient.  ASA Score: 4     Day of Surgery Review of History & Physical:    H&P update referred to the surgeon.     Anesthesia Plan Notes: NPO confirmed.   Neck position noted, limited extension.  Records indicate several easy intubations documented previously.        Ready For Surgery From Anesthesia Perspective.

## 2022-02-18 NOTE — Clinical Note
30 ml of contrast were injected throughout the case. 70 mL of contrast was the total wasted during the case. 100 mL was the total amount used during the case.

## 2022-02-18 NOTE — HPI
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 She is followed by general cardiology and EP for recently diagnosed AF. Pt has a history of falls and dementia, so shared decision made to proceed with Watchman Flx device.     Following history obtained from most recent EP clinic encounter.    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     In brief review of her cardiac history, she was previously an EP patient of Dr. Willoughby, but had not been seen since 2018. She was noted to have periods of short RP tachycardia and underwent EP study and ablation of typical AVNR with slow pathway modification on 9/6/2012. Since undergoing ablation, she denies any further episodes of palpitations similar to these episodes. She reports continued generalized fatigue and weakness that she was attributing to her advancing age and cognitive decline. She has a history of mechanical falls, most recently falling about 2 months ago when she was witnessed to trip when walking with her walker and hit her arm and face on the top bar of her walker. She and her  have since moved into assisted living and she has a caretaker around the clock. She has been working to improve her mobility with gait training and continued physical therapy, although she continues to report a significant amount of neck and back pain. She has been told to only ambulate with her walker, although she often  "forgets and walks without use of her walker. Her  states that she has more falls without her walker. There is no reported history of loss of consciousness without ambulation, and no reported episodes of syncope. She denies any recurrent arrhythmic symptoms, although her  notes that she is not always capable of expressing her symptoms or remembering her symptoms. A majority of this history was obtained by discussion with her . Following our prior encounter, an ambulatory monitor was ordered, revealing approximately a 5% burden of atrial fibrillation.       Today, we have set up a virtual visit to discuss the results of her recent ambulatory monitor revealing atrial fibrillation and to discuss possible options for treatment. 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 Her  thought that potentially her generalized fatigue was secondary to her previously prescribed effexor, although he denies any changes in her status since cessation of this agent. She denies any recent episodes of dizziness, lightheadedness, syncope/presyncope. Her  reports that she endorsed an episode of dizziness with her aide about a week ago, but the patient does not recall this event and is "feeling fine" presently. She reports "funny feelings" in her chest occasionally, but denies any yaya palpitations, chest pain or chest discomfort. She denies any nausea or vomiting, orthopnea, lower extremity edema, or PND. She reports baseline shortness of breath and dyspnea with exertion, but feels that these symptoms are unchanged for her. She continues to work with PT for gait training with no reported symptoms with exertion.       "

## 2022-02-18 NOTE — ASSESSMENT & PLAN NOTE
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    Given her high risk of bleeding including dementia and history of falls, will proceed with Watchman implantation     [id number] is 3 (HTN, female gender, age 65-74), portending an annual adjusted risk of CVA of 3.2%. Continue apixaban 5mg po BID.      Plan to proceed with implantation of a WATCHMAN left atrial appendage occlusion device. All questions and concerns were addressed

## 2022-02-18 NOTE — TRANSFER OF CARE
"Anesthesia Transfer of Care Note    Patient: Yvette Crews    Procedure(s) Performed: Procedure(s) (LRB):  Left atrial appendage occlusion (N/A)  ECHOCARDIOGRAM, TRANSESOPHAGEAL (N/A)    Patient location: PACU    Anesthesia Type: general    Transport from OR: Transported from OR on 6-10 L/min O2 by face mask with adequate spontaneous ventilation    Post pain: adequate analgesia    Post assessment: no apparent anesthetic complications and tolerated procedure well    Post vital signs: stable    Level of consciousness: awake and alert    Nausea/Vomiting: no nausea/vomiting    Complications: none    Transfer of care protocol was followed      Last vitals:   Visit Vitals  BP (!) 137/66 (BP Location: Left arm, Patient Position: Lying)   Pulse 76   Temp 36.7 °C (98.1 °F) (Temporal)   Resp 18   Ht 5' 2" (1.575 m)   Wt 40.8 kg (90 lb)   SpO2 100%   Breastfeeding No   BMI 16.46 kg/m²     "

## 2022-02-18 NOTE — BRIEF OP NOTE
: Chase Gill MD PhD  Date of procedure: 2/18/2022  Post-operative Diagnosis: AF and high chadsvasc with high fall risk     Procedure Performed: Left Atrial Appendage Occlusion with Watchman Flx device      Description of Procedure: The patient was brought to the EP lab in the fasting state. Prepped and draped in sterile fashion. Safety timeout was performed. Sedation administered by anesthesia staff. Ultrasound guided venous access of the right femoral vein was performed. 16Fr short sheath placed in right femoral vein. Heparin bolus given. JAMEL and fluoroscopic guided single transseptal puncture performed. 20  mm Watchman deployed in MILO using JAMEL and fluoroscopic guidance. Tug test confirmed stable position. JAMEL confirmed adequate compression and no significant roxann-device flow. Sheaths removed. Two silk figure of 8 sutures placed at right femoral access site for hemostasis.      EBL: <10 mL     Specimens Removed: None  Complications: no immediate           Plans  Post op orders including bedrest x 6 hours  Remove sutures at 6  hours   Resume routine home medications including anticoagulation tonight   Planned for likely discharge tomorrow

## 2022-02-18 NOTE — SUBJECTIVE & OBJECTIVE
Past Medical History:   Diagnosis Date    Adult bronchiectasis     Age-related osteoporosis with current pathological fracture with routine healing 8/28/2013    Amblyopia     Anxiety     Cataract     Chondrocalcinosis, cause unspecified, involving hand(712.34) 7/12/2011     Dx updated per 2019 IMO Load    Chronic sinusitis 4/6/2017    Colonic constipation 6/5/2013    Concussion 10/27/2016    Frequent falls 3/14/2017    Gait disturbance 3/14/2017    History of cardiac radiofrequency ablation (RFA) 2/3/2018    History of subarachnoid hemorrhage 10/30/2016    Hypogammaglobulinemia 9/7/2011    Irritable bowel syndrome 12/18/2015    Major depressive disorder, recurrent episode, in full remission 8/19/2010    Onychomycosis     Osteoarthritis     Postoperative hypothyroidism 12/18/2015    Rectal prolapse 6/5/2013    Reflux esophagitis 2/7/2010    Status post thyroidectomy 6/1/2017    Strabismus     SVT (supraventricular tachycardia) 11/22/2013    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     Underweight 1/23/2013       Past Surgical History:   Procedure Laterality Date    BREAST CYST ASPIRATION      x2    CATARACT EXTRACTION W/  INTRAOCULAR LENS IMPLANT Left 3/11/2019    Procedure: EXTRACTION, CATARACT, WITH IOL INSERTION;  Surgeon: Vera Sams MD;  Location: [facility];  Service: Ophthalmology;  Laterality: Left;    CATARACT EXTRACTION W/  INTRAOCULAR LENS IMPLANT Right 4/15/2019    Procedure: EXTRACTION, CATARACT, WITH IOL INSERTION LASER;  Surgeon: Vera Sams MD;  Location: [facility];  Service: Ophthalmology;  Laterality: Right;    COLON SURGERY      EYE SURGERY      FRACTURE SURGERY      NASAL SEPTUM SURGERY      OPEN REDUCTION AND INTERNAL FIXATION (ORIF) OF INJURY OF SACROILIAC JOINT Bilateral 1/20/2021    Procedure: ORIF, SACROILIAC JOINT;  Surgeon: Alcides Tamez MD;  Location: [address];  Service: Orthopedics;  Laterality: Bilateral;    RADIOFREQUENCY ABLATION       RECTAL PROLAPSE REPAIR  june 2013    STRABISMUS SURGERY      TONSILLECTOMY         Review of patient's allergies indicates:   Allergen Reactions    Adhesive      Tape tears skin    Buspar [buspirone]      headaches    Escitalopram oxalate Nausea Only     hyponatremia      Rifampin Rash       Current Facility-Administered Medications on File Prior to Encounter   Medication    balanced salt irrigation solution 1 drop    lidocaine (PF) 10 mg/ml (1%) injection 10 mg    moxifloxacin 0.5 % ophthalmic solution 1 drop    phenylephrine HCL 2.5% ophthalmic solution 1 drop    tropicamide 1% ophthalmic solution 1 drop     Current Outpatient Medications on File Prior to Encounter   Medication Sig    acetaminophen (TYLENOL ORAL) Take by mouth once daily.    albuterol (PROVENTIL/VENTOLIN HFA) 90 mcg/actuation inhaler Inhale 2 puffs into the lungs every 6 (six) hours as needed for Wheezing. Rescue    calcium-vitamin D3 (OS-ELAINE 500 + D3) 500 mg(1,250mg) -200 unit per tablet Take 1 tablet by mouth 2 (two) times daily.    docusate sodium (COLACE) 250 MG capsule Take 250 mg by mouth 2 (two) times daily as needed for Constipation.    donepeziL (ARICEPT) 10 MG tablet Take 1 tablet (10 mg total) by mouth once daily.    flu vac 2021 [id number],PF, (FLUAD QUAD 2021-22,65Y UP,,PF,) 60 mcg (15 mcg x 4)/0.5 mL Syrg inject into the muscle (Patient not taking: Reported on 2/1/2022)    guaiFENesin (MUCINEX) 600 mg 12 hr tablet Take 2 tablets (1,200 mg total) by mouth 2 (two) times daily.    Immune Globulin G, IGG,-PRO-IGA 10 % injection, Privigen, (PRIVIGEN) 10 % Soln Infuse 20 g into the vein every 4 weeks.    levothyroxine (SYNTHROID) 88 MCG tablet TAKE ONE TABLET BY MOUTH BEFORE breakfast    montelukast (SINGULAIR) 10 mg tablet TAKE ONE TABLET BY MOUTH EVERY EVENING    multivit with calcium,iron,min (MULTIPLE VITAMIN, WOMENS ORAL) Take by mouth once daily.    naproxen (NAPROSYN ORAL) Take 200 mg by mouth. As  needed every other day    polyethylene glycol (GLYCOLAX) 17 gram PwPk Take 17 g by mouth once daily. (Patient taking differently: Take 17 g by mouth as needed. As needed once a week or so)     Family History     Problem Relation (Age of Onset)    Breast cancer Paternal Grandmother    Heart disease Father, Brother    Stroke Father        Tobacco Use    Smoking status: Never Smoker    Smokeless tobacco: Never Used   Substance and Sexual Activity    Alcohol use: No    Drug use: No    Sexual activity: Not Currently     ROS     Constitution: Negative for fever, chills, weight loss or gain.   HENT: Negative for sore throat, rhinorrhea, or headache.  Eyes: Negative for blurred or double vision.   Cardiovascular: Negative for exertional chest pain  Pulmonary:-  Dyspnea on exertion  Gastrointestinal: Negative for abdominal pain, nausea, vomiting, or diarrhea. Negative for melena/hematochezia.  : Negative for dysuria.   Neurological: Negative for focal weakness or sensory changes.  Skin:+ bleeding or bruising    Objective:     Vital Signs (Most Recent):    Vital Signs (24h Range):           There is no height or weight on file to calculate BMI.            No intake or output data in the 24 hours ending 02/18/22 0846    Lines/Drains/Airways     None                 Physical Exam  Constitutional: No distress, thin, conversant  HEENT: Sclera anicteric, PERRLA, EOMI  Neck: No JVD, no masses, good movement  CV: RRR, S1 and S2 normal, no additional heart sounds or murmurs. Pulses 2+ and equal bilaterally in radial arteries, Bennett's normal on right. Distal pulses are 2+ and equal in the femoral, DP and PT areas bilaterally  Pulm: Clear to auscultation bilaterally with symmetrical expansion. Chest wall palpated for reproduction of pain symptoms, and no pain was able to be produced on palpation or resistance exercises  GI: Abdomen soft, non-tender, good bowel sounds  Extremities: Both extremities intact and grossly normal,  skin is warm, no edema noted  Skin: No ecchymosis, erythema, or ulcers  Psych: AOx3, appropriate affect  Neuro: CNII-XII intact, no focal deficits      Significant Labs: BMP: No results for input(s): GLU, NA, K, CL, CO2, BUN, CREATININE, CALCIUM, MG in the last 48 hours., CMP No results for input(s): NA, K, CL, CO2, GLU, BUN, CREATININE, CALCIUM, PROT, ALBUMIN, BILITOT, ALKPHOS, AST, ALT, ANIONGAP, ESTGFRAFRICA, EGFRNONAA in the last 48 hours., CBC No results for input(s): WBC, HGB, HCT, PLT in the last 48 hours., INR No results for input(s): INR, PROTIME in the last 48 hours. and Lipid Panel No results for input(s): CHOL, HDL, LDLCALC, TRIG, CHOLHDL in the last 48 hours.

## 2022-02-18 NOTE — ADDENDUM NOTE
Addendum  created 02/18/22 1556 by Gio Kelly MD    Clinical Note Signed, Intraprocedure Meds edited

## 2022-02-18 NOTE — H&P
[facility] - Short Stay Cardiac Unit  Cardiac Electrophysiology  History and Physical     Admission Date: 2/18/2022  Code Status: Prior   Attending Provider: Chase Gill MD   Principal Problem:<principal problem not specified>    Subjective:     Chief Complaint:  Elective presentation for Watchman implantation.    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 She is followed by general cardiology and EP for recently diagnosed AF. Pt has a history of falls and dementia, so shared decision made to proceed with Watchman Flx device.     Following history obtained from most recent EP clinic encounter.    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     In brief review of her cardiac history, she was previously an EP patient of Dr. Willoughby, but had not been seen since 2018. She was noted to have periods of short RP tachycardia and underwent EP study and ablation of typical AVNR with slow pathway modification on 9/6/2012. Since undergoing ablation, she denies any further episodes of palpitations similar to these episodes. She reports continued generalized fatigue and weakness that she was attributing to her advancing age and cognitive decline. She has a history of mechanical falls, most recently falling about 2 months ago when she was witnessed to trip when walking with her walker and hit her arm and face on the top bar of her walker. She and her   "have since moved into assisted living and she has a caretaker around the clock. She has been working to improve her mobility with gait training and continued physical therapy, although she continues to report a significant amount of neck and back pain. She has been told to only ambulate with her walker, although she often forgets and walks without use of her walker. Her  states that she has more falls without her walker. There is no reported history of loss of consciousness without ambulation, and no reported episodes of syncope. She denies any recurrent arrhythmic symptoms, although her  notes that she is not always capable of expressing her symptoms or remembering her symptoms. A majority of this history was obtained by discussion with her . Following our prior encounter, an ambulatory monitor was ordered, revealing approximately a 5% burden of atrial fibrillation.       Today, we have set up a virtual visit to discuss the results of her recent ambulatory monitor revealing atrial fibrillation and to discuss possible options for treatment. 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 Her  thought that potentially her generalized fatigue was secondary to her previously prescribed effexor, although he denies any changes in her status since cessation of this agent. She denies any recent episodes of dizziness, lightheadedness, syncope/presyncope. Her  reports that she endorsed an episode of dizziness with her aide about a week ago, but the patient does not recall this event and is "feeling fine" presently. She reports "funny feelings" in her chest occasionally, but denies any yaya palpitations, chest pain or chest discomfort. She denies any nausea or vomiting, orthopnea, lower extremity edema, or PND. She reports baseline shortness of breath and dyspnea with exertion, but feels " that these symptoms are unchanged for her. She continues to work with PT for gait training with no reported symptoms with exertion.         Past Medical History:   Diagnosis Date    Adult bronchiectasis     Age-related osteoporosis with current pathological fracture with routine healing 8/28/2013    Amblyopia     Anxiety     Cataract     Chondrocalcinosis, cause unspecified, involving hand(712.34) 7/12/2011     Dx updated per 2019 IMO Load    Chronic sinusitis 4/6/2017    Colonic constipation 6/5/2013    Concussion 10/27/2016    Frequent falls 3/14/2017    Gait disturbance 3/14/2017    History of cardiac radiofrequency ablation (RFA) 2/3/2018    History of subarachnoid hemorrhage 10/30/2016    Hypogammaglobulinemia 9/7/2011    Irritable bowel syndrome 12/18/2015    Major depressive disorder, recurrent episode, in full remission 8/19/2010    Onychomycosis     Osteoarthritis     Postoperative hypothyroidism 12/18/2015    Rectal prolapse 6/5/2013    Reflux esophagitis 2/7/2010    Status post thyroidectomy 6/1/2017    Strabismus     SVT (supraventricular tachycardia) 11/22/2013    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     Underweight 1/23/2013       Past Surgical History:   Procedure Laterality Date    BREAST CYST ASPIRATION      x2    CATARACT EXTRACTION W/  INTRAOCULAR LENS IMPLANT Left 3/11/2019    Procedure: EXTRACTION, CATARACT, WITH IOL INSERTION;  Surgeon: Vera Sams MD;  Location: [facility] OR;  Service: Ophthalmology;  Laterality: Left;    CATARACT EXTRACTION W/  INTRAOCULAR LENS IMPLANT Right 4/15/2019    Procedure: EXTRACTION, CATARACT, WITH IOL INSERTION LASER;  Surgeon: Vera Sams MD;  Location: [facility] OR;  Service: Ophthalmology;  Laterality: Right;    COLON SURGERY      EYE SURGERY      FRACTURE SURGERY      NASAL SEPTUM SURGERY      OPEN REDUCTION AND INTERNAL FIXATION (ORIF) OF INJURY OF SACROILIAC JOINT Bilateral 1/20/2021    Procedure: ORIF, SACROILIAC JOINT;   Surgeon: Alcides Tamez MD;  Location: [facility] OR [address] [place] [address];  Service: Orthopedics;  Laterality: Bilateral;    RADIOFREQUENCY ABLATION      RECTAL PROLAPSE REPAIR  june 2013    STRABISMUS SURGERY      TONSILLECTOMY         Review of patient's allergies indicates:   Allergen Reactions    Adhesive      Tape tears skin    Buspar [buspirone]      headaches    Escitalopram oxalate Nausea Only     hyponatremia      Rifampin Rash       Current Facility-Administered Medications on File Prior to Encounter   Medication    balanced salt irrigation solution 1 drop    lidocaine (PF) 10 mg/ml (1%) injection 10 mg    moxifloxacin 0.5 % ophthalmic solution 1 drop    phenylephrine HCL 2.5% ophthalmic solution 1 drop    tropicamide 1% ophthalmic solution 1 drop     Current Outpatient Medications on File Prior to Encounter   Medication Sig    acetaminophen (TYLENOL ORAL) Take by mouth once daily.    albuterol (PROVENTIL/VENTOLIN HFA) 90 mcg/actuation inhaler Inhale 2 puffs into the lungs every 6 (six) hours as needed for Wheezing. Rescue    calcium-vitamin D3 (OS-ELAINE 500 + D3) 500 mg(1,250mg) -200 unit per tablet Take 1 tablet by mouth 2 (two) times daily.    docusate sodium (COLACE) 250 MG capsule Take 250 mg by mouth 2 (two) times daily as needed for Constipation.    donepeziL (ARICEPT) 10 MG tablet Take 1 tablet (10 mg total) by mouth once daily.    flu vac 2021 65up-[id number],PF, (FLUAD QUAD 2021-22,65Y UP,,PF,) 60 mcg (15 mcg x 4)/0.5 mL Syrg inject into the muscle (Patient not taking: Reported on 2/1/2022)    guaiFENesin (MUCINEX) 600 mg 12 hr tablet Take 2 tablets (1,200 mg total) by mouth 2 (two) times daily.    Immune Globulin G, IGG,-PRO-IGA 10 % injection, Privigen, (PRIVIGEN) 10 % Soln Infuse 20 g into the vein every 4 weeks.    levothyroxine (SYNTHROID) 88 MCG tablet TAKE ONE TABLET BY MOUTH BEFORE breakfast    montelukast (SINGULAIR) 10 mg tablet TAKE ONE TABLET BY MOUTH EVERY EVENING     multivit with calcium,iron,min (MULTIPLE VITAMIN, WOMENS ORAL) Take by mouth once daily.    naproxen (NAPROSYN ORAL) Take 200 mg by mouth. As needed every other day    polyethylene glycol (GLYCOLAX) 17 gram PwPk Take 17 g by mouth once daily. (Patient taking differently: Take 17 g by mouth as needed. As needed once a week or so)     Family History     Problem Relation (Age of Onset)    Breast cancer Paternal Grandmother    Heart disease Father, Brother    Stroke Father        Tobacco Use    Smoking status: Never Smoker    Smokeless tobacco: Never Used   Substance and Sexual Activity    Alcohol use: No    Drug use: No    Sexual activity: Not Currently     Review of Systems   Constitutional: Negative for chills, decreased appetite and fever.   HENT: Negative for congestion and sore throat.    Eyes: Negative for blurred vision and discharge.   Cardiovascular: Negative for chest pain, claudication, cyanosis, dyspnea on exertion and leg swelling.   Respiratory: Negative for cough, hemoptysis and shortness of breath.    Endocrine: Negative for cold intolerance and heat intolerance.   Skin: Negative for color change.   Musculoskeletal: Negative for muscle weakness and myalgias.   Gastrointestinal: Negative for bloating and abdominal pain.   Neurological: Negative for dizziness, focal weakness and weakness.   Psychiatric/Behavioral: Negative for altered mental status and depression.     Objective:     Vital Signs (Most Recent):    Vital Signs (24h Range):             There is no height or weight on file to calculate BMI.            Physical Exam  Constitutional:       Appearance: Elderly, frail, cachectic  HENT:      Head: Normocephalic and atraumatic.      Right Ear: External ear normal.      Left Ear: External ear normal.   Eyes:      Extraocular Movements: EOM normal.      Conjunctiva/sclera: Conjunctivae normal.   Cardiovascular:      Rate and Rhythm: Normal rate and regular rhythm.      Pulses: Intact distal  pulses.           Radial pulses are 2+ on the right side and 2+ on the left side.      Heart sounds: Normal heart sounds.   Pulmonary:      Effort: Pulmonary effort is normal. No respiratory distress.      Breath sounds: Normal breath sounds. No wheezing.   Abdominal:      General: Bowel sounds are normal. There is no distension.      Palpations: Abdomen is soft.      Tenderness: There is no abdominal tenderness.   Musculoskeletal:         General: No edema. Normal range of motion.      Cervical back: Normal range of motion and neck supple.   Skin:     General: Skin is warm and dry.   Neurological:      Mental Status: Baseline delirium. Patient's  was answering most questions.        Significant Labs: CMP: No results for input(s): NA, K, CL, CO2, GLU, BUN, CREATININE, CALCIUM, PROT, ALBUMIN, BILITOT, ALKPHOS, AST, ALT, ANIONGAP, ESTGFRAFRICA, EGFRNONAA in the last 48 hours., CBC: No results for input(s): WBC, HGB, HCT, PLT in the last 48 hours. and INR: No results for input(s): INR, PROTIME in the last 48 hours.    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    Given her high risk of bleeding including dementia and history of falls, will proceed with Watchman implantation     [id number]  is 3 (HTN, female gender, age 65-74), portending an annual adjusted risk of CVA of 3.2%. Continue apixaban 5mg po BID.      Plan to proceed with implantation of a WATCHMAN left atrial appendage occlusion device. All questions and concerns were addressed        Parish Moreland MD  Cardiac Electrophysiology  [facility] - Short Stay Cardiac Unit

## 2022-02-18 NOTE — PROGRESS NOTES
Pt continuously c/o needing to use bathroom. purewick unsuccessful, place pt on bedpan, pt still unable to void.  Bladder scan done and shower 526ml in bladder.  Dr. Hurtado notified and said ok to place griffiths for remainder of bedrest. Precedex gtt started. Dr. Kelly came to bedside to check on pt and administered bolus via pump.PT settled enough to place griffiths. Sterile technique maintained. Clear yellow urinary return noted. Griffiths secured to L thigh. Will continue to monitor.

## 2022-02-18 NOTE — Clinical Note
An angiography was performed of the MILO. The angiography was performed via hand injection with 10 mL of contrast.

## 2022-02-18 NOTE — PROGRESS NOTES
Pt restless, trying to move in bed and will not lay still.  R groin site soft and WDL. Pt's  brought back to EP PACU to see if he would help to calm her down. Pt did not settle. Dr. Bryant carpenter/ cardiology notified and asked to contact anesthesia to help pt tolerate bedrest and keeping RLE still.

## 2022-02-18 NOTE — SIGNIFICANT EVENT
"Called to bedside by PACU RN to eval patient. 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 Concerned potential development of groin hematoma given her inability to remain flat. Prior precedex gtt was started at 0.5 and I bolused 0.5mcg/kg once I evaluated her. She was sedated to RASS -2 and O2 NS was applied while she remains mildly sedated for flat time.     Gio Kelly MD, FAAP  Congenital Cardiothoracic Anesthesiology   [facility]  [email or web address]      "

## 2022-02-18 NOTE — ANESTHESIA POSTPROCEDURE EVALUATION
Anesthesia Post Evaluation    Patient: Yvette Crews    Procedure(s) Performed: Procedure(s) (LRB):  Left atrial appendage occlusion (N/A)  ECHOCARDIOGRAM, TRANSESOPHAGEAL (N/A)    Final Anesthesia Type: general      Patient location during evaluation: PACU  Patient participation: Yes- Able to Participate  Level of consciousness: awake and alert  Post-procedure vital signs: reviewed and stable  Pain management: adequate  Airway patency: patent    PONV status at discharge: No PONV  Anesthetic complications: no      Cardiovascular status: blood pressure returned to baseline  Respiratory status: unassisted  Hydration status: euvolemic  Follow-up not needed.          Vitals Value Taken Time   /64 02/18/22 1317   Temp 36.5 °C (97.7 °F) 02/18/22 1301   Pulse 67 02/18/22 1331   Resp 11 02/18/22 1331   SpO2 100 % 02/18/22 1331   Vitals shown include unvalidated device data.      No case tracking events are documented in the log.      Pain/Greg Score: Greg Score: 8 (2/18/2022  1:15 PM)

## 2022-02-18 NOTE — CONSULTS
[facility] Short Stay Cardiac [facility]  [facility]  Consult Note    Patient Name: Yvette Crews  MRN: [id number]  Admission Date: 2/18/2022  Hospital Length of Stay: 0 days  Code Status: Prior   Attending Provider: Chase Gill MD   Consulting Provider: Denice Moran MD  Primary Care Physician: Sally Green MD  Principal Problem:<principal problem not specified>    Patient information was obtained from patient and ER records.     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 Gill with following problem list:  Paroxysmal atrial fibrillation on Eliquis, SVT s/p ablation (2012), prior subarachnoid hemorrhage in 2016, hypertension and gait instability with frequent walls and dementia.    TTE 11/21  · The left ventricle is normal in size with normal systolic function. The estimated ejection fraction is 60%.  · Normal right ventricular size with normal right ventricular systolic function.  · Normal left ventricular diastolic function.  · The estimated PA systolic pressure is 23 mmHg.  · Normal central venous pressure (3 mmHg).   CT chest non con 2019  Heart is normal in size.  There is trace calcification of the aortic annulus.  No pericardial effusion.    Anticoagulant/antiplatelets: eliquis 5mg BID  ECG: pending    Dysphagia or odynophagia:  No  Liver Disease, esophageal disease, or known varices:  No  Upper GI Bleeding: No  Snoring:  No  Sleep Apnea:  No  Prior neck surgery or radiation:  No  History of anesthetic difficulties:  No  Family history of anesthetic difficulties:  No  Last oral intake: yesterday before midnight  Able to move neck in all directions:  Yes          Past Medical History:   Diagnosis Date    Adult bronchiectasis     Age-related osteoporosis with current pathological fracture with routine healing 8/28/2013    Amblyopia     Anxiety     Cataract     Chondrocalcinosis, cause unspecified,  involving hand(712.34) 7/12/2011     Dx updated per 2019 IMO Load    Chronic sinusitis 4/6/2017    Colonic constipation 6/5/2013    Concussion 10/27/2016    Frequent falls 3/14/2017    Gait disturbance 3/14/2017    History of cardiac radiofrequency ablation (RFA) 2/3/2018    History of subarachnoid hemorrhage 10/30/2016    Hypogammaglobulinemia 9/7/2011    Irritable bowel syndrome 12/18/2015    Major depressive disorder, recurrent episode, in full remission 8/19/2010    Onychomycosis     Osteoarthritis     Postoperative hypothyroidism 12/18/2015    Rectal prolapse 6/5/2013    Reflux esophagitis 2/7/2010    Status post thyroidectomy 6/1/2017    Strabismus     SVT (supraventricular tachycardia) 11/22/2013    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     Underweight 1/23/2013       Past Surgical History:   Procedure Laterality Date    BREAST CYST ASPIRATION      x2    CATARACT EXTRACTION W/  INTRAOCULAR LENS IMPLANT Left 3/11/2019    Procedure: EXTRACTION, CATARACT, WITH IOL INSERTION;  Surgeon: Vera Sams MD;  Location: [facility];  Service: Ophthalmology;  Laterality: Left;    CATARACT EXTRACTION W/  INTRAOCULAR LENS IMPLANT Right 4/15/2019    Procedure: EXTRACTION, CATARACT, WITH IOL INSERTION LASER;  Surgeon: Vera Sams MD;  Location: [facility];  Service: Ophthalmology;  Laterality: Right;    COLON SURGERY      EYE SURGERY      FRACTURE SURGERY      NASAL SEPTUM SURGERY      OPEN REDUCTION AND INTERNAL FIXATION (ORIF) OF INJURY OF SACROILIAC JOINT Bilateral 1/20/2021    Procedure: ORIF, SACROILIAC JOINT;  Surgeon: Alcides Tamez MD;  Location: [address];  Service: Orthopedics;  Laterality: Bilateral;    RADIOFREQUENCY ABLATION      RECTAL PROLAPSE REPAIR  june 2013    STRABISMUS SURGERY      TONSILLECTOMY         Review of patient's allergies indicates:   Allergen Reactions    Adhesive      Tape tears skin    Buspar [buspirone]      headaches    Escitalopram  oxalate Nausea Only     hyponatremia      Rifampin Rash       Current Facility-Administered Medications on File Prior to Encounter   Medication    balanced salt irrigation solution 1 drop    lidocaine (PF) 10 mg/ml (1%) injection 10 mg    moxifloxacin 0.5 % ophthalmic solution 1 drop    phenylephrine HCL 2.5% ophthalmic solution 1 drop    tropicamide 1% ophthalmic solution 1 drop     Current Outpatient Medications on File Prior to Encounter   Medication Sig    acetaminophen (TYLENOL ORAL) Take by mouth once daily.    albuterol (PROVENTIL/VENTOLIN HFA) 90 mcg/actuation inhaler Inhale 2 puffs into the lungs every 6 (six) hours as needed for Wheezing. Rescue    calcium-vitamin D3 (OS-ELAINE 500 + D3) 500 mg(1,250mg) -200 unit per tablet Take 1 tablet by mouth 2 (two) times daily.    docusate sodium (COLACE) 250 MG capsule Take 250 mg by mouth 2 (two) times daily as needed for Constipation.    donepeziL (ARICEPT) 10 MG tablet Take 1 tablet (10 mg total) by mouth once daily.    flu vac 2021 65up-[id number],PF, (FLUAD QUAD 2021-22,65Y UP,,PF,) 60 mcg (15 mcg x 4)/0.5 mL Syrg inject into the muscle (Patient not taking: Reported on 2/1/2022)    guaiFENesin (MUCINEX) 600 mg 12 hr tablet Take 2 tablets (1,200 mg total) by mouth 2 (two) times daily.    Immune Globulin G, IGG,-PRO-IGA 10 % injection, Privigen, (PRIVIGEN) 10 % Soln Infuse 20 g into the vein every 4 weeks.    levothyroxine (SYNTHROID) 88 MCG tablet TAKE ONE TABLET BY MOUTH BEFORE breakfast    montelukast (SINGULAIR) 10 mg tablet TAKE ONE TABLET BY MOUTH EVERY EVENING    multivit with calcium,iron,min (MULTIPLE VITAMIN, WOMENS ORAL) Take by mouth once daily.    naproxen (NAPROSYN ORAL) Take 200 mg by mouth. As needed every other day    polyethylene glycol (GLYCOLAX) 17 gram PwPk Take 17 g by mouth once daily. (Patient taking differently: Take 17 g by mouth as needed. As needed once a week or so)     Family History     Problem Relation (Age of Onset)     Breast cancer Paternal Grandmother    Heart disease Father, Brother    Stroke Father        Tobacco Use    Smoking status: Never Smoker    Smokeless tobacco: Never Used   Substance and Sexual Activity    Alcohol use: No    Drug use: No    Sexual activity: Not Currently     ROS     Constitution: Negative for fever, chills, weight loss or gain.   HENT: Negative for sore throat, rhinorrhea, or headache.  Eyes: Negative for blurred or double vision.   Cardiovascular: Negative for exertional chest pain  Pulmonary:-  Dyspnea on exertion  Gastrointestinal: Negative for abdominal pain, nausea, vomiting, or diarrhea. Negative for melena/hematochezia.  : Negative for dysuria.   Neurological: Negative for focal weakness or sensory changes.  Skin:+ bleeding or bruising    Objective:     Vital Signs (Most Recent):    Vital Signs (24h Range):           There is no height or weight on file to calculate BMI.            No intake or output data in the 24 hours ending 02/18/22 0846    Lines/Drains/Airways     None                 Physical Exam  Constitutional: No distress, thin, conversant  HEENT: Sclera anicteric, PERRLA, EOMI  Neck: No JVD, no masses, good movement  CV: RRR, S1 and S2 normal, no additional heart sounds or murmurs. Pulses 2+ and equal bilaterally in radial arteries, Bennett's normal on right. Distal pulses are 2+ and equal in the femoral, DP and PT areas bilaterally  Pulm: Clear to auscultation bilaterally with symmetrical expansion. Chest wall palpated for reproduction of pain symptoms, and no pain was able to be produced on palpation or resistance exercises  GI: Abdomen soft, non-tender, good bowel sounds  Extremities: Both extremities intact and grossly normal, skin is warm, no edema noted  Skin: No ecchymosis, erythema, or ulcers  Psych: AOx3, appropriate affect  Neuro: CNII-XII intact, no focal deficits      Significant Labs: BMP: No results for input(s): GLU, NA, K, CL, CO2, BUN, CREATININE, CALCIUM,  MG in the last 48 hours., CMP No results for input(s): NA, K, CL, CO2, GLU, BUN, CREATININE, CALCIUM, PROT, ALBUMIN, BILITOT, ALKPHOS, AST, ALT, ANIONGAP, ESTGFRAFRICA, EGFRNONAA in the last 48 hours., CBC No results for input(s): WBC, HGB, HCT, PLT in the last 48 hours., INR No results for input(s): INR, PROTIME in the last 48 hours. and Lipid Panel No results for input(s): CHOL, HDL, LDLCALC, TRIG, CHOLHDL in the last 48 hours.        Assessment and Plan:     Paroxysmal atrial fibrillation  Here today for [facility] for LAAO  -No absolute contraindications of esophageal stricture, tumor, perforation, laceration,or diverticulum and/or active GI bleed  -The risks, benefits & alternatives of the procedure were explained to the patient.   -The risks of transesophageal echo include but are not limited to:  Dental trauma, esophageal trauma/perforation, bleeding, laryngospasm/brochospasm, aspiration, sore throat/hoarseness, & dislodgement of the endotracheal tube/nasogastric tube (where applicable).    -The risks of ANES monitored sedation include hypotension, respiratory depression, arrhythmias, bronchospasm, & death.    -Informed consent was obtained. The patient is agreeable to proceed with the procedure and all questions and concerns addressed.    Case discussed with an attending in echocardiography lab.    Further recommendations per attending addendum          VTE Risk Mitigation (From admission, onward)    None          Thank you for your consult.     Denice Moran MD  Cardiology   [facility] - [facility] Stay Cardiac Unit

## 2022-02-18 NOTE — ASSESSMENT & PLAN NOTE
Here today for [facility] for LAAO  -No absolute contraindications of esophageal stricture, tumor, perforation, laceration,or diverticulum and/or active GI bleed  -The risks, benefits & alternatives of the procedure were explained to the patient.   -The risks of transesophageal echo include but are not limited to:  Dental trauma, esophageal trauma/perforation, bleeding, laryngospasm/brochospasm, aspiration, sore throat/hoarseness, & dislodgement of the endotracheal tube/nasogastric tube (where applicable).    -The risks of ANES monitored sedation include hypotension, respiratory depression, arrhythmias, bronchospasm, & death.    -Informed consent was obtained. The patient is agreeable to proceed with the procedure and all questions and concerns addressed.    Case discussed with an attending in echocardiography lab.    Further recommendations per attending addendum

## 2022-02-18 NOTE — ANESTHESIA PROCEDURE NOTES
Intubation    Date/Time: 2/18/2022 11:25 AM  Performed by: Maggie Norris CRNA  Authorized by: Cholo Peacock MD     Intubation:     Induction:  Intravenous    Intubated:  Postinduction    Mask Ventilation:  Easy mask    Attempts:  1    Attempted By:  CRNA    Method of Intubation:  Direct    Blade:  Sanchez 2    Laryngeal View Grade: Grade I - full view of cords      Difficult Airway Encountered?: No      Complications:  None    Airway Device:  Oral endotracheal tube    Airway Device Size:  7.0    Style/Cuff Inflation:  Cuffed    Tube secured:  20    Secured at:  The lips    Placement Verified By:  Capnometry    Complicating Factors:  None    Findings Post-Intubation:  BS equal bilateral and atraumatic/condition of teeth unchanged

## 2022-02-18 NOTE — PROGRESS NOTES
Dr. Hurtado present to check on pt and said to leave R groin suture in place until bedrest is complete at 1830. Will follow through and continue to monitor.

## 2022-02-18 NOTE — SUBJECTIVE & OBJECTIVE
Past Medical History:   Diagnosis Date    Adult bronchiectasis     Age-related osteoporosis with current pathological fracture with routine healing 8/28/2013    Amblyopia     Anxiety     Cataract     Chondrocalcinosis, cause unspecified, involving hand(712.34) 7/12/2011     Dx updated per 2019 IMO Load    Chronic sinusitis 4/6/2017    Colonic constipation 6/5/2013    Concussion 10/27/2016    Frequent falls 3/14/2017    Gait disturbance 3/14/2017    History of cardiac radiofrequency ablation (RFA) 2/3/2018    History of subarachnoid hemorrhage 10/30/2016    Hypogammaglobulinemia 9/7/2011    Irritable bowel syndrome 12/18/2015    Major depressive disorder, recurrent episode, in full remission 8/19/2010    Onychomycosis     Osteoarthritis     Postoperative hypothyroidism 12/18/2015    Rectal prolapse 6/5/2013    Reflux esophagitis 2/7/2010    Status post thyroidectomy 6/1/2017    Strabismus     SVT (supraventricular tachycardia) 11/22/2013    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     Underweight 1/23/2013       Past Surgical History:   Procedure Laterality Date    BREAST CYST ASPIRATION      x2    CATARACT EXTRACTION W/  INTRAOCULAR LENS IMPLANT Left 3/11/2019    Procedure: EXTRACTION, CATARACT, WITH IOL INSERTION;  Surgeon: Vera Sams MD;  Location: [facility];  Service: Ophthalmology;  Laterality: Left;    CATARACT EXTRACTION W/  INTRAOCULAR LENS IMPLANT Right 4/15/2019    Procedure: EXTRACTION, CATARACT, WITH IOL INSERTION LASER;  Surgeon: Vera Sams MD;  Location: [facility];  Service: Ophthalmology;  Laterality: Right;    COLON SURGERY      EYE SURGERY      FRACTURE SURGERY      NASAL SEPTUM SURGERY      OPEN REDUCTION AND INTERNAL FIXATION (ORIF) OF INJURY OF SACROILIAC JOINT Bilateral 1/20/2021    Procedure: ORIF, SACROILIAC JOINT;  Surgeon: Alcides Tamez MD;  Location: [address];  Service: Orthopedics;  Laterality: Bilateral;    RADIOFREQUENCY ABLATION       RECTAL PROLAPSE REPAIR  june 2013    STRABISMUS SURGERY      TONSILLECTOMY         Review of patient's allergies indicates:   Allergen Reactions    Adhesive      Tape tears skin    Buspar [buspirone]      headaches    Escitalopram oxalate Nausea Only     hyponatremia      Rifampin Rash       Current Facility-Administered Medications on File Prior to Encounter   Medication    balanced salt irrigation solution 1 drop    lidocaine (PF) 10 mg/ml (1%) injection 10 mg    moxifloxacin 0.5 % ophthalmic solution 1 drop    phenylephrine HCL 2.5% ophthalmic solution 1 drop    tropicamide 1% ophthalmic solution 1 drop     Current Outpatient Medications on File Prior to Encounter   Medication Sig    acetaminophen (TYLENOL ORAL) Take by mouth once daily.    albuterol (PROVENTIL/VENTOLIN HFA) 90 mcg/actuation inhaler Inhale 2 puffs into the lungs every 6 (six) hours as needed for Wheezing. Rescue    calcium-vitamin D3 (OS-ELAINE 500 + D3) 500 mg(1,250mg) -200 unit per tablet Take 1 tablet by mouth 2 (two) times daily.    docusate sodium (COLACE) 250 MG capsule Take 250 mg by mouth 2 (two) times daily as needed for Constipation.    donepeziL (ARICEPT) 10 MG tablet Take 1 tablet (10 mg total) by mouth once daily.    flu vac 2021 65up-qvmIC35V,PF, (FLUAD QUAD 2021-22,65Y UP,,PF,) 60 mcg (15 mcg x 4)/0.5 mL Syrg inject into the muscle (Patient not taking: Reported on 2/1/2022)    guaiFENesin (MUCINEX) 600 mg 12 hr tablet Take 2 tablets (1,200 mg total) by mouth 2 (two) times daily.    Immune Globulin G, IGG,-PRO-IGA 10 % injection, Privigen, (PRIVIGEN) 10 % Soln Infuse 20 g into the vein every 4 weeks.    levothyroxine (SYNTHROID) 88 MCG tablet TAKE ONE TABLET BY MOUTH BEFORE breakfast    montelukast (SINGULAIR) 10 mg tablet TAKE ONE TABLET BY MOUTH EVERY EVENING    multivit with calcium,iron,min (MULTIPLE VITAMIN, WOMENS ORAL) Take by mouth once daily.    naproxen (NAPROSYN ORAL) Take 200 mg by mouth. As  needed every other day    polyethylene glycol (GLYCOLAX) 17 gram PwPk Take 17 g by mouth once daily. (Patient taking differently: Take 17 g by mouth as needed. As needed once a week or so)     Family History     Problem Relation (Age of Onset)    Breast cancer Paternal Grandmother    Heart disease Father, Brother    Stroke Father        Tobacco Use    Smoking status: Never Smoker    Smokeless tobacco: Never Used   Substance and Sexual Activity    Alcohol use: No    Drug use: No    Sexual activity: Not Currently     Review of Systems   Constitutional: Negative for chills, decreased appetite and fever.   HENT: Negative for congestion and sore throat.    Eyes: Negative for blurred vision and discharge.   Cardiovascular: Negative for chest pain, claudication, cyanosis, dyspnea on exertion and leg swelling.   Respiratory: Negative for cough, hemoptysis and shortness of breath.    Endocrine: Negative for cold intolerance and heat intolerance.   Skin: Negative for color change.   Musculoskeletal: Negative for muscle weakness and myalgias.   Gastrointestinal: Negative for bloating and abdominal pain.   Neurological: Negative for dizziness, focal weakness and weakness.   Psychiatric/Behavioral: Negative for altered mental status and depression.     Objective:     Vital Signs (Most Recent):    Vital Signs (24h Range):             There is no height or weight on file to calculate BMI.            Physical Exam  Constitutional:       Appearance: She is well-developed and well-nourished.   HENT:      Head: Normocephalic and atraumatic.      Right Ear: External ear normal.      Left Ear: External ear normal.   Eyes:      Extraocular Movements: EOM normal.      Conjunctiva/sclera: Conjunctivae normal.   Cardiovascular:      Rate and Rhythm: Normal rate and regular rhythm.      Pulses: Intact distal pulses.           Radial pulses are 2+ on the right side and 2+ on the left side.      Heart sounds: Normal heart sounds.    Pulmonary:      Effort: Pulmonary effort is normal. No respiratory distress.      Breath sounds: Normal breath sounds. No wheezing.   Abdominal:      General: Bowel sounds are normal. There is no distension.      Palpations: Abdomen is soft.      Tenderness: There is no abdominal tenderness.   Musculoskeletal:         General: No edema. Normal range of motion.      Cervical back: Normal range of motion and neck supple.   Skin:     General: Skin is warm and dry.   Neurological:      Mental Status: She is alert and oriented to person, place, and time.         Significant Labs: CMP: No results for input(s): NA, K, CL, CO2, GLU, BUN, CREATININE, CALCIUM, PROT, ALBUMIN, BILITOT, ALKPHOS, AST, ALT, ANIONGAP, ESTGFRAFRICA, EGFRNONAA in the last 48 hours., CBC: No results for input(s): WBC, HGB, HCT, PLT in the last 48 hours. and INR: No results for input(s): INR, PROTIME in the last 48 hours.    Significant Imaging: ECG: sinus rhythm with pacs

## 2022-02-19 VITALS
DIASTOLIC BLOOD PRESSURE: 58 MMHG | WEIGHT: 90 LBS | HEART RATE: 93 BPM | BODY MASS INDEX: 16.56 KG/M2 | HEIGHT: 62 IN | OXYGEN SATURATION: 96 % | TEMPERATURE: 98 F | SYSTOLIC BLOOD PRESSURE: 117 MMHG | RESPIRATION RATE: 16 BRPM

## 2022-02-19 PROCEDURE — 25000003 PHARM REV CODE 250: Performed by: INTERNAL MEDICINE

## 2022-02-19 RX ADMIN — LEVOTHYROXINE SODIUM 88 MCG: 88 TABLET ORAL at 06:02

## 2022-02-19 RX ADMIN — DONEPEZIL HYDROCHLORIDE 10 MG: 5 TABLET, FILM COATED ORAL at 09:02

## 2022-02-19 NOTE — ADDENDUM NOTE
Addendum  created 02/18/22 1844 by Rashaad Allen MD    Order list changed, Order sets accessed, Pharmacy for encounter modified

## 2022-02-19 NOTE — NURSING TRANSFER
Nursing Transfer Note      2/18/2022     Reason patient is being transferred: post procedure    Transfer To: [id number]      Transfer via stretcher    Transfer with cardiac monitoring    Transported by transport    Medicines sent: n/a    Any special needs or follow-up needed: n/a    Chart send with patient: Yes    Notified: spouse    Patient reassessed at: 2/18/22  @

## 2022-02-19 NOTE — PLAN OF CARE
Pt cooperative with routine care and procedures. Pt VSS. Pt AAOx2. Oriented to self and location. Pt primarily in bed activities. Pt wanting to go home. Right groin site accessed. No signs of bleeding or hematoma. Incontinence care provided. Pt stated she has burning on urination. Dr. Tompkins notified and he okayed ordering urinalysis to check for UTI.     Problem: Adult Inpatient Plan of Care  Goal: Plan of Care Review  Outcome: Ongoing, Progressing  Flowsheets (Taken 2/19/2022 0932)  Plan of Care Reviewed With: patient  Goal: Patient-Specific Goal (Individualized)  Outcome: Ongoing, Progressing  Flowsheets (Taken 2/19/2022 0932)  Anxieties, Fears or Concerns: Getting snacks  Individualized Care Needs: [place] patient  Patient-Specific Goals (Include Timeframe): Go home asap  Goal: Absence of Hospital-Acquired Illness or Injury  Outcome: Ongoing, Progressing  Goal: Optimal Comfort and Wellbeing  Outcome: Ongoing, Progressing  Goal: Readiness for Transition of Care  Outcome: Ongoing, Progressing     Problem: Fall Injury Risk  Goal: Absence of Fall and Fall-Related Injury  Outcome: Ongoing, Progressing     Problem: Infection  Goal: Absence of Infection Signs and Symptoms  Outcome: Ongoing, Progressing     Problem: Skin Injury Risk Increased  Goal: Skin Health and Integrity  Outcome: Ongoing, Progressing

## 2022-02-19 NOTE — PROGRESS NOTES
Pt discharged to home with  via stretcher, Alert and oriented to person place and sometimes situation. All due care rendered. LDA's removed. Discharge instructions given.Patient's  verbalized understanding to d/c instructions

## 2022-02-19 NOTE — DISCHARGE INSTRUCTIONS
Continue your usual home medications including rivaroxaban  You will need a repeat echocardiogram in about 6-8 weeks  Do not strain or lift anything greater than 5 lb for 1 week.  Do not drive or operate any dangerous machinery for 24 hours.   Clean the area with mild soap and water and then cover it with a bandage.   Once the skin has healed, bathing in a tub or swimming is allowed.   Inspect the groin site daily and report to the physician any swelling at the site that   cannot be controlled with manual pressure for 10 minutes, unusual pain at the   access site or affected extremity, unusual swelling at the access site, or signs or   symptoms of infection such as redness, pain, or fever.   Call 911 if you have:   Bleeding from the puncture site that you cannot stop by doing the following:   Relax and lie down right away. Keep your leg flat and apply firm pressure to the site using your fingers and a gauze pad. Keep the pressure on for 20 minutes. Continue this until the bleeding stops. This may take awhile. When bleeding stops, cover the site with a sterile bandage and keep your leg still as much as possible.

## 2022-02-19 NOTE — NURSING TRANSFER
Pt came over via bed. Belongings at bedside.  VS taken and stable. Pt oriented to room and questions answered.

## 2022-02-19 NOTE — DISCHARGE SUMMARY
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 She is followed by general cardiology and EP for recently diagnosed AF. Pt has a history of falls and dementia, so shared decision made to proceed with Watchman Flx device.      Following history obtained from most recent EP clinic encounter.     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     In brief review of her cardiac history, she was previously an EP patient of Dr. Willoughby, but had not been seen since 2018. She was noted to have periods of short RP tachycardia and underwent EP study and ablation of typical AVNR with slow pathway modification on 9/6/2012. Since undergoing ablation, she denies any further episodes of palpitations similar to these episodes. She reports continued generalized fatigue and weakness that she was attributing to her advancing age and cognitive decline. She has a history of mechanical falls, most recently falling about 2 months ago when she was witnessed to trip when walking with her walker and hit her arm and face on the top bar  "of her walker. She and her  have since moved into assisted living and she has a caretaker around the clock. She has been working to improve her mobility with gait training and continued physical therapy, although she continues to report a significant amount of neck and back pain. She has been told to only ambulate with her walker, although she often forgets and walks without use of her walker. Her  states that she has more falls without her walker. There is no reported history of loss of consciousness without ambulation, and no reported episodes of syncope. She denies any recurrent arrhythmic symptoms, although her  notes that she is not always capable of expressing her symptoms or remembering her symptoms. A majority of this history was obtained by discussion with her . Following our prior encounter, an ambulatory monitor was ordered, revealing approximately a 5% burden of atrial fibrillation.       Today, we have set up a virtual visit to discuss the results of her recent ambulatory monitor revealing atrial fibrillation and to discuss possible options for treatment. 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 Her  thought that potentially her generalized fatigue was secondary to her previously prescribed effexor, although he denies any changes in her status since cessation of this agent. She denies any recent episodes of dizziness, lightheadedness, syncope/presyncope. Her  reports that she endorsed an episode of dizziness with her aide about a week ago, but the patient does not recall this event and is "feeling fine" presently. She reports "funny feelings" in her chest occasionally, but denies any yaya palpitations, chest pain or chest discomfort. She denies any nausea or vomiting, orthopnea, lower extremity edema, or PND. She reports baseline shortness of breath and " dyspnea with exertion, but feels that these symptoms are unchanged for her. She continues to work with PT for gait training with no reported symptoms with exertion.      Procedure(s) (LRB):  Left atrial appendage occlusion (N/A)  ECHOCARDIOGRAM, TRANSESOPHAGEAL (N/A)     Indwelling Lines/Drains at time of discharge:  Lines/Drains/Airways     None                 Hospital Course:  Successful Watchman placed via R femoral access. No issues monitoring overnight. Access site clean with hematoma following morning. Continue home meds including anticoagulation. Will follow-up with Dr Gill as outpt.     Consults: N/A    Significant Diagnostic Studies: N/A    Pending Diagnostic Studies:     None          Final Active Diagnoses:    Diagnosis Date Noted POA    PRINCIPAL PROBLEM:  Paroxysmal atrial fibrillation [I48.0] 12/17/2021 Yes      Problems Resolved During this Admission:       Discharged Condition: stable    Follow Up:   Follow-up Information     Chase Gill MD Follow up in 2 month(s).    Specialties: Electrophysiology, Cardiovascular Disease  Contact information:  [address] [place] [address] [id number] [phone]                       Patient Instructions:   No discharge procedures on file.  Medications:  Reconciled Home Medications:      Medication List      CHANGE how you take these medications    polyethylene glycol 17 gram Pwpk  Commonly known as: GLYCOLAX  Take 17 g by mouth once daily.  What changed:   · when to take this  · reasons to take this  · additional instructions        CONTINUE taking these medications    albuterol 90 mcg/actuation inhaler  Commonly known as: PROVENTIL/VENTOLIN HFA  Inhale 2 puffs into the lungs every 6 (six) hours as needed for Wheezing. Rescue     calcium-vitamin D3 500 mg-5 mcg (200 unit) per tablet  Commonly known as: OS-ELAINE 500 + D3  Take 1 tablet by mouth 2 (two) times daily.     docusate sodium 250 MG capsule  Commonly known as: COLACE  Take 250 mg by mouth 2  (two) times daily as needed for Constipation.     donepeziL 10 MG tablet  Commonly known as: ARICEPT  Take 1 tablet (10 mg total) by mouth once daily.     FLUAD QUAD 2021-22(65Y UP)(PF) 60 mcg (15 mcg x 4)/0.5 mL Syrg  Generic drug: flu vac 2021 65up-sbsJA50M(PF)  inject into the muscle     guaiFENesin 600 mg 12 hr tablet  Commonly known as: MUCINEX  Take 2 tablets (1,200 mg total) by mouth 2 (two) times daily.     Immune Globulin G (IGG)-PRO-IGA 10 % injection (Privigen) 10 % Soln  Commonly known as: PRIVIGEN  Infuse 20 g into the vein every 4 weeks.     levothyroxine 88 MCG tablet  Commonly known as: SYNTHROID  TAKE ONE TABLET BY MOUTH BEFORE breakfast     montelukast 10 mg tablet  Commonly known as: SINGULAIR  TAKE ONE TABLET BY MOUTH EVERY EVENING     MULTIPLE VITAMIN, WOMENS ORAL  Take by mouth once daily.     mupirocin 2 % ointment  Commonly known as: BACTROBAN  APPLY ONE application TWICE DAILY FOR 7 DAYS     NAPROSYN ORAL  Take 200 mg by mouth. As needed every other day     rivaroxaban 20 mg Tab  Commonly known as: XARELTO  Take 1 tablet (20 mg total) by mouth daily with dinner or evening meal.     TYLENOL ORAL  Take by mouth once daily.            Time spent on the discharge of patient: 25 minutes    Jay Muller MD  Cardiology  [facility] - [facility] Step[facility]

## 2022-02-19 NOTE — HOSPITAL COURSE
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 She is followed by general cardiology and EP for recently diagnosed AF. Pt has a history of falls and dementia, so shared decision made to proceed with Watchman Flx device.          After consenting to the procedure, patient was brought to the EP lab where she underwent successful watchman implantation. She tolerated the procedure well and was discharged the following day in good condition.

## 2022-02-19 NOTE — NURSING TRANSFER
Nursing Transfer Note      2/18/2022     Reason patient is being transferred: to 2nd floor PACU to complete recovery    Transfer To: PACU 1    Transfer via stretcher    Transfer with cardiac monitoring    Transported by RN    Medicines sent: precedex sent with pt, but not connected to pt.    Any special needs or follow-up needed: bedrest complete at 1830. At that time, remove R groin suture and griffiths.    Chart send with patient: Yes    Notified: pt's , Pavel, notified via phone    Patient reassessed at: to be done by receiving RN (date, time)    Upon arrival to floor: cardiac monitor applied, patient oriented to room, call bell in reach and bed in lowest position

## 2022-02-21 LAB
POC ACTIVATED CLOTTING TIME K: 285 SEC (ref 74–137)
SAMPLE: ABNORMAL

## 2022-02-22 LAB
BLD PROD TYP BPU: NORMAL
BLD PROD TYP BPU: NORMAL
BLOOD UNIT EXPIRATION DATE: NORMAL
BLOOD UNIT EXPIRATION DATE: NORMAL
BLOOD UNIT TYPE CODE: 5100
BLOOD UNIT TYPE CODE: 5100
BLOOD UNIT TYPE: NORMAL
BLOOD UNIT TYPE: NORMAL
CODING SYSTEM: NORMAL
CODING SYSTEM: NORMAL
DISPENSE STATUS: NORMAL
DISPENSE STATUS: NORMAL
NUM UNITS TRANS PACKED RBC: NORMAL
NUM UNITS TRANS PACKED RBC: NORMAL

## 2022-02-23 ENCOUNTER — TELEPHONE (OUTPATIENT)
Dept: ELECTROPHYSIOLOGY | Facility: CLINIC | Age: 72
End: 2022-02-23
Payer: MEDICARE

## 2022-02-23 NOTE — TELEPHONE ENCOUNTER
Called patient to follow up after her watchman device.  No answer, left message. Called to inquire if she is having any symptoms or complications post procedure, any questions.  Call back number provided

## 2022-03-04 ENCOUNTER — INFUSION (OUTPATIENT)
Dept: INFECTIOUS DISEASES | Facility: HOSPITAL | Age: 72
End: 2022-03-04
Attending: INTERNAL MEDICINE
Payer: MEDICARE

## 2022-03-04 VITALS
BODY MASS INDEX: 16.27 KG/M2 | TEMPERATURE: 99 F | DIASTOLIC BLOOD PRESSURE: 64 MMHG | HEART RATE: 73 BPM | RESPIRATION RATE: 16 BRPM | SYSTOLIC BLOOD PRESSURE: 122 MMHG | WEIGHT: 88.94 LBS

## 2022-03-04 DIAGNOSIS — D83.9 CVID (COMMON VARIABLE IMMUNODEFICIENCY): Primary | ICD-10-CM

## 2022-03-04 DIAGNOSIS — J47.9 ADULT BRONCHIECTASIS: ICD-10-CM

## 2022-03-04 PROCEDURE — 96365 THER/PROPH/DIAG IV INF INIT: CPT

## 2022-03-04 PROCEDURE — 96366 THER/PROPH/DIAG IV INF ADDON: CPT

## 2022-03-04 PROCEDURE — 25000003 PHARM REV CODE 250: Performed by: ALLERGY & IMMUNOLOGY

## 2022-03-04 PROCEDURE — 63600175 PHARM REV CODE 636 W HCPCS: Mod: JG | Performed by: ALLERGY & IMMUNOLOGY

## 2022-03-04 RX ORDER — ACETAMINOPHEN 325 MG/1
650 TABLET ORAL
Status: DISCONTINUED | OUTPATIENT
Start: 2022-03-04 | End: 2022-03-04 | Stop reason: HOSPADM

## 2022-03-04 RX ORDER — SODIUM CHLORIDE 0.9 % (FLUSH) 0.9 %
10 SYRINGE (ML) INJECTION
Status: DISCONTINUED | OUTPATIENT
Start: 2022-03-04 | End: 2022-03-04 | Stop reason: HOSPADM

## 2022-03-04 RX ORDER — ACETAMINOPHEN 325 MG/1
650 TABLET ORAL
Status: CANCELLED | OUTPATIENT
Start: 2022-04-01

## 2022-03-04 RX ORDER — SODIUM CHLORIDE 0.9 % (FLUSH) 0.9 %
10 SYRINGE (ML) INJECTION
Status: CANCELLED | OUTPATIENT
Start: 2022-04-01

## 2022-03-04 RX ADMIN — SODIUM CHLORIDE: 0.9 INJECTION, SOLUTION INTRAVENOUS at 09:03

## 2022-03-04 RX ADMIN — HUMAN IMMUNOGLOBULIN G 25 G: 20 LIQUID INTRAVENOUS at 10:03

## 2022-03-04 NOTE — PLAN OF CARE
Problem: Fall Injury Risk  Goal: Absence of Fall and Fall-Related Injury  Outcome: Ongoing, Progressing  Intervention: Identify and Manage Contributors  Flowsheets (Taken 3/4/2022 1017)  Self-Care Promotion: independence encouraged  Medication Review/Management: medications reviewed

## 2022-03-04 NOTE — PROGRESS NOTES
Pt refused premeds of both tylenol and benadryl. Received IVIG PRIVIGEN 25 GRAMS.  Infusion started @ 12 cc/hr and increased rate every 30 mins, 24 cc/hr., 48 cc /hr and max rate @ 96 cc/hr, IV NS ran concurrently at 25 ml/hr. Tolerated well and left in NAD. Sitter at side. Return appt provided.

## 2022-03-14 ENCOUNTER — PATIENT MESSAGE (OUTPATIENT)
Dept: ELECTROPHYSIOLOGY | Facility: CLINIC | Age: 72
End: 2022-03-14
Payer: MEDICARE

## 2022-03-14 ENCOUNTER — OFFICE VISIT (OUTPATIENT)
Dept: PRIMARY CARE CLINIC | Facility: CLINIC | Age: 72
End: 2022-03-14
Payer: MEDICARE

## 2022-03-14 VITALS
HEIGHT: 62 IN | OXYGEN SATURATION: 99 % | WEIGHT: 87.5 LBS | SYSTOLIC BLOOD PRESSURE: 108 MMHG | BODY MASS INDEX: 16.1 KG/M2 | TEMPERATURE: 98 F | HEART RATE: 87 BPM | DIASTOLIC BLOOD PRESSURE: 68 MMHG

## 2022-03-14 DIAGNOSIS — F02.80 LATE ONSET ALZHEIMER'S DEMENTIA WITHOUT BEHAVIORAL DISTURBANCE: ICD-10-CM

## 2022-03-14 DIAGNOSIS — Z12.11 SCREEN FOR COLON CANCER: ICD-10-CM

## 2022-03-14 DIAGNOSIS — D80.1 HYPOGAMMAGLOBULINEMIA: ICD-10-CM

## 2022-03-14 DIAGNOSIS — G30.1 LATE ONSET ALZHEIMER'S DEMENTIA WITHOUT BEHAVIORAL DISTURBANCE: ICD-10-CM

## 2022-03-14 DIAGNOSIS — J47.9 ADULT BRONCHIECTASIS: ICD-10-CM

## 2022-03-14 DIAGNOSIS — E89.0 POSTOPERATIVE HYPOTHYROIDISM: Primary | ICD-10-CM

## 2022-03-14 DIAGNOSIS — I48.0 PAROXYSMAL ATRIAL FIBRILLATION: ICD-10-CM

## 2022-03-14 DIAGNOSIS — M81.0 OSTEOPOROSIS, UNSPECIFIED OSTEOPOROSIS TYPE, UNSPECIFIED PATHOLOGICAL FRACTURE PRESENCE: ICD-10-CM

## 2022-03-14 PROCEDURE — 99214 PR OFFICE/OUTPT VISIT, EST, LEVL IV, 30-39 MIN: ICD-10-PCS | Mod: S$PBB,,, | Performed by: FAMILY MEDICINE

## 2022-03-14 PROCEDURE — 99999 PR PBB SHADOW E&M-EST. PATIENT-LVL IV: ICD-10-PCS | Mod: PBBFAC,,, | Performed by: FAMILY MEDICINE

## 2022-03-14 PROCEDURE — 99214 OFFICE O/P EST MOD 30 MIN: CPT | Mod: PBBFAC,PN | Performed by: FAMILY MEDICINE

## 2022-03-14 PROCEDURE — 99999 PR PBB SHADOW E&M-EST. PATIENT-LVL IV: CPT | Mod: PBBFAC,,, | Performed by: FAMILY MEDICINE

## 2022-03-14 PROCEDURE — 99214 OFFICE O/P EST MOD 30 MIN: CPT | Mod: S$PBB,,, | Performed by: FAMILY MEDICINE

## 2022-03-14 RX ORDER — MONTELUKAST SODIUM 10 MG/1
10 TABLET ORAL NIGHTLY
Qty: 90 TABLET | Refills: 1 | Status: ON HOLD | OUTPATIENT
Start: 2022-03-14 | End: 2023-02-06

## 2022-03-14 RX ORDER — LEVOTHYROXINE SODIUM 88 UG/1
88 TABLET ORAL
Qty: 90 TABLET | Refills: 1 | Status: SHIPPED | OUTPATIENT
Start: 2022-03-14

## 2022-03-14 RX ORDER — BACITRACIN ZINC AND POLYMYXIN B SULFATE 500; 10000 [USP'U]/G; [USP'U]/G
OINTMENT OPHTHALMIC
COMMUNITY
Start: 2022-02-01 | End: 2022-07-04

## 2022-03-14 NOTE — PROGRESS NOTES
"Subjective:       Patient ID: Yvette Crews is a 71 y.o. female.    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      She is s/p watchman placement 2/18/22, has plans for repeat JAMEL set and plans for Xarelto for 6 weeks post watchman. She is feeling ok, she eating well, staying active, she denies f/n/v/stool change/cp, she says sometimes her heart races and when this occurs she gets sob, she denies urinary sx.  Last fall was a month ago. She has been on Aricept for the past few months, may have helped some with memory. She is sleeping fair. Has help with shower 3 days a week, she is able to get dressed on her own. Last IVIG 3/4/22.     CVID/Bronchiectasis: following with immunology, CT chest 11/2019, Singulair 10 mg daily, IVIG monthly  Alternating diarrhea and constipation at baseline with miralax prn  Hypothyroidism: levothyroxine 88 mcg daily  MDD/VICTORINO: declined medications and therapy  Osteopenia: dexa 4/2021  Eye exam 7/2021  Colonscopy normal in 2012, plan for cologuard insteadof scope  MMG no longer needed  Covid vaccines utd       Review of Systems  see HPI  Objective:      /68   Pulse 87   Temp 97.8 °F (36.6 °C) (Oral)   Ht 5' 2" (1.575 m)   Wt 39.7 kg (87 lb 8.4 oz)   SpO2 99%   BMI 16.01 kg/m²   Physical Exam  Vitals and nursing note reviewed.   Constitutional:       Appearance: She is well-developed.   HENT:      Head: Normocephalic and atraumatic.   Neck:      Thyroid: No thyromegaly.   Cardiovascular:      Rate and Rhythm: Normal rate and regular rhythm.      Heart sounds: Normal heart sounds.   Pulmonary:      Effort: Pulmonary effort is normal. No respiratory distress.      Breath sounds: Normal breath " sounds.   Abdominal:      General: Abdomen is flat. Bowel sounds are normal. There is no distension.      Palpations: Abdomen is soft. There is no mass.      Tenderness: There is no abdominal tenderness.   Musculoskeletal:      Cervical back: Normal range of motion and neck supple.      Right lower leg: No edema.      Left lower leg: No edema.   Lymphadenopathy:      Cervical: No cervical adenopathy.   Skin:     General: Skin is warm and dry.   Neurological:      Mental Status: She is alert.         Assessment:       1. Postoperative hypothyroidism    2. Adult bronchiectasis    3. Paroxysmal atrial fibrillation    4. Osteoporosis, unspecified osteoporosis type, unspecified pathological fracture presence    5. Late onset Alzheimer's dementia without behavioral disturbance    6. Hypogammaglobulinemia    7. Screen for colon cancer        Plan:   Yvette was seen today for follow-up.    Diagnoses and all orders for this visit:    Postoperative hypothyroidism  -     levothyroxine (SYNTHROID) 88 MCG tablet; Take 1 tablet (88 mcg total) by mouth before breakfast.    Adult bronchiectasis  -     montelukast (SINGULAIR) 10 mg tablet; Take 1 tablet (10 mg total) by mouth every evening.    Paroxysmal atrial fibrillation    Osteoporosis, unspecified osteoporosis type, unspecified pathological fracture presence    Late onset Alzheimer's dementia without behavioral disturbance    Hypogammaglobulinemia    Screen for colon cancer  -     Cologuard Screening (Multitarget Stool DNA); Future  -     Cologuard Screening (Multitarget Stool DNA)

## 2022-03-20 ENCOUNTER — PATIENT MESSAGE (OUTPATIENT)
Dept: PRIMARY CARE CLINIC | Facility: CLINIC | Age: 72
End: 2022-03-20
Payer: MEDICARE

## 2022-03-21 ENCOUNTER — PATIENT MESSAGE (OUTPATIENT)
Dept: PRIMARY CARE CLINIC | Facility: CLINIC | Age: 72
End: 2022-03-21
Payer: MEDICARE

## 2022-03-21 ENCOUNTER — TELEPHONE (OUTPATIENT)
Dept: ELECTROPHYSIOLOGY | Facility: CLINIC | Age: 72
End: 2022-03-21
Payer: MEDICARE

## 2022-03-21 NOTE — TELEPHONE ENCOUNTER
Okay for patient to use AZO for bladder control.  Please fax.    Please Fax  [phone], the nurses office

## 2022-03-21 NOTE — TELEPHONE ENCOUNTER
Instructions faxed per pt's request.    ----- Message from Dalia Orr RN sent at 3/18/2022  3:08 PM CDT -----  Contact: [facility]-[phone]  Fax copy of pre op instructions to nurse. At below fax number.  ----- Message -----  From: Dalia Orr RN  Sent: 3/18/2022   1:48 PM CDT  To: Dalia Orr RN      ----- Message -----  From: Ana Adorno MA  Sent: 3/18/2022   1:38 PM CDT  To: Ana María Green RN    She asked to speak to Ana María..She is calling  to get instructions for pt who is having  a procedure on Mon.4/4. Please fax instructions [phone]. Please call

## 2022-03-22 ENCOUNTER — PATIENT MESSAGE (OUTPATIENT)
Dept: ALLERGY | Facility: CLINIC | Age: 72
End: 2022-03-22
Payer: MEDICARE

## 2022-03-22 RX ORDER — AMOXICILLIN AND CLAVULANATE POTASSIUM 875; 125 MG/1; MG/1
1 TABLET, FILM COATED ORAL 2 TIMES DAILY
Qty: 20 TABLET | Refills: 1 | Status: SHIPPED | OUTPATIENT
Start: 2022-03-22 | End: 2022-04-01

## 2022-03-23 ENCOUNTER — PATIENT MESSAGE (OUTPATIENT)
Dept: PRIMARY CARE CLINIC | Facility: CLINIC | Age: 72
End: 2022-03-23
Payer: MEDICARE

## 2022-03-23 ENCOUNTER — PATIENT MESSAGE (OUTPATIENT)
Dept: ALLERGY | Facility: CLINIC | Age: 72
End: 2022-03-23
Payer: MEDICARE

## 2022-03-23 ENCOUNTER — TELEPHONE (OUTPATIENT)
Dept: PRIMARY CARE CLINIC | Facility: CLINIC | Age: 72
End: 2022-03-23
Payer: MEDICARE

## 2022-03-23 NOTE — TELEPHONE ENCOUNTER
Spoke to pts . Per Dr. Green okay to take AZO morning, noon and night for 2 weeks. Then twice a day after that.     LM for judd at [facility] to give verbal okay on directions.      Fax:[phone]  # [phone]

## 2022-03-23 NOTE — TELEPHONE ENCOUNTER
----- Message from Franny Gomez LPN sent at 3/23/2022  1:08 PM CDT -----  Contact: [facility] [phone]  Can you please give specific dosage instructions for the dosage of AZO for pt?    ----- Message -----  From: Diallo Ruiz  Sent: 3/23/2022  12:24 PM CDT  To: Peter Zavala Staff    [facility] recv'd the order for the AZO but, she needs to know the directions and dosage.     Thank colby

## 2022-03-25 DIAGNOSIS — T14.8XXA ABRASION: ICD-10-CM

## 2022-03-28 RX ORDER — MUPIROCIN 20 MG/G
OINTMENT TOPICAL
Qty: 44 G | Refills: 11 | OUTPATIENT
Start: 2022-03-28

## 2022-03-28 NOTE — TELEPHONE ENCOUNTER
Spoke with nurse judd re: bactroban. [facility]  Judd said she got the fax re: the AZO

## 2022-03-28 NOTE — TELEPHONE ENCOUNTER
----- Message from Natalie Green sent at 3/28/2022 12:38 PM CDT -----  Contact: Lisa/[facility]/ [phone]  Requesting an RX refill or new RX.  Is this a refill or new RX: refill  RX name and strength :  mupirocin (BACTROBAN) 2 % ointment 44 g   Is this a 30 day or 90 day RX:   Pharmacy name and phone #   [facility] [place] [address]   Phone:  [phone]  Fax:  [phone]  The doctors have asked that we provide their patients with the following 2 reminders -- prescription refills can take up to 72 hours, and a friendly reminder that in the future you can use your [facility] account to request refills:         Please call and clarify usage of Azo , Lisa says she needs a written order faxed over with the directions for the Azo caps usage .Please fax to .

## 2022-03-30 ENCOUNTER — TELEPHONE (OUTPATIENT)
Dept: NEUROLOGY | Facility: CLINIC | Age: 72
End: 2022-03-30
Payer: MEDICARE

## 2022-03-31 ENCOUNTER — PATIENT MESSAGE (OUTPATIENT)
Dept: PRIMARY CARE CLINIC | Facility: CLINIC | Age: 72
End: 2022-03-31
Payer: MEDICARE

## 2022-03-31 ENCOUNTER — TELEPHONE (OUTPATIENT)
Dept: ELECTROPHYSIOLOGY | Facility: CLINIC | Age: 72
End: 2022-03-31
Payer: MEDICARE

## 2022-03-31 NOTE — TELEPHONE ENCOUNTER
----- Message from Mark Gonsales MA sent at 3/22/2022  2:49 PM CDT -----  Regarding: FW: please call  Please advise.  ----- Message -----  From: Carmen Dumont  Sent: 3/22/2022   1:57 PM CDT  To: Migdalia Garcia Staff  Subject: please call                                      The pt's  is calling to ask a few questions about her procedure. Please call him back @ [phone]. Thanks, Carmen

## 2022-03-31 NOTE — TELEPHONE ENCOUNTER
----- Message from Franci Dunham MA sent at 3/31/2022  4:14 PM CDT -----  The patient  Pavel is returning your phone call  please call [phone]. Thank you

## 2022-03-31 NOTE — TELEPHONE ENCOUNTER
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 He had questions about anticoagulation and the Watchman device which I went over. I also reviewed procedure details for Monday.  He verbalized understanding and appreciated call.

## 2022-04-01 ENCOUNTER — INFUSION (OUTPATIENT)
Dept: INFECTIOUS DISEASES | Facility: HOSPITAL | Age: 72
End: 2022-04-01
Attending: INTERNAL MEDICINE
Payer: MEDICARE

## 2022-04-01 VITALS
BODY MASS INDEX: 16.57 KG/M2 | HEART RATE: 77 BPM | TEMPERATURE: 98 F | HEIGHT: 62 IN | WEIGHT: 90.06 LBS | SYSTOLIC BLOOD PRESSURE: 128 MMHG | DIASTOLIC BLOOD PRESSURE: 59 MMHG

## 2022-04-01 DIAGNOSIS — J47.9 ADULT BRONCHIECTASIS: ICD-10-CM

## 2022-04-01 DIAGNOSIS — D83.9 CVID (COMMON VARIABLE IMMUNODEFICIENCY): Primary | ICD-10-CM

## 2022-04-01 PROCEDURE — 96366 THER/PROPH/DIAG IV INF ADDON: CPT

## 2022-04-01 PROCEDURE — 63600175 PHARM REV CODE 636 W HCPCS: Mod: JG | Performed by: ALLERGY & IMMUNOLOGY

## 2022-04-01 PROCEDURE — 96365 THER/PROPH/DIAG IV INF INIT: CPT

## 2022-04-01 RX ORDER — ACETAMINOPHEN 325 MG/1
650 TABLET ORAL
Status: DISCONTINUED | OUTPATIENT
Start: 2022-04-01 | End: 2022-04-01 | Stop reason: HOSPADM

## 2022-04-01 RX ORDER — ACETAMINOPHEN 325 MG/1
650 TABLET ORAL
Status: CANCELLED | OUTPATIENT
Start: 2022-04-29

## 2022-04-01 RX ORDER — SODIUM CHLORIDE 0.9 % (FLUSH) 0.9 %
10 SYRINGE (ML) INJECTION
Status: CANCELLED | OUTPATIENT
Start: 2022-04-29

## 2022-04-01 RX ORDER — SODIUM CHLORIDE 0.9 % (FLUSH) 0.9 %
10 SYRINGE (ML) INJECTION
Status: DISCONTINUED | OUTPATIENT
Start: 2022-04-01 | End: 2022-04-01 | Stop reason: HOSPADM

## 2022-04-01 RX ADMIN — HUMAN IMMUNOGLOBULIN G 25 G: 5 LIQUID INTRAVENOUS at 09:04

## 2022-04-04 ENCOUNTER — HOSPITAL ENCOUNTER (OUTPATIENT)
Dept: CARDIOLOGY | Facility: HOSPITAL | Age: 72
Discharge: HOME | End: 2022-04-04
Attending: INTERNAL MEDICINE | Admitting: INTERNAL MEDICINE
Payer: MEDICARE

## 2022-04-04 ENCOUNTER — HOSPITAL ENCOUNTER (OUTPATIENT)
Facility: HOSPITAL | Age: 72
Discharge: HOME | End: 2022-04-04
Attending: INTERNAL MEDICINE | Admitting: INTERNAL MEDICINE
Payer: MEDICARE

## 2022-04-04 ENCOUNTER — ANESTHESIA EVENT (OUTPATIENT)
Dept: MEDSURG UNIT | Facility: HOSPITAL | Age: 72
End: 2022-04-04
Payer: MEDICARE

## 2022-04-04 ENCOUNTER — ANESTHESIA (OUTPATIENT)
Dept: MEDSURG UNIT | Facility: HOSPITAL | Age: 72
End: 2022-04-04
Payer: MEDICARE

## 2022-04-04 ENCOUNTER — TELEPHONE (OUTPATIENT)
Dept: PRIMARY CARE CLINIC | Facility: CLINIC | Age: 72
End: 2022-04-04
Payer: MEDICARE

## 2022-04-04 VITALS
BODY MASS INDEX: 16.56 KG/M2 | DIASTOLIC BLOOD PRESSURE: 72 MMHG | WEIGHT: 90 LBS | OXYGEN SATURATION: 100 % | RESPIRATION RATE: 16 BRPM | HEIGHT: 62 IN | TEMPERATURE: 98 F | HEART RATE: 56 BPM | SYSTOLIC BLOOD PRESSURE: 159 MMHG

## 2022-04-04 VITALS
BODY MASS INDEX: 16.56 KG/M2 | HEIGHT: 62 IN | SYSTOLIC BLOOD PRESSURE: 146 MMHG | WEIGHT: 90 LBS | DIASTOLIC BLOOD PRESSURE: 79 MMHG

## 2022-04-04 DIAGNOSIS — I49.9 ARRHYTHMIA: ICD-10-CM

## 2022-04-04 DIAGNOSIS — I48.0 PAROXYSMAL ATRIAL FIBRILLATION: ICD-10-CM

## 2022-04-04 DIAGNOSIS — Z95.818 PRESENCE OF WATCHMAN LEFT ATRIAL APPENDAGE CLOSURE DEVICE: ICD-10-CM

## 2022-04-04 LAB
ASCENDING AORTA: 3.1 CM
BSA FOR ECHO PROCEDURE: 1.34 M2
EJECTION FRACTION: 60 %
SINUS: 3.2 CM
STJ: 2.6 CM

## 2022-04-04 PROCEDURE — 25000003 PHARM REV CODE 250: Performed by: NURSE ANESTHETIST, CERTIFIED REGISTERED

## 2022-04-04 PROCEDURE — 93325 TRANSESOPHAGEAL ECHO (TEE) (CUPID ONLY): ICD-10-PCS | Mod: 26,,, | Performed by: INTERNAL MEDICINE

## 2022-04-04 PROCEDURE — 93325 DOPPLER ECHO COLOR FLOW MAPG: CPT | Mod: 26,,, | Performed by: INTERNAL MEDICINE

## 2022-04-04 PROCEDURE — D9220A PRA ANESTHESIA: ICD-10-PCS | Mod: ANES,,, | Performed by: ANESTHESIOLOGY

## 2022-04-04 PROCEDURE — 63600175 PHARM REV CODE 636 W HCPCS: Performed by: NURSE ANESTHETIST, CERTIFIED REGISTERED

## 2022-04-04 PROCEDURE — 93312 TRANSESOPHAGEAL ECHO (TEE) (CUPID ONLY): ICD-10-PCS | Mod: 26,,, | Performed by: INTERNAL MEDICINE

## 2022-04-04 PROCEDURE — 37000008 HC ANESTHESIA 1ST 15 MINUTES

## 2022-04-04 PROCEDURE — 93312 ECHO TRANSESOPHAGEAL: CPT | Mod: 26,,, | Performed by: INTERNAL MEDICINE

## 2022-04-04 PROCEDURE — 37000009 HC ANESTHESIA EA ADD 15 MINS

## 2022-04-04 PROCEDURE — D9220A PRA ANESTHESIA: Mod: ANES,,, | Performed by: ANESTHESIOLOGY

## 2022-04-04 PROCEDURE — 93005 ELECTROCARDIOGRAM TRACING: CPT | Mod: 59

## 2022-04-04 PROCEDURE — 93320 DOPPLER ECHO COMPLETE: CPT | Mod: 26,,, | Performed by: INTERNAL MEDICINE

## 2022-04-04 PROCEDURE — D9220A PRA ANESTHESIA: ICD-10-PCS | Mod: CRNA,,, | Performed by: NURSE ANESTHETIST, CERTIFIED REGISTERED

## 2022-04-04 PROCEDURE — 93010 EKG 12-LEAD: ICD-10-PCS | Mod: ,,, | Performed by: INTERNAL MEDICINE

## 2022-04-04 PROCEDURE — 93320 TRANSESOPHAGEAL ECHO (TEE) (CUPID ONLY): ICD-10-PCS | Mod: 26,,, | Performed by: INTERNAL MEDICINE

## 2022-04-04 PROCEDURE — 93325 DOPPLER ECHO COLOR FLOW MAPG: CPT

## 2022-04-04 PROCEDURE — 93010 ELECTROCARDIOGRAM REPORT: CPT | Mod: ,,, | Performed by: INTERNAL MEDICINE

## 2022-04-04 PROCEDURE — D9220A PRA ANESTHESIA: Mod: CRNA,,, | Performed by: NURSE ANESTHETIST, CERTIFIED REGISTERED

## 2022-04-04 RX ORDER — LIDOCAINE HCL/PF 100 MG/5ML
SYRINGE (ML) INTRAVENOUS
Status: DISCONTINUED | OUTPATIENT
Start: 2022-04-04 | End: 2022-04-04

## 2022-04-04 RX ORDER — PHENYLEPHRINE HYDROCHLORIDE 10 MG/ML
INJECTION INTRAVENOUS
Status: DISCONTINUED | OUTPATIENT
Start: 2022-04-04 | End: 2022-04-04

## 2022-04-04 RX ORDER — PROPOFOL 10 MG/ML
VIAL (ML) INTRAVENOUS CONTINUOUS PRN
Status: DISCONTINUED | OUTPATIENT
Start: 2022-04-04 | End: 2022-04-04

## 2022-04-04 RX ORDER — LIDOCAINE HYDROCHLORIDE 20 MG/ML
SOLUTION OROPHARYNGEAL
Status: DISCONTINUED | OUTPATIENT
Start: 2022-04-04 | End: 2022-04-04

## 2022-04-04 RX ORDER — ASPIRIN 81 MG/1
81 TABLET ORAL DAILY
Qty: 30 TABLET | Refills: 11
Start: 2022-04-04 | End: 2022-07-15

## 2022-04-04 RX ORDER — ONDANSETRON 2 MG/ML
4 INJECTION INTRAMUSCULAR; INTRAVENOUS DAILY PRN
Status: DISCONTINUED | OUTPATIENT
Start: 2022-04-04 | End: 2022-04-04 | Stop reason: HOSPADM

## 2022-04-04 RX ORDER — PROPOFOL 10 MG/ML
VIAL (ML) INTRAVENOUS
Status: DISCONTINUED | OUTPATIENT
Start: 2022-04-04 | End: 2022-04-04

## 2022-04-04 RX ORDER — CLOPIDOGREL BISULFATE 75 MG/1
75 TABLET ORAL DAILY
Qty: 30 TABLET | Refills: 4 | Status: ON HOLD | OUTPATIENT
Start: 2022-04-04 | End: 2022-05-14 | Stop reason: HOSPADM

## 2022-04-04 RX ADMIN — PROPOFOL 50 MG: 10 INJECTION, EMULSION INTRAVENOUS at 10:04

## 2022-04-04 RX ADMIN — LIDOCAINE HYDROCHLORIDE 60 MG: 20 INJECTION, SOLUTION INTRAVENOUS at 10:04

## 2022-04-04 RX ADMIN — LIDOCAINE HYDROCHLORIDE 15 ML: 20 SOLUTION ORAL; TOPICAL at 10:04

## 2022-04-04 RX ADMIN — Medication 100 MCG/KG/MIN: at 10:04

## 2022-04-04 RX ADMIN — SODIUM CHLORIDE: 0.9 INJECTION, SOLUTION INTRAVENOUS at 10:04

## 2022-04-04 RX ADMIN — PHENYLEPHRINE HYDROCHLORIDE 100 MCG: 10 INJECTION, SOLUTION INTRAMUSCULAR; INTRAVENOUS; SUBCUTANEOUS at 11:04

## 2022-04-04 RX ADMIN — GLYCOPYRROLATE 0.2 MG: 0.2 INJECTION, SOLUTION INTRAMUSCULAR; INTRAVITREAL at 10:04

## 2022-04-04 NOTE — TRANSFER OF CARE
"Anesthesia Transfer of Care Note    Patient: Yvette Crews    Procedure(s) Performed: Procedure(s) (LRB):  Transesophageal echo (JAMEL) intra-procedure log documentation (N/A)    Patient location: Cath Lab    Anesthesia Type: general    Transport from OR: Transported from OR on 2-3 L/min O2 by NC with adequate spontaneous ventilation    Post pain: adequate analgesia    Post assessment: no apparent anesthetic complications    Post vital signs: stable    Level of consciousness: sedated    Nausea/Vomiting: no nausea/vomiting    Complications: none    Transfer of care protocol was followed      Last vitals:   Visit Vitals  /73 (BP Location: Right arm, Patient Position: Lying)   Pulse (!) 55   Temp 37.1 °C (98.8 °F) (Temporal)   Resp 20   Ht 5' 2" (1.575 m)   Wt 40.8 kg (90 lb)   SpO2 100%   Breastfeeding No   BMI 16.46 kg/m²     "

## 2022-04-04 NOTE — ANESTHESIA POSTPROCEDURE EVALUATION
Anesthesia Post Evaluation    Patient: Yvette Crews    Procedure(s) Performed: Procedure(s) (LRB):  Transesophageal echo (JAMEL) intra-procedure log documentation (N/A)    Final Anesthesia Type: general      Patient location during evaluation: PACU  Patient participation: Yes- Able to Participate  Level of consciousness: awake and alert  Post-procedure vital signs: reviewed and stable  Pain management: adequate  Airway patency: patent    PONV status at discharge: No PONV  Anesthetic complications: no      Cardiovascular status: blood pressure returned to baseline  Respiratory status: unassisted  Hydration status: euvolemic  Follow-up not needed.          Vitals Value Taken Time   /72 04/04/22 1200   Temp 36.5 °C (97.7 °F) 04/04/22 1115   Pulse 70 04/04/22 1202   Resp 17 04/04/22 1202   SpO2 100 % 04/04/22 1200   Vitals shown include unvalidated device data.      No case tracking events are documented in the log.      Pain/Greg Score: Greg Score: 10 (4/4/2022 12:00 PM)

## 2022-04-04 NOTE — DISCHARGE SUMMARY
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     Dysphagia or odynophagia:  No  Liver Disease, esophageal disease, or known varices:  No  Upper GI Bleeding: No  Snoring:  No  Sleep Apnea:  No  Prior neck surgery or radiation:  No  History of anesthetic difficulties:  No  Family history of anesthetic difficulties:  No  Last oral intake:  12 hours ago  Able to move neck in all directions:  Yes    JAMEL 2/18/22    · S/p 27 mm Watchman FLX with 22% compression. No peridevice leak.  · Normal appearing left atrial appendage with windsock shape. No thrombus is present in the appendage. Normal appendage velocities .6 m/s.  · No interatrial septal defect present post transeptal puncture.  · The left ventricle is normal in size with normal systolic function.  · The estimated ejection fraction is 60%.  · Normal right ventricular size with normal right ventricular systolic function.  · Mitral valve appears moderately thickened. Mild-to-moderate mitral regurgitation.  · Grade I Plaque present in the descending aorta.      She is followed by general cardiology and EP for recently diagnosed AF. Due to a history of falls and dementia, shared decision made to implant 27 mm Watchman Flx on 2/19/22.     Following history obtained from most recent EP clinic encounter.     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     In brief review of her cardiac history, she was previously an EP patient of Dr. Willoughby, but had not been seen since 2018. She was noted to have periods of short RP tachycardia and underwent EP study and ablation of typical AVNR with slow pathway modification on 9/6/2012. Since undergoing ablation, she denies any further episodes of palpitations similar to these episodes. She reports continued generalized fatigue and weakness that she was attributing to her advancing age and cognitive decline. She has a history of mechanical falls, most recently falling about 2 months ago when she was witnessed to trip when walking with her walker and hit her arm and face on the top bar of her walker. She and her  have since moved into assisted living and she has a caretaker around the clock. She has been working to improve her mobility with gait training and continued physical therapy, although she continues to report a significant amount of neck and back pain. She has been told to only ambulate with her walker, although she often forgets and walks without use of her walker. Her  states that she has more falls without her walker. There is no reported history of loss of consciousness without ambulation, and no reported episodes of syncope. She denies any recurrent arrhythmic symptoms, although her  notes that she is not always capable of expressing her symptoms or remembering her symptoms. A majority of this history was obtained by discussion with her . Following our prior encounter, an ambulatory monitor was ordered, revealing approximately a 5% burden of atrial  fibrillation.             Procedure(s) (LRB):  Transesophageal echo (JAMEL) intra-procedure log documentation (N/A)     Indwelling Lines/Drains at time of discharge:  Lines/Drains/Airways     None                 Hospital Course:    Pt underwent JAMEL and tolerated procedure well with no complications. She was monitored per post procedure protocol. She was instructed to discontinue xarelto and start aspirin and plavix. Aspirin to be continued indefinitely and plavix to be stopped on 8/18/22 (6 months post watchman implant).      Goals of Care Treatment Preferences:  Code Status: Full Code    Living Will: Yes                Pending Diagnostic Studies:     Procedure Component Value Units Date/Time    Transesophageal echo (JAMEL) [[id number]]     Order Status: Sent Lab Status: No result           Final Active Diagnoses:    Diagnosis Date Noted POA    Paroxysmal atrial fibrillation [I48.0] 12/17/2021 Yes      Problems Resolved During this Admission:     No new Assessment & Plan notes have been filed under this hospital service since the last note was generated.  Service: Cardiology      Discharged Condition: good    Disposition: Home or Self Care    Follow Up:    Patient Instructions:   No discharge procedures on file.  Medications:  Reconciled Home Medications:      Medication List      START taking these medications    aspirin 81 MG EC tablet  Commonly known as: ECOTRIN  Take 1 tablet (81 mg total) by mouth once daily.     clopidogreL 75 mg tablet  Commonly known as: PLAVIX  Take 1 tablet (75 mg total) by mouth once daily.        CHANGE how you take these medications    polyethylene glycol 17 gram Pwpk  Commonly known as: GLYCOLAX  Take 17 g by mouth once daily.  What changed:   · when to take this  · reasons to take this  · additional instructions        CONTINUE taking these medications    albuterol 90 mcg/actuation inhaler  Commonly known as: PROVENTIL/VENTOLIN HFA  Inhale 2 puffs into the lungs every 6 (six) hours as  needed for Wheezing. Rescue     bacitracin-polymyxin b ophthalmic ointment  Commonly known as: POLYSPORIN     calcium-vitamin D3 500 mg-5 mcg (200 unit) per tablet  Commonly known as: OS-ELAINE 500 + D3  Take 1 tablet by mouth 2 (two) times daily.     docusate sodium 250 MG capsule  Commonly known as: COLACE  Take 250 mg by mouth 2 (two) times daily as needed for Constipation.     donepeziL 10 MG tablet  Commonly known as: ARICEPT  Take 1 tablet (10 mg total) by mouth once daily.     guaiFENesin 600 mg 12 hr tablet  Commonly known as: MUCINEX  Take 2 tablets (1,200 mg total) by mouth 2 (two) times daily.     Immune Globulin G (IGG)-PRO-IGA 10 % injection (Privigen) 10 % Soln  Commonly known as: PRIVIGEN  Infuse 20 g into the vein every 4 weeks.     levothyroxine 88 MCG tablet  Commonly known as: SYNTHROID  Take 1 tablet (88 mcg total) by mouth before breakfast.     montelukast 10 mg tablet  Commonly known as: SINGULAIR  Take 1 tablet (10 mg total) by mouth every evening.     MULTI VITAMIN ORAL  Take by mouth.     MULTIPLE VITAMIN, WOMENS ORAL  Take by mouth once daily.     mupirocin 2 % ointment  Commonly known as: BACTROBAN  APPLY ONE application TWICE DAILY FOR 7 DAYS     NAPROSYN ORAL  Take 200 mg by mouth. As needed every other day     TYLENOL ORAL  Take by mouth once daily.        STOP taking these medications    XARELTO 20 mg Tab  Generic drug: rivaroxaban            Time spent on the discharge of patient: 20 minutes    Parish Moreland MD  Cardiology  [facility] - Cardiology

## 2022-04-04 NOTE — PLAN OF CARE
Patient arrived to room. PIV placed, labs sent. Admit assessment completed. Plan of care discussed with patient.  at bedside. nurse call bell within reach. Will monitor

## 2022-04-04 NOTE — ANESTHESIA PREPROCEDURE EVALUATION
04/04/2022  Yvette Crews is a 71 y.o., female with atrial fibrillation s/p Watchman device here for surveillance JAMEL.    Pre-operative evaluation for Procedure(s) (LRB):  Transesophageal echo (JAMEL) intra-procedure log documentation (N/A)        Patient Active Problem List   Diagnosis    Underweight    Age-related osteoporosis with current pathological fracture with routine healing    Reflux esophagitis    Hypogammaglobulinemia    Major depressive disorder, recurrent episode, in full remission    Chondrocalcinosis, cause unspecified, involving hand(712.34)    AVNRT (AV eliezer re-entry tachycardia)    Adult bronchiectasis    Refractive error    Postoperative hypothyroidism    Irritable bowel syndrome    Rectal prolapse    Colonic constipation    Alternating esotropia    Hypertropia of right eye    Gait disturbance    Frequent falls    Chronic sinusitis    History of cardiac radiofrequency ablation (RFA)    Dementia    Cellulitis of right lower extremity    Pathological fracture due to osteoporosis    Osteoporosis    Localized osteoporosis of [place]    CVID (common variable immunodeficiency)    Cachexia    Late onset Alzheimer's dementia without behavioral disturbance    Senile purpura    Paroxysmal atrial fibrillation    Primary hypertension    Anxiety disorder, unspecified    Hypothyroidism       Review of patient's allergies indicates:   Allergen Reactions    Adhesive      Tape tears skin    Buspar [buspirone]      headaches    Escitalopram oxalate Nausea Only     hyponatremia      Rifampin Rash       Current Facility-Administered Medications on File Prior to Encounter   Medication Dose Route Frequency Provider Last Rate Last Admin    balanced salt irrigation solution 1 drop  1 drop Right Eye On Call Procedure Vera Sams MD        lidocaine (PF) 10 mg/ml  (1%) injection 10 mg  1 mL Intradermal Once Vera Sams MD        moxifloxacin 0.5 % ophthalmic solution 1 drop  1 drop Right Eye On Call Procedure Vera Sams MD   1 drop at 04/15/19 0922    phenylephrine HCL 2.5% ophthalmic solution 1 drop  1 drop Right Eye On Call Procedure Vera Sams MD   1 drop at 04/15/19 0922    sodium chloride 0.9% bolus 1,000 mL  1,000 mL Intravenous Once Ada Montoya NP        sodium chloride 0.9% bolus 1,000 mL  1,000 mL Intravenous Once Ada Montoya NP        tropicamide 1% ophthalmic solution 1 drop  1 drop Right Eye On Call Procedure Vera Sams MD   1 drop at 04/15/19 0922     Current Outpatient Medications on File Prior to Encounter   Medication Sig Dispense Refill    acetaminophen (TYLENOL ORAL) Take by mouth once daily.      docusate sodium (COLACE) 250 MG capsule Take 250 mg by mouth 2 (two) times daily as needed for Constipation.      donepeziL (ARICEPT) 10 MG tablet Take 1 tablet (10 mg total) by mouth once daily. 30 tablet 11    guaiFENesin (MUCINEX) 600 mg 12 hr tablet Take 2 tablets (1,200 mg total) by mouth 2 (two) times daily. 120 tablet 6    multivit with calcium,iron,min (MULTIPLE VITAMIN, WOMENS ORAL) Take by mouth once daily.      albuterol (PROVENTIL/VENTOLIN HFA) 90 mcg/actuation inhaler Inhale 2 puffs into the lungs every 6 (six) hours as needed for Wheezing. Rescue 18 g 1    calcium-vitamin D3 (OS-ELAINE 500 + D3) 500 mg(1,250mg) -200 unit per tablet Take 1 tablet by mouth 2 (two) times daily. 60 tablet 11    Immune Globulin G, IGG,-PRO-IGA 10 % injection, Privigen, (PRIVIGEN) 10 % Soln Infuse 20 g into the vein every 4 weeks. 200 mL 0    naproxen (NAPROSYN ORAL) Take 200 mg by mouth. As needed every other day      polyethylene glycol (GLYCOLAX) 17 gram PwPk Take 17 g by mouth once daily. (Patient taking differently: Take 17 g by mouth as needed. As needed once a week or so)  0       Past Surgical History:   Procedure  Laterality Date    BREAST CYST ASPIRATION      x2    CATARACT EXTRACTION W/  INTRAOCULAR LENS IMPLANT Left 3/11/2019    Procedure: EXTRACTION, CATARACT, WITH IOL INSERTION;  Surgeon: Vera Sams MD;  Location: [facility];  Service: Ophthalmology;  Laterality: Left;    CATARACT EXTRACTION W/  INTRAOCULAR LENS IMPLANT Right 4/15/2019    Procedure: EXTRACTION, CATARACT, WITH IOL INSERTION LASER;  Surgeon: Vera Sams MD;  Location: [facility], [place] OR;  Service: Ophthalmology;  Laterality: Right;    COLON SURGERY      EYE SURGERY      FRACTURE SURGERY      NASAL SEPTUM SURGERY      OCCLUSION OF LEFT ATRIAL APPENDAGE N/A 2/18/2022    Procedure: Left atrial appendage occlusion;  Surgeon: Chase Gill MD;  Location: [facility] EP LAB;  Service: Cardiology;  Laterality: N/A;  afib, watchman, BSCI, osiris, anes, MB/EH, 3prep    OPEN REDUCTION AND INTERNAL FIXATION (ORIF) OF INJURY OF SACROILIAC JOINT Bilateral 1/20/2021    Procedure: ORIF, SACROILIAC JOINT;  Surgeon: Alcides Tamez MD;  Location: [address];  Service: Orthopedics;  Laterality: Bilateral;    RADIOFREQUENCY ABLATION      RECTAL PROLAPSE REPAIR  june 2013    STRABISMUS SURGERY      TONSILLECTOMY      TRANSESOPHAGEAL ECHOCARDIOGRAPHY N/A 2/18/2022    Procedure: ECHOCARDIOGRAM, TRANSESOPHAGEAL;  Surgeon: Nils Diagnostic Provider;  Location: [facility] EP LAB;  Service: Cardiology;  Laterality: N/A;       Social History     Socioeconomic History    Marital status:     Number of children: 2   Occupational History    Occupation: retired   Tobacco Use    Smoking status: Never Smoker    Smokeless tobacco: Never Used   Substance and Sexual Activity    Alcohol use: No    Drug use: No    Sexual activity: Not Currently     Social Determinants of Health     Financial Resource Strain: Low Risk     Difficulty of Paying Living Expenses: Not hard at all   Food Insecurity: No Food Insecurity    Worried About Running Out of Food in the Last  Year: Never true    Ran Out of Food in the Last Year: Never true   Transportation Needs: No Transportation Needs    Lack of Transportation (Medical): No    Lack of Transportation (Non-Medical): No   Physical Activity: Sufficiently Active    Days of Exercise per Week: 5 days    Minutes of Exercise per Session: 30 min   Stress: No Stress Concern Present    Feeling of Stress : Not at all   Social Connections: Moderately Isolated    Frequency of Communication with Friends and Family: Twice a week    Frequency of Social Gatherings with Friends and Family: Twice a week    Attends Scientologist Services: Never    Active Member of Clubs or Organizations: No    Attends Club or Organization Meetings: Never    Marital Status:    Housing Stability: Low Risk     Unable to Pay for Housing in the Last Year: No    Number of Places Lived in the Last Year: 1    Unstable Housing in the Last Year: No         CBC: No results for input(s): WBC, RBC, HGB, HCT, PLT, MCV, MCH, MCHC in the last 72 hours.    CMP: No results for input(s): NA, K, CL, CO2, BUN, CREATININE, GLU, MG, PHOS, CALCIUM, ALBUMIN, PROT, ALKPHOS, ALT, AST, BILITOT in the last 72 hours.    INR  No results for input(s): PT, INR, PROTIME, APTT in the last 72 hours.            2D Echo:  Results for orders placed or performed during the hospital encounter of 10/27/16   2D echo with color flow doppler   Result Value Ref Range    EF + QEF 60 55 - 65    Diastolic Dysfunction No     Est. PA Systolic Pressure 29     Pericardial Effusion TRIVIAL     Tricuspid Valve Regurgitation MILD            Pre-op Assessment    I have reviewed the Patient Summary Reports.     I have reviewed the Nursing Notes.    I have reviewed the Medications.     Review of Systems  Anesthesia Hx:  No problems with previous Anesthesia    Hematology/Oncology:  Hematology Normal   Oncology Normal     EENT/Dental:EENT/Dental Normal   Cardiovascular:   Hypertension Dysrhythmias atrial  fibrillation    Pulmonary:  Pulmonary Normal    Renal/:  Renal/ Normal     Hepatic/GI:  Hepatic/GI Normal    Musculoskeletal:   Arthritis     Neurological:  Neurology Normal    Endocrine:   Hypothyroidism    Dermatological:  Skin Normal    Psych:   Psychiatric History          Physical Exam  General: Well nourished    Airway:  Mallampati: II   Mouth Opening: Normal  TM Distance: Normal  Tongue: Normal  Neck ROM: Normal ROM    Dental:  Intact    Chest/Lungs:  Clear to auscultation, Normal Respiratory Rate    Heart:  Rate: Normal  Sounds: Normal        Anesthesia Plan  Type of Anesthesia, risks & benefits discussed:    Anesthesia Type: Gen Natural Airway  Intra-op Monitoring Plan: Standard ASA Monitors  Post Op Pain Control Plan: multimodal analgesia  Induction:  IV  Informed Consent: Informed consent signed with the Patient and all parties understand the risks and agree with anesthesia plan.  All questions answered.   ASA Score: 3    Ready For Surgery From Anesthesia Perspective.     .

## 2022-04-04 NOTE — H&P
[facility] - [facility]  Cardiology  History and Physical     Patient Name: Yevtte Crews  MRN: [id number]  Admission Date: 4/4/2022  Code Status: Prior   Attending Provider: Chase Gill MD   Primary Care Physician: Sally Green MD  Principal Problem:<principal problem not specified>    Patient information was obtained from patient and ER records.     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     Dysphagia or odynophagia:  No  Liver Disease, esophageal disease, or known varices:  No  Upper GI Bleeding: No  Snoring:  No  Sleep Apnea:  No  Prior neck surgery or radiation:  No  History of anesthetic difficulties:  No  Family history of anesthetic difficulties:  No  Last oral intake:  12 hours ago  Able to move neck in all directions:  Yes    JAMEL 2/18/22    · S/p 27 mm Watchman FLX with 22% compression. No peridevice leak.  · Normal appearing left atrial appendage with windsock shape. No thrombus is present in the appendage. Normal appendage velocities .6 m/s.  · No interatrial septal defect present post transeptal puncture.  · The left ventricle is normal in size with normal systolic function.  · The estimated ejection fraction is 60%.  · Normal right ventricular size with normal right ventricular systolic function.  · Mitral valve appears moderately thickened. Mild-to-moderate mitral regurgitation.  · Grade I Plaque present in the descending aorta.      She is followed by general cardiology and EP for recently diagnosed AF. Due to a history of falls and dementia, shared decision made to implant 27 mm Watchman Flx on 2/19/22.     Following history obtained from most recent EP clinic encounter.     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     In brief review of her cardiac history, she was previously an EP patient of Dr. Willoughby, but had not been seen since 2018. She was noted to have periods of short RP tachycardia and underwent EP study and ablation of typical AVNR with slow pathway modification on 9/6/2012. Since undergoing ablation, she denies any further episodes of palpitations similar to these episodes. She reports continued generalized fatigue and weakness that she was attributing to her advancing age and cognitive decline. She has a history of mechanical falls, most recently falling about 2 months ago when she was witnessed to trip when walking with her walker and hit her arm and face on the top bar of her walker. She and her  have since moved into assisted living and she has a caretaker around the clock. She has been working to improve her mobility with gait training and continued physical therapy, although she continues to report a significant amount of neck and back pain. She has been told to only ambulate with her walker, although she often forgets and walks without use of her walker. Her  states that she has more falls without her walker. There is no reported history of loss of consciousness without ambulation, and no reported episodes of syncope. She denies any recurrent arrhythmic symptoms, although her  notes that she is not always capable of expressing her symptoms or remembering her symptoms. A majority of this history was obtained by discussion with her . Following our prior encounter, an ambulatory  "monitor was ordered, revealing approximately a 5% burden of atrial fibrillation.       Today, we have set up a virtual visit to discuss the results of her recent ambulatory monitor revealing atrial fibrillation and to discuss possible options for treatment. 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 Her  thought that potentially her generalized fatigue was secondary to her previously prescribed effexor, although he denies any changes in her status since cessation of this agent. She denies any recent episodes of dizziness, lightheadedness, syncope/presyncope. Her  reports that she endorsed an episode of dizziness with her aide about a week ago, but the patient does not recall this event and is "feeling fine" presently. She reports "funny feelings" in her chest occasionally, but denies any yaya palpitations, chest pain or chest discomfort. She denies any nausea or vomiting, orthopnea, lower extremity edema, or PND. She reports baseline shortness of breath and dyspnea with exertion, but feels that these symptoms are unchanged for her. She continues to work with PT for gait training with no reported symptoms with exertion.       Past Medical History:   Diagnosis Date    Adult bronchiectasis     Age-related osteoporosis with current pathological fracture with routine healing 8/28/2013    Amblyopia     Anxiety     Cataract     Chondrocalcinosis, cause unspecified, involving hand(712.34) 7/12/2011     Dx updated per 2019 IMO Load    Chronic sinusitis 4/6/2017    Colonic constipation 6/5/2013    Concussion 10/27/2016    Frequent falls 3/14/2017    Gait disturbance 3/14/2017    History of cardiac radiofrequency ablation (RFA) 2/3/2018    History of subarachnoid hemorrhage 10/30/2016    Hypogammaglobulinemia 9/7/2011    Irritable bowel syndrome 12/18/2015    Major depressive disorder, " recurrent episode, in full remission 8/19/2010    Onychomycosis     Osteoarthritis     Postoperative hypothyroidism 12/18/2015    Rectal prolapse 6/5/2013    Reflux esophagitis 2/7/2010    Status post thyroidectomy 6/1/2017    Strabismus     SVT (supraventricular tachycardia) 11/22/2013    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     Underweight 1/23/2013       Past Surgical History:   Procedure Laterality Date    BREAST CYST ASPIRATION      x2    CATARACT EXTRACTION W/  INTRAOCULAR LENS IMPLANT Left 3/11/2019    Procedure: EXTRACTION, CATARACT, WITH IOL INSERTION;  Surgeon: Vera Sams MD;  Location: [facility];  Service: Ophthalmology;  Laterality: Left;    CATARACT EXTRACTION W/  INTRAOCULAR LENS IMPLANT Right 4/15/2019    Procedure: EXTRACTION, CATARACT, WITH IOL INSERTION LASER;  Surgeon: Vera Sams MD;  Location: [facility];  Service: Ophthalmology;  Laterality: Right;    COLON SURGERY      EYE SURGERY      FRACTURE SURGERY      NASAL SEPTUM SURGERY      OCCLUSION OF LEFT ATRIAL APPENDAGE N/A 2/18/2022    Procedure: Left atrial appendage occlusion;  Surgeon: Chase Gill MD;  Location: [facility] EP LAB;  Service: Cardiology;  Laterality: N/A;  afib, watchman, BSCI, osiris, anes, MB/EH, 3prep    OPEN REDUCTION AND INTERNAL FIXATION (ORIF) OF INJURY OF SACROILIAC JOINT Bilateral 1/20/2021    Procedure: ORIF, SACROILIAC JOINT;  Surgeon: Alcides Tamez MD;  [address];  Service: Orthopedics;  Laterality: Bilateral;    RADIOFREQUENCY ABLATION      RECTAL PROLAPSE REPAIR  june 2013    STRABISMUS SURGERY      TONSILLECTOMY      TRANSESOPHAGEAL ECHOCARDIOGRAPHY N/A 2/18/2022    Procedure: ECHOCARDIOGRAM, TRANSESOPHAGEAL;  Surgeon: Nils Diagnostic Provider;  Location: [facility] EP LAB;  Service: Cardiology;  Laterality: N/A;       Review of patient's allergies indicates:   Allergen Reactions    Adhesive      Tape tears skin    Buspar [buspirone]      headaches     Escitalopram oxalate Nausea Only     hyponatremia      Rifampin Rash       Current Facility-Administered Medications on File Prior to Encounter   Medication    balanced salt irrigation solution 1 drop    lidocaine (PF) 10 mg/ml (1%) injection 10 mg    moxifloxacin 0.5 % ophthalmic solution 1 drop    phenylephrine HCL 2.5% ophthalmic solution 1 drop    sodium chloride 0.9% bolus 1,000 mL    sodium chloride 0.9% bolus 1,000 mL    tropicamide 1% ophthalmic solution 1 drop     Current Outpatient Medications on File Prior to Encounter   Medication Sig    acetaminophen (TYLENOL ORAL) Take by mouth once daily.    docusate sodium (COLACE) 250 MG capsule Take 250 mg by mouth 2 (two) times daily as needed for Constipation.    donepeziL (ARICEPT) 10 MG tablet Take 1 tablet (10 mg total) by mouth once daily.    guaiFENesin (MUCINEX) 600 mg 12 hr tablet Take 2 tablets (1,200 mg total) by mouth 2 (two) times daily.    multivit with calcium,iron,min (MULTIPLE VITAMIN, WOMENS ORAL) Take by mouth once daily.    albuterol (PROVENTIL/VENTOLIN HFA) 90 mcg/actuation inhaler Inhale 2 puffs into the lungs every 6 (six) hours as needed for Wheezing. Rescue    calcium-vitamin D3 (OS-ELAINE 500 + D3) 500 mg(1,250mg) -200 unit per tablet Take 1 tablet by mouth 2 (two) times daily.    Immune Globulin G, IGG,-PRO-IGA 10 % injection, Privigen, (PRIVIGEN) 10 % Soln Infuse 20 g into the vein every 4 weeks.    naproxen (NAPROSYN ORAL) Take 200 mg by mouth. As needed every other day    polyethylene glycol (GLYCOLAX) 17 gram PwPk Take 17 g by mouth once daily. (Patient taking differently: Take 17 g by mouth as needed. As needed once a week or so)     Family History       Problem Relation (Age of Onset)    Breast cancer Paternal Grandmother    Heart disease Father, Brother    Stroke Father          Tobacco Use    Smoking status: Never Smoker    Smokeless tobacco: Never Used   Substance and Sexual Activity    Alcohol use: No    Drug  use: No    Sexual activity: Not Currently     Review of Systems   Constitutional: Negative for chills, decreased appetite and fever.   HENT:  Negative for congestion and sore throat.    Eyes:  Negative for blurred vision and discharge.   Cardiovascular:  Negative for chest pain, claudication, cyanosis, dyspnea on exertion and leg swelling.   Respiratory:  Negative for cough, hemoptysis and shortness of breath.    Endocrine: Negative for cold intolerance and heat intolerance.   Skin:  Negative for color change.   Musculoskeletal:  Negative for muscle weakness and myalgias.   Gastrointestinal:  Negative for bloating and abdominal pain.   Neurological:  Negative for dizziness, focal weakness and weakness.   Psychiatric/Behavioral:  Negative for altered mental status and depression.    Objective:     Vital Signs (Most Recent):  Temp: 98.8 °F (37.1 °C) (04/04/22 0943)  Pulse: (!) 55 (04/04/22 0943)  Resp: 20 (04/04/22 0943)  BP: 136/73 (04/04/22 0944)  SpO2: 100 % (04/04/22 0943)   Vital Signs (24h Range):  Temp:  [98.8 °F (37.1 °C)] 98.8 °F (37.1 °C)  Pulse:  [55] 55  Resp:  [20] 20  SpO2:  [100 %] 100 %  BP: (136-138)/(66-73) 136/73     Weight: 40.8 kg (90 lb)  Body mass index is 16.46 kg/m².    SpO2: 100 %  O2 Device (Oxygen Therapy): room air    No intake or output data in the 24 hours ending 04/04/22 1040    Lines/Drains/Airways       Peripheral Intravenous Line  Duration                  Peripheral IV - Single Lumen 04/04/22 1003 20 G Left Forearm <1 day                    Physical Exam  Constitutional:       Appearance: She is well-developed.   HENT:      Head: Normocephalic and atraumatic.      Right Ear: External ear normal.      Left Ear: External ear normal.   Eyes:      Conjunctiva/sclera: Conjunctivae normal.   Cardiovascular:      Rate and Rhythm: Normal rate and regular rhythm.      Pulses: Intact distal pulses.           Radial pulses are 2+ on the right side and 2+ on the left side.      Heart sounds:  Normal heart sounds.   Pulmonary:      Effort: Pulmonary effort is normal. No respiratory distress.      Breath sounds: Normal breath sounds. No wheezing.   Abdominal:      General: Bowel sounds are normal. There is no distension.      Palpations: Abdomen is soft.      Tenderness: There is no abdominal tenderness.   Musculoskeletal:         General: Normal range of motion.      Cervical back: Normal range of motion and neck supple.   Skin:     General: Skin is warm and dry.   Neurological:      Mental Status: She is alert and oriented to person, place, and time.   Psychiatric:         Mood and Affect: Mood normal.         Behavior: Behavior normal.         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     Given her high risk of bleeding including dementia and history of falls, Watchman implanted on 2/18/22.     [id number] is 3 (HTN, female gender, age 65-74), portending an annual adjusted risk of CVA of 3.2%. On apixaban 5mg po BID.      She presents today for 6 week post watchman implant JAMEL.    1. JAMEL for evaluation of Watchman implantation position and potential leak.  -No absolute contraindications of esophageal stricture, tumor, perforation, laceration,or diverticulum and/or active GI bleed  -The risks, benefits & alternatives of the  procedure were explained to the patient.   -The risks of transesophageal echo include but are not limited to:  Dental trauma, esophageal trauma/perforation, bleeding, laryngospasm/brochospasm, aspiration, sore throat/hoarseness, & dislodgement of the endotracheal tube/nasogastric tube (where applicable).    -The risks of moderate sedation include hypotension, respiratory depression, arrhythmias, bronchospasm, & death.    -Informed consent was obtained. The patient is agreeable to proceed with the procedure and all questions and concerns addressed.    Case discussed with an attending in echocardiography lab.     Further recommendations per attending addendum          VTE Risk Mitigation (From admission, onward)    None          Parish Moreland MD  Cardiology   [facility] - [facility]

## 2022-04-04 NOTE — HPI
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     Dysphagia or odynophagia:  No  Liver Disease, esophageal disease, or known varices:  No  Upper GI Bleeding: No  Snoring:  No  Sleep Apnea:  No  Prior neck surgery or radiation:  No  History of anesthetic difficulties:  No  Family history of anesthetic difficulties:  No  Last oral intake:  12 hours ago  Able to move neck in all directions:  Yes    JAMEL 2/18/22    S/p 27 mm Watchman FLX with 22% compression. No peridevice leak.  Normal appearing left atrial appendage with windsock shape. No thrombus is present in the appendage. Normal appendage velocities .6 m/s.  No interatrial septal defect present post transeptal puncture.  The left ventricle is normal in size with normal systolic function.  The estimated ejection fraction is 60%.  Normal right ventricular size with normal right ventricular systolic function.  Mitral valve appears moderately thickened. Mild-to-moderate mitral regurgitation.  Grade I Plaque present in the descending aorta.      She is followed by general cardiology and EP for recently diagnosed AF. Due to a history of falls and dementia, shared decision made to implant 27 mm Watchman Flx on 2/19/22.     Following history obtained from most recent EP clinic encounter.     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     In brief review of her cardiac  history, she was previously an EP patient of Dr. Willoughby, but had not been seen since 2018. She was noted to have periods of short RP tachycardia and underwent EP study and ablation of typical AVNR with slow pathway modification on 9/6/2012. Since undergoing ablation, she denies any further episodes of palpitations similar to these episodes. She reports continued generalized fatigue and weakness that she was attributing to her advancing age and cognitive decline. She has a history of mechanical falls, most recently falling about 2 months ago when she was witnessed to trip when walking with her walker and hit her arm and face on the top bar of her walker. She and her  have since moved into assisted living and she has a caretaker around the clock. She has been working to improve her mobility with gait training and continued physical therapy, although she continues to report a significant amount of neck and back pain. She has been told to only ambulate with her walker, although she often forgets and walks without use of her walker. Her  states that she has more falls without her walker. There is no reported history of loss of consciousness without ambulation, and no reported episodes of syncope. She denies any recurrent arrhythmic symptoms, although her  notes that she is not always capable of expressing her symptoms or remembering her symptoms. A majority of this history was obtained by discussion with her . Following our prior encounter, an ambulatory monitor was ordered, revealing approximately a 5% burden of atrial fibrillation.       Today, we have set up a virtual visit to discuss the results of her recent ambulatory monitor revealing atrial fibrillation and to discuss possible options for treatment. 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 Her  thought that potentially  "her generalized fatigue was secondary to her previously prescribed effexor, although he denies any changes in her status since cessation of this agent. She denies any recent episodes of dizziness, lightheadedness, syncope/presyncope. Her  reports that she endorsed an episode of dizziness with her aide about a week ago, but the patient does not recall this event and is "feeling fine" presently. She reports "funny feelings" in her chest occasionally, but denies any yaya palpitations, chest pain or chest discomfort. She denies any nausea or vomiting, orthopnea, lower extremity edema, or PND. She reports baseline shortness of breath and dyspnea with exertion, but feels that these symptoms are unchanged for her. She continues to work with PT for gait training with no reported symptoms with exertion.   "

## 2022-04-04 NOTE — TELEPHONE ENCOUNTER
----- Message from Adele Merrill sent at 4/4/2022  1:13 PM CDT -----  Contact: Speedy [phone]  Lisa at [facility] is calling for the pt she is trying to get the order to be refaxed for azo she states it was as needed 3 times a day but forgot to put the medication name please advise and give return call

## 2022-04-04 NOTE — ASSESSMENT & PLAN NOTE
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     Given her high risk of bleeding including dementia and history of falls, Watchman implanted on 2/18/22.     [id number] is 3 (HTN, female gender, age 65-74), portending an annual adjusted risk of CVA of 3.2%. On apixaban 5mg po BID.      She presents today for 6 week post watchman implant JAMEL.    1. JAMEL for evaluation of Watchman implantation position and potential leak.  -No absolute contraindications of esophageal stricture, tumor, perforation, laceration,or diverticulum and/or active GI bleed  -The risks, benefits & alternatives of the procedure were explained to the patient.   -The risks of transesophageal echo include but are not limited to:  Dental trauma, esophageal trauma/perforation, bleeding, laryngospasm/brochospasm, aspiration, sore throat/hoarseness, & dislodgement of the endotracheal tube/nasogastric tube (where applicable).    -The risks of moderate sedation include hypotension, respiratory depression, arrhythmias, bronchospasm, & death.    -Informed consent was obtained. The patient is agreeable to proceed with the procedure and all questions and concerns addressed.    Case discussed with an attending in echocardiography lab.     Further recommendations per attending addendum

## 2022-04-04 NOTE — PLAN OF CARE
Pt arrived to cath lab holding bed 8 s/p JAMEL. Assumed care of pt. Pt laying in bed with no S/S of distress or SOB. Denies pain. Needs met. Bed in lowest position. Will continue to monitor.

## 2022-04-04 NOTE — SUBJECTIVE & OBJECTIVE
Past Medical History:   Diagnosis Date    Adult bronchiectasis     Age-related osteoporosis with current pathological fracture with routine healing 8/28/2013    Amblyopia     Anxiety     Cataract     Chondrocalcinosis, cause unspecified, involving hand(712.34) 7/12/2011     Dx updated per 2019 IMO Load    Chronic sinusitis 4/6/2017    Colonic constipation 6/5/2013    Concussion 10/27/2016    Frequent falls 3/14/2017    Gait disturbance 3/14/2017    History of cardiac radiofrequency ablation (RFA) 2/3/2018    History of subarachnoid hemorrhage 10/30/2016    Hypogammaglobulinemia 9/7/2011    Irritable bowel syndrome 12/18/2015    Major depressive disorder, recurrent episode, in full remission 8/19/2010    Onychomycosis     Osteoarthritis     Postoperative hypothyroidism 12/18/2015    Rectal prolapse 6/5/2013    Reflux esophagitis 2/7/2010    Status post thyroidectomy 6/1/2017    Strabismus     SVT (supraventricular tachycardia) 11/22/2013    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     Underweight 1/23/2013       Past Surgical History:   Procedure Laterality Date    BREAST CYST ASPIRATION      x2    CATARACT EXTRACTION W/  INTRAOCULAR LENS IMPLANT Left 3/11/2019    Procedure: EXTRACTION, CATARACT, WITH IOL INSERTION;  Surgeon: Vera Sams MD;  Location: [facility] OR;  Service: Ophthalmology;  Laterality: Left;    CATARACT EXTRACTION W/  INTRAOCULAR LENS IMPLANT Right 4/15/2019    Procedure: EXTRACTION, CATARACT, WITH IOL INSERTION LASER;  Surgeon: Vera Sams MD;  Location: [facility] OR;  Service: Ophthalmology;  Laterality: Right;    COLON SURGERY      EYE SURGERY      FRACTURE SURGERY      NASAL SEPTUM SURGERY      OCCLUSION OF LEFT ATRIAL APPENDAGE N/A 2/18/2022    Procedure: Left atrial appendage occlusion;  Surgeon: Chase Gill MD;  Location: [facility] EP LAB;  Service: Cardiology;  Laterality: N/A;  afib, watchman, BSCI, osiris, anes, MB/EH, 3prep    OPEN REDUCTION AND INTERNAL FIXATION (ORIF) OF INJURY OF  SACROILIAC JOINT Bilateral 1/20/2021    Procedure: ORIF, SACROILIAC JOINT;  Surgeon: Alcides Tamez MD;  Location: [facility] OR [address] [place] [address];  Service: Orthopedics;  Laterality: Bilateral;    RADIOFREQUENCY ABLATION      RECTAL PROLAPSE REPAIR  june 2013    STRABISMUS SURGERY      TONSILLECTOMY      TRANSESOPHAGEAL ECHOCARDIOGRAPHY N/A 2/18/2022    Procedure: ECHOCARDIOGRAM, TRANSESOPHAGEAL;  Surgeon: Nils Diagnostic Provider;  Location: [facility] EP LAB;  Service: Cardiology;  Laterality: N/A;       Review of patient's allergies indicates:   Allergen Reactions    Adhesive      Tape tears skin    Buspar [buspirone]      headaches    Escitalopram oxalate Nausea Only     hyponatremia      Rifampin Rash       Current Facility-Administered Medications on File Prior to Encounter   Medication    balanced salt irrigation solution 1 drop    lidocaine (PF) 10 mg/ml (1%) injection 10 mg    moxifloxacin 0.5 % ophthalmic solution 1 drop    phenylephrine HCL 2.5% ophthalmic solution 1 drop    sodium chloride 0.9% bolus 1,000 mL    sodium chloride 0.9% bolus 1,000 mL    tropicamide 1% ophthalmic solution 1 drop     Current Outpatient Medications on File Prior to Encounter   Medication Sig    acetaminophen (TYLENOL ORAL) Take by mouth once daily.    docusate sodium (COLACE) 250 MG capsule Take 250 mg by mouth 2 (two) times daily as needed for Constipation.    donepeziL (ARICEPT) 10 MG tablet Take 1 tablet (10 mg total) by mouth once daily.    guaiFENesin (MUCINEX) 600 mg 12 hr tablet Take 2 tablets (1,200 mg total) by mouth 2 (two) times daily.    multivit with calcium,iron,min (MULTIPLE VITAMIN, WOMENS ORAL) Take by mouth once daily.    albuterol (PROVENTIL/VENTOLIN HFA) 90 mcg/actuation inhaler Inhale 2 puffs into the lungs every 6 (six) hours as needed for Wheezing. Rescue    calcium-vitamin D3 (OS-ELAINE 500 + D3) 500 mg(1,250mg) -200 unit per tablet Take 1 tablet by mouth 2 (two) times daily.    Immune Globulin G, IGG,-PRO-IGA 10 %  injection, Privigen, (PRIVIGEN) 10 % Soln Infuse 20 g into the vein every 4 weeks.    naproxen (NAPROSYN ORAL) Take 200 mg by mouth. As needed every other day    polyethylene glycol (GLYCOLAX) 17 gram PwPk Take 17 g by mouth once daily. (Patient taking differently: Take 17 g by mouth as needed. As needed once a week or so)     Family History       Problem Relation (Age of Onset)    Breast cancer Paternal Grandmother    Heart disease Father, Brother    Stroke Father          Tobacco Use    Smoking status: Never Smoker    Smokeless tobacco: Never Used   Substance and Sexual Activity    Alcohol use: No    Drug use: No    Sexual activity: Not Currently     Review of Systems   Constitutional: Negative for chills, decreased appetite and fever.   HENT:  Negative for congestion and sore throat.    Eyes:  Negative for blurred vision and discharge.   Cardiovascular:  Negative for chest pain, claudication, cyanosis, dyspnea on exertion and leg swelling.   Respiratory:  Negative for cough, hemoptysis and shortness of breath.    Endocrine: Negative for cold intolerance and heat intolerance.   Skin:  Negative for color change.   Musculoskeletal:  Negative for muscle weakness and myalgias.   Gastrointestinal:  Negative for bloating and abdominal pain.   Neurological:  Negative for dizziness, focal weakness and weakness.   Psychiatric/Behavioral:  Negative for altered mental status and depression.    Objective:     Vital Signs (Most Recent):  Temp: 98.8 °F (37.1 °C) (04/04/22 0943)  Pulse: (!) 55 (04/04/22 0943)  Resp: 20 (04/04/22 0943)  BP: 136/73 (04/04/22 0944)  SpO2: 100 % (04/04/22 0943)   Vital Signs (24h Range):  Temp:  [98.8 °F (37.1 °C)] 98.8 °F (37.1 °C)  Pulse:  [55] 55  Resp:  [20] 20  SpO2:  [100 %] 100 %  BP: (136-138)/(66-73) 136/73     Weight: 40.8 kg (90 lb)  Body mass index is 16.46 kg/m².    SpO2: 100 %  O2 Device (Oxygen Therapy): room air    No intake or output data in the 24 hours ending 04/04/22  1040    Lines/Drains/Airways       Peripheral Intravenous Line  Duration                  Peripheral IV - Single Lumen 04/04/22 1003 20 G Left Forearm <1 day                    Physical Exam  Constitutional:       Appearance: She is well-developed.   HENT:      Head: Normocephalic and atraumatic.      Right Ear: External ear normal.      Left Ear: External ear normal.   Eyes:      Conjunctiva/sclera: Conjunctivae normal.   Cardiovascular:      Rate and Rhythm: Normal rate and regular rhythm.      Pulses: Intact distal pulses.           Radial pulses are 2+ on the right side and 2+ on the left side.      Heart sounds: Normal heart sounds.   Pulmonary:      Effort: Pulmonary effort is normal. No respiratory distress.      Breath sounds: Normal breath sounds. No wheezing.   Abdominal:      General: Bowel sounds are normal. There is no distension.      Palpations: Abdomen is soft.      Tenderness: There is no abdominal tenderness.   Musculoskeletal:         General: Normal range of motion.      Cervical back: Normal range of motion and neck supple.   Skin:     General: Skin is warm and dry.   Neurological:      Mental Status: She is alert and oriented to person, place, and time.   Psychiatric:         Mood and Affect: Mood normal.         Behavior: Behavior normal.

## 2022-04-05 ENCOUNTER — PATIENT MESSAGE (OUTPATIENT)
Dept: PRIMARY CARE CLINIC | Facility: CLINIC | Age: 72
End: 2022-04-05
Payer: MEDICARE

## 2022-04-05 LAB — NONINV COLON CA DNA+OCC BLD SCRN STL QL: NEGATIVE

## 2022-04-08 ENCOUNTER — PATIENT MESSAGE (OUTPATIENT)
Dept: PRIMARY CARE CLINIC | Facility: CLINIC | Age: 72
End: 2022-04-08
Payer: MEDICARE

## 2022-04-08 NOTE — TELEPHONE ENCOUNTER
Please advise re: pt requesting a verbal order be sent to the [facility] living for claritin 10 mg daily as needed

## 2022-04-11 RX ORDER — LORATADINE 10 MG/1
10 TABLET ORAL DAILY
Qty: 30 TABLET | Refills: 5 | Status: SHIPPED | OUTPATIENT
Start: 2022-04-11 | End: 2022-04-11 | Stop reason: SDUPTHER

## 2022-04-11 RX ORDER — LORATADINE 10 MG/1
10 TABLET ORAL DAILY
Qty: 30 TABLET | Refills: 5 | Status: ON HOLD | OUTPATIENT
Start: 2022-04-11 | End: 2023-02-06

## 2022-04-11 NOTE — TELEPHONE ENCOUNTER
I sent an order to [facility], the pharmacy on file, but also printed a copy in case it needed to be faxed to the assisted living facility.

## 2022-04-11 NOTE — TELEPHONE ENCOUNTER
Faxed Claritin 10 mg Rx to [facility] @ [phone]    Sent pt msg via [facility] re: this rx

## 2022-04-19 ENCOUNTER — PATIENT OUTREACH (OUTPATIENT)
Dept: ADMINISTRATIVE | Facility: OTHER | Age: 72
End: 2022-04-19
Payer: MEDICARE

## 2022-04-21 ENCOUNTER — OFFICE VISIT (OUTPATIENT)
Dept: OBSTETRICS AND GYNECOLOGY | Facility: CLINIC | Age: 72
End: 2022-04-21
Payer: MEDICARE

## 2022-04-21 ENCOUNTER — IMMUNIZATION (OUTPATIENT)
Dept: PHARMACY | Facility: CLINIC | Age: 72
End: 2022-04-21
Payer: MEDICARE

## 2022-04-21 VITALS
SYSTOLIC BLOOD PRESSURE: 100 MMHG | DIASTOLIC BLOOD PRESSURE: 66 MMHG | HEIGHT: 62 IN | WEIGHT: 87.75 LBS | BODY MASS INDEX: 16.15 KG/M2

## 2022-04-21 DIAGNOSIS — Z12.4 PAP SMEAR FOR CERVICAL CANCER SCREENING: ICD-10-CM

## 2022-04-21 DIAGNOSIS — Z23 NEED FOR VACCINATION: Primary | ICD-10-CM

## 2022-04-21 DIAGNOSIS — Z00.00 ANNUAL PHYSICAL EXAM: Primary | ICD-10-CM

## 2022-04-21 PROCEDURE — 87624 HPV HI-RISK TYP POOLED RSLT: CPT | Performed by: OBSTETRICS & GYNECOLOGY

## 2022-04-21 PROCEDURE — G0101 CA SCREEN;PELVIC/BREAST EXAM: HCPCS | Mod: PBBFAC | Performed by: OBSTETRICS & GYNECOLOGY

## 2022-04-21 PROCEDURE — G0101 CA SCREEN;PELVIC/BREAST EXAM: HCPCS | Mod: S$PBB,,, | Performed by: OBSTETRICS & GYNECOLOGY

## 2022-04-21 PROCEDURE — 99999 PR PBB SHADOW E&M-EST. PATIENT-LVL II: ICD-10-PCS | Mod: PBBFAC,,, | Performed by: OBSTETRICS & GYNECOLOGY

## 2022-04-21 PROCEDURE — 99999 PR PBB SHADOW E&M-EST. PATIENT-LVL II: CPT | Mod: PBBFAC,,, | Performed by: OBSTETRICS & GYNECOLOGY

## 2022-04-21 PROCEDURE — 88175 CYTOPATH C/V AUTO FLUID REDO: CPT | Performed by: OBSTETRICS & GYNECOLOGY

## 2022-04-21 PROCEDURE — G0101 PR CA SCREEN;PELVIC/BREAST EXAM: ICD-10-PCS | Mod: S$PBB,,, | Performed by: OBSTETRICS & GYNECOLOGY

## 2022-04-21 PROCEDURE — 99212 OFFICE O/P EST SF 10 MIN: CPT | Mod: PBBFAC,25 | Performed by: OBSTETRICS & GYNECOLOGY

## 2022-04-21 NOTE — PROGRESS NOTES
GYN Annual exam  2022    Chief Complaint   Patient presents with    Well Woman         HISTORY OF PRESENT ILLNESS:  Pt is a  71 y.o.  alert female who presents today for GYN annual exam.  She does not have any GYN concerns.   She went through menopause at age 57. No bleeding since then.   She denies pelvic pain or vaginal discharge.  She is  to her .   Not sexually active for about 15 years as her  was in a car accident and injured his hips.   She denies breast concerns.   She is using Azo over the counter for bladder strength. She notes her urine is orange colored.     No LMP recorded. Patient is postmenopausal.    Review of patient's allergies indicates:   Allergen Reactions    Adhesive      Tape tears skin    Buspar [buspirone]      headaches    Escitalopram oxalate Nausea Only     hyponatremia      Rifampin Rash       Current Outpatient Medications on File Prior to Visit   Medication Sig Dispense Refill    acetaminophen (TYLENOL ORAL) Take by mouth once daily.      albuterol (PROVENTIL/VENTOLIN HFA) 90 mcg/actuation inhaler Inhale 2 puffs into the lungs every 6 (six) hours as needed for Wheezing. Rescue 18 g 1    aspirin (ECOTRIN) 81 MG EC tablet Take 1 tablet (81 mg total) by mouth once daily. 30 tablet 11    bacitracin-polymyxin b (POLYSPORIN) ophthalmic ointment       calcium-vitamin D3 (OS-ELAINE 500 + D3) 500 mg(1,250mg) -200 unit per tablet Take 1 tablet by mouth 2 (two) times daily. 60 tablet 11    clopidogreL (PLAVIX) 75 mg tablet Take 1 tablet (75 mg total) by mouth once daily. 30 tablet 4    docusate sodium (COLACE) 250 MG capsule Take 250 mg by mouth 2 (two) times daily as needed for Constipation.      donepeziL (ARICEPT) 10 MG tablet Take 1 tablet (10 mg total) by mouth once daily. 30 tablet 11    guaiFENesin (MUCINEX) 600 mg 12 hr tablet Take 2 tablets (1,200 mg total) by mouth 2 (two) times daily. 120 tablet 6    Immune Globulin G, IGG,-PRO-IGA 10 %  injection, Privigen, (PRIVIGEN) 10 % Soln Infuse 20 g into the vein every 4 weeks. 200 mL 0    levothyroxine (SYNTHROID) 88 MCG tablet Take 1 tablet (88 mcg total) by mouth before breakfast. 90 tablet 1    loratadine (CLARITIN) 10 mg tablet Take 1 tablet (10 mg total) by mouth once daily. 30 tablet 5    montelukast (SINGULAIR) 10 mg tablet Take 1 tablet (10 mg total) by mouth every evening. 90 tablet 1    multivit with calcium,iron,min (MULTIPLE VITAMIN, WOMENS ORAL) Take by mouth once daily.      multivit-min/ferrous fumarate (MULTI VITAMIN ORAL) Take by mouth.      mupirocin (BACTROBAN) 2 % ointment APPLY ONE application TWICE DAILY FOR 7 DAYS 44 g 6    naproxen (NAPROSYN ORAL) Take 200 mg by mouth. As needed every other day      polyethylene glycol (GLYCOLAX) 17 gram PwPk Take 17 g by mouth once daily. (Patient taking differently: Take 17 g by mouth as needed. As needed once a week or so)  0    [DISCONTINUED] XARELTO 20 mg Tab TAKE 1 TAB BY MOUTH EVERY EVENING WITH DINNER (BEGIN AFTER ELIQUIS IS COMPLETED) 30 tablet 11     Current Facility-Administered Medications on File Prior to Visit   Medication Dose Route Frequency Provider Last Rate Last Admin    balanced salt irrigation solution 1 drop  1 drop Right Eye On Call Procedure Vera Sams MD        lidocaine (PF) 10 mg/ml (1%) injection 10 mg  1 mL Intradermal Once Vera Sams MD        moxifloxacin 0.5 % ophthalmic solution 1 drop  1 drop Right Eye On Call Procedure Vera Sams MD   1 drop at 04/15/19 0922    phenylephrine HCL 2.5% ophthalmic solution 1 drop  1 drop Right Eye On Call Procedure Vera Sams MD   1 drop at 04/15/19 0922    sodium chloride 0.9% bolus 1,000 mL  1,000 mL Intravenous Once Ada Montoya NP        sodium chloride 0.9% bolus 1,000 mL  1,000 mL Intravenous Once Ada Montoya NP        sodium chloride 0.9% bolus 1,000 mL  1,000 mL Intravenous Once Ada Montoya NP        tropicamide 1%  ophthalmic solution 1 drop  1 drop Right Eye On Call Procedure Vera Sams MD   1 drop at 04/15/19 0922       Past Medical History:   Diagnosis Date    Adult bronchiectasis     Age-related osteoporosis with current pathological fracture with routine healing 8/28/2013    Amblyopia     Anxiety     Cataract     Chondrocalcinosis, cause unspecified, involving hand(712.34) 7/12/2011     Dx updated per 2019 IMO Load    Chronic sinusitis 4/6/2017    Colonic constipation 6/5/2013    Concussion 10/27/2016    Frequent falls 3/14/2017    Gait disturbance 3/14/2017    History of cardiac radiofrequency ablation (RFA) 2/3/2018    History of subarachnoid hemorrhage 10/30/2016    Hypogammaglobulinemia 9/7/2011    Irritable bowel syndrome 12/18/2015    Major depressive disorder, recurrent episode, in full remission 8/19/2010    Onychomycosis     Osteoarthritis     Postoperative hypothyroidism 12/18/2015    Rectal prolapse 6/5/2013    Reflux esophagitis 2/7/2010    Status post thyroidectomy 6/1/2017    Strabismus     SVT (supraventricular tachycardia) 11/22/2013    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     Underweight 1/23/2013            Past Surgical History:   Procedure Laterality Date    BREAST CYST ASPIRATION      x2    CATARACT EXTRACTION W/  INTRAOCULAR LENS IMPLANT Left 3/11/2019    Procedure: EXTRACTION, CATARACT, WITH IOL INSERTION;  Surgeon: Vera Sams MD;  Location: [facility] OR;  Service: Ophthalmology;  Laterality: Left;    CATARACT EXTRACTION W/  INTRAOCULAR LENS IMPLANT Right 4/15/2019    Procedure: EXTRACTION, CATARACT, WITH IOL INSERTION LASER;  Surgeon: Vera Sams MD;  Location: [facility] OR;  Service: Ophthalmology;  Laterality: Right;    COLON SURGERY      EYE SURGERY      FRACTURE SURGERY      NASAL SEPTUM SURGERY      OCCLUSION OF LEFT ATRIAL APPENDAGE N/A 2/18/2022    Procedure: Left atrial appendage occlusion;  Surgeon: Chase Gill MD;  Location: [facility] EP LAB;   "Service: Cardiology;  Laterality: N/A;  afib, watchman, BSCI, osiris, anes, MB/EH, 3prep    OPEN REDUCTION AND INTERNAL FIXATION (ORIF) OF INJURY OF SACROILIAC JOINT Bilateral 2021    Procedure: ORIF, SACROILIAC JOINT;  Surgeon: Alcides Tamez MD;  Location: [facility] OR [address] [place] [address];  Service: Orthopedics;  Laterality: Bilateral;    RADIOFREQUENCY ABLATION      RECTAL PROLAPSE REPAIR  2013    STRABISMUS SURGERY      TONSILLECTOMY      TRANSESOPHAGEAL ECHOCARDIOGRAPHY N/A 2022    Procedure: ECHOCARDIOGRAM, TRANSESOPHAGEAL;  [facility];  Location: [facility] EP LAB;  Service: Cardiology;  Laterality: N/A;       Social History     Socioeconomic History    Marital status:     Number of children: 2   Occupational History    Occupation: retired   Tobacco Use    Smoking status: Never Smoker    Smokeless tobacco: Never Used   Substance and Sexual Activity    Alcohol use: No    Drug use: No    Sexual activity: Not Currently                  Family History   Problem Relation Age of Onset    Heart disease Father     Stroke Father     Heart disease Brother     Breast cancer Paternal Grandmother     Melanoma Neg Hx     Psoriasis Neg Hx     Lupus Neg Hx     Eczema Neg Hx     Amblyopia Neg Hx     Blindness Neg Hx     Cataracts Neg Hx     Glaucoma Neg Hx     Macular degeneration Neg Hx     Retinal detachment Neg Hx     Strabismus Neg Hx        OB History    Para Term  AB Living   2 2 2         SAB IAB Ectopic Multiple Live Births                  # Outcome Date GA Lbr Migel/2nd Weight Sex Delivery Anes PTL Lv   2 Term            1 Term                  Gynecological History:     Denies history of abnormal paps and STD's.     REVIEW OF SYSTEMS:  Negative except as above.     PHYSICAL EXAM  /66   Ht 5' 2" (1.575 m)   Wt 39.8 kg (87 lb 11.9 oz)   BMI 16.05 kg/m²   GENERAL APPEARANCE:  The patient is a pleasant, normal appearing female with normal " "affect and in no distress.  BREASTS: Breast exam performed supine.  No masses, non-tender, no nipple discharge or lymphadenopathy.  ABDOMEN: Soft, non-tender, non-distended.  No hepatosplenomegaly.  Normal bowel sounds.  No umbilical or inguinal hernias.  :  Vulva: Inspection of her external genitalia reveals a orange colored labia bilaterally but otherwise normal but atrophic mons pubis, labia minora and labia majora.  Normal appearing clitoris, urethral meatus and [place]'s glands.    Bladder:  No evidence of urethral or bladder tenderness.    Vagina:  Speculum exam reveals pink and atrophic vaginal mucosa.  Bartholin gland is normal to palpation.  Cervix:  Cervix is normal in appearance with no lesions.  There is no cervical motion tenderness.  Uterus:  Uterus is normal size, mobile and non-tender.  No adnexal masses are palpated.  Adnexa are non-tender to palpation  Perineum:  Perineum appears normal.  Anus:  Normal with no apparent lesions.       ASSESSMENT/PLAN:  Pt is a  71 y.o.  alert female who presents today for GYN annual exam.  - pap with HPV collected  - Mammogram: The most current breast cancer screening guidelines were discussed with the patient; she had a mammogram in 2018 where the radiologist noted "Today's examination is of doubtful clinical utility secondary to the lack of breast tissue and the inability to see any thing more than the patient's nipple. This represents a change from the patient's previous exams. If the physical state remain stable, future mammograms will also likely be nondiagnostic." She declines mammogram. Breast exam normal today.   - Screening for colon cancer per PCP  - Bone mineral density testing with DEXA showed osteoporosis which is managed by PCP    - We discussed consistent weight bearing aerobic exercise, consistent seatbelt use, and consistent women's multivitamin use with both vitamin D and calcium supplementation (dosing discussed for this age group - pt to " aim for vitamin D 600 international units daily (800 international units if patient is >70 years) and calcium 1200 mg daily; self-breast awareness was discussed and taught; the new Pap smear guidelines were reviewed     Orange vulva likely secondary to recent use of Azo.     Pt voiced understanding of all counseling and instructions; no barriers to learning  RTO in 1 year or PRN      Percy Rose  4/21/2022

## 2022-04-29 ENCOUNTER — INFUSION (OUTPATIENT)
Dept: INFECTIOUS DISEASES | Facility: HOSPITAL | Age: 72
End: 2022-04-29
Attending: INTERNAL MEDICINE
Payer: MEDICARE

## 2022-04-29 VITALS
SYSTOLIC BLOOD PRESSURE: 102 MMHG | OXYGEN SATURATION: 100 % | DIASTOLIC BLOOD PRESSURE: 70 MMHG | HEART RATE: 87 BPM | TEMPERATURE: 98 F | WEIGHT: 87.88 LBS | RESPIRATION RATE: 16 BRPM | BODY MASS INDEX: 16.07 KG/M2

## 2022-04-29 DIAGNOSIS — J47.9 ADULT BRONCHIECTASIS: ICD-10-CM

## 2022-04-29 DIAGNOSIS — D83.9 CVID (COMMON VARIABLE IMMUNODEFICIENCY): Primary | ICD-10-CM

## 2022-04-29 PROCEDURE — 96365 THER/PROPH/DIAG IV INF INIT: CPT

## 2022-04-29 PROCEDURE — 96366 THER/PROPH/DIAG IV INF ADDON: CPT

## 2022-04-29 PROCEDURE — 25000003 PHARM REV CODE 250: Performed by: ALLERGY & IMMUNOLOGY

## 2022-04-29 PROCEDURE — 63600175 PHARM REV CODE 636 W HCPCS: Mod: JG | Performed by: ALLERGY & IMMUNOLOGY

## 2022-04-29 RX ORDER — ACETAMINOPHEN 325 MG/1
650 TABLET ORAL
Status: DISCONTINUED | OUTPATIENT
Start: 2022-04-29 | End: 2022-04-29 | Stop reason: HOSPADM

## 2022-04-29 RX ORDER — SODIUM CHLORIDE 0.9 % (FLUSH) 0.9 %
10 SYRINGE (ML) INJECTION
Status: CANCELLED | OUTPATIENT
Start: 2022-05-27

## 2022-04-29 RX ORDER — SODIUM CHLORIDE 0.9 % (FLUSH) 0.9 %
10 SYRINGE (ML) INJECTION
Status: DISCONTINUED | OUTPATIENT
Start: 2022-04-29 | End: 2022-04-29 | Stop reason: HOSPADM

## 2022-04-29 RX ORDER — DIPHENHYDRAMINE HYDROCHLORIDE 50 MG/ML
25 INJECTION INTRAMUSCULAR; INTRAVENOUS ONCE
Status: DISCONTINUED | OUTPATIENT
Start: 2022-04-29 | End: 2022-07-15 | Stop reason: HOSPADM

## 2022-04-29 RX ORDER — ACETAMINOPHEN 325 MG/1
650 TABLET ORAL
Status: CANCELLED | OUTPATIENT
Start: 2022-05-27

## 2022-04-29 RX ADMIN — HUMAN IMMUNOGLOBULIN G 25 G: 5 LIQUID INTRAVENOUS at 08:04

## 2022-04-29 RX ADMIN — SODIUM CHLORIDE: 0.9 INJECTION, SOLUTION INTRAVENOUS at 08:04

## 2022-05-03 ENCOUNTER — TELEPHONE (OUTPATIENT)
Dept: OBSTETRICS AND GYNECOLOGY | Facility: CLINIC | Age: 72
End: 2022-05-03
Payer: MEDICARE

## 2022-05-03 NOTE — TELEPHONE ENCOUNTER
Attempted to call patient to review test results. No answer so voicemail left asking her to call my office and let me know when a good time to reach her would be. Also I let her know her results were released in the portal so she can check them there.     Percy Rose  5/3/2022

## 2022-05-12 ENCOUNTER — PATIENT MESSAGE (OUTPATIENT)
Dept: ELECTROPHYSIOLOGY | Facility: CLINIC | Age: 72
End: 2022-05-12
Payer: MEDICARE

## 2022-05-13 ENCOUNTER — PATIENT MESSAGE (OUTPATIENT)
Dept: PRIMARY CARE CLINIC | Facility: CLINIC | Age: 72
End: 2022-05-13
Payer: MEDICARE

## 2022-05-13 ENCOUNTER — PATIENT MESSAGE (OUTPATIENT)
Dept: ELECTROPHYSIOLOGY | Facility: CLINIC | Age: 72
End: 2022-05-13
Payer: MEDICARE

## 2022-05-13 ENCOUNTER — HOSPITAL ENCOUNTER (INPATIENT)
Facility: HOSPITAL | Age: 72
LOS: 1 days | Discharge: HOME | DRG: 812 | End: 2022-05-14
Attending: EMERGENCY MEDICINE | Admitting: HOSPITALIST
Payer: MEDICARE

## 2022-05-13 DIAGNOSIS — D50.0 IRON DEFICIENCY ANEMIA DUE TO CHRONIC BLOOD LOSS: ICD-10-CM
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
## 2022-05-13 DIAGNOSIS — D64.9 ANEMIA REQUIRING TRANSFUSIONS: ICD-10-CM

## 2022-05-13 DIAGNOSIS — K92.2 GI BLEED: ICD-10-CM

## 2022-05-13 DIAGNOSIS — D64.9 SYMPTOMATIC ANEMIA: Primary | ICD-10-CM

## 2022-05-13 DIAGNOSIS — R41.82 ALTERED MENTAL STATUS: ICD-10-CM

## 2022-05-13 DIAGNOSIS — K92.2 GASTROINTESTINAL HEMORRHAGE, UNSPECIFIED GASTROINTESTINAL HEMORRHAGE TYPE: ICD-10-CM

## 2022-05-13 PROBLEM — S22.31XA CLOSED FRACTURE OF ONE RIB OF RIGHT SIDE: Status: ACTIVE | Noted: 2022-05-13

## 2022-05-13 PROBLEM — I10 PRIMARY HYPERTENSION: Chronic | Status: ACTIVE | Noted: 2021-12-17

## 2022-05-13 PROBLEM — F02.80 LATE ONSET ALZHEIMER'S DEMENTIA WITHOUT BEHAVIORAL DISTURBANCE: Chronic | Status: ACTIVE | Noted: 2021-11-02

## 2022-05-13 PROBLEM — F03.90 DEMENTIA: Chronic | Status: ACTIVE | Noted: 2018-12-13

## 2022-05-13 PROBLEM — Z98.890 HISTORY OF CARDIAC RADIOFREQUENCY ABLATION (RFA): Chronic | Status: ACTIVE | Noted: 2018-02-03

## 2022-05-13 PROBLEM — L03.115 CELLULITIS OF RIGHT LOWER EXTREMITY: Status: RESOLVED | Noted: 2021-01-19 | Resolved: 2022-05-13

## 2022-05-13 PROBLEM — E03.9 HYPOTHYROIDISM: Chronic | Status: ACTIVE | Noted: 2022-02-01

## 2022-05-13 PROBLEM — G30.1 LATE ONSET ALZHEIMER'S DEMENTIA WITHOUT BEHAVIORAL DISTURBANCE: Chronic | Status: ACTIVE | Noted: 2021-11-02

## 2022-05-13 PROBLEM — D83.9 CVID (COMMON VARIABLE IMMUNODEFICIENCY): Chronic | Status: ACTIVE | Noted: 2021-06-03

## 2022-05-13 PROBLEM — I48.0 PAROXYSMAL ATRIAL FIBRILLATION: Chronic | Status: ACTIVE | Noted: 2021-12-17

## 2022-05-13 LAB
ABO + RH BLD: NORMAL
ALBUMIN SERPL BCP-MCNC: 3.1 G/DL (ref 3.5–5.2)
ALP SERPL-CCNC: 50 U/L (ref 55–135)
ALT SERPL W/O P-5'-P-CCNC: 32 U/L (ref 10–44)
AMORPH CRY UR QL COMP ASSIST: NORMAL
ANION GAP SERPL CALC-SCNC: 5 MMOL/L (ref 8–16)
ANISOCYTOSIS BLD QL SMEAR: SLIGHT
APTT BLDCRRT: 22.5 SEC (ref 21–32)
AST SERPL-CCNC: 25 U/L (ref 10–40)
BACTERIA #/AREA URNS AUTO: NORMAL /HPF
BASOPHILS # BLD AUTO: 0.02 K/UL (ref 0–0.2)
BASOPHILS NFR BLD: 0.2 % (ref 0–1.9)
BILIRUB SERPL-MCNC: 0.2 MG/DL (ref 0.1–1)
BILIRUB UR QL STRIP: ABNORMAL
BLD GP AB SCN CELLS X3 SERPL QL: NORMAL
BLD PROD TYP BPU: NORMAL
BLD PROD TYP BPU: NORMAL
BLOOD UNIT EXPIRATION DATE: NORMAL
BLOOD UNIT EXPIRATION DATE: NORMAL
BLOOD UNIT TYPE CODE: 5100
BLOOD UNIT TYPE CODE: 5100
BLOOD UNIT TYPE: NORMAL
BLOOD UNIT TYPE: NORMAL
BNP SERPL-MCNC: 219 PG/ML (ref 0–99)
BUN SERPL-MCNC: 42 MG/DL (ref 6–30)
BUN SERPL-MCNC: 46 MG/DL (ref 8–23)
CALCIUM SERPL-MCNC: 8.5 MG/DL (ref 8.7–10.5)
CHLORIDE SERPL-SCNC: 107 MMOL/L (ref 95–110)
CHLORIDE SERPL-SCNC: 107 MMOL/L (ref 95–110)
CLARITY UR REFRACT.AUTO: CLEAR
CO2 SERPL-SCNC: 27 MMOL/L (ref 23–29)
CODING SYSTEM: NORMAL
CODING SYSTEM: NORMAL
COLOR UR AUTO: ABNORMAL
CREAT SERPL-MCNC: 0.6 MG/DL (ref 0.5–1.4)
CREAT SERPL-MCNC: 0.7 MG/DL (ref 0.5–1.4)
DAT IGG-SP REAG RBC-IMP: NORMAL
DIFFERENTIAL METHOD: ABNORMAL
DISPENSE STATUS: NORMAL
DISPENSE STATUS: NORMAL
EOSINOPHIL # BLD AUTO: 0 K/UL (ref 0–0.5)
EOSINOPHIL NFR BLD: 0 % (ref 0–8)
ERYTHROCYTE [DISTWIDTH] IN BLOOD BY AUTOMATED COUNT: 14.6 % (ref 11.5–14.5)
EST. GFR  (AFRICAN AMERICAN): >60 ML/MIN/1.73 M^2
EST. GFR  (NON AFRICAN AMERICAN): >60 ML/MIN/1.73 M^2
GLUCOSE SERPL-MCNC: 85 MG/DL (ref 70–110)
GLUCOSE SERPL-MCNC: 86 MG/DL (ref 70–110)
GLUCOSE UR QL STRIP: ABNORMAL
HCT VFR BLD AUTO: 16.7 % (ref 37–48.5)
HCT VFR BLD CALC: 15 %PCV (ref 36–54)
HCV AB SERPL QL IA: NEGATIVE
HGB BLD-MCNC: 5.1 G/DL (ref 12–16)
HGB UR QL STRIP: ABNORMAL
IMM GRANULOCYTES # BLD AUTO: 0.02 K/UL (ref 0–0.04)
IMM GRANULOCYTES NFR BLD AUTO: 0.2 % (ref 0–0.5)
INR PPP: 1.1 (ref 0.8–1.2)
KETONES UR QL STRIP: ABNORMAL
LEUKOCYTE ESTERASE UR QL STRIP: ABNORMAL
LYMPHOCYTES # BLD AUTO: 0.9 K/UL (ref 1–4.8)
LYMPHOCYTES NFR BLD: 10.6 % (ref 18–48)
MAGNESIUM SERPL-MCNC: 1.9 MG/DL (ref 1.6–2.6)
MCH RBC QN AUTO: 28.7 PG (ref 27–31)
MCHC RBC AUTO-ENTMCNC: 30.5 G/DL (ref 32–36)
MCV RBC AUTO: 94 FL (ref 82–98)
MICROSCOPIC COMMENT: NORMAL
MONOCYTES # BLD AUTO: 0.4 K/UL (ref 0.3–1)
MONOCYTES NFR BLD: 4.6 % (ref 4–15)
NEUTROPHILS # BLD AUTO: 7.4 K/UL (ref 1.8–7.7)
NEUTROPHILS NFR BLD: 84.4 % (ref 38–73)
NITRITE UR QL STRIP: ABNORMAL
NRBC BLD-RTO: 0 /100 WBC
NUM UNITS TRANS PACKED RBC: NORMAL
NUM UNITS TRANS PACKED RBC: NORMAL
OVALOCYTES BLD QL SMEAR: ABNORMAL
PH UR STRIP: 5 [PH] (ref 5–8)
PLATELET # BLD AUTO: 190 K/UL (ref 150–450)
PMV BLD AUTO: 10.6 FL (ref 9.2–12.9)
POC IONIZED CALCIUM: 1.12 MMOL/L (ref 1.06–1.42)
POC TCO2 (MEASURED): 25 MMOL/L (ref 23–29)
POIKILOCYTOSIS BLD QL SMEAR: SLIGHT
POLYCHROMASIA BLD QL SMEAR: ABNORMAL
POTASSIUM BLD-SCNC: 4 MMOL/L (ref 3.5–5.1)
POTASSIUM SERPL-SCNC: 3.9 MMOL/L (ref 3.5–5.1)
PROT SERPL-MCNC: 5.9 G/DL (ref 6–8.4)
PROT UR QL STRIP: ABNORMAL
PROTHROMBIN TIME: 11.6 SEC (ref 9–12.5)
RBC # BLD AUTO: 1.78 M/UL (ref 4–5.4)
RBC #/AREA URNS AUTO: 2 /HPF (ref 0–4)
RETICS/RBC NFR AUTO: 5.5 % (ref 0.5–2.5)
SAMPLE: ABNORMAL
SODIUM BLD-SCNC: 141 MMOL/L (ref 136–145)
SODIUM SERPL-SCNC: 139 MMOL/L (ref 136–145)
SP GR UR STRIP: 1.02 (ref 1–1.03)
T4 FREE SERPL-MCNC: 0.75 NG/DL (ref 0.71–1.51)
TROPONIN I SERPL DL<=0.01 NG/ML-MCNC: <0.006 NG/ML (ref 0–0.03)
TSH SERPL DL<=0.005 MIU/L-ACNC: 5.16 UIU/ML (ref 0.4–4)
URN SPEC COLLECT METH UR: ABNORMAL
WBC # BLD AUTO: 8.76 K/UL (ref 3.9–12.7)
WBC #/AREA URNS AUTO: 2 /HPF (ref 0–5)

## 2022-05-13 PROCEDURE — 85025 COMPLETE CBC W/AUTO DIFF WBC: CPT | Performed by: PHYSICIAN ASSISTANT

## 2022-05-13 PROCEDURE — 25000003 PHARM REV CODE 250: Performed by: PHYSICIAN ASSISTANT

## 2022-05-13 PROCEDURE — 99284 PR EMERGENCY DEPT VISIT,LEVEL IV: ICD-10-PCS | Mod: ,,, | Performed by: INTERNAL MEDICINE

## 2022-05-13 PROCEDURE — 25000003 PHARM REV CODE 250: Performed by: STUDENT IN AN ORGANIZED HEALTH CARE EDUCATION/TRAINING PROGRAM

## 2022-05-13 PROCEDURE — 99223 PR INITIAL HOSPITAL CARE,LEVL III: ICD-10-PCS | Mod: ,,, | Performed by: STUDENT IN AN ORGANIZED HEALTH CARE EDUCATION/TRAINING PROGRAM

## 2022-05-13 PROCEDURE — 93005 ELECTROCARDIOGRAM TRACING: CPT

## 2022-05-13 PROCEDURE — 99284 EMERGENCY DEPT VISIT MOD MDM: CPT | Mod: ,,, | Performed by: INTERNAL MEDICINE

## 2022-05-13 PROCEDURE — 96374 THER/PROPH/DIAG INJ IV PUSH: CPT

## 2022-05-13 PROCEDURE — C9113 INJ PANTOPRAZOLE SODIUM, VIA: HCPCS | Performed by: PHYSICIAN ASSISTANT

## 2022-05-13 PROCEDURE — 86920 COMPATIBILITY TEST SPIN: CPT | Performed by: PHYSICIAN ASSISTANT

## 2022-05-13 PROCEDURE — 36430 TRANSFUSION BLD/BLD COMPNT: CPT

## 2022-05-13 PROCEDURE — 84484 ASSAY OF TROPONIN QUANT: CPT | Performed by: PHYSICIAN ASSISTANT

## 2022-05-13 PROCEDURE — 83735 ASSAY OF MAGNESIUM: CPT | Performed by: PHYSICIAN ASSISTANT

## 2022-05-13 PROCEDURE — 93010 ELECTROCARDIOGRAM REPORT: CPT | Mod: ,,, | Performed by: INTERNAL MEDICINE

## 2022-05-13 PROCEDURE — 63600175 PHARM REV CODE 636 W HCPCS: Performed by: STUDENT IN AN ORGANIZED HEALTH CARE EDUCATION/TRAINING PROGRAM

## 2022-05-13 PROCEDURE — 86880 COOMBS TEST DIRECT: CPT | Performed by: STUDENT IN AN ORGANIZED HEALTH CARE EDUCATION/TRAINING PROGRAM

## 2022-05-13 PROCEDURE — 86850 RBC ANTIBODY SCREEN: CPT | Performed by: PHYSICIAN ASSISTANT

## 2022-05-13 PROCEDURE — 85610 PROTHROMBIN TIME: CPT | Performed by: PHYSICIAN ASSISTANT

## 2022-05-13 PROCEDURE — 84439 ASSAY OF FREE THYROXINE: CPT | Performed by: PHYSICIAN ASSISTANT

## 2022-05-13 PROCEDURE — 86803 HEPATITIS C AB TEST: CPT | Performed by: EMERGENCY MEDICINE

## 2022-05-13 PROCEDURE — 85730 THROMBOPLASTIN TIME PARTIAL: CPT | Performed by: PHYSICIAN ASSISTANT

## 2022-05-13 PROCEDURE — P9016 RBC LEUKOCYTES REDUCED: HCPCS | Mod: BL | Performed by: PHYSICIAN ASSISTANT

## 2022-05-13 PROCEDURE — 63600175 PHARM REV CODE 636 W HCPCS: Performed by: PHYSICIAN ASSISTANT

## 2022-05-13 PROCEDURE — 93010 EKG 12-LEAD: ICD-10-PCS | Mod: ,,, | Performed by: INTERNAL MEDICINE

## 2022-05-13 PROCEDURE — 11000001 HC ACUTE MED/SURG PRIVATE ROOM

## 2022-05-13 PROCEDURE — 80053 COMPREHEN METABOLIC PANEL: CPT | Performed by: PHYSICIAN ASSISTANT

## 2022-05-13 PROCEDURE — 81001 URINALYSIS AUTO W/SCOPE: CPT | Performed by: STUDENT IN AN ORGANIZED HEALTH CARE EDUCATION/TRAINING PROGRAM

## 2022-05-13 PROCEDURE — 85045 AUTOMATED RETICULOCYTE COUNT: CPT | Performed by: EMERGENCY MEDICINE

## 2022-05-13 PROCEDURE — C9113 INJ PANTOPRAZOLE SODIUM, VIA: HCPCS | Performed by: STUDENT IN AN ORGANIZED HEALTH CARE EDUCATION/TRAINING PROGRAM

## 2022-05-13 PROCEDURE — P9016 RBC LEUKOCYTES REDUCED: HCPCS | Performed by: PHYSICIAN ASSISTANT

## 2022-05-13 PROCEDURE — 99291 PR CRITICAL CARE, E/M 30-74 MINUTES: ICD-10-PCS | Mod: ,,, | Performed by: PHYSICIAN ASSISTANT

## 2022-05-13 PROCEDURE — 99291 CRITICAL CARE FIRST HOUR: CPT | Mod: ,,, | Performed by: PHYSICIAN ASSISTANT

## 2022-05-13 PROCEDURE — 99291 CRITICAL CARE FIRST HOUR: CPT | Mod: 25

## 2022-05-13 PROCEDURE — 84443 ASSAY THYROID STIM HORMONE: CPT | Performed by: PHYSICIAN ASSISTANT

## 2022-05-13 PROCEDURE — 83880 ASSAY OF NATRIURETIC PEPTIDE: CPT | Performed by: PHYSICIAN ASSISTANT

## 2022-05-13 PROCEDURE — 99223 1ST HOSP IP/OBS HIGH 75: CPT | Mod: ,,, | Performed by: STUDENT IN AN ORGANIZED HEALTH CARE EDUCATION/TRAINING PROGRAM

## 2022-05-13 RX ORDER — SODIUM CHLORIDE 0.9 % (FLUSH) 0.9 %
10 SYRINGE (ML) INJECTION
Status: DISCONTINUED | OUTPATIENT
Start: 2022-05-13 | End: 2022-05-14 | Stop reason: HOSPADM

## 2022-05-13 RX ORDER — GUAIFENESIN 600 MG/1
1200 TABLET, EXTENDED RELEASE ORAL 2 TIMES DAILY
Status: DISCONTINUED | OUTPATIENT
Start: 2022-05-13 | End: 2022-05-14 | Stop reason: HOSPADM

## 2022-05-13 RX ORDER — ACETAMINOPHEN 325 MG/1
650 TABLET ORAL
Status: COMPLETED | OUTPATIENT
Start: 2022-05-13 | End: 2022-05-13

## 2022-05-13 RX ORDER — CALCIUM CARB/VITAMIN D3/VIT K1 650MG-12.5
TABLET,CHEWABLE ORAL
COMMUNITY
End: 2022-07-04

## 2022-05-13 RX ORDER — TALC
6 POWDER (GRAM) TOPICAL NIGHTLY PRN
Status: DISCONTINUED | OUTPATIENT
Start: 2022-05-13 | End: 2022-05-14 | Stop reason: HOSPADM

## 2022-05-13 RX ORDER — DONEPEZIL HYDROCHLORIDE 10 MG/1
10 TABLET, FILM COATED ORAL DAILY
Status: DISCONTINUED | OUTPATIENT
Start: 2022-05-14 | End: 2022-05-14 | Stop reason: HOSPADM

## 2022-05-13 RX ORDER — CETIRIZINE HYDROCHLORIDE 10 MG/1
10 TABLET ORAL DAILY
Refills: 5 | Status: DISCONTINUED | OUTPATIENT
Start: 2022-05-14 | End: 2022-05-14 | Stop reason: HOSPADM

## 2022-05-13 RX ORDER — ACETAMINOPHEN 325 MG/1
650 TABLET ORAL EVERY 4 HOURS PRN
Status: DISCONTINUED | OUTPATIENT
Start: 2022-05-13 | End: 2022-05-14 | Stop reason: HOSPADM

## 2022-05-13 RX ORDER — IBUPROFEN 200 MG
24 TABLET ORAL
Status: DISCONTINUED | OUTPATIENT
Start: 2022-05-13 | End: 2022-05-14 | Stop reason: HOSPADM

## 2022-05-13 RX ORDER — ASPIRIN 81 MG/1
81 TABLET ORAL DAILY
Status: DISCONTINUED | OUTPATIENT
Start: 2022-05-14 | End: 2022-05-14

## 2022-05-13 RX ORDER — GLUCAGON 1 MG
1 KIT INJECTION
Status: DISCONTINUED | OUTPATIENT
Start: 2022-05-13 | End: 2022-05-14 | Stop reason: HOSPADM

## 2022-05-13 RX ORDER — LEVOTHYROXINE SODIUM 88 UG/1
88 TABLET ORAL
Status: DISCONTINUED | OUTPATIENT
Start: 2022-05-14 | End: 2022-05-14 | Stop reason: HOSPADM

## 2022-05-13 RX ORDER — POLYETHYLENE GLYCOL 3350 17 G/17G
17 POWDER, FOR SOLUTION ORAL DAILY
Status: DISCONTINUED | OUTPATIENT
Start: 2022-05-14 | End: 2022-05-14 | Stop reason: HOSPADM

## 2022-05-13 RX ORDER — CLOPIDOGREL BISULFATE 75 MG/1
75 TABLET ORAL DAILY
Status: DISCONTINUED | OUTPATIENT
Start: 2022-05-14 | End: 2022-05-14

## 2022-05-13 RX ORDER — PANTOPRAZOLE SODIUM 40 MG/10ML
80 INJECTION, POWDER, LYOPHILIZED, FOR SOLUTION INTRAVENOUS
Status: COMPLETED | OUTPATIENT
Start: 2022-05-13 | End: 2022-05-13

## 2022-05-13 RX ORDER — PHENAZOPYRIDINE HYDROCHLORIDE 95 MG/1
95 TABLET ORAL 2 TIMES DAILY
COMMUNITY
End: 2022-07-04

## 2022-05-13 RX ORDER — TALC
6 POWDER (GRAM) TOPICAL NIGHTLY PRN
Status: DISCONTINUED | OUTPATIENT
Start: 2022-05-13 | End: 2022-05-13

## 2022-05-13 RX ORDER — HYDROCODONE BITARTRATE AND ACETAMINOPHEN 500; 5 MG/1; MG/1
TABLET ORAL
Status: DISCONTINUED | OUTPATIENT
Start: 2022-05-13 | End: 2022-05-14 | Stop reason: HOSPADM

## 2022-05-13 RX ORDER — IBUPROFEN 200 MG
16 TABLET ORAL
Status: DISCONTINUED | OUTPATIENT
Start: 2022-05-13 | End: 2022-05-14 | Stop reason: HOSPADM

## 2022-05-13 RX ORDER — ONDANSETRON 8 MG/1
8 TABLET, ORALLY DISINTEGRATING ORAL EVERY 8 HOURS PRN
Status: DISCONTINUED | OUTPATIENT
Start: 2022-05-13 | End: 2022-05-14 | Stop reason: HOSPADM

## 2022-05-13 RX ORDER — PANTOPRAZOLE SODIUM 40 MG/10ML
40 INJECTION, POWDER, LYOPHILIZED, FOR SOLUTION INTRAVENOUS 2 TIMES DAILY
Status: DISCONTINUED | OUTPATIENT
Start: 2022-05-13 | End: 2022-05-14 | Stop reason: HOSPADM

## 2022-05-13 RX ORDER — MONTELUKAST SODIUM 10 MG/1
10 TABLET ORAL NIGHTLY
Status: DISCONTINUED | OUTPATIENT
Start: 2022-05-13 | End: 2022-05-14 | Stop reason: HOSPADM

## 2022-05-13 RX ADMIN — GUAIFENESIN 1200 MG: 600 TABLET, EXTENDED RELEASE ORAL at 08:05

## 2022-05-13 RX ADMIN — PANTOPRAZOLE SODIUM 40 MG: 40 INJECTION, POWDER, FOR SOLUTION INTRAVENOUS at 08:05

## 2022-05-13 RX ADMIN — PANTOPRAZOLE SODIUM 80 MG: 40 INJECTION, POWDER, FOR SOLUTION INTRAVENOUS at 01:05

## 2022-05-13 RX ADMIN — ACETAMINOPHEN 650 MG: 325 TABLET ORAL at 07:05

## 2022-05-13 RX ADMIN — ACETAMINOPHEN 650 MG: 325 TABLET ORAL at 02:05

## 2022-05-13 RX ADMIN — MONTELUKAST 10 MG: 10 TABLET, FILM COATED ORAL at 08:05

## 2022-05-13 NOTE — HPI
Ms. Yvette Crews is a 72 year old female for whom GI is consulted with concern for GI bleeding. She has a PMH significant for atrial fibrillation (status post Watchman device placement in 02/2022 and on ASA and PLAVIX), CVID, and hypothyroidism (status post total thyroidectomy).     Patient reports onset of progressive weakness of approximately one week duration associated with increased frequency of falls. She usually ambulates with a walker, however she reports being too weak to ambulate. She denies symptom association with dysphagia, abdominal pain, nausea, vomiting, NSAID usage, melena, hematochezia, and bloody in urine. She reports having a brown bowel movement daily with minimal straining. Due to weakness, she was brought to ED here on 05/13/2022.     Hospital Course: On arrival, vital signs normal on room air. Labs notable for anemia (Hgb 5.1 from 12 in 02/2022), normal platelets, normal INR, elevated BUN (46), elevated BNP (219), and normal troponin. CT head without evidence of bleed or CVA. She was given PPI, IVF, and admitted to hospital medicine.

## 2022-05-13 NOTE — ED NOTES
Incontinent care provided for this patient as she her brief was wet with urine.  New brief applied and purewick repositioned.

## 2022-05-13 NOTE — SUBJECTIVE & OBJECTIVE
Past Medical History:   Diagnosis Date    Adult bronchiectasis     Age-related osteoporosis with current pathological fracture with routine healing 8/28/2013    Amblyopia     Anxiety     Cataract     Chondrocalcinosis, cause unspecified, involving hand(712.34) 7/12/2011     Dx updated per 2019 IMO Load    Chronic sinusitis 4/6/2017    Colonic constipation 6/5/2013    Concussion 10/27/2016    Frequent falls 3/14/2017    Gait disturbance 3/14/2017    History of cardiac radiofrequency ablation (RFA) 2/3/2018    History of subarachnoid hemorrhage 10/30/2016    Hypogammaglobulinemia 9/7/2011    Irritable bowel syndrome 12/18/2015    Major depressive disorder, recurrent episode, in full remission 8/19/2010    Onychomycosis     Osteoarthritis     Postoperative hypothyroidism 12/18/2015    Rectal prolapse 6/5/2013    Reflux esophagitis 2/7/2010    Status post thyroidectomy 6/1/2017    Strabismus     SVT (supraventricular tachycardia) 11/22/2013    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     Underweight 1/23/2013       Past Surgical History:   Procedure Laterality Date    BREAST CYST ASPIRATION      x2    CATARACT EXTRACTION W/  INTRAOCULAR LENS IMPLANT Left 3/11/2019    Procedure: EXTRACTION, CATARACT, WITH IOL INSERTION;  Surgeon: Vera Sams MD;  Location: [facility] OR;  Service: Ophthalmology;  Laterality: Left;    CATARACT EXTRACTION W/  INTRAOCULAR LENS IMPLANT Right 4/15/2019    Procedure: EXTRACTION, CATARACT, WITH IOL INSERTION LASER;  Surgeon: Vera Sams MD;  Location: [facility] OR;  Service: Ophthalmology;  Laterality: Right;    COLON SURGERY      EYE SURGERY      FRACTURE SURGERY      NASAL SEPTUM SURGERY      OCCLUSION OF LEFT ATRIAL APPENDAGE N/A 2/18/2022    Procedure: Left atrial appendage occlusion;  Surgeon: Chase Gill MD;  Location: [facility] EP LAB;  Service: Cardiology;  Laterality: N/A;  afib, watchman, BSCI, osiris, anes, MB/EH, 3prep    OPEN REDUCTION AND INTERNAL FIXATION (ORIF) OF INJURY OF  SACROILIAC JOINT Bilateral 1/20/2021    Procedure: ORIF, SACROILIAC JOINT;  Surgeon: Alcides Tamez MD;  Location: [facility] OR [address] [place] [address];  Service: Orthopedics;  Laterality: Bilateral;    RADIOFREQUENCY ABLATION      RECTAL PROLAPSE REPAIR  june 2013    STRABISMUS SURGERY      TONSILLECTOMY      TRANSESOPHAGEAL ECHOCARDIOGRAPHY N/A 2/18/2022    Procedure: ECHOCARDIOGRAM, TRANSESOPHAGEAL;  Surgeon: Nils Diagnostic Provider;  Location: [facility] EP LAB;  Service: Cardiology;  Laterality: N/A;       Review of patient's allergies indicates:   Allergen Reactions    Adhesive      Tape tears skin    Buspar [buspirone]      headaches    Escitalopram oxalate Nausea Only     hyponatremia      Rifampin Rash       Current Facility-Administered Medications on File Prior to Encounter   Medication    balanced salt irrigation solution 1 drop    diphenhydrAMINE injection 25 mg    lidocaine (PF) 10 mg/ml (1%) injection 10 mg    moxifloxacin 0.5 % ophthalmic solution 1 drop    phenylephrine HCL 2.5% ophthalmic solution 1 drop    sodium chloride 0.9% bolus 1,000 mL    sodium chloride 0.9% bolus 1,000 mL    sodium chloride 0.9% bolus 1,000 mL    tropicamide 1% ophthalmic solution 1 drop     Current Outpatient Medications on File Prior to Encounter   Medication Sig    acetaminophen (TYLENOL ORAL) Take by mouth once daily.    albuterol (PROVENTIL/VENTOLIN HFA) 90 mcg/actuation inhaler Inhale 2 puffs into the lungs every 6 (six) hours as needed for Wheezing. Rescue    aspirin (ECOTRIN) 81 MG EC tablet Take 1 tablet (81 mg total) by mouth once daily.    bacitracin-polymyxin b (POLYSPORIN) ophthalmic ointment     calcium-vitamin D3 (OS-ELAINE 500 + D3) 500 mg(1,250mg) -200 unit per tablet Take 1 tablet by mouth 2 (two) times daily.    clopidogreL (PLAVIX) 75 mg tablet Take 1 tablet (75 mg total) by mouth once daily.    docusate sodium (COLACE) 250 MG capsule Take 250 mg by mouth 2 (two) times daily as needed for Constipation.    donepeziL  (ARICEPT) 10 MG tablet Take 1 tablet (10 mg total) by mouth once daily.    guaiFENesin (MUCINEX) 600 mg 12 hr tablet Take 2 tablets (1,200 mg total) by mouth 2 (two) times daily.    Immune Globulin G, IGG,-PRO-IGA 10 % injection, Privigen, (PRIVIGEN) 10 % Soln Infuse 20 g into the vein every 4 weeks.    levothyroxine (SYNTHROID) 88 MCG tablet Take 1 tablet (88 mcg total) by mouth before breakfast.    loratadine (CLARITIN) 10 mg tablet Take 1 tablet (10 mg total) by mouth once daily.    montelukast (SINGULAIR) 10 mg tablet Take 1 tablet (10 mg total) by mouth every evening.    multivit with calcium,iron,min (MULTIPLE VITAMIN, WOMENS ORAL) Take by mouth once daily.    multivit-min/ferrous fumarate (MULTI VITAMIN ORAL) Take by mouth.    mupirocin (BACTROBAN) 2 % ointment APPLY ONE application TWICE DAILY FOR 7 DAYS    naproxen (NAPROSYN ORAL) Take 200 mg by mouth. As needed every other day    polyethylene glycol (GLYCOLAX) 17 gram PwPk Take 17 g by mouth once daily. (Patient taking differently: Take 17 g by mouth as needed. As needed once a week or so)    sars-cov-2, covid-19, (MODERNA COVID-19) 100 mcg/0.5 ml injection Inject into the muscle.    [DISCONTINUED] XARELTO 20 mg Tab TAKE 1 TAB BY MOUTH EVERY EVENING WITH DINNER (BEGIN AFTER ELIQUIS IS COMPLETED)     Family History       Problem Relation (Age of Onset)    Breast cancer Paternal Grandmother    Heart disease Father, Brother    Stroke Father          Tobacco Use    Smoking status: Never Smoker    Smokeless tobacco: Never Used   Substance and Sexual Activity    Alcohol use: No    Drug use: No    Sexual activity: Not Currently     Review of Systems   Constitutional:  Positive for fatigue. Negative for fever.   HENT:  Negative for nosebleeds and sore throat.    Respiratory:  Positive for cough. Negative for wheezing.    Cardiovascular:  Negative for chest pain and leg swelling.   Gastrointestinal:  Negative for abdominal pain and blood in stool.   Genitourinary:   Negative for difficulty urinating, dysuria and hematuria.   Musculoskeletal:  Negative for back pain and joint swelling.   Skin:  Negative for pallor and rash.   Neurological:  Negative for syncope and numbness.   Hematological:  Bruises/bleeds easily.   Psychiatric/Behavioral:  Positive for confusion. Negative for agitation.    Objective:     Vital Signs (Most Recent):  Temp: 98.8 °F (37.1 °C) (05/13/22 1011)  Pulse: 88 (05/13/22 1438)  Resp: 19 (05/13/22 1140)  BP: (!) 106/58 (05/13/22 1438)  SpO2: 99 % (05/13/22 1438)   Vital Signs (24h Range):  Temp:  [98.8 °F (37.1 °C)] 98.8 °F (37.1 °C)  Pulse:  [69-93] 88  Resp:  [18-19] 19  SpO2:  [99 %-100 %] 99 %  BP: ()/(49-65) 106/58     Weight: 38.6 kg (85 lb)  Body mass index is 15.55 kg/m².    Physical Exam  Constitutional:       General: She is not in acute distress.     Appearance: She is underweight. She is ill-appearing. She is not diaphoretic.   HENT:      Head: Normocephalic and atraumatic.   Eyes:      Extraocular Movements: Extraocular movements intact.      Pupils: Pupils are equal, round, and reactive to light.      Comments: Conjunctival pallor   Neck:      Vascular: No JVD.   Cardiovascular:      Rate and Rhythm: Normal rate and regular rhythm.      Heart sounds: Normal heart sounds.   Pulmonary:      Effort: Pulmonary effort is normal. No respiratory distress.      Breath sounds: Normal breath sounds.   Abdominal:      General: Bowel sounds are normal. There is no distension.      Palpations: Abdomen is soft.      Tenderness: There is no abdominal tenderness.   Musculoskeletal:         General: Tenderness (left shoulder) present. No swelling.   Lymphadenopathy:      Cervical: No cervical adenopathy.   Skin:     General: Skin is warm and dry.      Capillary Refill: Capillary refill takes less than 2 seconds.      Findings: Bruising (scattered purpura of hands) present. No erythema or rash.   Neurological:      Mental Status: She is alert. Mental  status is at baseline. She is disoriented.   Psychiatric:         Mood and Affect: Mood normal.         Behavior: Behavior normal.         Thought Content: Thought content normal.         Judgment: Judgment normal.         CRANIAL NERVES     CN III, IV, VI   Pupils are equal, round, and reactive to light.     Significant Labs: All pertinent labs within the past 24 hours have been reviewed.  CBC:   Recent Labs   Lab 05/13/22  1150 05/13/22  1159   WBC 8.76  --    HGB 5.1*  --    HCT 16.7* 15*     --      CMP:   Recent Labs   Lab 05/13/22  1150      K 3.9      CO2 27   GLU 86   BUN 46*   CREATININE 0.7   CALCIUM 8.5*   PROT 5.9*   ALBUMIN 3.1*   BILITOT 0.2   ALKPHOS 50*   AST 25   ALT 32   ANIONGAP 5*   EGFRNONAA >60.0     Coagulation:   Recent Labs   Lab 05/13/22  1238   INR 1.1   APTT 22.5       Significant Imaging: I have reviewed all pertinent imaging results/findings within the past 24 hours.

## 2022-05-13 NOTE — PROGRESS NOTES
I have reviewed the notes, assessments, and/or procedures performed this visit, and I concur with the documentation.    As noted  Seen in the ED for severe symptomatic anemia  Likely relates to aspirin/plavix use- with erosive gastritis vs avm  I believe too frail for colonoscopy  Consider EGd, for we may not want to use empiric PPI given her osteoporosis  For now , transfuse  No active gi bleeding, so no urgency in work up

## 2022-05-13 NOTE — ASSESSMENT & PLAN NOTE
This is a 72 year old female for whom GI is consulted with concern for GI bleeding. She has a PMH significant for atrial fibrillation (status post Watchman device placement in 02/2022 and on ASA and PLAVIX), CVID, and hypothyroidism (status post total thyroidectomy) who presented to [facility] on 05/13/2022 with symptomatic normocytic anemia (Hgb 5.1 from 12 in 02/2022) without overt signs of GI bleeding. Etiology of anemia possibly from slow bleeding from gastritis versus gastric erosions in the setting of DAPT usage; given overall stability this is not likely a brisk GI bleed.     Recommendations:     -Continue transfusions to keep Hgb >7.  -Continue PPI daily with DAPT usage to minimize risk of mucosal damage   -Okay for a regular diet from GI perspective.   -No plans for endoscopic intervention currently given overall frailty of patient limiting her ability to tolerate necessary prep for colonoscopy and placing her at increased risk for sedation.   -If Hgb fails to respond appropriately to transfusions or develops overt signs of GI bleeding, will re-consider obtaining EGD.

## 2022-05-13 NOTE — H&P
[facility] - Emergency Dept  [facility] Medicine  History & Physical    Patient Name: Yvette Crews  MRN: [id number]  Patient Class: IP- Inpatient  Admission Date: 5/13/2022  Attending Physician: Russell Robledo MD   Primary Care Provider: aSlly Green MD         Patient information was obtained from patient, past medical records and ER records.     Subjective:     Principal Problem:Symptomatic anemia    Chief Complaint:   Chief Complaint   Patient presents with    Altered Mental Status     From [facility], fell several times, has intermittent confusion and lethargic, usually walks with a walker         HPI: Yvette Crews is a 72 y.o. 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 Pt a resident at [facility] in an apartment with her , Earnest. She reports increasing weakness for the last few days, with a fall from standing today prompting her to present to the ED for evaluation. She states that she has also noticed she feels more irritable and confused for the past few days. Denies any bleeding per rectum or hematuria. States she did get a small cut on her hand that bled much more than she would expect but that eventually stopped.     In ED, evaluation notable for hbg 5.1. FOBT positive though with brown stool. Imaging significant for a new right 5th anterior rib fracture, no acute fractures of hips, shoulder, head CT without acute process.  GI was consulted in the ED for concerns of GI bleed. Pt admitted to hospital medicine for symptomatic anemia.       Past Medical History:   Diagnosis Date    Adult bronchiectasis     Age-related osteoporosis with current pathological fracture with routine healing 8/28/2013    Amblyopia     Anxiety     Cataract     Chondrocalcinosis, cause unspecified, involving hand(712.34) 7/12/2011     Dx updated per 2019 IMO Load    Chronic sinusitis 4/6/2017    Colonic constipation 6/5/2013    Concussion 10/27/2016    Frequent falls 3/14/2017    Gait disturbance 3/14/2017    History of cardiac radiofrequency ablation (RFA) 2/3/2018    History of subarachnoid hemorrhage 10/30/2016    Hypogammaglobulinemia 9/7/2011    Irritable bowel syndrome 12/18/2015    Major depressive disorder, recurrent episode, in full remission 8/19/2010    Onychomycosis     Osteoarthritis     Postoperative hypothyroidism 12/18/2015    Rectal prolapse 6/5/2013    Reflux esophagitis 2/7/2010    Status post thyroidectomy 6/1/2017    Strabismus     SVT (supraventricular tachycardia) 11/22/2013    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     Underweight 1/23/2013       Past Surgical History:   Procedure Laterality Date    BREAST CYST ASPIRATION      x2    CATARACT EXTRACTION W/  INTRAOCULAR LENS IMPLANT Left 3/11/2019    Procedure: EXTRACTION, CATARACT, WITH IOL INSERTION;  Surgeon: Vera Sams MD;  Location: [facility] OR;  Service: Ophthalmology;  Laterality: Left;    CATARACT EXTRACTION W/  INTRAOCULAR LENS IMPLANT Right 4/15/2019    Procedure: EXTRACTION, CATARACT, WITH IOL INSERTION LASER;  Surgeon: Vera Sams MD;  Location: [facility] OR;  Service: Ophthalmology;  Laterality: Right;    COLON SURGERY      EYE SURGERY      FRACTURE SURGERY      NASAL SEPTUM SURGERY      OCCLUSION OF LEFT ATRIAL APPENDAGE N/A 2/18/2022    Procedure: Left atrial appendage occlusion;  Surgeon: Chase Gill MD;  Location: [facility] EP LAB;   Service: Cardiology;  Laterality: N/A;  afib, watchman, BSCI, osiris, anes, MB/EH, 3prep    OPEN REDUCTION AND INTERNAL FIXATION (ORIF) OF INJURY OF SACROILIAC JOINT Bilateral 1/20/2021    Procedure: ORIF, SACROILIAC JOINT;  Surgeon: Alcides Tamez MD;  Location: [facility] OR [address] [place] [address];  Service: Orthopedics;  Laterality: Bilateral;    RADIOFREQUENCY ABLATION      RECTAL PROLAPSE REPAIR  june 2013    STRABISMUS SURGERY      TONSILLECTOMY      TRANSESOPHAGEAL ECHOCARDIOGRAPHY N/A 2/18/2022    Procedure: ECHOCARDIOGRAM, TRANSESOPHAGEAL;  Surgeon: [facility] Diagnostic Provider;  Location: [facility] EP LAB;  Service: Cardiology;  Laterality: N/A;       Review of patient's allergies indicates:   Allergen Reactions    Adhesive      Tape tears skin    Buspar [buspirone]      headaches    Escitalopram oxalate Nausea Only     hyponatremia      Rifampin Rash       Current Facility-Administered Medications on File Prior to Encounter   Medication    balanced salt irrigation solution 1 drop    diphenhydrAMINE injection 25 mg    lidocaine (PF) 10 mg/ml (1%) injection 10 mg    moxifloxacin 0.5 % ophthalmic solution 1 drop    phenylephrine HCL 2.5% ophthalmic solution 1 drop    sodium chloride 0.9% bolus 1,000 mL    sodium chloride 0.9% bolus 1,000 mL    sodium chloride 0.9% bolus 1,000 mL    tropicamide 1% ophthalmic solution 1 drop     Current Outpatient Medications on File Prior to Encounter   Medication Sig    acetaminophen (TYLENOL ORAL) Take by mouth once daily.    albuterol (PROVENTIL/VENTOLIN HFA) 90 mcg/actuation inhaler Inhale 2 puffs into the lungs every 6 (six) hours as needed for Wheezing. Rescue    aspirin (ECOTRIN) 81 MG EC tablet Take 1 tablet (81 mg total) by mouth once daily.    bacitracin-polymyxin b (POLYSPORIN) ophthalmic ointment     calcium-vitamin D3 (OS-ELAINE 500 + D3) 500 mg(1,250mg) -200 unit per tablet Take 1 tablet by mouth 2 (two) times daily.    clopidogreL (PLAVIX) 75 mg tablet Take 1  tablet (75 mg total) by mouth once daily.    docusate sodium (COLACE) 250 MG capsule Take 250 mg by mouth 2 (two) times daily as needed for Constipation.    donepeziL (ARICEPT) 10 MG tablet Take 1 tablet (10 mg total) by mouth once daily.    guaiFENesin (MUCINEX) 600 mg 12 hr tablet Take 2 tablets (1,200 mg total) by mouth 2 (two) times daily.    Immune Globulin G, IGG,-PRO-IGA 10 % injection, Privigen, (PRIVIGEN) 10 % Soln Infuse 20 g into the vein every 4 weeks.    levothyroxine (SYNTHROID) 88 MCG tablet Take 1 tablet (88 mcg total) by mouth before breakfast.    loratadine (CLARITIN) 10 mg tablet Take 1 tablet (10 mg total) by mouth once daily.    montelukast (SINGULAIR) 10 mg tablet Take 1 tablet (10 mg total) by mouth every evening.    multivit with calcium,iron,min (MULTIPLE VITAMIN, WOMENS ORAL) Take by mouth once daily.    multivit-min/ferrous fumarate (MULTI VITAMIN ORAL) Take by mouth.    mupirocin (BACTROBAN) 2 % ointment APPLY ONE application TWICE DAILY FOR 7 DAYS    naproxen (NAPROSYN ORAL) Take 200 mg by mouth. As needed every other day    polyethylene glycol (GLYCOLAX) 17 gram PwPk Take 17 g by mouth once daily. (Patient taking differently: Take 17 g by mouth as needed. As needed once a week or so)    sars-cov-2, covid-19, (MODERNA COVID-19) 100 mcg/0.5 ml injection Inject into the muscle.    [DISCONTINUED] XARELTO 20 mg Tab TAKE 1 TAB BY MOUTH EVERY EVENING WITH DINNER (BEGIN AFTER ELIQUIS IS COMPLETED)     Family History       Problem Relation (Age of Onset)    Breast cancer Paternal Grandmother    Heart disease Father, Brother    Stroke Father          Tobacco Use    Smoking status: Never Smoker    Smokeless tobacco: Never Used   Substance and Sexual Activity    Alcohol use: No    Drug use: No    Sexual activity: Not Currently     Review of Systems   Constitutional:  Positive for fatigue. Negative for fever.   HENT:  Negative for nosebleeds and sore throat.    Respiratory:   Positive for cough. Negative for wheezing.    Cardiovascular:  Negative for chest pain and leg swelling.   Gastrointestinal:  Negative for abdominal pain and blood in stool.   Genitourinary:  Negative for difficulty urinating, dysuria and hematuria.   Musculoskeletal:  Negative for back pain and joint swelling.   Skin:  Negative for pallor and rash.   Neurological:  Negative for syncope and numbness.   Hematological:  Bruises/bleeds easily.   Psychiatric/Behavioral:  Positive for confusion. Negative for agitation.    Objective:     Vital Signs (Most Recent):  Temp: 98.8 °F (37.1 °C) (05/13/22 1011)  Pulse: 88 (05/13/22 1438)  Resp: 19 (05/13/22 1140)  BP: (!) 106/58 (05/13/22 1438)  SpO2: 99 % (05/13/22 1438)   Vital Signs (24h Range):  Temp:  [98.8 °F (37.1 °C)] 98.8 °F (37.1 °C)  Pulse:  [69-93] 88  Resp:  [18-19] 19  SpO2:  [99 %-100 %] 99 %  BP: ()/(49-65) 106/58     Weight: 38.6 kg (85 lb)  Body mass index is 15.55 kg/m².    Physical Exam  Constitutional:       General: She is not in acute distress.     Appearance: She is underweight. She is ill-appearing. She is not diaphoretic.   HENT:      Head: Normocephalic and atraumatic.   Eyes:      Extraocular Movements: Extraocular movements intact.      Pupils: Pupils are equal, round, and reactive to light.      Comments: Conjunctival pallor   Neck:      Vascular: No JVD.   Cardiovascular:      Rate and Rhythm: Normal rate and regular rhythm.      Heart sounds: Normal heart sounds.   Pulmonary:      Effort: Pulmonary effort is normal. No respiratory distress.      Breath sounds: Normal breath sounds.   Abdominal:      General: Bowel sounds are normal. There is no distension.      Palpations: Abdomen is soft.      Tenderness: There is no abdominal tenderness.   Musculoskeletal:         General: Tenderness (left shoulder) present. No swelling.   Lymphadenopathy:      Cervical: No cervical adenopathy.   Skin:     General: Skin is warm and dry.      Capillary  Refill: Capillary refill takes less than 2 seconds.      Findings: Bruising (scattered purpura of hands) present. No erythema or rash.   Neurological:      Mental Status: She is alert. Mental status is at baseline. She is disoriented.   Psychiatric:         Mood and Affect: Mood normal.         Behavior: Behavior normal.         Thought Content: Thought content normal.         Judgment: Judgment normal.         CRANIAL NERVES     CN III, IV, VI   Pupils are equal, round, and reactive to light.     Significant Labs: All pertinent labs within the past 24 hours have been reviewed.  CBC:   Recent Labs   Lab 05/13/22  1150 05/13/22  1159   WBC 8.76  --    HGB 5.1*  --    HCT 16.7* 15*     --      CMP:   Recent Labs   Lab 05/13/22  1150      K 3.9      CO2 27   GLU 86   BUN 46*   CREATININE 0.7   CALCIUM 8.5*   PROT 5.9*   ALBUMIN 3.1*   BILITOT 0.2   ALKPHOS 50*   AST 25   ALT 32   ANIONGAP 5*   EGFRNONAA >60.0     Coagulation:   Recent Labs   Lab 05/13/22  1238   INR 1.1   APTT 22.5       Significant Imaging: I have reviewed all pertinent imaging results/findings within the past 24 hours.    Assessment/Plan:     * Symptomatic anemia  Pt with significant drop of Hgb to 5.1 from normal 3 months ago. Normocytic.     -Transfuse 2 U PRBC urgently in ED  -will attempt to add reticulocyte, haptoglobin, Direct coumbs count to blood drawn prior to transfusion (Hemolytic anemia rare but reported side effect of IVIG)  -Pt not on anticoagulation, but on DAPT s/p watcman procedure.   -Denies hematuria, will obtain UA  -denies BRBPR or hematemasis. GI consulted by ED, appreciate recommendations.   -Will start GI Bleed pathway and therapies (PPI, maintain large bore IVs, CLD now and NPO at midnight)    Paroxysmal atrial fibrillation  S/p watcman Feb 2022, per cardiology documentation she has stopped anticoagulation, but should continue on DAPT through 8/2022 (6 months post procedure)    Will continue dapt at this  time while evaluation for possible source of bleeding/anemia        Late onset Alzheimer's dementia without behavioral disturbance  Continue donepezil      CVID (common variable immunodeficiency)  Receives IVIG infusions as outpatient      Postoperative hypothyroidism  Continue levothyroxine      VTE Risk Mitigation (From admission, onward)         Ordered     IP VTE HIGH RISK PATIENT  Once         05/13/22 1535     Reason for No Pharmacological VTE Prophylaxis  Once        Question:  Reasons:  Answer:  Risk of Bleeding    05/13/22 1532     Place sequential compression device  Until discontinued         05/13/22 1532                   Stanley Muñoz DO  Department of Hospital Medicine   [facility] - Emergency Dept

## 2022-05-13 NOTE — SUBJECTIVE & OBJECTIVE
Past Medical History:   Diagnosis Date    Adult bronchiectasis     Age-related osteoporosis with current pathological fracture with routine healing 8/28/2013    Amblyopia     Anxiety     Cataract     Cellulitis of right lower extremity 1/19/2021    Chondrocalcinosis, cause unspecified, involving hand(712.34) 7/12/2011     Dx updated per 2019 IMO Load    Chronic sinusitis 4/6/2017    Colonic constipation 6/5/2013    Concussion 10/27/2016    Frequent falls 3/14/2017    Gait disturbance 3/14/2017    History of cardiac radiofrequency ablation (RFA) 2/3/2018    History of subarachnoid hemorrhage 10/30/2016    Hypogammaglobulinemia 9/7/2011    Irritable bowel syndrome 12/18/2015    Major depressive disorder, recurrent episode, in full remission 8/19/2010    Onychomycosis     Osteoarthritis     Postoperative hypothyroidism 12/18/2015    Rectal prolapse 6/5/2013    Reflux esophagitis 2/7/2010    Status post thyroidectomy 6/1/2017    Strabismus     SVT (supraventricular tachycardia) 11/22/2013    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     Underweight 1/23/2013       Past Surgical History:   Procedure Laterality Date    BREAST CYST ASPIRATION      x2    CATARACT EXTRACTION W/  INTRAOCULAR LENS IMPLANT Left 3/11/2019    Procedure: EXTRACTION, CATARACT, WITH IOL INSERTION;  Surgeon: Vera Sams MD;  Location: [facility] OR;  Service: Ophthalmology;  Laterality: Left;    CATARACT EXTRACTION W/  INTRAOCULAR LENS IMPLANT Right 4/15/2019    Procedure: EXTRACTION, CATARACT, WITH IOL INSERTION LASER;  Surgeon: Vera Sams MD;  Location: [facility] OR;  Service: Ophthalmology;  Laterality: Right;    COLON SURGERY      EYE SURGERY      FRACTURE SURGERY      NASAL SEPTUM SURGERY      OCCLUSION OF LEFT ATRIAL APPENDAGE N/A 2/18/2022    Procedure: Left atrial appendage occlusion;  Surgeon: Chase Gill MD;  Location: [facility] EP LAB;  Service: Cardiology;  Laterality: N/A;  afib, watchman, BSCI, osiris, anes, MB/EH, 3prep    OPEN REDUCTION  AND INTERNAL FIXATION (ORIF) OF INJURY OF SACROILIAC JOINT Bilateral 1/20/2021    Procedure: ORIF, SACROILIAC JOINT;  Surgeon: Alcides Tamez MD;  Location: [facility] OR [address] [place] [address];  Service: Orthopedics;  Laterality: Bilateral;    RADIOFREQUENCY ABLATION      RECTAL PROLAPSE REPAIR  june 2013    STRABISMUS SURGERY      TONSILLECTOMY      TRANSESOPHAGEAL ECHOCARDIOGRAPHY N/A 2/18/2022    Procedure: ECHOCARDIOGRAM, TRANSESOPHAGEAL;  Surgeon: Dosc Diagnostic Provider;  Location: [facility] EP LAB;  Service: Cardiology;  Laterality: N/A;       Review of patient's allergies indicates:   Allergen Reactions    Adhesive      Tape tears skin    Buspar [buspirone]      headaches    Escitalopram oxalate Nausea Only     hyponatremia      Rifampin Rash     Family History       Problem Relation (Age of Onset)    Breast cancer Paternal Grandmother    Heart disease Father, Brother    Stroke Father          Tobacco Use    Smoking status: Never Smoker    Smokeless tobacco: Never Used   Substance and Sexual Activity    Alcohol use: No    Drug use: No    Sexual activity: Not Currently     Review of Systems   Constitutional:  Positive for fatigue. Negative for appetite change, chills and fever.   HENT:  Negative for congestion and trouble swallowing.    Eyes:  Negative for pain and redness.   Respiratory:  Negative for cough and shortness of breath.    Cardiovascular:  Negative for chest pain and palpitations.   Gastrointestinal:  Negative for abdominal distention, abdominal pain, anal bleeding, blood in stool, constipation, diarrhea, nausea, rectal pain and vomiting.   Genitourinary:  Negative for difficulty urinating and dysuria.   Musculoskeletal:  Positive for gait problem. Negative for back pain and neck pain.   Skin:  Negative for rash.   Neurological:  Positive for weakness. Negative for dizziness, light-headedness and headaches.   Objective:     Vital Signs (Most Recent):  Temp: 98.9 °F (37.2 °C) (05/13/22 1538)  Pulse: 80  (05/13/22 1538)  Resp: 14 (05/13/22 1538)  BP: (!) 106/58 (05/13/22 1538)  SpO2: 99 % (05/13/22 1538)   Vital Signs (24h Range):  Temp:  [98.8 °F (37.1 °C)-98.9 °F (37.2 °C)] 98.9 °F (37.2 °C)  Pulse:  [69-93] 80  Resp:  [14-19] 14  SpO2:  [99 %-100 %] 99 %  BP: ()/(49-65) 106/58     Weight: 38.6 kg (85 lb) (05/13/22 1325)  Body mass index is 15.55 kg/m².      Intake/Output Summary (Last 24 hours) at 5/13/2022 1601  Last data filed at 5/13/2022 1538  Gross per 24 hour   Intake 450 ml   Output --   Net 450 ml       Lines/Drains/Airways       Peripheral Intravenous Line  Duration                  Peripheral IV - Single Lumen 05/13/22 0000 18 G Right Hand <1 day         Peripheral IV - Single Lumen 05/13/22 1238 18 G Left Forearm <1 day                    Physical Exam  Constitutional:       Appearance: She is cachectic.   Eyes:      General: No scleral icterus.     Conjunctiva/sclera: Conjunctivae normal.   Cardiovascular:      Rate and Rhythm: Normal rate and regular rhythm.      Pulses: Normal pulses.      Heart sounds: Normal heart sounds.   Pulmonary:      Effort: Pulmonary effort is normal. No respiratory distress.      Breath sounds: Normal breath sounds.   Abdominal:      General: Bowel sounds are normal. There is no distension.      Palpations: Abdomen is soft.      Tenderness: There is no abdominal tenderness.      Comments: Brown stool on rectal exam.    Musculoskeletal:      Right lower leg: No edema.      Left lower leg: No edema.   Skin:     General: Skin is warm and dry.      Findings: No bruising or rash.   Neurological:      Mental Status: She is alert and oriented to person, place, and time.       Significant Labs:  All pertinent lab results from the last 24 hours have been reviewed.    Significant Imaging:  Imaging results within the past 24 hours have been reviewed.

## 2022-05-13 NOTE — ASSESSMENT & PLAN NOTE
S/p carlos Feb 2022, per cardiology documentation she has stopped anticoagulation, but should continue on DAPT through 8/2022 (6 months post procedure)    Will continue dapt at this time while evaluation for possible source of bleeding/anemia

## 2022-05-13 NOTE — PLAN OF CARE
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 She lives at [facility] in [facility]. She is  to former [facility] neurologist Dr. Pavel Crews. Her primary care physician is Dr. Sally Green.    Her  contacted Electrophysiology on 5/12/2022 after she felt dizzy and weak. She felt weak again the next morning and fell to the floor in her apartment. She was taken to [facility]. She had been having increasing weakness for the past few days, had been more irritable and confused. She was found to have acute anemia with hemoglobin of 5.1 g/dL and hematocrit 16.7%, from 12.1 and 38.6 on 2/1/2022. BUN was elevated at 46 mg/dL. BNP was elevated at 219 pg/mL. PT, PTT, and INR were normal. Urinalysis showed no hematuria. X-ray showed a new right 5th anterior rib fracture. Stool was brown but positive for hemoccult. She takes aspirin indefinitely and clopidrogel until 8/18/2022 (6 months post Watchman procedure). She had a small cut on her hand that bled much more than expected but eventually stopped. She denied any overt blood in her stool or her urine. She was given acetaminophen and pantoprazole. Gastroenterology was consulted. She was admitted to Hospital Medicine Team A.    CT Head Without Contrast 5/13/22: FINDINGS:   Study distorted by motion artifact and  beam hardening, within limits of the study there is no evidence for acute intracranial hemorrhage or sulcal effacement.  Mild generalized cerebral volume loss similar to prior with compensatory enlargement ventricles sulci and cisterns without hydrocephalus..  There is no midline shift or mass effect.  Visualized paranasal sinuses and mastoid air cells are clear.   Impression:  No acute intracranial findings as detailed above specifically without evidence for acute intracranial hemorrhage or sulcal effacement to suggest large territory recent infarction.   Clinical correlation and further evaluation as warranted.   X-Ray Hips Bilateral 2 View Incl AP Pelvis 5/13/22: FINDINGS:   Two views pelvis and hips.   There is osteopenia.  Degenerative changes are noted of the bilateral sacroiliac joints noting bilateral screw fixation.  Degenerative changes are noted of the pubic symphysis as well as of the bilateral femoroacetabular joints.  No femoroacetabular dislocation.  No convincing acute displaced fracture or dislocation of the hips or pelvis.   Impression:  No convincing acute displaced fracture or dislocation of the hips or pelvis.   X-Ray Chest AP Portable 5/13/22: FINDINGS:   There is a new fracture of the right 5th anterior rib.  No pneumothorax seen.  The lung volumes are more shallow.  The heart size appears normal.  There is mild unfolding and atherosclerotic stigmata of the aorta.   Impression:  New right 5th anterior rib fracture.   X-Ray Shoulder Trauma Left 5/13/22: FINDINGS:   Three views left shoulder.   There is osteopenia.  The left humeral head maintains appropriate relationship with the glenoid.  Degenerative changes are noted of the acromioclavicular joint.  No acute displaced left rib fracture.  The left lung zones are clear.   Impression:  Allowing for positioning, no convincing acute displaced fracture or dislocation of the left shoulder.   X-Ray Elbow Complete Left 5/13/22: FINDINGS:   Three  views left elbow.   No significant displacement of the anterior or posterior elbow fat pads.  Anterior humeral line and radiocapitellar line are in appropriate orientation.  Degenerative changes are noted about the elbow including the olecranon.  No convincing acute displaced fracture or dislocation of the elbow.  No radiopaque foreign body.   Impression:  No convincing acute displaced fracture or dislocation of the elbow.

## 2022-05-13 NOTE — CONSULTS
[facility] - Emergency Dept  Gastroenterology  Consult Note    Patient Name: Yvette Crews  MRN: [id number]  Admission Date: 5/13/2022  Hospital Length of Stay: 0 days  Code Status: Full Code   Attending Provider: Russell Robledo MD   Consulting Provider: Gordo Elizabeth MD  Primary Care Physician: Sally Green MD  Principal Problem:Symptomatic anemia    Inpatient consult to Gastroenterology  Consult performed by: Gordo Elizabeth MD  Consult ordered by: Caroline Herbert PA-C        Subjective:     HPI:  Ms. Yvette Crews is a 72 year old female for whom GI is consulted with concern for GI bleeding. She has a PMH significant for atrial fibrillation (status post Watchman device placement in 02/2022 and on ASA and PLAVIX), CVID, and hypothyroidism (status post total thyroidectomy).     Patient reports onset of progressive weakness of approximately one week duration associated with increased frequency of falls. She usually ambulates with a walker, however she reports being too weak to ambulate. She denies symptom association with dysphagia, abdominal pain, nausea, vomiting, NSAID usage, melena, hematochezia, and bloody in urine. She reports having a brown bowel movement daily with minimal straining. Due to weakness, she was brought to ED here on 05/13/2022.     Hospital Course: On arrival, vital signs normal on room air. Labs notable for anemia (Hgb 5.1 from 12 in 02/2022), normal platelets, normal INR, elevated BUN (46), elevated BNP (219), and normal troponin. CT head without evidence of bleed or CVA. She was given PPI, IVF, and admitted to hospital medicine.       Past Medical History:   Diagnosis Date    Adult bronchiectasis     Age-related osteoporosis with current pathological fracture with routine healing 8/28/2013    Amblyopia     Anxiety     Cataract     Cellulitis of right lower extremity 1/19/2021    Chondrocalcinosis, cause unspecified, involving hand(712.34) 7/12/2011     Dx  updated per 2019 IMO Load    Chronic sinusitis 4/6/2017    Colonic constipation 6/5/2013    Concussion 10/27/2016    Frequent falls 3/14/2017    Gait disturbance 3/14/2017    History of cardiac radiofrequency ablation (RFA) 2/3/2018    History of subarachnoid hemorrhage 10/30/2016    Hypogammaglobulinemia 9/7/2011    Irritable bowel syndrome 12/18/2015    Major depressive disorder, recurrent episode, in full remission 8/19/2010    Onychomycosis     Osteoarthritis     Postoperative hypothyroidism 12/18/2015    Rectal prolapse 6/5/2013    Reflux esophagitis 2/7/2010    Status post thyroidectomy 6/1/2017    Strabismus     SVT (supraventricular tachycardia) 11/22/2013    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     Underweight 1/23/2013       Past Surgical History:   Procedure Laterality Date    BREAST CYST ASPIRATION      x2    CATARACT EXTRACTION W/  INTRAOCULAR LENS IMPLANT Left 3/11/2019    Procedure: EXTRACTION, CATARACT, WITH IOL INSERTION;  Surgeon: Vera Sams MD;  Location: [facility] OR;  Service: Ophthalmology;  Laterality: Left;    CATARACT EXTRACTION W/  INTRAOCULAR LENS IMPLANT Right 4/15/2019    Procedure: EXTRACTION, CATARACT, WITH IOL INSERTION LASER;  Surgeon: Vera Sams MD;  Location: [facility] OR;  Service: Ophthalmology;  Laterality: Right;    COLON SURGERY      EYE SURGERY      FRACTURE SURGERY      NASAL SEPTUM SURGERY      OCCLUSION OF LEFT ATRIAL APPENDAGE N/A 2/18/2022    Procedure: Left atrial appendage occlusion;  Surgeon: Chase Gill MD;  Location: [facility] EP LAB;  Service: Cardiology;  Laterality: N/A;  afib, watchman, BSCI, osiris, anes, MB/EH, 3prep    OPEN REDUCTION AND INTERNAL FIXATION (ORIF) OF INJURY OF SACROILIAC JOINT Bilateral 1/20/2021    Procedure: ORIF, SACROILIAC JOINT;  Surgeon: Alcides Tamez MD;  Location: [facility] OR 2ND FLR;  Service: Orthopedics;  Laterality: Bilateral;    RADIOFREQUENCY ABLATION      RECTAL PROLAPSE REPAIR  june 2013     STRABISMUS SURGERY      TONSILLECTOMY      TRANSESOPHAGEAL ECHOCARDIOGRAPHY N/A 2/18/2022    Procedure: ECHOCARDIOGRAM, TRANSESOPHAGEAL;  Surgeon: Nils Diagnostic Provider;  Location: [facility] LAB;  Service: Cardiology;  Laterality: N/A;       Review of patient's allergies indicates:   Allergen Reactions    Adhesive      Tape tears skin    Buspar [buspirone]      headaches    Escitalopram oxalate Nausea Only     hyponatremia      Rifampin Rash     Family History       Problem Relation (Age of Onset)    Breast cancer Paternal Grandmother    Heart disease Father, Brother    Stroke Father          Tobacco Use    Smoking status: Never Smoker    Smokeless tobacco: Never Used   Substance and Sexual Activity    Alcohol use: No    Drug use: No    Sexual activity: Not Currently     Review of Systems   Constitutional:  Positive for fatigue. Negative for appetite change, chills and fever.   HENT:  Negative for congestion and trouble swallowing.    Eyes:  Negative for pain and redness.   Respiratory:  Negative for cough and shortness of breath.    Cardiovascular:  Negative for chest pain and palpitations.   Gastrointestinal:  Negative for abdominal distention, abdominal pain, anal bleeding, blood in stool, constipation, diarrhea, nausea, rectal pain and vomiting.   Genitourinary:  Negative for difficulty urinating and dysuria.   Musculoskeletal:  Positive for gait problem. Negative for back pain and neck pain.   Skin:  Negative for rash.   Neurological:  Positive for weakness. Negative for dizziness, light-headedness and headaches.   Objective:     Vital Signs (Most Recent):  Temp: 98.9 °F (37.2 °C) (05/13/22 1538)  Pulse: 80 (05/13/22 1538)  Resp: 14 (05/13/22 1538)  BP: (!) 106/58 (05/13/22 1538)  SpO2: 99 % (05/13/22 1538)   Vital Signs (24h Range):  Temp:  [98.8 °F (37.1 °C)-98.9 °F (37.2 °C)] 98.9 °F (37.2 °C)  Pulse:  [69-93] 80  Resp:  [14-19] 14  SpO2:  [99 %-100 %] 99 %  BP: ()/(49-65) 106/58      Weight: 38.6 kg (85 lb) (05/13/22 1325)  Body mass index is 15.55 kg/m².      Intake/Output Summary (Last 24 hours) at 5/13/2022 1601  Last data filed at 5/13/2022 1538  Gross per 24 hour   Intake 450 ml   Output --   Net 450 ml       Lines/Drains/Airways       Peripheral Intravenous Line  Duration                  Peripheral IV - Single Lumen 05/13/22 0000 18 G Right Hand <1 day         Peripheral IV - Single Lumen 05/13/22 1238 18 G Left Forearm <1 day                    Physical Exam  Constitutional:       Appearance: She is cachectic.   Eyes:      General: No scleral icterus.     Conjunctiva/sclera: Conjunctivae normal.   Cardiovascular:      Rate and Rhythm: Normal rate and regular rhythm.      Pulses: Normal pulses.      Heart sounds: Normal heart sounds.   Pulmonary:      Effort: Pulmonary effort is normal. No respiratory distress.      Breath sounds: Normal breath sounds.   Abdominal:      General: Bowel sounds are normal. There is no distension.      Palpations: Abdomen is soft.      Tenderness: There is no abdominal tenderness.      Comments: Brown stool on rectal exam.    Musculoskeletal:      Right lower leg: No edema.      Left lower leg: No edema.   Skin:     General: Skin is warm and dry.      Findings: No bruising or rash.   Neurological:      Mental Status: She is alert and oriented to person, place, and time.       Significant Labs:  All pertinent lab results from the last 24 hours have been reviewed.    Significant Imaging:  Imaging results within the past 24 hours have been reviewed.    Assessment/Plan:     * Symptomatic anemia  This is a 72 year old female for whom GI is consulted with concern for GI bleeding. She has a PMH significant for atrial fibrillation (status post Watchman device placement in 02/2022 and on ASA and PLAVIX), CVID, and hypothyroidism (status post total thyroidectomy) who presented to [facility] on 05/13/2022 with symptomatic normocytic anemia (Hgb 5.1 from 12 in 02/2022)  without overt signs of GI bleeding. Etiology of anemia possibly from slow bleeding from gastritis versus gastric erosions in the setting of DAPT usage; given overall stability this is not likely a brisk GI bleed.     Recommendations:     -Continue transfusions to keep Hgb >7.  -Continue PPI daily with DAPT usage to minimize risk of mucosal damage   -Okay for a regular diet from GI perspective.   -No plans for endoscopic intervention currently given overall frailty of patient limiting her ability to tolerate necessary prep for colonoscopy and placing her at increased risk for sedation.   -If Hgb fails to respond appropriately to transfusions or develops overt signs of GI bleeding, will re-consider obtaining EGD.         Thank you for your consult. I will follow-up with patient. Please contact us if you have any additional questions.    Gordo Elizabeth MD  Gastroenterology  [facility] - Emergency Dept

## 2022-05-13 NOTE — ASSESSMENT & PLAN NOTE
Pt with significant drop of Hgb to 5.1 from normal 3 months ago. Normocytic.     -Transfuse 2 U PRBC urgently in ED  -will attempt to add reticulocyte, haptoglobin, Direct coumbs count to blood drawn prior to transfusion (Hemolytic anemia rare but reported side effect of IVIG)  -Pt not on anticoagulation, but on DAPT s/p watcman procedure.   -Denies hematuria, will obtain UA  -denies BRBPR or hematemasis. GI consulted by ED, appreciate recommendations.   -Will start GI Bleed pathway and therapies (PPI, maintain large bore IVs, CLD now and NPO at midnight)

## 2022-05-13 NOTE — ED PROVIDER NOTES
72-year-old Encounter Date: 5/13/2022       History     Chief Complaint   Patient presents with    Altered Mental Status     From [facility], fell several times, has intermittent confusion and lethargic, usually walks with a walker      72-year-old female with history of reflux esophagitis, SVT, irritable bowel syndrome, depression, dementia presents the ER for evaluation of weakness.  Patient is here from [facility] as she has had multiple falls in the last few days.  Patient has also had increasing weakness.  Low blood pressure in the 90s as per nursing facility.  Patient states she feels very weak and fatigued over the last several days.  At baseline does use a walker to ambulate but has not been able to do so over the last couple days.  She denies any chest pain, palpitations or shortness of breath.  Denies any abdominal pain, nausea vomiting or diarrhea.  She has not noticed any black or tarry stool.  No rectal bleeding.  Patient denies any UTI symptoms including dysuria hematuria at this time.  No known fevers at home.  She is not on blood thinners.    The history is provided by the patient.     Review of patient's allergies indicates:   Allergen Reactions    Adhesive      Tape tears skin    Buspar [buspirone]      headaches    Escitalopram oxalate Nausea Only     hyponatremia      Rifampin Rash     Past Medical History:   Diagnosis Date    Adult bronchiectasis     Age-related osteoporosis with current pathological fracture with routine healing 8/28/2013    Amblyopia     Anxiety     Cataract     Chondrocalcinosis, cause unspecified, involving hand(712.34) 7/12/2011     Dx updated per 2019 IMO Load    Chronic sinusitis 4/6/2017    Colonic constipation 6/5/2013    Concussion 10/27/2016    Frequent falls 3/14/2017    Gait disturbance 3/14/2017    History of cardiac radiofrequency ablation (RFA) 2/3/2018    History of subarachnoid hemorrhage 10/30/2016    Hypogammaglobulinemia 9/7/2011     Irritable bowel syndrome 12/18/2015    Major depressive disorder, recurrent episode, in full remission 8/19/2010    Onychomycosis     Osteoarthritis     Postoperative hypothyroidism 12/18/2015    Rectal prolapse 6/5/2013    Reflux esophagitis 2/7/2010    Status post thyroidectomy 6/1/2017    Strabismus     SVT (supraventricular tachycardia) 11/22/2013    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     Underweight 1/23/2013     Past Surgical History:   Procedure Laterality Date    BREAST CYST ASPIRATION      x2    CATARACT EXTRACTION W/  INTRAOCULAR LENS IMPLANT Left 3/11/2019    Procedure: EXTRACTION, CATARACT, WITH IOL INSERTION;  Surgeon: Vera Sams MD;  Location: [facility];  Service: Ophthalmology;  Laterality: Left;    CATARACT EXTRACTION W/  INTRAOCULAR LENS IMPLANT Right 4/15/2019    Procedure: EXTRACTION, CATARACT, WITH IOL INSERTION LASER;  Surgeon: Vera Sams MD;  Location: [facility];  Service: Ophthalmology;  Laterality: Right;    COLON SURGERY      EYE SURGERY      FRACTURE SURGERY      NASAL SEPTUM SURGERY      OCCLUSION OF LEFT ATRIAL APPENDAGE N/A 2/18/2022    Procedure: Left atrial appendage occlusion;  Surgeon: Chase Gill MD;  Location: [facility];  Service: Cardiology;  Laterality: N/A;  afib, watchman, BSCI, osiris, anes, MB/EH, 3prep    OPEN REDUCTION AND INTERNAL FIXATION (ORIF) OF INJURY OF SACROILIAC JOINT Bilateral 1/20/2021    Procedure: ORIF, SACROILIAC JOINT;  Surgeon: Alcides Tamez MD;  [address];  Service: Orthopedics;  Laterality: Bilateral;    RADIOFREQUENCY ABLATION      RECTAL PROLAPSE REPAIR  june 2013    STRABISMUS SURGERY      TONSILLECTOMY      TRANSESOPHAGEAL ECHOCARDIOGRAPHY N/A 2/18/2022    Procedure: ECHOCARDIOGRAM, TRANSESOPHAGEAL;  Surgeon: Nils Diagnostic Provider;  Location: [facility];  Service: Cardiology;  Laterality: N/A;     Family History   Problem Relation Age of Onset    Heart disease Father     Stroke  Father     Heart disease Brother     Breast cancer Paternal Grandmother     Melanoma Neg Hx     Psoriasis Neg Hx     Lupus Neg Hx     Eczema Neg Hx     Amblyopia Neg Hx     Blindness Neg Hx     Cataracts Neg Hx     Glaucoma Neg Hx     Macular degeneration Neg Hx     Retinal detachment Neg Hx     Strabismus Neg Hx      Social History     Tobacco Use    Smoking status: Never Smoker    Smokeless tobacco: Never Used   Substance Use Topics    Alcohol use: No    Drug use: No     Review of Systems   Constitutional: Positive for fatigue. Negative for chills and fever.   HENT: Negative for congestion.    Eyes: Negative for visual disturbance.   Respiratory: Negative for cough and shortness of breath.    Cardiovascular: Negative for chest pain and palpitations.   Gastrointestinal: Negative for abdominal pain, nausea and vomiting.   Genitourinary: Negative for dysuria and flank pain.   Musculoskeletal: Positive for arthralgias. Negative for myalgias.   Skin: Negative for rash and wound.   Allergic/Immunologic: Negative for immunocompromised state.   Neurological: Positive for weakness and light-headedness. Negative for numbness.   Hematological: Does not bruise/bleed easily.   Psychiatric/Behavioral: Negative for confusion.       Physical Exam     Initial Vitals [05/13/22 1011]   BP Pulse Resp Temp SpO2   (!) 91/49 93 18 98.8 °F (37.1 °C) 99 %      MAP       --         Physical Exam    Vitals reviewed.  Constitutional: She appears well-developed and well-nourished. She is not diaphoretic. No distress.   HENT:   Head: Normocephalic and atraumatic.   Right Ear: Tympanic membrane normal.   Left Ear: Tympanic membrane normal.   Nose: Nose normal.   Mouth/Throat: Uvula is midline and oropharynx is clear and moist. Mucous membranes are pale.   Eyes: Conjunctivae and EOM are normal. Pupils are equal, round, and reactive to light.   Neck: Neck supple.   Normal range of motion.  Cardiovascular: Normal rate, regular  rhythm and intact distal pulses.   Murmur heard.  Pulmonary/Chest: Breath sounds normal. No respiratory distress.   Abdominal: Abdomen is soft. She exhibits no distension. There is no abdominal tenderness.   Genitourinary: Rectum:      Guaiac result positive (brown stool).   Guaiac positive stool (brown stool). : Acceptable.  Musculoskeletal:         General: Normal range of motion.      Left shoulder: Tenderness present. No swelling or deformity.      Left elbow: Tenderness present.      Cervical back: Normal range of motion and neck supple.     Neurological: She is alert and oriented to person, place, and time.   Skin: Skin is warm. There is pallor.         ED Course   Procedures  Labs Reviewed   CBC W/ AUTO DIFFERENTIAL - Abnormal; Notable for the following components:       Result Value    RBC 1.78 (*)     Hemoglobin 5.1 (*)     Hematocrit 16.7 (*)     MCHC 30.5 (*)     RDW 14.6 (*)     Lymph # 0.9 (*)     Gran % 84.4 (*)     Lymph % 10.6 (*)     All other components within normal limits    Narrative:     Release to patient->Immediate  H&H critical result(s) called and verbal readback obtained from   Ebony Fernandez RN by JC8 05/13/2022 12:05   COMPREHENSIVE METABOLIC PANEL - Abnormal; Notable for the following components:    BUN 46 (*)     Calcium 8.5 (*)     Total Protein 5.9 (*)     Albumin 3.1 (*)     Alkaline Phosphatase 50 (*)     Anion Gap 5 (*)     All other components within normal limits    Narrative:     Release to patient->Immediate   TSH - Abnormal; Notable for the following components:    TSH 5.164 (*)     All other components within normal limits    Narrative:     Release to patient->Immediate   B-TYPE NATRIURETIC PEPTIDE - Abnormal; Notable for the following components:     (*)     All other components within normal limits    Narrative:     Release to patient->Immediate   ISTAT PROCEDURE - Abnormal; Notable for the following components:    POC BUN 42 (*)     POC Hematocrit  15 (*)     All other components within normal limits   MAGNESIUM    Narrative:     Release to patient->Immediate   TROPONIN I    Narrative:     Release to patient->Immediate   HEPATITIS C ANTIBODY    Narrative:     Release to patient->Immediate   APTT   PROTIME-INR   T4, FREE    Narrative:     Release to patient->Immediate   URINALYSIS, REFLEX TO URINE CULTURE   PROTIME-INR   APTT   TYPE & SCREEN   ISTAT CHEM8   POCT GLUCOSE MONITORING CONTINUOUS   PREPARE RBC SOFT        ECG Results          EKG 12-lead (In process)  Result time 05/13/22 14:44:28    In process by Interface, Lab In [facility] (05/13/22 14:44:28)                 Narrative:    Test Reason : R41.82,    Vent. Rate : 076 BPM     Atrial Rate : 076 BPM     P-R Int : 170 ms          QRS Dur : 062 ms      QT Int : 354 ms       P-R-T Axes : 056 079 072 degrees     QTc Int : 398 ms    Age and gender specific analysis  Sinus rhythm with sinus arrhythmia  Normal ECG  When compared with ECG of 04-APR-2022 09:15,  ST now depressed in Anterior leads    Referred By: [email or web address]   SELF           Confirmed By:                             Imaging Results          CT Head Without Contrast (Final result)  Result time 05/13/22 12:35:45    Final result by Benjamin Su DO (05/13/22 12:35:45)                 Impression:      No acute intracranial findings as detailed above specifically without evidence for acute intracranial hemorrhage or sulcal effacement to suggest large territory recent infarction.    Clinical correlation and further evaluation as warranted.      Electronically signed by: Benjamin Su DO  Date:    05/13/2022  Time:    12:35             Narrative:    EXAMINATION:  CT HEAD WITHOUT CONTRAST    CLINICAL HISTORY:  Head trauma, minor (Age >= 65y);    TECHNIQUE:  Multiple sequential 5 mm axial images of the head without contrast.  Coronal and sagittal reformatted imaging from the axial acquisition.    COMPARISON:  MRI 07/26/2021    FINDINGS:  Study distorted by  motion artifact and beam hardening, within limits of the study there is no evidence for acute intracranial hemorrhage or sulcal effacement.  Mild generalized cerebral volume loss similar to prior with compensatory enlargement ventricles sulci and cisterns without hydrocephalus..  There is no midline shift or mass effect.  Visualized paranasal sinuses and mastoid air cells are clear.                               X-Ray Elbow Complete Left (Final result)  Result time 05/13/22 12:58:54    Final result by Marc Amor MD (05/13/22 12:58:54)                 Impression:      1. No convincing acute displaced fracture or dislocation of the elbow.      Electronically signed by: Marc Amor MD  Date:    05/13/2022  Time:    12:58             Narrative:    EXAMINATION:  XR ELBOW COMPLETE 3 VIEW LEFT    CLINICAL HISTORY:  Unspecified fall, initial encounter    TECHNIQUE:  AP, lateral, and oblique views of the left elbow were performed.    COMPARISON:  None    FINDINGS:  Three views left elbow.    No significant displacement of the anterior or posterior elbow fat pads.  Anterior humeral line and radiocapitellar line are in appropriate orientation.  Degenerative changes are noted about the elbow including the olecranon.  No convincing acute displaced fracture or dislocation of the elbow.  No radiopaque foreign body.                               X-Ray Shoulder Trauma Left (Final result)  Result time 05/13/22 12:57:49    Final result by Marc Amor MD (05/13/22 12:57:49)                 Impression:      1. Allowing for positioning, no convincing acute displaced fracture or dislocation of the left shoulder.      Electronically signed by: Marc Amor MD  Date:    05/13/2022  Time:    12:57             Narrative:    EXAMINATION:  XR SHOULDER TRAUMA 3 VIEW LEFT    CLINICAL HISTORY:  Unspecified fall, initial encounter    TECHNIQUE:  Three views of the left shoulder were  performed.    COMPARISON  10/14/2021    FINDINGS:  Three views left shoulder.    There is osteopenia.  The left humeral head maintains appropriate relationship with the glenoid.  Degenerative changes are noted of the acromioclavicular joint.  No acute displaced left rib fracture.  The left lung zones are clear.                               X-Ray Hips Bilateral 2 View Incl AP Pelvis (Final result)  Result time 05/13/22 12:56:42    Final result by Marc Amor MD (05/13/22 12:56:42)                 Impression:      1. No convincing acute displaced fracture or dislocation of the hips or pelvis.      Electronically signed by: Marc Amor MD  Date:    05/13/2022  Time:    12:56             Narrative:    EXAMINATION:  XR HIPS BILATERAL 2 VIEW INCL AP PELVIS    CLINICAL HISTORY:  Unspecified fall, initial encounter    TECHNIQUE:  AP view of the pelvis and frogleg lateral views of both hips were performed.    COMPARISON:  None.    FINDINGS:  Two views pelvis and hips.    There is osteopenia.  Degenerative changes are noted of the bilateral sacroiliac joints noting bilateral screw fixation.  Degenerative changes are noted of the pubic symphysis as well as of the bilateral femoroacetabular joints.  No femoroacetabular dislocation.  No convincing acute displaced fracture or dislocation of the hips or pelvis.                               X-Ray Chest AP Portable (Final result)  Result time 05/13/22 12:57:13    Final result by Cherry Cabrera MD (05/13/22 12:57:13)                 Impression:      New right 5th anterior rib fracture.      Electronically signed by: Cherry Cabrera  Date:    05/13/2022  Time:    12:57             Narrative:    EXAMINATION:  XR CHEST AP PORTABLE    CLINICAL HISTORY:  Unspecified fall, initial encounter    TECHNIQUE:  Single frontal view of the chest was performed.    COMPARISON:  01/22/2021    FINDINGS:  There is a new fracture of the right 5th anterior rib.  No pneumothorax seen.  The  lung volumes are more shallow.  The heart size appears normal.  There is mild unfolding and atherosclerotic stigmata of the aorta.                                 Medications   0.9%  NaCl infusion (for blood administration) (has no administration in time range)   pantoprazole injection 40 mg (has no administration in time range)   melatonin tablet 6 mg (has no administration in time range)   ondansetron disintegrating tablet 8 mg (has no administration in time range)   acetaminophen tablet 650 mg (has no administration in time range)   glucose chewable tablet 16 g (has no administration in time range)   glucose chewable tablet 24 g (has no administration in time range)   dextrose 50% injection 12.5 g (has no administration in time range)   dextrose 50% injection 25 g (has no administration in time range)   glucagon (human recombinant) injection 1 mg (has no administration in time range)   pantoprazole injection 80 mg (80 mg Intravenous Given 5/13/22 1343)   acetaminophen tablet 650 mg (650 mg Oral Given 5/13/22 1412)           APC / Resident Notes:   Patient in the ER promptly upon arrival.  She is afebrile, no acute distress.  She is alert oriented x4.  Pale skin and mucosal membranes noted.  Heart murmur noted.  Abdomen soft, nondistended, nontender.  Lung sounds are clear.  Patient does have some tenderness on palpation to the left upper arm and shoulder and elbow region without deformity.  IV access was established, labs ordered.  Patient was placed on the cardiac monitor.      Laboratory studies show normal white count of 8.7.  Hemoglobin is 5.1 significantly lower than patient's previous hemoglobin of 12.1.  Blood transfusion consent form signed.  Rectal examination was done with nursing staff present in the room.  Brown stool, Hemoccult positive  Blood transfusion ordered.    Laboratory studies otherwise show normal liver and kidney functions.  Cardiac workup essentially unremarkable.  X-ray of chest  concerning right 5th anterior rib fracture.  X-ray of the shoulder, elbow on a full.  CT of the head is unremarkable for acute intracranial abnormality or skull fractures.    On reassessment patient is resting comfortably.  Discussed case with Hospital Medicine for primary admission of patient.  Will need GI consult.  Clear liquid diet placed. Patient was informed of the decision for admission, acknowledges and agrees to treatment plan. The care of this patient was overseen by attending physician who agrees with treatment, plan, and disposition.    Disclaimer: This note has been generated using voice-recognition software. There may be typographical errors that have been missed during proof-reading.         Attending Attestation:     Physician Attestation Statement for NP/PA:   I have conducted a face to face encounter with this patient in addition to the NP/PA, due to Medical Complexity    Other NP/PA Attestation Additions:      Medical Decision Making: Weakness, low H&H.  Will transfuse and admit.                Critical Care   Date: 05/13/2022  Performed by: Cholo Banks MD   Authorized by: Cholo Banks MD    Total critical care time (exclusive of procedural time) : 30 minutes  Critical care was necessary to treat or prevent imminent or life-threatening deterioration of the following conditions:  Symptomatic anemia requiring transfusion       ED Course as of 05/13/22 1534   Fri May 13, 2022   1323 X-Ray Chest AP Portable  Anterior rib fracture, 5th right [AJ]      ED Course User Index  [AJ] Caroline Herbert PA-C             Clinical Impression:   Final diagnoses:  [R41.82] Altered mental status  [W19.XXXA] Fall  [D64.9] Symptomatic anemia (Primary)  [D64.9] Anemia requiring transfusions  [K92.2] Gastrointestinal hemorrhage, unspecified gastrointestinal hemorrhage type  [K92.2] GI bleed          ED Disposition Condition    Admit               Caroline Herbert PA-C  05/13/22 1450       Caroline Herbert  KETURAH  05/13/22 1534       Cholo Banks MD  05/13/22 [id number]

## 2022-05-13 NOTE — HPI
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 She lives at [facility] in [place]. She is  to former [facility] neurologist Dr. Pavel Crews. Her primary care physician is Dr. Sally Green.               Her  contacted Electrophysiology on 5/12/2022 after she felt dizzy and weak. She felt weak again the next morning and fell to the floor in her apartment. She was taken to [facility]. She had been having increasing weakness for the past few days, had been more irritable and confused. She was found to have acute anemia with hemoglobin of 5.1 g/dL and hematocrit 16.7%, from 12.1 and 38.6 on 2/1/2022. BUN was elevated at 46 mg/dL. BNP was elevated at 219 pg/mL. PT, PTT, and INR were normal. Urinalysis showed no hematuria. X-ray showed a new right 5th anterior rib fracture. Stool was brown but positive for hemoccult. She takes aspirin indefinitely and clopidrogel until 8/18/2022 (6 months post Watchman procedure). She had a small cut on her hand that bled much more than expected but eventually stopped. She denied any overt blood in her stool or her urine. She was given acetaminophen and pantoprazole. Gastroenterology was consulted. She was admitted to Hospital Medicine Team [facility]

## 2022-05-14 VITALS
HEIGHT: 62 IN | SYSTOLIC BLOOD PRESSURE: 147 MMHG | BODY MASS INDEX: 15.64 KG/M2 | WEIGHT: 85 LBS | DIASTOLIC BLOOD PRESSURE: 70 MMHG | OXYGEN SATURATION: 95 % | HEART RATE: 70 BPM | RESPIRATION RATE: 16 BRPM | TEMPERATURE: 98 F

## 2022-05-14 PROBLEM — Z95.818 PRESENCE OF WATCHMAN LEFT ATRIAL APPENDAGE CLOSURE DEVICE: Chronic | Status: ACTIVE | Noted: 2022-05-14

## 2022-05-14 PROBLEM — Z95.818 PRESENCE OF WATCHMAN LEFT ATRIAL APPENDAGE CLOSURE DEVICE: Status: ACTIVE | Noted: 2022-05-14

## 2022-05-14 PROBLEM — D50.9 IRON DEFICIENCY ANEMIA: Status: ACTIVE | Noted: 2022-05-13

## 2022-05-14 LAB
BASOPHILS # BLD AUTO: 0.02 K/UL (ref 0–0.2)
BASOPHILS # BLD AUTO: 0.03 K/UL (ref 0–0.2)
BASOPHILS NFR BLD: 0.3 % (ref 0–1.9)
BASOPHILS NFR BLD: 0.4 % (ref 0–1.9)
DIFFERENTIAL METHOD: ABNORMAL
DIFFERENTIAL METHOD: ABNORMAL
EOSINOPHIL # BLD AUTO: 0 K/UL (ref 0–0.5)
EOSINOPHIL # BLD AUTO: 0 K/UL (ref 0–0.5)
EOSINOPHIL NFR BLD: 0.1 % (ref 0–8)
EOSINOPHIL NFR BLD: 0.2 % (ref 0–8)
ERYTHROCYTE [DISTWIDTH] IN BLOOD BY AUTOMATED COUNT: 14.5 % (ref 11.5–14.5)
ERYTHROCYTE [DISTWIDTH] IN BLOOD BY AUTOMATED COUNT: 14.7 % (ref 11.5–14.5)
HCT VFR BLD AUTO: 27 % (ref 37–48.5)
HCT VFR BLD AUTO: 27.2 % (ref 37–48.5)
HCT VFR BLD AUTO: 28.3 % (ref 37–48.5)
HGB BLD-MCNC: 8.6 G/DL (ref 12–16)
HGB BLD-MCNC: 8.9 G/DL (ref 12–16)
HGB BLD-MCNC: 9 G/DL (ref 12–16)
IMM GRANULOCYTES # BLD AUTO: 0.02 K/UL (ref 0–0.04)
IMM GRANULOCYTES # BLD AUTO: 0.03 K/UL (ref 0–0.04)
IMM GRANULOCYTES NFR BLD AUTO: 0.3 % (ref 0–0.5)
IMM GRANULOCYTES NFR BLD AUTO: 0.4 % (ref 0–0.5)
LYMPHOCYTES # BLD AUTO: 1.3 K/UL (ref 1–4.8)
LYMPHOCYTES # BLD AUTO: 1.4 K/UL (ref 1–4.8)
LYMPHOCYTES NFR BLD: 19.8 % (ref 18–48)
LYMPHOCYTES NFR BLD: 20.1 % (ref 18–48)
MCH RBC QN AUTO: 29.7 PG (ref 27–31)
MCH RBC QN AUTO: 30 PG (ref 27–31)
MCHC RBC AUTO-ENTMCNC: 31.9 G/DL (ref 32–36)
MCHC RBC AUTO-ENTMCNC: 32.7 G/DL (ref 32–36)
MCV RBC AUTO: 92 FL (ref 82–98)
MCV RBC AUTO: 93 FL (ref 82–98)
MONOCYTES # BLD AUTO: 0.4 K/UL (ref 0.3–1)
MONOCYTES # BLD AUTO: 0.4 K/UL (ref 0.3–1)
MONOCYTES NFR BLD: 5.9 % (ref 4–15)
MONOCYTES NFR BLD: 6.3 % (ref 4–15)
NEUTROPHILS # BLD AUTO: 4.7 K/UL (ref 1.8–7.7)
NEUTROPHILS # BLD AUTO: 5.2 K/UL (ref 1.8–7.7)
NEUTROPHILS NFR BLD: 73.1 % (ref 38–73)
NEUTROPHILS NFR BLD: 73.1 % (ref 38–73)
NRBC BLD-RTO: 0 /100 WBC
NRBC BLD-RTO: 0 /100 WBC
PLATELET # BLD AUTO: 185 K/UL (ref 150–450)
PLATELET # BLD AUTO: 195 K/UL (ref 150–450)
PMV BLD AUTO: 10.5 FL (ref 9.2–12.9)
PMV BLD AUTO: 10.7 FL (ref 9.2–12.9)
RBC # BLD AUTO: 2.9 M/UL (ref 4–5.4)
RBC # BLD AUTO: 2.97 M/UL (ref 4–5.4)
WBC # BLD AUTO: 6.37 K/UL (ref 3.9–12.7)
WBC # BLD AUTO: 7.1 K/UL (ref 3.9–12.7)

## 2022-05-14 PROCEDURE — 85025 COMPLETE CBC W/AUTO DIFF WBC: CPT | Mod: 91 | Performed by: STUDENT IN AN ORGANIZED HEALTH CARE EDUCATION/TRAINING PROGRAM

## 2022-05-14 PROCEDURE — C9113 INJ PANTOPRAZOLE SODIUM, VIA: HCPCS | Performed by: STUDENT IN AN ORGANIZED HEALTH CARE EDUCATION/TRAINING PROGRAM

## 2022-05-14 PROCEDURE — 36415 COLL VENOUS BLD VENIPUNCTURE: CPT | Performed by: STUDENT IN AN ORGANIZED HEALTH CARE EDUCATION/TRAINING PROGRAM

## 2022-05-14 PROCEDURE — 85018 HEMOGLOBIN: CPT | Performed by: HOSPITALIST

## 2022-05-14 PROCEDURE — 99239 HOSP IP/OBS DSCHRG MGMT >30: CPT | Mod: ,,, | Performed by: HOSPITALIST

## 2022-05-14 PROCEDURE — 85014 HEMATOCRIT: CPT | Mod: 59 | Performed by: HOSPITALIST

## 2022-05-14 PROCEDURE — 63600175 PHARM REV CODE 636 W HCPCS: Performed by: STUDENT IN AN ORGANIZED HEALTH CARE EDUCATION/TRAINING PROGRAM

## 2022-05-14 PROCEDURE — 99239 PR HOSPITAL DISCHARGE DAY,>30 MIN: ICD-10-PCS | Mod: ,,, | Performed by: HOSPITALIST

## 2022-05-14 PROCEDURE — 25000003 PHARM REV CODE 250: Performed by: STUDENT IN AN ORGANIZED HEALTH CARE EDUCATION/TRAINING PROGRAM

## 2022-05-14 RX ORDER — FERROUS SULFATE 325(65) MG
325 TABLET, DELAYED RELEASE (ENTERIC COATED) ORAL DAILY
Qty: 30 TABLET | Refills: 5 | Status: ON HOLD | OUTPATIENT
Start: 2022-05-14 | End: 2023-02-08 | Stop reason: HOSPADM

## 2022-05-14 RX ORDER — GUAIFENESIN 100 MG/5ML
200 SOLUTION ORAL EVERY 4 HOURS PRN
Status: DISCONTINUED | OUTPATIENT
Start: 2022-05-14 | End: 2022-05-14 | Stop reason: HOSPADM

## 2022-05-14 RX ORDER — FAMOTIDINE 40 MG/1
40 TABLET, FILM COATED ORAL DAILY
Qty: 30 TABLET | Refills: 5 | Status: SHIPPED | OUTPATIENT
Start: 2022-05-14

## 2022-05-14 RX ORDER — FAMOTIDINE 40 MG/1
40 TABLET, FILM COATED ORAL DAILY
Qty: 30 TABLET | Refills: 5 | Status: SHIPPED | OUTPATIENT
Start: 2022-05-14 | End: 2022-07-04 | Stop reason: CLARIF

## 2022-05-14 RX ORDER — FERROUS SULFATE 325(65) MG
325 TABLET, DELAYED RELEASE (ENTERIC COATED) ORAL DAILY
Qty: 30 TABLET | Refills: 5 | Status: SHIPPED | OUTPATIENT
Start: 2022-05-14 | End: 2022-07-04 | Stop reason: CLARIF

## 2022-05-14 RX ADMIN — DONEPEZIL HYDROCHLORIDE 10 MG: 10 TABLET ORAL at 08:05

## 2022-05-14 RX ADMIN — LEVOTHYROXINE SODIUM 88 MCG: 88 TABLET ORAL at 05:05

## 2022-05-14 RX ADMIN — POLYETHYLENE GLYCOL 3350 17 G: 17 POWDER, FOR SOLUTION ORAL at 08:05

## 2022-05-14 RX ADMIN — THERA TABS 1 TABLET: TAB at 08:05

## 2022-05-14 RX ADMIN — ACETAMINOPHEN 650 MG: 325 TABLET ORAL at 03:05

## 2022-05-14 RX ADMIN — GUAIFENESIN 1200 MG: 600 TABLET, EXTENDED RELEASE ORAL at 08:05

## 2022-05-14 RX ADMIN — CETIRIZINE HYDROCHLORIDE 10 MG: 10 TABLET, FILM COATED ORAL at 08:05

## 2022-05-14 RX ADMIN — PANTOPRAZOLE SODIUM 40 MG: 40 INJECTION, POWDER, FOR SOLUTION INTRAVENOUS at 08:05

## 2022-05-14 NOTE — NURSING
Discharge instructions reviewed with Pavel over the phone, all questions answered, currently waiting for  Daughter to arrive to provide transportation.

## 2022-05-14 NOTE — PROGRESS NOTES
[facility] Medicine  Progress Note    Patient Name: Yvette Crews  MRN: [id number]  Patient Class: IP- Inpatient   Admission Date: 5/13/2022  Length of Stay: 1 days  Attending Physician: Russell Robledo MD  Primary Care Provider: Sally Green MD        Subjective:     Principal Problem:Iron deficiency anemia        HPI:  Yvette Crews is a 72 year old white woman with hypertension, history of multinodular goiter status post total thyroidectomy on 1/25/2017 with postoperative hypothyroidism, paroxysmal atrial fibrillation status post left atrial appendage closure with Watchman device on 2/18/2022, 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 She lives at [facility] in [facility]. She is  to former [facility] neurologist Dr. Pavel Crews. Her primary care physician is Dr. Sally Green.               Her  contacted Electrophysiology on 5/12/2022 after she felt dizzy and weak. She felt weak again the next morning and fell to the floor in her apartment. She was taken to [facility] Emergency Department. She had been having increasing weakness for the past few days, had been more irritable and confused. She was found to have acute anemia with hemoglobin of 5.1 g/dL and hematocrit 16.7%, from 12.1 and 38.6 on 2/1/2022. BUN was elevated at 46 mg/dL. BNP was elevated at 219 pg/mL. PT, PTT, and INR were normal. Urinalysis showed no hematuria. X-ray showed a new right 5th anterior rib fracture. Stool was brown but positive for hemoccult. She takes aspirin indefinitely and clopidrogel until 8/18/2022 (6 months post Watchman  procedure). She had a small cut on her hand that bled much more than expected but eventually stopped. She denied any overt blood in her stool or her urine. She was given acetaminophen and pantoprazole. Gastroenterology was consulted. She was admitted to Hospital Medicine Team A.      Overview/Hospital Course:  Gastroenterology suspected that if she had a gastrointestinal bleed, it was slow and exacerbated by dual antiplatelet therapy. She was considered high risk for endoscopy due to her frailty. She was admitted on a Friday so endoscopy would not be done until at least Monday. She was put on intravenous pantoprazole twice daily. She was transfused 2 units of packed red blood cells. Her aspirin and clopidrogel were held. Hemoglobin and hematocrit remained stable after transfusion. After discussion with her , it was decided to stop clopidrogel, let her go home, prescribe iron supplement (due to history of iron deficiency) and acid suppressant (famotidine rather than a proton pump inhibitor due to risk of worsening osteoporosis), and get repeat labs on Tuesday 5/17/2022 when she returns for her previously scheduled Neurology appointment.       Interval History: Discussed with her and her  over the phone about the plan.    Review of Systems   Constitutional:  Negative for chills and fever.   Respiratory:  Negative for cough and shortness of breath.    Gastrointestinal:  Negative for abdominal pain, blood in stool and vomiting.   Objective:     Vital Signs (Most Recent):  Temp: 98 °F (36.7 °C) (05/14/22 1051)  Pulse: 73 (05/14/22 1100)  Resp: 16 (05/14/22 0433)  BP: (!) 146/67 (05/14/22 1051)  SpO2: (!) 92 % (05/14/22 1051)   Vital Signs (24h Range):  Temp:  [96.5 °F (35.8 °C)-98.9 °F (37.2 °C)] 98 °F (36.7 °C)  Pulse:  [53-90] 73  Resp:  [14-16] 16  SpO2:  [92 %-100 %] 92 %  BP: (106-168)/(58-71) 146/67     Weight: 38.6 kg (85 lb)  Body mass index is 15.55 kg/m².    Intake/Output Summary (Last 24 hours)  at 5/14/2022 1354  Last data filed at 5/14/2022 0600  Gross per 24 hour   Intake 948.75 ml   Output 300 ml   Net 648.75 ml      Physical Exam  Vitals and nursing note reviewed.   Constitutional:       General: She is not in acute distress.     Appearance: She is underweight. She is not toxic-appearing or diaphoretic.   Neck:      Comments: kyphosis  Neurological:      Mental Status: She is alert. Mental status is at baseline.      Motor: No tremor or seizure activity.   Psychiatric:         Attention and Perception: Attention normal.         Mood and Affect: Mood and affect normal.         Speech: Speech normal.         Behavior: Behavior is cooperative.         Thought Content: Thought content normal.       MELD-Na score: 7 at 5/13/2022 12:38 PM  MELD score: 7 at 5/13/2022 12:38 PM  Calculated from:  Serum Creatinine: 0.7 mg/dL (Using min of 1 mg/dL) at 5/13/2022 11:50 AM  Serum Sodium: 139 mmol/L (Using max of 137 mmol/L) at 5/13/2022 11:50 AM  Total Bilirubin: 0.2 mg/dL (Using min of 1 mg/dL) at 5/13/2022 11:50 AM  INR(ratio): 1.1 at 5/13/2022 12:38 PM  Age: 72 years    Significant Labs:  CBC:  Recent Labs   Lab 05/13/22  1150 05/13/22  1159 05/14/22  0548 05/14/22  0953   WBC 8.76  --  6.37 7.10   HGB 5.1*  --  8.6* 8.9*   HCT 16.7* 15* 27.0* 27.2*     --  195 185     CMP:  Recent Labs   Lab 05/13/22  1150      K 3.9      CO2 27   GLU 86   BUN 46*   CREATININE 0.7   CALCIUM 8.5*   PROT 5.9*   ALBUMIN 3.1*   BILITOT 0.2   ALKPHOS 50*   AST 25   ALT 32   ANIONGAP 5*   EGFRNONAA >60.0           Assessment/Plan:      * Iron deficiency anemia  Transfused blood. History of iron deficiency anemia so prescribe iron supplement. Could be a slow gastrointestinal bleed. Prescribe famotidine. Stop clopidrogel. Repeat CBC in a few days. Follow up with Gastroenterology outpatient if endoscopy is desired.    Paroxysmal atrial fibrillation  Presence of Watchman left atrial appendage closure device  No thrombus  seen on recent JAMEL. Resume aspirin and stop clopidrogel.    Late onset Alzheimer's dementia without behavioral disturbance  Continue donepezil      CVID (common variable immunodeficiency)  Receives IVIG infusions as outpatient      Postoperative hypothyroidism  Continue levothyroxine        VTE Risk Mitigation (From admission, onward)         Ordered     IP VTE HIGH RISK PATIENT  Once         05/13/22 1535     Reason for No Pharmacological VTE Prophylaxis  Once        Question:  Reasons:  Answer:  Risk of Bleeding    05/13/22 1532     Place sequential compression device  Until discontinued         05/13/22 1532                Discharge Planning   SANTIAGO: 5/14/2022     Code Status: Full Code                    Russell Robledo MD  Department of Hospital Medicine   [facility] Surg

## 2022-05-14 NOTE — PLAN OF CARE
Pt was transfused 2 units of PRBC. Pt denies pain and there was no s/s of distress. Bed alarm, call light and personal belongings are within reach.     Problem: Adult Inpatient Plan of Care  Goal: Plan of Care Review  Outcome: Ongoing, Progressing     Problem: Adjustment to Illness (Gastrointestinal Bleeding)  Goal: Optimal Coping with Acute Illness  5/14/2022 0517 by Carmela Catalan RN  Outcome: Ongoing, Progressing  5/14/2022 0517 by Carmela Catalan RN  Outcome: Ongoing, Progressing     Problem: Skin Injury Risk Increased  Goal: Skin Health and Integrity  5/14/2022 0517 by Carmela Catalan RN  Outcome: Ongoing, Progressing  5/14/2022 0517 by Carmela Catalan RN  Outcome: Ongoing, Progressing     Problem: Fall Injury Risk  Goal: Absence of Fall and Fall-Related Injury  Outcome: Ongoing, Progressing

## 2022-05-14 NOTE — ASSESSMENT & PLAN NOTE
Presence of Watchman left atrial appendage closure device  No thrombus seen on recent JAMEL. Resume aspirin and stop clopidrogel.

## 2022-05-14 NOTE — HOSPITAL COURSE
Gastroenterology suspected that if she had a gastrointestinal bleed, it was slow and exacerbated by dual antiplatelet therapy. She was considered high risk for endoscopy due to her frailty. She was admitted on a Friday so endoscopy would not be done until at least Monday. She was put on intravenous pantoprazole twice daily. She was transfused 2 units of packed red blood cells. Her aspirin and clopidrogel were held. Hemoglobin and hematocrit remained stable after transfusion. After discussion with her , it was decided to stop clopidrogel, let her go home, prescribe iron supplement (due to history of iron deficiency) and acid suppressant (famotidine rather than a proton pump inhibitor due to risk of worsening osteoporosis), and get repeat labs on Tuesday 5/17/2022 when she returns for her previously scheduled Neurology appointment.

## 2022-05-14 NOTE — SUBJECTIVE & OBJECTIVE
Interval History: Discussed with her and her  over the phone about the plan.    Review of Systems   Constitutional:  Negative for chills and fever.   Respiratory:  Negative for cough and shortness of breath.    Gastrointestinal:  Negative for abdominal pain, blood in stool and vomiting.   Objective:     Vital Signs (Most Recent):  Temp: 98 °F (36.7 °C) (05/14/22 1051)  Pulse: 73 (05/14/22 1100)  Resp: 16 (05/14/22 0433)  BP: (!) 146/67 (05/14/22 1051)  SpO2: (!) 92 % (05/14/22 1051)   Vital Signs (24h Range):  Temp:  [96.5 °F (35.8 °C)-98.9 °F (37.2 °C)] 98 °F (36.7 °C)  Pulse:  [53-90] 73  Resp:  [14-16] 16  SpO2:  [92 %-100 %] 92 %  BP: (106-168)/(58-71) 146/67     Weight: 38.6 kg (85 lb)  Body mass index is 15.55 kg/m².    Intake/Output Summary (Last 24 hours) at 5/14/2022 1354  Last data filed at 5/14/2022 0600  Gross per 24 hour   Intake 948.75 ml   Output 300 ml   Net 648.75 ml      Physical Exam  Vitals and nursing note reviewed.   Constitutional:       General: She is not in acute distress.     Appearance: She is underweight. She is not toxic-appearing or diaphoretic.   Neck:      Comments: kyphosis  Neurological:      Mental Status: She is alert. Mental status is at baseline.      Motor: No tremor or seizure activity.   Psychiatric:         Attention and Perception: Attention normal.         Mood and Affect: Mood and affect normal.         Speech: Speech normal.         Behavior: Behavior is cooperative.         Thought Content: Thought content normal.       MELD-Na score: 7 at 5/13/2022 12:38 PM  MELD score: 7 at 5/13/2022 12:38 PM  Calculated from:  Serum Creatinine: 0.7 mg/dL (Using min of 1 mg/dL) at 5/13/2022 11:50 AM  Serum Sodium: 139 mmol/L (Using max of 137 mmol/L) at 5/13/2022 11:50 AM  Total Bilirubin: 0.2 mg/dL (Using min of 1 mg/dL) at 5/13/2022 11:50 AM  INR(ratio): 1.1 at 5/13/2022 12:38 PM  Age: 72 years    Significant Labs:  CBC:  Recent Labs   Lab 05/13/22  1150 05/13/22  1159  05/14/22  0548 05/14/22  0953   WBC 8.76  --  6.37 7.10   HGB 5.1*  --  8.6* 8.9*   HCT 16.7* 15* 27.0* 27.2*     --  195 185     CMP:  Recent Labs   Lab 05/13/22  1150      K 3.9      CO2 27   GLU 86   BUN 46*   CREATININE 0.7   CALCIUM 8.5*   PROT 5.9*   ALBUMIN 3.1*   BILITOT 0.2   ALKPHOS 50*   AST 25   ALT 32   ANIONGAP 5*   EGFRNONAA >60.0

## 2022-05-14 NOTE — DISCHARGE SUMMARY
[facility] Medicine  Discharge Summary      Patient Name: Yvette Crews  MRN: [id number]  Patient Class: IP- Inpatient  Admission Date: 5/13/2022  Hospital Length of Stay: 1 days  Discharge Date and Time: 5/14/2022  4:46 PM  Attending Physician: Russell Robledo MD   Discharging Provider: Russell Robledo MD  Primary Care Provider: Sally Green MD  [facility] Medicine Team: [facility] MED A Russell Robledo MD    HPI:   Yvette Crews is a 72 year old white woman with hypertension, history of multinodular goiter status post total thyroidectomy on 1/25/2017 with postoperative hypothyroidism, paroxysmal atrial fibrillation status post left atrial appendage closure with Watchman device on 2/18/2022, 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 She lives at [facility] in [facility]. She is  to former [facility] neurologist Dr. Pavel Crews. Her primary care physician is Dr. Sally Green.               Her  contacted Electrophysiology on 5/12/2022 after she felt dizzy and weak. She felt weak again the next morning and fell to the floor in her apartment. She was taken to [facility] Emergency Department. She had been having increasing weakness for the past few days, had been more irritable and confused. She was found to have acute anemia with hemoglobin of 5.1 g/dL and hematocrit 16.7%, from 12.1 and 38.6 on 2/1/2022. BUN was elevated at 46 mg/dL. BNP was elevated at 219 pg/mL. PT, PTT, and INR were normal. Urinalysis showed no hematuria. X-ray showed a new right 5th anterior rib fracture. Stool was brown but positive for hemoccult.  She takes aspirin indefinitely and clopidrogel until 8/18/2022 (6 months post Watchman procedure). She had a small cut on her hand that bled much more than expected but eventually stopped. She denied any overt blood in her stool or her urine. She was given acetaminophen and pantoprazole. Gastroenterology was consulted. She was admitted to Hospital Medicine Team A.        Hospital Course:   Gastroenterology suspected that if she had a gastrointestinal bleed, it was slow and exacerbated by dual antiplatelet therapy. She was considered high risk for endoscopy due to her frailty. She was admitted on a Friday so endoscopy would not be done until at least Monday. She was put on intravenous pantoprazole twice daily. She was transfused 2 units of packed red blood cells. Her aspirin and clopidrogel were held. Hemoglobin and hematocrit remained stable after transfusion. After discussion with her , it was decided to stop clopidrogel, let her go home, prescribe iron supplement (due to history of iron deficiency) and acid suppressant (famotidine rather than a proton pump inhibitor due to risk of worsening osteoporosis), and get repeat labs on Tuesday 5/17/2022 when she returns for her previously scheduled Neurology appointment.        Goals of Care Treatment Preferences:  Code Status: Full Code    Living Will: Yes              Consults:   Consults (From admission, onward)        Status Ordering Provider     Inpatient consult to Gastroenterology  Once        Provider:  (Not yet assigned)    CASEY Ashley        Final Active Diagnoses:    Diagnosis Date Noted POA    PRINCIPAL PROBLEM:  Iron deficiency anemia [D50.9] 05/13/2022 Yes    Presence of Watchman left atrial appendage closure device [Z95.818] 05/14/2022 Yes     Chronic    Closed fracture of one rib of right side [S22.31XA] 05/13/2022 Yes    Paroxysmal atrial fibrillation [I48.0] 12/17/2021 Yes     Chronic    Primary hypertension [I10] 12/17/2021  Yes     Chronic    Late onset Alzheimer's dementia without behavioral disturbance [G30.1, F02.80] 11/02/2021 Yes     Chronic    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] 06/03/2021 Yes     Chronic    Postoperative hypothyroidism [E89.0] 12/18/2015 Yes     Chronic      Problems Resolved During this Admission:       Discharged Condition: good    Disposition: Home or Self Care    Follow Up:   Follow-up Information     [facility] PSYCH RESIDENT 1 Follow up on 5/17/2022.           [facility] Follow up on 5/17/2022.    Specialty: Administration  Why: get CBC  Contact information:  [address]  [facility] [id number]           [facility] 4th Fl. Call.    Specialty: Gastroenterology  Why: If endoscopy is desired  Contact information:  [address]  [facility] [address] [phone]  Additional information:  [facility]rology - Main Building, 4th Floor   Please park in [facility] and take Atrium elevator                     Patient Instructions:      CBC Without Differential   Standing Status: Future Standing Exp. Date: 07/13/23     Ambulatory referral/consult to Gastroenterology   Standing Status: Future   Referral Priority: Routine Referral Type: Consultation   Referral Reason: Specialty Services Required   Requested Specialty: Gastroenterology   Number of Visits Requested: 1     Diet Adult Regular     Notify your health care provider if you experience any of the following:  persistent dizziness, light-headedness, or visual disturbances     Notify your health care provider if you experience any of the following:  increased confusion or weakness     Notify your health care provider if you experience any of the following:  persistent nausea and vomiting or diarrhea     Activity as tolerated       Significant Diagnostic Studies:   Recent Labs   Lab 05/13/22  1150 05/13/22  1159 05/14/22  0548 05/14/22  0953 05/14/22  1456   WBC 8.76  --  6.37 7.10  --    HGB  5.1*  --  8.6* 8.9* 9.0*   HCT 16.7*   < > 27.0* 27.2* 28.3*     --  195 185  --     < > = values in this interval not displayed.     CT Head Without Contrast 5/13/22: FINDINGS:   Study distorted by motion artifact and beam hardening, within limits of the study there is no evidence for acute intracranial hemorrhage or sulcal effacement.  Mild generalized cerebral volume loss similar to prior with compensatory enlargement ventricles sulci and cisterns without hydrocephalus..  There is no midline shift or mass effect.  Visualized paranasal sinuses and mastoid air cells are clear.   Impression:  No acute intracranial findings as detailed above specifically without evidence for acute intracranial hemorrhage or sulcal effacement to suggest large territory recent infarction.   Clinical correlation and further evaluation as warranted.   X-Ray Hips Bilateral 2 View Incl AP Pelvis 5/13/22: FINDINGS:   Two views pelvis and hips.   There is osteopenia.  Degenerative changes are noted of the bilateral sacroiliac joints noting bilateral screw fixation.  Degenerative changes are noted of the pubic symphysis as well as of the bilateral femoroacetabular joints.  No femoroacetabular dislocation.  No convincing acute displaced fracture or dislocation of the hips or pelvis.   Impression:  No convincing acute displaced fracture or dislocation of the hips or pelvis.   X-Ray Chest AP Portable 5/13/22: FINDINGS:   There is a new fracture of the right 5th anterior rib.  No pneumothorax seen.  The lung volumes are more shallow.  The heart size appears normal.  There is mild unfolding and atherosclerotic stigmata of the aorta.   Impression:  New right 5th anterior rib fracture.   X-Ray Shoulder Trauma Left 5/13/22: FINDINGS:   Three views left shoulder.   There is osteopenia.  The left humeral head maintains appropriate relationship with the glenoid.  Degenerative changes are noted of the acromioclavicular joint.  No acute displaced  left rib fracture.  The left lung zones are clear.   Impression:  Allowing for positioning, no convincing acute displaced fracture or dislocation of the left shoulder.   X-Ray Elbow Complete Left 5/13/22: FINDINGS:   Three views left elbow.   No significant displacement of the anterior or posterior elbow fat pads.  Anterior humeral line and radiocapitellar line are in appropriate orientation.  Degenerative changes are noted about the elbow including the olecranon.  No convincing acute displaced fracture or dislocation of the elbow.  No radiopaque foreign body.   Impression:  No convincing acute displaced fracture or dislocation of the elbow.      Medications:  Reconciled Home Medications:      Medication List      START taking these medications    * famotidine 40 MG tablet  Commonly known as: PEPCID  Take 1 tablet (40 mg total) by mouth once daily.     * famotidine 40 MG tablet  Commonly known as: PEPCID  Take 1 tablet (40 mg total) by mouth once daily.     * ferrous sulfate 325 (65 FE) MG EC tablet  Take 1 tablet (325 mg total) by mouth once daily.     * ferrous sulfate 325 (65 FE) MG EC tablet  Take 1 tablet (325 mg total) by mouth once daily.         * This list has 4 medication(s) that are the same as other medications prescribed for you. Read the directions carefully, and ask your doctor or other care provider to review them with you.            CHANGE how you take these medications    polyethylene glycol 17 gram Pwpk  Commonly known as: GLYCOLAX  Take 17 g by mouth once daily.  What changed:   · when to take this  · reasons to take this  · additional instructions        CONTINUE taking these medications    albuterol 90 mcg/actuation inhaler  Commonly known as: PROVENTIL/VENTOLIN HFA  Inhale 2 puffs into the lungs every 6 (six) hours as needed for Wheezing. Rescue     aspirin 81 MG EC tablet  Commonly known as: ECOTRIN  Take 1 tablet (81 mg total) by mouth once daily.     bacitracin-polymyxin b ophthalmic  ointment  Commonly known as: POLYSPORIN     calcium-vitamin D3 500 mg-5 mcg (200 unit) per tablet  Commonly known as: OS-ELAINE 500 + D3  Take 1 tablet by mouth 2 (two) times daily.     docusate sodium 250 MG capsule  Commonly known as: COLACE  Take 250 mg by mouth 2 (two) times daily as needed for Constipation.     donepeziL 10 MG tablet  Commonly known as: ARICEPT  Take 1 tablet (10 mg total) by mouth once daily.     guaiFENesin 600 mg 12 hr tablet  Commonly known as: MUCINEX  Take 2 tablets (1,200 mg total) by mouth 2 (two) times daily.     Immune Globulin G (IGG)-PRO-IGA 10 % injection (Privigen) 10 % Soln  Commonly known as: PRIVIGEN  Infuse 20 g into the vein every 4 weeks.     levothyroxine 88 MCG tablet  Commonly known as: SYNTHROID  Take 1 tablet (88 mcg total) by mouth before breakfast.     loratadine 10 mg tablet  Commonly known as: CLARITIN  Take 1 tablet (10 mg total) by mouth once daily.     montelukast 10 mg tablet  Commonly known as: SINGULAIR  Take 1 tablet (10 mg total) by mouth every evening.     MULTI VITAMIN ORAL  Take by mouth.     MULTIPLE VITAMIN, WOMENS ORAL  Take by mouth once daily.     mupirocin 2 % ointment  Commonly known as: BACTROBAN  APPLY ONE application TWICE DAILY FOR 7 DAYS     NAPROSYN ORAL  Take 200 mg by mouth. As needed every other day     phenazopyridine 95 MG tablet  Commonly known as: PYRIDIUM  Take 95 mg by mouth 2 (two) times a day.     sars-cov-2 (covid-19) 100 mcg/0.5 ml injection  Commonly known as: MODERNA COVID-19  Inject into the muscle.     TYLENOL ORAL  Take by mouth once daily.     VIACTIV 650 mg-12.5 mcg-40 mcg Chew  Generic drug: calcium-vitamin D3-vitamin K  Take by mouth.        STOP taking these medications    clopidogreL 75 mg tablet  Commonly known as: PLAVIX     XARELTO 20 mg Tab  Generic drug: rivaroxaban            Indwelling Lines/Drains at time of discharge: None    Time spent on the discharge of patient: 35 minutes         Russell Robledo,  MD  Department of Hospital Medicine  [facility] Surg

## 2022-05-14 NOTE — NURSING
Patient escort has arrived to assist patient off unit in wheelchair, daughter has arrived to provide transportation.

## 2022-05-16 ENCOUNTER — PATIENT MESSAGE (OUTPATIENT)
Dept: PRIMARY CARE CLINIC | Facility: CLINIC | Age: 72
End: 2022-05-16
Payer: MEDICARE

## 2022-05-16 ENCOUNTER — TELEPHONE (OUTPATIENT)
Dept: PRIMARY CARE CLINIC | Facility: CLINIC | Age: 72
End: 2022-05-16
Payer: MEDICARE

## 2022-05-16 DIAGNOSIS — D50.0 IRON DEFICIENCY ANEMIA DUE TO CHRONIC BLOOD LOSS: Primary | ICD-10-CM

## 2022-05-16 DIAGNOSIS — D64.9 ANEMIA, UNSPECIFIED TYPE: ICD-10-CM

## 2022-05-16 NOTE — TELEPHONE ENCOUNTER
pts  called to schedule CBC for tomorrow that didn't get scheduled.     Asking if you can add a reticulocytes count to her lab orders to have drawn tomorrow     Please advise

## 2022-05-16 NOTE — TELEPHONE ENCOUNTER
Please be advised re: pt admitted to hospital on Friday evening & discharged on Saturday after 3 units of blood

## 2022-05-17 ENCOUNTER — LAB VISIT (OUTPATIENT)
Dept: LAB | Facility: HOSPITAL | Age: 72
End: 2022-05-17
Attending: HOSPITALIST
Payer: MEDICARE

## 2022-05-17 ENCOUNTER — OFFICE VISIT (OUTPATIENT)
Dept: NEUROLOGY | Facility: CLINIC | Age: 72
End: 2022-05-17
Payer: MEDICARE

## 2022-05-17 ENCOUNTER — TELEPHONE (OUTPATIENT)
Dept: PRIMARY CARE CLINIC | Facility: CLINIC | Age: 72
End: 2022-05-17

## 2022-05-17 ENCOUNTER — PATIENT MESSAGE (OUTPATIENT)
Dept: PRIMARY CARE CLINIC | Facility: CLINIC | Age: 72
End: 2022-05-17
Payer: MEDICARE

## 2022-05-17 VITALS — DIASTOLIC BLOOD PRESSURE: 70 MMHG | SYSTOLIC BLOOD PRESSURE: 126 MMHG | HEART RATE: 89 BPM

## 2022-05-17 DIAGNOSIS — D64.9 ANEMIA, UNSPECIFIED TYPE: ICD-10-CM

## 2022-05-17 DIAGNOSIS — D64.9 ANEMIA, UNSPECIFIED TYPE: Primary | ICD-10-CM

## 2022-05-17 DIAGNOSIS — D50.0 IRON DEFICIENCY ANEMIA DUE TO CHRONIC BLOOD LOSS: ICD-10-CM

## 2022-05-17 DIAGNOSIS — G30.1 LATE ONSET ALZHEIMER'S DEMENTIA WITHOUT BEHAVIORAL DISTURBANCE: Primary | ICD-10-CM

## 2022-05-17 DIAGNOSIS — F02.80 LATE ONSET ALZHEIMER'S DEMENTIA WITHOUT BEHAVIORAL DISTURBANCE: Primary | ICD-10-CM

## 2022-05-17 LAB
ERYTHROCYTE [DISTWIDTH] IN BLOOD BY AUTOMATED COUNT: 15.6 % (ref 11.5–14.5)
HAPTOGLOB SERPL-MCNC: 174 MG/DL (ref 30–250)
HCT VFR BLD AUTO: 26.5 % (ref 37–48.5)
HGB BLD-MCNC: 8.4 G/DL (ref 12–16)
MCH RBC QN AUTO: 29.9 PG (ref 27–31)
MCHC RBC AUTO-ENTMCNC: 31.7 G/DL (ref 32–36)
MCV RBC AUTO: 94 FL (ref 82–98)
PLATELET # BLD AUTO: 234 K/UL (ref 150–450)
PMV BLD AUTO: 10.9 FL (ref 9.2–12.9)
RBC # BLD AUTO: 2.81 M/UL (ref 4–5.4)
RETICS/RBC NFR AUTO: 5.6 % (ref 0.5–2.5)
WBC # BLD AUTO: 5.78 K/UL (ref 3.9–12.7)

## 2022-05-17 PROCEDURE — 99999 PR PBB SHADOW E&M-EST. PATIENT-LVL III: ICD-10-PCS | Mod: PBBFAC,,, | Performed by: PSYCHIATRY & NEUROLOGY

## 2022-05-17 PROCEDURE — 85027 COMPLETE CBC AUTOMATED: CPT | Performed by: HOSPITALIST

## 2022-05-17 PROCEDURE — 99999 PR PBB SHADOW E&M-EST. PATIENT-LVL III: CPT | Mod: PBBFAC,,, | Performed by: PSYCHIATRY & NEUROLOGY

## 2022-05-17 PROCEDURE — 36415 COLL VENOUS BLD VENIPUNCTURE: CPT | Performed by: FAMILY MEDICINE

## 2022-05-17 PROCEDURE — 99213 PR OFFICE/OUTPT VISIT, EST, LEVL III, 20-29 MIN: ICD-10-PCS | Mod: S$PBB,,, | Performed by: PSYCHIATRY & NEUROLOGY

## 2022-05-17 PROCEDURE — 85045 AUTOMATED RETICULOCYTE COUNT: CPT | Performed by: FAMILY MEDICINE

## 2022-05-17 PROCEDURE — 83010 ASSAY OF HAPTOGLOBIN QUANT: CPT | Performed by: FAMILY MEDICINE

## 2022-05-17 PROCEDURE — 99213 OFFICE O/P EST LOW 20 MIN: CPT | Mod: S$PBB,,, | Performed by: PSYCHIATRY & NEUROLOGY

## 2022-05-17 PROCEDURE — 99213 OFFICE O/P EST LOW 20 MIN: CPT | Mod: PBBFAC | Performed by: PSYCHIATRY & NEUROLOGY

## 2022-05-17 NOTE — TELEPHONE ENCOUNTER
Please pend and associate all medications and supplements with dose and frequency set to print that are needed and we can send him over to them.  Please verify with patient and  as well.

## 2022-05-17 NOTE — TELEPHONE ENCOUNTER
"Spoke with Lisa at [facility]; she states the ER does not have a freq on pts medication list anymore and has also taken away azo an added pyridium. Asking do you want to continue with the pyridium verses the azo.    If you would like to Continue all previous medications and dosage/directions. Shes okay with a signed rx pad stating "continue all previous medications with same dosage and directions prior to ER visit"    Please advise     Fax number [phone]  "

## 2022-05-17 NOTE — PROGRESS NOTES
[facility]    Name: Yvette Crews  : 1950  MRN: [id number]    Date: 2022    Established patient - In-person follow-up    Assessment:     Ms. Crews is a 72 y.o. female with a history of SAH (2016), mTBI, AVNRT, CVID, MDD, IBS, osteoporosis, hypothyroidism who presents to the [facility] due to memory deficits.  Patient began experiencing memory symptoms in 2016, which have progressively worsened over time and now involve all cognitive domains.  In addition cognitive impairment, patient has experienced multiple recent falls.  On evaluation today, neurologic exam was notable for stooped posture and slow gait.  Patient required walker.  Patient had some difficulty with performing tasks that were asked of her but was able to perform with coaching.  She scored 12 30 on the MMSE deficits in orientation, attention, naming, 3 step command, delayed recall, and visuoconstructual ability.  MRI from 2021 was reviewed and is notable for moderate volume loss disproportionately affecting the medial temporal lobes and posterior parietal lobes.  Additionally, there is patchy T2/FLAIR hyperintensities throughout the supratentorial white matter consistent with a moderate chronic microvascular ischemia.  Ms. Crews's diagnosis is consistent with dementia as cognitive impairment has resulted in functional decline and inability to live independently.  The most likely underlying etiology is Alzheimer's disease given her early memory impairment which has progressed to involve most cognitive domains, age, and findings on MRI.      Recommendations:     Workup   New IDEAS study on hold until around September due to directive from study sponsor.  Will perform amyloid PET once able through New [employer].    Treatment   Continue Donepezil 10 mg PO daily.    Safety-related   We recommend that a medication management system be put in place to avoid errors in taking medication.  Helpful services include PillPack (pillpack.com; free service) and [employer] ([employer]; paid subscription service).   We recommend that Ms. Crews not drive.     Health maintenance    Continue to follow-up with primary care doctor to monitor and treat vascular risk factors.   Recommend staying socially and cognitively active.   Recommend eating a healthy and balanced diet (Mediterranean or DASH diet).    Follow-up: Follow up in about 6 months (around 11/17/2022). I provided Ms. Crews and her , Pavel, with our contact information should they have any questions or concerns.      Devin Gtz MD   Behavioral Neurology & Neuropsychiatry  [facility]    Subjective:      Chief complaint:  No chief complaint on file.    History of present illness:  Ms. Crews is a 72 y.o. female with a history of SAH (2016), mTBI, AVNRT, CVID, MDD, IBS, osteoporosis, hypothyroidism who presents today to the [facility] due to concerns regarding memory deficits. Ms. Crews is accompanied by her , Pavel (who is a retired neurologist), who participates in providing history. Additional information is obtained by reviewing available medical records.  Patient was initially seen in neurology clinic by Dr. Dixon followed by Dr. Shukla.    HPI by Dr. Dixon from 12/13/2018:  The patient was accompanied by her  and her daughter, who collaborated with the history.  Her  is a retired neurologist.  They both live at [facility], and assisted living facility.  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  In 2016 she had a fall when she may have tripped on a tree root or broken pavement.  She briefly lost consciousness and was seen at the [facility] and subsequently admitted to the hospital.  She was noted to be confused and disoriented when she arrived in ED.  An  initial CT scan of the head revealed right maxillary blood products concerning for facial fracture, and questionable SAH in bilat occipital lobes.  A CT scan done prior to discharge on 10/29/2016 revealed subarachnoid hemorrhage within the bilateral occipital lobes, left parietal lobe and along the left sylvian fissure.  Left cerebral convexity extra-axial fluid collection, increased in conspicuity compared to prior imaging examinations. There is minimal associated mass effect on the left cerebral convexity without significant midline shift. Increased prominence of the extra-axial space along the right frontal convexity slight with slight increased density. This could possibly be artifactual, however, interval development of new extra-axial fluid collection not excluded.      It is reported that she may have had some difficulty with her memory even prior to the fall however after the fall she has significant worsening in her memory primarily with retention of recent information, unable to recall conversations she may have had and getting confused very easily.  However, she was able to take care of her day-to-day needs at home, including personal hygiene without any problems.  She was using the fitness center on a regular basis.  She had no further falls however continues to have problems with her balance.  The daughter also reports that she has significant problem with depression and anxiety in the past but refuses to take any medications.  She has had poor appetite having lost weight over the past 1 year.    Interval history by Dr. Shukla from 07/16/2021:  Retired from work as psychiatrist in 2007 and moved into [facility] with  who is a retired neurologist in 2015, stopped driving around that time.  Fall with SAH as below in 2016 and family noted worsening difficulty recalling conversations and retaining information.  Recurrent fall when getting up to use the bathroom at night earlier this year with  coxyx fracture - began using walker about 2018.   is not present today as he was admitted to the hospital but he has noted several recent episodes in which she acutely becomes more confused.  She has long history of anxiety and depression which remains uncontrolled, repeatedly very self critical and endorses belief that family and caregivers will abandon her or that she is being put on display throughout testing and interview.  Continues engage in some activities at the home, sleep is variable.    Interval history (11/01/21):  Ms. Crews's daughter reports that since the patient's sacral fracture in January 2021, she has gone downhill.  Patient has experienced significant worsening of cognitive impairment over the past year.  There have been several episodes of confusion and on one occasion she thought that she needed of car to come pick her up, though she was actually near the door to her apartment at [facility].  Her  reports that she frequently mixes up up clothes and is unsure what to put on.  She is having trouble dressing herself and will sometimes put on clothes backwards or inside out.  Her memory has substantially worsened and she has difficulty remembering recent events and conversations.  She often forgets to take medications and is unable to recall if she had eaten earlier in the day.  She endorses lightheadedness when getting up from a chair or out of bed and had several falls last week.  Her , who is a physician, thought that her Effexor might be contributing to her falls and stop the medication.    Interval history (02/01/22):  Patient's cognition is relatively unchanged since last appointment. She is busy for much of the day going to activities, exercise classes, etc.  Will wake in the middle of the night and will get dressed and the go back to bed.  Has had episodes of difficulty getting around [facility] and even within the apartment. Patient fell recently and hit her  arm which led to significant bleeding due to being on a blood thinner.  Tolerating donepezil well.    Interval history (05/17/22):  Patient's cognition is relatively unchanged since last appointment.  She continues to be busy for much of the day going to activities, exercise classes, etc.  She continues to have episodes of waking during the night and getting dressed, but she is redirectable.  In general, she does not wander outside of the apartment, though on one occasion she did but only for a short distance before turning around and going back into the apartment.  [facility] staff is available to help should she wander throughout the facility during the night.  She continues to have difficulty with ambulation.  She does not always use her walker and has had numerous falls.  Pavel reports that the patient was recently found to have a hemoglobin of 5, though a source of bleeding could not be identified.  She received 2 units of PRBCs.  Patient was on DAPT after having WATCHMAN device placed.  Pavix was stopped after low Hgb was discovered.  Tolerating donepezil well.    Review of systems  · Negative except as noted in the HPI    Past Medical History:   Diagnosis Date    Adult bronchiectasis     Age-related osteoporosis with current pathological fracture with routine healing 8/28/2013    Amblyopia     Anxiety     Cataract     Cellulitis of right lower extremity 1/19/2021    Chondrocalcinosis, cause unspecified, involving hand(712.34) 7/12/2011     Dx updated per 2019 IMO Load    Chronic sinusitis 4/6/2017    Colonic constipation 6/5/2013    Concussion 10/27/2016    Frequent falls 3/14/2017    Gait disturbance 3/14/2017    History of cardiac radiofrequency ablation (RFA) 2/3/2018    History of subarachnoid hemorrhage 10/30/2016    Hypogammaglobulinemia 9/7/2011    Irritable bowel syndrome 12/18/2015    Major depressive disorder, recurrent episode, in full remission 8/19/2010    Onychomycosis      Osteoarthritis     Postoperative hypothyroidism 12/18/2015    Rectal prolapse 6/5/2013    Reflux esophagitis 2/7/2010    Status post thyroidectomy 6/1/2017    Strabismus     SVT (supraventricular tachycardia) 11/22/2013    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     Underweight 1/23/2013     Past Surgical History:   Procedure Laterality Date    BREAST CYST ASPIRATION      x2    CATARACT EXTRACTION W/  INTRAOCULAR LENS IMPLANT Left 3/11/2019    Procedure: EXTRACTION, CATARACT, WITH IOL INSERTION;  Surgeon: Vera Sams MD;  Location: [facility] OR;  Service: Ophthalmology;  Laterality: Left;    CATARACT EXTRACTION W/  INTRAOCULAR LENS IMPLANT Right 4/15/2019    Procedure: EXTRACTION, CATARACT, WITH IOL INSERTION LASER;  Surgeon: Vera Sams MD;  Location: [facility];  Service: Ophthalmology;  Laterality: Right;    COLON SURGERY      EYE SURGERY      FRACTURE SURGERY      NASAL SEPTUM SURGERY      OCCLUSION OF LEFT ATRIAL APPENDAGE N/A 2/18/2022    Procedure: Left atrial appendage occlusion;  Surgeon: Chase Gill MD;  Location: [facility];  Service: Cardiology;  Laterality: N/A;  afib, watchman, BSCI, osiris, anes, MB/EH, 3prep    OPEN REDUCTION AND INTERNAL FIXATION (ORIF) OF INJURY OF SACROILIAC JOINT Bilateral 1/20/2021    Procedure: ORIF, SACROILIAC JOINT;  Surgeon: Alcides Tamez MD;  Location: [address];  Service: Orthopedics;  Laterality: Bilateral;    RADIOFREQUENCY ABLATION      RECTAL PROLAPSE REPAIR  june 2013    STRABISMUS SURGERY      TONSILLECTOMY      TRANSESOPHAGEAL ECHOCARDIOGRAPHY N/A 2/18/2022    Procedure: ECHOCARDIOGRAM, TRANSESOPHAGEAL;  Surgeon: Nils Diagnostic Provider;  Location: [facility];  Service: Cardiology;  Laterality: N/A;     Family History   Problem Relation Age of Onset    Heart disease Father     Stroke Father     Heart disease Brother     Breast cancer Paternal Grandmother     Melanoma Neg Hx     Psoriasis Neg Hx     Lupus Neg Hx      Eczema Neg Hx     Amblyopia Neg Hx     Blindness Neg Hx     Cataracts Neg Hx     Glaucoma Neg Hx     Macular degeneration Neg Hx     Retinal detachment Neg Hx     Strabismus Neg Hx      Social History     Socioeconomic History    Marital status:     Number of children: 2   Occupational History    Occupation: retired   Tobacco Use    Smoking status: Never Smoker    Smokeless tobacco: Never Used   Substance and Sexual Activity    Alcohol use: No    Drug use: No    Sexual activity: Not Currently     Social Determinants of Health     Financial Resource Strain: Low Risk     Difficulty of Paying Living Expenses: Not hard at all   Food Insecurity: No Food Insecurity    Worried About Running Out of Food in the Last Year: Never true    Ran Out of Food in the Last Year: Never true   Transportation Needs: No Transportation Needs    Lack of Transportation (Medical): No    Lack of Transportation (Non-Medical): No   Physical Activity: Sufficiently Active    Days of Exercise per Week: 5 days    Minutes of Exercise per Session: 30 min   Stress: No Stress Concern Present    Feeling of Stress : Not at all   Social Connections: Moderately Isolated    Frequency of Communication with Friends and Family: Twice a week    Frequency of Social Gatherings with Friends and Family: Twice a week    Attends Restoration Services: Never    Active Member of Clubs or Organizations: No    Attends Club or Organization Meetings: Never    Marital Status:    Housing Stability: Low Risk     Unable to Pay for Housing in the Last Year: No    Number of Places Lived in the Last Year: 1    Unstable Housing in the Last Year: No     Current Outpatient Medications:     acetaminophen (TYLENOL ORAL), Take by mouth once daily., Disp: , Rfl:     albuterol (PROVENTIL/VENTOLIN HFA) 90 mcg/actuation inhaler, Inhale 2 puffs into the lungs every 6 (six) hours as needed for Wheezing. Rescue, Disp: 18 g, Rfl: 1    aspirin  (ECOTRIN) 81 MG EC tablet, Take 1 tablet (81 mg total) by mouth once daily., Disp: 30 tablet, Rfl: 11    bacitracin-polymyxin b (POLYSPORIN) ophthalmic ointment, , Disp: , Rfl:     calcium-vitamin D3-vitamin K 650 mg-12.5 mcg-40 mcg Chew, Take by mouth., Disp: , Rfl:     docusate sodium (COLACE) 250 MG capsule, Take 250 mg by mouth 2 (two) times daily as needed for Constipation., Disp: , Rfl:     donepeziL (ARICEPT) 10 MG tablet, Take 1 tablet (10 mg total) by mouth once daily., Disp: 30 tablet, Rfl: 11    famotidine (PEPCID) 40 MG tablet, Take 1 tablet (40 mg total) by mouth once daily., Disp: 30 tablet, Rfl: 5    ferrous sulfate 325 (65 FE) MG EC tablet, Take 1 tablet (325 mg total) by mouth once daily., Disp: 30 tablet, Rfl: 5    guaiFENesin (MUCINEX) 600 mg 12 hr tablet, Take 2 tablets (1,200 mg total) by mouth 2 (two) times daily., Disp: 120 tablet, Rfl: 6    Immune Globulin G, IGG,-PRO-IGA 10 % injection, Privigen, (PRIVIGEN) 10 % Soln, Infuse 20 g into the vein every 4 weeks., Disp: 200 mL, Rfl: 0    levothyroxine (SYNTHROID) 88 MCG tablet, Take 1 tablet (88 mcg total) by mouth before breakfast., Disp: 90 tablet, Rfl: 1    loratadine (CLARITIN) 10 mg tablet, Take 1 tablet (10 mg total) by mouth once daily., Disp: 30 tablet, Rfl: 5    montelukast (SINGULAIR) 10 mg tablet, Take 1 tablet (10 mg total) by mouth every evening., Disp: 90 tablet, Rfl: 1    multivit with calcium,iron,min (MULTIPLE VITAMIN, WOMENS ORAL), Take by mouth once daily., Disp: , Rfl:     multivit-min/ferrous fumarate (MULTI VITAMIN ORAL), Take by mouth., Disp: , Rfl:     mupirocin (BACTROBAN) 2 % ointment, APPLY ONE application TWICE DAILY FOR 7 DAYS, Disp: 44 g, Rfl: 6    polyethylene glycol (GLYCOLAX) 17 gram PwPk, Take 17 g by mouth once daily. (Patient taking differently: Take 17 g by mouth as needed. As needed once a week or so), Disp: , Rfl: 0    sars-cov-2, covid-19, (MODERNA COVID-19) 100 mcg/0.5 ml injection, Inject  into the muscle., Disp: 0.25 mL, Rfl: 0    calcium-vitamin D3 (OS-ELAINE 500 + D3) 500 mg(1,250mg) -200 unit per tablet, Take 1 tablet by mouth 2 (two) times daily., Disp: 60 tablet, Rfl: 11    famotidine (PEPCID) 40 MG tablet, Take 1 tablet (40 mg total) by mouth once daily., Disp: 30 tablet, Rfl: 5    ferrous sulfate 325 (65 FE) MG EC tablet, Take 1 tablet (325 mg total) by mouth once daily., Disp: 30 tablet, Rfl: 5    naproxen (NAPROSYN ORAL), Take 200 mg by mouth. As needed every other day, Disp: , Rfl:     phenazopyridine (PYRIDIUM) 95 MG tablet, Take 95 mg by mouth 2 (two) times a day., Disp: , Rfl:     Current Facility-Administered Medications:     diphenhydrAMINE injection 25 mg, 25 mg, Intravenous, Once, Alcides Doss MD    Facility-Administered Medications Ordered in Other Visits:     balanced salt irrigation solution 1 drop, 1 drop, Right Eye, On Call Procedure, Vera Sams MD    lidocaine (PF) 10 mg/ml (1%) injection 10 mg, 1 mL, Intradermal, Once, Vera Sams MD    moxifloxacin 0.5 % ophthalmic solution 1 drop, 1 drop, Right Eye, On Call Procedure, Vera Sams MD, 1 drop at 04/15/19 0922    phenylephrine HCL 2.5% ophthalmic solution 1 drop, 1 drop, Right Eye, On Call Procedure, Vera Sams MD, 1 drop at 04/15/19 0922    sodium chloride 0.9% bolus 1,000 mL, 1,000 mL, Intravenous, Once, Ada Montoya NP    sodium chloride 0.9% bolus 1,000 mL, 1,000 mL, Intravenous, Once, Ada Montoya NP    sodium chloride 0.9% bolus 1,000 mL, 1,000 mL, Intravenous, Once, Ada Montoya NP    tropicamide 1% ophthalmic solution 1 drop, 1 drop, Right Eye, On Call Procedure, Vera Sams MD, 1 drop at 04/15/19 0922    Allergies:  Adhesive, Buspar [buspirone], Escitalopram oxalate, and Rifampin    Objective:     Vital signs:  Blood pressure 126/70, pulse 89.    Neurological exam:  Mental status: Ms. Crews was noted to be appropriate. She was alert and oriented to person,  place, and situation. Attention and concentration were intact. Knowledge of recent autobiographical events was relatively intact.   Language: Ms. Rick speech was fluent, non-effortful, and grammatically intact. She did not have word-finding difficulty or make word-substitutions. Her comprehension was intact to syntactically complex sentences.  Cranial nerves: Extraocular movements were full. Facial movements were symmetric. Affect was blunted. Hearing was intact to voice. She did not have slurring or other speech abnormalities.  Motor examination: Muscle bulk decreased in the upper and lower extremities. She moved all limbs symmetrically. Strength was 4+/5 throughout. She did not have resting tremor or other involuntary movements.  Coordination/sensory:  Deferred  Station/Gait:  Severely stooped, slow, short stride length, uses walker.     Neuroimaging:  Results for orders placed or performed during the hospital encounter of 07/26/21   MRI Brain Without Contrast    Narrative    EXAMINATION:  MRI BRAIN WITHOUT CONTRAST    CLINICAL HISTORY:  Altered mental status;.  Altered mental status, unspecified    TECHNIQUE:  Multiplanar multisequence MR imaging of the brain was performed without contrast.    COMPARISON:  12/20/2018    FINDINGS:  Mild generalized cerebral volume loss with mild compensatory dilatation of the lateral ventricles, similar compared to previous.  Ventricles are midline.  No acute extra-axial fluid collections.    No acute hemorrhage, edema or acute infarct. Patchy periventricular and subcortical foci of increased T2 and FLAIR signal.    Normal vascular flow voids are preserved.    Corpus callosum is intact.  Craniocervical junction is intact.    Mastoid air cells are clear.  Visualized paranasal sinuses are clear.      Impression    No acute intracranial abnormality identified.  Moderate chronic small vessel ischemic change.    There appears to be narrowing of the cervical spinal canal at C3,  findings which can be further evaluated with cervical spine MRI.      Electronically signed by: Amy Ramos  Date:    07/26/2021  Time:    10:27   Results for orders placed or performed during the hospital encounter of 11/23/16   CT Head Without Contrast    Narrative    CT head without contrast    11/23/16 10:25:00    Accession# [id number]    CLINICAL INDICATION: 66 year old F with Injury head (959.01)     TECHNIQUE: Axial CT images obtained throughout the region of the head without the use of intravenous contrast. Axial, sagittal and coronal reconstructions were performed.    COMPARISON: Comparison made to multiple prior studies, the most recent dated 10/29/2016    FINDINGS:    The ventricles are stable in size without evidence of hydrocephalus.    The brain appears within normal limits. No parenchymal mass, hemorrhage, edema or major vascular distribution infarct.    Previous subarachnoid hemorrhage over the occipital regions is no longer apparent. Neither are the subdural collections. No new extra-axial blood or fluid collections.      Healing fracture of the right zygomatic arch and maxilla, partially visualized. Cranium appears intact on today's study. No new fractures are apparent. Mastoid air cells and paranasal sinuses are essentially clear.    Degenerative changes at the craniocervical junction with prominent calcification of the ligaments around the dens.    Impression           Interval resolution of the occipital subarachnoid hemorrhage and bilateral subdural collections. No new hemorrhage or other acute intracranial pathology.    Healing fractures of the right zygomatic arch and maxilla, partially visualized. No new fractures are identified.      Electronically signed by: KIRILL VINCENT MD  Date:     11/23/16  Time:    11:14      *Note: Due to a large number of results and/or encounters for the requested time period, some results have not been displayed. A complete set of results can be found in Results  Review.     Laboratory studies:  Admission on 05/13/2022, Discharged on 05/14/2022   Component Date Value Ref Range Status    WBC 05/13/2022 8.76  3.90 - 12.70 K/uL Final    RBC 05/13/2022 1.78 (A) 4.00 - 5.40 M/uL Final    Hemoglobin 05/13/2022 5.1 (A) 12.0 - 16.0 g/dL Final    Comment: H&H critical result(s) called and verbal readback obtained from   Ebony Fernandez RN by JC8 05/13/2022 12:05      Hematocrit 05/13/2022 16.7 (A) 37.0 - 48.5 % Final    Comment: H&H critical result(s) called and verbal readback obtained from   Ebony Fernandez RN by JC8 05/13/2022 12:05      MCV 05/13/2022 94  82 - 98 fL Final    MCH 05/13/2022 28.7  27.0 - 31.0 pg Final    MCHC 05/13/2022 30.5 (A) 32.0 - 36.0 g/dL Final    RDW 05/13/2022 14.6 (A) 11.5 - 14.5 % Final    Platelets 05/13/2022 190  150 - 450 K/uL Final    MPV 05/13/2022 10.6  9.2 - 12.9 fL Final    Immature Granulocytes 05/13/2022 0.2  0.0 - 0.5 % Final    Gran # (ANC) 05/13/2022 7.4  1.8 - 7.7 K/uL Final    Immature Grans (Abs) 05/13/2022 0.02  0.00 - 0.04 K/uL Final    Comment: Mild elevation in immature granulocytes is non specific and   can be seen in a variety of conditions including stress response,   acute inflammation, trauma and pregnancy. Correlation with other   laboratory and clinical findings is essential.      Lymph # 05/13/2022 0.9 (A) 1.0 - 4.8 K/uL Final    Mono # 05/13/2022 0.4  0.3 - 1.0 K/uL Final    Eos # 05/13/2022 0.0  0.0 - 0.5 K/uL Final    Baso # 05/13/2022 0.02  0.00 - 0.20 K/uL Final    nRBC 05/13/2022 0  0 /100 WBC Final    Gran % 05/13/2022 84.4 (A) 38.0 - 73.0 % Final    Lymph % 05/13/2022 10.6 (A) 18.0 - 48.0 % Final    Mono % 05/13/2022 4.6  4.0 - 15.0 % Final    Eosinophil % 05/13/2022 0.0  0.0 - 8.0 % Final    Basophil % 05/13/2022 0.2  0.0 - 1.9 % Final    Aniso 05/13/2022 Slight   Final    Poik 05/13/2022 Slight   Final    Poly 05/13/2022 Occasional   Final    Ovalocytes 05/13/2022 Occasional   Final     Differential Method 05/13/2022 Automated   Final    Sodium 05/13/2022 139  136 - 145 mmol/L Final    Potassium 05/13/2022 3.9  3.5 - 5.1 mmol/L Final    Chloride 05/13/2022 107  95 - 110 mmol/L Final    CO2 05/13/2022 27  23 - 29 mmol/L Final    Glucose 05/13/2022 86  70 - 110 mg/dL Final    BUN 05/13/2022 46 (A) 8 - 23 mg/dL Final    Creatinine 05/13/2022 0.7  0.5 - 1.4 mg/dL Final    Calcium 05/13/2022 8.5 (A) 8.7 - 10.5 mg/dL Final    Total Protein 05/13/2022 5.9 (A) 6.0 - 8.4 g/dL Final    Albumin 05/13/2022 3.1 (A) 3.5 - 5.2 g/dL Final    Total Bilirubin 05/13/2022 0.2  0.1 - 1.0 mg/dL Final    Comment: For infants and newborns, interpretation of results should be based  on gestational age, weight and in agreement with clinical  observations.    Premature Infant recommended reference ranges:  Up to 24 hours.............<8.0 mg/dL  Up to 48 hours............<12.0 mg/dL  3-5 days..................<15.0 mg/dL  6-29 days.................<15.0 mg/dL      Alkaline Phosphatase 05/13/2022 50 (A) 55 - 135 U/L Final    AST 05/13/2022 25  10 - 40 U/L Final    ALT 05/13/2022 32  10 - 44 U/L Final    Anion Gap 05/13/2022 5 (A) 8 - 16 mmol/L Final    eGFR if African American 05/13/2022 >60.0  >60 mL/min/1.73 m^2 Final    eGFR if non African American 05/13/2022 >60.0  >60 mL/min/1.73 m^2 Final    Comment: Calculation used to obtain the estimated glomerular filtration  rate (eGFR) is the CKD-EPI equation.       Magnesium 05/13/2022 1.9  1.6 - 2.6 mg/dL Final    Troponin I 05/13/2022 <0.006  0.000 - 0.026 ng/mL Final    Comment: The reference interval for Troponin I represents the 99th percentile   cutoff   for our facility and is consistent with 3rd generation assay   performance.      TSH 05/13/2022 5.164 (A) 0.400 - 4.000 uIU/mL Final    BNP 05/13/2022 219 (A) 0 - 99 pg/mL Final    Values of less than 100 pg/ml are consistent with non-CHF populations.    Hepatitis C Ab 05/13/2022 Negative  Negative  Final    POC Glucose 05/13/2022 85  70 - 110 mg/dL Final    POC BUN 05/13/2022 42 (A) 6 - 30 mg/dL Final    POC Creatinine 05/13/2022 0.6  0.5 - 1.4 mg/dL Final    POC Sodium 05/13/2022 141  136 - 145 mmol/L Final    POC Potassium 05/13/2022 4.0  3.5 - 5.1 mmol/L Final    POC Chloride 05/13/2022 107  95 - 110 mmol/L Final    POC TCO2 (MEASURED) 05/13/2022 25  23 - 29 mmol/L Final    POC Ionized Calcium 05/13/2022 1.12  1.06 - 1.42 mmol/L Final    POC Hematocrit 05/13/2022 15 (A) 36 - 54 %PCV Final    Sample 05/13/2022 BONNIE   Final    aPTT 05/13/2022 22.5  21.0 - 32.0 sec Final    aPTT therapeutic range = 39-69 seconds    Prothrombin Time 05/13/2022 11.6  9.0 - 12.5 sec Final    INR 05/13/2022 1.1  0.8 - 1.2 Final    Comment: Coumadin Therapy:  2.0 - 3.0 for INR for all indicators except mechanical heart valves  and antiphospholipid syndromes which should use 2.5 - 3.5.      Group & Rh 05/13/2022 O POS   Final    Indirect Kemar 05/13/2022 NEG   Final    UNIT NUMBER 05/13/2022 [id number]   Final    Product Code 05/13/2022 [id number]   Final    DISPENSE STATUS 05/13/2022 TRANSFUSED   Final    CODING SYSTEM 05/13/2022 [id number]   Final    Unit Blood Type Code 05/13/2022 5100   Final    Unit Blood Type 05/13/2022 O POS   Final    Unit Expiration 05/13/2022 [id number]   Final    UNIT NUMBER 05/13/2022 [id number]   Final    Product Code 05/13/2022 [id number]   Final    DISPENSE STATUS 05/13/2022 TRANSFUSED   Final    CODING SYSTEM 05/13/2022 [id number]   Final    Unit Blood Type Code 05/13/2022 5100   Final    Unit Blood Type 05/13/2022 O POS   Final    Unit Expiration 05/13/2022 [id number]   Final    Free T4 05/13/2022 0.75  0.71 - 1.51 ng/dL Final    Specimen UA 05/13/2022 Urine, Unspecified   Final    Color, UA 05/13/2022 Orange (A) Yellow, Straw, Mai Final    Appearance, UA 05/13/2022 Clear  Clear Final    pH, UA 05/13/2022 5.0  5.0 - 8.0 Final    Specific [place], UA 05/13/2022  1.025  1.005 - 1.030 Final    Protein, UA 05/13/2022 SEE COMMENT  Negative Final    Comment: Recommend a 24 hour urine protein or a urine   protein/creatinine ratio if globulin induced proteinuria is  clinically suspected.  Color may interfere with results      Glucose, UA 05/13/2022 SEE COMMENT  Negative Final    Color may interfere with results    Ketones, UA 05/13/2022 SEE COMMENT  Negative Final    Color may interfere with results    Bilirubin (UA) 05/13/2022 SEE COMMENT  Negative Final    Color may interfere with results    Occult Blood UA 05/13/2022 SEE COMMENT  Negative Final    Color may interfere with results    Nitrite, UA 05/13/2022 SEE COMMENT  Negative Final    Color may interfere with results    Leukocytes, UA 05/13/2022 SEE COMMENT  Negative Final    Color may interfere with results    Direct Kemar (KAREN) 05/13/2022 NEG   Final    WBC 05/14/2022 6.37  3.90 - 12.70 K/uL Final    RBC 05/14/2022 2.90 (A) 4.00 - 5.40 M/uL Final    Hemoglobin 05/14/2022 8.6 (A) 12.0 - 16.0 g/dL Final    Hematocrit 05/14/2022 27.0 (A) 37.0 - 48.5 % Final    MCV 05/14/2022 93  82 - 98 fL Final    MCH 05/14/2022 29.7  27.0 - 31.0 pg Final    MCHC 05/14/2022 31.9 (A) 32.0 - 36.0 g/dL Final    RDW 05/14/2022 14.5  11.5 - 14.5 % Final    Platelets 05/14/2022 195  150 - 450 K/uL Final    MPV 05/14/2022 10.7  9.2 - 12.9 fL Final    Immature Granulocytes 05/14/2022 0.3  0.0 - 0.5 % Final    Gran # (ANC) 05/14/2022 4.7  1.8 - 7.7 K/uL Final    Immature Grans (Abs) 05/14/2022 0.02  0.00 - 0.04 K/uL Final    Comment: Mild elevation in immature granulocytes is non specific and   can be seen in a variety of conditions including stress response,   acute inflammation, trauma and pregnancy. Correlation with other   laboratory and clinical findings is essential.      Lymph # 05/14/2022 1.3  1.0 - 4.8 K/uL Final    Mono # 05/14/2022 0.4  0.3 - 1.0 K/uL Final    Eos # 05/14/2022 0.0  0.0 - 0.5 K/uL Final    Baso #  05/14/2022 0.02  0.00 - 0.20 K/uL Final    nRBC 05/14/2022 0  0 /100 WBC Final    Gran % 05/14/2022 73.1 (A) 38.0 - 73.0 % Final    Lymph % 05/14/2022 19.8  18.0 - 48.0 % Final    Mono % 05/14/2022 6.3  4.0 - 15.0 % Final    Eosinophil % 05/14/2022 0.2  0.0 - 8.0 % Final    Basophil % 05/14/2022 0.3  0.0 - 1.9 % Final    Differential Method 05/14/2022 Automated   Final    Retic 05/13/2022 5.5 (A) 0.5 - 2.5 % Final    RBC, UA 05/13/2022 2  0 - 4 /hpf Final    WBC, UA 05/13/2022 2  0 - 5 /hpf Final    Bacteria 05/13/2022 Rare  None-Occ /hpf Final    Amorphous, UA 05/13/2022 Rare  None-Moderate Final    Microscopic Comment 05/13/2022 SEE COMMENT   Final    Comment: Other formed elements not mentioned in the report are not   present in the microscopic examination.       WBC 05/14/2022 7.10  3.90 - 12.70 K/uL Final    RBC 05/14/2022 2.97 (A) 4.00 - 5.40 M/uL Final    Hemoglobin 05/14/2022 8.9 (A) 12.0 - 16.0 g/dL Final    Hematocrit 05/14/2022 27.2 (A) 37.0 - 48.5 % Final    MCV 05/14/2022 92  82 - 98 fL Final    MCH 05/14/2022 30.0  27.0 - 31.0 pg Final    MCHC 05/14/2022 32.7  32.0 - 36.0 g/dL Final    RDW 05/14/2022 14.7 (A) 11.5 - 14.5 % Final    Platelets 05/14/2022 185  150 - 450 K/uL Final    MPV 05/14/2022 10.5  9.2 - 12.9 fL Final    Immature Granulocytes 05/14/2022 0.4  0.0 - 0.5 % Final    Gran # (ANC) 05/14/2022 5.2  1.8 - 7.7 K/uL Final    Immature Grans (Abs) 05/14/2022 0.03  0.00 - 0.04 K/uL Final    Comment: Mild elevation in immature granulocytes is non specific and   can be seen in a variety of conditions including stress response,   acute inflammation, trauma and pregnancy. Correlation with other   laboratory and clinical findings is essential.      Lymph # 05/14/2022 1.4  1.0 - 4.8 K/uL Final    Mono # 05/14/2022 0.4  0.3 - 1.0 K/uL Final    Eos # 05/14/2022 0.0  0.0 - 0.5 K/uL Final    Baso # 05/14/2022 0.03  0.00 - 0.20 K/uL Final    nRBC 05/14/2022 0  0 /100 WBC Final     Gran % 05/14/2022 73.1 (A) 38.0 - 73.0 % Final    Lymph % 05/14/2022 20.1  18.0 - 48.0 % Final    Mono % 05/14/2022 5.9  4.0 - 15.0 % Final    Eosinophil % 05/14/2022 0.1  0.0 - 8.0 % Final    Basophil % 05/14/2022 0.4  0.0 - 1.9 % Final    Differential Method 05/14/2022 Automated   Final    Hemoglobin 05/14/2022 9.0 (A) 12.0 - 16.0 g/dL Final    Hematocrit 05/14/2022 28.3 (A) 37.0 - 48.5 % Final   Office Visit on 04/21/2022   Component Date Value Ref Range Status    Cytology ThinPrep Pap Source 04/21/2022 Cervix   Final    Cytology ThinPrep Pap Report Status 04/21/2022 DNR   Final    Cytology Thinprep PAP Clinical His* 04/21/2022 Routine exam   Corrected    Cytology ThinPrep Pap LMP 04/21/2022 age 57   Final    Cytology ThinPrep Previous PAP 04/21/2022 Unknown   Final    Cytology ThinPrep Previous Biopsy 04/21/2022 No   Final    Cytology ThinPrep PAP Adequacy 04/21/2022 SEE BELOW   Final    Comment: Satisfactory for evaluation. Endocervical/transformation zone   component   present.      Cytology ThinPrep PAP General Pham* 04/21/2022 DNR   Final    Cytology ThinPrep PAP Interpretati* 04/21/2022 SEE BELOW   Final    Negative for intraepithelial lesion or malignancy.    Cytology ThinPrep PAP Comment 04/21/2022 SEE BELOW   Final    This Pap test has been evaluated with computer assisted technology.    Cytotechnologist 04/21/2022 SEE BELOW   Final    Comment: [facility], CT(ASCP) CT screening location: [place] [address] [address],   [facility] [address]      Review Cytotechnologist 04/21/2022 DNR   Final    Pathologist 04/21/2022 DNR   Final    Cytology ThinPrep PAP Infection 04/21/2022 DNR   Final    Cytology Thin Prep Pap Explanation 04/21/2022 SEE BELOW   Final    Comment: EXPLANATORY NOTE:     The Pap is a screening test for cervical cancer. It is   not a diagnostic test and is subject to false negative   and false positive results. It is most reliable when a   satisfactory sample, regularly  obtained, is submitted   with relevant clinical findings and history, and when   the Pap result is evaluated along with historic and   current clinical information.    TEST PERFORMED AT:  [facility] [address] [address]  YANN GARCIA MD      HPV DNA High Risk 04/21/2022 Not Detected  NOT DETECTED Final    Comment: Not Detected High Risk HPV types (16,18,31,33,35,39,45,51,52,  56,58,59,66,68) were not detected. Other HPV  types which cause anogenital lesions may be present.  The significance of the other types of HPV  in malignant processes has not been established.    Methodology: Real Time PCR                        TEST PERFORMED AT:  [facility]/[address] [address]  RAYMUNDO AN MD,PHD         I performed a total of 28 minutes on Ms. Crews's care on the day of their visit excluding time spent related to any billed procedures. This time includes time spent with the patient as well as time spent documenting in the medical record, reviewing patient's records and tests, obtaining history, placing orders, communicating with other healthcare professionals, counseling the patient, family, or caregiver, and/or care coordination for the diagnoses above.    This note was dictated using [email or web address] speech recognition software. Please excuse any spelling or grammatical errors. Word recognition mistakes are occasionally missed on review.

## 2022-05-17 NOTE — TELEPHONE ENCOUNTER
I sent her a [facility] message.  Please set of blood work again Monday.  Please also set up with a visit with GI.

## 2022-05-17 NOTE — TELEPHONE ENCOUNTER
Nurse judd is not in the office will need to call tomorrow to get every medication with its dosage and frequency.    Also need to ask the pts  does he want her on pyridium or azo, its up to the pts discretion

## 2022-05-17 NOTE — TELEPHONE ENCOUNTER
Please advise re: the following medication.  The asst living facility will need new RX.  Pt came back from ER & meds were changed. Please advise    Naprosyn..the patient took 200 mg daily... ER sent back PRN  Need new Rx for daily    Need new order for multivitamin  Once daily    Colace..the patient was taking daily... came back from ER PRN  Need new RX for daily    Azo ...the patient was taking 2 a day...came back from ER w/o it on the list but pyridium is on the list  Do you want to change?    Viactiv..the patient was  Taking 2 a day.. came back from ER without it  Need new RX    Please advise

## 2022-05-18 ENCOUNTER — TELEPHONE (OUTPATIENT)
Dept: GASTROENTEROLOGY | Facility: CLINIC | Age: 72
End: 2022-05-18
Payer: MEDICARE

## 2022-05-18 NOTE — TELEPHONE ENCOUNTER
Spoke with patients .  Patient scheduled to see Dr.Matthew Herrera on 5/27 for 11:00.  Confirmation mailed.   Jennifer

## 2022-05-18 NOTE — TELEPHONE ENCOUNTER
Pts  requested that we make lab appt here Weds instead of Monday since he already has an appt Weds here. States its a lot with the back and fourth.     Lab appt scheduled here Weds to repeat cbc. Pts  notified via my [facility] portal message.    Pt already has an appt with GI next week 05/27

## 2022-05-18 NOTE — TELEPHONE ENCOUNTER
Please see if we can assist him with making his wife an appointment in GI, Haley may be able to help

## 2022-05-18 NOTE — TELEPHONE ENCOUNTER
----- Message from Angie Nj sent at 5/18/2022  8:16 AM CDT -----  Contact: [phone]  Pt is calling to get a follow up Er appt with the dr from a referral.    Pt would like a call back.    [phone]

## 2022-05-25 ENCOUNTER — LAB VISIT (OUTPATIENT)
Dept: LAB | Facility: HOSPITAL | Age: 72
End: 2022-05-25
Attending: FAMILY MEDICINE
Payer: MEDICARE

## 2022-05-25 ENCOUNTER — PATIENT MESSAGE (OUTPATIENT)
Dept: PRIMARY CARE CLINIC | Facility: CLINIC | Age: 72
End: 2022-05-25
Payer: MEDICARE

## 2022-05-25 DIAGNOSIS — D64.9 ANEMIA, UNSPECIFIED TYPE: ICD-10-CM

## 2022-05-25 LAB
BASOPHILS # BLD AUTO: 0.02 K/UL (ref 0–0.2)
BASOPHILS NFR BLD: 0.3 % (ref 0–1.9)
DIFFERENTIAL METHOD: ABNORMAL
EOSINOPHIL # BLD AUTO: 0 K/UL (ref 0–0.5)
EOSINOPHIL NFR BLD: 0 % (ref 0–8)
ERYTHROCYTE [DISTWIDTH] IN BLOOD BY AUTOMATED COUNT: 16 % (ref 11.5–14.5)
HCT VFR BLD AUTO: 32.7 % (ref 37–48.5)
HGB BLD-MCNC: 9.8 G/DL (ref 12–16)
IMM GRANULOCYTES # BLD AUTO: 0 K/UL (ref 0–0.04)
IMM GRANULOCYTES NFR BLD AUTO: 0 % (ref 0–0.5)
LYMPHOCYTES # BLD AUTO: 1 K/UL (ref 1–4.8)
LYMPHOCYTES NFR BLD: 16.3 % (ref 18–48)
MCH RBC QN AUTO: 29.8 PG (ref 27–31)
MCHC RBC AUTO-ENTMCNC: 30 G/DL (ref 32–36)
MCV RBC AUTO: 99 FL (ref 82–98)
MONOCYTES # BLD AUTO: 0.4 K/UL (ref 0.3–1)
MONOCYTES NFR BLD: 7.2 % (ref 4–15)
NEUTROPHILS # BLD AUTO: 4.4 K/UL (ref 1.8–7.7)
NEUTROPHILS NFR BLD: 76.2 % (ref 38–73)
NRBC BLD-RTO: 0 /100 WBC
PLATELET # BLD AUTO: 317 K/UL (ref 150–450)
PMV BLD AUTO: 11.1 FL (ref 9.2–12.9)
RBC # BLD AUTO: 3.29 M/UL (ref 4–5.4)
WBC # BLD AUTO: 5.82 K/UL (ref 3.9–12.7)

## 2022-05-25 PROCEDURE — 85025 COMPLETE CBC W/AUTO DIFF WBC: CPT | Performed by: FAMILY MEDICINE

## 2022-05-25 PROCEDURE — 36415 COLL VENOUS BLD VENIPUNCTURE: CPT | Mod: PN | Performed by: FAMILY MEDICINE

## 2022-05-26 ENCOUNTER — PATIENT MESSAGE (OUTPATIENT)
Dept: PRIMARY CARE CLINIC | Facility: CLINIC | Age: 72
End: 2022-05-26
Payer: MEDICARE

## 2022-05-26 ENCOUNTER — TELEPHONE (OUTPATIENT)
Dept: PRIMARY CARE CLINIC | Facility: CLINIC | Age: 72
End: 2022-05-26

## 2022-05-26 DIAGNOSIS — D64.9 ANEMIA, UNSPECIFIED TYPE: Primary | ICD-10-CM

## 2022-05-26 NOTE — TELEPHONE ENCOUNTER
----- Message from Leonid Cadena sent at 5/26/2022  4:05 PM CDT -----  Contact: Lisa ([facility]) [phone] ext [phone]  Lisa from [facility] assisting living requesting a call today for clarification on pt  meds, from her hospital stay was 5/13-5/14  and have to make sure the nurse administer the correct meds.    Please call and advise

## 2022-05-26 NOTE — TELEPHONE ENCOUNTER
Spoke with judd from [facility].  They need a letter stating ok to resume all meds prior to the ER visit on 5-13-22. & continue on the 2 new meds: iron & pepcid.  I drafted a fax. Tari has it. Please sign & fax back to them.

## 2022-05-26 NOTE — TELEPHONE ENCOUNTER
Please advise.  Pt thinking about cancelling GI appt.  Asking for your recommendation.  Pt CBC came up

## 2022-05-30 ENCOUNTER — TELEPHONE (OUTPATIENT)
Dept: ELECTROPHYSIOLOGY | Facility: CLINIC | Age: 72
End: 2022-05-30
Payer: MEDICARE

## 2022-05-30 ENCOUNTER — INFUSION (OUTPATIENT)
Dept: INFECTIOUS DISEASES | Facility: HOSPITAL | Age: 72
End: 2022-05-30
Attending: INTERNAL MEDICINE
Payer: MEDICARE

## 2022-05-30 VITALS
RESPIRATION RATE: 18 BRPM | DIASTOLIC BLOOD PRESSURE: 58 MMHG | BODY MASS INDEX: 16.14 KG/M2 | HEART RATE: 85 BPM | SYSTOLIC BLOOD PRESSURE: 120 MMHG | WEIGHT: 88.25 LBS | TEMPERATURE: 97 F | OXYGEN SATURATION: 98 %

## 2022-05-30 DIAGNOSIS — D83.9 CVID (COMMON VARIABLE IMMUNODEFICIENCY): Primary | ICD-10-CM

## 2022-05-30 DIAGNOSIS — J47.9 ADULT BRONCHIECTASIS: ICD-10-CM

## 2022-05-30 PROCEDURE — 63600175 PHARM REV CODE 636 W HCPCS: Mod: JG | Performed by: ALLERGY & IMMUNOLOGY

## 2022-05-30 PROCEDURE — 96366 THER/PROPH/DIAG IV INF ADDON: CPT

## 2022-05-30 PROCEDURE — 25000003 PHARM REV CODE 250: Performed by: ALLERGY & IMMUNOLOGY

## 2022-05-30 PROCEDURE — 96365 THER/PROPH/DIAG IV INF INIT: CPT

## 2022-05-30 RX ORDER — SODIUM CHLORIDE 0.9 % (FLUSH) 0.9 %
10 SYRINGE (ML) INJECTION
Status: CANCELLED | OUTPATIENT
Start: 2022-06-20

## 2022-05-30 RX ORDER — SODIUM CHLORIDE 0.9 % (FLUSH) 0.9 %
10 SYRINGE (ML) INJECTION
Status: DISCONTINUED | OUTPATIENT
Start: 2022-05-30 | End: 2022-05-30 | Stop reason: HOSPADM

## 2022-05-30 RX ORDER — ACETAMINOPHEN 325 MG/1
650 TABLET ORAL
Status: COMPLETED | OUTPATIENT
Start: 2022-05-30 | End: 2022-05-30

## 2022-05-30 RX ORDER — ACETAMINOPHEN 325 MG/1
650 TABLET ORAL
Status: CANCELLED | OUTPATIENT
Start: 2022-06-20

## 2022-05-30 RX ADMIN — HUMAN IMMUNOGLOBULIN G 25 G: 20 LIQUID INTRAVENOUS at 09:05

## 2022-05-30 RX ADMIN — SODIUM CHLORIDE: 0.9 INJECTION, SOLUTION INTRAVENOUS at 09:05

## 2022-05-30 RX ADMIN — ACETAMINOPHEN 650 MG: 325 TABLET ORAL at 09:05

## 2022-05-30 NOTE — PROGRESS NOTES
Pt arrived to infusion suite for Privigen infusion, premeds tylenol given po and refused benadryl. Received IVIG PRIVIGEN 25 GRAMS.  Infusion started @ 12 cc/hr and increased rate every 30 mins, 24 cc/hr., 48 cc /hr and max rate @ 97 cc/hr, IV NS ran concurrently at 25 ml/hr. Tolerated well and left in NAD. Sitter at side. Return appt provided.

## 2022-05-30 NOTE — TELEPHONE ENCOUNTER
----- Message from Venecia Crews sent at 5/30/2022  8:29 AM CDT -----  Regarding: Appointment  PT's spouse called to report that PT's phone is broken and they need to transfer appointment to his phone.  Please call PT to explain virtual appointment @  [phone]      Thanks

## 2022-05-31 ENCOUNTER — OFFICE VISIT (OUTPATIENT)
Dept: ELECTROPHYSIOLOGY | Facility: CLINIC | Age: 72
End: 2022-05-31
Payer: MEDICARE

## 2022-05-31 DIAGNOSIS — Z95.818 PRESENCE OF WATCHMAN LEFT ATRIAL APPENDAGE CLOSURE DEVICE: Primary | Chronic | ICD-10-CM

## 2022-05-31 PROCEDURE — 99213 OFFICE O/P EST LOW 20 MIN: CPT | Mod: 95,,, | Performed by: INTERNAL MEDICINE

## 2022-05-31 PROCEDURE — 99213 PR OFFICE/OUTPT VISIT, EST, LEVL III, 20-29 MIN: ICD-10-PCS | Mod: 95,,, | Performed by: INTERNAL MEDICINE

## 2022-05-31 NOTE — PROGRESS NOTES
Subjective:    Patient ID:  Yvette Crews is a 72 y.o. female who presents for follow-up of Atrial Fibrillation      The patient location is: Home  The chief complaint leading to consultation is: AF  Visit type: Virtual visit with synchronous audio and video  Total time spent with patient: 15  Each patient to whom he or she provides medical services by telemedicine is:  (1) informed of the relationship between the physician and patient and the respective role of any other health care provider with respect to management of the patient; and (2) notified that he or she may decline to receive medical services by telemedicine and may withdraw from such care at any time.    Notes:   72 yoF who returns for a virtual visit today after undergoing LAAO 2/18/22.  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    Interval history: Follow up JAMEL 4/4/22 revealed no leak- xarelto stopped and DAPT started.  5/12/22 she presented with anemia requiring PRBCs. Plavix stopped, aspirin restarted. Currently on iron and has GI follow up.     JAMEL 4/22:  · The left ventricle is normal in size with normal systolic function.  · The estimated ejection fraction is 60%.  · Normal right ventricular size with normal right ventricular systolic function.  · MILO occluder is present. Watchman 27 mm device well seated. No significant leak seen.  · Mild mitral regurgitation.  · There is bileaflet mitral prolapse.  · There is mild mitral  prolapse of the anterior mitral leaflet.  · There is mild mitral prolapse of the posterior mitral leaflet.  · Grade 2 plaque present in the descending aorta.    Past Medical History:  No date: Adult bronchiectasis  8/28/2013: Age-related osteoporosis with current pathological   fracture with routine healing  No date: Amblyopia  No date: Anxiety  No date: Cataract  1/19/2021: Cellulitis of right lower extremity  7/12/2011: Chondrocalcinosis, cause unspecified, involving hand(712.  34)      Comment:   Dx updated per 2019 IMO Load  4/6/2017: Chronic sinusitis  6/5/2013: Colonic constipation  10/27/2016: Concussion  3/14/2017: Frequent falls  3/14/2017: Gait disturbance  2/3/2018: History of cardiac radiofrequency ablation (RFA)  10/30/2016: History of subarachnoid hemorrhage  9/7/2011: Hypogammaglobulinemia  12/18/2015: Irritable bowel syndrome  8/19/2010: Major depressive disorder, recurrent episode, in full   remission  No date: Onychomycosis  No date: Osteoarthritis  12/18/2015: Postoperative hypothyroidism  6/5/2013: Rectal prolapse  2/7/2010: Reflux esophagitis  6/1/2017: Status post thyroidectomy  No date: Strabismus  11/22/2013: SVT (supraventricular tachycardia)      Comment:  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 1/23/2013: Underweight    Past Surgical History:  No date: BREAST CYST ASPIRATION      Comment:  x2  3/11/2019: CATARACT EXTRACTION W/  INTRAOCULAR LENS IMPLANT; Left      Comment:  Procedure: EXTRACTION, CATARACT, WITH IOL INSERTION;                 Surgeon: Vera Sams MD;  Location: [facility] OR;                 Service: Ophthalmology;  Laterality: Left;  4/15/2019: CATARACT EXTRACTION W/  INTRAOCULAR LENS IMPLANT; Right      Comment:  Procedure: EXTRACTION, CATARACT, WITH IOL INSERTION                LASER;  Surgeon: Vera Sams MD;  Location: [facility]                OR;  Service: Ophthalmology;  Laterality: Right;  No date: COLON SURGERY  No date: EYE SURGERY  No date: FRACTURE SURGERY  No  date: NASAL SEPTUM SURGERY  2/18/2022: OCCLUSION OF LEFT ATRIAL APPENDAGE; N/A      Comment:  Procedure: Left atrial appendage occlusion;  Surgeon:                Chase Gill MD;  Location: [facility] EP LAB;  Service:               Cardiology;  Laterality: N/A;  afib, watchman, BSCI, osiris,               anes, MB/EH, 3prep  1/20/2021: OPEN REDUCTION AND INTERNAL FIXATION (ORIF) OF INJURY OF   SACROILIAC JOINT; Bilateral      Comment:  Procedure: ORIF, SACROILIAC JOINT;  Surgeon: Alcides Tamez MD;  [facility];  Service:                Orthopedics;  Laterality: Bilateral;  No date: RADIOFREQUENCY ABLATION  june 2013: RECTAL PROLAPSE REPAIR  No date: STRABISMUS SURGERY  No date: TONSILLECTOMY  2/18/2022: TRANSESOPHAGEAL ECHOCARDIOGRAPHY; N/A      Comment:  Procedure: ECHOCARDIOGRAM, TRANSESOPHAGEAL;  Surgeon:                Nils Diagnostic Provider;  Location: [facility] EP LAB;                 Service: Cardiology;  Laterality: N/A;    Social History    Socioeconomic History      Marital status:       Number of children: 2    Occupational History      Occupation: retired    Tobacco Use      Smoking status: Never Smoker      Smokeless tobacco: Never Used    Substance and Sexual Activity      Alcohol use: No      Drug use: No      Sexual activity: Not Currently    Social Determinants of Health  Financial Resource Strain: Low Risk       Difficulty of Paying Living Expenses: Not hard at all  Food Insecurity: No Food Insecurity      Worried About Running Out of Food in the Last Year: Never true      Ran Out of Food in the Last Year: Never true  Transportation Needs: No Transportation Needs      Lack of Transportation (Medical): No      Lack of Transportation (Non-Medical): No  Physical Activity: Sufficiently Active      Days of Exercise per Week: 5 days      Minutes of Exercise per Session: 30 min  Stress: No Stress Concern Present      Feeling of Stress : Not at all  Social  Connections: Moderately Isolated      Frequency of Communication with Friends and Family: Twice a week      Frequency of Social Gatherings with Friends and Family: Twice a week      Attends Judaism Services: Never      Active Member of Clubs or Organizations: No      Attends Club or Organization Meetings: Never      Marital Status:   Housing Stability: Low Risk       Unable to Pay for Housing in the Last Year: No      Number of Places Lived in the Last Year: 1      Unstable Housing in the Last Year: No    Review of patient's family history indicates:  Problem: Heart disease      Relation: Father          Age of Onset: (Not Specified)  Problem: Stroke      Relation: Father          Age of Onset: (Not Specified)  Problem: Heart disease      Relation: Brother          Age of Onset: (Not Specified)  Problem: Breast cancer      Relation: Paternal Grandmother          Age of Onset: (Not Specified)  Problem: Melanoma      Relation: Neg Hx          Age of Onset: (Not Specified)  Problem: Psoriasis      Relation: Neg Hx          Age of Onset: (Not Specified)  Problem: Lupus      Relation: Neg Hx          Age of Onset: (Not Specified)  Problem: Eczema      Relation: Neg Hx          Age of Onset: (Not Specified)  Problem: Amblyopia      Relation: Neg Hx          Age of Onset: (Not Specified)  Problem: Blindness      Relation: Neg Hx          Age of Onset: (Not Specified)  Problem: Cataracts      Relation: Neg Hx          Age of Onset: (Not Specified)  Problem: Glaucoma      Relation: Neg Hx          Age of Onset: (Not Specified)  Problem: Macular degeneration      Relation: Neg Hx          Age of Onset: (Not Specified)  Problem: Retinal detachment      Relation: Neg Hx          Age of Onset: (Not Specified)  Problem: Strabismus      Relation: Neg Hx          Age of Onset: (Not Specified)      Current Outpatient Medications:  acetaminophen (TYLENOL ORAL), Take by mouth once daily., Disp: , Rfl:   albuterol  (PROVENTIL/VENTOLIN HFA) 90 mcg/actuation inhaler, Inhale 2 puffs into the lungs every 6 (six) hours as needed for Wheezing. Rescue (Patient not taking: Reported on 5/31/2022), Disp: 18 g, Rfl: 1  aspirin (ECOTRIN) 81 MG EC tablet, Take 1 tablet (81 mg total) by mouth once daily., Disp: 30 tablet, Rfl: 11  bacitracin-polymyxin b (POLYSPORIN) ophthalmic ointment, , Disp: , Rfl:   calcium-vitamin D3 (OS-ELAINE 500 + D3) 500 mg(1,250mg) -200 unit per tablet, Take 1 tablet by mouth 2 (two) times daily., Disp: 60 tablet, Rfl: 11  calcium-vitamin D3-vitamin K 650 mg-12.5 mcg-40 mcg Chew, Take by mouth., Disp: , Rfl:   docusate sodium (COLACE) 250 MG capsule, Take 250 mg by mouth 2 (two) times daily as needed for Constipation., Disp: , Rfl:   donepeziL (ARICEPT) 10 MG tablet, Take 1 tablet (10 mg total) by mouth once daily., Disp: 30 tablet, Rfl: 11  famotidine (PEPCID) 40 MG tablet, Take 1 tablet (40 mg total) by mouth once daily., Disp: 30 tablet, Rfl: 5  famotidine (PEPCID) 40 MG tablet, Take 1 tablet (40 mg total) by mouth once daily., Disp: 30 tablet, Rfl: 5  ferrous sulfate 325 (65 FE) MG EC tablet, Take 1 tablet (325 mg total) by mouth once daily., Disp: 30 tablet, Rfl: 5  ferrous sulfate 325 (65 FE) MG EC tablet, Take 1 tablet (325 mg total) by mouth once daily., Disp: 30 tablet, Rfl: 5  guaiFENesin (MUCINEX) 600 mg 12 hr tablet, Take 2 tablets (1,200 mg total) by mouth 2 (two) times daily., Disp: 120 tablet, Rfl: 6  Immune Globulin G, IGG,-PRO-IGA 10 % injection, Privigen, (PRIVIGEN) 10 % Soln, Infuse 20 g into the vein every 4 weeks., Disp: 200 mL, Rfl: 0  levothyroxine (SYNTHROID) 88 MCG tablet, Take 1 tablet (88 mcg total) by mouth before breakfast., Disp: 90 tablet, Rfl: 1  loratadine (CLARITIN) 10 mg tablet, Take 1 tablet (10 mg total) by mouth once daily., Disp: 30 tablet, Rfl: 5  montelukast (SINGULAIR) 10 mg tablet, Take 1 tablet (10 mg total) by mouth every evening., Disp: 90 tablet, Rfl: 1  multivit with  calcium,iron,min (MULTIPLE VITAMIN, WOMENS ORAL), Take by mouth once daily., Disp: , Rfl:   multivit-min/ferrous fumarate (MULTI VITAMIN ORAL), Take by mouth., Disp: , Rfl:   mupirocin (BACTROBAN) 2 % ointment, APPLY ONE application TWICE DAILY FOR 7 DAYS, Disp: 44 g, Rfl: 6  naproxen (NAPROSYN ORAL), Take 200 mg by mouth. As needed every other day, Disp: , Rfl:   phenazopyridine (PYRIDIUM) 95 MG tablet, Take 95 mg by mouth 2 (two) times a day., Disp: , Rfl:   polyethylene glycol (GLYCOLAX) 17 gram PwPk, Take 17 g by mouth once daily. (Patient taking differently: Take 17 g by mouth as needed. As needed once a week or so), Disp: , Rfl: 0  pumpkin seed extract/soy germ (AZO BLADDER CONTROL ORAL), Take by mouth., Disp: , Rfl:   sars-cov-2, covid-19, (MODERNA COVID-19) 100 mcg/0.5 ml injection, Inject into the muscle., Disp: 0.25 mL, Rfl: 0    Current Facility-Administered Medications:  diphenhydrAMINE injection 25 mg, 25 mg, Intravenous, Once, Alcides Doss MD    Facility-Administered Medications Ordered in Other Visits:  balanced salt irrigation solution 1 drop, 1 drop, Right Eye, On Call Procedure, Vera Sams MD  lidocaine (PF) 10 mg/ml (1%) injection 10 mg, 1 mL, Intradermal, Once, Vera Sams MD  moxifloxacin 0.5 % ophthalmic solution 1 drop, 1 drop, Right Eye, On Call Procedure, Vera Sams MD, 1 drop at 04/15/19 0922  phenylephrine HCL 2.5% ophthalmic solution 1 drop, 1 drop, Right Eye, On Call Procedure, Vera Sams MD, 1 drop at 04/15/19 0922  sodium chloride 0.9% bolus 1,000 mL, 1,000 mL, Intravenous, Once, Ada Montoya NP  sodium chloride 0.9% bolus 1,000 mL, 1,000 mL, Intravenous, Once, Ada Montoya NP  sodium chloride 0.9% bolus 1,000 mL, 1,000 mL, Intravenous, Once, Ada Montoya, NP  tropicamide 1% ophthalmic solution 1 drop, 1 drop, Right Eye, On Call Procedure, Vera Sams MD, 1 drop at 04/15/19 0764        Review of Systems   Constitutional: Positive for  malaise/fatigue.   HENT: Negative.    Eyes: Negative.    Cardiovascular: Negative for chest pain, dyspnea on exertion, leg swelling, near-syncope, palpitations and syncope.   Respiratory: Negative.  Negative for shortness of breath.    Endocrine: Negative.    Hematologic/Lymphatic: Negative.    Skin: Negative.    Musculoskeletal: Negative.    Gastrointestinal: Negative.    Genitourinary: Negative.    Neurological: Negative.  Negative for dizziness and light-headedness.   Psychiatric/Behavioral: Negative.    Allergic/Immunologic: Negative.         Objective:    Physical Exam        ECG: SBR    Assessment:       1. Presence of Watchman left atrial appendage closure device         Plan:       72 yoF here for follow up. She had a significant anemia on DAPT. I would hold plavix indefinitely and maintain aspirin. If she has recurrent bleeding events, I would recommend evaluation for a source. Stop date for aspirin 8/18/22. Follow up with routine EP care with Dr Negron.

## 2022-06-02 ENCOUNTER — PATIENT MESSAGE (OUTPATIENT)
Dept: PRIMARY CARE CLINIC | Facility: CLINIC | Age: 72
End: 2022-06-02
Payer: MEDICARE

## 2022-06-09 ENCOUNTER — PATIENT MESSAGE (OUTPATIENT)
Dept: PRIMARY CARE CLINIC | Facility: CLINIC | Age: 72
End: 2022-06-09
Payer: MEDICARE

## 2022-06-09 ENCOUNTER — LAB VISIT (OUTPATIENT)
Dept: LAB | Facility: HOSPITAL | Age: 72
End: 2022-06-09
Attending: FAMILY MEDICINE
Payer: MEDICARE

## 2022-06-09 ENCOUNTER — TELEPHONE (OUTPATIENT)
Dept: PRIMARY CARE CLINIC | Facility: CLINIC | Age: 72
End: 2022-06-09
Payer: MEDICARE

## 2022-06-09 DIAGNOSIS — D64.9 ANEMIA, UNSPECIFIED TYPE: ICD-10-CM

## 2022-06-09 LAB
BASOPHILS # BLD AUTO: 0.01 K/UL (ref 0–0.2)
BASOPHILS NFR BLD: 0.2 % (ref 0–1.9)
DIFFERENTIAL METHOD: ABNORMAL
EOSINOPHIL # BLD AUTO: 0 K/UL (ref 0–0.5)
EOSINOPHIL NFR BLD: 0 % (ref 0–8)
ERYTHROCYTE [DISTWIDTH] IN BLOOD BY AUTOMATED COUNT: 16.5 % (ref 11.5–14.5)
HCT VFR BLD AUTO: 33.4 % (ref 37–48.5)
HGB BLD-MCNC: 10.4 G/DL (ref 12–16)
IMM GRANULOCYTES # BLD AUTO: 0.02 K/UL (ref 0–0.04)
IMM GRANULOCYTES NFR BLD AUTO: 0.3 % (ref 0–0.5)
LYMPHOCYTES # BLD AUTO: 0.7 K/UL (ref 1–4.8)
LYMPHOCYTES NFR BLD: 11.1 % (ref 18–48)
MCH RBC QN AUTO: 31.3 PG (ref 27–31)
MCHC RBC AUTO-ENTMCNC: 31.1 G/DL (ref 32–36)
MCV RBC AUTO: 101 FL (ref 82–98)
MONOCYTES # BLD AUTO: 0.3 K/UL (ref 0.3–1)
MONOCYTES NFR BLD: 4.8 % (ref 4–15)
NEUTROPHILS # BLD AUTO: 5.6 K/UL (ref 1.8–7.7)
NEUTROPHILS NFR BLD: 83.6 % (ref 38–73)
NRBC BLD-RTO: 0 /100 WBC
PLATELET # BLD AUTO: 217 K/UL (ref 150–450)
PMV BLD AUTO: 10.9 FL (ref 9.2–12.9)
RBC # BLD AUTO: 3.32 M/UL (ref 4–5.4)
WBC # BLD AUTO: 6.65 K/UL (ref 3.9–12.7)

## 2022-06-09 PROCEDURE — 85025 COMPLETE CBC W/AUTO DIFF WBC: CPT | Performed by: FAMILY MEDICINE

## 2022-06-09 PROCEDURE — 36415 COLL VENOUS BLD VENIPUNCTURE: CPT | Performed by: FAMILY MEDICINE

## 2022-06-16 ENCOUNTER — PATIENT MESSAGE (OUTPATIENT)
Dept: ELECTROPHYSIOLOGY | Facility: CLINIC | Age: 72
End: 2022-06-16
Payer: MEDICARE

## 2022-06-16 ENCOUNTER — PATIENT MESSAGE (OUTPATIENT)
Dept: PRIMARY CARE CLINIC | Facility: CLINIC | Age: 72
End: 2022-06-16
Payer: MEDICARE

## 2022-06-16 RX ORDER — ACETAMINOPHEN 325 MG/1
650 TABLET ORAL
Status: CANCELLED | OUTPATIENT
Start: 2022-06-27

## 2022-06-16 RX ORDER — SODIUM CHLORIDE 0.9 % (FLUSH) 0.9 %
10 SYRINGE (ML) INJECTION
Status: CANCELLED | OUTPATIENT
Start: 2022-06-27

## 2022-06-16 NOTE — TELEPHONE ENCOUNTER
Pt wants naproxen D/C'd for they stop getting billed for devika. Pt not using    Please advise re: increasing the dosage of the colace

## 2022-06-17 ENCOUNTER — PES CALL (OUTPATIENT)
Dept: ADMINISTRATIVE | Facility: CLINIC | Age: 72
End: 2022-06-17
Payer: MEDICARE

## 2022-06-18 ENCOUNTER — PATIENT MESSAGE (OUTPATIENT)
Dept: ORTHOPEDICS | Facility: CLINIC | Age: 72
End: 2022-06-18
Payer: MEDICARE

## 2022-06-20 ENCOUNTER — PATIENT MESSAGE (OUTPATIENT)
Dept: PRIMARY CARE CLINIC | Facility: CLINIC | Age: 72
End: 2022-06-20
Payer: MEDICARE

## 2022-06-20 NOTE — TELEPHONE ENCOUNTER
1 of 2 messages  Please advise re: increasing the colace.  An order needs to be faxed to [facility]

## 2022-06-20 NOTE — TELEPHONE ENCOUNTER
[facility]ed [facility] to discontinue naproxen & increase colace to 250 mg TID PRN for constipation  Faxed to [facility] @ [phone]

## 2022-06-21 ENCOUNTER — PATIENT MESSAGE (OUTPATIENT)
Dept: ORTHOPEDICS | Facility: CLINIC | Age: 72
End: 2022-06-21
Payer: MEDICARE

## 2022-06-21 ENCOUNTER — PATIENT MESSAGE (OUTPATIENT)
Dept: PRIMARY CARE CLINIC | Facility: CLINIC | Age: 72
End: 2022-06-21
Payer: MEDICARE

## 2022-06-21 DIAGNOSIS — M81.6 LOCALIZED OSTEOPOROSIS OF LEQUESNE: ICD-10-CM

## 2022-06-21 DIAGNOSIS — M81.0 OSTEOPOROSIS, UNSPECIFIED OSTEOPOROSIS TYPE, UNSPECIFIED PATHOLOGICAL FRACTURE PRESENCE: Primary | ICD-10-CM

## 2022-06-21 RX ORDER — HEPARIN 100 UNIT/ML
500 SYRINGE INTRAVENOUS
Status: CANCELLED | OUTPATIENT
Start: 2022-06-21

## 2022-06-21 RX ORDER — SODIUM CHLORIDE 0.9 % (FLUSH) 0.9 %
10 SYRINGE (ML) INJECTION
Status: CANCELLED | OUTPATIENT
Start: 2022-06-21

## 2022-06-21 RX ORDER — ZOLEDRONIC ACID 5 MG/100ML
5 INJECTION, SOLUTION INTRAVENOUS
Status: CANCELLED | OUTPATIENT
Start: 2022-06-21

## 2022-06-22 ENCOUNTER — PATIENT MESSAGE (OUTPATIENT)
Dept: ORTHOPEDICS | Facility: CLINIC | Age: 72
End: 2022-06-22
Payer: MEDICARE

## 2022-06-22 ENCOUNTER — TELEPHONE (OUTPATIENT)
Dept: PRIMARY CARE CLINIC | Facility: CLINIC | Age: 72
End: 2022-06-22
Payer: MEDICARE

## 2022-06-22 ENCOUNTER — PATIENT MESSAGE (OUTPATIENT)
Dept: PRIMARY CARE CLINIC | Facility: CLINIC | Age: 72
End: 2022-06-22
Payer: MEDICARE

## 2022-06-23 ENCOUNTER — PATIENT MESSAGE (OUTPATIENT)
Dept: ORTHOPEDICS | Facility: CLINIC | Age: 72
End: 2022-06-23
Payer: MEDICARE

## 2022-07-01 ENCOUNTER — INFUSION (OUTPATIENT)
Dept: INFECTIOUS DISEASES | Facility: HOSPITAL | Age: 72
End: 2022-07-01
Attending: INTERNAL MEDICINE
Payer: MEDICARE

## 2022-07-01 VITALS
HEART RATE: 66 BPM | TEMPERATURE: 99 F | SYSTOLIC BLOOD PRESSURE: 131 MMHG | BODY MASS INDEX: 16.1 KG/M2 | DIASTOLIC BLOOD PRESSURE: 69 MMHG | WEIGHT: 88 LBS | OXYGEN SATURATION: 100 %

## 2022-07-01 DIAGNOSIS — D83.9 CVID (COMMON VARIABLE IMMUNODEFICIENCY): Primary | ICD-10-CM

## 2022-07-01 DIAGNOSIS — J47.9 ADULT BRONCHIECTASIS: ICD-10-CM

## 2022-07-01 PROCEDURE — 96366 THER/PROPH/DIAG IV INF ADDON: CPT

## 2022-07-01 PROCEDURE — 25000003 PHARM REV CODE 250: Performed by: ALLERGY & IMMUNOLOGY

## 2022-07-01 PROCEDURE — 63600175 PHARM REV CODE 636 W HCPCS: Mod: JG | Performed by: ALLERGY & IMMUNOLOGY

## 2022-07-01 PROCEDURE — 96365 THER/PROPH/DIAG IV INF INIT: CPT

## 2022-07-01 RX ORDER — ACETAMINOPHEN 325 MG/1
650 TABLET ORAL
Status: CANCELLED | OUTPATIENT
Start: 2022-07-29

## 2022-07-01 RX ORDER — ACETAMINOPHEN 325 MG/1
650 TABLET ORAL
Status: DISCONTINUED | OUTPATIENT
Start: 2022-07-01 | End: 2022-07-01 | Stop reason: HOSPADM

## 2022-07-01 RX ORDER — SODIUM CHLORIDE 0.9 % (FLUSH) 0.9 %
10 SYRINGE (ML) INJECTION
Status: DISCONTINUED | OUTPATIENT
Start: 2022-07-01 | End: 2022-07-01 | Stop reason: HOSPADM

## 2022-07-01 RX ORDER — SODIUM CHLORIDE 0.9 % (FLUSH) 0.9 %
10 SYRINGE (ML) INJECTION
Status: CANCELLED | OUTPATIENT
Start: 2022-07-29

## 2022-07-01 RX ADMIN — SODIUM CHLORIDE: 0.9 INJECTION, SOLUTION INTRAVENOUS at 09:07

## 2022-07-01 RX ADMIN — HUMAN IMMUNOGLOBULIN G 25 G: 5 LIQUID INTRAVENOUS at 10:07

## 2022-07-01 NOTE — PROGRESS NOTES
Pt arrived to infusion suite for Privigen infusion, premeds tylenol given po and refused benadryl. Received IVIG PRIVIGEN 25 GRAMS.  Infusion started @ 11 cc/hr and increased rate every 30 mins, 23 cc/hr., 47 cc /hr and max rate @ 95 cc/hr, IV NS ran concurrently at 25 ml/hr. Tolerated well and left in NAD. Sitter at side. Return appt provided.

## 2022-07-03 ENCOUNTER — HOSPITAL ENCOUNTER (OUTPATIENT)
Facility: HOSPITAL | Age: 72
Discharge: HOME HEALTH | End: 2022-07-05
Attending: EMERGENCY MEDICINE | Admitting: INTERNAL MEDICINE
Payer: MEDICARE

## 2022-07-03 DIAGNOSIS — Z91.89 AT RISK FOR LONG QT SYNDROME: ICD-10-CM

## 2022-07-03 DIAGNOSIS — K21.00 GASTROESOPHAGEAL REFLUX DISEASE WITH ESOPHAGITIS WITHOUT HEMORRHAGE: ICD-10-CM

## 2022-07-03 DIAGNOSIS — S22.039A CLOSED FRACTURE OF THIRD THORACIC VERTEBRA, UNSPECIFIED FRACTURE MORPHOLOGY, INITIAL ENCOUNTER: ICD-10-CM

## 2022-07-03 DIAGNOSIS — S42.032D: Primary | ICD-10-CM

## 2022-07-03 DIAGNOSIS — I48.0 PAROXYSMAL ATRIAL FIBRILLATION: Chronic | ICD-10-CM

## 2022-07-03 DIAGNOSIS — S42.032A CLOSED DISPLACED FRACTURE OF ACROMIAL END OF LEFT CLAVICLE, INITIAL ENCOUNTER: ICD-10-CM
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
## 2022-07-03 DIAGNOSIS — F02.80 LATE ONSET ALZHEIMER'S DEMENTIA WITHOUT BEHAVIORAL DISTURBANCE: Chronic | ICD-10-CM

## 2022-07-03 DIAGNOSIS — G30.1 LATE ONSET ALZHEIMER'S DEMENTIA WITHOUT BEHAVIORAL DISTURBANCE: Chronic | ICD-10-CM

## 2022-07-03 DIAGNOSIS — R07.9 CHEST PAIN: ICD-10-CM

## 2022-07-03 DIAGNOSIS — S42.032B: ICD-10-CM

## 2022-07-03 DIAGNOSIS — E89.0 POSTOPERATIVE HYPOTHYROIDISM: Chronic | ICD-10-CM

## 2022-07-03 DIAGNOSIS — S01.01XA LACERATION OF SCALP, INITIAL ENCOUNTER: ICD-10-CM

## 2022-07-03 DIAGNOSIS — R29.6 FREQUENT FALLS: ICD-10-CM

## 2022-07-03 DIAGNOSIS — J32.9 CHRONIC SINUSITIS, UNSPECIFIED LOCATION: ICD-10-CM

## 2022-07-03 DIAGNOSIS — R63.6 UNDERWEIGHT: ICD-10-CM

## 2022-07-03 PROBLEM — S42.002A FRACTURE OF LEFT CLAVICLE: Status: ACTIVE | Noted: 2022-07-03

## 2022-07-03 LAB
ANION GAP SERPL CALC-SCNC: 6 MMOL/L (ref 8–16)
BASOPHILS # BLD AUTO: 0.01 K/UL (ref 0–0.2)
BASOPHILS NFR BLD: 0.2 % (ref 0–1.9)
BUN SERPL-MCNC: 17 MG/DL (ref 8–23)
CALCIUM SERPL-MCNC: 8.8 MG/DL (ref 8.7–10.5)
CHLORIDE SERPL-SCNC: 102 MMOL/L (ref 95–110)
CO2 SERPL-SCNC: 24 MMOL/L (ref 23–29)
CREAT SERPL-MCNC: 0.6 MG/DL (ref 0.5–1.4)
DIFFERENTIAL METHOD: ABNORMAL
EOSINOPHIL # BLD AUTO: 0 K/UL (ref 0–0.5)
EOSINOPHIL NFR BLD: 0.2 % (ref 0–8)
ERYTHROCYTE [DISTWIDTH] IN BLOOD BY AUTOMATED COUNT: 15.2 % (ref 11.5–14.5)
EST. GFR  (AFRICAN AMERICAN): >60 ML/MIN/1.73 M^2
EST. GFR  (NON AFRICAN AMERICAN): >60 ML/MIN/1.73 M^2
GLUCOSE SERPL-MCNC: 80 MG/DL (ref 70–110)
HCT VFR BLD AUTO: 34 % (ref 37–48.5)
HGB BLD-MCNC: 11.2 G/DL (ref 12–16)
IMM GRANULOCYTES # BLD AUTO: 0.01 K/UL (ref 0–0.04)
IMM GRANULOCYTES NFR BLD AUTO: 0.2 % (ref 0–0.5)
LYMPHOCYTES # BLD AUTO: 1 K/UL (ref 1–4.8)
LYMPHOCYTES NFR BLD: 17.8 % (ref 18–48)
MCH RBC QN AUTO: 31.6 PG (ref 27–31)
MCHC RBC AUTO-ENTMCNC: 32.9 G/DL (ref 32–36)
MCV RBC AUTO: 96 FL (ref 82–98)
MONOCYTES # BLD AUTO: 0.3 K/UL (ref 0.3–1)
MONOCYTES NFR BLD: 5.7 % (ref 4–15)
NEUTROPHILS # BLD AUTO: 4.3 K/UL (ref 1.8–7.7)
NEUTROPHILS NFR BLD: 75.9 % (ref 38–73)
NRBC BLD-RTO: 0 /100 WBC
PLATELET # BLD AUTO: 196 K/UL (ref 150–450)
PMV BLD AUTO: 10.4 FL (ref 9.2–12.9)
POTASSIUM SERPL-SCNC: 4.1 MMOL/L (ref 3.5–5.1)
RBC # BLD AUTO: 3.54 M/UL (ref 4–5.4)
SODIUM SERPL-SCNC: 132 MMOL/L (ref 136–145)
WBC # BLD AUTO: 5.62 K/UL (ref 3.9–12.7)

## 2022-07-03 PROCEDURE — 12002 RPR S/N/AX/GEN/TRNK2.6-7.5CM: CPT | Mod: ,,, | Performed by: EMERGENCY MEDICINE

## 2022-07-03 PROCEDURE — 12002 RPR S/N/AX/GEN/TRNK2.6-7.5CM: CPT

## 2022-07-03 PROCEDURE — 96365 THER/PROPH/DIAG IV INF INIT: CPT

## 2022-07-03 PROCEDURE — G0378 HOSPITAL OBSERVATION PER HR: HCPCS

## 2022-07-03 PROCEDURE — 63600175 PHARM REV CODE 636 W HCPCS

## 2022-07-03 PROCEDURE — 99220 PR INITIAL OBSERVATION CARE,LEVL III: ICD-10-PCS | Mod: ,,, | Performed by: PHYSICIAN ASSISTANT

## 2022-07-03 PROCEDURE — 99284 PR EMERGENCY DEPT VISIT,LEVEL IV: ICD-10-PCS | Mod: 25,,, | Performed by: EMERGENCY MEDICINE

## 2022-07-03 PROCEDURE — 81003 URINALYSIS AUTO W/O SCOPE: CPT | Performed by: PHYSICIAN ASSISTANT

## 2022-07-03 PROCEDURE — 25000003 PHARM REV CODE 250: Performed by: EMERGENCY MEDICINE

## 2022-07-03 PROCEDURE — 25000003 PHARM REV CODE 250

## 2022-07-03 PROCEDURE — 96366 THER/PROPH/DIAG IV INF ADDON: CPT | Mod: 59

## 2022-07-03 PROCEDURE — 99220 PR INITIAL OBSERVATION CARE,LEVL III: CPT | Mod: ,,, | Performed by: PHYSICIAN ASSISTANT

## 2022-07-03 PROCEDURE — 99285 EMERGENCY DEPT VISIT HI MDM: CPT | Mod: 25

## 2022-07-03 PROCEDURE — 80048 BASIC METABOLIC PNL TOTAL CA: CPT | Performed by: STUDENT IN AN ORGANIZED HEALTH CARE EDUCATION/TRAINING PROGRAM

## 2022-07-03 PROCEDURE — 85025 COMPLETE CBC W/AUTO DIFF WBC: CPT | Performed by: EMERGENCY MEDICINE

## 2022-07-03 PROCEDURE — 12002 PR RESUP NPTERF WND BODY 2.6-7.5 CM: ICD-10-PCS | Mod: ,,, | Performed by: EMERGENCY MEDICINE

## 2022-07-03 PROCEDURE — 99284 EMERGENCY DEPT VISIT MOD MDM: CPT | Mod: 25,,, | Performed by: EMERGENCY MEDICINE

## 2022-07-03 RX ORDER — OXYCODONE HYDROCHLORIDE 5 MG/1
5 TABLET ORAL EVERY 6 HOURS PRN
Status: DISCONTINUED | OUTPATIENT
Start: 2022-07-04 | End: 2022-07-05 | Stop reason: HOSPADM

## 2022-07-03 RX ORDER — LANOLIN ALCOHOL/MO/W.PET/CERES
1 CREAM (GRAM) TOPICAL DAILY
Refills: 5 | Status: DISCONTINUED | OUTPATIENT
Start: 2022-07-04 | End: 2022-07-05 | Stop reason: HOSPADM

## 2022-07-03 RX ORDER — ACETAMINOPHEN 325 MG/1
650 TABLET ORAL EVERY 8 HOURS
Status: DISCONTINUED | OUTPATIENT
Start: 2022-07-04 | End: 2022-07-05 | Stop reason: HOSPADM

## 2022-07-03 RX ORDER — FAMOTIDINE 20 MG/1
40 TABLET, FILM COATED ORAL DAILY
Status: DISCONTINUED | OUTPATIENT
Start: 2022-07-04 | End: 2022-07-05 | Stop reason: HOSPADM

## 2022-07-03 RX ORDER — ASPIRIN 81 MG/1
81 TABLET ORAL DAILY
Status: DISCONTINUED | OUTPATIENT
Start: 2022-07-04 | End: 2022-07-05 | Stop reason: HOSPADM

## 2022-07-03 RX ORDER — ACETAMINOPHEN 325 MG/1
650 TABLET ORAL
Status: COMPLETED | OUTPATIENT
Start: 2022-07-03 | End: 2022-07-03

## 2022-07-03 RX ORDER — MAG HYDROX/ALUMINUM HYD/SIMETH 200-200-20
30 SUSPENSION, ORAL (FINAL DOSE FORM) ORAL 4 TIMES DAILY PRN
Status: DISCONTINUED | OUTPATIENT
Start: 2022-07-03 | End: 2022-07-05 | Stop reason: HOSPADM

## 2022-07-03 RX ORDER — DONEPEZIL HYDROCHLORIDE 5 MG/1
10 TABLET, FILM COATED ORAL DAILY
Status: DISCONTINUED | OUTPATIENT
Start: 2022-07-04 | End: 2022-07-05 | Stop reason: HOSPADM

## 2022-07-03 RX ORDER — GLUCAGON 1 MG
1 KIT INJECTION
Status: DISCONTINUED | OUTPATIENT
Start: 2022-07-03 | End: 2022-07-05 | Stop reason: HOSPADM

## 2022-07-03 RX ORDER — IBUPROFEN 200 MG
16 TABLET ORAL
Status: DISCONTINUED | OUTPATIENT
Start: 2022-07-03 | End: 2022-07-05 | Stop reason: HOSPADM

## 2022-07-03 RX ORDER — MONTELUKAST SODIUM 10 MG/1
10 TABLET ORAL NIGHTLY
Status: DISCONTINUED | OUTPATIENT
Start: 2022-07-04 | End: 2022-07-05 | Stop reason: HOSPADM

## 2022-07-03 RX ORDER — LIDOCAINE HYDROCHLORIDE 10 MG/ML
5 INJECTION, SOLUTION EPIDURAL; INFILTRATION; INTRACAUDAL; PERINEURAL
Status: COMPLETED | OUTPATIENT
Start: 2022-07-03 | End: 2022-07-03

## 2022-07-03 RX ORDER — ONDANSETRON 8 MG/1
8 TABLET, ORALLY DISINTEGRATING ORAL EVERY 8 HOURS PRN
Status: DISCONTINUED | OUTPATIENT
Start: 2022-07-03 | End: 2022-07-05 | Stop reason: HOSPADM

## 2022-07-03 RX ORDER — METHOCARBAMOL 500 MG/1
500 TABLET, FILM COATED ORAL 3 TIMES DAILY
Status: DISCONTINUED | OUTPATIENT
Start: 2022-07-04 | End: 2022-07-05 | Stop reason: HOSPADM

## 2022-07-03 RX ORDER — CEFAZOLIN SODIUM/D5W 2 G/100 ML
2 PLASTIC BAG, INJECTION (ML) INTRAVENOUS ONCE
Status: COMPLETED | OUTPATIENT
Start: 2022-07-03 | End: 2022-07-03

## 2022-07-03 RX ORDER — ACETAMINOPHEN 325 MG/1
650 TABLET ORAL EVERY 4 HOURS PRN
Status: DISCONTINUED | OUTPATIENT
Start: 2022-07-03 | End: 2022-07-05 | Stop reason: HOSPADM

## 2022-07-03 RX ORDER — IPRATROPIUM BROMIDE AND ALBUTEROL SULFATE 2.5; .5 MG/3ML; MG/3ML
3 SOLUTION RESPIRATORY (INHALATION) EVERY 4 HOURS PRN
Status: DISCONTINUED | OUTPATIENT
Start: 2022-07-03 | End: 2022-07-05 | Stop reason: HOSPADM

## 2022-07-03 RX ORDER — TALC
6 POWDER (GRAM) TOPICAL NIGHTLY PRN
Status: DISCONTINUED | OUTPATIENT
Start: 2022-07-03 | End: 2022-07-05 | Stop reason: HOSPADM

## 2022-07-03 RX ORDER — NALOXONE HCL 0.4 MG/ML
0.02 VIAL (ML) INJECTION
Status: DISCONTINUED | OUTPATIENT
Start: 2022-07-03 | End: 2022-07-05 | Stop reason: HOSPADM

## 2022-07-03 RX ORDER — BACITRACIN ZINC 500 [USP'U]/G
1 OINTMENT TOPICAL
Status: COMPLETED | OUTPATIENT
Start: 2022-07-03 | End: 2022-07-03

## 2022-07-03 RX ORDER — ENOXAPARIN SODIUM 100 MG/ML
40 INJECTION SUBCUTANEOUS EVERY 24 HOURS
Status: DISCONTINUED | OUTPATIENT
Start: 2022-07-04 | End: 2022-07-05 | Stop reason: HOSPADM

## 2022-07-03 RX ORDER — POLYETHYLENE GLYCOL 3350 17 G/17G
17 POWDER, FOR SOLUTION ORAL DAILY PRN
Status: DISCONTINUED | OUTPATIENT
Start: 2022-07-03 | End: 2022-07-05 | Stop reason: HOSPADM

## 2022-07-03 RX ORDER — SIMETHICONE 80 MG
1 TABLET,CHEWABLE ORAL 4 TIMES DAILY PRN
Status: DISCONTINUED | OUTPATIENT
Start: 2022-07-03 | End: 2022-07-05 | Stop reason: HOSPADM

## 2022-07-03 RX ORDER — PROCHLORPERAZINE EDISYLATE 5 MG/ML
5 INJECTION INTRAMUSCULAR; INTRAVENOUS EVERY 6 HOURS PRN
Status: DISCONTINUED | OUTPATIENT
Start: 2022-07-03 | End: 2022-07-05 | Stop reason: HOSPADM

## 2022-07-03 RX ORDER — SODIUM CHLORIDE 0.9 % (FLUSH) 0.9 %
10 SYRINGE (ML) INJECTION EVERY 8 HOURS PRN
Status: DISCONTINUED | OUTPATIENT
Start: 2022-07-03 | End: 2022-07-05 | Stop reason: HOSPADM

## 2022-07-03 RX ORDER — CELECOXIB 100 MG/1
100 CAPSULE ORAL DAILY
Status: DISCONTINUED | OUTPATIENT
Start: 2022-07-04 | End: 2022-07-04

## 2022-07-03 RX ORDER — IBUPROFEN 200 MG
24 TABLET ORAL
Status: DISCONTINUED | OUTPATIENT
Start: 2022-07-03 | End: 2022-07-05 | Stop reason: HOSPADM

## 2022-07-03 RX ORDER — LEVOTHYROXINE SODIUM 88 UG/1
88 TABLET ORAL
Status: DISCONTINUED | OUTPATIENT
Start: 2022-07-04 | End: 2022-07-05 | Stop reason: HOSPADM

## 2022-07-03 RX ADMIN — DEXTROSE 2 G: 50 INJECTION, SOLUTION INTRAVENOUS at 08:07

## 2022-07-03 RX ADMIN — ACETAMINOPHEN 650 MG: 325 TABLET ORAL at 08:07

## 2022-07-03 RX ADMIN — LIDOCAINE HYDROCHLORIDE 50 MG: 10 INJECTION, SOLUTION EPIDURAL; INFILTRATION; INTRACAUDAL at 08:07

## 2022-07-03 RX ADMIN — Medication 1 EACH: at 08:07

## 2022-07-03 NOTE — ED NOTES
Vince ems s/p trip and fall at home.  Denies loc.  Laceration to posterior part of head observed.  No complaints at this time.  Family at bedside.  sadia

## 2022-07-03 NOTE — ED PROVIDER NOTES
Encounter Date: 7/3/2022       History     Chief Complaint   Patient presents with    Fall     Lac to back of head, not bleeding. C/o L shoulder pain     Ms. Crews is a 72 y.o. F with a past medical history paroxisomal afib on ASA, hypothyroidism, alzheimers, bronciectasis, hypogammaglobulinemia, SVT, freqeunt falls, and osteoporosis who presents after fall from standing.  Patient is supposed to use assist devices for walking and has a history of frequent falls.  Her  reports that she is behaving normally.  He heard the fall in the adjacent room but did not see mae fall.  She was awake after the fall and he did not believe her to be confused or have syncope.  No fevers, chills, recent illness.  Once for fall was attributed to low hemoglobin request blood work.  Despite AFib she is not on anticoagulants.  She is on aspirin.  Last tetanus booster was 2021.  Patient reports some pain in left shoulder but notes that it is chronic.  Her  notes that she received all her medications via a home health nurse and that she is taking them appropriately.  Per  he does not feel a medical cause was likely to this fall. She suffered a minor laceration to the back of the scalp. She also reports L shoulder pain but reports this is chronic. She takes aspirin daily.     The history is provided by the patient and the spouse. The history is limited by the condition of the patient.     Review of patient's allergies indicates:   Allergen Reactions    Adhesive      Tape tears skin    Buspar [buspirone]      headaches    Escitalopram oxalate Nausea Only     hyponatremia      Rifampin Rash     Past Medical History:   Diagnosis Date    Adult bronchiectasis     Age-related osteoporosis with current pathological fracture with routine healing 8/28/2013    Amblyopia     Anxiety     Cataract     Cellulitis of right lower extremity 1/19/2021    Chondrocalcinosis, cause unspecified, involving hand(712.34) 7/12/2011      Dx updated per 2019 IMO Load    Chronic sinusitis 4/6/2017    Colonic constipation 6/5/2013    Concussion 10/27/2016    Frequent falls 3/14/2017    Gait disturbance 3/14/2017    History of cardiac radiofrequency ablation (RFA) 2/3/2018    History of subarachnoid hemorrhage 10/30/2016    Hypogammaglobulinemia 9/7/2011    Irritable bowel syndrome 12/18/2015    Major depressive disorder, recurrent episode, in full remission 8/19/2010    Onychomycosis     Osteoarthritis     Postoperative hypothyroidism 12/18/2015    Rectal prolapse 6/5/2013    Reflux esophagitis 2/7/2010    Status post thyroidectomy 6/1/2017    Strabismus     SVT (supraventricular tachycardia) 11/22/2013    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     Underweight 1/23/2013     Past Surgical History:   Procedure Laterality Date    BREAST CYST ASPIRATION      x2    CATARACT EXTRACTION W/  INTRAOCULAR LENS IMPLANT Left 3/11/2019    Procedure: EXTRACTION, CATARACT, WITH IOL INSERTION;  Surgeon: Vera Sams MD;  Location: [facility] OR;  Service: Ophthalmology;  Laterality: Left;    CATARACT EXTRACTION W/  INTRAOCULAR LENS IMPLANT Right 4/15/2019    Procedure: EXTRACTION, CATARACT, WITH IOL INSERTION LASER;  Surgeon: Vera Sams MD;  Location: [facility] OR;  Service: Ophthalmology;  Laterality: Right;    COLON SURGERY      EYE SURGERY      FRACTURE SURGERY      NASAL SEPTUM SURGERY      OCCLUSION OF LEFT ATRIAL APPENDAGE N/A 2/18/2022    Procedure: Left atrial appendage occlusion;  Surgeon: Chase Gill MD;  Location: [facility] EP LAB;  Service: Cardiology;  Laterality: N/A;  afib, watchman, BSCI, osiris, anes, MB/EH, 3prep    OPEN REDUCTION AND INTERNAL FIXATION (ORIF) OF INJURY OF SACROILIAC JOINT Bilateral 1/20/2021    Procedure: ORIF, SACROILIAC JOINT;  Surgeon: Alcides Tamez MD;  Location: [facility] OR 2ND FLR;  Service: Orthopedics;  Laterality: Bilateral;    RADIOFREQUENCY ABLATION      RECTAL PROLAPSE REPAIR  tutu  2013    STRABISMUS SURGERY      TONSILLECTOMY      TRANSESOPHAGEAL ECHOCARDIOGRAPHY N/A 2/18/2022    Procedure: ECHOCARDIOGRAM, TRANSESOPHAGEAL;  Surgeon: Nils Diagnostic Provider;  Location: [facility] LAB;  Service: Cardiology;  Laterality: N/A;     Family History   Problem Relation Age of Onset    Heart disease Father     Stroke Father     Heart disease Brother     Breast cancer Paternal Grandmother     Melanoma Neg Hx     Psoriasis Neg Hx     Lupus Neg Hx     Eczema Neg Hx     Amblyopia Neg Hx     Blindness Neg Hx     Cataracts Neg Hx     Glaucoma Neg Hx     Macular degeneration Neg Hx     Retinal detachment Neg Hx     Strabismus Neg Hx      Social History     Tobacco Use    Smoking status: Never Smoker    Smokeless tobacco: Never Used   Substance Use Topics    Alcohol use: No    Drug use: No     Review of Systems   Constitutional: Negative for fever.   HENT: Negative for nosebleeds.    Eyes: Negative for visual disturbance.   Respiratory: Negative for cough and wheezing.    Cardiovascular: Negative for chest pain.   Gastrointestinal: Negative for abdominal pain.   Musculoskeletal: Positive for arthralgias and gait problem. Negative for back pain and neck pain.   Neurological: Positive for headaches. Negative for syncope and numbness.   Psychiatric/Behavioral: Negative for confusion.       Physical Exam     Initial Vitals [07/03/22 1749]   BP Pulse Resp Temp SpO2   (!) 158/90 92 16 98 °F (36.7 °C) 100 %      MAP       --         Physical Exam    Nursing note and vitals reviewed.  Constitutional: She appears well-developed and well-nourished. She is not diaphoretic. No distress.   HENT:   Nose: Nose normal.   3 cm laceration over occiput with swelling but no without underlying step-offs   Eyes: EOM are normal. Pupils are equal, round, and reactive to light.   Small subconjunctival hemorrhage in left eye   Neck:   No midline cervical tenderness   Cardiovascular: Normal rate, regular rhythm and  normal heart sounds.   Pulmonary/Chest: Breath sounds normal. No respiratory distress. She has no wheezes. She has no rales.   Abdominal: Abdomen is soft. She exhibits no distension. There is no abdominal tenderness. There is no rebound.   Musculoskeletal:      Cervical back: No bony tenderness. No spinous process tenderness.      Thoracic back: No bony tenderness.      Lumbar back: No bony tenderness.      Comments: Some nonspecific tenderness to left shoulder over no discrete bony area, chronic deformity changes are noted without rigidity but there is crepitus with passive range of motion to that joint  No midline tenderness to C, T, or L spine     Neurological: She is alert. She has normal strength. No sensory deficit. Coordination normal.   Oriented to hospital, somewhat impaired recent memory   Skin: Skin is warm and dry.   Abrasion with small punctate area of oozing overlying AC joint on the left             ED Course   Lac Repair    Date/Time: 7/3/2022 10:28 PM  Performed by: Nimesh English MD  Authorized by: Bj Slater MD     Consent:     Consent obtained:  Verbal    Consent given by:  Spouse    Risks, benefits, and alternatives were discussed: yes      Risks discussed:  Infection, need for additional repair and nerve damage    Alternatives discussed:  No treatment  Universal protocol:     Patient identity confirmed:  Arm band and provided demographic data  Anesthesia:     Anesthesia method:  Local infiltration    Local anesthetic:  Lidocaine 1% w/o epi  Laceration details:     Location:  Scalp    Scalp location:  Occipital    Length (cm):  3  Pre-procedure details:     Preparation:  Patient was prepped and draped in usual sterile fashion  Exploration:     Hemostasis achieved with:  Direct pressure    Wound exploration: wound explored through full range of motion and entire depth of wound visualized      Wound extent: no fascia violation noted    Treatment:     Area cleansed with:  Povidone-iodine  and saline    Amount of cleaning:  Extensive    Irrigation solution:  Sterile saline    Irrigation volume:  1000    Irrigation method:  Pressure wash    Scar revision: no    Skin repair:     Repair method:  Sutures    Suture size:  5-0    Suture material:  Plain gut    Suture technique:  Simple interrupted    Number of sutures:  4  Approximation:     Approximation:  Close  Repair type:     Repair type:  Intermediate  Post-procedure details:     Dressing:  Antibiotic ointment    Procedure completion:  Tolerated      Labs Reviewed   CBC W/ AUTO DIFFERENTIAL - Abnormal; Notable for the following components:       Result Value    RBC 3.54 (*)     Hemoglobin 11.2 (*)     Hematocrit 34.0 (*)     MCH 31.6 (*)     RDW 15.2 (*)     Gran % 75.9 (*)     Lymph % 17.8 (*)     All other components within normal limits   BASIC METABOLIC PANEL - Abnormal; Notable for the following components:    Sodium 132 (*)     Anion Gap 6 (*)     All other components within normal limits          Imaging Results          X-Ray Humerus 2 View Left (Final result)  Result time 07/03/22 21:51:32    Final result by Kj Lorenzana MD (07/03/22 21:51:32)                 Impression:      No acute fracture of the left humerus identified.    Acute oblique fracture of the inferior aspect of the distal left clavicle with inferomedial displacement of the fracture fragment. Associated widening of the left acromioclavicular joint and superior displacement of the distal clavicle relative to the acromion suggesting acromioclavicular joint strain or separation.      Electronically signed by: Kj Lorenzana MD  Date:    07/03/2022  Time:    21:51             Narrative:    EXAMINATION:  XR HUMERUS 2 VIEW LEFT; XR CLAVICLE LEFT    CLINICAL HISTORY:  fracture;    TECHNIQUE:  Left humerus two views.  Left clavicle two views.    COMPARISON:  10/28/2016.    FINDINGS:  No acute fracture or bone destruction of the left humerus.Degenerative change similar to  10/20/2016 examination.  Some calcification has developed between the acromion and humeral head suggesting rotator cuff tendinopathy.    Acute oblique fracture of the inferior aspect of the distal left clavicle with inferomedial displacement of the fracture fragment.  Associated widening of the left acromioclavicular joint and superior displacement of the distal clavicle relative to the acromion suggesting acromioclavicular joint strain or separation.                               X-Ray Clavicle Left (Final result)  Result time 07/03/22 21:51:32   Procedure changed from X-Ray Shoulder Trauma 3 view Left     Final result by Kj Lorenzana MD (07/03/22 21:51:32)                 Impression:      No acute fracture of the left humerus identified.    Acute oblique fracture of the inferior aspect of the distal left clavicle with inferomedial displacement of the fracture fragment. Associated widening of the left acromioclavicular joint and superior displacement of the distal clavicle relative to the acromion suggesting acromioclavicular joint strain or separation.      Electronically signed by: Kj Lorenzana MD  Date:    07/03/2022  Time:    21:51             Narrative:    EXAMINATION:  XR HUMERUS 2 VIEW LEFT; XR CLAVICLE LEFT    CLINICAL HISTORY:  fracture;    TECHNIQUE:  Left humerus two views.  Left clavicle two views.    COMPARISON:  10/28/2016.    FINDINGS:  No acute fracture or bone destruction of the left humerus.Degenerative change similar to 10/20/2016 examination.  Some calcification has developed between the acromion and humeral head suggesting rotator cuff tendinopathy.    Acute oblique fracture of the inferior aspect of the distal left clavicle with inferomedial displacement of the fracture fragment.  Associated widening of the left acromioclavicular joint and superior displacement of the distal clavicle relative to the acromion suggesting acromioclavicular joint strain or separation.                                CT Cervical Spine Without Contrast (Final result)  Result time 07/03/22 21:28:02    Final result by Krishna Torres MD (07/03/22 21:28:02)                 Impression:      New anterior wedging of the T3 vertebral body, with sclerotic superior endplate change.  Finding may reflect acute/subacute compression fracture.  Consider further evaluation with MRI thoracic spine, as clinically indicated.    Stable T2 vertebral body mild remote compression deformity.    Stable 8 mm anterolisthesis of C2 on C3 with moderate canal stenosis at this level.    Additional findings as above.    Electronically signed by resident: Charles Bennett  Date:    07/03/2022  Time:    19:56    Electronically signed by: Krishna Torres MD  Date:    07/03/2022  Time:    21:28             Narrative:    EXAMINATION:  CT CERVICAL SPINE WITHOUT CONTRAST    CLINICAL HISTORY:  Neck trauma (Age >= 65y);    TECHNIQUE:  Low dose axial images, sagittal and coronal reformations were performed though the cervical spine.  Contrast was not administered.    COMPARISON:  C-spine radiograph 10/14/2021.  Same-day CT head.  CT cervical spine 11/25/2013.    CT chest 11/05/2019    FINDINGS:  Kyphotic alignment of the cervical spine.  There is stable 8 mm anterolisthesis of C2 on C3. The cervical vertebral body heights appear well-maintained.  Mild anterior wedging of the T2 vertebral body, likely remote compression fracture.  Newly seen mild anterior wedging of the T3 vertebral body, with sclerotic superior endplate change.  There is no evidence of osseous lytic or blastic process.    Multilevel degenerative disc disease, with severe disc height loss C3-C7 with active phenomenon at those levels.  Focal degenerative changes are described below.    C2 -- C3: Posterior disc-osteophyte complex.  Bilateral facet arthropathy.  Mild left neural foraminal narrowing.  Moderate central canal stenosis.    C3 -- C4: Posterior disc-osteophyte complex.  Bilateral facet  arthropathy.  Mild left neural foraminal narrowing.  Mild central canal stenosis.    C4 -- C5: Posterior disc-osteophyte complex.  Bilateral facet arthropathy.  No significant neural foraminal narrowing. There is no central canal stenosis.    C5 -- C6: Posterior disc-osteophyte complex.  Bilateral facet arthropathy.  No significant neural foraminal narrowing.  Mild central canal stenosis.    C6 -- C7: Posterior disc-osteophyte complex.  There is no neural foraminal narrowing.  There is no central canal stenosis.    C7 -- T1: There is no posterior disc-osteophyte complex.  There is no neural foraminal narrowing.  There is no central canal narrowing.    Evaluation of the surrounding soft tissues demonstrates a right apical calcified nodule with surrounding subsegmental atelectasis versus scarring.  Mild right dependent atelectasis.  Minimal biapical pleuroparenchymal scarring.  The airways appear patent.                               CT Head Without Contrast (Final result)  Result time 07/03/22 19:53:12    Final result by Ethan Snowden MD (07/03/22 19:53:12)                 Impression:      Left parieto-occipital scalp mild soft tissue swelling/contusion without displaced skull fracture or acute intracranial abnormality identified.    Involutional change and chronic microvascular ischemic change.      Electronically signed by: Ethan Snowden MD  Date:    07/03/2022  Time:    19:53             Narrative:    EXAMINATION:  CT HEAD WITHOUT CONTRAST    CLINICAL HISTORY:  Head trauma, minor (Age >= 65y);    TECHNIQUE:  Low dose axial CT images obtained throughout the head without intravenous contrast. Sagittal and coronal reconstructions were performed.    COMPARISON:  Head CT most recent 05/13/2022 and MRI brain 07/26/2021    FINDINGS:  Intracranial compartment:    Mild generalized cerebral volume loss.  Ventricles are midline and stable in size and configuration without distortion by mass effect or acute hydrocephalus.   Chronic nonspecific empty sella configuration, unchanged.  No extra-axial blood or fluid collections.    Grossly stable distribution of patchy hypoattenuation of the subcortical and periventricular white matter consistent with chronic microvascular ischemic change.  No definite new focal areas of abnormal parenchymal attenuation.  Skull base atherosclerotic vascular calcifications noted.  No parenchymal mass, hemorrhage, edema or major vascular distribution infarct.    Skull/extracranial contents (limited evaluation): Small area localized soft tissue swelling/contusion overlying the left parieto-occipital calvarium.  No fracture. Mastoid air cells and paranasal sinuses are essentially clear.                               X-Ray Shoulder Trauma Left (Final result)  Result time 07/03/22 19:21:06    Final result by Krishna Torres MD (07/03/22 19:21:06)                 Impression:      Acute displaced fracture of the distal 3rd of the left clavicle.    Anterior subluxation of the left shoulder.      Electronically signed by: Krishna Torres MD  Date:    07/03/2022  Time:    19:21             Narrative:    EXAMINATION:  XR SHOULDER TRAUMA 3 VIEW LEFT    CLINICAL HISTORY:  Unspecified fall, initial encounter    TECHNIQUE:  Three views of the left shoulder were performed.    COMPARISON  05/13/2022.    FINDINGS:  The bone mineralization is within normal limits.  There is an acute displaced fracture of the distal 3rd of the left clavicle.  The remainder of the osseous structures are unremarkable.    There is anterior subluxation of the humerus relative to the glenoid.  There is no yaya dislocation.    There are postoperative changes in the mediastinum.  The visualized left hemithorax unremarkable.  There is no evidence of a pneumothorax or pulmonary contusion.                                 Medications   sodium chloride 0.9% flush 10 mL (has no administration in time range)   albuterol-ipratropium 2.5 mg-0.5 mg/3 mL nebulizer  solution 3 mL (has no administration in time range)   melatonin tablet 6 mg (has no administration in time range)   ondansetron disintegrating tablet 8 mg (has no administration in time range)   prochlorperazine injection Soln 5 mg (has no administration in time range)   polyethylene glycol packet 17 g (has no administration in time range)   simethicone chewable tablet 80 mg (has no administration in time range)   aluminum-magnesium hydroxide-simethicone 200-200-20 mg/5 mL suspension 30 mL (has no administration in time range)   acetaminophen tablet 650 mg (has no administration in time range)   naloxone 0.4 mg/mL injection 0.02 mg (has no administration in time range)   glucose chewable tablet 16 g (has no administration in time range)   glucose chewable tablet 24 g (has no administration in time range)   glucagon (human recombinant) injection 1 mg (has no administration in time range)   dextrose 10% bolus 125 mL (has no administration in time range)   dextrose 10% bolus 250 mL (has no administration in time range)   LIDOcaine (PF) 10 mg/ml (1%) injection 50 mg (50 mg Infiltration Given 7/3/22 2037)   acetaminophen tablet 650 mg (650 mg Oral Given 7/3/22 2037)   ceFAZolin 2 gram in dextrose 5% 100 mL IVPB (premix) (0 g Intravenous Stopped 7/3/22 2148)   bacitracin zinc ointment 1 each (1 each Topical (Top) Given 7/3/22 2038)     Medical Decision Making:   History:   I obtained history from: someone other than patient.  Old Medical Records: I decided to obtain old medical records.  Initial Assessment:   Ms. Crews is a 72 y.o. F with a past medical history paroxisomal afib on ASA, hypothyroidism, alzheimers, bronciectasis, hypogammaglobulinemia, SVT, freqeunt falls, and osteoporosis who presents after fall from standing.   Differential Diagnosis:   Laceration  Subdural hemorrhage  Subarachnoid hemorrhage  Skull fracture  Shoulder fx  Open fx  Other fractureAnemia  Clinical Tests:   Lab Tests: Ordered  Radiological  Study: Reviewed and Ordered  ED Management:  Labs and imaging ordered (left shoulder film, CT head and cervical spine).  Tylenol.  Tetanus is up-to-date.    Reassessment:  Shoulder x-ray reveals acute displaced fracture of the distal 3rd left clavicle.  Given small overlying area of oozing, patient received Ancef.  Orthopedics consulted for open fracture.  Laceration repaired as above.    Reassessment: CT head showed no acute intracranial process.  Orthopedics perform washout at bedside.  They recommend admission to Medicine for continued IV antibiotics for open fracture.  Patient informed of plan. CBC without leukocytosis.  Hemoglobin 11.2 at baseline.  CMP with hyponatremia of 132. Cervical spine CT with possible compression deformity of T3.  MRI thoracic spine ordered.  Observation per case management.            Attending Attestation:   Physician Attestation Statement for Resident:  As the supervising MD   Physician Attestation Statement: I have personally seen and examined this patient.   I agree with the above history. -: Addended as above.   As the supervising MD I agree with the above PE.    As the supervising MD I agree with the above treatment, course, plan, and disposition.                         Clinical Impression:   Final diagnoses:  [W19.XXXA] Fall (Primary)  [S01.01XA] Laceration of scalp, initial encounter  [S42.032A] Closed displaced fracture of acromial end of left clavicle, initial encounter  [S42.032B] Open displaced fracture of acromial end of left clavicle, initial encounter  [S22.039A] Closed fracture of third thoracic vertebra, unspecified fracture morphology, initial encounter          ED Disposition Condition    Observation               Bj Slater MD  07/03/22 2247       Bj Slater MD  07/03/22 1319

## 2022-07-04 PROBLEM — S22.31XA CLOSED FRACTURE OF ONE RIB OF RIGHT SIDE: Status: RESOLVED | Noted: 2022-05-13 | Resolved: 2022-07-04

## 2022-07-04 PROBLEM — Z98.890 HISTORY OF CARDIAC RADIOFREQUENCY ABLATION (RFA): Chronic | Status: RESOLVED | Noted: 2018-02-03 | Resolved: 2022-07-04

## 2022-07-04 LAB
ANION GAP SERPL CALC-SCNC: 6 MMOL/L (ref 8–16)
BASOPHILS # BLD AUTO: 0.02 K/UL (ref 0–0.2)
BASOPHILS NFR BLD: 0.3 % (ref 0–1.9)
BILIRUB UR QL STRIP: NEGATIVE
BUN SERPL-MCNC: 16 MG/DL (ref 8–23)
CALCIUM SERPL-MCNC: 8.8 MG/DL (ref 8.7–10.5)
CHLORIDE SERPL-SCNC: 102 MMOL/L (ref 95–110)
CLARITY UR REFRACT.AUTO: CLEAR
CO2 SERPL-SCNC: 24 MMOL/L (ref 23–29)
COLOR UR AUTO: NORMAL
CREAT SERPL-MCNC: 0.7 MG/DL (ref 0.5–1.4)
DIFFERENTIAL METHOD: ABNORMAL
EOSINOPHIL # BLD AUTO: 0 K/UL (ref 0–0.5)
EOSINOPHIL NFR BLD: 0.1 % (ref 0–8)
ERYTHROCYTE [DISTWIDTH] IN BLOOD BY AUTOMATED COUNT: 15.6 % (ref 11.5–14.5)
EST. GFR  (AFRICAN AMERICAN): >60 ML/MIN/1.73 M^2
EST. GFR  (NON AFRICAN AMERICAN): >60 ML/MIN/1.73 M^2
GLUCOSE SERPL-MCNC: 78 MG/DL (ref 70–110)
GLUCOSE UR QL STRIP: NEGATIVE
HCT VFR BLD AUTO: 36.6 % (ref 37–48.5)
HGB BLD-MCNC: 11.9 G/DL (ref 12–16)
HGB UR QL STRIP: NEGATIVE
IMM GRANULOCYTES # BLD AUTO: 0.02 K/UL (ref 0–0.04)
IMM GRANULOCYTES NFR BLD AUTO: 0.3 % (ref 0–0.5)
KETONES UR QL STRIP: NEGATIVE
LEUKOCYTE ESTERASE UR QL STRIP: NEGATIVE
LYMPHOCYTES # BLD AUTO: 1.2 K/UL (ref 1–4.8)
LYMPHOCYTES NFR BLD: 16.6 % (ref 18–48)
MAGNESIUM SERPL-MCNC: 2 MG/DL (ref 1.6–2.6)
MCH RBC QN AUTO: 32.4 PG (ref 27–31)
MCHC RBC AUTO-ENTMCNC: 32.5 G/DL (ref 32–36)
MCV RBC AUTO: 100 FL (ref 82–98)
MONOCYTES # BLD AUTO: 0.4 K/UL (ref 0.3–1)
MONOCYTES NFR BLD: 5 % (ref 4–15)
NEUTROPHILS # BLD AUTO: 5.6 K/UL (ref 1.8–7.7)
NEUTROPHILS NFR BLD: 77.7 % (ref 38–73)
NITRITE UR QL STRIP: NEGATIVE
NRBC BLD-RTO: 0 /100 WBC
PH UR STRIP: 6 [PH] (ref 5–8)
PHOSPHATE SERPL-MCNC: 4.2 MG/DL (ref 2.7–4.5)
PLATELET # BLD AUTO: 205 K/UL (ref 150–450)
PMV BLD AUTO: 10.2 FL (ref 9.2–12.9)
POTASSIUM SERPL-SCNC: 4.5 MMOL/L (ref 3.5–5.1)
PROT UR QL STRIP: NEGATIVE
RBC # BLD AUTO: 3.67 M/UL (ref 4–5.4)
SODIUM SERPL-SCNC: 132 MMOL/L (ref 136–145)
SP GR UR STRIP: 1.01 (ref 1–1.03)
URN SPEC COLLECT METH UR: NORMAL
WBC # BLD AUTO: 7.17 K/UL (ref 3.9–12.7)

## 2022-07-04 PROCEDURE — 84100 ASSAY OF PHOSPHORUS: CPT | Performed by: INTERNAL MEDICINE

## 2022-07-04 PROCEDURE — 25000003 PHARM REV CODE 250: Performed by: PHYSICIAN ASSISTANT

## 2022-07-04 PROCEDURE — 83735 ASSAY OF MAGNESIUM: CPT | Performed by: INTERNAL MEDICINE

## 2022-07-04 PROCEDURE — 93005 ELECTROCARDIOGRAM TRACING: CPT

## 2022-07-04 PROCEDURE — 96372 THER/PROPH/DIAG INJ SC/IM: CPT | Mod: 59 | Performed by: PHYSICIAN ASSISTANT

## 2022-07-04 PROCEDURE — 96372 THER/PROPH/DIAG INJ SC/IM: CPT | Mod: 59

## 2022-07-04 PROCEDURE — 99225 PR SUBSEQUENT OBSERVATION CARE,LEVEL II: ICD-10-PCS | Mod: ,,, | Performed by: INTERNAL MEDICINE

## 2022-07-04 PROCEDURE — G0378 HOSPITAL OBSERVATION PER HR: HCPCS

## 2022-07-04 PROCEDURE — 97530 THERAPEUTIC ACTIVITIES: CPT

## 2022-07-04 PROCEDURE — 97165 OT EVAL LOW COMPLEX 30 MIN: CPT

## 2022-07-04 PROCEDURE — 96366 THER/PROPH/DIAG IV INF ADDON: CPT

## 2022-07-04 PROCEDURE — 97162 PT EVAL MOD COMPLEX 30 MIN: CPT

## 2022-07-04 PROCEDURE — 85025 COMPLETE CBC W/AUTO DIFF WBC: CPT | Performed by: INTERNAL MEDICINE

## 2022-07-04 PROCEDURE — 93010 EKG 12-LEAD: ICD-10-PCS | Mod: ,,, | Performed by: INTERNAL MEDICINE

## 2022-07-04 PROCEDURE — 93010 ELECTROCARDIOGRAM REPORT: CPT | Mod: ,,, | Performed by: INTERNAL MEDICINE

## 2022-07-04 PROCEDURE — 99225 PR SUBSEQUENT OBSERVATION CARE,LEVEL II: CPT | Mod: ,,, | Performed by: INTERNAL MEDICINE

## 2022-07-04 PROCEDURE — 97535 SELF CARE MNGMENT TRAINING: CPT

## 2022-07-04 PROCEDURE — 63600175 PHARM REV CODE 636 W HCPCS: Performed by: PHYSICIAN ASSISTANT

## 2022-07-04 PROCEDURE — 80048 BASIC METABOLIC PNL TOTAL CA: CPT | Performed by: INTERNAL MEDICINE

## 2022-07-04 RX ORDER — CEFAZOLIN SODIUM/D5W 2 G/100 ML
2 PLASTIC BAG, INJECTION (ML) INTRAVENOUS
Status: DISCONTINUED | OUTPATIENT
Start: 2022-07-04 | End: 2022-07-05 | Stop reason: HOSPADM

## 2022-07-04 RX ORDER — DIVALPROEX SODIUM 250 MG/1
250 TABLET, DELAYED RELEASE ORAL ONCE
Status: COMPLETED | OUTPATIENT
Start: 2022-07-04 | End: 2022-07-04

## 2022-07-04 RX ORDER — DOCUSATE CALCIUM 240 MG
240 CAPSULE ORAL 3 TIMES DAILY
Status: ON HOLD | COMMUNITY
End: 2023-02-06

## 2022-07-04 RX ORDER — CALCIUM CARB/VITAMIN D3/VIT K1 650MG-12.5
TABLET,CHEWABLE ORAL
Status: ON HOLD | COMMUNITY
End: 2023-02-08 | Stop reason: HOSPADM

## 2022-07-04 RX ADMIN — MONTELUKAST 10 MG: 10 TABLET, FILM COATED ORAL at 08:07

## 2022-07-04 RX ADMIN — ASPIRIN 81 MG: 81 TABLET, COATED ORAL at 09:07

## 2022-07-04 RX ADMIN — DEXTROSE 2 G: 50 INJECTION, SOLUTION INTRAVENOUS at 12:07

## 2022-07-04 RX ADMIN — ACETAMINOPHEN 650 MG: 325 TABLET ORAL at 01:07

## 2022-07-04 RX ADMIN — DONEPEZIL HYDROCHLORIDE 10 MG: 10 TABLET ORAL at 09:07

## 2022-07-04 RX ADMIN — DEXTROSE 2 G: 50 INJECTION, SOLUTION INTRAVENOUS at 04:07

## 2022-07-04 RX ADMIN — DEXTROSE 2 G: 50 INJECTION, SOLUTION INTRAVENOUS at 09:07

## 2022-07-04 RX ADMIN — FERROUS SULFATE TAB 325 MG (65 MG ELEMENTAL FE) 1 EACH: 325 (65 FE) TAB at 09:07

## 2022-07-04 RX ADMIN — DIVALPROEX SODIUM 250 MG: 250 TABLET, DELAYED RELEASE ORAL at 09:07

## 2022-07-04 RX ADMIN — METHOCARBAMOL 500 MG: 500 TABLET ORAL at 04:07

## 2022-07-04 RX ADMIN — ENOXAPARIN SODIUM 40 MG: 100 INJECTION SUBCUTANEOUS at 04:07

## 2022-07-04 RX ADMIN — CELECOXIB 100 MG: 100 CAPSULE ORAL at 09:07

## 2022-07-04 RX ADMIN — Medication 6 MG: at 08:07

## 2022-07-04 RX ADMIN — LEVOTHYROXINE SODIUM 88 MCG: 88 TABLET ORAL at 06:07

## 2022-07-04 RX ADMIN — METHOCARBAMOL 500 MG: 500 TABLET ORAL at 09:07

## 2022-07-04 RX ADMIN — ACETAMINOPHEN 650 MG: 325 TABLET ORAL at 06:07

## 2022-07-04 RX ADMIN — FAMOTIDINE 40 MG: 20 TABLET ORAL at 09:07

## 2022-07-04 RX ADMIN — METHOCARBAMOL 500 MG: 500 TABLET ORAL at 08:07

## 2022-07-04 NOTE — PLAN OF CARE
Problem: Physical Therapy  Goal: Physical Therapy Goal  Description: Goals to met by 7/18/2022    1. Supine to sit with Modified [place]  2. Sit to supine with Modified [place]  3. Rolling to Left and Right with Modified [place].  4. Sit to stand transfer with Stand-by Assistance  5. Bed to chair transfer with Stand-by Assistance using Rolling Walker  6. Gait  x 75 feet with Stand-by Assistance using Rolling Walker   7. Lower extremity exercise program x15 reps per Instruction, with assistance as needed in order to facilitate improved strength, improved postural control, and improvement in functional independence    PT Eval: 7/4/2022

## 2022-07-04 NOTE — CONSULTS
[facility]  Orthopedics  Consult Note    Patient Name: Yvette Crews  MRN: [id number]  Admission Date: 7/3/2022  Hospital Length of Stay: 0 days  Attending Provider: Coral Chavis MD  Primary Care Provider: Sally Green MD    Patient information was obtained from patient, spouse/SO, past medical records and ER records.     Inpatient consult to Orthopedic Surgery  Consult performed by: Logan Caballero MD  Consult ordered by: Nimesh English MD        Subjective:     Principal Problem:Fracture of left clavicle    Chief Complaint:   Chief Complaint   Patient presents with    Fall     Lac to back of head, not bleeding. C/o L shoulder pain        HPI: Yvette Crews is a 72 y.o. female with PMH significant for paroxisomal afib, hypothyroidism, AD, bronciectasis, hypogammaglobulinemia/CVID, and osteoporosis  presenting with left shoulder and clavicle pain after a fall. She does not remember the event, but her  is at bedside to assist with the history. He states that he was in the opposite room when he heard a thud and found his wife to have fallen down. Unknown LOC but pt does have scalp bleeding at time of presentation. The patient denies prior hx of falls. Patient denies numbness and tingling. Denies any other musculoskeletal pain or injuries. No known history of prior left shoulder injury or surgery.  Walks w/out assisted devices at baseline. ASA at baseline for anticoagulation.  They deny IV drug use.   They deny tobacco use.   They deny alcohol use.   They deny immunosuppressant medications.  They deny chemotherapy.  They deny radiation therapy.         Past Medical History:   Diagnosis Date    Adult bronchiectasis     Age-related osteoporosis with current pathological fracture with routine healing 8/28/2013    Amblyopia     Anxiety     Cataract     Cellulitis of right lower extremity 1/19/2021    Chondrocalcinosis, cause unspecified, involving hand(722.34)  7/12/2011     Dx updated per 2019 IMO Load    Chronic sinusitis 4/6/2017    Colonic constipation 6/5/2013    Concussion 10/27/2016    Frequent falls 3/14/2017    Gait disturbance 3/14/2017    History of cardiac radiofrequency ablation (RFA) 2/3/2018    History of subarachnoid hemorrhage 10/30/2016    Hypogammaglobulinemia 9/7/2011    Irritable bowel syndrome 12/18/2015    Major depressive disorder, recurrent episode, in full remission 8/19/2010    Onychomycosis     Osteoarthritis     Postoperative hypothyroidism 12/18/2015    Rectal prolapse 6/5/2013    Reflux esophagitis 2/7/2010    Status post thyroidectomy 6/1/2017    Strabismus     SVT (supraventricular tachycardia) 11/22/2013    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     Underweight 1/23/2013       Past Surgical History:   Procedure Laterality Date    BREAST CYST ASPIRATION      x2    CATARACT EXTRACTION W/  INTRAOCULAR LENS IMPLANT Left 3/11/2019    Procedure: EXTRACTION, CATARACT, WITH IOL INSERTION;  Surgeon: Vera Sams MD;  [place] [facility], [place], [place] by [facility] OR;  Service: Ophthalmology;  Laterality: Left;    CATARACT EXTRACTION W/  INTRAOCULAR LENS IMPLANT Right 4/15/2019    Procedure: EXTRACTION, CATARACT, WITH IOL INSERTION LASER;  Surgeon: Vera Sams MD;  Location: [facility], [place], operated by [facility] OR;  Service: Ophthalmology;  Laterality: Right;    COLON SURGERY      EYE SURGERY      FRACTURE SURGERY      NASAL SEPTUM SURGERY      OCCLUSION OF LEFT ATRIAL APPENDAGE N/A 2/18/2022    Procedure: Left atrial appendage occlusion;  Surgeon: Chase Gill MD;  Location: [facility] EP LAB;  Service: Cardiology;  Laterality: N/A;  afib, watchman, BSCI, osiris, anes, MB/EH, 3prep    OPEN REDUCTION AND INTERNAL FIXATION (ORIF) OF INJURY OF SACROILIAC JOINT Bilateral 1/20/2021    Procedure: ORIF, SACROILIAC JOINT;  Surgeon: Alcides Tamez MD;  Location: [facility] OR 2ND FLR;  Service: Orthopedics;  Laterality: Bilateral;    RADIOFREQUENCY ABLATION      RECTAL PROLAPSE  REPAIR  june 2013    STRABISMUS SURGERY      TONSILLECTOMY      TRANSESOPHAGEAL ECHOCARDIOGRAPHY N/A 2/18/2022    Procedure: ECHOCARDIOGRAM, TRANSESOPHAGEAL;  Surgeon: Nils Diagnostic Provider;  Location: [facility] EP LAB;  Service: Cardiology;  Laterality: N/A;       Review of patient's allergies indicates:   Allergen Reactions    Adhesive      Tape tears skin    Buspar [buspirone]      headaches    Escitalopram oxalate Nausea Only     hyponatremia      Rifampin Rash       Current Facility-Administered Medications   Medication    acetaminophen tablet 650 mg    albuterol-ipratropium 2.5 mg-0.5 mg/3 mL nebulizer solution 3 mL    aluminum-magnesium hydroxide-simethicone 200-200-20 mg/5 mL suspension 30 mL    dextrose 10% bolus 125 mL    dextrose 10% bolus 250 mL    diphenhydrAMINE injection 25 mg    glucagon (human recombinant) injection 1 mg    glucose chewable tablet 16 g    glucose chewable tablet 24 g    melatonin tablet 6 mg    naloxone 0.4 mg/mL injection 0.02 mg    ondansetron disintegrating tablet 8 mg    polyethylene glycol packet 17 g    prochlorperazine injection Soln 5 mg    simethicone chewable tablet 80 mg    sodium chloride 0.9% flush 10 mL     Current Outpatient Medications   Medication Sig    acetaminophen (TYLENOL ORAL) Take by mouth once daily.    albuterol (PROVENTIL/VENTOLIN HFA) 90 mcg/actuation inhaler Inhale 2 puffs into the lungs every 6 (six) hours as needed for Wheezing. Rescue (Patient not taking: Reported on 5/31/2022)    aspirin (ECOTRIN) 81 MG EC tablet Take 1 tablet (81 mg total) by mouth once daily.    bacitracin-polymyxin b (POLYSPORIN) ophthalmic ointment     calcium-vitamin D3 (OS-ELAINE 500 + D3) 500 mg(1,250mg) -200 unit per tablet Take 1 tablet by mouth 2 (two) times daily.    calcium-vitamin D3-vitamin K 650 mg-12.5 mcg-40 mcg Chew Take by mouth.    docusate sodium (COLACE) 250 MG capsule Take 250 mg by mouth 2 (two) times daily as needed for Constipation.     donepeziL (ARICEPT) 10 MG tablet Take 1 tablet (10 mg total) by mouth once daily.    famotidine (PEPCID) 40 MG tablet Take 1 tablet (40 mg total) by mouth once daily.    famotidine (PEPCID) 40 MG tablet Take 1 tablet (40 mg total) by mouth once daily.    ferrous sulfate 325 (65 FE) MG EC tablet Take 1 tablet (325 mg total) by mouth once daily.    ferrous sulfate 325 (65 FE) MG EC tablet Take 1 tablet (325 mg total) by mouth once daily.    guaiFENesin (MUCINEX) 600 mg 12 hr tablet Take 2 tablets (1,200 mg total) by mouth 2 (two) times daily.    Immune Globulin G, IGG,-PRO-IGA 10 % injection, Privigen, (PRIVIGEN) 10 % Soln Infuse 20 g into the vein every 4 weeks.    levothyroxine (SYNTHROID) 88 MCG tablet Take 1 tablet (88 mcg total) by mouth before breakfast.    loratadine (CLARITIN) 10 mg tablet Take 1 tablet (10 mg total) by mouth once daily.    montelukast (SINGULAIR) 10 mg tablet Take 1 tablet (10 mg total) by mouth every evening.    multivit with calcium,iron,min (MULTIPLE VITAMIN, WOMENS ORAL) Take by mouth once daily.    multivit-min/ferrous fumarate (MULTI VITAMIN ORAL) Take by mouth.    mupirocin (BACTROBAN) 2 % ointment APPLY ONE application TWICE DAILY FOR 7 DAYS    naproxen (NAPROSYN ORAL) Take 200 mg by mouth. As needed every other day    phenazopyridine (PYRIDIUM) 95 MG tablet Take 95 mg by mouth 2 (two) times a day.    polyethylene glycol (GLYCOLAX) 17 gram PwPk Take 17 g by mouth once daily. (Patient taking differently: Take 17 g by mouth as needed. As needed once a week or so)    pumpkin seed extract/soy germ (AZO BLADDER CONTROL ORAL) Take by mouth.    sars-cov-2, covid-19, (MODERNA COVID-19) 100 mcg/0.5 ml injection Inject into the muscle.     Facility-Administered Medications Ordered in Other Encounters   Medication    balanced salt irrigation solution 1 drop    moxifloxacin 0.5 % ophthalmic solution 1 drop    phenylephrine HCL 2.5% ophthalmic solution 1 drop    sodium  chloride 0.9% bolus 1,000 mL    sodium chloride 0.9% bolus 1,000 mL    sodium chloride 0.9% bolus 1,000 mL    tropicamide 1% ophthalmic solution 1 drop     Family History       Problem Relation (Age of Onset)    Breast cancer Paternal Grandmother    Heart disease Father, Brother    Stroke Father          Tobacco Use    Smoking status: Never Smoker    Smokeless tobacco: Never Used   Substance and Sexual Activity    Alcohol use: No    Drug use: No    Sexual activity: Not Currently     Review of Systems   Unable to perform ROS: Dementia   Objective:     Vital Signs (Most Recent):  Temp: 97.6 °F (36.4 °C) (07/03/22 2211)  Pulse: 62 (07/03/22 2211)  Resp: 16 (07/03/22 2211)  BP: (!) 153/78 (07/03/22 2211)  SpO2: 100 % (07/03/22 2211)   Vital Signs (24h Range):  Temp:  [97.6 °F (36.4 °C)-98 °F (36.7 °C)] 97.6 °F (36.4 °C)  Pulse:  [62-92] 62  Resp:  [16] 16  SpO2:  [100 %] 100 %  BP: (153-158)/(78-90) 153/78           There is no height or weight on file to calculate BMI.      Intake/Output Summary (Last 24 hours) at 7/3/2022 2234  Last data filed at 7/3/2022 2148  Gross per 24 hour   Intake 100 ml   Output --   Net 100 ml       Ortho/SPM Exam  Gen:  No acute distress, cachectic  CV:  Peripherally well-perfused.  Respiratory:  Normal respiratory effort. No accessory muscle use.   Head/Neck:  Normocephalic. Sclera anicteric. TM. Neck supple.  Neuro: CN 2-12 grossly intact. No FND. Awake. Alert. Oriented to person, place, time, and situation.  Abdomen: Soft, NTND.      MSK:  Left Upper extremity  Inspection  - Small sub-centimeter abrasion/skin tear to the posterior clavicle with bruising infection to the abrasion, no skin tenting  - No swelling  - No ecchymosis, erythema, or signs of cellulitis  Palpation  - TTP to the distal clavicle  Range of motion  - AROM and PROM of the elbow, wrist, and hand intact without pain, able to roll shoulder with minimal pain  Stability  - No evidence of joint dislocation or abnormal  "laxity  Neurovascular  - AIN/PIN/Radial/Median/Ulnar Nerves assessed in isolation without deficit  - Able to give thumbs up, make "OK" sign, cross IF/LF, abduct/adduct fingers, make fist  - SILT throughout  - Compartments soft  - Radial artery palpated  - Muscle tone normal, decreased muscle mass          Right Upper extremity  Inspection  - Skin intact throughout, no open wounds  - No swelling  - No ecchymosis, erythema, or signs of cellulitis  Palpation  - NonTTP throughout, no palpable abnormality  Range of motion  - AROM and PROM of the shoulder, elbow, wrist, and hand intact without pain  Stability  - No evidence of joint dislocation or abnormal laxity  Neurovascular  - AIN/PIN/Radial/Median/Ulnar Nerves assessed in isolation without deficit  - Able to give thumbs up, make "OK" sign, cross IF/LF, abduct/adduct fingers, make fist  - SILT throughout  - Compartments soft  - Radial artery palpated  - Muscle tone normal, decreased muscle mass      Right Lower Extremity  Inspection  - Skin intact throughout, no open wounds  - No swelling  - No ecchymosis, erythema, or signs of cellulitis  Palpation  - NonTTP throughout, no palpable abnormality  Range of motion  - AROM and PROM of the hip, knee, ankle, and foot intact without pain  Stability  - No evidence of joint dislocation or abnormal laxity  Neurovascular  - TA/EHL/Gastroc/FHL assessed in isolation without deficit  - SILT throughout  - Compartments soft  - DP palpated   - Negative Stinchfield  - Muscle tone normal, decreased muscle mass    Left Lower Extremity  Inspection  - Skin intact throughout, no open wounds  - No swelling  - No ecchymosis, erythema, or signs of cellulitis  Palpation  - NonTTP throughout, no palpable abnormality  Range of motion  - AROM and PROM of the hip, knee, ankle, and foot intact without pain  Stability  - No evidence of joint dislocation or abnormal laxity  Neurovascular  - TA/EHL/Gastroc/FHL assessed in isolation without deficit  - " SILT throughout  - Compartments soft  - DP palpated   - [facility]  - Muscle tone normal, decreased muscle mass      Significant Labs: CBC:   Recent Labs   Lab 07/03/22 2014   WBC 5.62   HGB 11.2*   HCT 34.0*        CMP:   Recent Labs   Lab 07/03/22 2014   *   K 4.1      CO2 24   GLU 80   BUN 17   CREATININE 0.6   CALCIUM 8.8   ANIONGAP 6*   EGFRNONAA >60.0     All pertinent labs within the past 24 hours have been reviewed.    Significant Imaging: X-Ray: I have reviewed all pertinent results/findings and my personal findings are:  displaced distal clavicle fracture, no other acute fracture or dislocation appreciated    Results for orders placed or performed during the hospital encounter of 07/03/22 (from the past 2160 hour(s))   CT Cervical Spine Without Contrast    Impression    New anterior wedging of the T3 vertebral body, with sclerotic superior endplate change.  Finding may reflect acute/subacute compression fracture.  Consider further evaluation with MRI thoracic spine, as clinically indicated.    Stable T2 vertebral body mild remote compression deformity.    Stable 8 mm anterolisthesis of C2 on C3 with moderate canal stenosis at this level.    Additional findings as above.    Electronically signed by resident: Charles Bennett  Date:    07/03/2022  Time:    19:56    Electronically signed by: Krishna Torres MD  Date:    07/03/2022  Time:    21:28   CT Head Without Contrast    Impression    Left parieto-occipital scalp mild soft tissue swelling/contusion without displaced skull fracture or acute intracranial abnormality identified.    Involutional change and chronic microvascular ischemic change.      Electronically signed by: Ethan Snowden MD  Date:    07/03/2022  Time:    19:53     *Note: Due to a large number of results and/or encounters for the requested time period, some results have not been displayed. A complete set of results can be found in Results Review.          Assessment/Plan:     Displaced fracture of lateral end of left clavicle, initial encounter for open fracture  Yvette Crews is a 72 y.o. female with a distal third clavicle fracture, grade I open, NVI.     - Ancef in ED  - Irrigated with 1L of NS and betadine solution  - NWB LUE  - Sling LUE  - Observation for 24 hours of IV abx, ancef  - Discharge on bactrim after completion  - CT shoulder to rule out any other skeletal abnormalities not captured on X ray  - Orthopedics will continue to follow            Logan Caballero MD  Orthopedics  [facility]

## 2022-07-04 NOTE — NURSING
Patient awake, alert, and oriented to self. Call light within reach and bed in lowest setting. Denies pain or discomfort. IV site clean, dry,and  intact. Nurse sitting in the room with pt.Pt is attempting to get out of bed and although redirectable is still attempting to get up. NAD noted. Will continue to monitor.

## 2022-07-04 NOTE — ED NOTES
Dr. Chavis at bedside, assisted the patient to call and talk to her .  She also discussed the plan of care with the patient.

## 2022-07-04 NOTE — HPI
Yvette Crews is a 72 y.o. female with a PMHx of paroxysmal afib, hypothyroidism, hypogammaglobulinemia, osteoporosis who presented to the ED for evaluation of a mechanical fall with head trauma. Per  the patient hit the back of her head but did not lose consciousness. She has a laceration to the back of her scalp. She reports left shoulder pain. She takes aspirin daily.      ED: VSSAF. XR humerus with an acute oblique fracture of the inferior aspect of the distal left clavicle with inferomedial displacement of the fracture fragment. Ortho consulted.

## 2022-07-04 NOTE — PT/OT/SLP EVAL
Physical Therapy Co-Evaluation and Co-Treatment  Co-evaluation with OT for maximal pt participation, safety, and activity tolerance      Patient Name:  Yvette Crews   MRN:  [id number]  Admit Date: 7/3/2022  Admitting Diagnosis:  Displaced fracture of lateral end of left clavicle, initial encounter for open fracture   Length of Stay: 0 days  Recent Surgery: * No surgery found *      Recommendations:     Discharge Recommendations:  home health PT   Discharge Equipment Recommendations: none   Barriers to discharge: Evolving Clinical Presentation    Assessment:     Yvette Crews is a 72 y.o. female admitted with a medical diagnosis of Displaced fracture of lateral end of left clavicle, initial encounter for open fracture.  She presents with the following impairments/functional limitations: weakness, gait instability, impaired endurance, impaired balance, decreased lower extremity function, decreased upper extremity function, decreased ROM, decreased safety awareness, impaired cognition, pain, impaired skin, orthopedic precautions, impaired functional mobilty.     Pt presents in UE sling after L clavicular fx. Pt mildly confused and tangential, needs to be redirected and re-oriented at times though she answers all orientation questions correctly. Pt had wet her diaper so assisted PCT in changing linens and gown while we had patient stand. Pt was able to sit EOB from supine with min A, stand at EOB from sitting with min A, then max A for static standing balance as patient had a severe posterior lean and stood with legs locked in extension. Pt ambulates ~5ft with mod A and HHA, back to bed and max A for leg and torso positioning sit to supine. Treatment tolerated well, continue to train balance and progress ambulation as able.    Rehab Prognosis: Fair; patient would benefit from acute skilled PT services to address these deficits and reach maximum level of function.      Highest level of mobility achieved  "this visit: 5ft ambulation with mod A and HHA    Activity with RN/PCT: can transfer with nursing    Plan:     During this hospitalization, patient to be seen 3 x/week to address the identified rehab impairments via gait training, therapeutic activities, therapeutic exercises, neuromuscular re-education and progress towards the established goals.    · Plan of Care Expires:  08/03/22    Subjective     RN Clara notified prior to session. No family present upon PT entrance into room.    Chief Complaint: shoulder pain  Patient/Family Comments/goals: "My food doesn't look like food, everything is swapped"  Pain/Comfort:  · Pain Rating 1: 7/10  · Location - Side 1: Left  · Location - Orientation 1: generalized  · Location 1: shoulder  · Pain Addressed 1: Distraction, Reposition    Social History:  Residence: lives in an assisted living facility   Support available: [facility] staff  Equipment Used: rollator, bedside commode, shower chair  Equipment Owned (not using): None  Prior level of function: assist required for ADLs and IADLs  Work: Retired.   Drive: no.       Objective:     Additional staff present: OT Cyn    Patient found supine with: pulse ox (continuous), blood pressure cuff     General Precautions: Standard, Cardiac fall   Orthopedic Precautions:LUE non weight bearing   Braces: UE Sling   Body mass index is 16.09 kg/m².  Oxygen Device: Room Air    Vitals: /82 (BP Location: Right arm, Patient Position: Lying)   Pulse 71   Temp 97.8 °F (36.6 °C) (Oral)   Resp 16   Wt 39.9 kg (87 lb 15.4 oz)   SpO2 100%   BMI 16.09 kg/m²     Exams:  · Cognition:   · Oriented X 4, Alert, Cooperative and Tangential  · Command following: Follows one-step verbal commands  · Fluency: clear/fluent  · Hearing: Intact  · Vision:  Intact  · Skin Integrity: Bruising of L shoulder    Physical Exam:   · Edema - None noted  · ROM - IMAN LEs WFL  · Strength - IMAN LEs WFL   · Coordination - No deficits noted    Outcome Measures:    AM-PAC 6 " CLICK MOBILITY  Turning over in bed (including adjusting bedclothes, sheets and blankets)?: 3  Sitting down on and standing up from a chair with arms (e.g., wheelchair, bedside commode, etc.): 3  Moving from lying on back to sitting on the side of the bed?: 3  Moving to and from a bed to a chair (including a wheelchair)?: 2  Need to walk in hospital room?: 2  Climbing 3-5 steps with a railing?: 1  Basic Mobility Total Score: 14     Functional Mobility:    Bed Mobility:   · Scooting to HOB via supine bridge: maximal assistance of 2 persons  · Supine to Sit: minimum assistance; from L side of bed  · Scooting anteriorly to EOB to have both feet planted on floor: maximal assistance  · Sit to Supine: maximal assistance; to R side of bed    Sitting Balance at Edge of Bed:   Static Sitting Balance: Fair : able to sit unsupported without balance loss and without UE support   Dynamic Sitting Balance: Fair- : able to reach ipsilaterally but unable to weight shift   Assistance Level Required: Contact Guard Assistance   Time: 8 min   Postural deviations noted: slouched posture, rounded shoulders and increased cervical flexion    Transfers:   · Sit <> Stand Transfer: minimum assistance with hand-held assist  · Stand <> Sit Transfer: minimum assistance with hand-held assist  · [id number] from EOB    Standing Balance:   Static Standing Balance: Poor-: requires maximal assistance and UE support to maintain standing balance without loss   Dynamic Standing Balance: Poor : able to stand with moderate assistance and minimally reach ipsilaterally. Unable to cross midline.   Assistance Level Required: Moderate Assistance and Maximum Assistance   Patient used: hand-held assist   Time: 8 min   Postural deviations noted: slouched posture, rounded shoulders and forward head    Gait:   · Patient ambulated: 5ft   · Patient required: moderate assist  · Patient used:  hand-held assist  · Gait Pattern observed: swing-to gait  · Gait  Deviation(s): occasional unsteady gait, decreased step length and decreased marcelino  · Impairments due to: impaired balance and decreased strength  · Comments: knees remained extended throughout    Education:   Time provided for education, counseling and discussion of health disposition in regards to patient's current status   All questions answered within PT scope of practice and to patient's satisfaction   PT role in POC to address current functional deficits   Pt educated on proper body mechanics, safety techniques, and energy conservation with PT facilitation and cueing throughout session    Patient left supine with all lines intact, call button in reach and RN Clara present.    GOALS:   Multidisciplinary Problems     Physical Therapy Goals        Problem: Physical Therapy    Goal Priority Disciplines Outcome Goal Variances Interventions   Physical Therapy Goal     PT, PT/OT Ongoing, Progressing     Description: Goals to met by 7/18/2022    1. Supine to sit with Modified Logan  2. Sit to supine with Modified Logan  3. Rolling to Left and Right with Modified Logan.  4. Sit to stand transfer with Stand-by Assistance  5. Bed to chair transfer with Stand-by Assistance using Rolling Walker  6. Gait  x 75 feet with Stand-by Assistance using Rolling Walker   7. Lower extremity exercise program x15 reps per Instruction, with assistance as needed in order to facilitate improved strength, improved postural control, and improvement in functional independence                     History:     Past Medical History:   Diagnosis Date    Adult bronchiectasis     Age-related osteoporosis with current pathological fracture with routine healing 8/28/2013    Amblyopia     Anxiety     Cataract     Cellulitis of right lower extremity 1/19/2021    Chondrocalcinosis, cause unspecified, involving hand(712.34) 7/12/2011     Dx updated per 2019 IMO Load    Chronic sinusitis 4/6/2017    Colonic constipation  6/5/2013    Concussion 10/27/2016    Frequent falls 3/14/2017    Gait disturbance 3/14/2017    History of cardiac radiofrequency ablation (RFA) 2/3/2018    History of subarachnoid hemorrhage 10/30/2016    Hypogammaglobulinemia 9/7/2011    Irritable bowel syndrome 12/18/2015    Major depressive disorder, recurrent episode, in full remission 8/19/2010    Onychomycosis     Osteoarthritis     Postoperative hypothyroidism 12/18/2015    Rectal prolapse 6/5/2013    Reflux esophagitis 2/7/2010    Status post thyroidectomy 6/1/2017    Strabismus     SVT (supraventricular tachycardia) 11/22/2013    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     Underweight 1/23/2013       Past Surgical History:   Procedure Laterality Date    BREAST CYST ASPIRATION      x2    CATARACT EXTRACTION W/  INTRAOCULAR LENS IMPLANT Left 3/11/2019    Procedure: EXTRACTION, CATARACT, WITH IOL INSERTION;  Surgeon: Vera Sams MD;  Location: [facility] OR;  Service: Ophthalmology;  Laterality: Left;    CATARACT EXTRACTION W/  INTRAOCULAR LENS IMPLANT Right 4/15/2019    Procedure: EXTRACTION, CATARACT, WITH IOL INSERTION LASER;  Surgeon: Vera Sams MD;  Location: [facility] OR;  Service: Ophthalmology;  Laterality: Right;    COLON SURGERY      EYE SURGERY      FRACTURE SURGERY      NASAL SEPTUM SURGERY      OCCLUSION OF LEFT ATRIAL APPENDAGE N/A 2/18/2022    Procedure: Left atrial appendage occlusion;  Surgeon: Chase Gill MD;  Location: [facility] EP LAB;  Service: Cardiology;  Laterality: N/A;  afib, watchman, BSCI, osiris, anes, MB/EH, 3prep    OPEN REDUCTION AND INTERNAL FIXATION (ORIF) OF INJURY OF SACROILIAC JOINT Bilateral 1/20/2021    Procedure: ORIF, SACROILIAC JOINT;  Surgeon: Alcides Tamez MD;  Location: [address] FLR;  Service: Orthopedics;  Laterality: Bilateral;    RADIOFREQUENCY ABLATION      RECTAL PROLAPSE REPAIR  june 2013    STRABISMUS SURGERY      TONSILLECTOMY      TRANSESOPHAGEAL ECHOCARDIOGRAPHY  N/A 2/18/2022    Procedure: ECHOCARDIOGRAM, TRANSESOPHAGEAL;  Surgeon: Nils Diagnostic Provider;  Location: [facility] EP LAB;  Service: Cardiology;  Laterality: N/A;       Time Tracking:     PT Received On: 07/04/22  PT Start Time: 0933     PT Stop Time: 0953  PT Total Time (min): 20 min     Billable Minutes: Evaluation 1 procedure and Therapeutic Activity 10 min    Devin Gandhi, PT, DPT  7/4/2022

## 2022-07-04 NOTE — ED NOTES
Patient assisted to ambulate to the restroom at this time.  She ambulated with an unsteady gait and required assistance.

## 2022-07-04 NOTE — HPI
Yvette Crews is a 72 y.o. female with PMH significant for paroxisomal afib, hypothyroidism, AD, bronciectasis, hypogammaglobulinemia/CVID, and osteoporosis  presenting with left shoulder and clavicle pain after a fall. She does not remember the event, but her  is at bedside to assist with the history. He states that he was in the opposite room when he heard a thud and found his wife to have fallen down. Unknown LOC but pt does have scalp bleeding at time of presentation. The patient denies prior hx of falls. Patient denies numbness and tingling. Denies any other musculoskeletal pain or injuries. No known history of prior left shoulder injury or surgery.  Walks w/out assisted devices at baseline. ASA at baseline for anticoagulation.  They deny IV drug use.   They deny tobacco use.   They deny alcohol use.   They deny immunosuppressant medications.  They deny chemotherapy.  They deny radiation therapy.

## 2022-07-04 NOTE — PLAN OF CARE
Problem: Occupational Therapy  Goal: Occupational Therapy Goal  Description: Goals to be met by: 7/18/2022     Patient will increase functional independence with ADLs by performing:    UE Dressing with Minimal Assistance.  Grooming while seated with Stand-By Assistance.  Toileting from bedside commode with Minimal Assistance for hygiene and clothing management.   Step transfer with Minimal Assistance using AD PRN.  Toilet transfer to bedside commode with Minimal Assistance using AD PRN.    Outcome: Ongoing, Progressing     Evaluation complete; goals and POC established. Recommending Home Health OT upon discharge to return to OF.

## 2022-07-04 NOTE — HOSPITAL COURSE
Ortho consulted for open fracture to left distal clavicle on her left side. Ortho ordered CT scan of shoulder and MRI of thoracic spine to make sure no other injuries than the left distal left clavicle fracture as noted on X-ray. CT scan of shoulder showed acute displaced fracture of the distal inferior left clavicle with associated widening of the left acromial clavicular interval, concerning for AC subluxation versus separation and mild anterior subluxation of the glenohumeral joint.  Moderate left shoulder effusion. MRI of thoracic spine showed remote comfression vertebral fractures at T8, T10 and L2 and L3. Orthopedics irrigated left shoulder wound in ED and placed patient on IV Ancef and recommended 24 hours of IV Ancef since wound near left shoulder joint. As per Ortho, non-weight bearing to left upper extremity and sling for comfort to left arm. PT/OT consulted for evaluation. Patient placed on Robaxin and Tylenol for pain management. Patient placed in observation. No surgery indicated as per Ortho. PT/OT evaluated and recommended HH PT/OT on hospital discharge and arrangements made on discharge. Patient completed 24 hours of IV Ancef in hopsital and switched on discharge to Bactrim DS 1 tablet po BID for 7 days as per [facility] recs. Patient to follow-up in Ortho clinic in 1 week on discharge for wound check and monitoring healing of left clavicle fracture. Discussed plans with patient's  Dr. Pavel Crews by phone. [facility]. Patient dischargd on Tylenol and Robaxin prn for pain management. Pain controlled on discharge. Patient discharged in good condition to [facility] via medical transport on 7/5.

## 2022-07-04 NOTE — ASSESSMENT & PLAN NOTE
Yvette Crews is a 72 y.o. female with a distal third clavicle fracture, grade I open, NVI.     - Ancef in ED  - Irrigated with 1L of NS and betadine solution  - NWB LUE  - Sling LUE  - Observation for 24 hours of IV abx, ancef  - Discharge on bactrim after completion  - CT shoulder to rule out any other skeletal abnormalities not captured on X ray  - Orthopedics will continue to follow  - Follow up in clinic in one week, pt will be contacted with appointment details

## 2022-07-04 NOTE — SUBJECTIVE & OBJECTIVE
Interval History: Patient seen in ED bed 7 as awaiting a room upstairs. Patient noted to have open fracture of lateral end of left clavicle. Ortho irrigated wound and patient placed on IV Ancef for open fracture. No surgery needed as per Ortho. Patient non-weight bearing to left upper extremity as per Ortho and placed in sling for comfort. Patient will need 24 hours of IV Ancef and will need po Bactrim on discharge. Patient placed in observation. Patient placed on Tylenol and Robaxin for pain control. PT/OT consulted for evaluation. I spoke on phone with patient's  Dr. Pavel Crews who is a former neurologist here at [facility] and updated him on test results. He reports he is recovering from a recent cervical spinal surgery himself from an injury he suffered to his spine back in 2007. He reports he and his wife reside at [facility] in the assisted living section. He reports patient likely will need a ride home tomorrow. He states their daughter is in advanced pregnancy and due soon. Patient not currently active with home health. Patient reports pain in left shoulder currently controlled.     Review of Systems   Constitutional:  Negative for fever.   Respiratory:  Negative for cough and shortness of breath.    Cardiovascular:  Negative for chest pain.   Gastrointestinal:  Negative for abdominal pain, nausea and vomiting.   Musculoskeletal:  Positive for arthralgias (Left shoulder).   Skin:  Positive for wound (Left shoulder).   Neurological:  Negative for dizziness, light-headedness and headaches.   Psychiatric/Behavioral:  Negative for agitation and confusion.    Objective:     Vital Signs (Most Recent):  Temp: 97.8 °F (36.6 °C) (07/04/22 1336)  Pulse: 55 (07/04/22 1336)  Resp: 16 (07/04/22 1336)  BP: 160/82 (07/04/22 1336)  SpO2: 97 % (07/04/22 1336) on room air   Vital Signs (24h Range):  Temp:  [97.5 °F (36.4 °C)-98 °F (36.7 °C)] 97.8 °F (36.6 °C)  Pulse:  [52-92] 55  Resp:  [14-20] 16  SpO2:  [97 %-100 %] 97  %  BP: (127-162)/(65-91) 160/82     Weight: 39.9 kg (87 lb 15.4 oz)  Body mass index is 16.09 kg/m².    Intake/Output Summary (Last 24 hours) at 7/4/2022 1411  Last data filed at 7/4/2022 1348  Gross per 24 hour   Intake 300 ml   Output 300 ml   Net 0 ml      Physical Exam  Vitals and nursing note reviewed.   Constitutional:       General: She is awake. She is not in acute distress.     Appearance: She is underweight. She is not ill-appearing.   Neck:      Vascular: No JVD.   Cardiovascular:      Rate and Rhythm: Normal rate and regular rhythm.      Heart sounds: Normal heart sounds. No murmur heard.    No friction rub. No gallop.   Pulmonary:      Effort: Pulmonary effort is normal. No respiratory distress.      Breath sounds: Normal breath sounds. No wheezing.   Abdominal:      General: Abdomen is flat. Bowel sounds are normal. There is no distension.      Palpations: Abdomen is soft.      Tenderness: There is no abdominal tenderness.   Skin:     General: Skin is warm.      Comments: Bandage in place to left shoulder and left arm in sling   Neurological:      Mental Status: She is alert and oriented to person, place, and time.   Psychiatric:         Mood and Affect: Mood normal.         Behavior: Behavior normal. Behavior is cooperative.       Significant Labs: CBC:   Recent Labs   Lab 07/03/22 2014 07/04/22  0350   WBC 5.62 7.17   HGB 11.2* 11.9*   HCT 34.0* 36.6*    205     CMP:   Recent Labs   Lab 07/03/22 2014 07/04/22  0350   * 132*   K 4.1 4.5    102   CO2 24 24   GLU 80 78   BUN 17 16   CREATININE 0.6 0.7   CALCIUM 8.8 8.8   ANIONGAP 6* 6*   EGFRNONAA >60.0 >60.0       Significant Imaging: I have reviewed all pertinent imaging results/findings within the past 24 hours.

## 2022-07-04 NOTE — ED NOTES
Patient was moved from ED RM 7 to the hallway.  She is awaiting a room for admission.  Patient yelling for Clara who was her previous RN and she was notified that I am her nurse now.  She has asked that the play be over and wants to know what is happening since she has other obligations.  The patient is was reoriented that she is in the hospital and that she is going to stay here.  She is only orientated to self.  Denies any complaints at this time.  She had removed her gown and blanket.   Theses were reapplied and she was asked to keep her clothes on while she is in the hallway.  Verbalized understanding but requires frequent reorientation and reminding.  She also has been assisted back in the bed as she had her legs over the side rail.     Assessment is unchanged.  I had also re-adjusted the sling to her L arm.

## 2022-07-04 NOTE — PT/OT/SLP EVAL
Occupational Therapy   Co-Evaluation & Treatment    Name: Yvette Crews  MRN: [id number]  Admitting Diagnosis:  Displaced fracture of lateral end of left clavicle, initial encounter for open fracture    Length of Stay: 0 days    Recommendations:     Discharge Recommendations: home health OT  Discharge Equipment Recommendations:   (TBD pending progress)  Barriers to discharge:  None    Plan:     Patient to be seen 3 x/week to address the above listed problems via self-care/home management, therapeutic activities, therapeutic exercises  · Plan of Care Expires: 08/03/22  · Plan of Care Reviewed with: patient      Assessment:     Yvette Crews is a 72 y.o. female with a medical diagnosis of Displaced fracture of lateral end of left clavicle, initial encounter for open fracture.  She presents with the following performance deficits affecting function: weakness, impaired endurance, impaired self care skills, impaired functional mobilty, gait instability, impaired balance, impaired cognition, decreased upper extremity function, decreased lower extremity function, decreased safety awareness, decreased ROM, orthopedic precautions.      Pt mainly limited by impaired cognition, gait instability, and impaired balance on this date. Pt requiring minimum - maximal assistance for bed mobility, minimum - maximal assistance for functional mobility with hand-held assist, and maximal - total assistance for ADLs (dressing, toileting). Pt states she requires assist with ADLs and functional mobility at baseline, with assist provided by staff at [facility]. Pt with good motivation and fair tolerance for therapy. Pt would benefit from Home Health OT upon D/C to return to [facility].    Rehab Prognosis: Fair; patient would benefit from acute skilled OT services to address these deficits and reach maximum level of function.         Subjective     Patient found HOB elevated with pulse ox (continuous), blood pressure cuff (LUE  "sling) and no family present upon OT entry to room.    Chief Complaint: Fall (Lac to back of head, not bleeding. C/o L shoulder pain)    Patient/Family Comments/goals: "I need you to take these circles from me."    Pain/Comfort:  · Pain Rating 1: 7/10  · Location - Side 1: Left  · Location - Orientation 1: generalized  · Location 1: shoulder  · Pain Addressed 1: Reposition, Distraction, Cessation of Activity  · Pain Rating Post-Intervention 1:  (no rating provided)    Patients cultural, spiritual, Alevism conflicts given the current situation: no    Occupational Profile:  *Pt a questionable historian; information needs verification  Living Environment: Pt lives with her  at [facility] assisted living. Pt's bathroom has a WIS with a shower chair in place.  Prior Level of Function: Patient reports needing assistance with mobility & with ADLs, especially bathing and dressing.   Patient uses DME as follows: rollator, bedside commode, shower chair.   DME owned (not currently used): none.  Roles/Responsibilities:   Work: no.   Drive: no.   Managing Medicines/Managing Home: no.   Equipment Used at Home:  rollator, bedside commode, shower chair    Patient reports they will have assistance from [facility] staff upon discharge.      Objective:     Patient found with: pulse ox (continuous), blood pressure cuff (LUE sling)   General Precautions: Standard, fall   Orthopedic Precautions:LUE non weight bearing   Braces: UE Sling   Oxygen Device: Room Air  Vitals: /82 (BP Location: Right arm, Patient Position: Lying)   Pulse 71   Temp 97.8 °F (36.6 °C) (Oral)   Resp 16   Wt 39.9 kg (87 lb 15.4 oz)   SpO2 100%   BMI 16.09 kg/m²     Cognitive and Psychosocial Function:   · AxOx3 -- Person, Time and Situation   · Follows Commands/attention:follows one-step commands  · Communication:  clear/fluent  · Memory: Impaired STM and Poor immediate recall  · Safety awareness/insight to disability: impaired "   · Mood/Affect/Coping skills/emotional control: Confused    Hearing: Intact     Vision:  wears glasses     Physical Exam:  Postural examination/scapula alignment:    -       Rounded shoulders  -       Forward head  -       Kyphosis     Left UE Right UE   UE Edema absent absent   UE ROM NT AROM decreased   UE Strength NT   decreased ROM, decreased strength    Strength NT fair composite grasp   Sensation LUE INTACT:WFL RUE INTACT: WFL   Fine Motor Coordination:  LUE IMPAIRED: manipulation of objects RUE IMPAIRED: manipulation of objects   Gross Motor Coordination: LUE IMPAIRED: WFL, limited by fatigue and impaired functional endurance RUE IMPAIRED:WFL, limited by fatigue and impaired functional endurance     Occupational Performance:  Bed Mobility:    · Scooting to HOB in supine: total assistance and 2 persons  · Patient completed Supine to Sit with minimum assistance on right side of bed  · Scooting anteriorly to EOB to have both feet planted on floor: maximal assistance  · Patient completed Sit to Supine with maximal assistance on right side of bed    Functional Mobility/Transfers:   Static Sitting EOB: Minimum assistance   Patient completed Sit <> Stand Transfer with minimum assistance  with  hand-held assist    Static Standing Balance: Maximum assistance with hand-held assist  o Pt with significant posterior lean; cues for anterior weight shift   Functional Mobility: Pt ambulated approx. 12 small steps near bedside with moderate assistance with hand-held assist   o No LOB, SOB, or c/o dizziness  o Posterior lean throughout    Activities of Daily Living:  · Feeding:  Pt found covered in crumbs upon entry to room  · Upper Body Dressing: maximal - total assistance to doff/don gown while seated EOB/supine  · Lower Body Dressing: maximal assistance to don clean brief (performed by PCT) with pt assisting by standing  · Toileting: maximal assistance with PCT performing pericare and clothing management and pt  assisting by standing      [facility] 6 Click ADL:  [facility] Total Score: 12    Treatment & Education:  - Educated on role of OT, POC, and goals for this hospital stay  - Emphasized importance of OOB ax and level of staff assistance required for transfers and functional mobility (maximal assistance for static stand; moderate assistance for steps)  - Encouraged pt to alert OT of personal self-care goals and/or comfort-related concerns during future OT sessions  - Pt denies any further questions, concerns, or requests at this time  - Whiteboard updated        Patient left HOB elevated with all lines intact, call button in reach and PCT present    GOALS:   Multidisciplinary Problems     Occupational Therapy Goals        Problem: Occupational Therapy    Goal Priority Disciplines Outcome Interventions   Occupational Therapy Goal     OT, PT/OT Ongoing, Progressing    Description: Goals to be met by: 7/18/2022     Patient will increase functional independence with ADLs by performing:    UE Dressing with Minimal Assistance.  Grooming while seated with Stand-By Assistance.  Toileting from bedside commode with Minimal Assistance for hygiene and clothing management.   Step transfer with Minimal Assistance using AD PRN.  Toilet transfer to bedside commode with Minimal Assistance using AD PRN.                       History:     Past Medical History:   Diagnosis Date    Adult bronchiectasis     Age-related osteoporosis with current pathological fracture with routine healing 8/28/2013    Amblyopia     Anxiety     Cataract     Cellulitis of right lower extremity 1/19/2021    Chondrocalcinosis, cause unspecified, involving hand(712.34) 7/12/2011     Dx updated per 2019 IMO Load    Chronic sinusitis 4/6/2017    Colonic constipation 6/5/2013    Concussion 10/27/2016    Frequent falls 3/14/2017    Gait disturbance 3/14/2017    History of cardiac radiofrequency ablation (RFA) 2/3/2018    History of subarachnoid hemorrhage  10/30/2016    Hypogammaglobulinemia 9/7/2011    Irritable bowel syndrome 12/18/2015    Major depressive disorder, recurrent episode, in full remission 8/19/2010    Onychomycosis     Osteoarthritis     Postoperative hypothyroidism 12/18/2015    Rectal prolapse 6/5/2013    Reflux esophagitis 2/7/2010    Status post thyroidectomy 6/1/2017    Strabismus     SVT (supraventricular tachycardia) 11/22/2013    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     Underweight 1/23/2013       Past Surgical History:   Procedure Laterality Date    BREAST CYST ASPIRATION      x2    CATARACT EXTRACTION W/  INTRAOCULAR LENS IMPLANT Left 3/11/2019    Procedure: EXTRACTION, CATARACT, WITH IOL INSERTION;  Surgeon: Vera Sams MD;  Location: [facility] OR;  Service: Ophthalmology;  Laterality: Left;    CATARACT EXTRACTION W/  INTRAOCULAR LENS IMPLANT Right 4/15/2019    Procedure: EXTRACTION, CATARACT, WITH IOL INSERTION LASER;  Surgeon: Vera Sams MD;  Location: [facility] OR;  Service: Ophthalmology;  Laterality: Right;    COLON SURGERY      EYE SURGERY      FRACTURE SURGERY      NASAL SEPTUM SURGERY      OCCLUSION OF LEFT ATRIAL APPENDAGE N/A 2/18/2022    Procedure: Left atrial appendage occlusion;  Surgeon: Chase Gill MD;  Location: [facility] EP LAB;  Service: Cardiology;  Laterality: N/A;  afib, watchman, BSCI, osiris, anes, MB/EH, 3prep    OPEN REDUCTION AND INTERNAL FIXATION (ORIF) OF INJURY OF SACROILIAC JOINT Bilateral 1/20/2021    Procedure: ORIF, SACROILIAC JOINT;  Surgeon: Alcides Tamez MD;  [address];  Service: Orthopedics;  Laterality: Bilateral;    RADIOFREQUENCY ABLATION      RECTAL PROLAPSE REPAIR  june 2013    STRABISMUS SURGERY      TONSILLECTOMY      TRANSESOPHAGEAL ECHOCARDIOGRAPHY N/A 2/18/2022    Procedure: ECHOCARDIOGRAM, TRANSESOPHAGEAL;  Surgeon: Geovany Diagnostic Provider;  Location: [facility] EP LAB;  Service: Cardiology;  Laterality: N/A;         Time Tracking:     OT Date  of Treatment: 07/04/22  OT Start Time: 0933  OT Stop Time: 0953  OT Total Time (min): 20 min  Additional staff present: PT, PCT  Co-evaluation performed to safely facilitate mobility and functional tasks concurrently for comprehensive assessment.      Billable Minutes:Evaluation 10  Self Care/Home Management 10      7/4/2022

## 2022-07-04 NOTE — ASSESSMENT & PLAN NOTE
- Controlled. s/p cardiac ablation in past.  - Patient in sinus rhythm on admit and has Watchman device in place so no long term anticoagulation needed.

## 2022-07-04 NOTE — SUBJECTIVE & OBJECTIVE
Past Medical History:   Diagnosis Date    Adult bronchiectasis     Age-related osteoporosis with current pathological fracture with routine healing 8/28/2013    Amblyopia     Anxiety     Cataract     Cellulitis of right lower extremity 1/19/2021    Chondrocalcinosis, cause unspecified, involving hand(712.34) 7/12/2011     Dx updated per 2019 IMO Load    Chronic sinusitis 4/6/2017    Colonic constipation 6/5/2013    Concussion 10/27/2016    Frequent falls 3/14/2017    Gait disturbance 3/14/2017    History of cardiac radiofrequency ablation (RFA) 2/3/2018    History of subarachnoid hemorrhage 10/30/2016    Hypogammaglobulinemia 9/7/2011    Irritable bowel syndrome 12/18/2015    Major depressive disorder, recurrent episode, in full remission 8/19/2010    Onychomycosis     Osteoarthritis     Postoperative hypothyroidism 12/18/2015    Rectal prolapse 6/5/2013    Reflux esophagitis 2/7/2010    Status post thyroidectomy 6/1/2017    Strabismus     SVT (supraventricular tachycardia) 11/22/2013    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     Underweight 1/23/2013       Past Surgical History:   Procedure Laterality Date    BREAST CYST ASPIRATION      x2    CATARACT EXTRACTION W/  INTRAOCULAR LENS IMPLANT Left 3/11/2019    Procedure: EXTRACTION, CATARACT, WITH IOL INSERTION;  Surgeon: Vera Sams MD;  Location: [facility] OR;  Service: Ophthalmology;  Laterality: Left;    CATARACT EXTRACTION W/  INTRAOCULAR LENS IMPLANT Right 4/15/2019    Procedure: EXTRACTION, CATARACT, WITH IOL INSERTION LASER;  Surgeon: Vera Sams MD;  Location: [facility] OR;  Service: Ophthalmology;  Laterality: Right;    COLON SURGERY      EYE SURGERY      FRACTURE SURGERY      NASAL SEPTUM SURGERY      OCCLUSION OF LEFT ATRIAL APPENDAGE N/A 2/18/2022    Procedure: Left atrial appendage occlusion;  Surgeon: Chase Gill MD;  Location: [facility] EP LAB;  Service: Cardiology;  Laterality: N/A;  afib, watchman, BSCI, osiris, anes, MB/EH, 3prep    OPEN REDUCTION  AND INTERNAL FIXATION (ORIF) OF INJURY OF SACROILIAC JOINT Bilateral 1/20/2021    Procedure: ORIF, SACROILIAC JOINT;  Surgeon: Alcides Tamez MD;  Location: [facility] OR [address] [place] [address];  Service: Orthopedics;  Laterality: Bilateral;    RADIOFREQUENCY ABLATION      RECTAL PROLAPSE REPAIR  june 2013    STRABISMUS SURGERY      TONSILLECTOMY      TRANSESOPHAGEAL ECHOCARDIOGRAPHY N/A 2/18/2022    Procedure: ECHOCARDIOGRAM, TRANSESOPHAGEAL;  Surgeon: Nils Diagnostic Provider;  Location: [facility] EP LAB;  Service: Cardiology;  Laterality: N/A;       Review of patient's allergies indicates:   Allergen Reactions    Adhesive      Tape tears skin    Buspar [buspirone]      headaches    Escitalopram oxalate Nausea Only     hyponatremia      Rifampin Rash       Current Facility-Administered Medications on File Prior to Encounter   Medication    balanced salt irrigation solution 1 drop    diphenhydrAMINE injection 25 mg    moxifloxacin 0.5 % ophthalmic solution 1 drop    phenylephrine HCL 2.5% ophthalmic solution 1 drop    sodium chloride 0.9% bolus 1,000 mL    sodium chloride 0.9% bolus 1,000 mL    sodium chloride 0.9% bolus 1,000 mL    tropicamide 1% ophthalmic solution 1 drop    [DISCONTINUED] lidocaine (PF) 10 mg/ml (1%) injection 10 mg     Current Outpatient Medications on File Prior to Encounter   Medication Sig    acetaminophen (TYLENOL ORAL) Take by mouth once daily.    albuterol (PROVENTIL/VENTOLIN HFA) 90 mcg/actuation inhaler Inhale 2 puffs into the lungs every 6 (six) hours as needed for Wheezing. Rescue (Patient not taking: Reported on 5/31/2022)    aspirin (ECOTRIN) 81 MG EC tablet Take 1 tablet (81 mg total) by mouth once daily.    bacitracin-polymyxin b (POLYSPORIN) ophthalmic ointment     calcium-vitamin D3 (OS-ELAINE 500 + D3) 500 mg(1,250mg) -200 unit per tablet Take 1 tablet by mouth 2 (two) times daily.    calcium-vitamin D3-vitamin K 650 mg-12.5 mcg-40 mcg Chew Take by mouth.    docusate sodium (COLACE) 250 MG  capsule Take 250 mg by mouth 2 (two) times daily as needed for Constipation.    donepeziL (ARICEPT) 10 MG tablet Take 1 tablet (10 mg total) by mouth once daily.    famotidine (PEPCID) 40 MG tablet Take 1 tablet (40 mg total) by mouth once daily.    famotidine (PEPCID) 40 MG tablet Take 1 tablet (40 mg total) by mouth once daily.    ferrous sulfate 325 (65 FE) MG EC tablet Take 1 tablet (325 mg total) by mouth once daily.    ferrous sulfate 325 (65 FE) MG EC tablet Take 1 tablet (325 mg total) by mouth once daily.    guaiFENesin (MUCINEX) 600 mg 12 hr tablet Take 2 tablets (1,200 mg total) by mouth 2 (two) times daily.    Immune Globulin G, IGG,-PRO-IGA 10 % injection, Privigen, (PRIVIGEN) 10 % Soln Infuse 20 g into the vein every 4 weeks.    levothyroxine (SYNTHROID) 88 MCG tablet Take 1 tablet (88 mcg total) by mouth before breakfast.    loratadine (CLARITIN) 10 mg tablet Take 1 tablet (10 mg total) by mouth once daily.    montelukast (SINGULAIR) 10 mg tablet Take 1 tablet (10 mg total) by mouth every evening.    multivit with calcium,iron,min (MULTIPLE VITAMIN, WOMENS ORAL) Take by mouth once daily.    multivit-min/ferrous fumarate (MULTI VITAMIN ORAL) Take by mouth.    mupirocin (BACTROBAN) 2 % ointment APPLY ONE application TWICE DAILY FOR 7 DAYS    naproxen (NAPROSYN ORAL) Take 200 mg by mouth. As needed every other day    phenazopyridine (PYRIDIUM) 95 MG tablet Take 95 mg by mouth 2 (two) times a day.    polyethylene glycol (GLYCOLAX) 17 gram PwPk Take 17 g by mouth once daily. (Patient taking differently: Take 17 g by mouth as needed. As needed once a week or so)    pumpkin seed extract/soy germ (AZO BLADDER CONTROL ORAL) Take by mouth.    sars-cov-2, covid-19, (MODERNA COVID-19) 100 mcg/0.5 ml injection Inject into the muscle.    [DISCONTINUED] clopidogreL (PLAVIX) 75 mg tablet Take 1 tablet (75 mg total) by mouth once daily.    [DISCONTINUED] XARELTO 20 mg Tab TAKE 1 TAB BY MOUTH EVERY EVENING WITH DINNER  (BEGIN AFTER ELIQUIS IS COMPLETED)     Family History       Problem Relation (Age of Onset)    Breast cancer Paternal Grandmother    Heart disease Father, Brother    Stroke Father          Tobacco Use    Smoking status: Never Smoker    Smokeless tobacco: Never Used   Substance and Sexual Activity    Alcohol use: No    Drug use: No    Sexual activity: Not Currently     Review of Systems   Unable to perform ROS: Dementia   Objective:     Vital Signs (Most Recent):  Temp: 97.6 °F (36.4 °C) (07/03/22 2211)  Pulse: 62 (07/03/22 2211)  Resp: 16 (07/03/22 2211)  BP: (!) 153/78 (07/03/22 2211)  SpO2: 100 % (07/03/22 2211)   Vital Signs (24h Range):  Temp:  [97.6 °F (36.4 °C)-98 °F (36.7 °C)] 97.6 °F (36.4 °C)  Pulse:  [62-92] 62  Resp:  [16] 16  SpO2:  [100 %] 100 %  BP: (153-158)/(78-90) 153/78        There is no height or weight on file to calculate BMI.    Physical Exam  Vitals and nursing note reviewed.   Constitutional:       General: She is not in acute distress.     Appearance: She is well-developed.   HENT:      Head: Normocephalic and atraumatic.      Mouth/Throat:      Mouth: Mucous membranes are moist.   Eyes:      Conjunctiva/sclera: Conjunctivae normal.      Pupils: Pupils are equal, round, and reactive to light.   Cardiovascular:      Rate and Rhythm: Normal rate and regular rhythm.      Heart sounds: Normal heart sounds.   Pulmonary:      Effort: Pulmonary effort is normal. No respiratory distress.      Breath sounds: Normal breath sounds. No wheezing.   Abdominal:      General: Bowel sounds are normal. There is no distension.      Palpations: Abdomen is soft.      Tenderness: There is no abdominal tenderness.   Musculoskeletal:         General: Tenderness (distal clavicle) and deformity present. Normal range of motion.      Cervical back: Normal range of motion and neck supple.   Skin:     General: Skin is warm and dry.      Capillary Refill: Capillary refill takes less than 2 seconds.      Findings: No  rash.   Neurological:      General: No focal deficit present.      Mental Status: She is alert. Mental status is at baseline.      Cranial Nerves: No cranial nerve deficit.      Sensory: No sensory deficit.      Coordination: Coordination normal.   Psychiatric:         Behavior: Behavior normal.         Thought Content: Thought content normal.         Judgment: Judgment normal.         CRANIAL NERVES     CN III, IV, VI   Pupils are equal, round, and reactive to light.     Significant Labs: All pertinent labs within the past 24 hours have been reviewed.  CBC:   Recent Labs   Lab 07/03/22 2014   WBC 5.62   HGB 11.2*   HCT 34.0*        CMP:   Recent Labs   Lab 07/03/22 2014   *   K 4.1      CO2 24   GLU 80   BUN 17   CREATININE 0.6   CALCIUM 8.8   ANIONGAP 6*   EGFRNONAA >60.0       Significant Imaging: I have reviewed all pertinent imaging results/findings within the past 24 hours.  X-Ray Clavicle Left  Narrative: EXAMINATION:  XR HUMERUS 2 VIEW LEFT; XR CLAVICLE LEFT    CLINICAL HISTORY:  fracture;    TECHNIQUE:  Left humerus two views.  Left clavicle two views.    COMPARISON:  10/28/2016.    FINDINGS:  No acute fracture or bone destruction of the left humerus.Degenerative change similar to 10/20/2016 examination.  Some calcification has developed between the acromion and humeral head suggesting rotator cuff tendinopathy.    Acute oblique fracture of the inferior aspect of the distal left clavicle with inferomedial displacement of the fracture fragment.  Associated widening of the left acromioclavicular joint and superior displacement of the distal clavicle relative to the acromion suggesting acromioclavicular joint strain or separation.  Impression: No acute fracture of the left humerus identified.    Acute oblique fracture of the inferior aspect of the distal left clavicle with inferomedial displacement of the fracture fragment. Associated widening of the left acromioclavicular joint and  superior displacement of the distal clavicle relative to the acromion suggesting acromioclavicular joint strain or separation.    Electronically signed by: Kj Lorenzana MD  Date:    07/03/2022  Time:    21:51  X-Ray Humerus 2 View Left  Narrative: EXAMINATION:  XR HUMERUS 2 VIEW LEFT; XR CLAVICLE LEFT    CLINICAL HISTORY:  fracture;    TECHNIQUE:  Left humerus two views.  Left clavicle two views.    COMPARISON:  10/28/2016.    FINDINGS:  No acute fracture or bone destruction of the left humerus.Degenerative change similar to 10/20/2016 examination.  Some calcification has developed between the acromion and humeral head suggesting rotator cuff tendinopathy.    Acute oblique fracture of the inferior aspect of the distal left clavicle with inferomedial displacement of the fracture fragment.  Associated widening of the left acromioclavicular joint and superior displacement of the distal clavicle relative to the acromion suggesting acromioclavicular joint strain or separation.  Impression: No acute fracture of the left humerus identified.    Acute oblique fracture of the inferior aspect of the distal left clavicle with inferomedial displacement of the fracture fragment. Associated widening of the left acromioclavicular joint and superior displacement of the distal clavicle relative to the acromion suggesting acromioclavicular joint strain or separation.    Electronically signed by: Kj Lorenzana MD  Date:    07/03/2022  Time:    21:51  CT Cervical Spine Without Contrast  Narrative: EXAMINATION:  CT CERVICAL SPINE WITHOUT CONTRAST    CLINICAL HISTORY:  Neck trauma (Age >= 65y);    TECHNIQUE:  Low dose axial images, sagittal and coronal reformations were performed though the cervical spine.  Contrast was not administered.    COMPARISON:  C-spine radiograph 10/14/2021.  Same-day CT head.  CT cervical spine 11/25/2013.    CT chest 11/05/2019    FINDINGS:  Kyphotic alignment of the cervical spine.  There is stable 8 mm  anterolisthesis of C2 on C3. The cervical vertebral body heights appear well-maintained.  Mild anterior wedging of the T2 vertebral body, likely remote compression fracture.  Newly seen mild anterior wedging of the T3 vertebral body, with sclerotic superior endplate change.  There is no evidence of osseous lytic or blastic process.    Multilevel degenerative disc disease, with severe disc height loss C3-C7 with active phenomenon at those levels.  Focal degenerative changes are described below.    C2 -- C3: Posterior disc-osteophyte complex.  Bilateral facet arthropathy.  Mild left neural foraminal narrowing.  Moderate central canal stenosis.    C3 -- C4: Posterior disc-osteophyte complex.  Bilateral facet arthropathy.  Mild left neural foraminal narrowing.  Mild central canal stenosis.    C4 -- C5: Posterior disc-osteophyte complex.  Bilateral facet arthropathy.  No significant neural foraminal narrowing. There is no central canal stenosis.    C5 -- C6: Posterior disc-osteophyte complex.  Bilateral facet arthropathy.  No significant neural foraminal narrowing.  Mild central canal stenosis.    C6 -- C7: Posterior disc-osteophyte complex.  There is no neural foraminal narrowing.  There is no central canal stenosis.    C7 -- T1: There is no posterior disc-osteophyte complex.  There is no neural foraminal narrowing.  There is no central canal narrowing.    Evaluation of the surrounding soft tissues demonstrates a right apical calcified nodule with surrounding subsegmental atelectasis versus scarring.  Mild right dependent atelectasis.  Minimal biapical pleuroparenchymal scarring.  The airways appear patent.  Impression: New anterior wedging of the T3 vertebral body, with sclerotic superior endplate change.  Finding may reflect acute/subacute compression fracture.  Consider further evaluation with MRI thoracic spine, as clinically indicated.    Stable T2 vertebral body mild remote compression deformity.    Stable 8 mm  anterolisthesis of C2 on C3 with moderate canal stenosis at this level.    Additional findings as above.    Electronically signed by resident: Charles Bennett  Date:    07/03/2022  Time:    19:56    Electronically signed by: Krishna Torres MD  Date:    07/03/2022  Time:    21:28  CT Head Without Contrast  Narrative: EXAMINATION:  CT HEAD WITHOUT CONTRAST    CLINICAL HISTORY:  Head trauma, minor (Age >= 65y);    TECHNIQUE:  Low dose axial CT images obtained throughout the head without intravenous contrast. Sagittal and coronal reconstructions were performed.    COMPARISON:  Head CT most recent 05/13/2022 and MRI brain 07/26/2021    FINDINGS:  Intracranial compartment:    Mild generalized cerebral volume loss.  Ventricles are midline and stable in size and configuration without distortion by mass effect or acute hydrocephalus.  Chronic nonspecific empty sella configuration, unchanged.  No extra-axial blood or fluid collections.    Grossly stable distribution of patchy hypoattenuation of the subcortical and periventricular white matter consistent with chronic microvascular ischemic change.  No definite new focal areas of abnormal parenchymal attenuation.  Skull base atherosclerotic vascular calcifications noted.  No parenchymal mass, hemorrhage, edema or major vascular distribution infarct.    Skull/extracranial contents (limited evaluation): Small area localized soft tissue swelling/contusion overlying the left parieto-occipital calvarium.  No fracture. Mastoid air cells and paranasal sinuses are essentially clear.  Impression: Left parieto-occipital scalp mild soft tissue swelling/contusion without displaced skull fracture or acute intracranial abnormality identified.    Involutional change and chronic microvascular ischemic change.    Electronically signed by: Ethan Snowden MD  Date:    07/03/2022  Time:    19:53  X-Ray Shoulder Trauma Left  Narrative: EXAMINATION:  XR SHOULDER TRAUMA 3 VIEW LEFT    CLINICAL  HISTORY:  Unspecified fall, initial encounter    TECHNIQUE:  Three views of the left shoulder were performed.    COMPARISON  05/13/2022.    FINDINGS:  The bone mineralization is within normal limits.  There is an acute displaced fracture of the distal 3rd of the left clavicle.  The remainder of the osseous structures are unremarkable.    There is anterior subluxation of the humerus relative to the glenoid.  There is no yaya dislocation.    There are postoperative changes in the mediastinum.  The visualized left hemithorax unremarkable.  There is no evidence of a pneumothorax or pulmonary contusion.  Impression: Acute displaced fracture of the distal 3rd of the left clavicle.    Anterior subluxation of the left shoulder.    Electronically signed by: Krishna Torres MD  Date:    07/03/2022  Time:    19:21

## 2022-07-04 NOTE — ASSESSMENT & PLAN NOTE
Mechanical fall    - imaging with acute oblique fracture of the inferior aspect of the distal left clavicle with inferomedial displacement of the fracture fragment.  - ortho consulted, appreciate recs  - no surgical intervention at this time, plan for 24 hours of ancef

## 2022-07-04 NOTE — PHARMACY MED REC
"Admission Medication History     The home medication history was taken by Nereyda Birmingham.    You may go to "Admission" then "Reconcile Home Medications" tabs to review and/or act upon these items.      The home medication list has been updated by the Pharmacy department.    Please read ALL comments highlighted in yellow.    Please address this information as you see fit.     Feel free to contact us if you have any questions or require assistance.      The medications listed below were removed from the home medication list. Please reorder if appropriate:  Patient reports no longer taking the following medication(s):   ACETAMINOPHEN   BACITRACIN-POLYMYXIN B OPHTHALMIC OINTMENT   CALCIUM-VITAMIN D3 500MG(1,250MG)-200 UNIT   CLOPIDOGREL 75MG   MUPIROCIN 2% OINT   NAPROXEN    XARELTO 20MG    Medications listed below were obtained from: Nursing home    Medication Sig    albuterol (PROVENTIL/VENTOLIN HFA) 90 mcg/actuation inhaler   Inhale 2 puffs into the lungs every 6 (six) hours as needed for Wheezing. Rescue    aspirin (ECOTRIN) 81 MG EC tablet   Take 81 mg by mouth once daily. Stop after 8/18/22)    calcium-vitamin D3-vitamin K (VIACTIV) 650 mg-12.5 mcg-40 mcg Chew   Chew 1 by mouth twice daily    docusate calcium (SURFAK) 240 mg capsule   Take 240 mg by mouth 3 (three) times daily.    donepeziL (ARICEPT) 10 MG tablet   Take 1 tablet (10 mg total) by mouth once daily.    famotidine (PEPCID) 40 MG tablet   Take 1 tablet (40 mg total) by mouth once daily.    ferrous sulfate 325 (65 FE) MG EC tablet   Take 1 tablet (325 mg total) by mouth once daily.    guaiFENesin (MUCINEX) 600 mg 12 hr tablet   Take 1,200 mg by mouth every 12 (twelve) hours.    Immune Globulin G, IGG,-PRO-IGA 10 % injection, Privigen, (PRIVIGEN) 10 % Soln   Infuse 20 g into the vein every 4 weeks.    levothyroxine (SYNTHROID) 88 MCG tablet   Take 1 tablet (88 mcg total) by mouth before breakfast.    loratadine (CLARITIN) 10 mg " tablet   Take 1 tablet (10 mg total) by mouth once daily.    montelukast (SINGULAIR) 10 mg tablet   Take 1 tablet (10 mg total) by mouth every evening.    pedi multivit no.7/folic acid (FLINTSTONES MULTI-VIT GUMMIES ORAL)   Chew 2 gummies by mouth every day    polyethylene glycol (GLYCOLAX) 17 gram PwPk   Take 17 g by mouth once daily.    pumpkin seed extract/soy germ (AZO BLADDER CONTROL ORAL)   Take 1 tablet by mouth 2 (two) times a day.    sars-cov-2, covid-19, (MODERNA COVID-19) 100 mcg/0.5 ml injection Inject into the muscle.             Potential issues to be addressed PRIOR TO DISCHARGE  The listed medications were obtained from another facility ([facility]). The patient may not have been able to fill these prescriptions prior to this admission and may require new scripts upon discharge.     Nereyda Birmingham  [id number]                    .

## 2022-07-04 NOTE — ASSESSMENT & PLAN NOTE
Body mass index is 16.09 kg/m². Patient underweight. Encourage oral intake and oral supplements with meals.

## 2022-07-04 NOTE — PROGRESS NOTES
[facility] - Emergency Dept  [facility]  Progress Note    Patient Name: Yvette Crews  MRN: [id number]  Patient Class: OP- Observation   Admission Date: 7/3/2022  Length of Stay: 0 days  Attending Physician: Coral Chavis MD  Primary Care Provider: Sally Green MD        Subjective:     Principal Problem:Open displaced fracture of acromial end of left clavicle        HPI:  Yvette Crews is a 72 y.o. female with a PMHx of paroxysmal afib, hypothyroidism, hypogammaglobulinemia, osteoporosis who presented to the ED for evaluation of a mechanical fall with head trauma. Per  the patient hit the back of her head but did not lose consciousness. She has a laceration to the back of her scalp. She reports left shoulder pain. She takes aspirin daily.      ED: VSSAF. XR humerus with an acute oblique fracture of the inferior aspect of the distal left clavicle with inferomedial displacement of the fracture fragment. Ortho consulted.       Overview/Hospital Course:  Ortho consulted for open fracture to left distal clavicle on her left side. Ortho ordered CT scan of shoulder and MRI of thoracic spine to make sure no other injuries than the left distal left clavicle fracture as noted on X-ray. CT scan of shoulder showed acute displaced fracture of the distal inferior left clavicle with associated widening of the left acromial clavicular interval, concerning for AC subluxation versus separation and mild anterior subluxation of the glenohumeral joint.  Moderate left shoulder effusion. MRI of thoracic spine showed remote comfression vertebral fractures at T*, T10 and L2 and L3> Orthopedics irrigated left shoulder wound in ED and placed pateitn on IV Ancef and recommended 24 hours of IV Ancef since wound near left shoulder joint. As per Ortho, non-weight bearing to left upper extremity and sling for comfort to left arm. PT/OT consulted for evaluation. Patient placed on Robaxin and Tylenol for pain  management. Patient placed in observation. No surgery indicated as per Ortho.       Interval History: Patient seen in ED bed 7 as awaiting a room upstairs. Patient noted to have open fracture of lateral end of left clavicle. Ortho irrigated wound and patient placed on IV Ancef for open fracture. No surgery needed as per Ortho. Patient non-weight bearing to left upper extremity as per Ortho and placed in sling for comfort. Patient will need 24 hours of IV Ancef and will need po Bactrim on discharge. Patient placed in observation. Patient placed on Tylenol and Robaxin for pain control. PT/OT consulted for evaluation. I spoke on phone with patient's  Dr. Pavel Crews who is a former neurologist here at [facility] and updated him on test results. He reports he is recovering from a recent cervical spinal surgery himself from an injury he suffered to his spine back in 2007. He reports he and his wife reside at [facility] in the assisted living section. He reports patient likely will need a ride home tomorrow. He states their daughter is in advanced pregnancy and due soon. Patient not currently active with home health. Patient reports pain in left shoulder currently controlled.     Review of Systems   Constitutional:  Negative for fever.   Respiratory:  Negative for cough and shortness of breath.    Cardiovascular:  Negative for chest pain.   Gastrointestinal:  Negative for abdominal pain, nausea and vomiting.   Musculoskeletal:  Positive for arthralgias (Left shoulder).   Skin:  Positive for wound (Left shoulder).   Neurological:  Negative for dizziness, light-headedness and headaches.   Psychiatric/Behavioral:  Negative for agitation and confusion.    Objective:     Vital Signs (Most Recent):  Temp: 97.8 °F (36.6 °C) (07/04/22 1336)  Pulse: 55 (07/04/22 1336)  Resp: 16 (07/04/22 1336)  BP: 160/82 (07/04/22 1336)  SpO2: 97 % (07/04/22 1336) on room air   Vital Signs (24h Range):  Temp:  [97.5 °F (36.4 °C)-98 °F (36.7  °C)] 97.8 °F (36.6 °C)  Pulse:  [52-92] 55  Resp:  [14-20] 16  SpO2:  [97 %-100 %] 97 %  BP: (127-162)/(65-91) 160/82     Weight: 39.9 kg (87 lb 15.4 oz)  Body mass index is 16.09 kg/m².    Intake/Output Summary (Last 24 hours) at 7/4/2022 1411  Last data filed at 7/4/2022 1348  Gross per 24 hour   Intake 300 ml   Output 300 ml   Net 0 ml      Physical Exam  Vitals and nursing note reviewed.   Constitutional:       General: She is awake. She is not in acute distress.     Appearance: She is underweight. She is not ill-appearing.   Neck:      Vascular: No JVD.   Cardiovascular:      Rate and Rhythm: Normal rate and regular rhythm.      Heart sounds: Normal heart sounds. No murmur heard.    No friction rub. No gallop.   Pulmonary:      Effort: Pulmonary effort is normal. No respiratory distress.      Breath sounds: Normal breath sounds. No wheezing.   Abdominal:      General: Abdomen is flat. Bowel sounds are normal. There is no distension.      Palpations: Abdomen is soft.      Tenderness: There is no abdominal tenderness.   Skin:     General: Skin is warm.      Comments: Bandage in place to left shoulder and left arm in sling   Neurological:      Mental Status: She is alert and oriented to person, place, and time.   Psychiatric:         Mood and Affect: Mood normal.         Behavior: Behavior normal. Behavior is cooperative.       Significant Labs: CBC:   Recent Labs   Lab 07/03/22 2014 07/04/22  0350   WBC 5.62 7.17   HGB 11.2* 11.9*   HCT 34.0* 36.6*    205     CMP:   Recent Labs   Lab 07/03/22 2014 07/04/22  0350   * 132*   K 4.1 4.5    102   CO2 24 24   GLU 80 78   BUN 17 16   CREATININE 0.6 0.7   CALCIUM 8.8 8.8   ANIONGAP 6* 6*   EGFRNONAA >60.0 >60.0       Significant Imaging: I have reviewed all pertinent imaging results/findings within the past 24 hours.      Assessment/Plan:      * Open displaced fracture of acromial end of left clavicle  - Imaging showed acute oblique fracture of the  inferior aspect of the distal left clavicle with inferomedial displacement of the fracture fragment.  - Ortho consulted for open left clavicle fracture and irrigated wound in ED and started patient on IV Ancef and recommended to continue IV Ancef for 24 hours. Patient placed in observation for IV Ancef. No surgery needed.  - NWB LUE as per Ortho.   - Sling LUE as per Ortho.   - Plan discharge on oral Bctrim after completion of 24 hours of IV Ancef.   - PT/OT consult for evaluation.   - Fall precautions  - Scheduled Robaxin and Tylenol for pain management.     Late onset Alzheimer's dementia without behavioral disturbance  · Choric and controlled. Patient at cognitive baseline. Continue home Aricept to treat.   · Delirium precautions    Paroxysmal atrial fibrillation  - Controlled. s/p cardiac ablation in past.  - Patient in sinus rhythm on admit and has Watchman device in place so no long term anticoagulation needed.     Postoperative hypothyroidism  Chronic and controlled. Continue home Levothyroxine 88 mcg po daily to treat.     Reflux esophagitis  Chronic and controlled. Continue home Pepcid to treat.     Frequent falls  - PT/OT evaluation.   - Fall precautions    Chronic sinusitis  Chronic and controlled. Continue home Singulair to treat.     Underweight  Body mass index is 16.09 kg/m². Patient underweight. Encourage oral intake and oral supplements with meals.          VTE Risk Mitigation (From admission, onward)         Ordered     enoxaparin injection 40 mg  Daily         07/03/22 2300     IP VTE HIGH RISK PATIENT  Once         07/03/22 2300     Place sequential compression device  Until discontinued         07/03/22 2227                Discharge Planning   SANTIAGO: 7/5/2022     Code Status: Full Code   Is the patient medically ready for discharge?: No    Reason for patient still in hospital (select all that apply): Patient trending condition             Coral Chavis MD  Department of Hospital Medicine    [facility] - Emergency Dept

## 2022-07-04 NOTE — ASSESSMENT & PLAN NOTE
Yvette Crews is a 72 y.o. female with a distal third clavicle fracture, grade I open, NVI.     - Ancef in ED  - Irrigated with 1L of NS and betadine solution  - NWB LUE  - Sling LUE  - Observation for 24 hours of IV abx, ancef  - Discharge on bactrim after completion  - CT shoulder to rule out any other skeletal abnormalities not captured on X ray  - Orthopedics will continue to follow

## 2022-07-04 NOTE — SUBJECTIVE & OBJECTIVE
Past Medical History:   Diagnosis Date    Adult bronchiectasis     Age-related osteoporosis with current pathological fracture with routine healing 8/28/2013    Amblyopia     Anxiety     Cataract     Cellulitis of right lower extremity 1/19/2021    Chondrocalcinosis, cause unspecified, involving hand(712.34) 7/12/2011     Dx updated per 2019 IMO Load    Chronic sinusitis 4/6/2017    Colonic constipation 6/5/2013    Concussion 10/27/2016    Frequent falls 3/14/2017    Gait disturbance 3/14/2017    History of cardiac radiofrequency ablation (RFA) 2/3/2018    History of subarachnoid hemorrhage 10/30/2016    Hypogammaglobulinemia 9/7/2011    Irritable bowel syndrome 12/18/2015    Major depressive disorder, recurrent episode, in full remission 8/19/2010    Onychomycosis     Osteoarthritis     Postoperative hypothyroidism 12/18/2015    Rectal prolapse 6/5/2013    Reflux esophagitis 2/7/2010    Status post thyroidectomy 6/1/2017    Strabismus     SVT (supraventricular tachycardia) 11/22/2013    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     Underweight 1/23/2013       Past Surgical History:   Procedure Laterality Date    BREAST CYST ASPIRATION      x2    CATARACT EXTRACTION W/  INTRAOCULAR LENS IMPLANT Left 3/11/2019    Procedure: EXTRACTION, CATARACT, WITH IOL INSERTION;  Surgeon: Vera Sams MD;  Location: [facility] OR;  Service: Ophthalmology;  Laterality: Left;    CATARACT EXTRACTION W/  INTRAOCULAR LENS IMPLANT Right 4/15/2019    Procedure: EXTRACTION, CATARACT, WITH IOL INSERTION LASER;  Surgeon: Vera Sams MD;  Location: [facility] OR;  Service: Ophthalmology;  Laterality: Right;    COLON SURGERY      EYE SURGERY      FRACTURE SURGERY      NASAL SEPTUM SURGERY      OCCLUSION OF LEFT ATRIAL APPENDAGE N/A 2/18/2022    Procedure: Left atrial appendage occlusion;  Surgeon: Chase Gill MD;  Location: [facility] EP LAB;  Service: Cardiology;  Laterality: N/A;  afib, watchman, BSCI, osiris, anes, MB/EH, 3prep    OPEN REDUCTION  AND INTERNAL FIXATION (ORIF) OF INJURY OF SACROILIAC JOINT Bilateral 1/20/2021    Procedure: ORIF, SACROILIAC JOINT;  Surgeon: Alcides Tamez MD;  Location: [facility] OR [address] [place] [address];  Service: Orthopedics;  Laterality: Bilateral;    RADIOFREQUENCY ABLATION      RECTAL PROLAPSE REPAIR  june 2013    STRABISMUS SURGERY      TONSILLECTOMY      TRANSESOPHAGEAL ECHOCARDIOGRAPHY N/A 2/18/2022    Procedure: ECHOCARDIOGRAM, TRANSESOPHAGEAL;  Surgeon: Nils Diagnostic Provider;  Location: [facility] EP LAB;  Service: Cardiology;  Laterality: N/A;       Review of patient's allergies indicates:   Allergen Reactions    Adhesive      Tape tears skin    Buspar [buspirone]      headaches    Escitalopram oxalate Nausea Only     hyponatremia      Rifampin Rash       Current Facility-Administered Medications   Medication    acetaminophen tablet 650 mg    albuterol-ipratropium 2.5 mg-0.5 mg/3 mL nebulizer solution 3 mL    aluminum-magnesium hydroxide-simethicone 200-200-20 mg/5 mL suspension 30 mL    dextrose 10% bolus 125 mL    dextrose 10% bolus 250 mL    diphenhydrAMINE injection 25 mg    glucagon (human recombinant) injection 1 mg    glucose chewable tablet 16 g    glucose chewable tablet 24 g    melatonin tablet 6 mg    naloxone 0.4 mg/mL injection 0.02 mg    ondansetron disintegrating tablet 8 mg    polyethylene glycol packet 17 g    prochlorperazine injection Soln 5 mg    simethicone chewable tablet 80 mg    sodium chloride 0.9% flush 10 mL     Current Outpatient Medications   Medication Sig    acetaminophen (TYLENOL ORAL) Take by mouth once daily.    albuterol (PROVENTIL/VENTOLIN HFA) 90 mcg/actuation inhaler Inhale 2 puffs into the lungs every 6 (six) hours as needed for Wheezing. Rescue (Patient not taking: Reported on 5/31/2022)    aspirin (ECOTRIN) 81 MG EC tablet Take 1 tablet (81 mg total) by mouth once daily.    bacitracin-polymyxin b (POLYSPORIN) ophthalmic ointment     calcium-vitamin D3 (OS-ELAINE 500 + D3) 500 mg(1,250mg)  -200 unit per tablet Take 1 tablet by mouth 2 (two) times daily.    calcium-vitamin D3-vitamin K 650 mg-12.5 mcg-40 mcg Chew Take by mouth.    docusate sodium (COLACE) 250 MG capsule Take 250 mg by mouth 2 (two) times daily as needed for Constipation.    donepeziL (ARICEPT) 10 MG tablet Take 1 tablet (10 mg total) by mouth once daily.    famotidine (PEPCID) 40 MG tablet Take 1 tablet (40 mg total) by mouth once daily.    famotidine (PEPCID) 40 MG tablet Take 1 tablet (40 mg total) by mouth once daily.    ferrous sulfate 325 (65 FE) MG EC tablet Take 1 tablet (325 mg total) by mouth once daily.    ferrous sulfate 325 (65 FE) MG EC tablet Take 1 tablet (325 mg total) by mouth once daily.    guaiFENesin (MUCINEX) 600 mg 12 hr tablet Take 2 tablets (1,200 mg total) by mouth 2 (two) times daily.    Immune Globulin G, IGG,-PRO-IGA 10 % injection, Privigen, (PRIVIGEN) 10 % Soln Infuse 20 g into the vein every 4 weeks.    levothyroxine (SYNTHROID) 88 MCG tablet Take 1 tablet (88 mcg total) by mouth before breakfast.    loratadine (CLARITIN) 10 mg tablet Take 1 tablet (10 mg total) by mouth once daily.    montelukast (SINGULAIR) 10 mg tablet Take 1 tablet (10 mg total) by mouth every evening.    multivit with calcium,iron,min (MULTIPLE VITAMIN, WOMENS ORAL) Take by mouth once daily.    multivit-min/ferrous fumarate (MULTI VITAMIN ORAL) Take by mouth.    mupirocin (BACTROBAN) 2 % ointment APPLY ONE application TWICE DAILY FOR 7 DAYS    naproxen (NAPROSYN ORAL) Take 200 mg by mouth. As needed every other day    phenazopyridine (PYRIDIUM) 95 MG tablet Take 95 mg by mouth 2 (two) times a day.    polyethylene glycol (GLYCOLAX) 17 gram PwPk Take 17 g by mouth once daily. (Patient taking differently: Take 17 g by mouth as needed. As needed once a week or so)    pumpkin seed extract/soy germ (AZO BLADDER CONTROL ORAL) Take by mouth.    sars-cov-2, covid-19, (MODERNA COVID-19) 100 mcg/0.5 ml injection Inject into the muscle.      Facility-Administered Medications Ordered in Other Encounters   Medication    balanced salt irrigation solution 1 drop    moxifloxacin 0.5 % ophthalmic solution 1 drop    phenylephrine HCL 2.5% ophthalmic solution 1 drop    sodium chloride 0.9% bolus 1,000 mL    sodium chloride 0.9% bolus 1,000 mL    sodium chloride 0.9% bolus 1,000 mL    tropicamide 1% ophthalmic solution 1 drop     Family History       Problem Relation (Age of Onset)    Breast cancer Paternal Grandmother    Heart disease Father, Brother    Stroke Father          Tobacco Use    Smoking status: Never Smoker    Smokeless tobacco: Never Used   Substance and Sexual Activity    Alcohol use: No    Drug use: No    Sexual activity: Not Currently     Review of Systems   Unable to perform ROS: Dementia   Objective:     Vital Signs (Most Recent):  Temp: 97.6 °F (36.4 °C) (07/03/22 2211)  Pulse: 62 (07/03/22 2211)  Resp: 16 (07/03/22 2211)  BP: (!) 153/78 (07/03/22 2211)  SpO2: 100 % (07/03/22 2211)   Vital Signs (24h Range):  Temp:  [97.6 °F (36.4 °C)-98 °F (36.7 °C)] 97.6 °F (36.4 °C)  Pulse:  [62-92] 62  Resp:  [16] 16  SpO2:  [100 %] 100 %  BP: (153-158)/(78-90) 153/78           There is no height or weight on file to calculate BMI.      Intake/Output Summary (Last 24 hours) at 7/3/2022 2234  Last data filed at 7/3/2022 2148  Gross per 24 hour   Intake 100 ml   Output --   Net 100 ml       Ortho/SPM Exam  Gen:  No acute distress, cachectic  CV:  Peripherally well-perfused.  Respiratory:  Normal respiratory effort. No accessory muscle use.   Head/Neck:  Normocephalic. Sclera anicteric. TM. Neck supple.  Neuro: CN 2-12 grossly intact. No FND. Awake. Alert. Oriented to person, place, time, and situation.  Abdomen: Soft, NTND.      MSK:  Left Upper extremity  Inspection  - Small sub-centimeter abrasion/skin tear to the posterior clavicle with bruising infection to the abrasion, no skin tenting  - No swelling  - No ecchymosis, erythema, or signs of  "cellulitis  Palpation  - TTP to the distal clavicle  Range of motion  - AROM and PROM of the elbow, wrist, and hand intact without pain, able to roll shoulder with minimal pain  Stability  - No evidence of joint dislocation or abnormal laxity  Neurovascular  - AIN/PIN/Radial/Median/Ulnar Nerves assessed in isolation without deficit  - Able to give thumbs up, make "OK" sign, cross IF/LF, abduct/adduct fingers, make fist  - SILT throughout  - Compartments soft  - Radial artery palpated  - Muscle tone normal, decreased muscle mass          Right Upper extremity  Inspection  - Skin intact throughout, no open wounds  - No swelling  - No ecchymosis, erythema, or signs of cellulitis  Palpation  - NonTTP throughout, no palpable abnormality  Range of motion  - AROM and PROM of the shoulder, elbow, wrist, and hand intact without pain  Stability  - No evidence of joint dislocation or abnormal laxity  Neurovascular  - AIN/PIN/Radial/Median/Ulnar Nerves assessed in isolation without deficit  - Able to give thumbs up, make "OK" sign, cross IF/LF, abduct/adduct fingers, make fist  - SILT throughout  - Compartments soft  - Radial artery palpated  - Muscle tone normal, decreased muscle mass      Right Lower Extremity  Inspection  - Skin intact throughout, no open wounds  - No swelling  - No ecchymosis, erythema, or signs of cellulitis  Palpation  - NonTTP throughout, no palpable abnormality  Range of motion  - AROM and PROM of the hip, knee, ankle, and foot intact without pain  Stability  - No evidence of joint dislocation or abnormal laxity  Neurovascular  - TA/EHL/Gastroc/FHL assessed in isolation without deficit  - SILT throughout  - Compartments soft  - DP palpated   - Negative Stincfield  - Muscle tone normal, decreased muscle mass    Left Lower Extremity  Inspection  - Skin intact throughout, no open wounds  - No swelling  - No ecchymosis, erythema, or signs of cellulitis  Palpation  - NonTTP throughout, no palpable " abnormality  Range of motion  - AROM and PROM of the hip, knee, ankle, and foot intact without pain  Stability  - No evidence of joint dislocation or abnormal laxity  Neurovascular  - TA/EHL/Gastroc/FHL assessed in isolation without deficit  - SILT throughout  - Compartments soft  - DP palpated   - Negative Stinchfield  - Muscle tone normal, decreased muscle mass      Significant Labs: CBC:   Recent Labs   Lab 07/03/22 2014   WBC 5.62   HGB 11.2*   HCT 34.0*        CMP:   Recent Labs   Lab 07/03/22 2014   *   K 4.1      CO2 24   GLU 80   BUN 17   CREATININE 0.6   CALCIUM 8.8   ANIONGAP 6*   EGFRNONAA >60.0     All pertinent labs within the past 24 hours have been reviewed.    Significant Imaging: X-Ray: I have reviewed all pertinent results/findings and my personal findings are:  displaced distal clavicle fracture, no other acute fracture or dislocation appreciated    Results for orders placed or performed during the hospital encounter of 07/03/22 (from the past 2160 hour(s))   CT Cervical Spine Without Contrast    Impression    New anterior wedging of the T3 vertebral body, with sclerotic superior endplate change.  Finding may reflect acute/subacute compression fracture.  Consider further evaluation with MRI thoracic spine, as clinically indicated.    Stable T2 vertebral body mild remote compression deformity.    Stable 8 mm anterolisthesis of C2 on C3 with moderate canal stenosis at this level.    Additional findings as above.    Electronically signed by resident: Charles Bennett  Date:    07/03/2022  Time:    19:56    Electronically signed by: Krishna Torres MD  Date:    07/03/2022  Time:    21:28   CT Head Without Contrast    Impression    Left parieto-occipital scalp mild soft tissue swelling/contusion without displaced skull fracture or acute intracranial abnormality identified.    Involutional change and chronic microvascular ischemic change.      Electronically signed by: Ethan Snowden  MD  Date:    07/03/2022  Time:    19:53     *Note: Due to a large number of results and/or encounters for the requested time period, some results have not been displayed. A complete set of results can be found in Results Review.

## 2022-07-04 NOTE — H&P
[facility] - Emergency Dept  [facility] Medicine  History & Physical    Patient Name: Yvette Crews  MRN: [id number]  Patient Class: OP- Observation  Admission Date: 7/3/2022  Attending Physician: Coral Chavis MD   Primary Care Provider: Sally Green MD         Patient information was obtained from patient, past medical records and ER records.     Subjective:     Principal Problem:Displaced fracture of lateral end of left clavicle, initial encounter for open fracture    Chief Complaint:   Chief Complaint   Patient presents with    Fall     Lac to back of head, not bleeding. C/o L shoulder pain        HPI: Yvette Crews is a 72 y.o. female with a PMHx of paroxysmal afib, hypothyroidism, hypogammaglobulinemia, osteoporosis who presented to the ED for evaluation of a mechanical fall with head trauma. Per  the patient hit the back of her head but did not lose consciousness. She has a laceration to the back of her scalp. She reports left shoulder pain. She takes aspirin daily.      ED: VSSAF. XR humerus with an acute oblique fracture of the inferior aspect of the distal left clavicle with inferomedial displacement of the fracture fragment. Ortho consulted.       Past Medical History:   Diagnosis Date    Adult bronchiectasis     Age-related osteoporosis with current pathological fracture with routine healing 8/28/2013    Amblyopia     Anxiety     Cataract     Cellulitis of right lower extremity 1/19/2021    Chondrocalcinosis, cause unspecified, involving hand(712.34) 7/12/2011     Dx updated per 2019 IMO Load    Chronic sinusitis 4/6/2017    Colonic constipation 6/5/2013    Concussion 10/27/2016    Frequent falls 3/14/2017    Gait disturbance 3/14/2017    History of cardiac radiofrequency ablation (RFA) 2/3/2018    History of subarachnoid hemorrhage 10/30/2016    Hypogammaglobulinemia 9/7/2011    Irritable bowel syndrome 12/18/2015    Major depressive disorder, recurrent  episode, in full remission 8/19/2010    Onychomycosis     Osteoarthritis     Postoperative hypothyroidism 12/18/2015    Rectal prolapse 6/5/2013    Reflux esophagitis 2/7/2010    Status post thyroidectomy 6/1/2017    Strabismus     SVT (supraventricular tachycardia) 11/22/2013    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     Underweight 1/23/2013       Past Surgical History:   Procedure Laterality Date    BREAST CYST ASPIRATION      x2    CATARACT EXTRACTION W/  INTRAOCULAR LENS IMPLANT Left 3/11/2019    Procedure: EXTRACTION, CATARACT, WITH IOL INSERTION;  Surgeon: Vera Sams MD;  Location: [facility];  Service: Ophthalmology;  Laterality: Left;    CATARACT EXTRACTION W/  INTRAOCULAR LENS IMPLANT Right 4/15/2019    Procedure: EXTRACTION, CATARACT, WITH IOL INSERTION LASER;  Surgeon: Vera Sams MD;  Location: [facility];  Service: Ophthalmology;  Laterality: Right;    COLON SURGERY      EYE SURGERY      FRACTURE SURGERY      NASAL SEPTUM SURGERY      OCCLUSION OF LEFT ATRIAL APPENDAGE N/A 2/18/2022    Procedure: Left atrial appendage occlusion;  Surgeon: Chase Gill MD;  Location: [facility] EP LAB;  Service: Cardiology;  Laterality: N/A;  afib, watchman, BSCI, osiris, anes, MB/EH, 3prep    OPEN REDUCTION AND INTERNAL FIXATION (ORIF) OF INJURY OF SACROILIAC JOINT Bilateral 1/20/2021    Procedure: ORIF, SACROILIAC JOINT;  Surgeon: Alcides Tamez MD;  [address];  Service: Orthopedics;  Laterality: Bilateral;    RADIOFREQUENCY ABLATION      RECTAL PROLAPSE REPAIR  june 2013    STRABISMUS SURGERY      TONSILLECTOMY      TRANSESOPHAGEAL ECHOCARDIOGRAPHY N/A 2/18/2022    Procedure: ECHOCARDIOGRAM, TRANSESOPHAGEAL;  Surgeon: Nils Diagnostic Provider;  Location: [facility] EP LAB;  Service: Cardiology;  Laterality: N/A;       Review of patient's allergies indicates:   Allergen Reactions    Adhesive      Tape tears skin    Buspar [buspirone]      headaches    Escitalopram  oxalate Nausea Only     hyponatremia      Rifampin Rash       Current Facility-Administered Medications on File Prior to Encounter   Medication    balanced salt irrigation solution 1 drop    diphenhydrAMINE injection 25 mg    moxifloxacin 0.5 % ophthalmic solution 1 drop    phenylephrine HCL 2.5% ophthalmic solution 1 drop    sodium chloride 0.9% bolus 1,000 mL    sodium chloride 0.9% bolus 1,000 mL    sodium chloride 0.9% bolus 1,000 mL    tropicamide 1% ophthalmic solution 1 drop    [DISCONTINUED] lidocaine (PF) 10 mg/ml (1%) injection 10 mg     Current Outpatient Medications on File Prior to Encounter   Medication Sig    acetaminophen (TYLENOL ORAL) Take by mouth once daily.    albuterol (PROVENTIL/VENTOLIN HFA) 90 mcg/actuation inhaler Inhale 2 puffs into the lungs every 6 (six) hours as needed for Wheezing. Rescue (Patient not taking: Reported on 5/31/2022)    aspirin (ECOTRIN) 81 MG EC tablet Take 1 tablet (81 mg total) by mouth once daily.    bacitracin-polymyxin b (POLYSPORIN) ophthalmic ointment     calcium-vitamin D3 (OS-ELAINE 500 + D3) 500 mg(1,250mg) -200 unit per tablet Take 1 tablet by mouth 2 (two) times daily.    calcium-vitamin D3-vitamin K 650 mg-12.5 mcg-40 mcg Chew Take by mouth.    docusate sodium (COLACE) 250 MG capsule Take 250 mg by mouth 2 (two) times daily as needed for Constipation.    donepeziL (ARICEPT) 10 MG tablet Take 1 tablet (10 mg total) by mouth once daily.    famotidine (PEPCID) 40 MG tablet Take 1 tablet (40 mg total) by mouth once daily.    famotidine (PEPCID) 40 MG tablet Take 1 tablet (40 mg total) by mouth once daily.    ferrous sulfate 325 (65 FE) MG EC tablet Take 1 tablet (325 mg total) by mouth once daily.    ferrous sulfate 325 (65 FE) MG EC tablet Take 1 tablet (325 mg total) by mouth once daily.    guaiFENesin (MUCINEX) 600 mg 12 hr tablet Take 2 tablets (1,200 mg total) by mouth 2 (two) times daily.    Immune Globulin G, IGG,-PRO-IGA 10 %  injection, Privigen, (PRIVIGEN) 10 % Soln Infuse 20 g into the vein every 4 weeks.    levothyroxine (SYNTHROID) 88 MCG tablet Take 1 tablet (88 mcg total) by mouth before breakfast.    loratadine (CLARITIN) 10 mg tablet Take 1 tablet (10 mg total) by mouth once daily.    montelukast (SINGULAIR) 10 mg tablet Take 1 tablet (10 mg total) by mouth every evening.    multivit with calcium,iron,min (MULTIPLE VITAMIN, WOMENS ORAL) Take by mouth once daily.    multivit-min/ferrous fumarate (MULTI VITAMIN ORAL) Take by mouth.    mupirocin (BACTROBAN) 2 % ointment APPLY ONE application TWICE DAILY FOR 7 DAYS    naproxen (NAPROSYN ORAL) Take 200 mg by mouth. As needed every other day    phenazopyridine (PYRIDIUM) 95 MG tablet Take 95 mg by mouth 2 (two) times a day.    polyethylene glycol (GLYCOLAX) 17 gram PwPk Take 17 g by mouth once daily. (Patient taking differently: Take 17 g by mouth as needed. As needed once a week or so)    pumpkin seed extract/soy germ (AZO BLADDER CONTROL ORAL) Take by mouth.    sars-cov-2, covid-19, (MODERNA COVID-19) 100 mcg/0.5 ml injection Inject into the muscle.    [DISCONTINUED] clopidogreL (PLAVIX) 75 mg tablet Take 1 tablet (75 mg total) by mouth once daily.    [DISCONTINUED] XARELTO 20 mg Tab TAKE 1 TAB BY MOUTH EVERY EVENING WITH DINNER (BEGIN AFTER ELIQUIS IS COMPLETED)     Family History       Problem Relation (Age of Onset)    Breast cancer Paternal Grandmother    Heart disease Father, Brother    Stroke Father          Tobacco Use    Smoking status: Never Smoker    Smokeless tobacco: Never Used   Substance and Sexual Activity    Alcohol use: No    Drug use: No    Sexual activity: Not Currently     Review of Systems   Unable to perform ROS: Dementia   Objective:     Vital Signs (Most Recent):  Temp: 97.6 °F (36.4 °C) (07/03/22 2211)  Pulse: 62 (07/03/22 2211)  Resp: 16 (07/03/22 2211)  BP: (!) 153/78 (07/03/22 2211)  SpO2: 100 % (07/03/22 2211)   Vital Signs (24h  Range):  Temp:  [97.6 °F (36.4 °C)-98 °F (36.7 °C)] 97.6 °F (36.4 °C)  Pulse:  [62-92] 62  Resp:  [16] 16  SpO2:  [100 %] 100 %  BP: (153-158)/(78-90) 153/78        There is no height or weight on file to calculate BMI.    Physical Exam  Vitals and nursing note reviewed.   Constitutional:       General: She is not in acute distress.     Appearance: She is well-developed.   HENT:      Head: Normocephalic and atraumatic.      Mouth/Throat:      Mouth: Mucous membranes are moist.   Eyes:      Conjunctiva/sclera: Conjunctivae normal.      Pupils: Pupils are equal, round, and reactive to light.   Cardiovascular:      Rate and Rhythm: Normal rate and regular rhythm.      Heart sounds: Normal heart sounds.   Pulmonary:      Effort: Pulmonary effort is normal. No respiratory distress.      Breath sounds: Normal breath sounds. No wheezing.   Abdominal:      General: Bowel sounds are normal. There is no distension.      Palpations: Abdomen is soft.      Tenderness: There is no abdominal tenderness.   Musculoskeletal:         General: Tenderness (distal clavicle) and deformity present. Normal range of motion.      Cervical back: Normal range of motion and neck supple.   Skin:     General: Skin is warm and dry.      Capillary Refill: Capillary refill takes less than 2 seconds.      Findings: No rash.   Neurological:      General: No focal deficit present.      Mental Status: She is alert. Mental status is at baseline.      Cranial Nerves: No cranial nerve deficit.      Sensory: No sensory deficit.      Coordination: Coordination normal.   Psychiatric:         Behavior: Behavior normal.         Thought Content: Thought content normal.         Judgment: Judgment normal.         CRANIAL NERVES     CN III, IV, VI   Pupils are equal, round, and reactive to light.     Significant Labs: All pertinent labs within the past 24 hours have been reviewed.  CBC:   Recent Labs   Lab 07/03/22 2014   WBC 5.62   HGB 11.2*   HCT 34.0*         CMP:   Recent Labs   Lab 07/03/22 2014   *   K 4.1      CO2 24   GLU 80   BUN 17   CREATININE 0.6   CALCIUM 8.8   ANIONGAP 6*   EGFRNONAA >60.0       Significant Imaging: I have reviewed all pertinent imaging results/findings within the past 24 hours.  X-Ray Clavicle Left  Narrative: EXAMINATION:  XR HUMERUS 2 VIEW LEFT; XR CLAVICLE LEFT    CLINICAL HISTORY:  fracture;    TECHNIQUE:  Left humerus two views.  Left clavicle two views.    COMPARISON:  10/28/2016.    FINDINGS:  No acute fracture or bone destruction of the left humerus.Degenerative change similar to 10/20/2016 examination.  Some calcification has developed between the acromion and humeral head suggesting rotator cuff tendinopathy.    Acute oblique fracture of the inferior aspect of the distal left clavicle with inferomedial displacement of the fracture fragment.  Associated widening of the left acromioclavicular joint and superior displacement of the distal clavicle relative to the acromion suggesting acromioclavicular joint strain or separation.  Impression: No acute fracture of the left humerus identified.    Acute oblique fracture of the inferior aspect of the distal left clavicle with inferomedial displacement of the fracture fragment. Associated widening of the left acromioclavicular joint and superior displacement of the distal clavicle relative to the acromion suggesting acromioclavicular joint strain or separation.    Electronically signed by: Kj Lorenzana MD  Date:    07/03/2022  Time:    21:51  X-Ray Humerus 2 View Left  Narrative: EXAMINATION:  XR HUMERUS 2 VIEW LEFT; XR CLAVICLE LEFT    CLINICAL HISTORY:  fracture;    TECHNIQUE:  Left humerus two views.  Left clavicle two views.    COMPARISON:  10/28/2016.    FINDINGS:  No acute fracture or bone destruction of the left humerus.Degenerative change similar to 10/20/2016 examination.  Some calcification has developed between the acromion and humeral head suggesting rotator  cuff tendinopathy.    Acute oblique fracture of the inferior aspect of the distal left clavicle with inferomedial displacement of the fracture fragment.  Associated widening of the left acromioclavicular joint and superior displacement of the distal clavicle relative to the acromion suggesting acromioclavicular joint strain or separation.  Impression: No acute fracture of the left humerus identified.    Acute oblique fracture of the inferior aspect of the distal left clavicle with inferomedial displacement of the fracture fragment. Associated widening of the left acromioclavicular joint and superior displacement of the distal clavicle relative to the acromion suggesting acromioclavicular joint strain or separation.    Electronically signed by: Kj Lorenzana MD  Date:    07/03/2022  Time:    21:51  CT Cervical Spine Without Contrast  Narrative: EXAMINATION:  CT CERVICAL SPINE WITHOUT CONTRAST    CLINICAL HISTORY:  Neck trauma (Age >= 65y);    TECHNIQUE:  Low dose axial images, sagittal and coronal reformations were performed though the cervical spine.  Contrast was not administered.    COMPARISON:  C-spine radiograph 10/14/2021.  Same-day CT head.  CT cervical spine 11/25/2013.    CT chest 11/05/2019    FINDINGS:  Kyphotic alignment of the cervical spine.  There is stable 8 mm anterolisthesis of C2 on C3. The cervical vertebral body heights appear well-maintained.  Mild anterior wedging of the T2 vertebral body, likely remote compression fracture.  Newly seen mild anterior wedging of the T3 vertebral body, with sclerotic superior endplate change.  There is no evidence of osseous lytic or blastic process.    Multilevel degenerative disc disease, with severe disc height loss C3-C7 with active phenomenon at those levels.  Focal degenerative changes are described below.    C2 -- C3: Posterior disc-osteophyte complex.  Bilateral facet arthropathy.  Mild left neural foraminal narrowing.  Moderate central canal  stenosis.    C3 -- C4: Posterior disc-osteophyte complex.  Bilateral facet arthropathy.  Mild left neural foraminal narrowing.  Mild central canal stenosis.    C4 -- C5: Posterior disc-osteophyte complex.  Bilateral facet arthropathy.  No significant neural foraminal narrowing. There is no central canal stenosis.    C5 -- C6: Posterior disc-osteophyte complex.  Bilateral facet arthropathy.  No significant neural foraminal narrowing.  Mild central canal stenosis.    C6 -- C7: Posterior disc-osteophyte complex.  There is no neural foraminal narrowing.  There is no central canal stenosis.    C7 -- T1: There is no posterior disc-osteophyte complex.  There is no neural foraminal narrowing.  There is no central canal narrowing.    Evaluation of the surrounding soft tissues demonstrates a right apical calcified nodule with surrounding subsegmental atelectasis versus scarring.  Mild right dependent atelectasis.  Minimal biapical pleuroparenchymal scarring.  The airways appear patent.  Impression: New anterior wedging of the T3 vertebral body, with sclerotic superior endplate change.  Finding may reflect acute/subacute compression fracture.  Consider further evaluation with MRI thoracic spine, as clinically indicated.    Stable T2 vertebral body mild remote compression deformity.    Stable 8 mm anterolisthesis of C2 on C3 with moderate canal stenosis at this level.    Additional findings as above.    Electronically signed by resident: Charles Bennett  Date:    07/03/2022  Time:    19:56    Electronically signed by: Krishna Torres MD  Date:    07/03/2022  Time:    21:28  CT Head Without Contrast  Narrative: EXAMINATION:  CT HEAD WITHOUT CONTRAST    CLINICAL HISTORY:  Head trauma, minor (Age >= 65y);    TECHNIQUE:  Low dose axial CT images obtained throughout the head without intravenous contrast. Sagittal and coronal reconstructions were performed.    COMPARISON:  Head CT most recent 05/13/2022 and MRI brain  07/26/2021    FINDINGS:  Intracranial compartment:    Mild generalized cerebral volume loss.  Ventricles are midline and stable in size and configuration without distortion by mass effect or acute hydrocephalus.  Chronic nonspecific empty sella configuration, unchanged.  No extra-axial blood or fluid collections.    Grossly stable distribution of patchy hypoattenuation of the subcortical and periventricular white matter consistent with chronic microvascular ischemic change.  No definite new focal areas of abnormal parenchymal attenuation.  Skull base atherosclerotic vascular calcifications noted.  No parenchymal mass, hemorrhage, edema or major vascular distribution infarct.    Skull/extracranial contents (limited evaluation): Small area localized soft tissue swelling/contusion overlying the left parieto-occipital calvarium.  No fracture. Mastoid air cells and paranasal sinuses are essentially clear.  Impression: Left parieto-occipital scalp mild soft tissue swelling/contusion without displaced skull fracture or acute intracranial abnormality identified.    Involutional change and chronic microvascular ischemic change.    Electronically signed by: Ethan Snowden MD  Date:    07/03/2022  Time:    19:53  X-Ray Shoulder Trauma Left  Narrative: EXAMINATION:  XR SHOULDER TRAUMA 3 VIEW LEFT    CLINICAL HISTORY:  Unspecified fall, initial encounter    TECHNIQUE:  Three views of the left shoulder were performed.    COMPARISON  05/13/2022.    FINDINGS:  The bone mineralization is within normal limits.  There is an acute displaced fracture of the distal 3rd of the left clavicle.  The remainder of the osseous structures are unremarkable.    There is anterior subluxation of the humerus relative to the glenoid.  There is no yaya dislocation.    There are postoperative changes in the mediastinum.  The visualized left hemithorax unremarkable.  There is no evidence of a pneumothorax or pulmonary contusion.  Impression: Acute  displaced fracture of the distal 3rd of the left clavicle.    Anterior subluxation of the left shoulder.    Electronically signed by: Krishna Torres MD  Date:    07/03/2022  Time:    19:21      Assessment/Plan:     * Displaced fracture of lateral end of left clavicle, initial encounter for open fracture  Mechanical fall    - imaging with acute oblique fracture of the inferior aspect of the distal left clavicle with inferomedial displacement of the fracture fragment.  - ortho consulted, appreciate recs  - NWB LUE  - Sling LUE  - Observation for 24 hours of IV abx, ancef  - Discharge on bactrim after completion  - CT shoulder to rule out any other skeletal abnormalities not captured on X ray  - PT/OT  - fall precautions    Frequent falls  - PT/OT  - fall precautions    Paroxysmal atrial fibrillation  - s/p ablation  - not on BB  - xarelto discontinued in april    Late onset Alzheimer's dementia without behavioral disturbance  - continue aricept  - delirium precautions    Chronic sinusitis  - continue singulair, claritin    Postoperative hypothyroidism  - continue synthroid    Reflux esophagitis  - continue PPI    VTE Risk Mitigation (From admission, onward)         Ordered     enoxaparin injection 40 mg  Daily         07/03/22 2300     IP VTE HIGH RISK PATIENT  Once         07/03/22 2300     Place sequential compression device  Until discontinued         07/03/22 2227                   Maliha Anand PA-C  Department of Hospital Medicine   [facility] - Emergency Dept

## 2022-07-04 NOTE — ASSESSMENT & PLAN NOTE
- Imaging showed acute oblique fracture of the inferior aspect of the distal left clavicle with inferomedial displacement of the fracture fragment.  - Ortho consulted for open left clavicle fracture and irrigated wound in ED and started patient on IV Ancef and recommended to continue IV Ancef for 24 hours. Patient placed in observation for IV Ancef. No surgery needed.  - NWB LUE as per Ortho.   - Sling LUE as per Ortho.   - Plan discharge on oral Bctrim after completion of 24 hours of IV Ancef.   - PT/OT consult for evaluation.   - Fall precautions  - Scheduled Robaxin and Tylenol for pain management.

## 2022-07-04 NOTE — SUBJECTIVE & OBJECTIVE
Principal Problem:Displaced fracture of lateral end of left clavicle, initial encounter for open fracture    Principal Orthopedic Problem: Same    Interval History: Pt seen and examined at bedside. NAEON. Pain controlled. VSS, AF.     Review of patient's allergies indicates:   Allergen Reactions    Adhesive      Tape tears skin    Buspar [buspirone]      headaches    Escitalopram oxalate Nausea Only     hyponatremia      Rifampin Rash       Current Facility-Administered Medications   Medication    acetaminophen tablet 650 mg    acetaminophen tablet 650 mg    albuterol-ipratropium 2.5 mg-0.5 mg/3 mL nebulizer solution 3 mL    aluminum-magnesium hydroxide-simethicone 200-200-20 mg/5 mL suspension 30 mL    aspirin EC tablet 81 mg    ceFAZolin 2 gram in dextrose 5% 100 mL IVPB (premix)    celecoxib capsule 100 mg    dextrose 10% bolus 125 mL    dextrose 10% bolus 250 mL    diphenhydrAMINE injection 25 mg    donepeziL tablet 10 mg    enoxaparin injection 40 mg    famotidine tablet 40 mg    ferrous sulfate tablet 1 each    glucagon (human recombinant) injection 1 mg    glucose chewable tablet 16 g    glucose chewable tablet 24 g    levothyroxine tablet 88 mcg    melatonin tablet 6 mg    methocarbamoL tablet 500 mg    montelukast tablet 10 mg    naloxone 0.4 mg/mL injection 0.02 mg    ondansetron disintegrating tablet 8 mg    oxyCODONE immediate release tablet 5 mg    polyethylene glycol packet 17 g    prochlorperazine injection Soln 5 mg    simethicone chewable tablet 80 mg    sodium chloride 0.9% flush 10 mL     Current Outpatient Medications   Medication Sig    acetaminophen (TYLENOL ORAL) Take by mouth once daily.    albuterol (PROVENTIL/VENTOLIN HFA) 90 mcg/actuation inhaler Inhale 2 puffs into the lungs every 6 (six) hours as needed for Wheezing. Rescue (Patient not taking: Reported on 5/31/2022)    aspirin (ECOTRIN) 81 MG EC tablet Take 1 tablet (81 mg total) by mouth once daily.    bacitracin-polymyxin b  (POLYSPORIN) ophthalmic ointment     calcium-vitamin D3 (OS-ELAINE 500 + D3) 500 mg(1,250mg) -200 unit per tablet Take 1 tablet by mouth 2 (two) times daily.    calcium-vitamin D3-vitamin K 650 mg-12.5 mcg-40 mcg Chew Take by mouth.    docusate sodium (COLACE) 250 MG capsule Take 250 mg by mouth 2 (two) times daily as needed for Constipation.    donepeziL (ARICEPT) 10 MG tablet Take 1 tablet (10 mg total) by mouth once daily.    famotidine (PEPCID) 40 MG tablet Take 1 tablet (40 mg total) by mouth once daily.    famotidine (PEPCID) 40 MG tablet Take 1 tablet (40 mg total) by mouth once daily.    ferrous sulfate 325 (65 FE) MG EC tablet Take 1 tablet (325 mg total) by mouth once daily.    ferrous sulfate 325 (65 FE) MG EC tablet Take 1 tablet (325 mg total) by mouth once daily.    guaiFENesin (MUCINEX) 600 mg 12 hr tablet Take 2 tablets (1,200 mg total) by mouth 2 (two) times daily.    Immune Globulin G, IGG,-PRO-IGA 10 % injection, Privigen, (PRIVIGEN) 10 % Soln Infuse 20 g into the vein every 4 weeks.    levothyroxine (SYNTHROID) 88 MCG tablet Take 1 tablet (88 mcg total) by mouth before breakfast.    loratadine (CLARITIN) 10 mg tablet Take 1 tablet (10 mg total) by mouth once daily.    montelukast (SINGULAIR) 10 mg tablet Take 1 tablet (10 mg total) by mouth every evening.    multivit with calcium,iron,min (MULTIPLE VITAMIN, WOMENS ORAL) Take by mouth once daily.    multivit-min/ferrous fumarate (MULTI VITAMIN ORAL) Take by mouth.    mupirocin (BACTROBAN) 2 % ointment APPLY ONE application TWICE DAILY FOR 7 DAYS    naproxen (NAPROSYN ORAL) Take 200 mg by mouth. As needed every other day    phenazopyridine (PYRIDIUM) 95 MG tablet Take 95 mg by mouth 2 (two) times a day.    polyethylene glycol (GLYCOLAX) 17 gram PwPk Take 17 g by mouth once daily. (Patient taking differently: Take 17 g by mouth as needed. As needed once a week or so)    pumpkin seed extract/soy germ (AZO BLADDER CONTROL ORAL) Take by mouth.     "sars-cov-2, covid-19, (MODERNA COVID-19) 100 mcg/0.5 ml injection Inject into the muscle.     Facility-Administered Medications Ordered in Other Encounters   Medication    balanced salt irrigation solution 1 drop    moxifloxacin 0.5 % ophthalmic solution 1 drop    phenylephrine HCL 2.5% ophthalmic solution 1 drop    sodium chloride 0.9% bolus 1,000 mL    sodium chloride 0.9% bolus 1,000 mL    sodium chloride 0.9% bolus 1,000 mL    tropicamide 1% ophthalmic solution 1 drop     Objective:     Vital Signs (Most Recent):  Temp: 97.7 °F (36.5 °C) (07/04/22 0717)  Pulse: 62 (07/04/22 0717)  Resp: 20 (07/04/22 0717)  BP: 133/74 (07/04/22 0717)  SpO2: 99 % (07/04/22 0717) Vital Signs (24h Range):  Temp:  [97.5 °F (36.4 °C)-98 °F (36.7 °C)] 97.7 °F (36.5 °C)  Pulse:  [52-92] 62  Resp:  [14-20] 20  SpO2:  [98 %-100 %] 99 %  BP: (127-162)/(65-91) 133/74     Weight: 39.9 kg (87 lb 15.4 oz)     Body mass index is 16.09 kg/m².      Intake/Output Summary (Last 24 hours) at 7/4/2022 0743  Last data filed at 7/4/2022 0507  Gross per 24 hour   Intake 200 ml   Output --   Net 200 ml       Ortho/SPM Exam  Left Upper extremity  Inspection  - Small sub-centimeter abrasion/skin tear to the posterior clavicle with bruising infection to the abrasion, no skin tenting  - No swelling  - No ecchymosis, erythema, or signs of cellulitis  Palpation  - TTP to the distal clavicle  Range of motion  - AROM and PROM of the elbow, wrist, and hand intact without pain, able to roll shoulder with minimal pain  Stability  - No evidence of joint dislocation or abnormal laxity  Neurovascular  - AIN/PIN/Radial/Median/Ulnar Nerves assessed in isolation without deficit  - Able to give thumbs up, make "OK" sign, cross IF/LF, abduct/adduct fingers, make fist  - SILT throughout  - Compartments soft  - Radial artery palpated  - Muscle tone normal, decreased muscle mass     Significant Labs: CBC:   Recent Labs   Lab 07/03/22 2014 07/04/22  0350   WBC 5.62 7.17   HGB " 11.2* 11.9*   HCT 34.0* 36.6*    205     CMP:   Recent Labs   Lab 07/03/22  2014 07/04/22  0350   * 132*   K 4.1 4.5    102   CO2 24 24   GLU 80 78   BUN 17 16   CREATININE 0.6 0.7   CALCIUM 8.8 8.8   ANIONGAP 6* 6*   EGFRNONAA >60.0 >60.0     All pertinent labs within the past 24 hours have been reviewed.    Significant Imaging: I have reviewed all pertinent imaging results/findings.  Results for orders placed or performed during the hospital encounter of 07/03/22 (from the past 2160 hour(s))   CT Shoulder Without Contrast Left    Impression    1. Acute displaced fracture of the distal inferior left clavicle.  Associated widening of the left acromial clavicular interval, concerning for AC subluxation versus separation.  2. Mild anterior subluxation of the glenohumeral joint.  Moderate left shoulder effusion.  3. Left shoulder osteoarthritis as above.  4.  Additional findings as above.  This report was flagged in Epic as abnormal.    Electronically signed by resident: Charles Bennett  Date:    07/03/2022  Time:    23:28    Electronically signed by: Kj Lorenzana MD  Date:    07/04/2022  Time:    00:56   CT 3D RECON WITH INDEPENDENT WS    Impression    1. Acute displaced fracture of the distal inferior left clavicle.  Associated widening of the left acromial clavicular interval, concerning for AC subluxation versus separation.  2. Mild anterior subluxation of the glenohumeral joint.  Moderate left shoulder effusion.  3. Left shoulder osteoarthritis as above.  4.  Additional findings as above.  This report was flagged in Epic as abnormal.    Electronically signed by resident: Charles Bennett  Date:    07/03/2022  Time:    23:28    Electronically signed by: Kj Lorenzana MD  Date:    07/04/2022  Time:    00:56   CT Cervical Spine Without Contrast    Impression    New anterior wedging of the T3 vertebral body, with sclerotic superior endplate change.  Finding may reflect acute/subacute compression  fracture.  Consider further evaluation with MRI thoracic spine, as clinically indicated.    Stable T2 vertebral body mild remote compression deformity.    Stable 8 mm anterolisthesis of C2 on C3 with moderate canal stenosis at this level.    Additional findings as above.    Electronically signed by resident: Charles Bennett  Date:    07/03/2022  Time:    19:56    Electronically signed by: Krishna Torres MD  Date:    07/03/2022  Time:    21:28   CT Head Without Contrast    Impression    Left parieto-occipital scalp mild soft tissue swelling/contusion without displaced skull fracture or acute intracranial abnormality identified.    Involutional change and chronic microvascular ischemic change.      Electronically signed by: Ethan Snowden MD  Date:    07/03/2022  Time:    19:53     *Note: Due to a large number of results and/or encounters for the requested time period, some results have not been displayed. A complete set of results can be found in Results Review.     Results for orders placed or performed during the hospital encounter of 07/03/22 (from the past 2160 hour(s))   MRI Thoracic Spine Without Contrast    Impression    1. Multiple age-indeterminate, likely remote, compression fractures involving T8, T10, L2, and L3.  Correlate with point tenderness to assess acuity.  2. Associated 6 mm retropulsion at T8 resulting in mild spinal canal stenosis and abutment of the thoracic cord.  No cord signal abnormality.      Electronically signed by: Lc Caballero  Date:    07/04/2022  Time:    00:22     *Note: Due to a large number of results and/or encounters for the requested time period, some results have not been displayed. A complete set of results can be found in Results Review.

## 2022-07-04 NOTE — ASSESSMENT & PLAN NOTE
Mechanical fall    - imaging with acute oblique fracture of the inferior aspect of the distal left clavicle with inferomedial displacement of the fracture fragment.  - ortho consulted, appreciate recs  - NWB LUE  - Sling LUE  - Observation for 24 hours of IV abx, ancef  - Discharge on bactrim after completion  - CT shoulder to rule out any other skeletal abnormalities not captured on X ray  - PT/OT  - fall precautions

## 2022-07-04 NOTE — ED NOTES
Bed: [facility]  Expected date:   Expected time:   Means of arrival:   Comments:  Need stretcher

## 2022-07-04 NOTE — ED NOTES
Pt pulled gown and under wear off, attempting to get out of bed  MOUNIKA Craven RN notified pt needs a sitter  Pt redressed with gown

## 2022-07-04 NOTE — ED NOTES
Pt identifiers Yvette Crews were checked and are correct  LOC: The patient is awake, alert, aware of environment with an appropriate affect, speaking appropriately  APPEARANCE: Pt rates left shoulder pain an 8/10 , in no acute distress, pt is clean and well groomed, clothing properly fastened  SKIN: Skin warm, dry and intact, normal skin turgor, moist mucus membranes  RESPIRATORY: Airway is open and patent, respirations are spontaneous, even and unlabored, normal effort and rate  CARDIAC: Normal rate and rhythm, no peripheral edema noted, capillary refill < 3 seconds, bilateral radial pulses 2+  ABDOMEN: Soft, nontender, nondistended  NEUROLOGIC: PERRL, facial expression is symmetrical, patient moving all extremities spontaneously, normal sensation in all extremities when touched with a finger.  Follows all commands appropriately  MUSCULOSKELETAL: lleft arm in sling  Dressing noted to left shoulder

## 2022-07-04 NOTE — ASSESSMENT & PLAN NOTE
· Choric and controlled. Patient at cognitive baseline. Continue home Aricept to treat.   · Delirium precautions

## 2022-07-05 ENCOUNTER — TELEPHONE (OUTPATIENT)
Dept: ADMINISTRATIVE | Facility: CLINIC | Age: 72
End: 2022-07-05
Payer: MEDICARE

## 2022-07-05 VITALS
SYSTOLIC BLOOD PRESSURE: 146 MMHG | DIASTOLIC BLOOD PRESSURE: 85 MMHG | HEIGHT: 62 IN | RESPIRATION RATE: 16 BRPM | TEMPERATURE: 97 F | BODY MASS INDEX: 16.18 KG/M2 | WEIGHT: 87.94 LBS | HEART RATE: 76 BPM | OXYGEN SATURATION: 98 %

## 2022-07-05 PROCEDURE — 96375 TX/PRO/DX INJ NEW DRUG ADDON: CPT

## 2022-07-05 PROCEDURE — 96376 TX/PRO/DX INJ SAME DRUG ADON: CPT

## 2022-07-05 PROCEDURE — 63600175 PHARM REV CODE 636 W HCPCS: Performed by: INTERNAL MEDICINE

## 2022-07-05 PROCEDURE — 99217 PR OBSERVATION CARE DISCHARGE: ICD-10-PCS | Mod: ,,, | Performed by: INTERNAL MEDICINE

## 2022-07-05 PROCEDURE — G0378 HOSPITAL OBSERVATION PER HR: HCPCS

## 2022-07-05 PROCEDURE — 25000003 PHARM REV CODE 250: Performed by: PHYSICIAN ASSISTANT

## 2022-07-05 PROCEDURE — 99217 PR OBSERVATION CARE DISCHARGE: CPT | Mod: ,,, | Performed by: INTERNAL MEDICINE

## 2022-07-05 PROCEDURE — 63600175 PHARM REV CODE 636 W HCPCS: Performed by: PHYSICIAN ASSISTANT

## 2022-07-05 RX ORDER — DIAZEPAM 10 MG/2ML
2 INJECTION INTRAMUSCULAR ONCE
Status: COMPLETED | OUTPATIENT
Start: 2022-07-05 | End: 2022-07-05

## 2022-07-05 RX ORDER — LORAZEPAM 2 MG/ML
1 INJECTION INTRAMUSCULAR ONCE
Status: DISCONTINUED | OUTPATIENT
Start: 2022-07-05 | End: 2022-07-05

## 2022-07-05 RX ORDER — METHOCARBAMOL 500 MG/1
500 TABLET, FILM COATED ORAL 3 TIMES DAILY PRN
Qty: 20 TABLET | Refills: 0 | Status: ON HOLD | OUTPATIENT
Start: 2022-07-05 | End: 2022-10-06 | Stop reason: HOSPADM

## 2022-07-05 RX ORDER — LORAZEPAM 2 MG/ML
1 INJECTION INTRAMUSCULAR ONCE
Status: COMPLETED | OUTPATIENT
Start: 2022-07-05 | End: 2022-07-05

## 2022-07-05 RX ORDER — ACETAMINOPHEN 325 MG/1
650 TABLET ORAL EVERY 6 HOURS PRN
Refills: 0
Start: 2022-07-05

## 2022-07-05 RX ORDER — SULFAMETHOXAZOLE AND TRIMETHOPRIM 800; 160 MG/1; MG/1
1 TABLET ORAL 2 TIMES DAILY
Qty: 14 TABLET | Refills: 0 | Status: ON HOLD | OUTPATIENT
Start: 2022-07-05 | End: 2022-07-15 | Stop reason: HOSPADM

## 2022-07-05 RX ADMIN — DIAZEPAM 2 MG: 10 INJECTION, SOLUTION INTRAMUSCULAR; INTRAVENOUS at 10:07

## 2022-07-05 RX ADMIN — LORAZEPAM 1 MG: 2 INJECTION INTRAMUSCULAR; INTRAVENOUS at 03:07

## 2022-07-05 RX ADMIN — DEXTROSE 2 G: 50 INJECTION, SOLUTION INTRAVENOUS at 06:07

## 2022-07-05 NOTE — PLAN OF CARE
KINZA arranged stretcher transport to [facility] located at [address] [place] [address] [address] [facility], [place] [address] via [facility]. Requested  time is 1:30PM. Requested  time does not guarantee arrival time.    GOVIND Blanton can call report to [phone] ext [phone] and ask for nurse Lisa.    Kellee Peterson LMSW  [facility]  Ext. [phone]

## 2022-07-05 NOTE — PLAN OF CARE
07/05/22 1347   Post-Acute Status   Post-Acute Authorization Home Health   Home Health Status Set-up Complete/Auth obtained     SW was informed that [facility] has accepted pt and will be out to see her tomorrow 7/6.    Kellee Peterson LMSW  [facility]  Ext. [phone]

## 2022-07-05 NOTE — PLAN OF CARE
07/05/22 1254   Post-Acute Status   Post-Acute Authorization Home Health   Home Health Status Referrals Sent     Pt is expected to discharge home with home health. SW sent referrals via [facility] and is waiting for an accepting agency.    KINZA will continue to follow up.    Kellee Peterson LMSW  [facility]  Ext. [phone]

## 2022-07-05 NOTE — PLAN OF CARE
[facility] - Telemetry Stepdown ([place]  Initial Discharge Assessment       Primary Care Provider: Sally Green MD    Admission Diagnosis: Underweight [R63.6]  Paroxysmal atrial fibrillation [I48.0]  Fall [W19.XXXA]  Postoperative hypothyroidism [E89.0]  Chest pain [R07.9]  Frequent falls [R29.6]  Laceration of scalp, initial encounter [S01.01XA]  Chronic sinusitis, unspecified location [J32.9]  Open displaced fracture of acromial end of left clavicle with routine healing, subsequent encounter [S42.032D]  Closed fracture of third thoracic vertebra, unspecified fracture morphology, initial encounter [S22.039A]  Open displaced fracture of acromial end of left clavicle, initial encounter [S42.032B]  Closed displaced fracture of acromial end of left clavicle, initial encounter [S42.032A]  Gastroesophageal reflux disease with esophagitis without hemorrhage [K21.00]  Late onset Alzheimer's dementia without behavioral disturbance [G30.1, F02.80]    Admission Date: 7/3/2022  Expected Discharge Date: 7/5/2022         Payor: MEDICARE / Plan: MEDICARE PART A & B / Product Type: Government /     Extended Emergency Contact Information  Primary Emergency Contact: Pavel Crews  Address: [address] [place] [address] of [facility]  Mobile Phone: [phone]  Relation: Spouse    Discharge Plan A: (P) Home Health  Discharge Plan B: (P) Home with family, Home Health      [facility] [address]  [address]  [place] [address]  Phone: [phone] Fax: [phone]               KINZA completed Discharge Planning Assessment with patient's , Pavel via telephone. Discharge planning booklet given to patient/family and whiteboard updated with SANTIAGO and phone #. All questions answered.    Pavel reported that patient will need assistance with transportation upon discharge.     Pavel reported that him and patient live together at [facility]  [place]. Pavel reported that prior to hospitalization patient needed some assistance with her ADL's. Pavel reported that patient uses a walker at home. Pavel reported that patient is not on dialysis and does not go to a Coumadin clinic.        Kellee Peterson LMSW  [facility]  Ext. [phone]

## 2022-07-05 NOTE — TELEPHONE ENCOUNTER
Called pt; pt's  answered the phone; informed pt's  I was calling in regards to pt's virtual eawv appt scheduled for 7/6/22; pt's  stated pt was admitted to the hospital and the appt needed to be canceled; I tried to inform pt's  why I was calling but he stated he needed to get off the phone; I was calling to inform pt her virtual appt scheduled for 7/6/22 needs to be canceled due to last awv appt was completed on 11/2/21 and the appt was scheduled to early.

## 2022-07-05 NOTE — NURSING
Attempted to call [facility] at [phone] ect [phone]. No answer x2.left message with return call information provided.

## 2022-07-05 NOTE — NURSING
Received a return call from Lisa at [facility]. Report given. Patient off unit via stretcher with EMS ([facility]) NAD noted.

## 2022-07-06 ENCOUNTER — PATIENT MESSAGE (OUTPATIENT)
Dept: PRIMARY CARE CLINIC | Facility: CLINIC | Age: 72
End: 2022-07-06
Payer: MEDICARE

## 2022-07-06 PROCEDURE — G0180 PR HOME HEALTH MD CERTIFICATION: ICD-10-PCS | Mod: ,,, | Performed by: INTERNAL MEDICINE

## 2022-07-06 PROCEDURE — G0180 MD CERTIFICATION HHA PATIENT: HCPCS | Mod: ,,, | Performed by: INTERNAL MEDICINE

## 2022-07-06 NOTE — ASSESSMENT & PLAN NOTE
- Imaging showed acute oblique fracture of the inferior aspect of the distal left clavicle with inferomedial displacement of the fracture fragment.  - Ortho consulted for open left clavicle fracture and irrigated wound in ED and started patient on IV Ancef and recommended to continue IV Ancef for 24 hours. Patient placed in observation for IV Ancef. No surgery needed.  - NWB LUE as per Ortho on discharge.   - Sling LUE as per Ortho on discharge.   - Patient discharged on oral Bactrim DS 1 tablet po BID for 7 days after completion of 24 hours of IV Ancef in hospital.   - PT/OT consulted for evaluation. Recommended HH PT/OT on discharge and arrangements made prior to hospital discharge.    - Robaxin and Tylenol prn for pain management on discharge.   - Patient to follow-up in Orthopedic clinic in 1 week for wound check and monitor healing of left clavicle fracture.

## 2022-07-06 NOTE — DISCHARGE SUMMARY
[facility] [address]  [facility] Medicine  Discharge Summary      Patient Name: Yvette Crews  MRN: [id number]  Patient Class: OP- Observation  Admission Date: 7/3/2022  Hospital Length of Stay: 0 days  Discharge Date and Time: 7/5/2022  2:15 PM  Attending Physician: Coral Chavis MD   Discharging Provider: Coral Chavis MD  Primary Care Provider: Sally Green MD  [facility] Medicine Team: [facility] Coral Chavis MD    HPI:   Yvette Crews is a 72 y.o. female with a PMHx of paroxysmal afib, hypothyroidism, hypogammaglobulinemia, osteoporosis who presented to the ED for evaluation of a mechanical fall with head trauma. Per  the patient hit the back of her head but did not lose consciousness. She has a laceration to the back of her scalp. She reports left shoulder pain. She takes aspirin daily.      ED: VSSAF. XR humerus with an acute oblique fracture of the inferior aspect of the distal left clavicle with inferomedial displacement of the fracture fragment. Ortho consulted.     Hospital Course:   Ortho consulted for open fracture to left distal clavicle on her left side. Ortho ordered CT scan of shoulder and MRI of thoracic spine to make sure no other injuries than the left distal left clavicle fracture as noted on X-ray. CT scan of shoulder showed acute displaced fracture of the distal inferior left clavicle with associated widening of the left acromial clavicular interval, concerning for AC subluxation versus separation and mild anterior subluxation of the glenohumeral joint.  Moderate left shoulder effusion. MRI of thoracic spine showed remote comfression vertebral fractures at T8, T10 and L2 and L3. Orthopedics irrigated left shoulder wound in ED and placed patient on IV Ancef and recommended 24 hours of IV Ancef since wound near left shoulder joint. As per Ortho, non-weight bearing to left upper extremity and sling for comfort to left arm. PT/OT  consulted for evaluation. Patient placed on Robaxin and Tylenol for pain management. Patient placed in observation. No surgery indicated as per Ortho. PT/OT evaluated and recommended HH PT/OT on hospital discharge and arrangements made on discharge. Patient completed 24 hours of IV Ancef in hopsital and switched on discharge to Bactrim DS 1 tablet po BID for 7 days as per Severino esteban. Patient to follow-up in Ortho clinic in 1 week on discharge for wound check and monitoring healing of left clavicle fracture. Discussed plans with patient's  Dr. Pavel Crews by phone. Patient to mainatin non-weight bearing to left upper extremity on discharge. Patient dischargd on Tylenol and Robaxin prn for pain management. Pain controlled on discharge. Patient discharged in good condition to [facility] via medical transport on 7/5.        Goals of Care Treatment Preferences:  Code Status: Full Code    Living Will: Yes              Consults:   Consults (From admission, onward)        Status Ordering Provider     Inpatient consult to Orthopedic Surgery  Once        Provider:  (Not yet assigned)    RANDY Samaniego          * Open displaced fracture of acromial end of left clavicle  - Imaging showed acute oblique fracture of the inferior aspect of the distal left clavicle with inferomedial displacement of the fracture fragment.  - Ortho consulted for open left clavicle fracture and irrigated wound in ED and started patient on IV Ancef and recommended to continue IV Ancef for 24 hours. Patient placed in observation for IV Ancef. No surgery needed.  - NWB LUE as per Ortho on discharge.   - Sling LUE as per Ortho on discharge.   - Patient discharged on oral Bactrim DS 1 tablet po BID for 7 days after completion of 24 hours of IV Ancef in hospital.   - PT/OT consulted for evaluation. Recommended HH PT/OT on discharge and arrangements made prior to hospital discharge.    - Robaxin and Tylenol prn for  pain management on discharge.   - Patient to follow-up in Orthopedic clinic in 1 week for wound check and monitor healing of left clavicle fracture.     Late onset Alzheimer's dementia without behavioral disturbance  Chronic and controlled. Patient at cognitive baseline. Continue home Aricept to treat on discharge.       Paroxysmal atrial fibrillation  - Controlled. s/p cardiac ablation in past.  - Patient in sinus rhythm on admit and has Watchman device in place so no long term anticoagulation needed.     Postoperative hypothyroidism  Chronic and controlled. Continue home Levothyroxine 88 mcg po daily to treat on discharge.     Reflux esophagitis  Chronic and controlled. Continue home Pepcid to treat on discharge.     Frequent falls  - PT/OT evaluated and recommended HH PT/OT on discharge and arrangements made prior to hopsital discharge.   - Fall precautions on discharge.     Chronic sinusitis  Chronic and controlled. Continue home Singulair to treat on discharge.     Underweight  Body mass index is 16.09 kg/m². Patient underweight. Encourage oral intake and oral supplements with meals.        Final Active Diagnoses:    Diagnosis Date Noted POA    PRINCIPAL PROBLEM:  Open displaced fracture of acromial end of left clavicle [S42.032B] 07/03/2022 Yes    Late onset Alzheimer's dementia without behavioral disturbance [G30.1, F02.80] 11/02/2021 Yes     Chronic    Paroxysmal atrial fibrillation [I48.0] 12/17/2021 Yes     Chronic    Postoperative hypothyroidism [E89.0] 12/18/2015 Yes     Chronic    Reflux esophagitis [K21.00] 02/07/2010 Yes    Frequent falls [R29.6] 03/14/2017 Not Applicable    Chronic sinusitis [J32.9] 04/06/2017 Yes    Underweight [R63.6] 01/23/2013 Yes    Fracture of left clavicle [S42.002A] 07/03/2022 Yes      Problems Resolved During this Admission:       Discharged Condition: good    Disposition: Home-Health Care [facility]    Follow Up:  Future Appointments   Date Time Provider Department Center    7/7/2022  9:30 AM Alcides Tamez MD [facility] ORTHO [address]   7/14/2022 10:00 AM Milton Wilson MD [facility] [place]   7/19/2022  9:30 AM INFUSION, CHAIR 9 [facility]   7/28/2022  9:30 AM INFUSION, CHAIR 2 [facility]   9/14/2022  9:40 AM Sally Green MD [facility] [place]   10/6/2022  2:00 PM Marilee Lloyd, OD [facility]        Follow-up Information     Sally Green MD.    Specialty: Family Medicine  Contact information:  [address]  [place] [address]  [phone]             [facility] - Emergency Dept.    Specialty: Emergency Medicine  Why: As needed, If symptoms worsen  Contact information:  [address]  [facility] [address] [phone]           [facility]an Juarez - Orthopedics [facility] In 1 week.    Specialty: Orthopedics  Why: For wound re-check  Contact information:  [address]  [facility] [address] [phone]  Additional information:  [facility] - Main Building, 5th Floor   Please park in [facility] and take Atrium elevator                     Patient Instructions:      Diet Adult Regular     Notify your health care provider if you experience any of the following:  temperature >100.4     Notify your health care provider if you experience any of the following:  increased confusion or weakness     Notify your health care provider if you experience any of the following:  persistent nausea and vomiting or diarrhea     Notify your health care provider if you experience any of the following:  persistent dizziness, light-headedness, or visual disturbances     Notify your health care provider if you experience any of the following:  worsening rash     Notify your health care provider if you experience any of the following:  severe persistent headache     Notify your health care provider if you experience any of the following:  difficulty breathing or increased cough     Notify  your health care provider if you experience any of the following:  redness, tenderness, or signs of infection (pain, swelling, redness, odor or green/yellow discharge around incision site)     Notify your health care provider if you experience any of the following:  severe uncontrolled pain     Activity as tolerated     Weight bearing restrictions (specify):   Order Comments: Non weight bearing left upper extremity       Significant Diagnostic Studies: Labs:   CMP   Recent Labs   Lab 07/04/22  0350   *   K 4.5      CO2 24   GLU 78   BUN 16   CREATININE 0.7   CALCIUM 8.8   ANIONGAP 6*   ESTGFRAFRICA >60.0   EGFRNONAA >60.0    and CBC   Recent Labs   Lab 07/04/22  0350   WBC 7.17   HGB 11.9*   HCT 36.6*          CT Shoulder Without Contrast Left  1. Acute displaced fracture of the distal inferior left clavicle.  Associated widening of the left acromial clavicular interval, concerning for AC subluxation versus separation.  2. Mild anterior subluxation of the glenohumeral joint.  Moderate left shoulder effusion.  3. Left shoulder osteoarthritis as above.       Medications:  Reconciled Home Medications:      Medication List      START taking these medications    acetaminophen 325 MG tablet  Commonly known as: TYLENOL  Take 2 tablets (650 mg total) by mouth every 6 (six) hours as needed for Pain.     methocarbamoL 500 MG Tab  Commonly known as: ROBAXIN  Take 1 tablet (500 mg total) by mouth 3 (three) times daily as needed (Musce spasms).     sulfamethoxazole-trimethoprim 800-160mg 800-160 mg Tab  Commonly known as: BACTRIM DS  Take 1 tablet by mouth 2 (two) times daily. for 7 days        CHANGE how you take these medications    aspirin 81 MG EC tablet  Commonly known as: ECOTRIN  Take 1 tablet (81 mg total) by mouth once daily.  What changed: additional instructions     guaiFENesin 600 mg 12 hr tablet  Commonly known as: MUCINEX  Take 2 tablets (1,200 mg total) by mouth 2 (two) times daily.  What  changed: when to take this        CONTINUE taking these medications    albuterol 90 mcg/actuation inhaler  Commonly known as: PROVENTIL/VENTOLIN HFA  Inhale 2 puffs into the lungs every 6 (six) hours as needed for Wheezing. Rescue     AZO BLADDER CONTROL ORAL  Take 1 tablet by mouth 2 (two) times a day.     docusate calcium 240 mg capsule  Commonly known as: SURFAK  Take 240 mg by mouth 3 (three) times daily.     donepeziL 10 MG tablet  Commonly known as: ARICEPT  Take 1 tablet (10 mg total) by mouth once daily.     famotidine 40 MG tablet  Commonly known as: PEPCID  Take 1 tablet (40 mg total) by mouth once daily.     ferrous sulfate 325 (65 FE) MG EC tablet  Take 1 tablet (325 mg total) by mouth once daily.     FLINTSTONES MULTI-VIT GUMMIES ORAL  Chew 2 gummies by mouth every day     Immune Globulin G (IGG)-PRO-IGA 10 % injection (Privigen) 10 % Soln  Commonly known as: PRIVIGEN  Infuse 20 g into the vein every 4 weeks.     levothyroxine 88 MCG tablet  Commonly known as: SYNTHROID  Take 1 tablet (88 mcg total) by mouth before breakfast.     loratadine 10 mg tablet  Commonly known as: CLARITIN  Take 1 tablet (10 mg total) by mouth once daily.     montelukast 10 mg tablet  Commonly known as: SINGULAIR  Take 1 tablet (10 mg total) by mouth every evening.     polyethylene glycol 17 gram Pwpk  Commonly known as: GLYCOLAX  Take 17 g by mouth once daily.     VIACTIV 650 mg-12.5 mcg-40 mcg Chew  Generic drug: calcium-vitamin D3-vitamin K  Chew 1 by mouth twice daily        STOP taking these medications    clopidogreL 75 mg tablet  Commonly known as: PLAVIX     sars-cov-2 (covid-19) 100 mcg/0.5 ml injection  Commonly known as: MODERNA COVID-19     XARELTO 20 mg Tab  Generic drug: rivaroxaban            Indwelling Lines/Drains at time of discharge:   Lines/Drains/Airways     None                 Time spent on the discharge of patient: 25 minutes         Coral Chavis MD  Department of Hospital Medicine  [facility]  [facility]

## 2022-07-06 NOTE — ASSESSMENT & PLAN NOTE
Chronic and controlled. Patient at cognitive baseline. Continue home Aricept to treat on discharge.

## 2022-07-06 NOTE — PLAN OF CARE
[facility]an [facility] - Telemetry Stepdown ([facility])  Discharge Final Note    Primary Care Provider: Sally Green MD    Expected Discharge Date: 7/5/2022    Patient discharged to [facility] via ambulance transportation.     Patient's bedside nurse and patient/family notified of the above.      Final Discharge Note (most recent)       Final Note - 07/06/22 1055          Final Note    Assessment Type Final Discharge Note (P)      Anticipated Discharge Disposition Home-Health Care Svc (P)         Post-Acute Status    Post-Acute Authorization Home Health (P)      Home Health Status Set-up Complete/Auth obtained (P)                      Important Message from Medicare             Contact Info       Sally Green MD   Specialty: Family Medicine    [address]  [facility] [address]   Phone: [phone]       Next Steps: Follow up    [facility] - Emergency Dept   Specialty: Emergency Medicine    [address]  [facility] [address]   Phone: [phone]       Next Steps: Follow up    Instructions: As needed, If symptoms worsen    [facility] Juarez - Orthopedics 5th Fl   Specialty: Orthopedics    [address]  [facility] [address]   Phone: [phone]       Next Steps: Follow up in 1 week(s)    Instructions: For wound re-check            Future Appointments   Date Time Provider Department Center   7/7/2022  9:30 AM Alcides Tamez MD [facility] ORTHO [address]   7/14/2022 10:00 AM Milton Wilson MD [facility] [place]   7/19/2022  9:30 AM INFUSION, CHAIR 9 [facility]   7/28/2022  9:30 AM INFUSION, CHAIR 2 [facility]   9/14/2022  9:40 AM Sally Green MD [facility] [place]   10/6/2022  2:00 PM Marileefabby Lloyd, OD [facility]        scheduled post-discharge follow-up appointment and information added to AVS.     [facility].    Kellee Peterson LMSW  [facility]  Ext. [phone]

## 2022-07-06 NOTE — ASSESSMENT & PLAN NOTE
- PT/OT evaluated and recommended  PT/OT on discharge and arrangements made prior to hopsital discharge.   - Fall precautions on discharge.

## 2022-07-07 ENCOUNTER — HOSPITAL ENCOUNTER (INPATIENT)
Facility: HOSPITAL | Age: 72
LOS: 11 days | Discharge: HOME HEALTH | DRG: 085 | End: 2022-07-18
Attending: EMERGENCY MEDICINE | Admitting: NEUROLOGICAL SURGERY
Payer: MEDICARE

## 2022-07-07 ENCOUNTER — PATIENT MESSAGE (OUTPATIENT)
Dept: ORTHOPEDICS | Facility: CLINIC | Age: 72
End: 2022-07-07
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
## 2022-07-07 DIAGNOSIS — I48.91 A-FIB: ICD-10-CM

## 2022-07-07 DIAGNOSIS — I60.9 SAH (SUBARACHNOID HEMORRHAGE): ICD-10-CM

## 2022-07-07 DIAGNOSIS — M48.02 CERVICAL STENOSIS OF SPINAL CANAL: ICD-10-CM

## 2022-07-07 DIAGNOSIS — S42.032D: ICD-10-CM

## 2022-07-07 DIAGNOSIS — S06.6X1A TRAUMATIC SUBARACHNOID HEMORRHAGE WITH LOSS OF CONSCIOUSNESS OF 30 MINUTES OR LESS, INITIAL ENCOUNTER: Primary | ICD-10-CM

## 2022-07-07 LAB
ABO GROUP BLD: NORMAL
ALBUMIN SERPL BCP-MCNC: 3.3 G/DL (ref 3.5–5.2)
ALP SERPL-CCNC: 72 U/L (ref 55–135)
ALT SERPL W/O P-5'-P-CCNC: 26 U/L (ref 10–44)
ANION GAP SERPL CALC-SCNC: 9 MMOL/L (ref 8–16)
AST SERPL-CCNC: 49 U/L (ref 10–40)
BASOPHILS # BLD AUTO: 0.01 K/UL (ref 0–0.2)
BASOPHILS NFR BLD: 0.2 % (ref 0–1.9)
BILIRUB SERPL-MCNC: 0.3 MG/DL (ref 0.1–1)
BILIRUB UR QL STRIP: NEGATIVE
BLD GP AB SCN CELLS X3 SERPL QL: NORMAL
BUN SERPL-MCNC: 29 MG/DL (ref 8–23)
CALCIUM SERPL-MCNC: 9 MG/DL (ref 8.7–10.5)
CHLORIDE SERPL-SCNC: 102 MMOL/L (ref 95–110)
CLARITY UR REFRACT.AUTO: ABNORMAL
CO2 SERPL-SCNC: 23 MMOL/L (ref 23–29)
COLOR UR AUTO: YELLOW
CREAT SERPL-MCNC: 0.7 MG/DL (ref 0.5–1.4)
DIFFERENTIAL METHOD: ABNORMAL
EOSINOPHIL # BLD AUTO: 0 K/UL (ref 0–0.5)
EOSINOPHIL NFR BLD: 0 % (ref 0–8)
ERYTHROCYTE [DISTWIDTH] IN BLOOD BY AUTOMATED COUNT: 15.5 % (ref 11.5–14.5)
EST. GFR  (AFRICAN AMERICAN): >60 ML/MIN/1.73 M^2
EST. GFR  (NON AFRICAN AMERICAN): >60 ML/MIN/1.73 M^2
GLUCOSE SERPL-MCNC: 84 MG/DL (ref 70–110)
GLUCOSE UR QL STRIP: NEGATIVE
HCT VFR BLD AUTO: 34.9 % (ref 37–48.5)
HGB BLD-MCNC: 11.3 G/DL (ref 12–16)
HGB UR QL STRIP: NEGATIVE
IMM GRANULOCYTES # BLD AUTO: 0.02 K/UL (ref 0–0.04)
IMM GRANULOCYTES NFR BLD AUTO: 0.3 % (ref 0–0.5)
KETONES UR QL STRIP: ABNORMAL
LEUKOCYTE ESTERASE UR QL STRIP: NEGATIVE
LYMPHOCYTES # BLD AUTO: 0.8 K/UL (ref 1–4.8)
LYMPHOCYTES NFR BLD: 13.7 % (ref 18–48)
MCH RBC QN AUTO: 32.3 PG (ref 27–31)
MCHC RBC AUTO-ENTMCNC: 32.4 G/DL (ref 32–36)
MCV RBC AUTO: 100 FL (ref 82–98)
MONOCYTES # BLD AUTO: 0.5 K/UL (ref 0.3–1)
MONOCYTES NFR BLD: 7.3 % (ref 4–15)
NEUTROPHILS # BLD AUTO: 4.8 K/UL (ref 1.8–7.7)
NEUTROPHILS NFR BLD: 78.5 % (ref 38–73)
NITRITE UR QL STRIP: NEGATIVE
NRBC BLD-RTO: 0 /100 WBC
PH UR STRIP: 6 [PH] (ref 5–8)
PLATELET # BLD AUTO: 210 K/UL (ref 150–450)
PMV BLD AUTO: 10.6 FL (ref 9.2–12.9)
POTASSIUM SERPL-SCNC: 4.5 MMOL/L (ref 3.5–5.1)
PROT SERPL-MCNC: 7.2 G/DL (ref 6–8.4)
PROT UR QL STRIP: NEGATIVE
RBC # BLD AUTO: 3.5 M/UL (ref 4–5.4)
RH BLD: NORMAL
SODIUM SERPL-SCNC: 134 MMOL/L (ref 136–145)
SP GR UR STRIP: 1.03 (ref 1–1.03)
URN SPEC COLLECT METH UR: ABNORMAL
WBC # BLD AUTO: 6.14 K/UL (ref 3.9–12.7)

## 2022-07-07 PROCEDURE — 99291 CRITICAL CARE FIRST HOUR: CPT | Mod: ,,, | Performed by: EMERGENCY MEDICINE

## 2022-07-07 PROCEDURE — 86901 BLOOD TYPING SEROLOGIC RH(D): CPT | Performed by: EMERGENCY MEDICINE

## 2022-07-07 PROCEDURE — 93010 ELECTROCARDIOGRAM REPORT: CPT | Mod: ,,, | Performed by: INTERNAL MEDICINE

## 2022-07-07 PROCEDURE — 25000003 PHARM REV CODE 250

## 2022-07-07 PROCEDURE — 99223 PR INITIAL HOSPITAL CARE,LEVL III: ICD-10-PCS | Mod: ,,, | Performed by: NEUROLOGICAL SURGERY

## 2022-07-07 PROCEDURE — 11000001 HC ACUTE MED/SURG PRIVATE ROOM

## 2022-07-07 PROCEDURE — 99285 EMERGENCY DEPT VISIT HI MDM: CPT | Mod: 25

## 2022-07-07 PROCEDURE — 93010 EKG 12-LEAD: ICD-10-PCS | Mod: ,,, | Performed by: INTERNAL MEDICINE

## 2022-07-07 PROCEDURE — 81003 URINALYSIS AUTO W/O SCOPE: CPT | Performed by: EMERGENCY MEDICINE

## 2022-07-07 PROCEDURE — 99291 PR CRITICAL CARE, E/M 30-74 MINUTES: ICD-10-PCS | Mod: ,,, | Performed by: EMERGENCY MEDICINE

## 2022-07-07 PROCEDURE — 99223 1ST HOSP IP/OBS HIGH 75: CPT | Mod: ,,, | Performed by: NEUROLOGICAL SURGERY

## 2022-07-07 PROCEDURE — 86850 RBC ANTIBODY SCREEN: CPT | Performed by: EMERGENCY MEDICINE

## 2022-07-07 PROCEDURE — 86900 BLOOD TYPING SEROLOGIC ABO: CPT | Performed by: EMERGENCY MEDICINE

## 2022-07-07 PROCEDURE — 85025 COMPLETE CBC W/AUTO DIFF WBC: CPT | Performed by: EMERGENCY MEDICINE

## 2022-07-07 PROCEDURE — 80053 COMPREHEN METABOLIC PANEL: CPT | Performed by: EMERGENCY MEDICINE

## 2022-07-07 PROCEDURE — 93005 ELECTROCARDIOGRAM TRACING: CPT

## 2022-07-07 RX ORDER — MONTELUKAST SODIUM 10 MG/1
10 TABLET ORAL NIGHTLY
Status: DISCONTINUED | OUTPATIENT
Start: 2022-07-07 | End: 2022-07-18 | Stop reason: HOSPADM

## 2022-07-07 RX ORDER — LEVOTHYROXINE SODIUM 88 UG/1
88 TABLET ORAL
Status: DISCONTINUED | OUTPATIENT
Start: 2022-07-08 | End: 2022-07-18 | Stop reason: HOSPADM

## 2022-07-07 RX ORDER — METHOCARBAMOL 500 MG/1
500 TABLET, FILM COATED ORAL 3 TIMES DAILY PRN
Status: DISCONTINUED | OUTPATIENT
Start: 2022-07-07 | End: 2022-07-18 | Stop reason: HOSPADM

## 2022-07-07 RX ORDER — IBUPROFEN 200 MG
16 TABLET ORAL
Status: DISCONTINUED | OUTPATIENT
Start: 2022-07-07 | End: 2022-07-18 | Stop reason: HOSPADM

## 2022-07-07 RX ORDER — DONEPEZIL HYDROCHLORIDE 10 MG/1
10 TABLET, FILM COATED ORAL DAILY
Status: DISCONTINUED | OUTPATIENT
Start: 2022-07-07 | End: 2022-07-18 | Stop reason: HOSPADM

## 2022-07-07 RX ORDER — ALBUTEROL SULFATE 90 UG/1
2 AEROSOL, METERED RESPIRATORY (INHALATION) EVERY 6 HOURS PRN
Status: DISCONTINUED | OUTPATIENT
Start: 2022-07-07 | End: 2022-07-18 | Stop reason: HOSPADM

## 2022-07-07 RX ORDER — GLUCAGON 1 MG
1 KIT INJECTION
Status: DISCONTINUED | OUTPATIENT
Start: 2022-07-07 | End: 2022-07-18 | Stop reason: HOSPADM

## 2022-07-07 RX ORDER — FAMOTIDINE 20 MG/1
40 TABLET, FILM COATED ORAL DAILY
Status: DISCONTINUED | OUTPATIENT
Start: 2022-07-07 | End: 2022-07-08

## 2022-07-07 RX ORDER — IBUPROFEN 200 MG
24 TABLET ORAL
Status: DISCONTINUED | OUTPATIENT
Start: 2022-07-07 | End: 2022-07-18 | Stop reason: HOSPADM

## 2022-07-07 RX ORDER — ACETAMINOPHEN 325 MG/1
650 TABLET ORAL EVERY 4 HOURS PRN
Status: DISCONTINUED | OUTPATIENT
Start: 2022-07-07 | End: 2022-07-18 | Stop reason: HOSPADM

## 2022-07-07 RX ORDER — LANOLIN ALCOHOL/MO/W.PET/CERES
1 CREAM (GRAM) TOPICAL DAILY
Refills: 5 | Status: DISCONTINUED | OUTPATIENT
Start: 2022-07-07 | End: 2022-07-18 | Stop reason: HOSPADM

## 2022-07-07 RX ORDER — SULFAMETHOXAZOLE AND TRIMETHOPRIM 800; 160 MG/1; MG/1
1 TABLET ORAL 2 TIMES DAILY
Status: DISCONTINUED | OUTPATIENT
Start: 2022-07-07 | End: 2022-07-08

## 2022-07-07 RX ADMIN — ACETAMINOPHEN 650 MG: 325 TABLET ORAL at 11:07

## 2022-07-07 RX ADMIN — FERROUS SULFATE TAB 325 MG (65 MG ELEMENTAL FE) 1 EACH: 325 (65 FE) TAB at 02:07

## 2022-07-07 RX ADMIN — MONTELUKAST 10 MG: 10 TABLET, FILM COATED ORAL at 08:07

## 2022-07-07 RX ADMIN — METHOCARBAMOL 500 MG: 500 TABLET ORAL at 11:07

## 2022-07-07 RX ADMIN — DONEPEZIL HYDROCHLORIDE 10 MG: 10 TABLET ORAL at 02:07

## 2022-07-07 RX ADMIN — SULFAMETHOXAZOLE AND TRIMETHOPRIM 1 TABLET: 800; 160 TABLET ORAL at 08:07

## 2022-07-07 NOTE — CONSULTS
[facility] - Emergency Dept  Neurosurgery  Consult Note    Inpatient consult to Neurosurgery  Consult performed by: Chip Lorenzana MD  Consult ordered by: Jossy Alonzo MD             Chief Complaint/Reason for Admission: NSGY consulted for L temporal SAH noted on CTH after fall this morning 7/7    History of Present Illness: 73yo F PMH AVNRT, frequent falls, presenting as neurosurgery consult due to L temporal SAH noted on CTH in AM. Per patient's  was found down after hearing fall at 0830. More confusion present than baseline. Patient states she takes aspirin and plavix but hasn't taken the plavix in a while. She is supposed to use a walker at baseline but now is NWB to left arm due a left clavicle fx 7/3. CT c-spine showed C2-C3 anterolisthesis        (Not in a hospital admission)      Review of patient's allergies indicates:   Allergen Reactions    Adhesive      Tape tears skin    Buspar [buspirone]      headaches    Escitalopram oxalate Nausea Only     hyponatremia      Rifampin Rash       Past Medical History:   Diagnosis Date    Adult bronchiectasis     Age-related osteoporosis with current pathological fracture with routine healing 8/28/2013    Amblyopia     Anxiety     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    Cataract     Cellulitis of right lower extremity 1/19/2021    Chondrocalcinosis, cause unspecified, involving hand(712.34) 7/12/2011     Dx updated per 2019 IMO Load    Chronic sinusitis 4/6/2017    Colonic constipation 6/5/2013    Concussion 10/27/2016    Frequent falls 3/14/2017    Gait disturbance 3/14/2017    History of cardiac radiofrequency ablation (RFA) 2/3/2018    History of cardiac radiofrequency ablation (RFA) 2/3/2018    History of subarachnoid hemorrhage 10/30/2016    Hypogammaglobulinemia 9/7/2011    Irritable bowel syndrome 12/18/2015    Major depressive disorder, recurrent episode, in full remission 8/19/2010     Onychomycosis     Osteoarthritis     Postoperative hypothyroidism 12/18/2015    Presence of Watchman left atrial appendage closure device 5/14/2022    Rectal prolapse 6/5/2013    Reflux esophagitis 2/7/2010    Status post thyroidectomy 6/1/2017    Strabismus     SVT (supraventricular tachycardia) 11/22/2013    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     Underweight 1/23/2013     Past Surgical History:   Procedure Laterality Date    BREAST CYST ASPIRATION      x2    CATARACT EXTRACTION W/  INTRAOCULAR LENS IMPLANT Left 3/11/2019    Procedure: EXTRACTION, CATARACT, WITH IOL INSERTION;  Surgeon: Vera Sams MD;  Location: [facility];  Service: Ophthalmology;  Laterality: Left;    CATARACT EXTRACTION W/  INTRAOCULAR LENS IMPLANT Right 4/15/2019    Procedure: EXTRACTION, CATARACT, WITH IOL INSERTION LASER;  Surgeon: Vera Sams MD;  Location: [facility];  Service: Ophthalmology;  Laterality: Right;    COLON SURGERY      EYE SURGERY      FRACTURE SURGERY      NASAL SEPTUM SURGERY      OCCLUSION OF LEFT ATRIAL APPENDAGE N/A 2/18/2022    Procedure: Left atrial appendage occlusion;  Surgeon: Chase Gill MD;  Location: [facility];  Service: Cardiology;  Laterality: N/A;  afib, watchman, BSCI, osiris, anes, MB/EH, 3prep    OPEN REDUCTION AND INTERNAL FIXATION (ORIF) OF INJURY OF SACROILIAC JOINT Bilateral 1/20/2021    Procedure: ORIF, SACROILIAC JOINT;  Surgeon: Alcides Tamez MD;  Location: [address];  Service: Orthopedics;  Laterality: Bilateral;    RADIOFREQUENCY ABLATION      RECTAL PROLAPSE REPAIR  june 2013    STRABISMUS SURGERY      TONSILLECTOMY      TRANSESOPHAGEAL ECHOCARDIOGRAPHY N/A 2/18/2022    Procedure: ECHOCARDIOGRAM, TRANSESOPHAGEAL;  Surgeon: Geovany Diagnostic Provider;  Location: [facility];  Service: Cardiology;  Laterality: N/A;     Family History       Problem Relation (Age of Onset)    Breast cancer Paternal Grandmother    Heart disease Father, Brother     Stroke Father          Tobacco Use    Smoking status: Never Smoker    Smokeless tobacco: Never Used   Substance and Sexual Activity    Alcohol use: No    Drug use: No    Sexual activity: Not Currently     Review of Systems   Constitutional:  Negative for appetite change, chills and diaphoresis.   HENT:  Negative for congestion, ear pain, facial swelling, hearing loss, postnasal drip, rhinorrhea and sneezing.    Eyes:  Negative for photophobia, pain and discharge.   Respiratory:  Negative for cough, chest tightness and shortness of breath.    Cardiovascular:  Negative for chest pain, palpitations and leg swelling.   Gastrointestinal:  Negative for abdominal distention, abdominal pain, constipation and diarrhea.   Endocrine: Negative for cold intolerance, heat intolerance, polydipsia and polyphagia.   Genitourinary:  Negative for dysuria, flank pain and urgency.   Musculoskeletal:  Positive for gait problem. Negative for back pain and joint swelling.   Skin:  Negative for color change, pallor and rash.   Allergic/Immunologic: Negative for immunocompromised state.   Neurological:  Positive for headaches (frontal, 6-7/10 severity). Negative for facial asymmetry, speech difficulty, light-headedness and numbness.   Hematological:  Negative for adenopathy.   Psychiatric/Behavioral:  Positive for confusion. Negative for agitation, behavioral problems and hallucinations.    Objective:     Weight: 36.3 kg (80 lb)  Body mass index is 14.63 kg/m².  Vital Signs (Most Recent):  Temp: 97.8 °F (36.6 °C) (07/07/22 0833)  Pulse: 79 (07/07/22 1132)  Resp: 16 (07/07/22 0740)  BP: (!) 140/66 (07/07/22 1132)  SpO2: 95 % (07/07/22 1132)   Vital Signs (24h Range):  Temp:  [97.8 °F (36.6 °C)] 97.8 °F (36.6 °C)  Pulse:  [65-87] 79  Resp:  [16] 16  SpO2:  [95 %-100 %] 95 %  BP: (115-141)/(60-66) 140/66                          Physical Exam    General: Awake, Alert, Oriented  Head: Non-traumatic, normocephalic  Eyes: Pupils equal,  EOMi, L ptosis  Neck: Supple, normal ROM, no tenderness to palpation  CVS: Normal rate and rhythm, distal pulses present  Pulm: Symmetric expansion, no respiratory distress  GI: Abdomen nondistended, nontender  MSK: Moves BLE without restriction, spontaneous but restricted BUE movement  Skin: Dry, intact  Psych: Normal thought content and cognition    Neurosurgery Physical Exam    General: AOx3, GCS [id number]  CNII-XII: Intact on fine exam, PERRL, visual fields grossly intact, EOMi, facial sensation preserved, no facial assymetry, tongue/uvula/palate midline, shoulder shrug equal, no pronator drift  Extremities: UE motor limited 2/2 pain and spasticity but antigravity, 4/5 motor BLE, sensorium intact throughout, coordination intact throughout, + bonilla's b/l with no other pathologic reflexes, no sensory level present      Significant Labs:  Recent Labs   Lab 07/07/22  0901   GLU 84   *   K 4.5      CO2 23   BUN 29*   CREATININE 0.7   CALCIUM 9.0     Recent Labs   Lab 07/07/22  0901   WBC 6.14   HGB 11.3*   HCT 34.9*        No results for input(s): LABPT, INR, APTT in the last 48 hours.  Microbiology Results (last 7 days)       ** No results found for the last 168 hours. **          All pertinent labs from the last 24 hours have been reviewed.    Significant Diagnostics:  I have reviewed and interpreted all pertinent imaging results/findings within the past 24 hours.  Imaging Results              CT Head Without Contrast (Final result)  Result time 07/07/22 09:56:31      Final result by Dougie Pineda MD (07/07/22 09:56:31)                   Impression:      Small focus of likely subarachnoid hemorrhage along the medial left temporal lobe.      Electronically signed by: Dougie Pineda  Date:    07/07/2022  Time:    09:56               Narrative:    EXAMINATION:  CT HEAD WITHOUT CONTRAST    CLINICAL HISTORY:  Head trauma, moderate-severe;    TECHNIQUE:  Low dose axial images were obtained through  the head.  Coronal and sagittal reformations were also performed. Contrast was not administered.    COMPARISON:  07/03/2022    FINDINGS:  There is a small focus of increased density along the medial left temporal lobe that likely represents a small focus of subarachnoid hemorrhage.  No definite parenchymal changes are identified.  No hydrocephalus mass effect or acute territorial infarct.  No calvarial fracture.    Decreased attenuation the periventricular white matter is nonspecific but may reflect mild to moderate chronic small vessel ischemic change with stable volume loss.    These findings were communicated to Dr. Alonzo at 09:55 on 07/07/2022                                       CT Cervical Spine Without Contrast (Final result)  Result time 07/07/22 09:58:34      Final result by Jose Alfredo Nicole MD (07/07/22 09:58:34)                   Impression:      1. No acute fracture.  2. Stable appearance of mild T2 and T3 anterior vertebral body height loss.  3. Stable appearance of grade 2 anterolisthesis at C2-C3.  4. Multilevel degenerative changes of the cervical spine.  Moderate to severe spinal canal stenosis and left-sided neural foraminal narrowing noted at C2-C4.      Electronically signed by: Jose Alfredo Nicole MD  Date:    07/07/2022  Time:    09:58               Narrative:    EXAMINATION:  CT CERVICAL SPINE WITHOUT CONTRAST    CLINICAL HISTORY:  Spine fracture, cervical, traumatic;    TECHNIQUE:  Low dose axial images, sagittal and coronal reformations were performed though the cervical spine.  Contrast was not administered.    COMPARISON:  07/03/2022    FINDINGS:  Alignment: There is grade 2 anterolisthesis at C2-C3 measuring approximately 6 mm and similar to prior study.    Vertebrae: No acute fracture.  No lytic or blastic lesion.  Mild loss of T2 and T3 anterior vertebral body height, similar to prior study.    Discs: Severe disc height loss with degenerative endplate changes seen throughout the cervical  spine.    C1-2: Dens is intact.  Pre-dens space is maintained.  Thickening and calcification of the transverse ligament noted.    Skull base and craniocervical junction: Degenerative changes of the craniocervical junction.    Degenerative findings:    C2-C3: Malalignment with posterior disc osteophyte complex, uncovertebral spurring, and severe facet arthropathy result in moderate spinal canal stenosis and severe left, mild right neural foraminal narrowing.    C3-C4: Posterior disc osteophyte complex with uncovertebral spurring and severe left facet arthropathy result in severe spinal canal stenosis and moderate left, mild right neural foraminal narrowing.    C4-C5: Posterior disc osteophyte complex with uncovertebral spurring and moderate facet arthropathy result in mild spinal canal stenosis and mild left neural foraminal narrowing.    C5-C6: Right posterior disc osteophyte complex with uncovertebral spurring and mild facet arthropathy result in mild spinal canal stenosis.    C6-C7: Uncovertebral spurring results in mild right neural foraminal narrowing.    C7-T1: Posterior disc osteophyte complex noted.  No spinal canal stenosis or neural foraminal narrowing.    Paraspinal muscles & soft tissues: Unremarkable.                                       X-Ray Hips Bilateral 2 View Incl AP Pelvis (Final result)  Result time 07/07/22 08:48:04      Final result by Chase Dunne MD (07/07/22 08:48:04)                   Impression:      See above      Electronically signed by: Chase Dunne MD  Date:    07/07/2022  Time:    08:48               Narrative:    EXAMINATION:  XR HIPS BILATERAL 2 VIEW INCL AP PELVIS    CLINICAL HISTORY:  Unspecified fall, initial encounter    TECHNIQUE:  AP view of the pelvis and frogleg lateral views of both hips were performed.    COMPARISON:  05/13/2022    FINDINGS:  Patient has a trans sacral screw present in good position.  Degenerative changes seen in both hips but no fractures of  the hips are obvious                                       Assessment/Plan:     * SAH (subarachnoid hemorrhage)  71yo F PMH AVNRT found to have traumatic L temporal SAH on CTH after unwitnessed fall with concern for myelopathy but no focal deficits.     --Patient admitted to floor       -- q4h neurochecks on floor  --All labs and diagnostics reviewed       --CTH 7/7: L temporal SAH       --CT c-spine 7/7: anterolisthesis C2-C3  --Follow-up Rpx CTH 6h scan for stability  --F/U MRI C/T-spine  --SBP <160  --Na >135  --HOB >30  --Hold plavix/aspirin  --Continue to monitor clinically, notify NSGY immediately with any changes in neuro status    Dispo: floor, if stable tomorrow and scans have no cause for concern can d/c tomorrow        Thank you for your consult. I will follow-up with patient. Please contact us if you have any additional questions.    Chip Lorenzana MD  Neurosurgery  [facility] - [facility] Dept

## 2022-07-07 NOTE — ED NOTES
Bed: [facility]  Expected date:   Expected time:   Means of arrival:   Comments:

## 2022-07-07 NOTE — ED TRIAGE NOTES
Pt had a fall in her bathroom at home. Found by .  Fall was unwitnessed.  EMS reports no LOC per .

## 2022-07-07 NOTE — PLAN OF CARE
"Initial Discharge Planning Case Management Assessment:     Patient admitted on 7/7 (recent d/c on 7/5 for fall)    PCP updated in Epic: Sally Green MD  Baseline functioning: Normally independent and able to get around easily w/walker  Recent changes from baseline: Increased weakness, more falls. Pt states "I think it's medical" as reason for increased ambulation difficulty. Also NWB on left arm d/t clavicle fx so unable to use walker.   HH/community service hx: No hx HH  Hx facility placement: [facility] assisted living for last 6 years   DME: Uses rolling walker and shower chair at home   Psychosocial: Lives with  in assisted living. Supportive daughter who lives close. Assisted living provides all meals and Rx management.   Living environment: Assisted living, accessible.     Current dispo: Return to [facility] vs. SNF  Barriers to d/c: TBD  Transportation: TBD    Consults following: Case Management, Neuosurgery, PT/OT  Referrals sent: None at this time.     Actions completed today:   - Chart reviewed. Care plan discussed with pt, care team.   - SW met with pt at bedside to complete initial assessment. Pt states she would be agreeable to SNF/IPR placement if recommended. Unsure if increased support at [facility] is possible.     SW will continue to monitor and assist with any additional d/c needs as they arise.     Aurora Salas LMSW  ED   Care Management  [facility]  Ext. [phone]    [facility] - Emergency Dept  Initial Discharge Assessment       Primary Care Provider: Sally Green MD    Admission Diagnosis: No admission diagnoses are documented for this encounter.    Admission Date: 7/7/2022  Expected Discharge Date:     Discharge Barriers Identified: Other (see comments) (TBD)    Payor: MEDICARE / Plan: MEDICARE PART A & B / Product Type: Government /     Extended Emergency Contact Information  Primary Emergency Contact: EleonoraPavel  Address: [address] [place] [address] " "[address] [address]  Mobile Phone: [phone]  Relation: Spouse    Discharge Plan A: Assisted Living, Other (w/ increased services)  Discharge Plan B: Skilled Nursing Facility      [facility] [place] [address]  [address]  [address]  [place] [address]  Phone: [phone] Fax: [phone]      Initial Assessment (most recent)     Adult Discharge Assessment - 07/07/22 1242        Discharge Assessment    Assessment Type Discharge Planning Assessment     Confirmed/corrected address, phone number and insurance Yes     Confirmed Demographics Correct on Facesheet     Source of Information patient;health record     When was your last doctors appointment? --   "Months ago"    Does patient/caregiver understand observation status Yes     Communicated SANTIAGO with patient/caregiver Yes     Reason For Admission Fall, subarachnoid hemmorage     Lives With spouse     Facility Arrived From: [facility]     Do you expect to return to your current living situation? Yes     Do you have help at home or someone to help you manage your care at home? Yes     Who are your caregiver(s) and their phone number(s)? [facility]     Prior to hospitilization cognitive status: Unable to Assess     Current cognitive status: Alert/Oriented     Walking or Climbing Stairs Difficulty ambulation difficulty, requires equipment     Mobility Management FWW     Dressing/Bathing Difficulty bathing difficulty, requires equipment     Dressing/Bathing Management Shower chair     Do you have any problems with: Needs other help     Specify other help Rx management, meals provided by assisted living     Home Accessibility wheelchair accessible     Home Layout Able to live on 1st floor     Equipment Currently Used at Home walker, rolling;shower chair     Readmission within 30 days? Yes     Patient currently being followed by outpatient case management? No     Do you currently have service(s) " that help you manage your care at home? Yes     Name and Contact number of agency Assisted Living     Is the pt/caregiver preference to resume services with current agency Yes     Do you take prescription medications? Yes     Do you have prescription coverage? Yes     Coverage Medicare A&B     Do you have any problems affording any of your prescribed medications? No     Is the patient taking medications as prescribed? yes     How do you get to doctors appointments? family or friend will provide     Are you on dialysis? No     Do you take coumadin? No     Discharge Plan A Assisted Living;Other   w/ increased services    Discharge Plan B Skilled Nursing Facility     DME Needed Upon Discharge  other (see comments)   TBD    Discharge Plan discussed with: Patient     Discharge Barriers Identified Other (see comments)   TBD       Relationship/Environment    Name(s) of Who Lives With Patient Pavel Crews (Spouse)   [phone]

## 2022-07-07 NOTE — ED NOTES
Appearance:  Pt awake, alert & oriented to person, place & situation.  Disoriented to time. .  Skin:  Skin warm, dry & intact.  Mucous membranes dry.  Skin turgor normal.  Skin tear to left forearm. Dressing to left shoulder and left forearm.   Respiratory:  Respirations even, non-labored.    Neurologic:  Pt moving all extremities without difficulty.  Sensation intact.     Peripheral Vascular:  All peripheral pulses present.  Abdomen:  Abdomen soft, non-tender to palpation.   Musculoskeletal:  No obvious deformities noted.

## 2022-07-07 NOTE — PLAN OF CARE
07/07/22 1533   Post-Acute Status   Post-Acute Authorization Placement   Post-Acute Placement Status Pending medical clearance/testing   Coverage Medicare A&B   Discharge Delays None known at this time   Discharge Plan   Discharge Plan A Skilled Nursing Facility   Discharge Plan B Assisted Living  (Return w/increased support)     Aurora Salas LMSW  ED   Care Management  [facility]  Ext. [phone]

## 2022-07-07 NOTE — HPI
73yo F PMH AVNRT, frequent falls, presenting as neurosurgery consult due to L temporal SAH noted on CTH in AM. Per patient's  was found down after hearing fall at 0830. More confusion present than baseline. Patient states she takes aspirin and plavix but hasn't taken the plavix in a while. She is supposed to use a walker at baseline but now is NWB to left arm due a left clavicle fx 7/3. CT c-spine showed C2-C3 anterolisthesis

## 2022-07-07 NOTE — SUBJECTIVE & OBJECTIVE
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    Cataract     Cellulitis of right lower extremity 1/19/2021    Chondrocalcinosis, cause unspecified, involving hand(712.34) 7/12/2011     Dx updated per 2019 IMO Load    Chronic sinusitis 4/6/2017    Colonic constipation 6/5/2013    Concussion 10/27/2016    Frequent falls 3/14/2017    Gait disturbance 3/14/2017    History of cardiac radiofrequency ablation (RFA) 2/3/2018    History of cardiac radiofrequency ablation (RFA) 2/3/2018    History of subarachnoid hemorrhage 10/30/2016    Hypogammaglobulinemia 9/7/2011    Irritable bowel syndrome 12/18/2015    Major depressive disorder, recurrent episode, in full remission 8/19/2010    Onychomycosis     Osteoarthritis     Postoperative hypothyroidism 12/18/2015    Presence of Watchman left atrial appendage closure device 5/14/2022    Rectal prolapse 6/5/2013    Reflux esophagitis 2/7/2010    Status post thyroidectomy 6/1/2017    Strabismus     SVT (supraventricular tachycardia) 11/22/2013    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     Underweight 1/23/2013     Past Surgical History:   Procedure Laterality Date    BREAST CYST ASPIRATION      x2    CATARACT EXTRACTION W/  INTRAOCULAR LENS IMPLANT Left 3/11/2019    Procedure: EXTRACTION, CATARACT, WITH IOL INSERTION;  Surgeon: Vera Sams MD;  Location: [facility];  Service: Ophthalmology;  Laterality: Left;    CATARACT EXTRACTION W/  INTRAOCULAR LENS IMPLANT Right 4/15/2019    Procedure: EXTRACTION, CATARACT,  WITH IOL INSERTION LASER;  Surgeon: Vera Sams MD;  Location: [facility];  Service: Ophthalmology;  Laterality: Right;    COLON SURGERY      EYE SURGERY      FRACTURE SURGERY      NASAL SEPTUM SURGERY      OCCLUSION OF LEFT ATRIAL APPENDAGE N/A 2/18/2022    Procedure: Left atrial appendage occlusion;  Surgeon: Chase Gill MD;  Location: [facility];  Service: Cardiology;  Laterality: N/A;  afib, watchman, BSCI, osiris, anes, MB/EH, 3prep    OPEN REDUCTION AND INTERNAL FIXATION (ORIF) OF INJURY OF SACROILIAC JOINT Bilateral 1/20/2021    Procedure: ORIF, SACROILIAC JOINT;  Surgeon: Alcides Tamez MD;  [address];  Service: Orthopedics;  Laterality: Bilateral;    RADIOFREQUENCY ABLATION      RECTAL PROLAPSE REPAIR  june 2013    STRABISMUS SURGERY      TONSILLECTOMY      TRANSESOPHAGEAL ECHOCARDIOGRAPHY N/A 2/18/2022    Procedure: ECHOCARDIOGRAM, TRANSESOPHAGEAL;  Surgeon: Nils Diagnostic Provider;  Location: [facility] EP [facility];  Service: Cardiology;  Laterality: N/A;     Family History       Problem Relation (Age of Onset)    Breast cancer Paternal Grandmother    Heart disease Father, Brother    Stroke Father          Tobacco Use    Smoking status: Never Smoker    Smokeless tobacco: Never Used   Substance and Sexual Activity    Alcohol use: No    Drug use: No    Sexual activity: Not Currently     Review of Systems   Constitutional:  Negative for appetite change, chills and diaphoresis.   HENT:  Negative for congestion, ear pain, facial swelling, hearing loss, postnasal drip, rhinorrhea and sneezing.    Eyes:  Negative for photophobia, pain and discharge.   Respiratory:  Negative for cough, chest tightness and shortness of breath.    Cardiovascular:  Negative for chest pain, palpitations and leg swelling.   Gastrointestinal:  Negative for abdominal distention, abdominal pain, constipation and diarrhea.   Endocrine: Negative for cold intolerance, heat intolerance, polydipsia and polyphagia.    Genitourinary:  Negative for dysuria, flank pain and urgency.   Musculoskeletal:  Positive for gait problem. Negative for back pain and joint swelling.   Skin:  Negative for color change, pallor and rash.   Allergic/Immunologic: Negative for immunocompromised state.   Neurological:  Positive for headaches (frontal, 6-7/10 severity). Negative for facial asymmetry, speech difficulty, light-headedness and numbness.   Hematological:  Negative for adenopathy.   Psychiatric/Behavioral:  Positive for confusion. Negative for agitation, behavioral problems and hallucinations.    Objective:     Weight: 36.3 kg (80 lb)  Body mass index is 14.63 kg/m².  Vital Signs (Most Recent):  Temp: 97.8 °F (36.6 °C) (07/07/22 0833)  Pulse: 79 (07/07/22 1132)  Resp: 16 (07/07/22 0740)  BP: (!) 140/66 (07/07/22 1132)  SpO2: 95 % (07/07/22 1132)   Vital Signs (24h Range):  Temp:  [97.8 °F (36.6 °C)] 97.8 °F (36.6 °C)  Pulse:  [65-87] 79  Resp:  [16] 16  SpO2:  [95 %-100 %] 95 %  BP: (115-141)/(60-66) 140/66                          Physical Exam    General: Awake, Alert, Oriented  Head: Non-traumatic, normocephalic  Eyes: Pupils equal, EOMi, L ptosis  Neck: Supple, normal ROM, no tenderness to palpation  CVS: Normal rate and rhythm, distal pulses present  Pulm: Symmetric expansion, no respiratory distress  GI: Abdomen nondistended, nontender  MSK: Moves BLE without restriction, spontaneous but restricted BUE movement  Skin: Dry, intact  Psych: Normal thought content and cognition    Neurosurgery Physical Exam    General: AOx3, GCS [id number]  CNII-XII: Intact on fine exam, PERRL, visual fields grossly intact, EOMi, facial sensation preserved, no facial assymetry, tongue/uvula/palate midline, shoulder shrug equal, no pronator drift  Extremities: UE motor limited 2/2 pain and spasticity but antigravity, 4/5 motor BLE, sensorium intact throughout, coordination intact throughout, + bonilla's b/l with no other pathologic reflexes, no sensory level  present      Significant Labs:  Recent Labs   Lab 07/07/22 0901   GLU 84   *   K 4.5      CO2 23   BUN 29*   CREATININE 0.7   CALCIUM 9.0     Recent Labs   Lab 07/07/22 0901   WBC 6.14   HGB 11.3*   HCT 34.9*        No results for input(s): LABPT, INR, APTT in the last 48 hours.  Microbiology Results (last 7 days)       ** No results found for the last 168 hours. **          All pertinent labs from the last 24 hours have been reviewed.    Significant Diagnostics:  I have reviewed and interpreted all pertinent imaging results/findings within the past 24 hours.  Imaging Results              CT Head Without Contrast (Final result)  Result time 07/07/22 09:56:31      Final result by Dougie Pineda MD (07/07/22 09:56:31)                   Impression:      Small focus of likely subarachnoid hemorrhage along the medial left temporal lobe.      Electronically signed by: Dougie Pineda  Date:    07/07/2022  Time:    09:56               Narrative:    EXAMINATION:  CT HEAD WITHOUT CONTRAST    CLINICAL HISTORY:  Head trauma, moderate-severe;    TECHNIQUE:  Low dose axial images were obtained through the head.  Coronal and sagittal reformations were also performed. Contrast was not administered.    COMPARISON:  07/03/2022    FINDINGS:  There is a small focus of increased density along the medial left temporal lobe that likely represents a small focus of subarachnoid hemorrhage.  No definite parenchymal changes are identified.  No hydrocephalus mass effect or acute territorial infarct.  No calvarial fracture.    Decreased attenuation the periventricular white matter is nonspecific but may reflect mild to moderate chronic small vessel ischemic change with stable volume loss.    These findings were communicated to Dr. Alonzo at 09:55 on 07/07/2022                                       CT Cervical Spine Without Contrast (Final result)  Result time 07/07/22 09:58:34      Final result by Jose Alfredo Nicole MD  (07/07/22 09:58:34)                   Impression:      1. No acute fracture.  2. Stable appearance of mild T2 and T3 anterior vertebral body height loss.  3. Stable appearance of grade 2 anterolisthesis at C2-C3.  4. Multilevel degenerative changes of the cervical spine.  Moderate to severe spinal canal stenosis and left-sided neural foraminal narrowing noted at C2-C4.      Electronically signed by: Jose Alfredo Nicole MD  Date:    07/07/2022  Time:    09:58               Narrative:    EXAMINATION:  CT CERVICAL SPINE WITHOUT CONTRAST    CLINICAL HISTORY:  Spine fracture, cervical, traumatic;    TECHNIQUE:  Low dose axial images, sagittal and coronal reformations were performed though the cervical spine.  Contrast was not administered.    COMPARISON:  07/03/2022    FINDINGS:  Alignment: There is grade 2 anterolisthesis at C2-C3 measuring approximately 6 mm and similar to prior study.    Vertebrae: No acute fracture.  No lytic or blastic lesion.  Mild loss of T2 and T3 anterior vertebral body height, similar to prior study.    Discs: Severe disc height loss with degenerative endplate changes seen throughout the cervical spine.    C1-2: Dens is intact.  Pre-dens space is maintained.  Thickening and calcification of the transverse ligament noted.    Skull base and craniocervical junction: Degenerative changes of the craniocervical junction.    Degenerative findings:    C2-C3: Malalignment with posterior disc osteophyte complex, uncovertebral spurring, and severe facet arthropathy result in moderate spinal canal stenosis and severe left, mild right neural foraminal narrowing.    C3-C4: Posterior disc osteophyte complex with uncovertebral spurring and severe left facet arthropathy result in severe spinal canal stenosis and moderate left, mild right neural foraminal narrowing.    C4-C5: Posterior disc osteophyte complex with uncovertebral spurring and moderate facet arthropathy result in mild spinal canal stenosis and mild left  neural foraminal narrowing.    C5-C6: Right posterior disc osteophyte complex with uncovertebral spurring and mild facet arthropathy result in mild spinal canal stenosis.    C6-C7: Uncovertebral spurring results in mild right neural foraminal narrowing.    C7-T1: Posterior disc osteophyte complex noted.  No spinal canal stenosis or neural foraminal narrowing.    Paraspinal muscles & soft tissues: Unremarkable.                                       X-Ray Hips Bilateral 2 View Incl AP Pelvis (Final result)  Result time 07/07/22 08:48:04      Final result by Chase Dunne MD (07/07/22 08:48:04)                   Impression:      See above      Electronically signed by: Chase Dunne MD  Date:    07/07/2022  Time:    08:48               Narrative:    EXAMINATION:  XR HIPS BILATERAL 2 VIEW INCL AP PELVIS    CLINICAL HISTORY:  Unspecified fall, initial encounter    TECHNIQUE:  AP view of the pelvis and frogleg lateral views of both hips were performed.    COMPARISON:  05/13/2022    FINDINGS:  Patient has a trans sacral screw present in good position.  Degenerative changes seen in both hips but no fractures of the hips are obvious

## 2022-07-07 NOTE — ED NOTES
Pt care assumed from Brianna and Valorie, Saúl. Pt is resting comfortably on a stretcher. NAD noted, VSS. Pt disoriented to time, per previous RN, this is pt's baseline. Pt reoriented to ED and why she is here. Bed low and locked, side rails up x2. Will continue to monitor.

## 2022-07-07 NOTE — ED NOTES
Got the OK from neuro for the pt to eat, pt given sandwich and milk. Pt tolerated food with no issues.

## 2022-07-07 NOTE — PHARMACY MED REC
"Admission Medication History     The home medication history was taken by Cecilia Galdamez.    You may go to "Admission" then "Reconcile Home Medications" tabs to review and/or act upon these items.      The home medication list has been updated by the Pharmacy department.    Please read ALL comments highlighted in yellow.    Please address this information as you see fit.     Feel free to contact us if you have any questions or require assistance.      Medications listed below were obtained from: Medical records  Medication Sig    acetaminophen (TYLENOL) 325 MG tablet Take 2 tablets (650 mg total) by mouth every 6 (six) hours as needed for Pain.    albuterol (PROVENTIL/VENTOLIN HFA) 90 mcg/actuation inhaler Inhale 2 puffs into the lungs every 6 (six) hours as needed for Wheezing. Rescue    aspirin (ECOTRIN) 81 MG EC tablet Take 1 tablet (81 mg total) by mouth once daily. (Patient taking differently: Take 81 mg by mouth once daily. Stop after 8/18/22)    calcium-vitamin D3-vitamin K (VIACTIV) 650 mg-12.5 mcg-40 mcg Chew Chew 1 by mouth twice daily    docusate calcium (SURFAK) 240 mg capsule Take 240 mg by mouth 3 (three) times daily.    donepeziL (ARICEPT) 10 MG tablet Take 1 tablet (10 mg total) by mouth once daily.    famotidine (PEPCID) 40 MG tablet Take 1 tablet (40 mg total) by mouth once daily.    ferrous sulfate 325 (65 FE) MG EC tablet Take 1 tablet (325 mg total) by mouth once daily.    guaiFENesin (MUCINEX) 600 mg 12 hr tablet Take 2 tablets (1,200 mg total) by mouth 2 (two) times daily. (Patient taking differently: Take 1,200 mg by mouth every 12 (twelve) hours.)    Immune Globulin G, IGG,-PRO-IGA 10 % injection, Privigen, (PRIVIGEN) 10 % Soln Infuse 20 g into the vein every 4 weeks.    levothyroxine (SYNTHROID) 88 MCG tablet Take 1 tablet (88 mcg total) by mouth before breakfast.    loratadine (CLARITIN) 10 mg tablet Take 1 tablet (10 mg total) by mouth once daily.    methocarbamoL " (ROBAXIN) 500 MG Tab Take 1 tablet (500 mg total) by mouth 3 (three) times daily as needed (Musce spasms).    montelukast (SINGULAIR) 10 mg tablet Take 1 tablet (10 mg total) by mouth every evening.    pedi multivit no.7/folic acid (FLINTSTONES MULTI-VIT GUMMIES ORAL) Chew 2 gummies by mouth every day    polyethylene glycol (GLYCOLAX) 17 gram PwPk Take 17 g by mouth once daily.    pumpkin seed extract/soy germ (AZO BLADDER CONTROL ORAL) Take 1 tablet by mouth 2 (two) times a day.    sulfamethoxazole-trimethoprim 800-160mg (BACTRIM DS) 800-160 mg Tab Take 1 tablet by mouth 2 (two) times daily. for 7 days             Cecilia Galdamez  EXT [id number]                    .

## 2022-07-07 NOTE — HOSPITAL COURSE
7/7: NSGY consulted for L temporal SAH. Floor status, monitoring progression of SAH and clinical signs/symptoms  78: Neuro stable. MRI cervical spine shows severe cord compression as well as C2 on C3 anterolisthesis. Multiple compression fractures of the thoracic spine, worst at T8 however chronic appearing. CTH stable, CTA without aneurysm. Patient poor historian due to Alzheimer's disease.   7/9: NAEON. Pending surgery next week. AM labs pending.   7/10: NAEON. AFVSS. Exam stable. Tentative OR 7/13 for C1-C3 PCF. Cleared for OR by HM.   7/11: NAEON. AFVSS. Labs stable. Seen by Psychiatry yesterday, no acute intervention. Patient is in better spirits today. Neuro exam stable. Denies neck or back pain. Oriented x 3.  and daughter at bedside, all wish to proceed with surgical intervention 7/13.  7/12: Placed in restraints overnight due to attempts to get OOB. Further discussed surgical need/timing with patient and . Given that she does not have s/s acute cervical myelopathy will hold off on urgent surgical decompression until she is better optimized. With the clavicle fracture and NWB status it will be difficult to undergo rehab and therapy after a posterior cervical fusion. Her current cognitive status (hx Alzheimers dementia with current intermittent delirium), poor nutrition, and frail appearance would also make her post-operative course more challenging. Patient and  v/u. Will work on discharge planning once medically stable.  7/13: NAEON. AFVSS. Calm and cooperative on exam, no attempts to climb out of bed. Oriented x 4. Sodium improved after IVF. She states she has a little pain over the left clavicle, otherwise no pain. Pediatric aspen collar fitted to patient, wear when OOB. Pending discharge to SNF.  7/14: NAEON. AFVSS. Oriented x 2. Neuro exam stable. Pending discharge to SNF.  7/15: NAEON. AFVSS. Pt calm and cooperative this am, remains out of restraints w/o incident. Ongoing delirium  per baseline, oriented x 2. Reports some mild pain to L shoulder/clavicle upon waking, o/w no complaints. Participating with therapy sessions. Neuro exam stable. Pending placement vs discharge to home assisted living facility.  7/16: TIFFANIE. YUNS. Pt remains neuro stable, eating breakfast, dementia per baseline. No acute complaints this am, states mild shoulder pain resolved w/ Tylenol. Planning for discharge to home facility nursing unit on Monday.  7/17: OSVALDO MALCOLMS. Neuro stable. Asking for crackers at bedside.   7/18: TIFFANIE. RADHAVSS. Neuro stable. Planning for discharge today to SNF pending bed availability.

## 2022-07-07 NOTE — ED PROVIDER NOTES
Encounter Date: 7/7/2022       History     Chief Complaint   Patient presents with    Fall     Pt had unwitnessed fall in bathroom-found by .  Pt lives at [facility].  Hx alzheimers-at baseline.  Ruddynaeem MARIA D     Yvette Crews is a 72 F hx of AVNRT, gait disturbance with frequent falls, recent admission from 7/3-7/5 for fall, left distal clavicle fx brought in by EMS for fall.  Hx obtained by  via phone who states he heard a thump around 8:30 AM.  He went to the bathroom and found the patient on the floor, it appeared she slipped on the rug.  She seemed more confused than baseline but answered most questions appropriately.  She is supposed to use a walker at baseline but now is NWB to left arm due to the clavicle fx.  states she had a rough night, she was awake most of it and was uncomfortable. She has been tying to wear the sling but it often falls down.  She was scheduled for a follow up with ortho today regarding her fracture.  He states she has been eating well and doing ok since discharge.         Review of patient's allergies indicates:   Allergen Reactions    Adhesive      Tape tears skin    Buspar [buspirone]      headaches    Escitalopram oxalate Nausea Only     hyponatremia      Rifampin Rash     Past Medical History:   Diagnosis Date    Adult bronchiectasis     Age-related osteoporosis with current pathological fracture with routine healing 8/28/2013    Amblyopia     Anxiety     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    Cataract     Cellulitis of right lower extremity 1/19/2021    Chondrocalcinosis, cause unspecified, involving hand(712.34) 7/12/2011     Dx updated per 2019 IMO Load    Chronic sinusitis 4/6/2017    Colonic constipation 6/5/2013    Concussion 10/27/2016    Frequent falls 3/14/2017    Gait disturbance 3/14/2017    History of cardiac radiofrequency ablation (RFA) 2/3/2018    History of cardiac  radiofrequency ablation (RFA) 2/3/2018    History of subarachnoid hemorrhage 10/30/2016    Hypogammaglobulinemia 9/7/2011    Irritable bowel syndrome 12/18/2015    Major depressive disorder, recurrent episode, in full remission 8/19/2010    Onychomycosis     Osteoarthritis     Postoperative hypothyroidism 12/18/2015    Presence of Watchman left atrial appendage closure device 5/14/2022    Rectal prolapse 6/5/2013    Reflux esophagitis 2/7/2010    Status post thyroidectomy 6/1/2017    Strabismus     SVT (supraventricular tachycardia) 11/22/2013    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     Underweight 1/23/2013     Past Surgical History:   Procedure Laterality Date    BREAST CYST ASPIRATION      x2    CATARACT EXTRACTION W/  INTRAOCULAR LENS IMPLANT Left 3/11/2019    Procedure: EXTRACTION, CATARACT, WITH IOL INSERTION;  Surgeon: Vera Sams MD;  Location: [facility];  Service: Ophthalmology;  Laterality: Left;    CATARACT EXTRACTION W/  INTRAOCULAR LENS IMPLANT Right 4/15/2019    Procedure: EXTRACTION, CATARACT, WITH IOL INSERTION LASER;  Surgeon: Vera Sams MD;  Location: [facility] OR;  Service: Ophthalmology;  Laterality: Right;    COLON SURGERY      EYE SURGERY      FRACTURE SURGERY      NASAL SEPTUM SURGERY      OCCLUSION OF LEFT ATRIAL APPENDAGE N/A 2/18/2022    Procedure: Left atrial appendage occlusion;  Surgeon: Chase Gill MD;  Location: [facility] EP LAB;  Service: Cardiology;  Laterality: N/A;  afib, watchman, BSCI, osiris, anes, MB/EH, 3prep    OPEN REDUCTION AND INTERNAL FIXATION (ORIF) OF INJURY OF SACROILIAC JOINT Bilateral 1/20/2021    Procedure: ORIF, SACROILIAC JOINT;  Surgeon: Alcides Tamez MD;  Location: [address] FLR;  Service: Orthopedics;  Laterality: Bilateral;    RADIOFREQUENCY ABLATION      RECTAL PROLAPSE REPAIR  june 2013    STRABISMUS SURGERY      TONSILLECTOMY      TRANSESOPHAGEAL ECHOCARDIOGRAPHY N/A 2/18/2022    Procedure: ECHOCARDIOGRAM,  TRANSESOPHAGEAL;  Surgeon: [facility] Diagnostic Provider;  Location: [facility] EP LAB;  Service: Cardiology;  Laterality: N/A;     Family History   Problem Relation Age of Onset    Heart disease Father     Stroke Father     Heart disease Brother     Breast cancer Paternal Grandmother     Melanoma Neg Hx     Psoriasis Neg Hx     Lupus Neg Hx     Eczema Neg Hx     Amblyopia Neg Hx     Blindness Neg Hx     Cataracts Neg Hx     Glaucoma Neg Hx     Macular degeneration Neg Hx     Retinal detachment Neg Hx     Strabismus Neg Hx      Social History     Tobacco Use    Smoking status: Never Smoker    Smokeless tobacco: Never Used   Substance Use Topics    Alcohol use: No    Drug use: No     Review of Systems   Unable to perform ROS: Mental status change       Physical Exam     Initial Vitals   BP Pulse Resp Temp SpO2   07/07/22 0740 07/07/22 0740 07/07/22 0740 07/07/22 0833 07/07/22 0740   115/60 65 16 97.8 °F (36.6 °C) 99 %      MAP       --                Physical Exam    Nursing note and vitals reviewed.  Constitutional: No distress.   Thin, elderly female   HENT:   - scalp hematoma     Eyes: Conjunctivae and EOM are normal. Pupils are equal, round, and reactive to light. No scleral icterus.   (+) ptosis of left eyelid     Neck: No JVD present.   Cardiovascular: Normal rate, regular rhythm and intact distal pulses.   Pulmonary/Chest: Breath sounds normal. No respiratory distress. She has no wheezes. She has no rales.   Abdominal: Abdomen is soft. Bowel sounds are normal. She exhibits no distension. There is no abdominal tenderness. There is no rebound.   Musculoskeletal:      Comments: (+) dressing to left posterior shoulder- c/d/i       Lymphadenopathy:     She has no cervical adenopathy.   Neurological: She is alert.   Oriented to person, place, month, and president     Skin: Skin is warm. No rash noted.   (+) scattered ecchymosis over the extremities in various stages of healing.   (+) right skin abrasion w/  superficial skin tear         ED Course   Procedures  Labs Reviewed   CBC W/ AUTO DIFFERENTIAL - Abnormal; Notable for the following components:       Result Value    RBC 3.50 (*)     Hemoglobin 11.3 (*)     Hematocrit 34.9 (*)      (*)     MCH 32.3 (*)     RDW 15.5 (*)     Lymph # 0.8 (*)     Gran % 78.5 (*)     Lymph % 13.7 (*)     All other components within normal limits   COMPREHENSIVE METABOLIC PANEL - Abnormal; Notable for the following components:    Sodium 134 (*)     BUN 29 (*)     Albumin 3.3 (*)     AST 49 (*)     All other components within normal limits   URINALYSIS, REFLEX TO URINE CULTURE   TYPE & SCREEN          Imaging Results          CT Head Without Contrast (Final result)  Result time 07/07/22 09:56:31    Final result by Dougie Pineda MD (07/07/22 09:56:31)                 Impression:      Small focus of likely subarachnoid hemorrhage along the medial left temporal lobe.      Electronically signed by: Dougie Pineda  Date:    07/07/2022  Time:    09:56             Narrative:    EXAMINATION:  CT HEAD WITHOUT CONTRAST    CLINICAL HISTORY:  Head trauma, moderate-severe;    TECHNIQUE:  Low dose axial images were obtained through the head.  Coronal and sagittal reformations were also performed. Contrast was not administered.    COMPARISON:  07/03/2022    FINDINGS:  There is a small focus of increased density along the medial left temporal lobe that likely represents a small focus of subarachnoid hemorrhage.  No definite parenchymal changes are identified.  No hydrocephalus mass effect or acute territorial infarct.  No calvarial fracture.    Decreased attenuation the periventricular white matter is nonspecific but may reflect mild to moderate chronic small vessel ischemic change with stable volume loss.    These findings were communicated to Dr. Alonzo at 09:55 on 07/07/2022                               CT Cervical Spine Without Contrast (Final result)  Result time 07/07/22 09:58:34     Final result by Jose Alfredo Nicole MD (07/07/22 09:58:34)                 Impression:      1. No acute fracture.  2. Stable appearance of mild T2 and T3 anterior vertebral body height loss.  3. Stable appearance of grade 2 anterolisthesis at C2-C3.  4. Multilevel degenerative changes of the cervical spine.  Moderate to severe spinal canal stenosis and left-sided neural foraminal narrowing noted at C2-C4.      Electronically signed by: Jose Alfredo Nicole MD  Date:    07/07/2022  Time:    09:58             Narrative:    EXAMINATION:  CT CERVICAL SPINE WITHOUT CONTRAST    CLINICAL HISTORY:  Spine fracture, cervical, traumatic;    TECHNIQUE:  Low dose axial images, sagittal and coronal reformations were performed though the cervical spine.  Contrast was not administered.    COMPARISON:  07/03/2022    FINDINGS:  Alignment: There is grade 2 anterolisthesis at C2-C3 measuring approximately 6 mm and similar to prior study.    Vertebrae: No acute fracture.  No lytic or blastic lesion.  Mild loss of T2 and T3 anterior vertebral body height, similar to prior study.    Discs: Severe disc height loss with degenerative endplate changes seen throughout the cervical spine.    C1-2: Dens is intact.  Pre-dens space is maintained.  Thickening and calcification of the transverse ligament noted.    Skull base and craniocervical junction: Degenerative changes of the craniocervical junction.    Degenerative findings:    C2-C3: Malalignment with posterior disc osteophyte complex, uncovertebral spurring, and severe facet arthropathy result in moderate spinal canal stenosis and severe left, mild right neural foraminal narrowing.    C3-C4: Posterior disc osteophyte complex with uncovertebral spurring and severe left facet arthropathy result in severe spinal canal stenosis and moderate left, mild right neural foraminal narrowing.    C4-C5: Posterior disc osteophyte complex with uncovertebral spurring and moderate facet arthropathy result in mild  spinal canal stenosis and mild left neural foraminal narrowing.    C5-C6: Right posterior disc osteophyte complex with uncovertebral spurring and mild facet arthropathy result in mild spinal canal stenosis.    C6-C7: Uncovertebral spurring results in mild right neural foraminal narrowing.    C7-T1: Posterior disc osteophyte complex noted.  No spinal canal stenosis or neural foraminal narrowing.    Paraspinal muscles & soft tissues: Unremarkable.                               X-Ray Hips Bilateral 2 View Incl AP Pelvis (Final result)  Result time 07/07/22 08:48:04    Final result by Chase Dunne MD (07/07/22 08:48:04)                 Impression:      See above      Electronically signed by: Chase Dunne MD  Date:    07/07/2022  Time:    08:48             Narrative:    EXAMINATION:  XR HIPS BILATERAL 2 VIEW INCL AP PELVIS    CLINICAL HISTORY:  Unspecified fall, initial encounter    TECHNIQUE:  AP view of the pelvis and frogleg lateral views of both hips were performed.    COMPARISON:  05/13/2022    FINDINGS:  Patient has a trans sacral screw present in good position.  Degenerative changes seen in both hips but no fractures of the hips are obvious                                 Medications - No data to display  Medical Decision Making:   History:   Old Medical Records: I decided to obtain old medical records.  Initial Assessment:   Emergent evaluation a 72-year-old female presenting today with unwitnessed fall, initially confused which is now improving.    Vital signs stable      Differential Diagnosis:   Intracranial hemorrhage, skull fracture, scalp hematoma, electrolyte derangement, concussion, acute kidney injury, orthostatic hypotension, arrhythmia  Independently Interpreted Test(s):   I have ordered and independently interpreted X-rays - see prior notes.  I have ordered and independently interpreted EKG Reading(s) - see prior notes  Clinical Tests:   Lab Tests: Reviewed and Ordered  Radiological  Study: Ordered and Reviewed  Medical Tests: Ordered and Reviewed  ED Management:  - labs  - CT head, cervical spine  - EKG  Other:   I have discussed this case with another health care provider.       <> Summary of the Discussion: NSY             ED Course as of 07/08/22 0900   Thu Jul 07, 2022   1001 Notified by radiology that patient has an acute traumatic subarachnoid hemorrhage..  Neurosurgery consulted [GM]   1001 Impression:     Small focus of likely subarachnoid hemorrhage along the medial left temporal lobe.        Electronically signed by: Dougie Pineda  Date:                                            07/07/2022  Time:                                           09:56 [GM]   1002 Impression:     1. No acute fracture.  2. Stable appearance of mild T2 and T3 anterior vertebral body height loss.  3. Stable appearance of grade 2 anterolisthesis at C2-C3.  4. Multilevel degenerative changes of the cervical spine.  Moderate to severe spinal canal stenosis and left-sided neural foraminal narrowing noted at C2-C4.        Electronically signed by: Jose Alfredo Nicole MD  Date:                                            07/07/2022  Time:                                           09:58 [GM]   1002 WBC: 6.14 [GM]   1002 BUN(!): 29 [GM]   1002 Creatinine: 0.7 [GM]   1009 Type and screen ordered.     Updated    [GM]   1239 Neurosurgery evaluated patient at bedside, will admit for further treatment and evaluation. []      ED Course User Index  [GM] Jossy Alonzo MD          Critical Care time:35 minutes inclusive of direct patient care, review of previous records, interpretation of labs, imaging and ekg, as well as discussion of my impression and plan of care with the patient, family and other clinicians/consultants. This time is exclusive of any separate billable procedures and of treating other patients.      Clinical Impression:   Final diagnoses:  [W19.XXXA] Fall  [I48.91] A-fib  [S06.6X1A] Traumatic subarachnoid  hemorrhage with loss of consciousness of 30 minutes or less, initial encounter (Primary)          ED Disposition Condition    Admit               Jossy Alonzo MD  07/08/22 0901

## 2022-07-07 NOTE — ASSESSMENT & PLAN NOTE
71yo F PMH AVNRT, frequent falls, presenting as neurosurgery consult due to L temporal SAH noted on CTH in AM. Per patient's  was found down after hearing fall at 0830. More confusion present than baseline. Patient states she takes aspirin and plavix but hasn't taken the plavix in a while. She is supposed to use a walker at baseline but now is NWB to left arm due a left clavicle fx 7/3. CT c-spine showed C2-C3 anterolisthesis    --Patient admitted to floor       -- q4h neurochecks on floor  --All labs and diagnostics reviewed       --CTH 7/7: L temporal SAH       --CT c-spine 7/7: anterolisthesis C2-C3  --Follow-up Rpx CTH 6h scan for stability  --F/U MRI C/T-spine  --SBP <160  --Na >135  --HOB >30  --Hold plavix/aspirin  --Continue to monitor clinically, notify NSGY immediately with any changes in neuro status    Dispo: floor, if stable tomorrow and scans have no cause for concern can d/c tomorrow

## 2022-07-08 ENCOUNTER — PATIENT MESSAGE (OUTPATIENT)
Dept: PRIMARY CARE CLINIC | Facility: CLINIC | Age: 72
End: 2022-07-08
Payer: MEDICARE

## 2022-07-08 PROBLEM — Z01.818 PRE-OPERATIVE EXAM: Status: ACTIVE | Noted: 2022-07-08

## 2022-07-08 LAB
ANION GAP SERPL CALC-SCNC: 5 MMOL/L (ref 8–16)
APTT BLDCRRT: 26.8 SEC (ref 21–32)
BASOPHILS # BLD AUTO: 0.01 K/UL (ref 0–0.2)
BASOPHILS NFR BLD: 0.2 % (ref 0–1.9)
BUN SERPL-MCNC: 30 MG/DL (ref 8–23)
CALCIUM SERPL-MCNC: 8.8 MG/DL (ref 8.7–10.5)
CHLORIDE SERPL-SCNC: 104 MMOL/L (ref 95–110)
CO2 SERPL-SCNC: 25 MMOL/L (ref 23–29)
CREAT SERPL-MCNC: 0.7 MG/DL (ref 0.5–1.4)
DIFFERENTIAL METHOD: ABNORMAL
EOSINOPHIL # BLD AUTO: 0 K/UL (ref 0–0.5)
EOSINOPHIL NFR BLD: 0.2 % (ref 0–8)
ERYTHROCYTE [DISTWIDTH] IN BLOOD BY AUTOMATED COUNT: 15.5 % (ref 11.5–14.5)
EST. GFR  (AFRICAN AMERICAN): >60 ML/MIN/1.73 M^2
EST. GFR  (NON AFRICAN AMERICAN): >60 ML/MIN/1.73 M^2
GLUCOSE SERPL-MCNC: 106 MG/DL (ref 70–110)
HCT VFR BLD AUTO: 33 % (ref 37–48.5)
HGB BLD-MCNC: 10.7 G/DL (ref 12–16)
IMM GRANULOCYTES # BLD AUTO: 0.01 K/UL (ref 0–0.04)
IMM GRANULOCYTES NFR BLD AUTO: 0.2 % (ref 0–0.5)
INR PPP: 1 (ref 0.8–1.2)
LYMPHOCYTES # BLD AUTO: 0.8 K/UL (ref 1–4.8)
LYMPHOCYTES NFR BLD: 14.1 % (ref 18–48)
MCH RBC QN AUTO: 31.8 PG (ref 27–31)
MCHC RBC AUTO-ENTMCNC: 32.4 G/DL (ref 32–36)
MCV RBC AUTO: 98 FL (ref 82–98)
MONOCYTES # BLD AUTO: 0.4 K/UL (ref 0.3–1)
MONOCYTES NFR BLD: 6.9 % (ref 4–15)
NEUTROPHILS # BLD AUTO: 4.4 K/UL (ref 1.8–7.7)
NEUTROPHILS NFR BLD: 78.4 % (ref 38–73)
NRBC BLD-RTO: 0 /100 WBC
PLATELET # BLD AUTO: 223 K/UL (ref 150–450)
PMV BLD AUTO: 10.4 FL (ref 9.2–12.9)
POTASSIUM SERPL-SCNC: 3.7 MMOL/L (ref 3.5–5.1)
PROTHROMBIN TIME: 10.7 SEC (ref 9–12.5)
RBC # BLD AUTO: 3.37 M/UL (ref 4–5.4)
SODIUM SERPL-SCNC: 134 MMOL/L (ref 136–145)
WBC # BLD AUTO: 5.54 K/UL (ref 3.9–12.7)

## 2022-07-08 PROCEDURE — 99223 1ST HOSP IP/OBS HIGH 75: CPT | Mod: GC,,, | Performed by: HOSPITALIST

## 2022-07-08 PROCEDURE — 97165 OT EVAL LOW COMPLEX 30 MIN: CPT

## 2022-07-08 PROCEDURE — 99232 SBSQ HOSP IP/OBS MODERATE 35: CPT | Mod: ,,, | Performed by: PHYSICIAN ASSISTANT

## 2022-07-08 PROCEDURE — 99223 1ST HOSP IP/OBS HIGH 75: CPT | Mod: ,,, | Performed by: ORTHOPAEDIC SURGERY

## 2022-07-08 PROCEDURE — 85610 PROTHROMBIN TIME: CPT | Performed by: PHYSICIAN ASSISTANT

## 2022-07-08 PROCEDURE — 99232 PR SUBSEQUENT HOSPITAL CARE,LEVL II: ICD-10-PCS | Mod: ,,, | Performed by: PHYSICIAN ASSISTANT

## 2022-07-08 PROCEDURE — 97530 THERAPEUTIC ACTIVITIES: CPT

## 2022-07-08 PROCEDURE — 85730 THROMBOPLASTIN TIME PARTIAL: CPT | Performed by: PHYSICIAN ASSISTANT

## 2022-07-08 PROCEDURE — 99223 PR INITIAL HOSPITAL CARE,LEVL III: ICD-10-PCS | Mod: ,,, | Performed by: ORTHOPAEDIC SURGERY

## 2022-07-08 PROCEDURE — 25000003 PHARM REV CODE 250

## 2022-07-08 PROCEDURE — 85025 COMPLETE CBC W/AUTO DIFF WBC: CPT

## 2022-07-08 PROCEDURE — 97162 PT EVAL MOD COMPLEX 30 MIN: CPT

## 2022-07-08 PROCEDURE — 25500020 PHARM REV CODE 255: Performed by: NEUROLOGICAL SURGERY

## 2022-07-08 PROCEDURE — 11000001 HC ACUTE MED/SURG PRIVATE ROOM

## 2022-07-08 PROCEDURE — 80048 BASIC METABOLIC PNL TOTAL CA: CPT

## 2022-07-08 PROCEDURE — 25000003 PHARM REV CODE 250: Performed by: PHYSICIAN ASSISTANT

## 2022-07-08 PROCEDURE — 63600175 PHARM REV CODE 636 W HCPCS: Performed by: STUDENT IN AN ORGANIZED HEALTH CARE EDUCATION/TRAINING PROGRAM

## 2022-07-08 PROCEDURE — 99223 PR INITIAL HOSPITAL CARE,LEVL III: ICD-10-PCS | Mod: GC,,, | Performed by: HOSPITALIST

## 2022-07-08 PROCEDURE — 36415 COLL VENOUS BLD VENIPUNCTURE: CPT | Performed by: PHYSICIAN ASSISTANT

## 2022-07-08 PROCEDURE — 36415 COLL VENOUS BLD VENIPUNCTURE: CPT

## 2022-07-08 RX ORDER — SULFAMETHOXAZOLE AND TRIMETHOPRIM 400; 80 MG/1; MG/1
1 TABLET ORAL 2 TIMES DAILY
Status: DISPENSED | OUTPATIENT
Start: 2022-07-08 | End: 2022-07-12

## 2022-07-08 RX ORDER — HEPARIN SODIUM 5000 [USP'U]/ML
5000 INJECTION, SOLUTION INTRAVENOUS; SUBCUTANEOUS EVERY 8 HOURS
Status: DISCONTINUED | OUTPATIENT
Start: 2022-07-08 | End: 2022-07-18 | Stop reason: HOSPADM

## 2022-07-08 RX ORDER — FAMOTIDINE 20 MG/1
20 TABLET, FILM COATED ORAL DAILY
Status: DISCONTINUED | OUTPATIENT
Start: 2022-07-09 | End: 2022-07-11

## 2022-07-08 RX ADMIN — FAMOTIDINE 40 MG: 20 TABLET ORAL at 10:07

## 2022-07-08 RX ADMIN — LEVOTHYROXINE SODIUM 88 MCG: 88 TABLET ORAL at 06:07

## 2022-07-08 RX ADMIN — SULFAMETHOXAZOLE AND TRIMETHOPRIM 1 TABLET: 400; 80 TABLET ORAL at 08:07

## 2022-07-08 RX ADMIN — SULFAMETHOXAZOLE AND TRIMETHOPRIM 1 TABLET: 800; 160 TABLET ORAL at 10:07

## 2022-07-08 RX ADMIN — FERROUS SULFATE TAB 325 MG (65 MG ELEMENTAL FE) 1 EACH: 325 (65 FE) TAB at 10:07

## 2022-07-08 RX ADMIN — DONEPEZIL HYDROCHLORIDE 10 MG: 10 TABLET ORAL at 09:07

## 2022-07-08 RX ADMIN — MONTELUKAST 10 MG: 10 TABLET, FILM COATED ORAL at 08:07

## 2022-07-08 RX ADMIN — IOHEXOL 100 ML: 350 INJECTION, SOLUTION INTRAVENOUS at 03:07

## 2022-07-08 NOTE — CONSULTS
Devan Juarez - Neurosurgery ([facility])  Hospital Medicine  Consult Note    Patient Name: Yvette Crews  MRN: [id number]  Admission Date: 7/7/2022  Hospital Length of Stay: 1 days  Attending Physician: Mario Alberto King MD   Primary Care Provider: Sally Green MD           Patient information was obtained from patient, spouse/SO, past medical records and ER records.     Inpatient consult to Hospital Medicine-General  Consult performed by: Kristi Maldonado,   Consult ordered by: Nahed Booker PA-C        Subjective:     Principal Problem: SAH (subarachnoid hemorrhage)    Chief Complaint:   Chief Complaint   Patient presents with    Fall     Pt had unwitnessed fall in bathroom-found by .  Pt lives at [facility].  Hx alzheimers-at baseline.  Denies LOC        HPI: Mrs. Crews is a 71 yo woman with paroxysmal atrial fibrillation s/p Watchman device, hypertension, bronchiectasis, Alzheimer's, hypogammaglobulinemia (on monthly IVIG last treatment 7/1), acquired hypothyroidism after total thyroidectomy, and history of SAH s/p fall in 2016 presenting with a subarachnoid hemorrhage after a fall. Per patient's , she was found down in the bathroom yesterday morning but patient was alert. Unclear cause of her fall. She had another fall the week prior which resulted in a clavicular fracture. Patient's  reports patient is more confused than her baseline. She lives in an assisted living facility and is usually able to ambulate around the center with a walker, and do ADLs on her own. Neurosurgery is planning on surgical intervention and hospital medicine was consulted for pre-op evaluation.       Past Medical History:   Diagnosis Date    Adult bronchiectasis     Age-related osteoporosis with current pathological fracture with routine healing 8/28/2013    Amblyopia     Anxiety     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    Cataract      Cellulitis of right lower extremity 1/19/2021    Chondrocalcinosis, cause unspecified, involving hand(712.34) 7/12/2011     Dx updated per 2019 IMO Load    Chronic sinusitis 4/6/2017    Colonic constipation 6/5/2013    Concussion 10/27/2016    Frequent falls 3/14/2017    Gait disturbance 3/14/2017    History of cardiac radiofrequency ablation (RFA) 2/3/2018    History of cardiac radiofrequency ablation (RFA) 2/3/2018    History of subarachnoid hemorrhage 10/30/2016    Hypogammaglobulinemia 9/7/2011    Irritable bowel syndrome 12/18/2015    Major depressive disorder, recurrent episode, in full remission 8/19/2010    Onychomycosis     Osteoarthritis     Postoperative hypothyroidism 12/18/2015    Presence of Watchman left atrial appendage closure device 5/14/2022    Rectal prolapse 6/5/2013    Reflux esophagitis 2/7/2010    Status post thyroidectomy 6/1/2017    Strabismus     SVT (supraventricular tachycardia) 11/22/2013    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     Underweight 1/23/2013       Past Surgical History:   Procedure Laterality Date    BREAST CYST ASPIRATION      x2    CATARACT EXTRACTION W/  INTRAOCULAR LENS IMPLANT Left 3/11/2019    Procedure: EXTRACTION, CATARACT, WITH IOL INSERTION;  Surgeon: Vera Sams MD;  Location: [facility] OR;  Service: Ophthalmology;  Laterality: Left;    CATARACT EXTRACTION W/  INTRAOCULAR LENS IMPLANT Right 4/15/2019    Procedure: EXTRACTION, CATARACT, WITH IOL INSERTION LASER;  Surgeon: Vera Sams MD;  Location: [facility] OR;  Service: Ophthalmology;  Laterality: Right;    COLON SURGERY      EYE SURGERY      FRACTURE SURGERY      NASAL SEPTUM SURGERY      OCCLUSION OF LEFT ATRIAL APPENDAGE N/A 2/18/2022    Procedure: Left atrial appendage occlusion;  Surgeon: Chase Gill MD;  Location: [facility] EP LAB;  Service: Cardiology;  Laterality: N/A;  afib, watchman, BSCI, osiris, anes, MB/EH, 3prep    OPEN REDUCTION AND INTERNAL FIXATION (ORIF) OF  INJURY OF SACROILIAC JOINT Bilateral 1/20/2021    Procedure: ORIF, SACROILIAC JOINT;  Surgeon: Alcides Tamez MD;  Location: [facility] OR [address] [place] [address];  Service: Orthopedics;  Laterality: Bilateral;    RADIOFREQUENCY ABLATION      RECTAL PROLAPSE REPAIR  june 2013    STRABISMUS SURGERY      TONSILLECTOMY      TRANSESOPHAGEAL ECHOCARDIOGRAPHY N/A 2/18/2022    Procedure: ECHOCARDIOGRAM, TRANSESOPHAGEAL;  Surgeon: Nils Diagnostic Provider;  Location: [facility] EP LAB;  Service: Cardiology;  Laterality: N/A;       Review of patient's allergies indicates:   Allergen Reactions    Adhesive      Tape tears skin    Buspar [buspirone]      headaches    Escitalopram oxalate Nausea Only     hyponatremia      Rifampin Rash       Current Facility-Administered Medications on File Prior to Encounter   Medication    balanced salt irrigation solution 1 drop    moxifloxacin 0.5 % ophthalmic solution 1 drop    phenylephrine HCL 2.5% ophthalmic solution 1 drop    sodium chloride 0.9% bolus 1,000 mL    sodium chloride 0.9% bolus 1,000 mL    sodium chloride 0.9% bolus 1,000 mL    tropicamide 1% ophthalmic solution 1 drop     Current Outpatient Medications on File Prior to Encounter   Medication Sig    acetaminophen (TYLENOL) 325 MG tablet Take 2 tablets (650 mg total) by mouth every 6 (six) hours as needed for Pain.    albuterol (PROVENTIL/VENTOLIN HFA) 90 mcg/actuation inhaler Inhale 2 puffs into the lungs every 6 (six) hours as needed for Wheezing. Rescue    aspirin (ECOTRIN) 81 MG EC tablet Take 1 tablet (81 mg total) by mouth once daily. (Patient taking differently: Take 81 mg by mouth once daily. Stop after 8/18/22)    calcium-vitamin D3-vitamin K (VIACTIV) 650 mg-12.5 mcg-40 mcg Chew Chew 1 by mouth twice daily    docusate calcium (SURFAK) 240 mg capsule Take 240 mg by mouth 3 (three) times daily.    donepeziL (ARICEPT) 10 MG tablet Take 1 tablet (10 mg total) by mouth once daily.    famotidine (PEPCID) 40 MG  tablet Take 1 tablet (40 mg total) by mouth once daily.    ferrous sulfate 325 (65 FE) MG EC tablet Take 1 tablet (325 mg total) by mouth once daily.    guaiFENesin (MUCINEX) 600 mg 12 hr tablet Take 2 tablets (1,200 mg total) by mouth 2 (two) times daily. (Patient taking differently: Take 1,200 mg by mouth every 12 (twelve) hours.)    Immune Globulin G, IGG,-PRO-IGA 10 % injection, Privigen, (PRIVIGEN) 10 % Soln Infuse 20 g into the vein every 4 weeks.    levothyroxine (SYNTHROID) 88 MCG tablet Take 1 tablet (88 mcg total) by mouth before breakfast.    loratadine (CLARITIN) 10 mg tablet Take 1 tablet (10 mg total) by mouth once daily.    methocarbamoL (ROBAXIN) 500 MG Tab Take 1 tablet (500 mg total) by mouth 3 (three) times daily as needed (Musce spasms).    montelukast (SINGULAIR) 10 mg tablet Take 1 tablet (10 mg total) by mouth every evening.    pedi multivit no.7/folic acid (FLINTSTONES MULTI-VIT GUMMIES ORAL) Chew 2 gummies by mouth every day    polyethylene glycol (GLYCOLAX) 17 gram PwPk Take 17 g by mouth once daily.    pumpkin seed extract/soy germ (AZO BLADDER CONTROL ORAL) Take 1 tablet by mouth 2 (two) times a day.    sulfamethoxazole-trimethoprim 800-160mg (BACTRIM DS) 800-160 mg Tab Take 1 tablet by mouth 2 (two) times daily. for 7 days    [DISCONTINUED] clopidogreL (PLAVIX) 75 mg tablet Take 1 tablet (75 mg total) by mouth once daily.    [DISCONTINUED] XARELTO 20 mg Tab TAKE 1 TAB BY MOUTH EVERY EVENING WITH DINNER (BEGIN AFTER ELIQUIS IS COMPLETED)     Family History       Problem Relation (Age of Onset)    Breast cancer Paternal Grandmother    Heart disease Father, Brother    Stroke Father          Tobacco Use    Smoking status: Never Smoker    Smokeless tobacco: Never Used   Substance and Sexual Activity    Alcohol use: No    Drug use: No    Sexual activity: Not Currently     Review of Systems   Constitutional:  Negative for activity change and unexpected weight change.   HENT:   Negative for sore throat and voice change.    Eyes:  Negative for visual disturbance.   Respiratory:  Negative for cough and shortness of breath.    Cardiovascular:  Negative for chest pain and palpitations.   Gastrointestinal:  Negative for abdominal pain, constipation, diarrhea and vomiting.   Genitourinary:  Negative for difficulty urinating and urgency.   Musculoskeletal:  Negative for arthralgias and myalgias.   Skin:  Negative for wound.   Neurological:  Negative for light-headedness and headaches.   Objective:     Vital Signs (Most Recent):  Temp: 98.2 °F (36.8 °C) (07/08/22 1541)  Pulse: 68 (07/08/22 1541)  Resp: 16 (07/08/22 1541)  BP: (!) 155/74 (07/08/22 1541)  SpO2: 99 % (07/08/22 1541)   Vital Signs (24h Range):  Temp:  [96.3 °F (35.7 °C)-98.3 °F (36.8 °C)] 98.2 °F (36.8 °C)  Pulse:  [61-97] 68  Resp:  [14-20] 16  SpO2:  [95 %-99 %] 99 %  BP: ()/(53-95) 155/74     Weight: 40 kg (88 lb 2.9 oz)  Body mass index is 16.12 kg/m².    Physical Exam  Constitutional:       Appearance: Normal appearance.   HENT:      Head: Normocephalic and atraumatic.      Mouth/Throat:      Mouth: Mucous membranes are moist.   Eyes:      Extraocular Movements: Extraocular movements intact.   Neck:      Thyroid: No thyromegaly.      Trachea: No tracheal deviation.   Cardiovascular:      Rate and Rhythm: Normal rate and regular rhythm.      Heart sounds: No murmur heard.  Pulmonary:      Effort: Pulmonary effort is normal.      Breath sounds: Normal breath sounds. No wheezing or rales.   Abdominal:      General: Abdomen is flat. Bowel sounds are normal.      Palpations: Abdomen is soft. There is no mass.      Tenderness: There is no abdominal tenderness.   Musculoskeletal:      Comments: Shoulder brace in place   Skin:     General: Skin is warm and dry.      Coloration: Skin is not pale.   Neurological:      Mental Status: She is alert.      Sensory: No sensory deficit.      Coordination: Coordination normal.      Comments:  Oriented to self and year  Follows commands  Moving all extremities       Significant Labs: All pertinent labs within the past 24 hours have been reviewed.  CBC:   Recent Labs   Lab 07/07/22  0901 07/08/22  0926   WBC 6.14 5.54   HGB 11.3* 10.7*   HCT 34.9* 33.0*    223     CMP:   Recent Labs   Lab 07/07/22  0901 07/08/22  0926   * 134*   K 4.5 3.7    104   CO2 23 25   GLU 84 106   BUN 29* 30*   CREATININE 0.7 0.7   CALCIUM 9.0 8.8   PROT 7.2  --    ALBUMIN 3.3*  --    BILITOT 0.3  --    ALKPHOS 72  --    AST 49*  --    ALT 26  --    ANIONGAP 9 5*   EGFRNONAA >60.0 >60.0       Significant Imaging: I have reviewed all pertinent imaging results/findings within the past 24 hours.    Assessment/Plan:     * SAH (subarachnoid hemorrhage)  Management per primary  Possible surgical intervention 7/13/22    Pre-operative exam  Surgical Risk Assessment:    Active Cardiac Issues:  Active decompensated heart failure? No   Unstable angina?  No   Significant uncontrolled arrhythmias? No   Severe valvular heart disease-Aortic or Mitral Stenosis? No   Recent MI or coronary revascularization < 30 days? No     Cardiac Risk Factors:  High risk surgery? Yes   History of CAD/ischemic heart disease? No   History of cerebrovascular disease? No   History of compensated heart failure? No   Type 2 diabetes requiring insulin? No   Serum Creatinine > 2? No   Total cardiac risk factors 1     Functional mets <4    < 4 METs -unable to walk > 2 blocks on level ground without stopping due to symptoms  - eating, dressing, toileting, walking indoors, light housework. POOR   > 4 METs -climbing > 1 flight of stairs without stopping  -walking up hill > 1-2 blocks  -scrubbing floors  -moving furniture  - golf, bowling, dancing or tennis  -running short distance MODERATE to EXCELLENT     Perioperative Risk Assessment:  RCRI Score 1, Class II risk with 6% risk of cardiopulmonary complications      Perioperative Management Recommendations:     Patient is a high risk surgical candidate with:  Chronic bronchiectasis, stable on room air.  Paroxysmal atrial fibrillation s/p watchman device  No acute renal disease  On Bactrim for open clavicular wound  Patient does not meet 4 functional METS      Fracture of left clavicle  Evaluated by orthopedics, no surgical intervention indicated      Iron deficiency anemia  Monitor CBC  Continue home iron supplememt      Paroxysmal atrial fibrillation  Patient with Paroxysmal (<7 days) atrial fibrillation which is controlled currently without medications. Patient is currently in sinus rhythm.[id number] Score: 2. Anticoagulation not indicated due to watchman device.  EKG 7/7/22 showed normal sinus rhythm.   JAMEL showed EF of 60%        Postoperative hypothyroidism  Acquired hypothyroidism with hx of MNG s/p total thyroidectomy 1/25/17    -Continue synthroid    Adult bronchiectasis  Chronic, stable  On singulair and mucinex  Patient saturating well on room air        VTE Risk Mitigation (From admission, onward)         Ordered     IP VTE HIGH RISK PATIENT  Once         07/07/22 1322     Place sequential compression device  Until discontinued         07/07/22 1322                    Thank you for your consult. I will follow-up with patient. Please contact us if you have any additional questions.    Kristi Maldonado,   Department of Hospital Medicine   [facility] - Neurosurgery ([facility])

## 2022-07-08 NOTE — SUBJECTIVE & OBJECTIVE
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    Cataract     Cellulitis of right lower extremity 1/19/2021    Chondrocalcinosis, cause unspecified, involving hand(712.34) 7/12/2011     Dx updated per 2019 IMO Load    Chronic sinusitis 4/6/2017    Colonic constipation 6/5/2013    Concussion 10/27/2016    Frequent falls 3/14/2017    Gait disturbance 3/14/2017    History of cardiac radiofrequency ablation (RFA) 2/3/2018    History of cardiac radiofrequency ablation (RFA) 2/3/2018    History of subarachnoid hemorrhage 10/30/2016    Hypogammaglobulinemia 9/7/2011    Irritable bowel syndrome 12/18/2015    Major depressive disorder, recurrent episode, in full remission 8/19/2010    Onychomycosis     Osteoarthritis     Postoperative hypothyroidism 12/18/2015    Presence of Watchman left atrial appendage closure device 5/14/2022    Rectal prolapse 6/5/2013    Reflux esophagitis 2/7/2010    Status post thyroidectomy 6/1/2017    Strabismus     SVT (supraventricular tachycardia) 11/22/2013    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     Underweight 1/23/2013       Past Surgical History:   Procedure Laterality Date    BREAST CYST ASPIRATION      x2    CATARACT EXTRACTION W/  INTRAOCULAR LENS IMPLANT Left 3/11/2019    Procedure: EXTRACTION, CATARACT, WITH IOL INSERTION;  Surgeon: Vera Sams MD;  Location: [facility] OR;  Service: Ophthalmology;  Laterality: Left;    CATARACT EXTRACTION W/  INTRAOCULAR LENS IMPLANT Right 4/15/2019    Procedure: EXTRACTION, CATARACT, WITH IOL INSERTION LASER;  Surgeon: Vera Sams MD;  Location: [facility] OR;  Service: Ophthalmology;  Laterality: Right;    COLON SURGERY      EYE SURGERY      FRACTURE SURGERY      NASAL SEPTUM SURGERY      OCCLUSION OF LEFT ATRIAL APPENDAGE  N/A 2/18/2022    Procedure: Left atrial appendage occlusion;  Surgeon: Chase Gill MD;  Location: [facility] EP LAB;  Service: Cardiology;  Laterality: N/A;  afib, watchman, BSCI, osiris, anes, MB/EH, 3prep    OPEN REDUCTION AND INTERNAL FIXATION (ORIF) OF INJURY OF SACROILIAC JOINT Bilateral 1/20/2021    Procedure: ORIF, SACROILIAC JOINT;  Surgeon: Alcides Tamez MD;  Location: [facility] OR [address] [place] [address];  Service: Orthopedics;  Laterality: Bilateral;    RADIOFREQUENCY ABLATION      RECTAL PROLAPSE REPAIR  june 2013    STRABISMUS SURGERY      TONSILLECTOMY      TRANSESOPHAGEAL ECHOCARDIOGRAPHY N/A 2/18/2022    Procedure: ECHOCARDIOGRAM, TRANSESOPHAGEAL;  Surgeon: Nils Diagnostic Provider;  Location: [facility] EP LAB;  Service: Cardiology;  Laterality: N/A;       Review of patient's allergies indicates:   Allergen Reactions    Adhesive      Tape tears skin    Buspar [buspirone]      headaches    Escitalopram oxalate Nausea Only     hyponatremia      Rifampin Rash       Current Facility-Administered Medications on File Prior to Encounter   Medication    balanced salt irrigation solution 1 drop    moxifloxacin 0.5 % ophthalmic solution 1 drop    phenylephrine HCL 2.5% ophthalmic solution 1 drop    sodium chloride 0.9% bolus 1,000 mL    sodium chloride 0.9% bolus 1,000 mL    sodium chloride 0.9% bolus 1,000 mL    tropicamide 1% ophthalmic solution 1 drop     Current Outpatient Medications on File Prior to Encounter   Medication Sig    acetaminophen (TYLENOL) 325 MG tablet Take 2 tablets (650 mg total) by mouth every 6 (six) hours as needed for Pain.    albuterol (PROVENTIL/VENTOLIN HFA) 90 mcg/actuation inhaler Inhale 2 puffs into the lungs every 6 (six) hours as needed for Wheezing. Rescue    aspirin (ECOTRIN) 81 MG EC tablet Take 1 tablet (81 mg total) by mouth once daily. (Patient taking differently: Take 81 mg by mouth once daily. Stop after 8/18/22)    calcium-vitamin D3-vitamin K (VIACTIV) 650 mg-12.5 mcg-40 mcg Chew  Chew 1 by mouth twice daily    docusate calcium (SURFAK) 240 mg capsule Take 240 mg by mouth 3 (three) times daily.    donepeziL (ARICEPT) 10 MG tablet Take 1 tablet (10 mg total) by mouth once daily.    famotidine (PEPCID) 40 MG tablet Take 1 tablet (40 mg total) by mouth once daily.    ferrous sulfate 325 (65 FE) MG EC tablet Take 1 tablet (325 mg total) by mouth once daily.    guaiFENesin (MUCINEX) 600 mg 12 hr tablet Take 2 tablets (1,200 mg total) by mouth 2 (two) times daily. (Patient taking differently: Take 1,200 mg by mouth every 12 (twelve) hours.)    Immune Globulin G, IGG,-PRO-IGA 10 % injection, Privigen, (PRIVIGEN) 10 % Soln Infuse 20 g into the vein every 4 weeks.    levothyroxine (SYNTHROID) 88 MCG tablet Take 1 tablet (88 mcg total) by mouth before breakfast.    loratadine (CLARITIN) 10 mg tablet Take 1 tablet (10 mg total) by mouth once daily.    methocarbamoL (ROBAXIN) 500 MG Tab Take 1 tablet (500 mg total) by mouth 3 (three) times daily as needed (Musce spasms).    montelukast (SINGULAIR) 10 mg tablet Take 1 tablet (10 mg total) by mouth every evening.    pedi multivit no.7/folic acid (FLINTSTONES MULTI-VIT GUMMIES ORAL) Chew 2 gummies by mouth every day    polyethylene glycol (GLYCOLAX) 17 gram PwPk Take 17 g by mouth once daily.    pumpkin seed extract/soy germ (AZO BLADDER CONTROL ORAL) Take 1 tablet by mouth 2 (two) times a day.    sulfamethoxazole-trimethoprim 800-160mg (BACTRIM DS) 800-160 mg Tab Take 1 tablet by mouth 2 (two) times daily. for 7 days    [DISCONTINUED] clopidogreL (PLAVIX) 75 mg tablet Take 1 tablet (75 mg total) by mouth once daily.    [DISCONTINUED] XARELTO 20 mg Tab TAKE 1 TAB BY MOUTH EVERY EVENING WITH DINNER (BEGIN AFTER ELIQUIS IS COMPLETED)     Family History       Problem Relation (Age of Onset)    Breast cancer Paternal Grandmother    Heart disease Father, Brother    Stroke Father          Tobacco Use    Smoking status: Never Smoker    Smokeless tobacco: Never  Used   Substance and Sexual Activity    Alcohol use: No    Drug use: No    Sexual activity: Not Currently     Review of Systems   Constitutional:  Negative for activity change and unexpected weight change.   HENT:  Negative for sore throat and voice change.    Eyes:  Negative for visual disturbance.   Respiratory:  Negative for cough and shortness of breath.    Cardiovascular:  Negative for chest pain and palpitations.   Gastrointestinal:  Negative for abdominal pain, constipation, diarrhea and vomiting.   Genitourinary:  Negative for difficulty urinating and urgency.   Musculoskeletal:  Negative for arthralgias and myalgias.   Skin:  Negative for wound.   Neurological:  Negative for light-headedness and headaches.   Objective:     Vital Signs (Most Recent):  Temp: 98.2 °F (36.8 °C) (07/08/22 1541)  Pulse: 68 (07/08/22 1541)  Resp: 16 (07/08/22 1541)  BP: (!) 155/74 (07/08/22 1541)  SpO2: 99 % (07/08/22 1541)   Vital Signs (24h Range):  Temp:  [96.3 °F (35.7 °C)-98.3 °F (36.8 °C)] 98.2 °F (36.8 °C)  Pulse:  [61-97] 68  Resp:  [14-20] 16  SpO2:  [95 %-99 %] 99 %  BP: ()/(53-95) 155/74     Weight: 40 kg (88 lb 2.9 oz)  Body mass index is 16.12 kg/m².    Physical Exam  Constitutional:       Appearance: Normal appearance.   HENT:      Head: Normocephalic and atraumatic.      Mouth/Throat:      Mouth: Mucous membranes are moist.   Eyes:      Extraocular Movements: Extraocular movements intact.   Neck:      Thyroid: No thyromegaly.      Trachea: No tracheal deviation.   Cardiovascular:      Rate and Rhythm: Normal rate and regular rhythm.      Heart sounds: No murmur heard.  Pulmonary:      Effort: Pulmonary effort is normal.      Breath sounds: Normal breath sounds. No wheezing or rales.   Abdominal:      General: Abdomen is flat. Bowel sounds are normal.      Palpations: Abdomen is soft. There is no mass.      Tenderness: There is no abdominal tenderness.   Musculoskeletal:      Comments: Shoulder brace in place    Skin:     General: Skin is warm and dry.      Coloration: Skin is not pale.   Neurological:      Mental Status: She is alert.      Sensory: No sensory deficit.      Coordination: Coordination normal.      Comments: Oriented to self and year  Follows commands  Moving all extremities       Significant Labs: All pertinent labs within the past 24 hours have been reviewed.  CBC:   Recent Labs   Lab 07/07/22  0901 07/08/22 0926   WBC 6.14 5.54   HGB 11.3* 10.7*   HCT 34.9* 33.0*    223     CMP:   Recent Labs   Lab 07/07/22  0901 07/08/22  0926   * 134*   K 4.5 3.7    104   CO2 23 25   GLU 84 106   BUN 29* 30*   CREATININE 0.7 0.7   CALCIUM 9.0 8.8   PROT 7.2  --    ALBUMIN 3.3*  --    BILITOT 0.3  --    ALKPHOS 72  --    AST 49*  --    ALT 26  --    ANIONGAP 9 5*   EGFRNONAA >60.0 >60.0       Significant Imaging: I have reviewed all pertinent imaging results/findings within the past 24 hours.

## 2022-07-08 NOTE — PT/OT/SLP EVAL
"Occupational Therapy   Evaluation    Name: Yvette Crews  MRN: [id number]  Admitting Diagnosis:  SAH (subarachnoid hemorrhage)    Recommendations:     Discharge Recommendations: nursing facility, skilled  Discharge Equipment Recommendations:   (TBD)  Barriers to discharge:  None    Assessment:     Yvette Crews is a 72 y.o. female with a medical diagnosis of SAH (subarachnoid hemorrhage).  She presents with confusion, LUE NWB, and general weakness. Pt motivated for therapy and participated well. Performance deficits affecting function: weakness, impaired endurance, impaired functional mobilty, gait instability, impaired balance, decreased ROM, decreased upper extremity function, decreased safety awareness, impaired cognition, impaired self care skills, decreased coordination, orthopedic precautions.      Rehab Prognosis: Fair; patient would benefit from acute skilled OT services to address these deficits and reach maximum level of function.       Plan:     Patient to be seen 3 x/week to address the above listed problems via self-care/home management, therapeutic activities, therapeutic exercises, cognitive retraining  · Plan of Care Expires: 08/08/22  · Plan of Care Reviewed with: patient    Subjective     Chief Complaint: "I'm not going to remember any of this."  "You'll have to write that down"  Patient/Family Comments/goals: For arm to heal    Occupational Profile:  Living Environment: Pt has been a resident at [facility] for the past 6 years with her . Pt has access to [facility] with a chair to sit on.  Previous level of function: Pt was independent with ADLs and used a rollator for mobility. Pt does not drive.  Roles and Routines: Pt is retired. Her hobbies include: painting, drawing, reading, and socializing with friends.   Equipment Used at Home:  rollator, bedside commode (Built in chair in shower)  Assistance upon Discharge: Unknown.    Pain/Comfort:  Pain Rating 1: 0/10    Patients " "cultural, spiritual, Anabaptist conflicts given the current situation: no    Objective:     Communicated with: nurse prior to session.  Patient found HOB elevated with bed alarm, telemetry, PureWick (AVASYS) upon OT entry to room. RN present to watch patient.    General Precautions: Standard, fall   Orthopedic Precautions:LUE non weight bearing   Braces: N/A  Respiratory Status: Room air    Occupational Performance:    Bed Mobility:    · Patient completed Scooting/Bridging with minimum assistance toward EOB and toward HOB  · Patient completed Supine to Sit with minimum assistance  · Patient completed Sit to Supine with minimum assistance    Functional Mobility/Transfers:  · Deferred due to pt with posterior lean and unable to self correct    Activities of Daily Living:  · Grooming: moderate assistance for balance due to posterior lean while pt wiped face with washcloth while seated EOB    Cognitive/Visual Perceptual:  Cognitive/Psychosocial Skills:     -       Oriented to: Person, Place, Time and Situation   -       Follows Commands/attention:Follows one-step commands intermittently. Pt often asked OT to repeat directions or stated "I dont understand"  -       Communication: clear/fluent  -       Memory: Poor immediate recall  -       Safety awareness/insight to disability: impaired     Physical Exam:  Dominant hand:    -       Right  Upper Extremity Range of Motion:     -       Right Upper Extremity: WFL  -       Left Upper Extremity: Deficits: NT due to L clavicular fracture  Upper Extremity Strength:    -       Right Upper Extremity: WFL  -       Left Upper Extremity: Deficits: NT due to L clavicular fracture   Strength:    -       Right Upper Extremity: WFL  -       Left Upper Extremity: Deficits: NT due to L clavicular fracture    AMPAC 6 Click ADL:  AMPAC Total Score: 8    Treatment & Education:  Pt with difficulty maintaining static and dynamic EOB balance requiring Mod A due to posterior lean. Pt given " verbal and tactile cues to lean anteriorly and sit upright but pt did not perform.    Provided education regarding role of OT, POC, & discharge recommendations with pt verbalizing understanding.  Pt had no further questions & when asked whether there were any concerns pt reported none.  Education:    Patient left HOB elevated with all lines intact, call button in reach, bed alarm on, nurse notified    GOALS:   Multidisciplinary Problems     Occupational Therapy Goals        Problem: Occupational Therapy    Goal Priority Disciplines Outcome Interventions   Occupational Therapy Goal     OT, PT/OT Ongoing, Progressing    Description: Goals to be met by: 7/22/2022       Patient will increase functional independence with ADLs by performing:    UE Dressing with Moderate Assistance.  LE Dressing with Moderate Assistance.  Grooming while seated with Moderate Assistance.  Toileting from bedside commode with Moderate Assistance for hygiene and clothing management.   Sitting at edge of bed x10 minutes with Contact Guard Assistance and correcting posterior lean with 2/2 verbal cues.  Supine to sit with Stand-by Assistance.  Step transfer with Mod A                     History:     Past Medical History:   Diagnosis Date    Adult bronchiectasis     Age-related osteoporosis with current pathological fracture with routine healing 8/28/2013    Amblyopia     Anxiety     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    Cataract     Cellulitis of right lower extremity 1/19/2021    Chondrocalcinosis, cause unspecified, involving hand(712.34) 7/12/2011     Dx updated per 2019 IMO Load    Chronic sinusitis 4/6/2017    Colonic constipation 6/5/2013    Concussion 10/27/2016    Frequent falls 3/14/2017    Gait disturbance 3/14/2017    History of cardiac radiofrequency ablation (RFA) 2/3/2018    History of cardiac radiofrequency ablation (RFA) 2/3/2018    History of subarachnoid  hemorrhage 10/30/2016    Hypogammaglobulinemia 9/7/2011    Irritable bowel syndrome 12/18/2015    Major depressive disorder, recurrent episode, in full remission 8/19/2010    Onychomycosis     Osteoarthritis     Postoperative hypothyroidism 12/18/2015    Presence of Watchman left atrial appendage closure device 5/14/2022    Rectal prolapse 6/5/2013    Reflux esophagitis 2/7/2010    Status post thyroidectomy 6/1/2017    Strabismus     SVT (supraventricular tachycardia) 11/22/2013    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     Underweight 1/23/2013         Past Surgical History:   Procedure Laterality Date    BREAST CYST ASPIRATION      x2    CATARACT EXTRACTION W/  INTRAOCULAR LENS IMPLANT Left 3/11/2019    Procedure: EXTRACTION, CATARACT, WITH IOL INSERTION;  Surgeon: Vera Sams MD;  Location: [facility] OR;  Service: Ophthalmology;  Laterality: Left;    CATARACT EXTRACTION W/  INTRAOCULAR LENS IMPLANT Right 4/15/2019    Procedure: EXTRACTION, CATARACT, WITH IOL INSERTION LASER;  Surgeon: Vera Sams MD;  Location: [facility] OR;  Service: Ophthalmology;  Laterality: Right;    COLON SURGERY      EYE SURGERY      FRACTURE SURGERY      NASAL SEPTUM SURGERY      OCCLUSION OF LEFT ATRIAL APPENDAGE N/A 2/18/2022    Procedure: Left atrial appendage occlusion;  Surgeon: Chase Gill MD;  Location: [facility] EP LAB;  Service: Cardiology;  Laterality: N/A;  afib, watchman, BSCI, osiris, anes, MB/EH, 3prep    OPEN REDUCTION AND INTERNAL FIXATION (ORIF) OF INJURY OF SACROILIAC JOINT Bilateral 1/20/2021    Procedure: ORIF, SACROILIAC JOINT;  Surgeon: Alcides Tamez MD;  Location: [address] FLR;  Service: Orthopedics;  Laterality: Bilateral;    RADIOFREQUENCY ABLATION      RECTAL PROLAPSE REPAIR  june 2013    STRABISMUS SURGERY      TONSILLECTOMY      TRANSESOPHAGEAL ECHOCARDIOGRAPHY N/A 2/18/2022    Procedure: ECHOCARDIOGRAM, TRANSESOPHAGEAL;  Surgeon: [facility] Diagnostic Provider;  Location:  [facility];  Service: Cardiology;  Laterality: N/A;       Time Tracking:     OT Date of Treatment: 07/08/22  OT Start Time: 1305  OT Stop Time: 1328  OT Total Time (min): 23 min    Billable Minutes:Evaluation 10  Therapeutic Activity 13    7/8/2022

## 2022-07-08 NOTE — ASSESSMENT & PLAN NOTE
Surgical Risk Assessment:    Active Cardiac Issues:  Active decompensated heart failure? No   Unstable angina?  No   Significant uncontrolled arrhythmias? No   Severe valvular heart disease-Aortic or Mitral Stenosis? No   Recent MI or coronary revascularization < 30 days? No     Cardiac Risk Factors:  High risk surgery? Yes   History of CAD/ischemic heart disease? No   History of cerebrovascular disease? No   History of compensated heart failure? No   Type 2 diabetes requiring insulin? No   Serum Creatinine > 2? No   Total cardiac risk factors 1     Functional mets <4    < 4 METs -unable to walk > 2 blocks on level ground without stopping due to symptoms  - eating, dressing, toileting, walking indoors, light housework. POOR   > 4 METs -climbing > 1 flight of stairs without stopping  -walking up hill > 1-2 blocks  -scrubbing floors  -moving furniture  - golf, bowling, dancing or tennis  -running short distance MODERATE to EXCELLENT     Perioperative Risk Assessment:  RCRI Score 1, Class II risk with 6% risk of cardiopulmonary complications      Perioperative Management Recommendations:    Patient is a high risk surgical candidate with:  Chronic bronchiectasis, stable on room air.  Paroxysmal atrial fibrillation s/p watchman device  No acute renal disease  On Bactrim for open clavicular wound  Patient does not meet 4 functional METS

## 2022-07-08 NOTE — PT/OT/SLP EVAL
Physical Therapy Evaluation    Patient Name:  Yvette Crews   MRN:  [id number]    Recommendations:     Discharge Recommendations:  nursing facility, skilled   Discharge Equipment Recommendations:  (TBD)   Barriers to discharge: increased level of assist required    Assessment:     Yvette Crews is a 72 y.o. female admitted with a medical diagnosis of SAH (subarachnoid hemorrhage).  She presents with the following impairments/functional limitations:  weakness, gait instability, decreased upper extremity function, decreased lower extremity function, impaired balance, impaired endurance, decreased safety awareness, impaired fine motor, impaired self care skills, impaired functional mobilty, impaired cognition, impaired sensation. Pt was pleasantly confused and was able to follow simple commands throughout session. Pt was able to take ~6-7 steps left/right maxA with HH assist due to max posterior lean and needed frequent VC as pt was unable to distinguish right from left. Pt demonstrates poor adherence to WB precautions for L UE. Patient has significant fall history and remains high fall risk and is unsafe to d/c to assisted living facility at this time.     Pt would benefit from a skilled nursing facility for: Dynamic/static standing/sitting balance through skilled balance training, strengthening with the use of skilled therapeutic exercises interventions, and mobility through adaptive equipment training. Pt continues to benefit from a collaborative PT/OT program to improve quality of life and focus on recovery of impairments.    Rehab Prognosis: Good; patient would benefit from acute skilled PT services to address these deficits and reach maximum level of function.    Recent Surgery: Procedure(s) (LRB):  C1-C3 Posterior cervical fusion. depuy. neuromonitoring. sadaf art tirado. (N/A)      Plan:     During this hospitalization, patient to be seen 3 x/week to address the identified rehab impairments  "via gait training, therapeutic activities, therapeutic exercises, neuromuscular re-education and progress toward the following goals:    · Plan of Care Expires:  08/07/22    Subjective     Chief Complaint: none  Patient/Family Comments/goals: "What is the relevance of this" (remote)  "Can you show me an example" - after PT said their name was written on the board  "That's my right leg" (while pointing to therapist's leg)  Pain/Comfort:  Pain Rating 1: 0/10  Pain Rating Post-Intervention 1: 0/10    Patients cultural, spiritual, Sabianist conflicts given the current situation: no    Living Environment (per pt report, no family present at time of eval):  Pt lives with  in assisted living home with 0 ANIRUDH. Assisted living helps with meals, laundry and transportation. Patient reported ~7-8 falls in the last 3 months and said she is able to get up on her own.   Prior to admission, patients level of function was modified independent with rollator for mobility and assist from assisted living staff.  Equipment used at home: rollator, shower chair.  DME owned (not currently used): none.  Upon discharge, patient will have assistance from assisted living staff and daughter (lives close by).    Objective:     Communicated with RN prior to session.  Patient found HOB elevated with bed alarm, telemetry, PureWick, peripheral IV  upon PT entry to room.    General Precautions: Standard, fall   Orthopedic Precautions:LUE non weight bearing   Braces: N/A  Respiratory Status: Room air    Exams:  · Cognitive Exam:  Patient is oriented to Person, Place, Time (when asked date pt respond "222") and Situation  · Fine Motor Coordination:    · -       Intact  RLE heel shin and LLE heel shin  · Gross Motor Coordination:  impaired  · Postural Exam:  Patient presented with the following abnormalities:    · -       Rounded shoulders  · -       Forward head  · -       Kyphosis  · Sensation:    · -       Intact  light/touch BL LE; pt " "reported "maybe" some numbness but unable to report where  · RLE ROM: WFL  · RLE Strength: grossly 5/5, except knee extension 4/5  · LLE ROM: WFL  · LLE Strength: grossly 5/5    Functional Mobility:  · Bed Mobility:     · Scooting: minimum assistance; pt required frequent VC to maintain NWB L UE  · Bridging: supervision  · Supine to Sit: moderate assistance with trunk  · Sit to Supine: minimum assistance  · Transfers:     · Sit to Stand:  minimum assistance with hand-held assist  · Gait: ~6-7 steps left/right maxA with R UE HH assist. Pt demonstrated max posterior lean, decreased ability to weight shift, and decreased BL clearance/shuffling steps. Pt was unable to bring weight anteriorly with VC. Pt required maxA to weight shift and frequent VC to distinguish right from left.   · Balance:   · Static sitting: Joy-CGA with posterior lean  · Dynamic sitting: Joy-CGA with posterior lean  · Static standing: maxA with HH assist  · Dynamic standing: maxA with R UE HH assist    Therapeutic Activities and Exercises:  Patient educated on NWB status of L UE.   Patient educated on role of therapy, goals of session, and benefits of mobilizing.   Discussed PT plan of care during hospitalization.    Communication board up to date.  All questions answered within PT scope of practice.      AM-PAC 6 CLICK MOBILITY  Total Score:12     Patient left HOB elevated with all lines intact, call button in reach, bed alarm on and RN notified.    GOALS:   Multidisciplinary Problems     Physical Therapy Goals        Problem: Physical Therapy    Goal Priority Disciplines Outcome Goal Variances Interventions   Physical Therapy Goal     PT, PT/OT Ongoing, Progressing     Description: Goals to be met by: 2022    Patient will increase functional independence with mobility by performin. Supine to sit with Contact Guard Assistance  2. Sit to supine with Stand-by Assistance  3. Rolling to Left and Right with Stand-by Assistance.  4. Sit to " stand transfer with Stand-by Assistance  5. Bed to chair transfer with Minimal Assistance using LRAD  6. Gait  x 20 feet with Minimal Assistance using LRAD  7. Lower extremity exercise program x10 reps per handout, with supervision as needed                      History:     Past Medical History:   Diagnosis Date    Adult bronchiectasis     Age-related osteoporosis with current pathological fracture with routine healing 8/28/2013    Amblyopia     Anxiety     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    Cataract     Cellulitis of right lower extremity 1/19/2021    Chondrocalcinosis, cause unspecified, involving hand(712.34) 7/12/2011     Dx updated per 2019 IMO Load    Chronic sinusitis 4/6/2017    Colonic constipation 6/5/2013    Concussion 10/27/2016    Frequent falls 3/14/2017    Gait disturbance 3/14/2017    History of cardiac radiofrequency ablation (RFA) 2/3/2018    History of cardiac radiofrequency ablation (RFA) 2/3/2018    History of subarachnoid hemorrhage 10/30/2016    Hypogammaglobulinemia 9/7/2011    Irritable bowel syndrome 12/18/2015    Major depressive disorder, recurrent episode, in full remission 8/19/2010    Onychomycosis     Osteoarthritis     Postoperative hypothyroidism 12/18/2015    Presence of Watchman left atrial appendage closure device 5/14/2022    Rectal prolapse 6/5/2013    Reflux esophagitis 2/7/2010    Status post thyroidectomy 6/1/2017    Strabismus     SVT (supraventricular tachycardia) 11/22/2013    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     Underweight 1/23/2013       Past Surgical History:   Procedure Laterality Date    BREAST CYST ASPIRATION      x2    CATARACT EXTRACTION W/  INTRAOCULAR LENS IMPLANT Left 3/11/2019    Procedure: EXTRACTION, CATARACT, WITH IOL INSERTION;  Surgeon: Vera Sams MD;  Location: [facility];  Service: Ophthalmology;  Laterality: Left;    CATARACT EXTRACTION W/  INTRAOCULAR  LENS IMPLANT Right 4/15/2019    Procedure: EXTRACTION, CATARACT, WITH IOL INSERTION LASER;  Surgeon: Vera Sams MD;  Location: [facility];  Service: Ophthalmology;  Laterality: Right;    COLON SURGERY      EYE SURGERY      FRACTURE SURGERY      NASAL SEPTUM SURGERY      OCCLUSION OF LEFT ATRIAL APPENDAGE N/A 2/18/2022    Procedure: Left atrial appendage occlusion;  Surgeon: Chase Gill MD;  Location: [facility] EP LAB;  Service: Cardiology;  Laterality: N/A;  afib, watchman, BSCI, osiris, anes, MB/EH, 3prep    OPEN REDUCTION AND INTERNAL FIXATION (ORIF) OF INJURY OF SACROILIAC JOINT Bilateral 1/20/2021    Procedure: ORIF, SACROILIAC JOINT;  Surgeon: Alcides Tamez MD;  Location: [address];  Service: Orthopedics;  Laterality: Bilateral;    RADIOFREQUENCY ABLATION      RECTAL PROLAPSE REPAIR  june 2013    STRABISMUS SURGERY      TONSILLECTOMY      TRANSESOPHAGEAL ECHOCARDIOGRAPHY N/A 2/18/2022    Procedure: ECHOCARDIOGRAM, TRANSESOPHAGEAL;  Surgeon: [facility] Diagnostic Provider;  Location: [facility] EP LAB;  Service: Cardiology;  Laterality: N/A;       Time Tracking:     PT Received On: 07/08/22  PT Start Time: 1343     PT Stop Time: 1409  PT Total Time (min): 26 min     Billable Minutes: Evaluation 12 and Therapeutic Activity 14      07/08/2022

## 2022-07-08 NOTE — HPI
Yvette Crews is a 72 y.o. female with PMH significant for paroxisomal afib, hypothyroidism, AD, bronciectasis, hypogammaglobulinemia/CVID, osteoporosis, frequent falls, and grade I open left distal clavicle fracture (poke hole open) recently seen and treated at [facility] admitted to the hospital after an unwitnessed ground level fall yesterday.  Patient became more confused after this fall and was brought to the ED for evaluation. Patient found to have a left temporal subarachnoid hemorrhage.  Neurosurgery was consulted and admitted the patient for monitoring.  Repeat CT scan showed that the subarachnoid hemorrhage is stable.    Orthopedics was consulted for repeat evaluation of left clavicle fracture during this admission.  Per report from the , the patient has not been tolerating or wearing the sling well.  She is also unable to ambulate with a walker due to the nonweightbearing status to the left upper extremity.

## 2022-07-08 NOTE — SUBJECTIVE & OBJECTIVE
Interval History: Neuro stable. MRI cervical spine shows severe cord compression as well as C2 on C3 anterolisthesis. Multiple compression fractures of the thoracic spine, worst at T8 however chronic appearing. CTH stable, CTA without aneurysm. Patient poor historian due to Alzheimer's disease.     Medications:  Continuous Infusions:  Scheduled Meds:   donepeziL  10 mg Oral Daily    famotidine  40 mg Oral Daily    ferrous sulfate  1 tablet Oral Daily    levothyroxine  88 mcg Oral Before breakfast    montelukast  10 mg Oral QHS    sulfamethoxazole-trimethoprim 800-160mg  1 tablet Oral BID     PRN Meds:acetaminophen, albuterol, dextrose 10%, dextrose 10%, glucagon (human recombinant), glucose, glucose, glucose, methocarbamoL     Review of Systems  Objective:     Weight: 40 kg (88 lb 2.9 oz)  Body mass index is 14,796.39 kg/m².  Vital Signs (Most Recent):  Temp: 97.7 °F (36.5 °C) (07/08/22 0745)  Pulse: 68 (07/08/22 0745)  Resp: 18 (07/08/22 0745)  BP: (!) 99/53 (07/08/22 0745)  SpO2: 97 % (07/08/22 0745)   Vital Signs (24h Range):  Temp:  [96.8 °F (36 °C)-99.2 °F (37.3 °C)] 97.7 °F (36.5 °C)  Pulse:  [] 68  Resp:  [14-20] 18  SpO2:  [95 %-100 %] 97 %  BP: ()/(53-82) 99/53                     Female External Urinary Catheter 07/07/22 1500 (Active)       Physical Exam    Neurosurgery Physical Exam  General: well developed, well nourished, no distress.   Head: normocephalic  Neurologic: Alert and oriented. Thought content appropriate, higher order confusion present  GCS: Motor: 6/Verbal: 5/Eyes: 4 GCS Total: 15  Mental Status: Awake, Alert, Oriented x 4, with higher order confusion  Language: No aphasia  Speech: No dysarthria  Cranial nerves: face symmetric, tongue midline, CN II-XII grossly intact.   Eyes: pupils equal, round, reactive to light with accommodation, EOMI. Mild ptosis noted to left eye   Pulmonary: normal respirations, no signs of respiratory distress  Abdomen: soft, non-distended, not tender  to palpation  Skin: Skin is warm, dry and intact.  Sensory: responds to light touch throughout    Motor Strength: Motor exam limited 2/2 patient cooperation. Moves all extremities spontaneously, increased tone to BUE, at least 3/5 in BUE. BLE 4/5. No abnormal movements seen.     Pain limited motion in left shoulder 2/2 clavicle fracture      Reflexes:   Plascencia's: positive bilaterally     Cerebellar:   Finger-to-nose: unable to perform 2/2 spasticity in BUE and difficulty following complex command  Pronator drift: unable to perform 2/2 spasticity in BUE and difficulty following complex command        Significant Labs:  Recent Labs   Lab 07/07/22  0901 07/08/22  0926   GLU 84 106   * 134*   K 4.5 3.7    104   CO2 23 25   BUN 29* 30*   CREATININE 0.7 0.7   CALCIUM 9.0 8.8     Recent Labs   Lab 07/07/22  0901 07/08/22  0926   WBC 6.14 5.54   HGB 11.3* 10.7*   HCT 34.9* 33.0*    223     No results for input(s): LABPT, INR, APTT in the last 48 hours.  Microbiology Results (last 7 days)       ** No results found for the last 168 hours. **          All pertinent labs from the last 24 hours have been reviewed.    Significant Diagnostics:  CT Head Without Contrast    Result Date: 7/7/2022  Stable trace left temporal subarachnoid hemorrhage. No new hemorrhage or infarction. Electronically signed by: Juan Alberto Vargas MD Date:    07/07/2022 Time:    15:57    MRI Cervical Spine Without Contrast    Result Date: 7/7/2022  1. Compression fracture of the T8 vertebral body, with near complete vertebral height loss and 6 mm retropulsion of the fracture fragments.  No significant central canal stenosis or abnormal cord signal at this level.  Overall appearance similar to prior. 2. Redemonstration of 6 mm anterolisthesis of C2 on C3.  Short-segment of STIR signal hyperintensity, extending from the C2-C3 to C3-C4 levels, concerning for cord edema/malacia. 3. Similar appearance compression deformities of the T2, T3, T10,  L2 vertebral bodies.  L3 compression fracture outside the field of view of this study. 4. Degenerative changes as above.  Moderate to severe canal stenosis at C2-C4.  Severe left neural foraminal narrowing at C2-C3. 5.  Additional findings as above. This report was flagged in Epic as abnormal. Electronically signed by resident: Charles Bennett Date:    07/07/2022 Time:    21:18 Electronically signed by: Krishna Torres MD Date:    07/07/2022 Time:    22:18    MRI Thoracic Spine Without Contrast    Result Date: 7/7/2022  1. Compression fracture of the T8 vertebral body, with near complete vertebral height loss and 6 mm retropulsion of the fracture fragments.  No significant central canal stenosis or abnormal cord signal at this level.  Overall appearance similar to prior. 2. Redemonstration of 6 mm anterolisthesis of C2 on C3.  Short-segment of STIR signal hyperintensity, extending from the C2-C3 to C3-C4 levels, concerning for cord edema/malacia. 3. Similar appearance compression deformities of the T2, T3, T10, L2 vertebral bodies.  L3 compression fracture outside the field of view of this study. 4. Degenerative changes as above.  Moderate to severe canal stenosis at C2-C4.  Severe left neural foraminal narrowing at C2-C3. 5.  Additional findings as above. This report was flagged in Epic as abnormal. Electronically signed by resident: Charles Bennett Date:    07/07/2022 Time:    21:18 Electronically signed by: Krishna Torres MD Date:    07/07/2022 Time:    22:18    X-Ray Cervical Spine Flexion And Extension Only    Result Date: 7/8/2022  As above. Electronically signed by: Krishna Torres MD Date:    07/08/2022 Time:    00:01    CTA Head and Neck (xpd)    Result Date: 7/8/2022  1. Evolving appearance of trace subarachnoid hemorrhage along the medial left temporal lobe, overall unchanged from exam.  No significant interval detrimental change compared to prior head CT of 07/07/2022. 2. Allowing for motion artifact, CTA head and  neck appears to be within normal limits. 3. Fluid in the esophagus at the level of the aortic arch.  Correlation for reflux/dysmotility. 4. Redemonstration of anterolisthesis of C2 on C3 and compression deformities of the T2 and T3 vertebral bodies, unchanged. 5.  Additional findings as above. This report was flagged in Epic as abnormal. Electronically signed by resident: Charles Bennett Date:    07/08/2022 Time:    05:06 Electronically signed by: Marisol Santiago MD Date:    07/08/2022 Time:    07:10

## 2022-07-08 NOTE — ASSESSMENT & PLAN NOTE
Patient with Paroxysmal (<7 days) atrial fibrillation which is controlled currently without medications. Patient is currently in sinus rhythm.[id number] Score: 2. Anticoagulation not indicated due to watchman device.  EKG 7/7/22 showed normal sinus rhythm.   JAMEL showed EF of 60%

## 2022-07-08 NOTE — PLAN OF CARE
Problem: Occupational Therapy  Goal: Occupational Therapy Goal  Description: Goals to be met by: 7/22/2022       Patient will increase functional independence with ADLs by performing:    UE Dressing with Moderate Assistance.  LE Dressing with Moderate Assistance.  Grooming while seated with Moderate Assistance.  Toileting from bedside commode with Moderate Assistance for hygiene and clothing management.   Sitting at edge of bed x10 minutes with Contact Guard Assistance and correcting posterior lean with 2/2 verbal cues.  Supine to sit with Stand-by Assistance.  Step transfer with Mod A    Outcome: Ongoing, Progressing   OT eval completed

## 2022-07-08 NOTE — PROGRESS NOTES
Devan Juarez - Neurosurgery ([facility])  Neurosurgery  Progress Note    Subjective:     History of Present Illness: 71yo F PMH AVNRT, frequent falls, presenting as neurosurgery consult due to L temporal SAH noted on CTH in AM. Per patient's  was found down after hearing fall at 0830. More confusion present than baseline. Patient states she takes aspirin and plavix but hasn't taken the plavix in a while. She is supposed to use a walker at baseline but now is NWB to left arm due a left clavicle fx 7/3. CT c-spine showed C2-C3 anterolisthesis        Post-Op Info:  * No surgery found *         Interval History: Neuro stable. MRI cervical spine shows severe cord compression as well as C2 on C3 anterolisthesis. Multiple compression fractures of the thoracic spine, worst at T8 however chronic appearing. CTH stable, CTA without aneurysm. Patient poor historian due to Alzheimer's disease.     Medications:  Continuous Infusions:  Scheduled Meds:   donepeziL  10 mg Oral Daily    famotidine  40 mg Oral Daily    ferrous sulfate  1 tablet Oral Daily    levothyroxine  88 mcg Oral Before breakfast    montelukast  10 mg Oral QHS    sulfamethoxazole-trimethoprim 800-160mg  1 tablet Oral BID     PRN Meds:acetaminophen, albuterol, dextrose 10%, dextrose 10%, glucagon (human recombinant), glucose, glucose, glucose, methocarbamoL     Review of Systems  Objective:     Weight: 40 kg (88 lb 2.9 oz)  Body mass index is 14,796.39 kg/m².  Vital Signs (Most Recent):  Temp: 97.7 °F (36.5 °C) (07/08/22 0745)  Pulse: 68 (07/08/22 0745)  Resp: 18 (07/08/22 0745)  BP: (!) 99/53 (07/08/22 0745)  SpO2: 97 % (07/08/22 0745)   Vital Signs (24h Range):  Temp:  [96.8 °F (36 °C)-99.2 °F (37.3 °C)] 97.7 °F (36.5 °C)  Pulse:  [] 68  Resp:  [14-20] 18  SpO2:  [95 %-100 %] 97 %  BP: ()/(53-82) 99/53                     Female External Urinary Catheter 07/07/22 1500 (Active)       Physical Exam    Neurosurgery Physical Exam  General: well  developed, well nourished, no distress.   Head: normocephalic  Neurologic: Alert and oriented. Thought content appropriate, higher order confusion present  GCS: Motor: 6/Verbal: 5/Eyes: 4 GCS Total: 15  Mental Status: Awake, Alert, Oriented x 4, with higher order confusion  Language: No aphasia  Speech: No dysarthria  Cranial nerves: face symmetric, tongue midline, CN II-XII grossly intact.   Eyes: pupils equal, round, reactive to light with accommodation, EOMI. Mild ptosis noted to left eye   Pulmonary: normal respirations, no signs of respiratory distress  Abdomen: soft, non-distended, not tender to palpation  Skin: Skin is warm, dry and intact.  Sensory: responds to light touch throughout    Motor Strength: Motor exam limited 2/2 patient cooperation. Moves all extremities spontaneously, increased tone to BUE, at least 3/5 in BUE. BLE 4/5. No abnormal movements seen.     Pain limited motion in left shoulder 2/2 clavicle fracture      Reflexes:   Plascencia's: positive bilaterally     Cerebellar:   Finger-to-nose: unable to perform 2/2 spasticity in BUE and difficulty following complex command  Pronator drift: unable to perform 2/2 spasticity in BUE and difficulty following complex command        Significant Labs:  Recent Labs   Lab 07/07/22  0901 07/08/22  0926   GLU 84 106   * 134*   K 4.5 3.7    104   CO2 23 25   BUN 29* 30*   CREATININE 0.7 0.7   CALCIUM 9.0 8.8     Recent Labs   Lab 07/07/22  0901 07/08/22  0926   WBC 6.14 5.54   HGB 11.3* 10.7*   HCT 34.9* 33.0*    223     No results for input(s): LABPT, INR, APTT in the last 48 hours.  Microbiology Results (last 7 days)       ** No results found for the last 168 hours. **          All pertinent labs from the last 24 hours have been reviewed.    Significant Diagnostics:  CT Head Without Contrast    Result Date: 7/7/2022  Stable trace left temporal subarachnoid hemorrhage. No new hemorrhage or infarction. Electronically signed by: Juan Alberto  MD Sam Date:    07/07/2022 Time:    15:57    MRI Cervical Spine Without Contrast    Result Date: 7/7/2022  1. Compression fracture of the T8 vertebral body, with near complete vertebral height loss and 6 mm retropulsion of the fracture fragments.  No significant central canal stenosis or abnormal cord signal at this level.  Overall appearance similar to prior. 2. Redemonstration of 6 mm anterolisthesis of C2 on C3.  Short-segment of STIR signal hyperintensity, extending from the C2-C3 to C3-C4 levels, concerning for cord edema/malacia. 3. Similar appearance compression deformities of the T2, T3, T10, L2 vertebral bodies.  L3 compression fracture outside the field of view of this study. 4. Degenerative changes as above.  Moderate to severe canal stenosis at C2-C4.  Severe left neural foraminal narrowing at C2-C3. 5.  Additional findings as above. This report was flagged in Epic as abnormal. Electronically signed by resident: Charles Bennett Date:    07/07/2022 Time:    21:18 Electronically signed by: Krishna Torres MD Date:    07/07/2022 Time:    22:18    MRI Thoracic Spine Without Contrast    Result Date: 7/7/2022  1. Compression fracture of the T8 vertebral body, with near complete vertebral height loss and 6 mm retropulsion of the fracture fragments.  No significant central canal stenosis or abnormal cord signal at this level.  Overall appearance similar to prior. 2. Redemonstration of 6 mm anterolisthesis of C2 on C3.  Short-segment of STIR signal hyperintensity, extending from the C2-C3 to C3-C4 levels, concerning for cord edema/malacia. 3. Similar appearance compression deformities of the T2, T3, T10, L2 vertebral bodies.  L3 compression fracture outside the field of view of this study. 4. Degenerative changes as above.  Moderate to severe canal stenosis at C2-C4.  Severe left neural foraminal narrowing at C2-C3. 5.  Additional findings as above. This report was flagged in Epic as abnormal. Electronically signed  by resident: Charles Bennett Date:    07/07/2022 Time:    21:18 Electronically signed by: Krishna Torres MD Date:    07/07/2022 Time:    22:18    X-Ray Cervical Spine Flexion And Extension Only    Result Date: 7/8/2022  As above. Electronically signed by: Krishna Torres MD Date:    07/08/2022 Time:    00:01    CTA Head and Neck (xpd)    Result Date: 7/8/2022  1. Evolving appearance of trace subarachnoid hemorrhage along the medial left temporal lobe, overall unchanged from exam.  No significant interval detrimental change compared to prior head CT of 07/07/2022. 2. Allowing for motion artifact, CTA head and neck appears to be within normal limits. 3. Fluid in the esophagus at the level of the aortic arch.  Correlation for reflux/dysmotility. 4. Redemonstration of anterolisthesis of C2 on C3 and compression deformities of the T2 and T3 vertebral bodies, unchanged. 5.  Additional findings as above. This report was flagged in Epic as abnormal. Electronically signed by resident: Charles Bennett Date:    07/08/2022 Time:    05:06 Electronically signed by: Marisol Santiago MD Date:    07/08/2022 Time:    07:10       Assessment/Plan:     * SAH (subarachnoid hemorrhage)  71yo F PMH AVNRT, frequent falls, presenting as neurosurgery consult due to L temporal SAH noted on CTH in AM. Per patient's  was found down after hearing fall at 0830. More confusion present than baseline. Patient states she takes aspirin and plavix but hasn't taken the plavix in a while. She is supposed to use a walker at baseline but now is NWB to left arm due a left clavicle fx 7/3. CT c-spine showed C2-C3 anterolisthesis    --Patient admitted to floor       -- q4h neurochecks on floor  --All labs and diagnostics reviewed       --CTH 7/7: L temporal SAH, stable on repeat scan       --CTA 7/8 without aneurysm, stable appearing bleed       --CT c-spine 7/7: anterolisthesis C2-C3       --MRI cervical spine 7/7 shows anterlisthesis of C2 on C3 with severe spinal  cord compression C2-4 with cord signal change       --XR flex/ex of cervical spine shows no dynamic instability        --MRI thoracic spine shows remote compression fracture of T2, T3, T10, L2. Severe compression fracture of T8 with almost complete loss of height, chronic appearing.  --Afib: s/p ablation and left atrial appendage occlusion on 2/18/22: Xarelto and Plavix stopped in May per EP physician. Patient still taking ASA, Hold ASA at this time given SAH and possible surgical intervention  --PTT/INR pending  --Left clavicle fracture: fracture from 7/3. Per Ortho recs at that time: NWB to LUE, sling for comfort, bactrim x 7 days for open fracture. Ortho consulted for this admission, appreciate their assistance will follow up updated recs.   --Continue to monitor clinically, notify NSGY immediately with any changes in neuro status    Further recs pending review of imaging with Dr. Fernando Booker PAEssieC  Neurosurgery  Devan Juarez - Neurosurgery ([facility])

## 2022-07-08 NOTE — CONSULTS
Devan Juarez - Neurosurgery ([facility])  Orthopedics  Consult Note    Patient Name: Yvette Crews  MRN: [id number]  Admission Date: 7/7/2022  Hospital Length of Stay: 1 days  Attending Provider: Mario Alberto King MD  Primary Care Provider: Sally Green MD    Patient information was obtained from patient, spouse/SO, ER records and primary team.     Inpatient consult to Orthopedics  Consult performed by: Dandre Avalos MD  Consult ordered by: Nahed Booker PA-C        Subjective:     Principal Problem:SAH (subarachnoid hemorrhage)    Chief Complaint:   Chief Complaint   Patient presents with    Fall     Pt had unwitnessed fall in bathroom-found by .  Pt lives at [facility].  Hx alzheimers-at baseline.  Denies LOC        HPI: Yvette Crews is a 72 y.o. female with PMH significant for paroxisomal afib, hypothyroidism, AD, bronciectasis, hypogammaglobulinemia/CVID, osteoporosis, frequent falls, and grade I open left distal clavicle fracture (poke hole open) recently [place] and [place] [facility] [place] to [place] hospital after an unwitnessed ground level fall yesterday.  Patient became more confused after this fall and was brought to the ED for evaluation. Patient found to have a left temporal subarachnoid hemorrhage.  Neurosurgery was consulted and admitted the patient for monitoring.  Repeat CT scan showed that the subarachnoid hemorrhage is stable.    Orthopedics was consulted for repeat evaluation of left clavicle fracture during this admission.  Per report from the , the patient has not been tolerating or wearing the sling well.  She is also unable to ambulate with a walker due to the nonweightbearing status to the left upper extremity.        Past Medical History:   Diagnosis Date    Adult bronchiectasis     Age-related osteoporosis with current pathological fracture with routine healing 8/28/2013    Amblyopia     Anxiety     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    Cataract     Cellulitis of right lower extremity 1/19/2021    Chondrocalcinosis, cause unspecified, involving hand(712.34) 7/12/2011     Dx updated per 2019 IMO Load    Chronic sinusitis 4/6/2017    Colonic constipation 6/5/2013    Concussion 10/27/2016    Frequent falls 3/14/2017    Gait disturbance 3/14/2017    History of cardiac radiofrequency ablation (RFA) 2/3/2018    History of cardiac radiofrequency ablation (RFA) 2/3/2018    History of subarachnoid hemorrhage 10/30/2016    Hypogammaglobulinemia 9/7/2011    Irritable bowel syndrome 12/18/2015    Major depressive disorder, recurrent episode, in full remission 8/19/2010    Onychomycosis     Osteoarthritis     Postoperative hypothyroidism 12/18/2015    Presence of Watchman left atrial appendage closure device 5/14/2022    Rectal prolapse 6/5/2013    Reflux esophagitis 2/7/2010    Status post thyroidectomy 6/1/2017    Strabismus     SVT (supraventricular tachycardia) 11/22/2013    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     Underweight 1/23/2013       Past Surgical History:   Procedure Laterality Date    BREAST CYST ASPIRATION      x2    CATARACT EXTRACTION W/  INTRAOCULAR LENS IMPLANT Left 3/11/2019    Procedure: EXTRACTION, CATARACT, WITH IOL INSERTION;  Surgeon: Vera Sams MD;  Location: [facility] OR;  Service: Ophthalmology;  Laterality: Left;    CATARACT EXTRACTION W/  INTRAOCULAR LENS IMPLANT Right 4/15/2019    Procedure: EXTRACTION, CATARACT, WITH IOL INSERTION LASER;  Surgeon: Vera Sams MD;  Location: [facility] OR;  Service: Ophthalmology;  Laterality: Right;    COLON SURGERY      EYE SURGERY      FRACTURE SURGERY      NASAL SEPTUM SURGERY      OCCLUSION OF LEFT ATRIAL APPENDAGE N/A 2/18/2022    Procedure: Left atrial appendage occlusion;  Surgeon: Chase Gill MD;  Location: [facility] EP LAB;  Service: Cardiology;  Laterality: N/A;  afib, watchman, BSCI, osiris, anes, MB/EH, 3prep     OPEN REDUCTION AND INTERNAL FIXATION (ORIF) OF INJURY OF SACROILIAC JOINT Bilateral 1/20/2021    Procedure: ORIF, SACROILIAC JOINT;  Surgeon: Alcides Tamez MD;  Location: [facility] OR [address] [place] [address];  Service: Orthopedics;  Laterality: Bilateral;    RADIOFREQUENCY ABLATION      RECTAL PROLAPSE REPAIR  june 2013    STRABISMUS SURGERY      TONSILLECTOMY      TRANSESOPHAGEAL ECHOCARDIOGRAPHY N/A 2/18/2022    Procedure: ECHOCARDIOGRAM, TRANSESOPHAGEAL;  Surgeon: Nils Diagnostic Provider;  Location: [facility] EP LAB;  Service: Cardiology;  Laterality: N/A;       Review of patient's allergies indicates:   Allergen Reactions    Adhesive      Tape tears skin    Buspar [buspirone]      headaches    Escitalopram oxalate Nausea Only     hyponatremia      Rifampin Rash       Current Facility-Administered Medications   Medication    acetaminophen tablet 650 mg    albuterol inhaler 2 puff    dextrose 10% bolus 125 mL    dextrose 10% bolus 250 mL    donepeziL tablet 10 mg    [START ON 7/9/2022] famotidine tablet 20 mg    ferrous sulfate tablet 1 each    glucagon (human recombinant) injection 1 mg    glucose chewable tablet 16 g    glucose chewable tablet 16 g    glucose chewable tablet 24 g    levothyroxine tablet 88 mcg    methocarbamoL tablet 500 mg    montelukast tablet 10 mg    sulfamethoxazole-trimethoprim 400-80mg per tablet 1 tablet     Facility-Administered Medications Ordered in Other Encounters   Medication    balanced salt irrigation solution 1 drop    moxifloxacin 0.5 % ophthalmic solution 1 drop    phenylephrine HCL 2.5% ophthalmic solution 1 drop    sodium chloride 0.9% bolus 1,000 mL    sodium chloride 0.9% bolus 1,000 mL    sodium chloride 0.9% bolus 1,000 mL    tropicamide 1% ophthalmic solution 1 drop     Family History       Problem Relation (Age of Onset)    Breast cancer Paternal Grandmother    Heart disease Father, Brother    Stroke Father          Tobacco Use    Smoking status:  "Never Smoker    Smokeless tobacco: Never Used   Substance and Sexual Activity    Alcohol use: No    Drug use: No    Sexual activity: Not Currently     Review of Systems   Unable to perform ROS: Other - dementia and subarachnoid hemorrhage  Objective:     Vital Signs (Most Recent):  Temp: 96.3 °F (35.7 °C) (07/08/22 1144)  Pulse: 97 (07/08/22 1144)  Resp: 18 (07/08/22 1144)  BP: (!) 166/95 (07/08/22 1144)  SpO2: 99 % (07/08/22 1144)   Vital Signs (24h Range):  Temp:  [96.3 °F (35.7 °C)-99.2 °F (37.3 °C)] 96.3 °F (35.7 °C)  Pulse:  [] 97  Resp:  [14-20] 18  SpO2:  [95 %-100 %] 99 %  BP: ()/(53-95) 166/95     Weight: 40 kg (88 lb 2.9 oz)  Height: 5' 2.01" (157.5 cm)  Body mass index is 16.12 kg/m².    No intake or output data in the 24 hours ending 07/08/22 1210    Ortho/SPM Exam  General: no acute distress, appears stated age     Neuro: alert and oriented to name only  Psych: normal mood   Head: normocephalic, atraumatic.    Eyes: no scleral icterus   Mouth: moist mucous membranes   Cardiovascular: extremities warm and well perfused   Lungs: breathing comfortably, equal chest rise bilat   Skin: clean, dry, intact (any exceptions noted in below musculoskeletal exam)    Musculoskeletal:  LUE:  - Superficial skin tear over dorsal forearm  - Prior poke hole wound over posterior distal clavicle is closed with mild shortening ecchymoses leg, no dehiscence, purulence, or fluctuance  - No swelling  - No erythema, or signs of cellulitis  - Scattered ecchymoses throughout  - Mild TTP over distal clavicle, otherwise non TTP throughout  - ROM shoulder deferred due to known clavicle fracture  - AROM and PROM of the elbow, wrist, and hand intact without pain  - Grossly neuro intact (unable to perform isolated exam due to mental status)  - Compartments soft  - 2+ radial artery pulse  - Capillary Refill < 2 sec    RUE:  - Superficial skin tear over dorsal forearm  - No swelling  - No erythema, or signs of " cellulitis  - Scattered ecchymoses throughout  - NonTTP throughout  - AROM and PROM of the shoulder, elbow, wrist, and hand intact without pain  - Grossly neuro intact (unable to perform isolated exam due to mental status)  - Compartments soft  - 2+ radial artery pulse  - Capillary Refill < 2 sec    LLE:  - Skin intact throughout, no open wounds  - No swelling  - No erythema, or signs of cellulitis  - Scattered ecchymoses throughout  - NonTTP throughout  - AROM and PROM of the hip, knee, ankle, and foot intact without pain  - Grossly neuro intact (unable to perform isolated exam due to mental status)  - Compartments soft  - 2+ DP and PT pulses  - Capillary Refill < 2 sec  - Negative Log roll    RLE:  - Superficial skin tear over distal anterior leg  - No swelling  - No erythema, or signs of cellulitis  - Scattered ecchymoses throughout  - NonTTP throughout  - AROM and PROM of the hip, knee, ankle, and foot intact without pain  - Grossly neuro intact (unable to perform isolated exam due to mental status)  - Compartments soft  - 2+ DP and PT pulses  - Capillary Refill < 2 sec  - Negative Log roll    Spine/pelvis/axial body:  No tenderness to palpation of cervical, thoracic, or lumbar spine  No pain with compression of pelvis  No decubitus ulcers      Significant Labs: All pertinent labs within the past 24 hours have been reviewed.    Significant Imaging: I have reviewed and interpreted all pertinent imaging results/findings.  X-ray left clavicle with repeated demonstration of a mildly displaced distal clavicle fracture is similar to prior exam    Assessment/Plan:     Open displaced fracture of acromial end of left clavicle  Yvette Crews is a 72 y.o. female with a left grade 1 open distal third clavicle fracture sustained on 7/3/22 now admitted to Neurosurgery for monitoring for a stable subarachnoid hemorrhage.  Her left upper extremity remains neurovascularly intact.  She is neurovascularly intact and has  no new orthopedic injuries.  She has the known subarachnoid hemorrhage and multiple skin tears.  When Orthopedic Surgery initially saw the patient in the ED at the time of the fracture, her wound was irrigated with normal saline and betadine.  She was subsequently admitted to the hospital for 24 hours of antibiotics and discharged on a 7 day course of Bactrim.  Plan for continued non operative management of this fracture as operative fixation carries additional risk and would not accelerate the weight-bearing status of the arm at this time.    Plan:  - No acute orthopedic intervention at this time  - HANK FOSTER in figure-of-eight brace (patient not tolerating sling)  - Keep wound over left shoulder covered  - Continue Bactrim for a total of 7 days  - Recommend mobilization with a wheelchair given patient condition and inability to bear weight through the left upper extremity  - Rest of care per primary      Dandre Avalos MD  Orthopedics  [facility] - Neurosurgery (Hospital)

## 2022-07-08 NOTE — SUBJECTIVE & OBJECTIVE
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    Cataract     Cellulitis of right lower extremity 1/19/2021    Chondrocalcinosis, cause unspecified, involving hand(712.34) 7/12/2011     Dx updated per 2019 IMO Load    Chronic sinusitis 4/6/2017    Colonic constipation 6/5/2013    Concussion 10/27/2016    Frequent falls 3/14/2017    Gait disturbance 3/14/2017    History of cardiac radiofrequency ablation (RFA) 2/3/2018    History of cardiac radiofrequency ablation (RFA) 2/3/2018    History of subarachnoid hemorrhage 10/30/2016    Hypogammaglobulinemia 9/7/2011    Irritable bowel syndrome 12/18/2015    Major depressive disorder, recurrent episode, in full remission 8/19/2010    Onychomycosis     Osteoarthritis     Postoperative hypothyroidism 12/18/2015    Presence of Watchman left atrial appendage closure device 5/14/2022    Rectal prolapse 6/5/2013    Reflux esophagitis 2/7/2010    Status post thyroidectomy 6/1/2017    Strabismus     SVT (supraventricular tachycardia) 11/22/2013    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     Underweight 1/23/2013       Past Surgical History:   Procedure Laterality Date    BREAST CYST ASPIRATION      x2    CATARACT EXTRACTION W/  INTRAOCULAR LENS IMPLANT Left 3/11/2019    Procedure: EXTRACTION, CATARACT, WITH IOL INSERTION;  Surgeon: Vera Sams MD;  Location: [facility] OR;  Service: Ophthalmology;  Laterality: Left;    CATARACT EXTRACTION W/  INTRAOCULAR LENS IMPLANT Right 4/15/2019    Procedure: EXTRACTION, CATARACT, WITH IOL INSERTION LASER;  Surgeon: Vera Sams MD;  Location: [facility] OR;  Service: Ophthalmology;  Laterality: Right;    COLON SURGERY      EYE SURGERY      FRACTURE SURGERY      NASAL SEPTUM SURGERY      OCCLUSION OF LEFT ATRIAL APPENDAGE  N/A 2/18/2022    Procedure: Left atrial appendage occlusion;  Surgeon: Chase Gill MD;  Location: [facility] EP LAB;  Service: Cardiology;  Laterality: N/A;  afib, watchman, BSCI, osiris, anes, MB/EH, 3prep    OPEN REDUCTION AND INTERNAL FIXATION (ORIF) OF INJURY OF SACROILIAC JOINT Bilateral 1/20/2021    Procedure: ORIF, SACROILIAC JOINT;  Surgeon: Alcides Tamez MD;  [facility];  Service: Orthopedics;  Laterality: Bilateral;    RADIOFREQUENCY ABLATION      RECTAL PROLAPSE REPAIR  june 2013    STRABISMUS SURGERY      TONSILLECTOMY      TRANSESOPHAGEAL ECHOCARDIOGRAPHY N/A 2/18/2022    Procedure: ECHOCARDIOGRAM, TRANSESOPHAGEAL;  Surgeon: Nils Diagnostic Provider;  Location: [facility] EP LAB;  Service: Cardiology;  Laterality: N/A;       Review of patient's allergies indicates:   Allergen Reactions    Adhesive      Tape tears skin    Buspar [buspirone]      headaches    Escitalopram oxalate Nausea Only     hyponatremia      Rifampin Rash       Current Facility-Administered Medications   Medication    acetaminophen tablet 650 mg    albuterol inhaler 2 puff    dextrose 10% bolus 125 mL    dextrose 10% bolus 250 mL    donepeziL tablet 10 mg    [START ON 7/9/2022] famotidine tablet 20 mg    ferrous sulfate tablet 1 each    glucagon (human recombinant) injection 1 mg    glucose chewable tablet 16 g    glucose chewable tablet 16 g    glucose chewable tablet 24 g    levothyroxine tablet 88 mcg    methocarbamoL tablet 500 mg    montelukast tablet 10 mg    sulfamethoxazole-trimethoprim 400-80mg per tablet 1 tablet     Facility-Administered Medications Ordered in Other Encounters   Medication    balanced salt irrigation solution 1 drop    moxifloxacin 0.5 % ophthalmic solution 1 drop    phenylephrine HCL 2.5% ophthalmic solution 1 drop    sodium chloride 0.9% bolus 1,000 mL    sodium chloride 0.9% bolus 1,000 mL    sodium chloride 0.9% bolus 1,000 mL    tropicamide 1% ophthalmic solution 1 drop     Family  "History       Problem Relation (Age of Onset)    Breast cancer Paternal Grandmother    Heart disease Father, Brother    Stroke Father          Tobacco Use    Smoking status: Never Smoker    Smokeless tobacco: Never Used   Substance and Sexual Activity    Alcohol use: No    Drug use: No    Sexual activity: Not Currently     Review of Systems   Unable to perform ROS: Other - dementia and subarachnoid hemorrhage  Objective:     Vital Signs (Most Recent):  Temp: 96.3 °F (35.7 °C) (07/08/22 1144)  Pulse: 97 (07/08/22 1144)  Resp: 18 (07/08/22 1144)  BP: (!) 166/95 (07/08/22 1144)  SpO2: 99 % (07/08/22 1144)   Vital Signs (24h Range):  Temp:  [96.3 °F (35.7 °C)-99.2 °F (37.3 °C)] 96.3 °F (35.7 °C)  Pulse:  [] 97  Resp:  [14-20] 18  SpO2:  [95 %-100 %] 99 %  BP: ()/(53-95) 166/95     Weight: 40 kg (88 lb 2.9 oz)  Height: 5' 2.01" (157.5 cm)  Body mass index is 16.12 kg/m².    No intake or output data in the 24 hours ending 07/08/22 1210    Ortho/SPM Exam  General: no acute distress, appears stated age     Neuro: alert and oriented to name only  Psych: normal mood   Head: normocephalic, atraumatic.    Eyes: no scleral icterus   Mouth: moist mucous membranes   Cardiovascular: extremities warm and well perfused   Lungs: breathing comfortably, equal chest rise bilat   Skin: clean, dry, intact (any exceptions noted in below musculoskeletal exam)    Musculoskeletal:  LUE:  - Superficial skin tear over dorsal forearm  - Prior poke hole wound over posterior distal clavicle is closed with mild shortening ecchymoses leg, no dehiscence, purulence, or fluctuance  - No swelling  - No erythema, or signs of cellulitis  - Scattered ecchymoses throughout  - Mild TTP over distal clavicle, otherwise non TTP throughout  - ROM shoulder deferred due to known clavicle fracture  - AROM and PROM of the elbow, wrist, and hand intact without pain  - Grossly neuro intact (unable to perform isolated exam due to mental status)  - " Compartments soft  - 2+ radial artery pulse  - Capillary Refill < 2 sec    RUE:  - Superficial skin tear over dorsal forearm  - No swelling  - No erythema, or signs of cellulitis  - Scattered ecchymoses throughout  - NonTTP throughout  - AROM and PROM of the shoulder, elbow, wrist, and hand intact without pain  - Grossly neuro intact (unable to perform isolated exam due to mental status)  - Compartments soft  - 2+ radial artery pulse  - Capillary Refill < 2 sec    LLE:  - Skin intact throughout, no open wounds  - No swelling  - No erythema, or signs of cellulitis  - Scattered ecchymoses throughout  - NonTTP throughout  - AROM and PROM of the hip, knee, ankle, and foot intact without pain  - Grossly neuro intact (unable to perform isolated exam due to mental status)  - Compartments soft  - 2+ DP and PT pulses  - Capillary Refill < 2 sec  - Negative Log roll    RLE:  - Superficial skin tear over distal anterior leg  - No swelling  - No erythema, or signs of cellulitis  - Scattered ecchymoses throughout  - NonTTP throughout  - AROM and PROM of the hip, knee, ankle, and foot intact without pain  - Grossly neuro intact (unable to perform isolated exam due to mental status)  - Compartments soft  - 2+ DP and PT pulses  - Capillary Refill < 2 sec  - Negative Log roll    Spine/pelvis/axial body:  No tenderness to palpation of cervical, thoracic, or lumbar spine  No pain with compression of pelvis  No decubitus ulcers      Significant Labs: All pertinent labs within the past 24 hours have been reviewed.    Significant Imaging: I have reviewed and interpreted all pertinent imaging results/findings.  X-ray left clavicle with repeated demonstration of a mildly displaced distal clavicle fracture is similar to prior exam

## 2022-07-08 NOTE — ASSESSMENT & PLAN NOTE
Yvette Crews is a 72 y.o. female with a left grade 1 open distal third clavicle fracture sustained on 7/3/22 now admitted to Neurosurgery for monitoring for a stable subarachnoid hemorrhage.  Her left upper extremity remains neurovascularly intact.  She is neurovascularly intact and has no new orthopedic injuries.  She has the known subarachnoid hemorrhage and multiple skin tears.  When Orthopedic Surgery initially saw the patient in the ED at the time of the fracture, her wound was irrigated with normal saline and betadine.  She was subsequently admitted to the hospital for 24 hours of antibiotics and discharged on a 7 day course of Bactrim.  Plan for continued non operative management of this fracture as operative fixation carries additional risk and would not accelerate the weight-bearing status of the arm at this time.    Plan:  - No acute orthopedic intervention at this time  - HANK FOSTER in figure-of-eight brace (per patient not tolerating sling)  - Keep wound over left shoulder covered  - Continue Bactrim for a total of 7 days  - Recommend mobilization with a wheelchair given patient condition and inability to bear weight through the left upper extremity  - Rest of care per primary

## 2022-07-08 NOTE — PLAN OF CARE
PT key conducted and POC established.     Marleni Christensen, SPT  2022    Problem: Physical Therapy  Goal: Physical Therapy Goal  Description: Goals to be met by: 2022    Patient will increase functional independence with mobility by performin. Supine to sit with Contact Guard Assistance  2. Sit to supine with Stand-by Assistance  3. Rolling to Left and Right with Stand-by Assistance.  4. Sit to stand transfer with Stand-by Assistance  5. Bed to chair transfer with Minimal Assistance using LRAD  6. Gait  x 20 feet with Minimal Assistance using LRAD  7. Lower extremity exercise program x10 reps per handout, with supervision as needed     Outcome: Ongoing, Progressing

## 2022-07-08 NOTE — HPI
Mrs. Crews is a 71 yo woman with paroxysmal atrial fibrillation s/p Watchman device, hypertension, bronchiectasis, Alzheimer's, hypogammaglobulinemia (on monthly IVIG last treatment 7/1), acquired hypothyroidism after total thyroidectomy, and history of SAH s/p fall in 2016 presenting with a subarachnoid hemorrhage after a fall. Per patient's , she was found down in the bathroom yesterday morning but patient was alert. Unclear cause of her fall. She had another fall the week prior which resulted in a clavicular fracture. Patient's  reports patient is more confused than her baseline. She lives in an assisted living facility and is usually able to ambulate around the center with a walker, and do ADLs on her own. Neurosurgery is planning on surgical intervention and hospital medicine was consulted for pre-op evaluation.

## 2022-07-08 NOTE — PLAN OF CARE
Pt arrived via stretcher from ER admitted from assisted living [facility] as reported by patient she is a very poor historian. Pt was admitted with numerous bruises noted to bilateral arms and bilateral legs as reported by ER nurse she has had many falls in the last 2 weeks. Pt admitted with left fx clavicle secondary to fall. Pt oriented to room and call bell. Aquacel applied to stage one left hip and erythema noted to right hip.

## 2022-07-08 NOTE — PROGRESS NOTES
Pharmacist Renal Dose Adjustment Note    Yvette Crews is a 72 y.o. female being treated with the medication famotidine    Patient Data:    Vital Signs (Most Recent):  Temp: 96.3 °F (35.7 °C) (07/08/22 1144)  Pulse: 97 (07/08/22 1144)  Resp: 18 (07/08/22 1144)  BP: (!) 166/95 (07/08/22 1144)  SpO2: 99 % (07/08/22 1144)   Vital Signs (72h Range):  Temp:  [96.3 °F (35.7 °C)-99.2 °F (37.3 °C)]   Pulse:  []   Resp:  [14-20]   BP: ()/(53-95)   SpO2:  [95 %-100 %]      Recent Labs   Lab 07/04/22  0350 07/07/22  0901 07/08/22  0926   CREATININE 0.7 0.7 0.7     Serum creatinine: 0.7 mg/dL 07/08/22 0926  Estimated creatinine clearance: 18.3 mL/min    Medication:famotidine 40 mg po daily will be adjusted to famotidine 20 mg po daily     Pharmacist's Name: Shaina Woods  Pharmacist's Extension: [phone]

## 2022-07-08 NOTE — ASSESSMENT & PLAN NOTE
73yo F PMH AVNRT, frequent falls, presenting as neurosurgery consult due to L temporal SAH noted on CTH in AM. Per patient's  was found down after hearing fall at 0830. More confusion present than baseline. Patient states she takes aspirin and plavix but hasn't taken the plavix in a while. She is supposed to use a walker at baseline but now is NWB to left arm due a left clavicle fx 7/3. CT c-spine showed C2-C3 anterolisthesis    --Patient admitted to floor       -- q4h neurochecks on floor  --All labs and diagnostics reviewed       --CTH 7/7: L temporal SAH, stable on repeat scan       --CTA 7/8 without aneurysm, stable appearing bleed       --CT c-spine 7/7: anterolisthesis C2-C3       --MRI cervical spine 7/7 shows anterlisthesis of C2 on C3 with severe spinal cord compression C2-4 with cord signal change       --XR flex/ex of cervical spine shows no dynamic instability        --MRI thoracic spine shows remote compression fracture of T2, T3, T10, L2. Severe compression fracture of T8 with almost complete loss of height, chronic appearing.  --Afib: s/p ablation and left atrial appendage occlusion on 2/18/22: Xarelto and Plavix stopped in May per EP physician. Patient still taking ASA, Hold ASA at this time given SAH and possible surgical intervention  --PTT/INR pending  --Left clavicle fracture: fracture from 7/3. Per Ortho recs at that time: NWB to LUE, sling for comfort, bactrim x 7 days for open fracture. Ortho consulted for this admission, appreciate their assistance will follow up updated recs.   --Continue to monitor clinically, notify NSGY immediately with any changes in neuro status    Further recs pending review of imaging with Dr. King

## 2022-07-09 ENCOUNTER — PATIENT MESSAGE (OUTPATIENT)
Dept: SURGERY | Facility: HOSPITAL | Age: 72
End: 2022-07-09
Payer: MEDICARE

## 2022-07-09 LAB
ANION GAP SERPL CALC-SCNC: 8 MMOL/L (ref 8–16)
BASOPHILS # BLD AUTO: 0.02 K/UL (ref 0–0.2)
BASOPHILS NFR BLD: 0.4 % (ref 0–1.9)
BUN SERPL-MCNC: 32 MG/DL (ref 8–23)
CALCIUM SERPL-MCNC: 8.5 MG/DL (ref 8.7–10.5)
CHLORIDE SERPL-SCNC: 104 MMOL/L (ref 95–110)
CO2 SERPL-SCNC: 23 MMOL/L (ref 23–29)
CREAT SERPL-MCNC: 0.7 MG/DL (ref 0.5–1.4)
DIFFERENTIAL METHOD: ABNORMAL
EOSINOPHIL # BLD AUTO: 0 K/UL (ref 0–0.5)
EOSINOPHIL NFR BLD: 0.6 % (ref 0–8)
ERYTHROCYTE [DISTWIDTH] IN BLOOD BY AUTOMATED COUNT: 15.3 % (ref 11.5–14.5)
EST. GFR  (AFRICAN AMERICAN): >60 ML/MIN/1.73 M^2
EST. GFR  (NON AFRICAN AMERICAN): >60 ML/MIN/1.73 M^2
GLUCOSE SERPL-MCNC: 79 MG/DL (ref 70–110)
HCT VFR BLD AUTO: 32.5 % (ref 37–48.5)
HGB BLD-MCNC: 10.6 G/DL (ref 12–16)
IMM GRANULOCYTES # BLD AUTO: 0.01 K/UL (ref 0–0.04)
IMM GRANULOCYTES NFR BLD AUTO: 0.2 % (ref 0–0.5)
LYMPHOCYTES # BLD AUTO: 1.1 K/UL (ref 1–4.8)
LYMPHOCYTES NFR BLD: 21 % (ref 18–48)
MCH RBC QN AUTO: 32.1 PG (ref 27–31)
MCHC RBC AUTO-ENTMCNC: 32.6 G/DL (ref 32–36)
MCV RBC AUTO: 99 FL (ref 82–98)
MONOCYTES # BLD AUTO: 0.6 K/UL (ref 0.3–1)
MONOCYTES NFR BLD: 10.6 % (ref 4–15)
NEUTROPHILS # BLD AUTO: 3.5 K/UL (ref 1.8–7.7)
NEUTROPHILS NFR BLD: 67.2 % (ref 38–73)
NRBC BLD-RTO: 0 /100 WBC
PLATELET # BLD AUTO: 219 K/UL (ref 150–450)
PMV BLD AUTO: 10.5 FL (ref 9.2–12.9)
POTASSIUM SERPL-SCNC: 4.2 MMOL/L (ref 3.5–5.1)
RBC # BLD AUTO: 3.3 M/UL (ref 4–5.4)
SODIUM SERPL-SCNC: 135 MMOL/L (ref 136–145)
WBC # BLD AUTO: 5.18 K/UL (ref 3.9–12.7)

## 2022-07-09 PROCEDURE — 25000003 PHARM REV CODE 250: Performed by: NEUROLOGICAL SURGERY

## 2022-07-09 PROCEDURE — 99232 SBSQ HOSP IP/OBS MODERATE 35: CPT | Mod: GC,,, | Performed by: NEUROLOGICAL SURGERY

## 2022-07-09 PROCEDURE — 85025 COMPLETE CBC W/AUTO DIFF WBC: CPT

## 2022-07-09 PROCEDURE — 99232 PR SUBSEQUENT HOSPITAL CARE,LEVL II: ICD-10-PCS | Mod: GC,,, | Performed by: NEUROLOGICAL SURGERY

## 2022-07-09 PROCEDURE — 94760 N-INVAS EAR/PLS OXIMETRY 1: CPT

## 2022-07-09 PROCEDURE — 11000001 HC ACUTE MED/SURG PRIVATE ROOM

## 2022-07-09 PROCEDURE — 36415 COLL VENOUS BLD VENIPUNCTURE: CPT

## 2022-07-09 PROCEDURE — 25000003 PHARM REV CODE 250: Performed by: PHYSICIAN ASSISTANT

## 2022-07-09 PROCEDURE — 25000003 PHARM REV CODE 250

## 2022-07-09 PROCEDURE — 80048 BASIC METABOLIC PNL TOTAL CA: CPT

## 2022-07-09 RX ADMIN — LEVOTHYROXINE SODIUM 88 MCG: 88 TABLET ORAL at 06:07

## 2022-07-09 RX ADMIN — DONEPEZIL HYDROCHLORIDE 10 MG: 10 TABLET ORAL at 09:07

## 2022-07-09 RX ADMIN — MONTELUKAST 10 MG: 10 TABLET, FILM COATED ORAL at 09:07

## 2022-07-09 RX ADMIN — FERROUS SULFATE TAB 325 MG (65 MG ELEMENTAL FE) 1 EACH: 325 (65 FE) TAB at 09:07

## 2022-07-09 RX ADMIN — SULFAMETHOXAZOLE AND TRIMETHOPRIM 1 TABLET: 400; 80 TABLET ORAL at 09:07

## 2022-07-09 RX ADMIN — FAMOTIDINE 20 MG: 20 TABLET ORAL at 09:07

## 2022-07-09 NOTE — CONSULTS
"Nutrition consult received stating "new admit; 88 pounds".  Spoke w/ RN, who reports pt tolerating diet w/ 75% PO intake. Pt slightly disoriented, but reports appetite PTA was good.  Per chart review, pt's UBW: 90# x 2 years.    Pt appears thin, however no indicators of malnutrition noted. Will monitor energy intake/weight changes.     Thanks,  Alondra, MS, RD, LDN  "

## 2022-07-09 NOTE — PROGRESS NOTES
[facility] - Neurosurgery ([facility])  Neurosurgery  Progress Note    Subjective:     History of Present Illness: 73yo F PMH AVNRT, frequent falls, presenting as neurosurgery consult due to L temporal SAH noted on CTH in AM. Per patient's  was found down after hearing fall at 0830. More confusion present than baseline. Patient states she takes aspirin and plavix but hasn't taken the plavix in a while. She is supposed to use a walker at baseline but now is NWB to left arm due a left clavicle fx 7/3. CT c-spine showed C2-C3 anterolisthesis        Post-Op Info:  Procedure(s) (LRB):  C1-C3 Posterior cervical fusion. sfilatino. neuromonitoring. Fosubokeny harris. [place]. (N/A)         Interval History: NAEON. Pending surgery next week, cleared by . AM labs pending.     Medications:  Continuous Infusions:  Scheduled Meds:   donepeziL  10 mg Oral Daily    famotidine  20 mg Oral Daily    ferrous sulfate  1 tablet Oral Daily    heparin (porcine)  5,000 Units Subcutaneous Q8H    levothyroxine  88 mcg Oral Before breakfast    montelukast  10 mg Oral QHS    sulfamethoxazole-trimethoprim 400-80mg  1 tablet Oral BID     PRN Meds:acetaminophen, albuterol, dextrose 10%, dextrose 10%, glucagon (human recombinant), glucose, glucose, glucose, methocarbamoL     Review of Systems  Objective:     Weight: 40 kg (88 lb 2.9 oz)  Body mass index is 16.12 kg/m².  Vital Signs (Most Recent):  Temp: 96.4 °F (35.8 °C) (07/09/22 0000)  Pulse: 70 (07/09/22 0000)  Resp: 18 (07/09/22 0000)  BP: 126/81 (07/09/22 0000)  SpO2: 96 % (07/09/22 0000) Vital Signs (24h Range):  Temp:  [96.3 °F (35.7 °C)-98.2 °F (36.8 °C)] 96.4 °F (35.8 °C)  Pulse:  [68-97] 70  Resp:  [16-18] 18  SpO2:  [94 %-99 %] 96 %  BP: ()/(53-95) 126/81                     Female External Urinary Catheter 07/07/22 1500 (Active)   Skin female external urine collection device repositioned 07/08/22 0800   Tolerance no signs/symptoms of discomfort 07/08/22 0800       Physical  Exam    Neurosurgery Physical Exam  General: well developed, well nourished, no distress.   Head: normocephalic  Neurologic: Alert and oriented. Thought content appropriate, higher order confusion present  GCS: Motor: 6/Verbal: 5/Eyes: 4 GCS Total: 15  Mental Status: Awake, Alert, Oriented x 4, with higher order confusion  Language: No aphasia  Speech: No dysarthria  Cranial nerves: face symmetric, tongue midline, CN II-XII grossly intact.   Eyes: pupils equal, round, reactive to light with accommodation, EOMI. Mild ptosis noted to left eye   Pulmonary: normal respirations, no signs of respiratory distress  Abdomen: soft, non-distended, not tender to palpation  Skin: Skin is warm, dry and intact.  Sensory: responds to light touch throughout     Motor Strength: Motor exam limited 2/2 patient cooperation. Moves all extremities spontaneously, increased tone to BUE, at least 3/5 in BUE. BLE 4/5. No abnormal movements seen.      Pain limited motion in left shoulder 2/2 clavicle fracture        Reflexes:   Plascencia's: positive bilaterally      Cerebellar:   Finger-to-nose: unable to perform 2/2 spasticity in BUE and difficulty following complex command  Pronator drift: unable to perform 2/2 spasticity in BUE and difficulty following complex command    Significant Labs:  Recent Labs   Lab 07/07/22  0901 07/08/22  0926 07/09/22  0327   GLU 84 106 79   * 134* 135*   K 4.5 3.7 4.2    104 104   CO2 23 25 23   BUN 29* 30* 32*   CREATININE 0.7 0.7 0.7   CALCIUM 9.0 8.8 8.5*     Recent Labs   Lab 07/07/22  0901 07/08/22  0926 07/09/22  0328   WBC 6.14 5.54 5.18   HGB 11.3* 10.7* 10.6*   HCT 34.9* 33.0* 32.5*    223 219     Recent Labs   Lab 07/08/22  1141   INR 1.0   APTT 26.8     Microbiology Results (last 7 days)       ** No results found for the last 168 hours. **          All pertinent labs from the last 24 hours have been reviewed.    Significant Diagnostics:  I have reviewed and interpreted all pertinent  imaging results/findings within the past 24 hours.    Assessment/Plan:     * SAH (subarachnoid hemorrhage)  73yo F PMH AVNRT, frequent falls, presenting as neurosurgery consult due to L temporal SAH noted on CTH in AM. Per patient's  was found down after hearing fall at 0830. More confusion present than baseline. Patient states she takes aspirin and plavix but hasn't taken the plavix in a while. She is supposed to use a walker at baseline but now is NWB to left arm due a left clavicle fx 7/3. CT c-spine showed C2-C3 anterolisthesis    --Patient admitted to floor       -- q4h neurochecks on floor  --All labs and diagnostics reviewed       --CTH 7/7: L temporal SAH, stable on repeat scan       --CTA 7/8 without aneurysm, stable appearing bleed       --CT c-spine 7/7: anterolisthesis C2-C3       --MRI cervical spine 7/7 shows anterlisthesis of C2 on C3 with severe spinal cord compression C2-4 with cord signal change       --XR flex/ex of cervical spine shows no dynamic instability        --MRI thoracic spine shows remote compression fracture of T2, T3, T10, L2. Severe compression fracture of T8 with almost complete loss of height, chronic appearing.  --Afib: s/p ablation and left atrial appendage occlusion on 2/18/22: Xarelto and Plavix stopped in May per EP physician. Patient still taking ASA, Hold ASA at this time given SAH and possible surgical intervention  --PTT/INR pending  --Left clavicle fracture: fracture from 7/3. Per Ortho recs at that time: NWB to LUE, sling for comfort, bactrim x 7 days for open fracture. Ortho consulted for this admission, appreciate their assistance will follow up updated recs.   --Continue to monitor clinically, notify NSGY immediately with any changes in neuro status  --HM consulted for co-management, medical optimization, appreciate recs    Tentative C1-3 PCF next week on 7/13.        Xenia Lyon MD  Neurosurgery  [facility] - Neurosurgery ([facility])

## 2022-07-09 NOTE — ASSESSMENT & PLAN NOTE
71yo F PMH AVNRT, frequent falls, presenting as neurosurgery consult due to L temporal SAH noted on CTH in AM. Per patient's  was found down after hearing fall at 0830. More confusion present than baseline. Patient states she takes aspirin and plavix but hasn't taken the plavix in a while. She is supposed to use a walker at baseline but now is NWB to left arm due a left clavicle fx 7/3. CT c-spine showed C2-C3 anterolisthesis    --Patient admitted to floor       -- q4h neurochecks on floor  --All labs and diagnostics reviewed       --CTH 7/7: L temporal SAH, stable on repeat scan       --CTA 7/8 without aneurysm, stable appearing bleed       --CT c-spine 7/7: anterolisthesis C2-C3       --MRI cervical spine 7/7 shows anterlisthesis of C2 on C3 with severe spinal cord compression C2-4 with cord signal change       --XR flex/ex of cervical spine shows no dynamic instability        --MRI thoracic spine shows remote compression fracture of T2, T3, T10, L2. Severe compression fracture of T8 with almost complete loss of height, chronic appearing.  --Afib: s/p ablation and left atrial appendage occlusion on 2/18/22: Xarelto and Plavix stopped in May per EP physician. Patient still taking ASA, Hold ASA at this time given SAH and possible surgical intervention  --PTT/INR pending  --Left clavicle fracture: fracture from 7/3. Per Ortho recs at that time: NWB to LUE, sling for comfort, bactrim x 7 days for open fracture. Ortho consulted for this admission, appreciate their assistance will follow up updated recs.   --Continue to monitor clinically, notify NSGY immediately with any changes in neuro status  --HM consulted for co-management, medical optimization, appreciate recs    Tentative C1-3 PCF next week on 7/13.

## 2022-07-09 NOTE — PLAN OF CARE
Patient disoriented, tele sitter active. POC reinforced as needed. Room Air. Tolerating PO. Incontinent. Turn every two hours. No signs of distress. Fall and safety measures maintained during shift.     Problem: Adult Inpatient Plan of Care  Goal: Plan of Care Review  Outcome: Ongoing, Progressing  Goal: Patient-Specific Goal (Individualized)  Outcome: Ongoing, Progressing  Goal: Absence of Hospital-Acquired Illness or Injury  Outcome: Ongoing, Progressing  Goal: Optimal Comfort and Wellbeing  Outcome: Ongoing, Progressing  Goal: Readiness for Transition of Care  Outcome: Ongoing, Progressing     Problem: Fall Injury Risk  Goal: Absence of Fall and Fall-Related Injury  Outcome: Ongoing, Progressing     Problem: Skin Injury Risk Increased  Goal: Skin Health and Integrity  Outcome: Ongoing, Progressing     Problem: Impaired Wound Healing  Goal: Optimal Wound Healing  Outcome: Ongoing, Progressing     Problem: Bariatric Environmental Safety  Goal: Safety Maintained with Care  Outcome: Ongoing, Progressing

## 2022-07-09 NOTE — SUBJECTIVE & OBJECTIVE
Interval History: NAEON. Pending surgery next week, cleared by HM. AM labs pending.     Medications:  Continuous Infusions:  Scheduled Meds:   donepeziL  10 mg Oral Daily    famotidine  20 mg Oral Daily    ferrous sulfate  1 tablet Oral Daily    heparin (porcine)  5,000 Units Subcutaneous Q8H    levothyroxine  88 mcg Oral Before breakfast    montelukast  10 mg Oral QHS    sulfamethoxazole-trimethoprim 400-80mg  1 tablet Oral BID     PRN Meds:acetaminophen, albuterol, dextrose 10%, dextrose 10%, glucagon (human recombinant), glucose, glucose, glucose, methocarbamoL     Review of Systems  Objective:     Weight: 40 kg (88 lb 2.9 oz)  Body mass index is 16.12 kg/m².  Vital Signs (Most Recent):  Temp: 96.4 °F (35.8 °C) (07/09/22 0000)  Pulse: 70 (07/09/22 0000)  Resp: 18 (07/09/22 0000)  BP: 126/81 (07/09/22 0000)  SpO2: 96 % (07/09/22 0000) Vital Signs (24h Range):  Temp:  [96.3 °F (35.7 °C)-98.2 °F (36.8 °C)] 96.4 °F (35.8 °C)  Pulse:  [68-97] 70  Resp:  [16-18] 18  SpO2:  [94 %-99 %] 96 %  BP: ()/(53-95) 126/81                     Female External Urinary Catheter 07/07/22 1500 (Active)   Skin female external urine collection device repositioned 07/08/22 0800   Tolerance no signs/symptoms of discomfort 07/08/22 0800       Physical Exam    Neurosurgery Physical Exam  General: well developed, well nourished, no distress.   Head: normocephalic  Neurologic: Alert and oriented. Thought content appropriate, higher order confusion present  GCS: Motor: 6/Verbal: 5/Eyes: 4 GCS Total: 15  Mental Status: Awake, Alert, Oriented x 4, with higher order confusion  Language: No aphasia  Speech: No dysarthria  Cranial nerves: face symmetric, tongue midline, CN II-XII grossly intact.   Eyes: pupils equal, round, reactive to light with accommodation, EOMI. Mild ptosis noted to left eye   Pulmonary: normal respirations, no signs of respiratory distress  Abdomen: soft, non-distended, not tender to palpation  Skin: Skin is warm, dry  and intact.  Sensory: responds to light touch throughout     Motor Strength: Motor exam limited 2/2 patient cooperation. Moves all extremities spontaneously, increased tone to BUE, at least 3/5 in BUE. BLE 4/5. No abnormal movements seen.      Pain limited motion in left shoulder 2/2 clavicle fracture        Reflexes:   Plascencia's: positive bilaterally      Cerebellar:   Finger-to-nose: unable to perform 2/2 spasticity in BUE and difficulty following complex command  Pronator drift: unable to perform 2/2 spasticity in BUE and difficulty following complex command    Significant Labs:  Recent Labs   Lab 07/07/22  0901 07/08/22  0926 07/09/22  0327   GLU 84 106 79   * 134* 135*   K 4.5 3.7 4.2    104 104   CO2 23 25 23   BUN 29* 30* 32*   CREATININE 0.7 0.7 0.7   CALCIUM 9.0 8.8 8.5*     Recent Labs   Lab 07/07/22  0901 07/08/22  0926 07/09/22  0328   WBC 6.14 5.54 5.18   HGB 11.3* 10.7* 10.6*   HCT 34.9* 33.0* 32.5*    223 219     Recent Labs   Lab 07/08/22  1141   INR 1.0   APTT 26.8     Microbiology Results (last 7 days)       ** No results found for the last 168 hours. **          All pertinent labs from the last 24 hours have been reviewed.    Significant Diagnostics:  I have reviewed and interpreted all pertinent imaging results/findings within the past 24 hours.

## 2022-07-10 ENCOUNTER — PATIENT MESSAGE (OUTPATIENT)
Dept: SURGERY | Facility: HOSPITAL | Age: 72
End: 2022-07-10
Payer: MEDICARE

## 2022-07-10 LAB
ANION GAP SERPL CALC-SCNC: 7 MMOL/L (ref 8–16)
BASOPHILS # BLD AUTO: 0.02 K/UL (ref 0–0.2)
BASOPHILS NFR BLD: 0.3 % (ref 0–1.9)
BUN SERPL-MCNC: 32 MG/DL (ref 8–23)
CALCIUM SERPL-MCNC: 8.7 MG/DL (ref 8.7–10.5)
CHLORIDE SERPL-SCNC: 105 MMOL/L (ref 95–110)
CO2 SERPL-SCNC: 25 MMOL/L (ref 23–29)
CREAT SERPL-MCNC: 0.7 MG/DL (ref 0.5–1.4)
DIFFERENTIAL METHOD: ABNORMAL
EOSINOPHIL # BLD AUTO: 0 K/UL (ref 0–0.5)
EOSINOPHIL NFR BLD: 0.2 % (ref 0–8)
ERYTHROCYTE [DISTWIDTH] IN BLOOD BY AUTOMATED COUNT: 15.3 % (ref 11.5–14.5)
EST. GFR  (AFRICAN AMERICAN): >60 ML/MIN/1.73 M^2
EST. GFR  (NON AFRICAN AMERICAN): >60 ML/MIN/1.73 M^2
GLUCOSE SERPL-MCNC: 96 MG/DL (ref 70–110)
HCT VFR BLD AUTO: 33.6 % (ref 37–48.5)
HGB BLD-MCNC: 10.7 G/DL (ref 12–16)
IMM GRANULOCYTES # BLD AUTO: 0.01 K/UL (ref 0–0.04)
IMM GRANULOCYTES NFR BLD AUTO: 0.2 % (ref 0–0.5)
LYMPHOCYTES # BLD AUTO: 0.9 K/UL (ref 1–4.8)
LYMPHOCYTES NFR BLD: 14.5 % (ref 18–48)
MCH RBC QN AUTO: 31.8 PG (ref 27–31)
MCHC RBC AUTO-ENTMCNC: 31.8 G/DL (ref 32–36)
MCV RBC AUTO: 100 FL (ref 82–98)
MONOCYTES # BLD AUTO: 0.5 K/UL (ref 0.3–1)
MONOCYTES NFR BLD: 8.7 % (ref 4–15)
NEUTROPHILS # BLD AUTO: 4.6 K/UL (ref 1.8–7.7)
NEUTROPHILS NFR BLD: 76.1 % (ref 38–73)
NRBC BLD-RTO: 0 /100 WBC
PLATELET # BLD AUTO: 225 K/UL (ref 150–450)
PMV BLD AUTO: 10.1 FL (ref 9.2–12.9)
POTASSIUM SERPL-SCNC: 4.1 MMOL/L (ref 3.5–5.1)
RBC # BLD AUTO: 3.36 M/UL (ref 4–5.4)
SODIUM SERPL-SCNC: 137 MMOL/L (ref 136–145)
WBC # BLD AUTO: 6.01 K/UL (ref 3.9–12.7)

## 2022-07-10 PROCEDURE — 25000003 PHARM REV CODE 250: Performed by: PHYSICIAN ASSISTANT

## 2022-07-10 PROCEDURE — 94760 N-INVAS EAR/PLS OXIMETRY 1: CPT

## 2022-07-10 PROCEDURE — 25000003 PHARM REV CODE 250: Performed by: STUDENT IN AN ORGANIZED HEALTH CARE EDUCATION/TRAINING PROGRAM

## 2022-07-10 PROCEDURE — 25000003 PHARM REV CODE 250

## 2022-07-10 PROCEDURE — 85025 COMPLETE CBC W/AUTO DIFF WBC: CPT

## 2022-07-10 PROCEDURE — 36415 COLL VENOUS BLD VENIPUNCTURE: CPT

## 2022-07-10 PROCEDURE — 99231 PR SUBSEQUENT HOSPITAL CARE,LEVL I: ICD-10-PCS | Mod: GC,,, | Performed by: HOSPITALIST

## 2022-07-10 PROCEDURE — 11000001 HC ACUTE MED/SURG PRIVATE ROOM

## 2022-07-10 PROCEDURE — 99231 SBSQ HOSP IP/OBS SF/LOW 25: CPT | Mod: GC,,, | Performed by: HOSPITALIST

## 2022-07-10 PROCEDURE — 80048 BASIC METABOLIC PNL TOTAL CA: CPT

## 2022-07-10 PROCEDURE — 25000003 PHARM REV CODE 250: Performed by: NEUROLOGICAL SURGERY

## 2022-07-10 RX ORDER — POLYETHYLENE GLYCOL 3350 17 G/17G
17 POWDER, FOR SOLUTION ORAL 2 TIMES DAILY PRN
Status: DISCONTINUED | OUTPATIENT
Start: 2022-07-10 | End: 2022-07-18 | Stop reason: HOSPADM

## 2022-07-10 RX ADMIN — POLYETHYLENE GLYCOL 3350 17 G: 17 POWDER, FOR SOLUTION ORAL at 12:07

## 2022-07-10 RX ADMIN — SULFAMETHOXAZOLE AND TRIMETHOPRIM 1 TABLET: 400; 80 TABLET ORAL at 09:07

## 2022-07-10 RX ADMIN — FAMOTIDINE 20 MG: 20 TABLET ORAL at 09:07

## 2022-07-10 RX ADMIN — LEVOTHYROXINE SODIUM 88 MCG: 88 TABLET ORAL at 05:07

## 2022-07-10 RX ADMIN — FERROUS SULFATE TAB 325 MG (65 MG ELEMENTAL FE) 1 EACH: 325 (65 FE) TAB at 09:07

## 2022-07-10 RX ADMIN — MONTELUKAST 10 MG: 10 TABLET, FILM COATED ORAL at 09:07

## 2022-07-10 RX ADMIN — ACETAMINOPHEN 650 MG: 325 TABLET ORAL at 10:07

## 2022-07-10 RX ADMIN — DONEPEZIL HYDROCHLORIDE 10 MG: 10 TABLET ORAL at 09:07

## 2022-07-10 NOTE — SUBJECTIVE & OBJECTIVE
Interval History: No acute events overnight    Review of Systems   Constitutional:  Negative for activity change and unexpected weight change.   HENT:  Negative for sore throat and voice change.    Eyes:  Negative for visual disturbance.   Respiratory:  Negative for cough and shortness of breath.    Cardiovascular:  Negative for chest pain and palpitations.   Gastrointestinal:  Negative for abdominal pain, constipation, diarrhea and vomiting.   Genitourinary:  Negative for difficulty urinating and urgency.   Musculoskeletal:  Negative for arthralgias and myalgias.   Skin:  Negative for wound.   Neurological:  Negative for light-headedness and headaches.   Objective:     Vital Signs (Most Recent):  Temp: 97.9 °F (36.6 °C) (07/10/22 1141)  Pulse: 80 (07/10/22 1141)  Resp: 13 (07/10/22 1141)  BP: 120/78 (07/10/22 1141)  SpO2: 98 % (07/10/22 1141) Vital Signs (24h Range):  Temp:  [97.6 °F (36.4 °C)-98.7 °F (37.1 °C)] 97.9 °F (36.6 °C)  Pulse:  [63-97] 80  Resp:  [13-18] 13  SpO2:  [93 %-100 %] 98 %  BP: (111-129)/(59-78) 120/78     Weight: 40 kg (88 lb 2.9 oz)  Body mass index is 16.12 kg/m².  No intake or output data in the 24 hours ending 07/10/22 1148   Physical Exam  Constitutional:       Appearance: Normal appearance.   HENT:      Head: Normocephalic and atraumatic.      Mouth/Throat:      Mouth: Mucous membranes are moist.   Eyes:      Extraocular Movements: Extraocular movements intact.   Neck:      Thyroid: No thyromegaly.      Trachea: No tracheal deviation.   Cardiovascular:      Rate and Rhythm: Normal rate and regular rhythm.      Heart sounds: No murmur heard.  Pulmonary:      Effort: Pulmonary effort is normal.      Breath sounds: Normal breath sounds. No wheezing or rales.   Abdominal:      General: Abdomen is flat. Bowel sounds are normal.      Palpations: Abdomen is soft. There is no mass.      Tenderness: There is no abdominal tenderness.   Musculoskeletal:      Comments: Shoulder brace in place    Skin:     General: Skin is warm and dry.      Coloration: Skin is not pale.   Neurological:      Mental Status: She is alert.      Sensory: No sensory deficit.      Coordination: Coordination normal.      Comments: Oriented to self and year  Follows commands  Moving all extremities       Significant Labs: All pertinent labs within the past 24 hours have been reviewed.  CBC:   Recent Labs   Lab 07/09/22  0328 07/10/22  0532   WBC 5.18 6.01   HGB 10.6* 10.7*   HCT 32.5* 33.6*    225     CMP:   Recent Labs   Lab 07/09/22  0327 07/10/22  0532   * 137   K 4.2 4.1    105   CO2 23 25   GLU 79 96   BUN 32* 32*   CREATININE 0.7 0.7   CALCIUM 8.5* 8.7   ANIONGAP 8 7*   EGFRNONAA >60.0 >60.0       Significant Imaging: I have reviewed all pertinent imaging results/findings within the past 24 hours.

## 2022-07-10 NOTE — ASSESSMENT & PLAN NOTE
71yo F PMH AVNRT, frequent falls, presenting as neurosurgery consult due to L temporal SAH noted on CTH in AM. Per patient's  was found down after hearing fall at 0830. More confusion present than baseline. Patient states she takes aspirin and plavix but hasn't taken the plavix in a while. She is supposed to use a walker at baseline but now is NWB to left arm due a left clavicle fx 7/3. CT c-spine showed C2-C3 anterolisthesis    --Patient admitted to floor       -- q4h neurochecks on floor  --All labs and diagnostics reviewed       --CTH 7/7: L temporal SAH, stable on repeat scan       --CTA 7/8 without aneurysm, stable appearing bleed       --CT c-spine 7/7: anterolisthesis C2-C3       --MRI cervical spine 7/7 shows anterlisthesis of C2 on C3 with severe spinal cord compression C2-4 with cord signal change       --XR flex/ex of cervical spine shows no dynamic instability        --MRI thoracic spine shows remote compression fracture of T2, T3, T10, L2. Severe compression fracture of T8 with almost complete loss of height, chronic appearing.  --Afib: s/p ablation and left atrial appendage occlusion on 2/18/22: Xarelto and Plavix stopped in May per EP physician. Patient still taking ASA, Hold ASA at this time given SAH and possible surgical intervention  --PTT/INR wnl  --Left clavicle fracture: fracture from 7/3. Per Ortho recs at that time: NWB to LUE, sling for comfort, bactrim x 7 days for open fracture. Ortho consulted for this admission, appreciate their assistance will follow up updated recs.   --Continue to monitor clinically, notify NSGY immediately with any changes in neuro status  --HM consulted for co-management, medical optimization, appreciate recs; RCRI 1, Class 2 risk.     Tentative C1-3 PCF next week on 7/13.

## 2022-07-10 NOTE — PLAN OF CARE
Problem: Adult Inpatient Plan of Care  Goal: Patient-Specific Goal (Individualized)  Outcome: Ongoing, Progressing  Goal: Optimal Comfort and Wellbeing  Outcome: Ongoing, Progressing     Problem: Fall Injury Risk  Goal: Absence of Fall and Fall-Related Injury  Outcome: Ongoing, Progressing       POC reviewed with patient at the bedside. Patient verbalized understanding. Questions and concerns addressed. Patient is non-complaint with keeping telemetry leads in place, non-complaint with wearing SCDs and continues to refuse SQ heparin injections. Patient reeducated on the importance of leaving telemetry leads in place, wearing her SCDs and receiving her SQ heparin injections. Patient verbalized understanding but stated that she bruises easily and would rather not wear the SCDs or take the heparin. Vital signs all shift. See flow sheet. Tele sitter remains in  progress. Precautions maintained. Bed low and locked with call light within reach.

## 2022-07-10 NOTE — PROGRESS NOTES
Devan Juarez - Neurosurgery ([facility])  Neurosurgery  Progress Note    Subjective:     History of Present Illness: 71yo F PMH AVNRT, frequent falls, presenting as neurosurgery consult due to L temporal SAH noted on CTH in AM. Per patient's  was found down after hearing fall at 0830. More confusion present than baseline. Patient states she takes aspirin and plavix but hasn't taken the plavix in a while. She is supposed to use a walker at baseline but now is NWB to left arm due a left clavicle fx 7/3. CT c-spine showed C2-C3 anterolisthesis        Post-Op Info:  Procedure(s) (LRB):  C1-C3 Posterior cervical fusion. [employer]. neuromonitoring. sadaf harris. [place]. (N/A)         Interval History: 7/10: TIFFANIE. AFVSS. Exam stable. Tentative OR 7/13 for C1-C3 PCF. Cleared for OR by .     Medications:  Continuous Infusions:  Scheduled Meds:   donepeziL  10 mg Oral Daily    famotidine  20 mg Oral Daily    ferrous sulfate  1 tablet Oral Daily    heparin (porcine)  5,000 Units Subcutaneous Q8H    levothyroxine  88 mcg Oral Before breakfast    montelukast  10 mg Oral QHS    sulfamethoxazole-trimethoprim 400-80mg  1 tablet Oral BID     PRN Meds:acetaminophen, albuterol, dextrose 10%, dextrose 10%, glucagon (human recombinant), glucose, glucose, glucose, methocarbamoL     Review of Systems  Objective:     Weight: 40 kg (88 lb 2.9 oz)  Body mass index is 16.12 kg/m².  Vital Signs (Most Recent):  Temp: 97.6 °F (36.4 °C) (07/10/22 0733)  Pulse: 63 (07/10/22 0733)  Resp: 13 (07/10/22 0733)  BP: 127/64 (07/10/22 0733)  SpO2: 98 % (07/10/22 0733) Vital Signs (24h Range):  Temp:  [97.5 °F (36.4 °C)-98.7 °F (37.1 °C)] 97.6 °F (36.4 °C)  Pulse:  [63-97] 63  Resp:  [13-18] 13  SpO2:  [93 %-100 %] 98 %  BP: (111-129)/(59-73) 127/64                     Female External Urinary Catheter 07/07/22 1500 (Active)   Skin female external urine collection device repositioned 07/08/22 0800   Tolerance no signs/symptoms of discomfort 07/08/22  0800       Physical Exam    Neurosurgery Physical Exam  General: well developed, well nourished, no distress.   Head: normocephalic  Neurologic: Alert and oriented. Thought content appropriate, higher order confusion present  GCS: Motor: 6/Verbal: 5/Eyes: 4 GCS Total: 15  Mental Status: Awake, Alert, Oriented x 4, with higher order confusion  Language: No aphasia  Speech: No dysarthria  Cranial nerves: face symmetric, tongue midline, CN II-XII grossly intact.   Eyes: pupils equal, round, reactive to light with accommodation, EOMI. Mild ptosis noted to left eye   Pulmonary: normal respirations, no signs of respiratory distress  Abdomen: soft, non-distended, not tender to palpation  Skin: Skin is warm, dry and intact.  Sensory: responds to light touch throughout     Motor Strength: Motor exam limited 2/2 patient cooperation. Moves all extremities spontaneously, increased tone to BUE, at least 3/5 in BUE. BLE 4/5. No abnormal movements seen.      Pain limited motion in left shoulder 2/2 clavicle fracture        Reflexes:   Plascencia's: positive bilaterally      Cerebellar:   Finger-to-nose: unable to perform 2/2 spasticity in BUE and difficulty following complex command  Pronator drift: unable to perform 2/2 spasticity in BUE and difficulty following complex command    Significant Labs:  Recent Labs   Lab 07/08/22  0926 07/09/22  0327 07/10/22  0532    79 96   * 135* 137   K 3.7 4.2 4.1    104 105   CO2 25 23 25   BUN 30* 32* 32*   CREATININE 0.7 0.7 0.7   CALCIUM 8.8 8.5* 8.7       Recent Labs   Lab 07/08/22  0926 07/09/22  0328   WBC 5.54 5.18   HGB 10.7* 10.6*   HCT 33.0* 32.5*    219       Recent Labs   Lab 07/08/22  1141   INR 1.0   APTT 26.8       Microbiology Results (last 7 days)       ** No results found for the last 168 hours. **          All pertinent labs from the last 24 hours have been reviewed.    Significant Diagnostics:  I have reviewed and interpreted all pertinent imaging  results/findings within the past 24 hours.  No results found in the last 24 hours.      Assessment/Plan:     * SAH (subarachnoid hemorrhage)  73yo F PMH AVNRT, frequent falls, presenting as neurosurgery consult due to L temporal SAH noted on CTH in AM. Per patient's  was found down after hearing fall at 0830. More confusion present than baseline. Patient states she takes aspirin and plavix but hasn't taken the plavix in a while. She is supposed to use a walker at baseline but now is NWB to left arm due a left clavicle fx 7/3. CT c-spine showed C2-C3 anterolisthesis    --Patient admitted to floor       -- q4h neurochecks on floor  --All labs and diagnostics reviewed       --CTH 7/7: L temporal SAH, stable on repeat scan       --CTA 7/8 without aneurysm, stable appearing bleed       --CT c-spine 7/7: anterolisthesis C2-C3       --MRI cervical spine 7/7 shows anterlisthesis of C2 on C3 with severe spinal cord compression C2-4 with cord signal change       --XR flex/ex of cervical spine shows no dynamic instability        --MRI thoracic spine shows remote compression fracture of T2, T3, T10, L2. Severe compression fracture of T8 with almost complete loss of height, chronic appearing.  --Afib: s/p ablation and left atrial appendage occlusion on 2/18/22: Xarelto and Plavix stopped in May per EP physician. Patient still taking ASA, Hold ASA at this time given SAH and possible surgical intervention  --PTT/INR wnl  --Left clavicle fracture: fracture from 7/3. Per Ortho recs at that time: NWB to LUE, sling for comfort, bactrim x 7 days for open fracture. Ortho consulted for this admission, appreciate their assistance will follow up updated recs.   --Continue to monitor clinically, notify NSGY immediately with any changes in neuro status  --HM consulted for co-management, medical optimization, appreciate recs; RCRI 1, Class 2 risk.     Tentative C1-3 PCF next week on 7/13.        Mason Sy,  MD  Neurosurgery  Devan Juarez - Neurosurgery ([facility])

## 2022-07-10 NOTE — PROGRESS NOTES
Devan Juarez - Neurosurgery ([facility])  Hospital Medicine  Progress Note    Patient Name: Yvette Crews  MRN: [id number]  Patient Class: IP- Inpatient   Admission Date: 7/7/2022  Length of Stay: 3 days  Attending Physician: Mario Alberto King MD  Primary Care Provider: Sally Green MD        Subjective:     Principal Problem:SAH (subarachnoid hemorrhage)        HPI:  Mrs. Crews is a 71 yo woman with paroxysmal atrial fibrillation s/p Watchman device, hypertension, bronchiectasis, Alzheimer's, hypogammaglobulinemia (on monthly IVIG last treatment 7/1), acquired hypothyroidism after total thyroidectomy, and history of SAH s/p fall in 2016 presenting with a subarachnoid hemorrhage after a fall. Per patient's , she was found down in the bathroom yesterday morning but patient was alert. Unclear cause of her fall. She had another fall the week prior which resulted in a clavicular fracture. Patient's  reports patient is more confused than her baseline. She lives in an assisted living facility and is usually able to ambulate around the center with a walker, and do ADLs on her own. Neurosurgery is planning on surgical intervention and hospital medicine was consulted for pre-op evaluation.       Overview/Hospital Course:  No notes on file    Interval History: No acute events overnight    Review of Systems   Constitutional:  Negative for activity change and unexpected weight change.   HENT:  Negative for sore throat and voice change.    Eyes:  Negative for visual disturbance.   Respiratory:  Negative for cough and shortness of breath.    Cardiovascular:  Negative for chest pain and palpitations.   Gastrointestinal:  Negative for abdominal pain, constipation, diarrhea and vomiting.   Genitourinary:  Negative for difficulty urinating and urgency.   Musculoskeletal:  Negative for arthralgias and myalgias.   Skin:  Negative for wound.   Neurological:  Negative for light-headedness and headaches.   Objective:      Vital Signs (Most Recent):  Temp: 97.9 °F (36.6 °C) (07/10/22 1141)  Pulse: 80 (07/10/22 1141)  Resp: 13 (07/10/22 1141)  BP: 120/78 (07/10/22 1141)  SpO2: 98 % (07/10/22 1141) Vital Signs (24h Range):  Temp:  [97.6 °F (36.4 °C)-98.7 °F (37.1 °C)] 97.9 °F (36.6 °C)  Pulse:  [63-97] 80  Resp:  [13-18] 13  SpO2:  [93 %-100 %] 98 %  BP: (111-129)/(59-78) 120/78     Weight: 40 kg (88 lb 2.9 oz)  Body mass index is 16.12 kg/m².  No intake or output data in the 24 hours ending 07/10/22 1148   Physical Exam  Constitutional:       Appearance: Normal appearance.   HENT:      Head: Normocephalic and atraumatic.      Mouth/Throat:      Mouth: Mucous membranes are moist.   Eyes:      Extraocular Movements: Extraocular movements intact.   Neck:      Thyroid: No thyromegaly.      Trachea: No tracheal deviation.   Cardiovascular:      Rate and Rhythm: Normal rate and regular rhythm.      Heart sounds: No murmur heard.  Pulmonary:      Effort: Pulmonary effort is normal.      Breath sounds: Normal breath sounds. No wheezing or rales.   Abdominal:      General: Abdomen is flat. Bowel sounds are normal.      Palpations: Abdomen is soft. There is no mass.      Tenderness: There is no abdominal tenderness.   Musculoskeletal:      Comments: Shoulder brace in place   Skin:     General: Skin is warm and dry.      Coloration: Skin is not pale.   Neurological:      Mental Status: She is alert.      Sensory: No sensory deficit.      Coordination: Coordination normal.      Comments: Oriented to self and year  Follows commands  Moving all extremities       Significant Labs: All pertinent labs within the past 24 hours have been reviewed.  CBC:   Recent Labs   Lab 07/09/22  0328 07/10/22  0532   WBC 5.18 6.01   HGB 10.6* 10.7*   HCT 32.5* 33.6*    225     CMP:   Recent Labs   Lab 07/09/22  0327 07/10/22  0532   * 137   K 4.2 4.1    105   CO2 23 25   GLU 79 96   BUN 32* 32*   CREATININE 0.7 0.7   CALCIUM 8.5* 8.7   ANIONGAP  8 7*   EGFRNONAA >60.0 >60.0       Significant Imaging: I have reviewed all pertinent imaging results/findings within the past 24 hours.      Assessment/Plan:      * SAH (subarachnoid hemorrhage)  Management per primary  Possible surgical intervention 7/13/22    Pre-operative exam  Surgical Risk Assessment:    Active Cardiac Issues:  Active decompensated heart failure? No   Unstable angina?  No   Significant uncontrolled arrhythmias? No   Severe valvular heart disease-Aortic or Mitral Stenosis? No   Recent MI or coronary revascularization < 30 days? No     Cardiac Risk Factors:  High risk surgery? Yes   History of CAD/ischemic heart disease? No   History of cerebrovascular disease? No   History of compensated heart failure? No   Type 2 diabetes requiring insulin? No   Serum Creatinine > 2? No   Total cardiac risk factors 1     Functional mets <4    < 4 METs -unable to walk > 2 blocks on level ground without stopping due to symptoms  - eating, dressing, toileting, walking indoors, light housework. POOR   > 4 METs -climbing > 1 flight of stairs without stopping  -walking up hill > 1-2 blocks  -scrubbing floors  -moving furniture  - golf, bowling, dancing or tennis  -running short distance MODERATE to EXCELLENT     Perioperative Risk Assessment:  RCRI Score 1, Class II risk with 6% risk of cardiopulmonary complications      Perioperative Management Recommendations:    Patient is a high risk surgical candidate with:  Chronic bronchiectasis, stable on room air.  Paroxysmal atrial fibrillation s/p watchman device  No acute renal disease  On Bactrim for open clavicular wound  Patient does not meet 4 functional METS      Fracture of left clavicle  Evaluated by orthopedics, no surgical intervention indicated      Iron deficiency anemia  Monitor CBC  Continue home iron supplememt      Paroxysmal atrial fibrillation  Patient with Paroxysmal (<7 days) atrial fibrillation which is controlled currently without medications.  Patient is currently in sinus rhythm.[id number] Score: 2. Anticoagulation not indicated due to watchman device.  EKG 7/7/22 showed normal sinus rhythm.   JAMEL showed EF of 60%        Postoperative hypothyroidism  Acquired hypothyroidism with hx of MNG s/p total thyroidectomy 1/25/17    -Continue synthroid    Adult bronchiectasis  Chronic, stable  On singulair and mucinex  Patient saturating well on room air        VTE Risk Mitigation (From admission, onward)         Ordered     heparin (porcine) injection 5,000 Units  Every 8 hours         07/08/22 1734     IP VTE HIGH RISK PATIENT  Once         07/07/22 1322     Place sequential compression device  Until discontinued         07/07/22 1322                Discharge Planning   SANTIAGO: 7/15/2022     Code Status: Full Code   Is the patient medically ready for discharge?:     Reason for patient still in hospital (select all that apply): Patient trending condition and Treatment  Discharge Plan A: Skilled Nursing Facility   Discharge Delays: None known at this time              Kristi Maldonado DO  Department of Hospital Medicine   [facility] [address] - Neurosurgery ([facility])

## 2022-07-10 NOTE — SUBJECTIVE & OBJECTIVE
Interval History: 7/10: NAEON. AFVSS. Exam stable. Tentative OR 7/13 for C1-C3 PCF. Cleared for OR by HM.     Medications:  Continuous Infusions:  Scheduled Meds:   donepeziL  10 mg Oral Daily    famotidine  20 mg Oral Daily    ferrous sulfate  1 tablet Oral Daily    heparin (porcine)  5,000 Units Subcutaneous Q8H    levothyroxine  88 mcg Oral Before breakfast    montelukast  10 mg Oral QHS    sulfamethoxazole-trimethoprim 400-80mg  1 tablet Oral BID     PRN Meds:acetaminophen, albuterol, dextrose 10%, dextrose 10%, glucagon (human recombinant), glucose, glucose, glucose, methocarbamoL     Review of Systems  Objective:     Weight: 40 kg (88 lb 2.9 oz)  Body mass index is 16.12 kg/m².  Vital Signs (Most Recent):  Temp: 97.6 °F (36.4 °C) (07/10/22 0733)  Pulse: 63 (07/10/22 0733)  Resp: 13 (07/10/22 0733)  BP: 127/64 (07/10/22 0733)  SpO2: 98 % (07/10/22 0733) Vital Signs (24h Range):  Temp:  [97.5 °F (36.4 °C)-98.7 °F (37.1 °C)] 97.6 °F (36.4 °C)  Pulse:  [63-97] 63  Resp:  [13-18] 13  SpO2:  [93 %-100 %] 98 %  BP: (111-129)/(59-73) 127/64                     Female External Urinary Catheter 07/07/22 1500 (Active)   Skin female external urine collection device repositioned 07/08/22 0800   Tolerance no signs/symptoms of discomfort 07/08/22 0800       Physical Exam    Neurosurgery Physical Exam  General: well developed, well nourished, no distress.   Head: normocephalic  Neurologic: Alert and oriented. Thought content appropriate, higher order confusion present  GCS: Motor: 6/Verbal: 5/Eyes: 4 GCS Total: 15  Mental Status: Awake, Alert, Oriented x 4, with higher order confusion  Language: No aphasia  Speech: No dysarthria  Cranial nerves: face symmetric, tongue midline, CN II-XII grossly intact.   Eyes: pupils equal, round, reactive to light with accommodation, EOMI. Mild ptosis noted to left eye   Pulmonary: normal respirations, no signs of respiratory distress  Abdomen: soft, non-distended, not tender to  palpation  Skin: Skin is warm, dry and intact.  Sensory: responds to light touch throughout     Motor Strength: Motor exam limited 2/2 patient cooperation. Moves all extremities spontaneously, increased tone to BUE, at least 3/5 in BUE. BLE 4/5. No abnormal movements seen.      Pain limited motion in left shoulder 2/2 clavicle fracture        Reflexes:   Plascencia's: positive bilaterally      Cerebellar:   Finger-to-nose: unable to perform 2/2 spasticity in BUE and difficulty following complex command  Pronator drift: unable to perform 2/2 spasticity in BUE and difficulty following complex command    Significant Labs:  Recent Labs   Lab 07/08/22  0926 07/09/22  0327 07/10/22  0532    79 96   * 135* 137   K 3.7 4.2 4.1    104 105   CO2 25 23 25   BUN 30* 32* 32*   CREATININE 0.7 0.7 0.7   CALCIUM 8.8 8.5* 8.7       Recent Labs   Lab 07/08/22  0926 07/09/22  0328   WBC 5.54 5.18   HGB 10.7* 10.6*   HCT 33.0* 32.5*    219       Recent Labs   Lab 07/08/22  1141   INR 1.0   APTT 26.8       Microbiology Results (last 7 days)       ** No results found for the last 168 hours. **          All pertinent labs from the last 24 hours have been reviewed.    Significant Diagnostics:  I have reviewed and interpreted all pertinent imaging results/findings within the past 24 hours.  No results found in the last 24 hours.

## 2022-07-11 ENCOUNTER — TELEPHONE (OUTPATIENT)
Dept: PRIMARY CARE CLINIC | Facility: CLINIC | Age: 72
End: 2022-07-11
Payer: MEDICARE

## 2022-07-11 PROBLEM — M48.02 CERVICAL STENOSIS OF SPINAL CANAL: Status: ACTIVE | Noted: 2022-07-11

## 2022-07-11 LAB
ANION GAP SERPL CALC-SCNC: 8 MMOL/L (ref 8–16)
BASOPHILS # BLD AUTO: 0.01 K/UL (ref 0–0.2)
BASOPHILS NFR BLD: 0.2 % (ref 0–1.9)
BUN SERPL-MCNC: 26 MG/DL (ref 8–23)
CALCIUM SERPL-MCNC: 8.8 MG/DL (ref 8.7–10.5)
CHLORIDE SERPL-SCNC: 103 MMOL/L (ref 95–110)
CO2 SERPL-SCNC: 23 MMOL/L (ref 23–29)
CREAT SERPL-MCNC: 0.6 MG/DL (ref 0.5–1.4)
DIFFERENTIAL METHOD: ABNORMAL
EOSINOPHIL # BLD AUTO: 0 K/UL (ref 0–0.5)
EOSINOPHIL NFR BLD: 0.2 % (ref 0–8)
ERYTHROCYTE [DISTWIDTH] IN BLOOD BY AUTOMATED COUNT: 15 % (ref 11.5–14.5)
EST. GFR  (AFRICAN AMERICAN): >60 ML/MIN/1.73 M^2
EST. GFR  (NON AFRICAN AMERICAN): >60 ML/MIN/1.73 M^2
GLUCOSE SERPL-MCNC: 95 MG/DL (ref 70–110)
HCT VFR BLD AUTO: 36 % (ref 37–48.5)
HGB BLD-MCNC: 11.4 G/DL (ref 12–16)
IMM GRANULOCYTES # BLD AUTO: 0.02 K/UL (ref 0–0.04)
IMM GRANULOCYTES NFR BLD AUTO: 0.4 % (ref 0–0.5)
LYMPHOCYTES # BLD AUTO: 1 K/UL (ref 1–4.8)
LYMPHOCYTES NFR BLD: 19.8 % (ref 18–48)
MCH RBC QN AUTO: 30.9 PG (ref 27–31)
MCHC RBC AUTO-ENTMCNC: 31.7 G/DL (ref 32–36)
MCV RBC AUTO: 98 FL (ref 82–98)
MONOCYTES # BLD AUTO: 0.4 K/UL (ref 0.3–1)
MONOCYTES NFR BLD: 7.2 % (ref 4–15)
NEUTROPHILS # BLD AUTO: 3.8 K/UL (ref 1.8–7.7)
NEUTROPHILS NFR BLD: 72.2 % (ref 38–73)
NRBC BLD-RTO: 0 /100 WBC
PLATELET # BLD AUTO: 256 K/UL (ref 150–450)
PMV BLD AUTO: 9.8 FL (ref 9.2–12.9)
POTASSIUM SERPL-SCNC: 4.1 MMOL/L (ref 3.5–5.1)
RBC # BLD AUTO: 3.69 M/UL (ref 4–5.4)
SODIUM SERPL-SCNC: 134 MMOL/L (ref 136–145)
WBC # BLD AUTO: 5.26 K/UL (ref 3.9–12.7)

## 2022-07-11 PROCEDURE — 97535 SELF CARE MNGMENT TRAINING: CPT

## 2022-07-11 PROCEDURE — 80048 BASIC METABOLIC PNL TOTAL CA: CPT

## 2022-07-11 PROCEDURE — 25000003 PHARM REV CODE 250: Performed by: STUDENT IN AN ORGANIZED HEALTH CARE EDUCATION/TRAINING PROGRAM

## 2022-07-11 PROCEDURE — 36415 COLL VENOUS BLD VENIPUNCTURE: CPT

## 2022-07-11 PROCEDURE — 99232 PR SUBSEQUENT HOSPITAL CARE,LEVL II: ICD-10-PCS | Mod: ,,, | Performed by: PHYSICIAN ASSISTANT

## 2022-07-11 PROCEDURE — 11000001 HC ACUTE MED/SURG PRIVATE ROOM

## 2022-07-11 PROCEDURE — 25000003 PHARM REV CODE 250: Performed by: NEUROLOGICAL SURGERY

## 2022-07-11 PROCEDURE — 99232 PR SUBSEQUENT HOSPITAL CARE,LEVL II: ICD-10-PCS | Mod: GC,,, | Performed by: PSYCHIATRY & NEUROLOGY

## 2022-07-11 PROCEDURE — 25000003 PHARM REV CODE 250: Performed by: PHYSICIAN ASSISTANT

## 2022-07-11 PROCEDURE — 25000003 PHARM REV CODE 250

## 2022-07-11 PROCEDURE — 99232 PR SUBSEQUENT HOSPITAL CARE,LEVL II: ICD-10-PCS | Mod: GC,,, | Performed by: HOSPITALIST

## 2022-07-11 PROCEDURE — 97112 NEUROMUSCULAR REEDUCATION: CPT

## 2022-07-11 PROCEDURE — 99232 SBSQ HOSP IP/OBS MODERATE 35: CPT | Mod: ,,, | Performed by: PHYSICIAN ASSISTANT

## 2022-07-11 PROCEDURE — 99232 SBSQ HOSP IP/OBS MODERATE 35: CPT | Mod: GC,,, | Performed by: PSYCHIATRY & NEUROLOGY

## 2022-07-11 PROCEDURE — 85025 COMPLETE CBC W/AUTO DIFF WBC: CPT

## 2022-07-11 PROCEDURE — 99232 SBSQ HOSP IP/OBS MODERATE 35: CPT | Mod: GC,,, | Performed by: HOSPITALIST

## 2022-07-11 RX ORDER — FAMOTIDINE 20 MG/1
20 TABLET, FILM COATED ORAL 2 TIMES DAILY
Status: DISCONTINUED | OUTPATIENT
Start: 2022-07-11 | End: 2022-07-18 | Stop reason: HOSPADM

## 2022-07-11 RX ORDER — TALC
6 POWDER (GRAM) TOPICAL NIGHTLY PRN
Status: DISCONTINUED | OUTPATIENT
Start: 2022-07-11 | End: 2022-07-12

## 2022-07-11 RX ADMIN — SULFAMETHOXAZOLE AND TRIMETHOPRIM 1 TABLET: 400; 80 TABLET ORAL at 09:07

## 2022-07-11 RX ADMIN — FAMOTIDINE 20 MG: 20 TABLET ORAL at 09:07

## 2022-07-11 RX ADMIN — DONEPEZIL HYDROCHLORIDE 10 MG: 10 TABLET ORAL at 09:07

## 2022-07-11 RX ADMIN — LEVOTHYROXINE SODIUM 88 MCG: 88 TABLET ORAL at 05:07

## 2022-07-11 RX ADMIN — FERROUS SULFATE TAB 325 MG (65 MG ELEMENTAL FE) 1 EACH: 325 (65 FE) TAB at 09:07

## 2022-07-11 RX ADMIN — MONTELUKAST 10 MG: 10 TABLET, FILM COATED ORAL at 08:07

## 2022-07-11 RX ADMIN — FAMOTIDINE 20 MG: 20 TABLET ORAL at 08:07

## 2022-07-11 RX ADMIN — SULFAMETHOXAZOLE AND TRIMETHOPRIM 1 TABLET: 400; 80 TABLET ORAL at 08:07

## 2022-07-11 RX ADMIN — POLYETHYLENE GLYCOL 3350 17 G: 17 POWDER, FOR SOLUTION ORAL at 09:07

## 2022-07-11 NOTE — SUBJECTIVE & OBJECTIVE
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 Provider    Cataract -- -- Provider    Cellulitis of right lower extremity 1/19/2021 -- Provider    Chondrocalcinosis, cause unspecified, involving hand(712.34) 7/12/2011  Dx updated per 2019 IMO Load Provider    Chronic sinusitis 4/6/2017 -- Provider    Colonic constipation 6/5/2013 -- Provider    Concussion 10/27/2016 -- Provider    Frequent falls 3/14/2017 -- Provider    Gait disturbance 3/14/2017 -- Provider    History of cardiac radiofrequency ablation (RFA) 2/3/2018 -- Provider    History of cardiac radiofrequency ablation (RFA) 2/3/2018 -- Provider    History of subarachnoid hemorrhage 10/30/2016 -- Provider    Hypogammaglobulinemia 9/7/2011 -- Provider    Irritable bowel syndrome 12/18/2015 -- Provider    Major depressive disorder, recurrent episode, in full remission 8/19/2010 -- Provider    Onychomycosis -- -- Provider    Osteoarthritis -- -- Provider    Postoperative hypothyroidism 12/18/2015 -- Provider    Presence of Watchman left atrial appendage closure device 5/14/2022 -- Provider    Rectal prolapse 6/5/2013 -- Provider    Reflux esophagitis 2/7/2010 -- Provider    Status post thyroidectomy 6/1/2017 -- Provider    Strabismus -- -- Provider    SVT (supraventricular tachycardia) 11/22/2013 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  Provider    Underweight 1/23/2013 -- Provider              Pertinent Negatives       Diagnosis Date Noted Comments Source    Basal cell carcinoma 12/01/2014 -- Provider    Diabetes mellitus 10/11/2016 -- Provider    Diabetic retinopathy 10/11/2016 -- Provider    Glaucoma  10/11/2016 -- Provider    Macular degeneration 10/11/2016 -- Provider    Melanoma 12/01/2014 -- Provider    Squamous cell carcinoma 12/01/2014 -- Provider    Uveitis 10/11/2016 -- Provider                          Surgical as of 7/10/2022       Past Surgical History       Procedure Laterality Date Comments Source    NASAL SEPTUM SURGERY -- -- -- Provider    TONSILLECTOMY -- -- -- Provider    EYE SURGERY -- -- -- Provider    FRACTURE SURGERY -- -- -- Provider    COLON SURGERY -- -- -- Provider    RECTAL PROLAPSE REPAIR -- june 2013 -- Provider    RADIOFREQUENCY ABLATION -- -- -- Provider    STRABISMUS SURGERY -- -- -- Provider    BREAST CYST ASPIRATION -- -- x2 Provider    CATARACT EXTRACTION W/  INTRAOCULAR LENS IMPLANT Left 3/11/2019 Procedure: EXTRACTION, CATARACT, WITH IOL INSERTION;  Surgeon: Vera Sams MD;  Location: [facility];  Service: Ophthalmology;  Laterality: Left; Provider    CATARACT EXTRACTION W/  INTRAOCULAR LENS IMPLANT Right 4/15/2019 Procedure: EXTRACTION, CATARACT, WITH IOL INSERTION LASER;  Surgeon: Vera Sams MD;  Location: [facility];  Service: Ophthalmology;  Laterality: Right; Provider    OPEN REDUCTION AND INTERNAL FIXATION (ORIF) OF INJURY OF SACROILIAC JOINT Bilateral 1/20/2021 Procedure: ORIF, SACROILIAC JOINT;  Surgeon: Alcides Tamez MD;  Location: [address];  Service: Orthopedics;  Laterality: Bilateral; Provider    OCCLUSION OF LEFT ATRIAL APPENDAGE N/A 2/18/2022 Procedure: Left atrial appendage occlusion;  Surgeon: Chase Gill MD;  Location: [facility];  Service: Cardiology;  Laterality: N/A;  afib, watchman, BSCI, osiris, anes, MB/EH, 3prep Provider    TRANSESOPHAGEAL ECHOCARDIOGRAPHY N/A 2/18/2022 Procedure: ECHOCARDIOGRAM, TRANSESOPHAGEAL;  Surgeon: Nils Diagnostic Provider;  Location: [facility];  Service: Cardiology;  Laterality: N/A; Provider                          Family as of 7/10/2022       Problem Relation Name Age of Onset Comments Source     Heart disease Father -- -- -- Provider    Stroke Father -- -- -- Provider    Heart disease Brother 2 -- -- Provider    Breast cancer Paternal Grandmother -- -- -- Provider    Melanoma Neg Hx -- -- -- Provider    Psoriasis Neg Hx -- -- -- Provider    Lupus Neg Hx -- -- -- Provider    Eczema Neg Hx -- -- -- Provider    Amblyopia Neg Hx -- -- -- Provider    Blindness Neg Hx -- -- -- Provider    Cataracts Neg Hx -- -- -- Provider    Glaucoma Neg Hx -- -- -- Provider    Macular degeneration Neg Hx -- -- -- Provider    Retinal detachment Neg Hx -- -- -- Provider    Strabismus Neg Hx -- -- -- Provider                  Tobacco Use as of 7/10/2022       Smoking Status Smoking Start Date Smoking Quit Date Packs/Day Years Used    Never Smoker -- -- -- --      Types Comments Smokeless Tobacco Status Smokeless Tobacco Quit Date Source    -- -- Never Used -- Provider                  Alcohol Use as of 7/10/2022       Alcohol Use Drinks/Week Alcohol/Week Comments Source    No   -- -- Provider                  Drug Use as of 7/10/2022       Drug Use Types Frequency Comments Source    No -- -- -- Provider                  Sexual Activity as of 7/10/2022       Sexually Active Birth Control Partners Comments Source    Not Currently -- -- -- Provider                  Activities of Daily Living as of 7/10/2022       Activities of Daily Living Question Response Comments Source    Are you pregnant or think you may be? Not Asked -- Provider    Breast-feeding Not Asked -- Provider                  Social Documentation as of 7/10/2022    None               Occupational as of 7/10/2022       Occupation Employer Comments Source    retired -- -- Provider                  Socioeconomic as of 7/10/2022       Marital Status Spouse Name Number of Children Years Education Education Level Preferred Language Ethnicity Race Source     -- 2 -- -- English Not  or /a White Provider                  Pertinent History       Question  Response Comments    Lives with -- --    Place in Birth Order -- --    Lives in -- --    Number of Siblings -- --    Raised by -- --    Legal Involvement -- --    Childhood Trauma -- --    Criminal History of -- --    Financial Status -- --    Highest Level of Education -- --    Does patient have access to a firearm? 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    Cataract     Cellulitis of right lower extremity 1/19/2021    Chondrocalcinosis, cause unspecified, involving hand(712.34) 7/12/2011     Dx updated per 2019 IMO Load    Chronic sinusitis 4/6/2017    Colonic constipation 6/5/2013    Concussion 10/27/2016    Frequent falls 3/14/2017    Gait disturbance 3/14/2017    History of cardiac radiofrequency ablation (RFA) 2/3/2018    History of cardiac radiofrequency ablation (RFA) 2/3/2018    History of subarachnoid hemorrhage 10/30/2016    Hypogammaglobulinemia 9/7/2011    Irritable bowel syndrome 12/18/2015    Major depressive disorder, recurrent episode, in full remission 8/19/2010    Onychomycosis     Osteoarthritis     Postoperative hypothyroidism 12/18/2015    Presence of Watchman left atrial appendage closure device 5/14/2022    Rectal prolapse 6/5/2013    Reflux esophagitis 2/7/2010    Status post thyroidectomy 6/1/2017    Strabismus     SVT (supraventricular tachycardia) 11/22/2013    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     Underweight 1/23/2013     Past Surgical History:   Procedure Laterality Date    BREAST CYST ASPIRATION      x2    CATARACT EXTRACTION W/  INTRAOCULAR LENS IMPLANT Left 3/11/2019    Procedure: EXTRACTION, CATARACT, WITH IOL INSERTION;  Surgeon: Vera Sams MD;   Location: [facility] OR;  Service: Ophthalmology;  Laterality: Left;    CATARACT EXTRACTION W/  INTRAOCULAR LENS IMPLANT Right 4/15/2019    Procedure: EXTRACTION, CATARACT, WITH IOL INSERTION LASER;  Surgeon: Vera Sams MD;  Location: [facility];  Service: Ophthalmology;  Laterality: Right;    COLON SURGERY      EYE SURGERY      FRACTURE SURGERY      NASAL SEPTUM SURGERY      OCCLUSION OF LEFT ATRIAL APPENDAGE N/A 2/18/2022    Procedure: Left atrial appendage occlusion;  Surgeon: Chase Gill MD;  Location: [facility] EP LAB;  Service: Cardiology;  Laterality: N/A;  afib, watchman, BSCI, osiris, anes, MB/EH, 3prep    OPEN REDUCTION AND INTERNAL FIXATION (ORIF) OF INJURY OF SACROILIAC JOINT Bilateral 1/20/2021    Procedure: ORIF, SACROILIAC JOINT;  Surgeon: Alcides Tamez MD;  Location: [address] [facility];  Service: Orthopedics;  Laterality: Bilateral;    RADIOFREQUENCY ABLATION      RECTAL PROLAPSE REPAIR  june 2013    STRABISMUS SURGERY      TONSILLECTOMY      TRANSESOPHAGEAL ECHOCARDIOGRAPHY N/A 2/18/2022    Procedure: ECHOCARDIOGRAM, TRANSESOPHAGEAL;  Surgeon: Nils Diagnostic Provider;  Location: [facility] LAB;  Service: Cardiology;  Laterality: N/A;     Family History       Problem Relation (Age of Onset)    Breast cancer Paternal Grandmother    Heart disease Father, Brother    Stroke Father          Tobacco Use    Smoking status: Never Smoker    Smokeless tobacco: Never Used   Substance and Sexual Activity    Alcohol use: No    Drug use: No    Sexual activity: Not Currently     Review of patient's allergies indicates:   Allergen Reactions    Adhesive      Tape tears skin    Buspar [buspirone]      headaches    Escitalopram oxalate Nausea Only     hyponatremia      Rifampin Rash       Current Facility-Administered Medications on File Prior to Encounter   Medication    balanced salt irrigation solution 1 drop    moxifloxacin 0.5 % ophthalmic solution 1 drop    phenylephrine HCL 2.5% ophthalmic solution 1 drop     sodium chloride 0.9% bolus 1,000 mL    sodium chloride 0.9% bolus 1,000 mL    sodium chloride 0.9% bolus 1,000 mL    tropicamide 1% ophthalmic solution 1 drop     Current Outpatient Medications on File Prior to Encounter   Medication Sig    acetaminophen (TYLENOL) 325 MG tablet Take 2 tablets (650 mg total) by mouth every 6 (six) hours as needed for Pain.    albuterol (PROVENTIL/VENTOLIN HFA) 90 mcg/actuation inhaler Inhale 2 puffs into the lungs every 6 (six) hours as needed for Wheezing. Rescue    aspirin (ECOTRIN) 81 MG EC tablet Take 1 tablet (81 mg total) by mouth once daily. (Patient taking differently: Take 81 mg by mouth once daily. Stop after 8/18/22)    calcium-vitamin D3-vitamin K (VIACTIV) 650 mg-12.5 mcg-40 mcg Chew Chew 1 by mouth twice daily    docusate calcium (SURFAK) 240 mg capsule Take 240 mg by mouth 3 (three) times daily.    donepeziL (ARICEPT) 10 MG tablet Take 1 tablet (10 mg total) by mouth once daily.    famotidine (PEPCID) 40 MG tablet Take 1 tablet (40 mg total) by mouth once daily.    ferrous sulfate 325 (65 FE) MG EC tablet Take 1 tablet (325 mg total) by mouth once daily.    guaiFENesin (MUCINEX) 600 mg 12 hr tablet Take 2 tablets (1,200 mg total) by mouth 2 (two) times daily. (Patient taking differently: Take 1,200 mg by mouth every 12 (twelve) hours.)    Immune Globulin G, IGG,-PRO-IGA 10 % injection, Privigen, (PRIVIGEN) 10 % Soln Infuse 20 g into the vein every 4 weeks.    levothyroxine (SYNTHROID) 88 MCG tablet Take 1 tablet (88 mcg total) by mouth before breakfast.    loratadine (CLARITIN) 10 mg tablet Take 1 tablet (10 mg total) by mouth once daily.    methocarbamoL (ROBAXIN) 500 MG Tab Take 1 tablet (500 mg total) by mouth 3 (three) times daily as needed (Musce spasms).    montelukast (SINGULAIR) 10 mg tablet Take 1 tablet (10 mg total) by mouth every evening.    pedi multivit no.7/folic acid (FLINTSTONES MULTI-VIT GUMMIES ORAL) Chew 2 gummies by mouth every day     "polyethylene glycol (GLYCOLAX) 17 gram PwPk Take 17 g by mouth once daily.    pumpkin seed extract/soy germ (AZO BLADDER CONTROL ORAL) Take 1 tablet by mouth 2 (two) times a day.    sulfamethoxazole-trimethoprim 800-160mg (BACTRIM DS) 800-160 mg Tab Take 1 tablet by mouth 2 (two) times daily. for 7 days    [DISCONTINUED] clopidogreL (PLAVIX) 75 mg tablet Take 1 tablet (75 mg total) by mouth once daily.    [DISCONTINUED] XARELTO 20 mg Tab TAKE 1 TAB BY MOUTH EVERY EVENING WITH DINNER (BEGIN AFTER ELIQUIS IS COMPLETED)     Psychotherapeutics (From admission, onward)                None          Mental Status Exam:  Appearance: disheveled, malnourished, lying in bed, thin & gaunt looking  Behavior/Cooperation: normal, cooperative  Speech: slowed, soft  Mood: neutral  Affect: normal and agitated   Thought Process: normal and logical, circumstantial  Thought Content: normal, no suicidality, no homicidality, delusions, or paranoia   Orientation: grossly intact  Memory: 1/3 recall after 5 minutes  Attention Span/Concentration: Decreased and Recited months in reverse by number to 7, CAM-ICU passed with no errrors  Insight: fair  Judgment: fair     Strengths and Liabilities: Strength: Patient is expressive/articulate., Strength: Patient is intelligent., Strength: Patient has positive support network., Liability: Patient is dependent.    Objective:     Vital Signs (Most Recent):  Temp: 98.7 °F (37.1 °C) (07/10/22 1556)  Pulse: (!) 59 (07/10/22 1556)  Resp: 14 (07/10/22 1556)  BP: (!) 140/80 (07/10/22 1556)  SpO2: 99 % (07/10/22 1556)   Vital Signs (24h Range):  Temp:  [97.6 °F (36.4 °C)-98.7 °F (37.1 °C)] 98.7 °F (37.1 °C)  Pulse:  [59-80] 59  Resp:  [13-18] 14  SpO2:  [93 %-100 %] 99 %  BP: (111-140)/(59-80) 140/80     Height: 5' 2.01" (157.5 cm)  Weight: 40 kg (88 lb 2.9 oz)  Body mass index is 16.12 kg/m².    No intake or output data in the 24 hours ending 07/10/22 7370    Significant Labs: All pertinent labs within the " past 24 hours have been reviewed.    Significant Imaging: I have reviewed all pertinent imaging results/findings within the past 24 hours.

## 2022-07-11 NOTE — HOSPITAL COURSE
"Mrs. Crews is a 73 yo woman with paroxysmal atrial fibrillation s/p Watchman device, hypertension, bronchiectasis, Alzheimer's, hypogammaglobulinemia (on monthly IVIG last treatment 7/1), acquired hypothyroidism after total thyroidectomy, and history of SAH s/p fall in 2016 presenting with a subarachnoid hemorrhage after a fall. Per patient's , she was found down in the bathroom yesterday morning but patient was alert. Unclear cause of her fall. She had another fall the week prior which resulted in a clavicular fracture. Patient's  reports patient is more confused than her baseline. She lives in an assisted living facility and is usually able to ambulate around the center with a walker, and do ADLs on her own. Neurosurgery is planning on surgical intervention and hospital medicine was consulted for pre-op evaluation.     7/8/22: Neuro stable. MRI cervical spine shows severe cord compression as well as C2 on C3 anterolisthesis. Multiple compression fractures of the thoracic spine, worst at T8 however chronic appearing. CTH stable, CTA without aneurysm. Patient poor historian due to Alzheimer's disease.     7/9/22: NAEON. Pending surgery next week, cleared by . AM labs pending.     7/10/22: NAEON    7/11/22: No acute events overnight. Patient shares that she slept better last night compared to her other nights in the hospital thus far. She denies having any suicidal thoughts and continues to deny ever having any serious thoughts of any self harm. Patient's  and daughter at bedside were interviewed separately outside the patient room. Both individuals denied that Ms. Crews has had any previous suicide attempts or intentions of self harm. Per her , the patient has been having difficulty expressing her frustration surrounding her dementia and forgetfulness. He reports that, at this time, he believes his wife is "pretty close to her baseline mental status."     7/12/22: Patient became " agitated overnight and was trying to get out of her bed despite multiple attempts to redirect her. She was placed in restraints (right upper and lower extremities) for her safety. This morning, patient was calm and cooperative during interview. Patient was negative with Brief Confusion Assessment Method (bCAM). She denies any thoughts or intention of self harm at this time.

## 2022-07-11 NOTE — SUBJECTIVE & OBJECTIVE
Interval History: NAEON. AFVSS. Labs stable. Seen by Psychiatry yesterday, no acute intervention. Patient is in better spirits today. Neuro exam stable. Denies neck or back pain. Oriented x 3.  and daughter at bedside, all wish to proceed with surgical intervention 7/13.    Medications:  Continuous Infusions:  Scheduled Meds:   donepeziL  10 mg Oral Daily    famotidine  20 mg Oral BID    ferrous sulfate  1 tablet Oral Daily    heparin (porcine)  5,000 Units Subcutaneous Q8H    levothyroxine  88 mcg Oral Before breakfast    montelukast  10 mg Oral QHS    sulfamethoxazole-trimethoprim 400-80mg  1 tablet Oral BID     PRN Meds:acetaminophen, albuterol, dextrose 10%, dextrose 10%, glucagon (human recombinant), glucose, glucose, glucose, methocarbamoL, polyethylene glycol     Objective:     Weight: 40 kg (88 lb 2.9 oz)  Body mass index is 16.12 kg/m².  Vital Signs (Most Recent):  Temp: 97.6 °F (36.4 °C) (07/11/22 1246)  Pulse: 70 (07/11/22 1246)  Resp: 18 (07/11/22 1246)  BP: 132/67 (07/11/22 1246)  SpO2: 97 % (07/11/22 1246) Vital Signs (24h Range):  Temp:  [96.2 °F (35.7 °C)-98.7 °F (37.1 °C)] 97.6 °F (36.4 °C)  Pulse:  [59-94] 70  Resp:  [14-18] 18  SpO2:  [94 %-99 %] 97 %  BP: (105-140)/(55-80) 132/67                     Female External Urinary Catheter 07/07/22 1500 (Active)   Skin female external urine collection device repositioned 07/08/22 0800   Tolerance no signs/symptoms of discomfort 07/08/22 0800       Physical Exam    Neurosurgery Physical Exam  General: well developed, well nourished, no distress.   Head: normocephalic  Neurologic: Alert and oriented. Thought content appropriate, higher order confusion present  GCS: Motor: 6/Verbal: 5/Eyes: 4 GCS Total: 15  Mental Status: Awake, Alert, Oriented x 4, with higher order confusion  Language: No aphasia  Speech: No dysarthria  Cranial nerves: face symmetric, tongue midline, CN II-XII grossly intact.   Eyes: pupils equal, round, reactive to light  with accommodation, EOMI. Mild ptosis noted to left eye   Pulmonary: normal respirations, no signs of respiratory distress  Abdomen: soft, non-distended, not tender to palpation  Skin: Skin is warm, dry and intact.  Sensory: responds to light touch throughout  Motor Strength: Moves all extremities spontaneously, increased tone to BUE, at least 4/5 in BUE, 2-3/5 in bilateral deltoid 2/2 rigidity and left clavicle fracture. BLE 4/5. No abnormal movements seen.      Pain limited motion in left shoulder 2/2 clavicle fracture     Reflexes:   Plascencia's: positive bilaterally        Significant Labs:  Recent Labs   Lab 07/10/22  0532 07/11/22  0759   GLU 96 95    134*   K 4.1 4.1    103   CO2 25 23   BUN 32* 26*   CREATININE 0.7 0.6   CALCIUM 8.7 8.8       Recent Labs   Lab 07/10/22  0532 07/11/22  0759   WBC 6.01 5.26   HGB 10.7* 11.4*   HCT 33.6* 36.0*    256       No results for input(s): LABPT, INR, APTT in the last 48 hours.    Microbiology Results (last 7 days)       ** No results found for the last 168 hours. **          All pertinent labs from the last 24 hours have been reviewed.    Significant Diagnostics:  I have reviewed and interpreted all pertinent imaging results/findings within the past 24 hours.  No results found in the last 24 hours.

## 2022-07-11 NOTE — PROGRESS NOTES
Devan Juarez - Neurosurgery ([facility])  Neurosurgery  Progress Note    Subjective:     History of Present Illness: 73yo F PMH AVNRT, frequent falls, presenting as neurosurgery consult due to L temporal SAH noted on CTH in AM. Per patient's  was found down after hearing fall at 0830. More confusion present than baseline. Patient states she takes aspirin and plavix but hasn't taken the plavix in a while. She is supposed to use a walker at baseline but now is NWB to left arm due a left clavicle fx 7/3. CT c-spine showed C2-C3 anterolisthesis        Post-Op Info:  Procedure(s) (LRB):  C1-C3 Posterior cervical fusion. [place]kosuy. neuromonitoring. sadaf harris. [place]. (N/A)         Interval History: NAEON. AFVSS. Labs stable. Seen by Psychiatry yesterday, no acute intervention. Patient is in better spirits today. Neuro exam stable. Denies neck or back pain. Oriented x 3.  and daughter at bedside, all wish to proceed with surgical intervention 7/13.    Medications:  Continuous Infusions:  Scheduled Meds:   donepeziL  10 mg Oral Daily    famotidine  20 mg Oral BID    ferrous sulfate  1 tablet Oral Daily    heparin (porcine)  5,000 Units Subcutaneous Q8H    levothyroxine  88 mcg Oral Before breakfast    montelukast  10 mg Oral QHS    sulfamethoxazole-trimethoprim 400-80mg  1 tablet Oral BID     PRN Meds:acetaminophen, albuterol, dextrose 10%, dextrose 10%, glucagon (human recombinant), glucose, glucose, glucose, methocarbamoL, polyethylene glycol     Objective:     Weight: 40 kg (88 lb 2.9 oz)  Body mass index is 16.12 kg/m².  Vital Signs (Most Recent):  Temp: 97.6 °F (36.4 °C) (07/11/22 1246)  Pulse: 70 (07/11/22 1246)  Resp: 18 (07/11/22 1246)  BP: 132/67 (07/11/22 1246)  SpO2: 97 % (07/11/22 1246) Vital Signs (24h Range):  Temp:  [96.2 °F (35.7 °C)-98.7 °F (37.1 °C)] 97.6 °F (36.4 °C)  Pulse:  [59-94] 70  Resp:  [14-18] 18  SpO2:  [94 %-99 %] 97 %  BP: (105-140)/(55-80) 132/67                     Female  External Urinary Catheter 07/07/22 1500 (Active)   Skin female external urine collection device repositioned 07/08/22 0800   Tolerance no signs/symptoms of discomfort 07/08/22 0800       Physical Exam    Neurosurgery Physical Exam  General: well developed, well nourished, no distress.   Head: normocephalic  Neurologic: Alert and oriented. Thought content appropriate, higher order confusion present  GCS: Motor: 6/Verbal: 5/Eyes: 4 GCS Total: 15  Mental Status: Awake, Alert, Oriented x 4, with higher order confusion  Language: No aphasia  Speech: No dysarthria  Cranial nerves: face symmetric, tongue midline, CN II-XII grossly intact.   Eyes: pupils equal, round, reactive to light with accommodation, EOMI. Mild ptosis noted to left eye   Pulmonary: normal respirations, no signs of respiratory distress  Abdomen: soft, non-distended, not tender to palpation  Skin: Skin is warm, dry and intact.  Sensory: responds to light touch throughout  Motor Strength: Moves all extremities spontaneously, increased tone to BUE, at least 4/5 in BUE, 2-3/5 in bilateral deltoid 2/2 rigidity and left clavicle fracture. BLE 4/5. No abnormal movements seen.      Pain limited motion in left shoulder 2/2 clavicle fracture     Reflexes:   Plascencia's: positive bilaterally        Significant Labs:  Recent Labs   Lab 07/10/22  0532 07/11/22  0759   GLU 96 95    134*   K 4.1 4.1    103   CO2 25 23   BUN 32* 26*   CREATININE 0.7 0.6   CALCIUM 8.7 8.8       Recent Labs   Lab 07/10/22  0532 07/11/22  0759   WBC 6.01 5.26   HGB 10.7* 11.4*   HCT 33.6* 36.0*    256       No results for input(s): LABPT, INR, APTT in the last 48 hours.    Microbiology Results (last 7 days)       ** No results found for the last 168 hours. **          All pertinent labs from the last 24 hours have been reviewed.    Significant Diagnostics:  I have reviewed and interpreted all pertinent imaging results/findings within the past 24 hours.  No results found  in the last 24 hours.      Assessment/Plan:     SAH (subarachnoid hemorrhage)  71yo F PMH AVNRT, frequent falls, presenting as neurosurgery consult due to L temporal SAH noted on CTH in AM. Per patient's  was found down after hearing fall at 0830. More confusion present than baseline. Patient states she takes aspirin and plavix but hasn't taken the plavix in a while. She is supposed to use a walker at baseline but now is NWB to left arm due a left clavicle fx 7/3. CT c-spine showed C2-C3 anterolisthesis    --Patient admitted to floor       -- q4h neurochecks on floor  --All labs and diagnostics reviewed       --CTH 7/7: L temporal SAH, stable on repeat scan       --CTA 7/8 without aneurysm, stable appearing bleed       --CT c-spine 7/7: anterolisthesis C2-C3       --MRI cervical spine 7/7 shows anterlisthesis of C2 on C3 with severe spinal cord compression C2-4 with cord signal change       --XR flex/ex of cervical spine shows no dynamic instability        --MRI thoracic spine shows remote compression fracture of T2, T3, T10, L2. Severe compression fracture of T8 with almost complete loss of height, chronic appearing.  --Afib: s/p ablation and left atrial appendage occlusion on 2/18/22: Xarelto and Plavix stopped in May per EP physician. Patient still taking ASA, Hold ASA at this time given SAH and surgical intervention  --PTT/INR wnl  --Left clavicle fracture: fracture from 7/3, missed f/u due to fall. Per Ortho recs at that time: NWB to LUE, sling for comfort, bactrim x 7 days for open fracture. Per  will be able to bear weight 3 weeks from injury, will f/u outpatient. Ortho consulted for this admission, appreciate their assistance. NWB LUE in figure-of-eight brace (patient not tolerating sling). Keep wound over left shoulder covered. Continue Bactrim for a total of 7 days. Recommend mobilization with a wheelchair given patient condition and inability to bear weight through the left upper extremity.    --Psychiatry consulted 7/10 for suicidal ideation (told nurse she wanted to drown self). Mood better today. No intervention. Added melatonin for sleep aid.  --Continue to monitor clinically, notify NSGY immediately with any changes in neuro status  --HM consulted for co-management, medical optimization, appreciate recs; RCRI 1, Class 2 risk.     Tentative C1-3 PCF next week on 7/13.        Gina Ramesh PA-C  Neurosurgery  Devan Juarez - Neurosurgery ([facility])

## 2022-07-11 NOTE — PLAN OF CARE
Devan Juarez - Neurosurgery (Hospital)  Discharge Reassessment    Primary Care Provider: Sally Green MD    Expected Discharge Date: 7/15/2022     Patient is not medically ready for discharge.  Patient has a tentative plan to go to surgery on Wednesday 7/13.    Reassessment (most recent)     Discharge Reassessment - 07/11/22 1019        Discharge Reassessment    Assessment Type Discharge Planning Reassessment     Did the patient's condition or plan change since previous assessment? No     Discharge Plan discussed with: Spouse/sig other     Communicated SANTIAGO with patient/caregiver Date not available/Unable to determine     Discharge Plan A Skilled Nursing Facility     Discharge Plan B Assisted Living     DME Needed Upon Discharge  none     Discharge Barriers Identified None     Why the patient remains in the hospital Requires continued medical care        Post-Acute Status    Discharge Delays None known at this time

## 2022-07-11 NOTE — SUBJECTIVE & OBJECTIVE
Interval History: Patient is doing well. She is A/Ox3 but mildly confused.  in room reports she is at her baseline. She denie palpitations, CP, SOB, dizziness, syncope, fever, chills, cough.     Review of Systems  Objective:     Vital Signs (Most Recent):  Temp: 97.2 °F (36.2 °C) (07/11/22 1539)  Pulse: 77 (07/11/22 1539)  Resp: 18 (07/11/22 1539)  BP: (!) 153/74 (07/11/22 1539)  SpO2: 95 % (07/11/22 1539)   Vital Signs (24h Range):  Temp:  [96.2 °F (35.7 °C)-98.7 °F (37.1 °C)] 97.2 °F (36.2 °C)  Pulse:  [59-94] 77  Resp:  [18] 18  SpO2:  [94 %-97 %] 95 %  BP: (105-153)/(55-74) 153/74     Weight: 40 kg (88 lb 2.9 oz)  Body mass index is 16.12 kg/m².  No intake or output data in the 24 hours ending 07/11/22 1610   Physical Exam  Constitutional:       Appearance: Normal appearance.   HENT:      Head: Normocephalic and atraumatic.      Mouth/Throat:      Mouth: Mucous membranes are moist.   Eyes:      Extraocular Movements: Extraocular movements intact.   Cardiovascular:      Rate and Rhythm: Normal rate and regular rhythm.      Pulses: Normal pulses.      Heart sounds: Normal heart sounds. No murmur heard.    No friction rub. No gallop.   Pulmonary:      Effort: Pulmonary effort is normal.      Breath sounds: Normal breath sounds.   Abdominal:      General: Abdomen is flat. There is no distension.      Palpations: Abdomen is soft.      Tenderness: There is no abdominal tenderness.   Musculoskeletal:         General: No tenderness. Normal range of motion.      Cervical back: Normal range of motion and neck supple.      Left lower leg: No edema.   Skin:     General: Skin is warm and dry.   Neurological:      General: No focal deficit present.      Mental Status: She is alert and oriented to person, place, and time. Mental status is at baseline.       Significant Labs: All pertinent labs within the past 24 hours have been reviewed.  Recent Lab Results         07/11/22  9699        Anion Gap 8       Baso # 0.01        Basophil % 0.2       BUN 26       Calcium 8.8       Chloride 103       CO2 23       Creatinine 0.6       Differential Method Automated       eGFR if  >60.0       eGFR if non  >60.0  Comment: Calculation used to obtain the estimated glomerular filtration  rate (eGFR) is the CKD-EPI equation.          Eos # 0.0       Eosinophil % 0.2       Glucose 95       Gran # (ANC) 3.8       Gran % 72.2       Hematocrit 36.0       Hemoglobin 11.4       Immature Grans (Abs) 0.02  Comment: Mild elevation in immature granulocytes is non specific and   can be seen in a variety of conditions including stress response,   acute inflammation, trauma and pregnancy. Correlation with other   laboratory and clinical findings is essential.         Immature Granulocytes 0.4       Lymph # 1.0       Lymph % 19.8       MCH 30.9       MCHC 31.7       MCV 98       Mono # 0.4       Mono % 7.2       MPV 9.8       nRBC 0       Platelets 256       Potassium 4.1       RBC 3.69       RDW 15.0       Sodium 134       WBC 5.26               Significant Imaging: I have reviewed all pertinent imaging results/findings within the past 24 hours.

## 2022-07-11 NOTE — ASSESSMENT & PLAN NOTE
"ASSESSMENT     Yvette Crews is a 72 y.o. female with a past psychiatric history of Depression, Anxiety and Cognitive decline who presented to [facility] due to SAH (subarachnoid hemorrhage). Psychiatry was consulted for "Suicidal Ideation" (told nurse she wanted to drown her self).    IMPRESSION  Adjustment disorder with depressed mood  Dementia, degree unspecified    RECOMMENDATION(S)      1. Scheduled Medication(s):  None at this time    2. PRN Medication(s):  None at this time; consider PRN medication for sleep, will defer to CL team    3. Legal Status/Precaution(s):  Patient does not meet criteria for PEC or inpatient psychiatric admission at this time. Recommend to rescind PEC if one was placed. Patient is not currently an imminent danger to self or others and is not gravely disabled due to a psychiatric illness.    4. Other:  [facility] ICU - 0 errors  "

## 2022-07-11 NOTE — NURSING
Patient mention that she feels like killing herself , by drowning because she is tired of the confusion. Charge nurse was notified was advised to call the primary doctor ,however  did not answer the phone .

## 2022-07-11 NOTE — PROGRESS NOTES
Pharmacist Renal Dose Adjustment Note    Yvette Crews is a 72 y.o. female being treated with the medication famotidine.     Patient Data:    Vital Signs (Most Recent):  Temp: 96.7 °F (35.9 °C) (07/11/22 0924)  Pulse: 94 (07/11/22 0924)  Resp: 18 (07/11/22 0924)  BP: 126/73 (07/11/22 0924)  SpO2: 97 % (07/11/22 0924) Vital Signs (72h Range):  Temp:  [96.1 °F (35.6 °C)-98.7 °F (37.1 °C)]   Pulse:  [59-97]   Resp:  [13-18]   BP: ()/(50-95)   SpO2:  [93 %-100 %]      Recent Labs   Lab 07/09/22  0327 07/10/22  0532 07/11/22  0759   CREATININE 0.7 0.7 0.6     Serum creatinine: 0.6 mg/dL 07/11/22 0759  Estimated creatinine clearance: 53.5 mL/min    Medication: Famotidine will be renally adjusted from 20 mg PO daily to 20 mg PO twice daily.     Pharmacist's Name: Kj Williamson  Pharmacist's Extension: [phone]

## 2022-07-11 NOTE — PLAN OF CARE
Problem: Adult Inpatient Plan of Care  Goal: Patient-Specific Goal (Individualized)  Outcome: Ongoing, Progressing  Goal: Optimal Comfort and Wellbeing  Outcome: Ongoing, Progressing     Problem: Fall Injury Risk  Goal: Absence of Fall and Fall-Related Injury  Outcome: Ongoing, Progressing       POC reviewed with patient at the bedside. Patient verbalized understanding. Questions and concerns addressed. Patient remains non-complaint with wearing SCDs and continues to refuse SQ heparin injections. Continued to reeducate patient on wearing her SCDs and receiving her SQ heparin injections. Patient verbalized understanding patient continues to refuse. Vital signs all shift. See flow sheet. Tele sitter remains in progress. Precautions maintained. Psychiatric consult completed please see consult notes. Bed low and locked with call light within reach.

## 2022-07-11 NOTE — NURSING
Notified on call for neurosurgery that patients type and screen will  at  midnight, this writer was informed that the order will be renewed Wednesday before patient has surgery.

## 2022-07-11 NOTE — ASSESSMENT & PLAN NOTE
"ASSESSMENT     Yvette Crews is a 72 y.o. female with a past psychiatric history of Depression, Anxiety and Cognitive decline who presented to [facility] due to SAH (subarachnoid hemorrhage). Psychiatry was consulted for "Suicidal Ideation" (told nurse she wanted to drown her self). Patient denies any previous suicide attempts. She denies any active suicidal ideation, intent, or plan. Per patient's , she appears to have returned to her baseline mental status.     IMPRESSION  Adjustment disorder with depressed mood  Dementia, degree unspecified    RECOMMENDATION(S)      1. Scheduled Medication(s):  None at this time    2. PRN Medication(s):  None at this time    3. Legal Status/Precaution(s):  Patient does not meet criteria for PEC or inpatient psychiatric admission at this time. Recommend to rescind PEC if one was placed. Patient is not currently an imminent danger to self or others and is not gravely disabled due to a psychiatric illness.    "

## 2022-07-11 NOTE — SUBJECTIVE & OBJECTIVE
"  Family History       Problem Relation (Age of Onset)    Breast cancer Paternal Grandmother    Heart disease Father, Brother    Stroke Father          Tobacco Use    Smoking status: Never Smoker    Smokeless tobacco: Never Used   Substance and Sexual Activity    Alcohol use: No    Drug use: No    Sexual activity: Not Currently     Psychotherapeutics (From admission, onward)                None               Objective:     Vital Signs (Most Recent):  Temp: 97.2 °F (36.2 °C) (07/11/22 1539)  Pulse: 77 (07/11/22 1539)  Resp: 18 (07/11/22 1539)  BP: (!) 153/74 (07/11/22 1539)  SpO2: 95 % (07/11/22 1539)   Vital Signs (24h Range):  Temp:  [96.2 °F (35.7 °C)-98.7 °F (37.1 °C)] 97.2 °F (36.2 °C)  Pulse:  [59-94] 77  Resp:  [18] 18  SpO2:  [94 %-97 %] 95 %  BP: (105-153)/(55-74) 153/74     Height: 5' 2.01" (157.5 cm)  Weight: 40 kg (88 lb 2.9 oz)  Body mass index is 16.12 kg/m².    No intake or output data in the 24 hours ending 07/11/22 1624    Mental Status Exam:  Appearance: thin & gaunt looking  Behavior/Cooperation: friendly and cooperative  Speech: increased latency of response, soft  Mood: "good"  Affect: normal  Thought Process: logical  Thought Content: normal, no suicidality, no homicidality, delusions, or paranoia   Orientation: grossly intact  Memory: Impaired to some degree  Attention Span/Concentration: Impaired to some degree  Insight: limited  Judgment: limited      Significant Labs: All pertinent labs within the past 24 hours have been reviewed.    Significant Imaging: I have reviewed all pertinent imaging results/findings within the past 24 hours.  "

## 2022-07-11 NOTE — ASSESSMENT & PLAN NOTE
71yo F PMH AVNRT, frequent falls, presenting as neurosurgery consult due to L temporal SAH noted on CTH in AM. Per patient's  was found down after hearing fall at 0830. More confusion present than baseline. Patient states she takes aspirin and plavix but hasn't taken the plavix in a while. She is supposed to use a walker at baseline but now is NWB to left arm due a left clavicle fx 7/3. CT c-spine showed C2-C3 anterolisthesis    --Patient admitted to floor       -- q4h neurochecks on floor  --All labs and diagnostics reviewed       --CTH 7/7: L temporal SAH, stable on repeat scan       --CTA 7/8 without aneurysm, stable appearing bleed       --CT c-spine 7/7: anterolisthesis C2-C3       --MRI cervical spine 7/7 shows anterlisthesis of C2 on C3 with severe spinal cord compression C2-4 with cord signal change       --XR flex/ex of cervical spine shows no dynamic instability        --MRI thoracic spine shows remote compression fracture of T2, T3, T10, L2. Severe compression fracture of T8 with almost complete loss of height, chronic appearing.  --Afib: s/p ablation and left atrial appendage occlusion on 2/18/22: Xarelto and Plavix stopped in May per EP physician. Patient still taking ASA, Hold ASA at this time given SAH and surgical intervention  --PTT/INR wnl  --Left clavicle fracture: fracture from 7/3, missed f/u due to fall. Per Ortho recs at that time: NWB to LUE, sling for comfort, bactrim x 7 days for open fracture. Per  will be able to bear weight 3 weeks from injury, will f/u outpatient. Ortho consulted for this admission, appreciate their assistance. NWB LUE in figure-of-eight brace (patient not tolerating sling). Keep wound over left shoulder covered. Continue Bactrim for a total of 7 days. Recommend mobilization with a wheelchair given patient condition and inability to bear weight through the left upper extremity.   --Psychiatry consulted 7/10 for suicidal ideation (told nurse she wanted to  drown self). Mood better today. No intervention. Added melatonin for sleep aid.  --Continue to monitor clinically, notify NSGY immediately with any changes in neuro status  --HM consulted for co-management, medical optimization, appreciate recs; RCRI 1, Class 2 risk.     Tentative C1-3 PCF next week on 7/13.
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
## 2022-07-11 NOTE — PLAN OF CARE
SW completed the LOCET via phone. SW faxed [facility] to obtain the 142 for [facility] admission.      Meaghan Gandhi, THEE  [phone]

## 2022-07-11 NOTE — PROGRESS NOTES
07/11/22 1000   WOCN Assessment   WOCN Total Time (mins) 20   Visit Date 07/11/22   Visit Time 1000   Consult Type New   Wound skin tear   Number of Wounds 1   Intervention assessed;changed;chart review;orders;coordination of care   Teaching on-going        Wound 07/07/22 2200 Laceration Left anterior Greater trochanter #1   Date First Assessed/Time First Assessed: 07/07/22 2200   Pre-existing: Yes  Primary Wound Type: Laceration  Side: Left  Orientation: anterior  Location: Greater trochanter  Wound Number: #1   Wound Image    Dressing Appearance Clean;Intact;Dry   Drainage Amount None   Tissue loss description Not applicable   Dressing Foam        Wound 07/07/22 2248 Skin Tear Left Arm #2   Date First Assessed/Time First Assessed: 07/07/22 2248   Pre-existing: Yes  Primary Wound Type: Skin Tear  Side: Left  Location: Arm  Wound Number: #2   Wound Image    Dressing Appearance Dry;Intact;Dried drainage   Drainage Amount Scant   Drainage Characteristics/Odor Serosanguineous   Appearance Purple;Dry   Periwound Area Ecchymotic   Care Sterile normal saline   Dressing Foam   Wound consult performed.  Plan:  Foam to christine hips and sacrum for protection.  No open wounds noted.  Lt arm skin tear- foam also.  Turn every 2 hours when in bed.  Waffle mattress.  Will sign off. Re consult if needed.

## 2022-07-11 NOTE — NURSING
On call Dr. Castro notified of patient stating to day shift nurse that she wanted to kill herself by drowning herself on call appreciative of phone call and will place psychiatric consult.

## 2022-07-11 NOTE — PROGRESS NOTES
[facility] ([facility])  Hospital Medicine  Progress Note    Patient Name: Yvette Crews  MRN: [id number]  Patient Class: IP- Inpatient   Admission Date: 7/7/2022  Length of Stay: 4 days  Attending Physician: Mario Alberto King MD  Primary Care Provider: Sally Green MD        Subjective:     Principal Problem:Cervical stenosis of spinal canal        HPI:  Mrs. Crews is a 71 yo woman with paroxysmal atrial fibrillation s/p Watchman device, hypertension, bronchiectasis, Alzheimer's, hypogammaglobulinemia (on monthly IVIG last treatment 7/1), acquired hypothyroidism after total thyroidectomy, and history of SAH s/p fall in 2016 presenting with a subarachnoid hemorrhage after a fall. Per patient's , she was found down in the bathroom yesterday morning but patient was alert. Unclear cause of her fall. She had another fall the week prior which resulted in a clavicular fracture. Patient's  reports patient is more confused than her baseline. She lives in an assisted living facility and is usually able to ambulate around the center with a walker, and do ADLs on her own. Neurosurgery is planning on surgical intervention and hospital medicine was consulted for pre-op evaluation.       Overview/Hospital Course:  No notes on file    Interval History: Patient is doing well. She is A/Ox3 but mildly confused.  in room reports she is at her baseline. She denie palpitations, CP, SOB, dizziness, syncope, fever, chills, cough.     Review of Systems  Objective:     Vital Signs (Most Recent):  Temp: 97.2 °F (36.2 °C) (07/11/22 1539)  Pulse: 77 (07/11/22 1539)  Resp: 18 (07/11/22 1539)  BP: (!) 153/74 (07/11/22 1539)  SpO2: 95 % (07/11/22 1539)   Vital Signs (24h Range):  Temp:  [96.2 °F (35.7 °C)-98.7 °F (37.1 °C)] 97.2 °F (36.2 °C)  Pulse:  [59-94] 77  Resp:  [18] 18  SpO2:  [94 %-97 %] 95 %  BP: (105-153)/(55-74) 153/74     Weight: 40 kg (88 lb 2.9 oz)  Body mass index is 16.12 kg/m².  No intake  or output data in the 24 hours ending 07/11/22 1610   Physical Exam  Constitutional:       Appearance: Normal appearance.   HENT:      Head: Normocephalic and atraumatic.      Mouth/Throat:      Mouth: Mucous membranes are moist.   Eyes:      Extraocular Movements: Extraocular movements intact.   Cardiovascular:      Rate and Rhythm: Normal rate and regular rhythm.      Pulses: Normal pulses.      Heart sounds: Normal heart sounds. No murmur heard.    No friction rub. No gallop.   Pulmonary:      Effort: Pulmonary effort is normal.      Breath sounds: Normal breath sounds.   Abdominal:      General: Abdomen is flat. There is no distension.      Palpations: Abdomen is soft.      Tenderness: There is no abdominal tenderness.   Musculoskeletal:         General: No tenderness. Normal range of motion.      Cervical back: Normal range of motion and neck supple.      Left lower leg: No edema.   Skin:     General: Skin is warm and dry.   Neurological:      General: No focal deficit present.      Mental Status: She is alert and oriented to person, place, and time. Mental status is at baseline.       Significant Labs: All pertinent labs within the past 24 hours have been reviewed.  Recent Lab Results         07/11/22  0759        Anion Gap 8       Baso # 0.01       Basophil % 0.2       BUN 26       Calcium 8.8       Chloride 103       CO2 23       Creatinine 0.6       Differential Method Automated       eGFR if  >60.0       eGFR if non  >60.0  Comment: Calculation used to obtain the estimated glomerular filtration  rate (eGFR) is the CKD-EPI equation.          Eos # 0.0       Eosinophil % 0.2       Glucose 95       Gran # (ANC) 3.8       Gran % 72.2       Hematocrit 36.0       Hemoglobin 11.4       Immature Grans (Abs) 0.02  Comment: Mild elevation in immature granulocytes is non specific and   can be seen in a variety of conditions including stress response,   acute inflammation, trauma and  pregnancy. Correlation with other   laboratory and clinical findings is essential.         Immature Granulocytes 0.4       Lymph # 1.0       Lymph % 19.8       MCH 30.9       MCHC 31.7       MCV 98       Mono # 0.4       Mono % 7.2       MPV 9.8       nRBC 0       Platelets 256       Potassium 4.1       RBC 3.69       RDW 15.0       Sodium 134       WBC 5.26               Significant Imaging: I have reviewed all pertinent imaging results/findings within the past 24 hours.      Assessment/Plan:      Pre-operative exam  Surgical Risk Assessment:    Active Cardiac Issues:  Active decompensated heart failure? No   Unstable angina?  No   Significant uncontrolled arrhythmias? No   Severe valvular heart disease-Aortic or Mitral Stenosis? No   Recent MI or coronary revascularization < 30 days? No     Cardiac Risk Factors:  High risk surgery? Yes   History of CAD/ischemic heart disease? No   History of cerebrovascular disease? No   History of compensated heart failure? No   Type 2 diabetes requiring insulin? No   Serum Creatinine > 2? No   Total cardiac risk factors 1     Functional mets <4    < 4 METs -unable to walk > 2 blocks on level ground without stopping due to symptoms  - eating, dressing, toileting, walking indoors, light housework. POOR   > 4 METs -climbing > 1 flight of stairs without stopping  -walking up hill > 1-2 blocks  -scrubbing floors  -moving furniture  - golf, bowling, dancing or tennis  -running short distance MODERATE to EXCELLENT     Perioperative Risk Assessment:  RCRI Score 1, Class II risk with 6% risk of cardiopulmonary complications      Perioperative Management Recommendations:    Patient is a high risk surgical candidate with:  Chronic bronchiectasis, stable on room air.  Paroxysmal atrial fibrillation s/p watchman device  No acute renal disease  On Bactrim for open clavicular wound  Patient does not meet 4 functional METS      SAH (subarachnoid hemorrhage)  Management per primary  Possible  surgical intervention 7/13/22    Fracture of left clavicle  Evaluated by orthopedics, no surgical intervention indicated      Iron deficiency anemia  Monitor CBC  Continue home iron supplememt      Paroxysmal atrial fibrillation  Patient with Paroxysmal (<7 days) atrial fibrillation which is controlled currently without medications. Patient is currently in sinus rhythm.[id number] Score: 2. Anticoagulation not indicated due to watchman device.  EKG 7/7/22 showed normal sinus rhythm.   JAMEL showed EF of 60%        Postoperative hypothyroidism  Acquired hypothyroidism with hx of MNG s/p total thyroidectomy 1/25/17    -Continue synthroid    Adult bronchiectasis  Chronic, stable  On singulair and mucinex  Patient saturating well on room air        VTE Risk Mitigation (From admission, onward)         Ordered     heparin (porcine) injection 5,000 Units  Every 8 hours         07/08/22 1734     IP VTE HIGH RISK PATIENT  Once         07/07/22 1322     Place sequential compression device  Until discontinued         07/07/22 1322                Discharge Planning   SANTIAGO: 7/15/2022     Code Status: Full Code   Is the patient medically ready for discharge?:     Reason for patient still in hospital (select all that apply): Patient trending condition, Laboratory test, Treatment and Imaging  Discharge Plan A: Skilled Nursing Facility   Discharge Delays: None known at this time              Juan Alberto Jimenez DO  Department of Hospital Medicine   [facility] - Neurosurgery ([facility])
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
## 2022-07-11 NOTE — PT/OT/SLP PROGRESS
"Occupational Therapy   Treatment    Name: Yvette Crews  MRN: [id number]  Admitting Diagnosis:  Cervical stenosis of spinal canal       Recommendations:     Discharge Recommendations: nursing facility, skilled  Discharge Equipment Recommendations:   (TBD at next level of care)  Barriers to discharge:   (Increased (A) required)    Assessment:     Yvette Crews is a 72 y.o. female with a medical diagnosis of Cervical stenosis of spinal canal. Performance deficits affecting function are weakness, impaired endurance, impaired self care skills, impaired functional mobilty, gait instability, impaired balance, impaired cognition, decreased coordination, decreased upper extremity function, decreased lower extremity function, abnormal tone, decreased safety awareness, impaired coordination, orthopedic precautions.     Pt met HOB elevated with family present and agreeable to OT session. She participated in ADL tasks while seated EOB and required cueing for re-direction to task t/o due to difficulty following simple commands and impaired sustained attention. Pt tolerated session fairly well with good effort and motivation to progress. Goals remain appropriate.     Rehab Prognosis:  Good; patient would benefit from acute skilled OT services to address these deficits and reach maximum level of function.       Plan:     Patient to be seen 3 x/week to address the above listed problems via self-care/home management, therapeutic activities, therapeutic exercises, neuromuscular re-education, cognitive retraining  · Plan of Care Expires: 08/08/22  · Plan of Care Reviewed with: spouse, daughter, patient    Subjective   "Can you put some dry shampoo in my hair next time"  Pain/Comfort:  · Pain Rating 1: 0/10  · Pain Rating Post-Intervention 1: 0/10    Objective:     Communicated with: RN prior to session.  Patient found HOB elevated with bed alarm, telemetry, SCD ([employer] monitor) upon OT entry to room.    General " Precautions: Standard, fall   Orthopedic Precautions:LUE non weight bearing   Braces: N/A  Respiratory Status: Room air     Occupational Performance:     Bed Mobility:    · Dependent drawsheet tf toward HOB in supine  · Pt tolerated sitting EOB for ~20 minutes with Mod progressing to CGA  · Max cues t/o to uncross legs, maintain feet flat on floor, RUE on EOB for external support, and to eliminate posterior lean  · Pt observed with difficulty following simple commands  · Pt completed Scooting with minimum assistance to bring feet flat on floor  · Supine to Sit with moderate assistance for trunk management  · Cues for initiation and sequencing  · HOB elevated  · Sit to Supine with minimum assistance to raise LLE  · Flat bed    Functional Mobility/Transfers:  · NT due to pt's posterior lean and inability to self correct despite max cues t/o    Activities of Daily Living:  · Feeding:  moderate assistance for functional balance as pt ate cornbread + broccoli and drank from straw with RUE  · Grooming: contact guard assistance for functional balance as pt performed oral hygiene and washed face with wash cloth EOB  · Upper Body Dressing: Max (A) to don/doff gown anteriorly      [facility] 6 Click ADL: 11    Treatment & Education:  -Education on energy conservation and task modification to maximize safety and (I) during ADLs and mobility  -Education on importance of OOB activity to improve overall activity tolerance and promote recovery  -Pt educated to call for assistance and to transfer with hospital staff only  -Provided education regarding role of OT, POC, & discharge recommendations with pt verbalizing understanding.  Pt had no further questions & when asked whether there were any concerns pt reported none.    Patient left HOB elevated with all lines intact, call button in reach, bed alarm on and RN notifiedEducation:      GOALS:   Multidisciplinary Problems     Occupational Therapy Goals        Problem: Occupational  Therapy    Goal Priority Disciplines Outcome Interventions   Occupational Therapy Goal     OT, PT/OT Ongoing, Progressing    Description: Goals to be met by: 7/22/2022       Patient will increase functional independence with ADLs by performing:    UE Dressing with Moderate Assistance.  LE Dressing with Moderate Assistance.  Grooming while seated with Moderate Assistance.  Toileting from bedside commode with Moderate Assistance for hygiene and clothing management.   Sitting at edge of bed x10 minutes with Contact Guard Assistance and correcting posterior lean with 2/2 verbal cues.  Supine to sit with Stand-by Assistance.  Step transfer with Mod A                     Time Tracking:     OT Date of Treatment: 07/11/22  OT Start Time: 1153  OT Stop Time: 1226  OT Total Time (min): 33 min    Billable Minutes:Self Care/Home Management 33    OT/SYLVIE: OT          7/11/2022

## 2022-07-12 ENCOUNTER — PATIENT MESSAGE (OUTPATIENT)
Dept: PRIMARY CARE CLINIC | Facility: CLINIC | Age: 72
End: 2022-07-12
Payer: MEDICARE

## 2022-07-12 PROBLEM — E87.1 HYPONATREMIA: Status: ACTIVE | Noted: 2022-07-12

## 2022-07-12 PROBLEM — F05 DELIRIUM DUE TO GENERAL MEDICAL CONDITION: Status: ACTIVE | Noted: 2022-07-12

## 2022-07-12 LAB
ANION GAP SERPL CALC-SCNC: 10 MMOL/L (ref 8–16)
BASOPHILS # BLD AUTO: 0.01 K/UL (ref 0–0.2)
BASOPHILS NFR BLD: 0.2 % (ref 0–1.9)
BUN SERPL-MCNC: 19 MG/DL (ref 8–23)
CALCIUM SERPL-MCNC: 8.6 MG/DL (ref 8.7–10.5)
CHLORIDE SERPL-SCNC: 100 MMOL/L (ref 95–110)
CO2 SERPL-SCNC: 22 MMOL/L (ref 23–29)
CREAT SERPL-MCNC: 0.6 MG/DL (ref 0.5–1.4)
DIFFERENTIAL METHOD: ABNORMAL
EOSINOPHIL # BLD AUTO: 0 K/UL (ref 0–0.5)
EOSINOPHIL NFR BLD: 0.4 % (ref 0–8)
ERYTHROCYTE [DISTWIDTH] IN BLOOD BY AUTOMATED COUNT: 15 % (ref 11.5–14.5)
EST. GFR  (AFRICAN AMERICAN): >60 ML/MIN/1.73 M^2
EST. GFR  (NON AFRICAN AMERICAN): >60 ML/MIN/1.73 M^2
GLUCOSE SERPL-MCNC: 78 MG/DL (ref 70–110)
HCT VFR BLD AUTO: 36.8 % (ref 37–48.5)
HGB BLD-MCNC: 11.9 G/DL (ref 12–16)
IMM GRANULOCYTES # BLD AUTO: 0.01 K/UL (ref 0–0.04)
IMM GRANULOCYTES NFR BLD AUTO: 0.2 % (ref 0–0.5)
LYMPHOCYTES # BLD AUTO: 1 K/UL (ref 1–4.8)
LYMPHOCYTES NFR BLD: 17.9 % (ref 18–48)
MCH RBC QN AUTO: 31.2 PG (ref 27–31)
MCHC RBC AUTO-ENTMCNC: 32.3 G/DL (ref 32–36)
MCV RBC AUTO: 97 FL (ref 82–98)
MONOCYTES # BLD AUTO: 0.5 K/UL (ref 0.3–1)
MONOCYTES NFR BLD: 8.7 % (ref 4–15)
NEUTROPHILS # BLD AUTO: 4.1 K/UL (ref 1.8–7.7)
NEUTROPHILS NFR BLD: 72.6 % (ref 38–73)
NRBC BLD-RTO: 0 /100 WBC
OSMOLALITY SERPL: 286 MOSM/KG (ref 275–295)
PLATELET # BLD AUTO: 316 K/UL (ref 150–450)
PMV BLD AUTO: 10.9 FL (ref 9.2–12.9)
POTASSIUM SERPL-SCNC: 4.4 MMOL/L (ref 3.5–5.1)
RBC # BLD AUTO: 3.81 M/UL (ref 4–5.4)
SODIUM SERPL-SCNC: 132 MMOL/L (ref 136–145)
WBC # BLD AUTO: 5.63 K/UL (ref 3.9–12.7)

## 2022-07-12 PROCEDURE — 83930 ASSAY OF BLOOD OSMOLALITY: CPT | Performed by: STUDENT IN AN ORGANIZED HEALTH CARE EDUCATION/TRAINING PROGRAM

## 2022-07-12 PROCEDURE — 99233 SBSQ HOSP IP/OBS HIGH 50: CPT | Mod: ,,, | Performed by: PSYCHIATRY & NEUROLOGY

## 2022-07-12 PROCEDURE — 99232 SBSQ HOSP IP/OBS MODERATE 35: CPT | Mod: ,,, | Performed by: PHYSICIAN ASSISTANT

## 2022-07-12 PROCEDURE — 99232 SBSQ HOSP IP/OBS MODERATE 35: CPT | Mod: GC,,, | Performed by: HOSPITALIST

## 2022-07-12 PROCEDURE — 36415 COLL VENOUS BLD VENIPUNCTURE: CPT

## 2022-07-12 PROCEDURE — 25000003 PHARM REV CODE 250: Performed by: PHYSICIAN ASSISTANT

## 2022-07-12 PROCEDURE — 25000003 PHARM REV CODE 250

## 2022-07-12 PROCEDURE — 36415 COLL VENOUS BLD VENIPUNCTURE: CPT | Performed by: STUDENT IN AN ORGANIZED HEALTH CARE EDUCATION/TRAINING PROGRAM

## 2022-07-12 PROCEDURE — 99232 PR SUBSEQUENT HOSPITAL CARE,LEVL II: ICD-10-PCS | Mod: ,,, | Performed by: PHYSICIAN ASSISTANT

## 2022-07-12 PROCEDURE — 11000001 HC ACUTE MED/SURG PRIVATE ROOM

## 2022-07-12 PROCEDURE — 25000003 PHARM REV CODE 250: Performed by: NEUROLOGICAL SURGERY

## 2022-07-12 PROCEDURE — 97112 NEUROMUSCULAR REEDUCATION: CPT

## 2022-07-12 PROCEDURE — 63600175 PHARM REV CODE 636 W HCPCS: Performed by: STUDENT IN AN ORGANIZED HEALTH CARE EDUCATION/TRAINING PROGRAM

## 2022-07-12 PROCEDURE — 99232 PR SUBSEQUENT HOSPITAL CARE,LEVL II: ICD-10-PCS | Mod: GC,,, | Performed by: HOSPITALIST

## 2022-07-12 PROCEDURE — 99233 PR SUBSEQUENT HOSPITAL CARE,LEVL III: ICD-10-PCS | Mod: ,,, | Performed by: PSYCHIATRY & NEUROLOGY

## 2022-07-12 PROCEDURE — 85025 COMPLETE CBC W/AUTO DIFF WBC: CPT

## 2022-07-12 PROCEDURE — 97530 THERAPEUTIC ACTIVITIES: CPT

## 2022-07-12 PROCEDURE — 80048 BASIC METABOLIC PNL TOTAL CA: CPT

## 2022-07-12 RX ORDER — SODIUM CHLORIDE 9 MG/ML
INJECTION, SOLUTION INTRAVENOUS CONTINUOUS
Status: DISCONTINUED | OUTPATIENT
Start: 2022-07-12 | End: 2022-07-13

## 2022-07-12 RX ADMIN — MELATONIN TAB 3 MG 6 MG: 3 TAB at 12:07

## 2022-07-12 RX ADMIN — SODIUM CHLORIDE: 0.9 INJECTION, SOLUTION INTRAVENOUS at 04:07

## 2022-07-12 RX ADMIN — HEPARIN SODIUM 5000 UNITS: 5000 INJECTION INTRAVENOUS; SUBCUTANEOUS at 10:07

## 2022-07-12 RX ADMIN — MONTELUKAST 10 MG: 10 TABLET, FILM COATED ORAL at 09:07

## 2022-07-12 RX ADMIN — FAMOTIDINE 20 MG: 20 TABLET ORAL at 09:07

## 2022-07-12 RX ADMIN — DONEPEZIL HYDROCHLORIDE 10 MG: 10 TABLET ORAL at 09:07

## 2022-07-12 RX ADMIN — METHOCARBAMOL 500 MG: 500 TABLET ORAL at 12:07

## 2022-07-12 NOTE — PROGRESS NOTES
Devan Juarez - Neurosurgery ([facility])  Neurosurgery  Progress Note    Subjective:     History of Present Illness: 73yo F PMH AVNRT, frequent falls, presenting as neurosurgery consult due to L temporal SAH noted on CTH in AM. Per patient's  was found down after hearing fall at 0830. More confusion present than baseline. Patient states she takes aspirin and plavix but hasn't taken the plavix in a while. She is supposed to use a walker at baseline but now is NWB to left arm due a left clavicle fx 7/3. CT c-spine showed C2-C3 anterolisthesis        Post-Op Info:  Procedure(s) (LRB):  C1-C3 Posterior cervical fusion. [facility]. neuromonitoring. Hometapperkeny harris. [place]. (N/A)         Interval History: Placed in restraints overnight due to attempts to get OOB. Further discussed surgical need/timing with patient and . Given that she does not have s/s acute cervical myelopathy will hold off on urgent surgical decompression until she is better optimized. With the clavicle fracture and NWB status it will be difficult to undergo rehab and therapy after a posterior cervical fusion. Her current cognitive status (hx Alzheimers dementia with current intermittent delirium), poor nutrition, and frail appearance would also make her post-operative course more challenging. Patient and  v/u. Will work on discharge planning once medically stable.    Medications:  Continuous Infusions:   sodium chloride 0.9% 100 mL/hr at 07/12/22 1639     Scheduled Meds:   donepeziL  10 mg Oral Daily    famotidine  20 mg Oral BID    ferrous sulfate  1 tablet Oral Daily    heparin (porcine)  5,000 Units Subcutaneous Q8H    levothyroxine  88 mcg Oral Before breakfast    montelukast  10 mg Oral QHS    sulfamethoxazole-trimethoprim 400-80mg  1 tablet Oral BID     PRN Meds:acetaminophen, albuterol, dextrose 10%, dextrose 10%, glucagon (human recombinant), glucose, glucose, glucose, methocarbamoL, polyethylene glycol     Objective:     Weight:  40 kg (88 lb 2.9 oz)  Body mass index is 16.12 kg/m².  Vital Signs (Most Recent):  Temp: 98 °F (36.7 °C) (07/12/22 1621)  Pulse: 88 (07/12/22 1621)  Resp: 16 (07/12/22 1621)  BP: (!) 105/57 (07/12/22 1621)  SpO2: 96 % (07/12/22 1621) Vital Signs (24h Range):  Temp:  [96 °F (35.6 °C)-98.6 °F (37 °C)] 98 °F (36.7 °C)  Pulse:  [] 88  Resp:  [16-18] 16  SpO2:  [96 %-100 %] 96 %  BP: (105-146)/(57-79) 105/57                     Female External Urinary Catheter 07/07/22 1500 (Active)   Skin female external urine collection device repositioned 07/08/22 0800   Tolerance no signs/symptoms of discomfort 07/08/22 0800       Physical Exam    Neurosurgery Physical Exam  General: well developed, well nourished, no distress.   Head: normocephalic  Neurologic: Alert and orientedx2. Thought content appropriate, higher order confusion present  GCS: Motor: 6/Verbal: 5/Eyes: 4 GCS Total: 15  Mental Status: Awake, Alert, Oriented x 2 (person, time; stated she was at the [facility]), with higher order confusion  Language: No aphasia  Speech: No dysarthria  Cranial nerves: face symmetric, tongue midline, CN II-XII grossly intact.   Eyes: pupils equal, round, reactive to light with accommodation, EOMI. Mild ptosis noted to left eye   Pulmonary: normal respirations, no signs of respiratory distress  Abdomen: soft, non-distended, not tender to palpation  Skin: Skin is warm, dry and intact.  Sensory: responds to light touch throughout  Motor Strength: Moves all extremities spontaneously, increased tone to BUE, at least 4/5 in BUE, 3/5 in left deltoid 2/2 rigidity and left clavicle fracture. BLE 4/5. No abnormal movements seen.      Pain limited motion in left shoulder 2/2 clavicle fracture     Reflexes:   Plascencia's: positive bilaterally        Significant Labs:  Recent Labs   Lab 07/11/22  0759 07/12/22  0514   GLU 95 78   * 132*   K 4.1 4.4    100   CO2 23 22*   BUN 26* 19   CREATININE 0.6 0.6   CALCIUM 8.8 8.6*        Recent Labs   Lab 07/11/22  0759 07/12/22  0514   WBC 5.26 5.63   HGB 11.4* 11.9*   HCT 36.0* 36.8*    316       No results for input(s): LABPT, INR, APTT in the last 48 hours.    Microbiology Results (last 7 days)       ** No results found for the last 168 hours. **          All pertinent labs from the last 24 hours have been reviewed.    Significant Diagnostics:  I have reviewed and interpreted all pertinent imaging results/findings within the past 24 hours.  No results found in the last 24 hours.        Assessment/Plan:     SAH (subarachnoid hemorrhage)  73yo F PMH AVNRT, frequent falls, presenting as neurosurgery consult due to L temporal SAH noted on CTH in AM. Per patient's  was found down after hearing fall at 0830. More confusion present than baseline. Patient states she takes aspirin and plavix but hasn't taken the plavix in a while. She is supposed to use a walker at baseline but now is NWB to left arm due a left clavicle fx 7/3. CT c-spine showed C2-C3 anterolisthesis.    --Neuro exam stable without focal weakness. Wax/waning mental status.   --Further discussed surgical need/timing with patient and . Given that she does not have s/s acute cervical myelopathy will hold off on urgent surgical decompression until she is better optimized. With the clavicle fracture and NWB status it will be difficult to undergo rehab and therapy after a posterior cervical fusion. Her current cognitive status (hx Alzheimers dementia with current intermittent delirium), poor nutrition, and frail appearance would also make her post-operative course more challenging. Patient and  v/u. Will work on discharge planning once medically stable.  --C-collar when OOB. Will need to ensure it is properly fitted.       -- q4h neurochecks on floor  --All labs and diagnostics reviewed       --CTH 7/7: L temporal SAH, stable on repeat scan       --CTA 7/8 without aneurysm, stable appearing bleed       --CT  c-spine 7/7: anterolisthesis C2-C3       --MRI cervical spine 7/7 shows anterlisthesis of C2 on C3 with severe spinal cord compression C2-4 with cord signal change       --XR flex/ex of cervical spine shows no dynamic instability        --MRI thoracic spine shows remote compression fracture of T2, T3, T10, L2. Severe compression fracture of T8 with almost complete loss of height, chronic appearing.  --Afib: s/p ablation and left atrial appendage occlusion on 2/18/22: Xarelto and Plavix stopped in May per EP physician. Patient still taking ASA, Hold ASA at this time given SAH.  --PTT/INR wnl  --Left clavicle fracture: fracture from 7/3, missed f/u due to fall. Per Ortho recs at that time: NWB to LUE, sling for comfort, bactrim x 7 days for open fracture. Per  will be able to bear weight 3 weeks from injury, will f/u outpatient. Ortho consulted for this admission, appreciate their assistance. NWB LUE in figure-of-eight brace (patient not tolerating sling). Keep wound over left shoulder covered. Bactrim for a total of 7 days. Recommend mobilization with a wheelchair given patient condition and inability to bear weight through the left upper extremity.   --Psychiatry consulted 7/10 for suicidal ideation (told nurse she wanted to drown self), following now for delirium. Recommending to discontinue melatonin. Limit use of restraints.   --Continue to monitor clinically, notify NSGY immediately with any changes in neuro status  --HM consulted for co-management, medical optimization, appreciate recs; RCRI 1, Class 2 risk. Hyponatremia w/u ordered.    --Dispo: SNF once medically stable        Gina Ramesh PA-C  Neurosurgery  Devan Juarez - Neurosurgery ([facility])

## 2022-07-12 NOTE — ASSESSMENT & PLAN NOTE
71yo F PMH AVNRT, frequent falls, presenting as neurosurgery consult due to L temporal SAH noted on CTH in AM. Per patient's  was found down after hearing fall at 0830. More confusion present than baseline. Patient states she takes aspirin and plavix but hasn't taken the plavix in a while. She is supposed to use a walker at baseline but now is NWB to left arm due a left clavicle fx 7/3. CT c-spine showed C2-C3 anterolisthesis.    --Neuro exam stable without focal weakness. Wax/waning mental status.   --Further discussed surgical need/timing with patient and . Given that she does not have s/s acute cervical myelopathy will hold off on urgent surgical decompression until she is better optimized. With the clavicle fracture and NWB status it will be difficult to undergo rehab and therapy after a posterior cervical fusion. Her current cognitive status (hx Alzheimers dementia with current intermittent delirium), poor nutrition, and frail appearance would also make her post-operative course more challenging. Patient and  v/u. Will work on discharge planning once medically stable.  --C-collar when OOB. Will need to ensure it is properly fitted.       -- q4h neurochecks on floor  --All labs and diagnostics reviewed       --CTH 7/7: L temporal SAH, stable on repeat scan       --CTA 7/8 without aneurysm, stable appearing bleed       --CT c-spine 7/7: anterolisthesis C2-C3       --MRI cervical spine 7/7 shows anterlisthesis of C2 on C3 with severe spinal cord compression C2-4 with cord signal change       --XR flex/ex of cervical spine shows no dynamic instability        --MRI thoracic spine shows remote compression fracture of T2, T3, T10, L2. Severe compression fracture of T8 with almost complete loss of height, chronic appearing.  --Afib: s/p ablation and left atrial appendage occlusion on 2/18/22: Xarelto and Plavix stopped in May per EP physician. Patient still taking ASA, Hold ASA at this time given  SAH.  --PTT/INR wnl  --Left clavicle fracture: fracture from 7/3, missed f/u due to fall. Per Ortho recs at that time: NWB to LUE, sling for comfort, bactrim x 7 days for open fracture. Per  will be able to bear weight 3 weeks from injury, will f/u outpatient. Ortho consulted for this admission, appreciate their assistance. NWB LUE in figure-of-eight brace (patient not tolerating sling). Keep wound over left shoulder covered. Bactrim for a total of 7 days. Recommend mobilization with a wheelchair given patient condition and inability to bear weight through the left upper extremity.   --Psychiatry consulted 7/10 for suicidal ideation (told nurse she wanted to drown self), following now for delirium. Recommending to discontinue melatonin. Limit use of restraints.   --Continue to monitor clinically, notify NSGY immediately with any changes in neuro status  --HM consulted for co-management, medical optimization, appreciate recs; RCRI 1, Class 2 risk. Hyponatremia w/u ordered.    --Dispo: SNF once medically stable

## 2022-07-12 NOTE — ASSESSMENT & PLAN NOTE
Chronic hyponatremia vs hypovolemic hyponatremia 2/2 poor PO intake  Na improved 132 > 134 with gentle IVF rehydration with NS  Very little PO intake over last 48 hours  Appears hypovolemic on exam    Plan:    - Hyponatremia panel pending  - PO intake encouraged  - Continue gentle IV fluid resuscitation     - Patient is cleared from a medical from a medical standpoint. We will sign off

## 2022-07-12 NOTE — ASSESSMENT & PLAN NOTE
"ASSESSMENT     Yvette Crews is a 72 y.o. female with a past psychiatric history of Depression, Anxiety and Cognitive decline who presented to [facility] due to SAH (subarachnoid hemorrhage). Psychiatry was consulted for "Suicidal Ideation" (told nurse she wanted to drown her self). Patient denies any previous suicide attempts. She denies any active suicidal ideation, intent, or plan. Yesterday, patient's  stated that she appeared to have returned to her baseline mental status. Patient also displayed signs of delirium last night. She became agitated and was attempting to get out of her bed.This morning, patient was negative on bCAM; however, given last night's events, it appears her mental status is fluctuating with delirium clinically present.     IMPRESSION  Adjustment disorder with depressed mood  Dementia, degree unspecified    RECOMMENDATION(S)      1. Scheduled Medication(s):  None at this time    2. PRN Medication(s):  Can discontinue Melatonin    3. Legal Status/Precaution(s):  Patient does not meet criteria for PEC or inpatient psychiatric admission at this time. Recommend to rescind PEC if one was placed. Patient is not currently an imminent danger to self or others and is not gravely disabled due to a psychiatric illness.    4. Other:  bCAM - negative  DELIRIUM BEHAVIOR MANAGEMENT   PLEASE utilize CHEMICAL restraints with PRN meds first for agitation. Minimize use of PHYSICAL restraints   Keep window shades open and room lit during day and room dim at night in order to promote normal sleep-wake cycles   Encourage family at bedside. [place] patient often to situation, location, date   Continue to Limit or Discontinue use of Narcotics, Benzos and Anti-cholinergic medications as they may worsen delirium   Continue medical workup for causative etiology of Delirium      "

## 2022-07-12 NOTE — NURSING
0440 am paged team and spoke to Milton Haque.  Pt combative, getting OOB and raising legs on siderails, yelling, screaming and difficult to redirect.  Milton Haque instructed the CN via T.O to place  non violent soft restraints (rt wrist, rt ankle).  WCTM.

## 2022-07-12 NOTE — PT/OT/SLP PROGRESS
"Physical Therapy Treatment    Patient Name:  Yvette Crews   MRN:  [id number]    Recommendations:     Discharge Recommendations:  nursing facility, skilled   Discharge Equipment Recommendations:  (TBD)   Barriers to discharge: Increased level of assist    Assessment:     Yvette Crews is a 72 y.o. female admitted with a medical diagnosis of Cervical stenosis of spinal canal. Patient pleasantly confused throughout session this date, conversant however not appropriate to context. States she is in a psychiatric facility. Able to participate in gait trial however significant instability noted. Patient demonstrates gait instability that make them a high fall risk and unsafe to d/c home at this time.     She presents with the following impairments/functional limitations: weakness, gait instability, decreased upper extremity function, decreased lower extremity function, impaired balance, impaired endurance, decreased safety awareness, impaired fine motor, impaired self care skills, impaired functional mobilty, impaired cognition, impaired sensation. Yvette Crews would continue to benefit from acute PT intervention to improve quality of life and focus on recovery of impairments. Once medically stable, recommending pt discharge to nursing facility, skilled.    Rehab Prognosis: Good; patient continues to benefit from acute skilled PT services to address these deficits and reach maximum level of function.  Recent Surgery: Procedure(s) (LRB):  C1-C3 Posterior cervical fusion. depuy. neuromonitoring. sadaf tirado. (N/A)      Plan:     During this hospitalization, patient to be seen 3 x/week to address the identified rehab impairments via gait training, therapeutic activities, therapeutic exercises, neuromuscular re-education and progress toward the following goals:    · Plan of Care Expires:  08/07/22    Subjective     Chief Complaint: None verbalized   Patient/Family Comments/Goals: "I guess if " "they don't know about the weddings then I don't need to think about it", "Why are the pictures there and sometimes not there?", "Where is this stored?" in reference to call light, "I'm getting a shrimp" while grabbing her left pointer finger  Pain/Comfort:  · Pain Rating 1: 0/10    Objective:     Communicated with RN prior to session. Patient found supine with bed alarm, telemetry, restraints ([employer] camera) upon PT entry to room.     General Precautions: Standard, fall   Orthopedic Precautions:LUE non weight bearing   Braces:  (figure of 8 brace)    Functional Mobility:  · Bed Mobility:     · Rolling Right: maximal assistance  · Scooting: maximal assistance  · Supine to Sit: maximal assistance  · Sit to Supine: moderate assistance  · Transfers:     · Sit to Stand: minimum assistance with hand-held assist  · Gait: Patient ambulated 6 steps to the right with hand-held assist and maximal assistance. Patient demonstrates unsteady gait, decreased step length, decreased weight shift, decreased foot clearance and decreased marcelino. Cuing for sequencing and increased step size. All lines remained intact throughout ambulation trial.  · Balance:   · Static Sitting: Poor, able to maintain for 15 minute(s) with maximal assistance progressing to minimum assistance with cuing for forward lean  · Dynamic Sitting: Poor: Patient unable to accept challenge or move without loss of balance, maximal assistance  · Static Standing: Poor, able to maintain for 8 seconds with maximal assistance, significant posterior lean minimally improved with cuing  · Dynamic Standing: Poor: Patient unable to accept challenge or move without loss of balance, maximal assistance    AM-PAC 6 CLICK MOBILITY  Turning over in bed (including adjusting bedclothes, sheets and blankets)?: 2  Sitting down on and standing up from a chair with arms (e.g., wheelchair, bedside commode, etc.): 3  Moving from lying on back to sitting on the side of the bed?: 2  Moving to " and from a bed to a chair (including a wheelchair)?: 2  Need to walk in hospital room?: 2  Climbing 3-5 steps with a railing?: 1  Basic Mobility Total Score: 12     Therapeutic Activities and Exercises:  Patient educated on role of acute care PT and PT POC and call light usage  Assisted patient with eating some quesadilla at EOB, requiring assistance for sitting balance and food identification, at times attempting to eat dust from her finger    Patient left HOB elevated with all lines intact, call button in reach, RN notified, bed alarm on and restraints reapplied at end of session.    GOALS:   Multidisciplinary Problems     Physical Therapy Goals        Problem: Physical Therapy    Goal Priority Disciplines Outcome Goal Variances Interventions   Physical Therapy Goal     PT, PT/OT Ongoing, Progressing     Description: Goals to be met by: 2022    Patient will increase functional independence with mobility by performin. Supine to sit with Contact Guard Assistance  2. Sit to supine with Stand-by Assistance  3. Rolling to Left and Right with Stand-by Assistance.  4. Sit to stand transfer with Stand-by Assistance  5. Bed to chair transfer with Minimal Assistance using LRAD  6. Gait  x 20 feet with Minimal Assistance using LRAD  7. Lower extremity exercise program x10 reps per handout, with supervision as needed                      Time Tracking:     PT Received On: 22  PT Start Time: 1437     PT Stop Time: 1509  PT Total Time (min): 32 min     Billable Minutes: Therapeutic Activity 15 min and Neuromuscular Re-education 15 min     Treatment Type: Treatment  PT/PTA: PT     PTA Visit Number: 0     2022

## 2022-07-12 NOTE — SUBJECTIVE & OBJECTIVE
Interval History: Patient seen and evaluated. She is more confused today and is in soft restraints due to her pulling at her lines and getting out of bed per RN. She is disoriented to place but oriented to self and time. Yesterday she was A/Ox3 but confused which is her baseline per .       Objective:     Vital Signs (Most Recent):  Temp: 97.1 °F (36.2 °C) (07/12/22 1138)  Pulse: (!) 112 (07/12/22 1138)  Resp: 17 (07/12/22 1138)  BP: 127/79 (07/12/22 1138)  SpO2: 96 % (07/12/22 1138)   Vital Signs (24h Range):  Temp:  [96 °F (35.6 °C)-98.6 °F (37 °C)] 97.1 °F (36.2 °C)  Pulse:  [] 112  Resp:  [17-18] 17  SpO2:  [95 %-100 %] 96 %  BP: (127-153)/(65-79) 127/79     Weight: 40 kg (88 lb 2.9 oz)  Body mass index is 16.12 kg/m².    Intake/Output Summary (Last 24 hours) at 7/12/2022 1427  Last data filed at 7/11/2022 1800  Gross per 24 hour   Intake 118 ml   Output --   Net 118 ml      Physical Exam  Constitutional:       Appearance: She is not diaphoretic.   HENT:      Head: Normocephalic and atraumatic.      Nose: Nose normal. No rhinorrhea.      Mouth/Throat:      Mouth: Mucous membranes are dry.      Pharynx: No posterior oropharyngeal erythema.   Eyes:      Extraocular Movements: Extraocular movements intact.      Conjunctiva/sclera: Conjunctivae normal.   Cardiovascular:      Rate and Rhythm: Normal rate and regular rhythm.      Pulses: Normal pulses.      Heart sounds: Normal heart sounds.   Pulmonary:      Effort: Pulmonary effort is normal.      Breath sounds: Normal breath sounds.   Abdominal:      General: Abdomen is flat.      Palpations: Abdomen is soft.      Tenderness: There is no abdominal tenderness.   Musculoskeletal:         General: Normal range of motion.      Cervical back: Normal range of motion and neck supple.      Left lower leg: No edema.      Comments: Soft restraints in place   Skin:     General: Skin is warm and dry.   Neurological:      General: No focal deficit present.       Mental Status: She is alert. She is disoriented.      Comments: Oriented to self and time but disoriented to place       Significant Labs: All pertinent labs within the past 24 hours have been reviewed.  Recent Lab Results         07/12/22  1238   07/12/22  0514        Anion Gap   10       Baso #   0.01       Basophil %   0.2       BUN   19       Calcium   8.6       Chloride   100       CO2   22       Creatinine   0.6       Differential Method   Automated       eGFR if    >60.0       eGFR if non    >60.0  Comment: Calculation used to obtain the estimated glomerular filtration  rate (eGFR) is the CKD-EPI equation.          Eos #   0.0       Eosinophil %   0.4       Glucose   78       Gran # (ANC)   4.1       Gran %   72.6       Hematocrit   36.8       Hemoglobin   11.9       Immature Grans (Abs)   0.01  Comment: Mild elevation in immature granulocytes is non specific and   can be seen in a variety of conditions including stress response,   acute inflammation, trauma and pregnancy. Correlation with other   laboratory and clinical findings is essential.         Immature Granulocytes   0.2       Lymph #   1.0       Lymph %   17.9       MCH   31.2       MCHC   32.3       MCV   97       Mono #   0.5       Mono %   8.7       MPV   10.9       nRBC   0       Osmolality 286         Platelets   316       Potassium   4.4  Comment: *Result may be interfered by visible hemolysis       RBC   3.81       RDW   15.0       Sodium   132       WBC   5.63               Significant Imaging: I have reviewed all pertinent imaging results/findings within the past 24 hours.

## 2022-07-12 NOTE — ASSESSMENT & PLAN NOTE
Chronic hyponatremia vs hypovolemia vs pseudohyponatremia 2/2 recent IVIG (7/1)  Na 132   Very little PO intake over last 48 hours  Appears hypovolemic on exam    Plan:    - Hyponatremia panel pending. Will consider starting IVF pending results  - PO intake encouraged

## 2022-07-12 NOTE — CARE UPDATE
No restrictions from ortho perspective regarding clavicle fracture. Non weight bearing and range of motion as tolerated in figure of eight brace. Okay for prone positioning with appropriate padding for neurosurgery intervention. She will follow up in ortho trauma clinic for nonoperative management of clavicle fracture.

## 2022-07-12 NOTE — NURSING
(Pavel) made aware of pt being in restraints.  No concerns voiced by the , verbalized understanding.

## 2022-07-12 NOTE — ASSESSMENT & PLAN NOTE
Patient confused and A/Ox2  RN states she is refusing medication and pulling at lines overnight    Plan:     - Continue strict delirium precautions  - Hold any sedating medications, including methocarbamol
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
## 2022-07-12 NOTE — PROGRESS NOTES
Devan Juarez - Neurosurgery ([facility])  Hospital Medicine  Progress Note    Patient Name: Yvette Crews  MRN: [id number]  Patient Class: IP- Inpatient   Admission Date: 7/7/2022  Length of Stay: 5 days  Attending Physician: Mario Alberto King MD  Primary Care Provider: Sally Green MD        Subjective:     Principal Problem:Cervical stenosis of spinal canal        HPI:  Mrs. Crews is a 71 yo woman with paroxysmal atrial fibrillation s/p Watchman device, hypertension, bronchiectasis, Alzheimer's, hypogammaglobulinemia (on monthly IVIG last treatment 7/1), acquired hypothyroidism after total thyroidectomy, and history of SAH s/p fall in 2016 presenting with a subarachnoid hemorrhage after a fall. Per patient's , she was found down in the bathroom yesterday morning but patient was alert. Unclear cause of her fall. She had another fall the week prior which resulted in a clavicular fracture. Patient's  reports patient is more confused than her baseline. She lives in an assisted living facility and is usually able to ambulate around the center with a walker, and do ADLs on her own. Neurosurgery is planning on surgical intervention and hospital medicine was consulted for pre-op evaluation.       Overview/Hospital Course:  No notes on file    No new subjective & objective note has been filed under this hospital service since the last note was generated.      Assessment/Plan:      Hyponatremia  Chronic hyponatremia vs hypovolemia vs pseudohyponatremia 2/2 recent IVIG (7/1)  Na 132   Very little PO intake over last 48 hours  Appears hypovolemic on exam    Plan:    - Hyponatremia panel pending  - PO intake encouraged  - Gentle IV fluid resuscitation       Delirium due to general medical condition  Patient confused and A/Ox2  RN states she is refusing medication and pulling at lines overnight    Plan:     - Continue strict delirium precautions  - Hold any sedating medications  - Wean restraints as  tolerated          Pre-operative exam  Surgical Risk Assessment:    Active Cardiac Issues:  Active decompensated heart failure? No   Unstable angina?  No   Significant uncontrolled arrhythmias? No   Severe valvular heart disease-Aortic or Mitral Stenosis? No   Recent MI or coronary revascularization < 30 days? No     Cardiac Risk Factors:  High risk surgery? Yes   History of CAD/ischemic heart disease? No   History of cerebrovascular disease? No   History of compensated heart failure? No   Type 2 diabetes requiring insulin? No   Serum Creatinine > 2? No   Total cardiac risk factors 1     Functional mets <4    < 4 METs -unable to walk > 2 blocks on level ground without stopping due to symptoms  - eating, dressing, toileting, walking indoors, light housework. POOR   > 4 METs -climbing > 1 flight of stairs without stopping  -walking up hill > 1-2 blocks  -scrubbing floors  -moving furniture  - golf, bowling, dancing or tennis  -running short distance MODERATE to EXCELLENT     Perioperative Risk Assessment:  RCRI Score 1, Class II risk with 6% risk of cardiopulmonary complications      Perioperative Management Recommendations:    Patient is a high risk surgical candidate with:  Chronic bronchiectasis, stable on room air.  Paroxysmal atrial fibrillation s/p watchman device  No acute renal disease  On Bactrim for open clavicular wound  Patient does not meet 4 functional METS      SAH (subarachnoid hemorrhage)  Management per primary  No longer planned for surgery    Fracture of left clavicle  Evaluated by orthopedics, no surgical intervention indicated      Iron deficiency anemia  Monitor CBC  Continue home iron supplememt      Paroxysmal atrial fibrillation  Patient with Paroxysmal (<7 days) atrial fibrillation which is controlled currently without medications. Patient is currently in sinus rhythm.[id number] Score: 2. Anticoagulation not indicated due to watchman device.  EKG 7/7/22 showed normal sinus rhythm.   JAMEL  showed EF of 60%        Postoperative hypothyroidism  Acquired hypothyroidism with hx of MNG s/p total thyroidectomy 1/25/17    -Continue synthroid    Adult bronchiectasis  Chronic, stable  On singulair and mucinex  Patient saturating well on room air      VTE Risk Mitigation (From admission, onward)         Ordered     heparin (porcine) injection 5,000 Units  Every 8 hours         07/08/22 1734     IP VTE HIGH RISK PATIENT  Once         07/07/22 1322     Place sequential compression device  Until discontinued         07/07/22 1322                Discharge Planning   SANTIAGO: 7/15/2022     Code Status: Full Code   Is the patient medically ready for discharge?:     Reason for patient still in hospital (select all that apply): Patient trending condition, Laboratory test, Treatment and Imaging  Discharge Plan A: Skilled Nursing Facility   Discharge Delays: None known at this time              Juan Alberto Jimenez DO  Department of Hospital Medicine   Devan fahad - Neurosurgery ([facility])

## 2022-07-12 NOTE — SUBJECTIVE & OBJECTIVE
"  Family History       Problem Relation (Age of Onset)    Breast cancer Paternal Grandmother    Heart disease Father, Brother    Stroke Father          Tobacco Use    Smoking status: Never Smoker    Smokeless tobacco: Never Used   Substance and Sexual Activity    Alcohol use: No    Drug use: No    Sexual activity: Not Currently     Psychotherapeutics (From admission, onward)                None               Objective:     Vital Signs (Most Recent):  Temp: 96 °F (35.6 °C) (07/12/22 0752)  Pulse: 65 (07/12/22 0752)  Resp: 17 (07/12/22 0752)  BP: (!) 141/67 (07/12/22 0752)  SpO2: 100 % (07/12/22 0752)   Vital Signs (24h Range):  Temp:  [96 °F (35.6 °C)-98.6 °F (37 °C)] 96 °F (35.6 °C)  Pulse:  [58-77] 65  Resp:  [17-18] 17  SpO2:  [95 %-100 %] 100 %  BP: (131-153)/(65-74) 141/67     Height: 5' 2.01" (157.5 cm)  Weight: 40 kg (88 lb 2.9 oz)  Body mass index is 16.12 kg/m².      Intake/Output Summary (Last 24 hours) at 7/12/2022 1053  Last data filed at 7/11/2022 1800  Gross per 24 hour   Intake 358 ml   Output --   Net 358 ml     Mental Status Exam:  Appearance: thin & gaunt looking  Behavior/Cooperation: friendly and cooperative  Speech: increased latency of response, soft  Mood: fine  Affect: normal  Thought Process: logical  Thought Content: normal, no suicidality, no homicidality, delusions, or paranoia   Orientation: grossly intact  Memory: Impaired to some degree  Attention Span/Concentration: Impaired to some degree  Insight: limited  Judgment: limited      Significant Labs: All pertinent labs within the past 24 hours have been reviewed.    Significant Imaging: I have reviewed all pertinent imaging results/findings within the past 24 hours.  "

## 2022-07-12 NOTE — SUBJECTIVE & OBJECTIVE
Interval History: Placed in restraints overnight due to attempts to get OOB. Further discussed surgical need/timing with patient and . Given that she does not have s/s acute cervical myelopathy will hold off on urgent surgical decompression until she is better optimized. With the clavicle fracture and NWB status it will be difficult to undergo rehab and therapy after a posterior cervical fusion. Her current cognitive status (hx Alzheimers dementia with current intermittent delirium), poor nutrition, and frail appearance would also make her post-operative course more challenging. Patient and  v/u. Will work on discharge planning once medically stable.    Medications:  Continuous Infusions:   sodium chloride 0.9% 100 mL/hr at 07/12/22 1639     Scheduled Meds:   donepeziL  10 mg Oral Daily    famotidine  20 mg Oral BID    ferrous sulfate  1 tablet Oral Daily    heparin (porcine)  5,000 Units Subcutaneous Q8H    levothyroxine  88 mcg Oral Before breakfast    montelukast  10 mg Oral QHS    sulfamethoxazole-trimethoprim 400-80mg  1 tablet Oral BID     PRN Meds:acetaminophen, albuterol, dextrose 10%, dextrose 10%, glucagon (human recombinant), glucose, glucose, glucose, methocarbamoL, polyethylene glycol     Objective:     Weight: 40 kg (88 lb 2.9 oz)  Body mass index is 16.12 kg/m².  Vital Signs (Most Recent):  Temp: 98 °F (36.7 °C) (07/12/22 1621)  Pulse: 88 (07/12/22 1621)  Resp: 16 (07/12/22 1621)  BP: (!) 105/57 (07/12/22 1621)  SpO2: 96 % (07/12/22 1621) Vital Signs (24h Range):  Temp:  [96 °F (35.6 °C)-98.6 °F (37 °C)] 98 °F (36.7 °C)  Pulse:  [] 88  Resp:  [16-18] 16  SpO2:  [96 %-100 %] 96 %  BP: (105-146)/(57-79) 105/57                     Female External Urinary Catheter 07/07/22 1500 (Active)   Skin female external urine collection device repositioned 07/08/22 0800   Tolerance no signs/symptoms of discomfort 07/08/22 0800       Physical Exam    Neurosurgery Physical Exam  General: well  developed, well nourished, no distress.   Head: normocephalic  Neurologic: Alert and orientedx2. Thought content appropriate, higher order confusion present  GCS: Motor: 6/Verbal: 5/Eyes: 4 GCS Total: 15  Mental Status: Awake, Alert, Oriented x 2 (person, time; stated she was at the [facility]), with higher order confusion  Language: No aphasia  Speech: No dysarthria  Cranial nerves: face symmetric, tongue midline, CN II-XII grossly intact.   Eyes: pupils equal, round, reactive to light with accommodation, EOMI. Mild ptosis noted to left eye   Pulmonary: normal respirations, no signs of respiratory distress  Abdomen: soft, non-distended, not tender to palpation  Skin: Skin is warm, dry and intact.  Sensory: responds to light touch throughout  Motor Strength: Moves all extremities spontaneously, increased tone to BUE, at least 4/5 in BUE, 3/5 in left deltoid 2/2 rigidity and left clavicle fracture. BLE 4/5. No abnormal movements seen.      Pain limited motion in left shoulder 2/2 clavicle fracture     Reflexes:   Plascencia's: positive bilaterally        Significant Labs:  Recent Labs   Lab 07/11/22  0759 07/12/22  0514   GLU 95 78   * 132*   K 4.1 4.4    100   CO2 23 22*   BUN 26* 19   CREATININE 0.6 0.6   CALCIUM 8.8 8.6*       Recent Labs   Lab 07/11/22  0759 07/12/22  0514   WBC 5.26 5.63   HGB 11.4* 11.9*   HCT 36.0* 36.8*    316       No results for input(s): LABPT, INR, APTT in the last 48 hours.    Microbiology Results (last 7 days)       ** No results found for the last 168 hours. **          All pertinent labs from the last 24 hours have been reviewed.    Significant Diagnostics:  I have reviewed and interpreted all pertinent imaging results/findings within the past 24 hours.  No results found in the last 24 hours.

## 2022-07-12 NOTE — ASSESSMENT & PLAN NOTE
Patient confused and A/Ox2  RN states she is refusing medication and pulling at lines overnight    Plan:     - Continue strict delirium precautions  - Hold any sedating medications  - Wean restraints as tolerated

## 2022-07-12 NOTE — ASSESSMENT & PLAN NOTE
[facility]          2019        Tereso Morales       : 2000  [address] [place] [address]        To Whom It May Concern,    This is to certify that Tereso Morales was seen in the clinic on  2019.  He is to keep the splint clean and dry and no right handed work    REMARKS/RESTRICTIONS: these are in effective until he has follow up with orthopedics        SIGNATURE:_________________________________________, 2019       Caro Blanchard NP                                .      [facility]  [facility]  [address]  [place] [address] [phone]   Management per primary  No longer planned for surgery

## 2022-07-13 ENCOUNTER — TELEPHONE (OUTPATIENT)
Dept: NEUROSURGERY | Facility: CLINIC | Age: 72
End: 2022-07-13
Payer: MEDICARE

## 2022-07-13 DIAGNOSIS — I60.9 SAH (SUBARACHNOID HEMORRHAGE): Primary | ICD-10-CM

## 2022-07-13 LAB
ANION GAP SERPL CALC-SCNC: 10 MMOL/L (ref 8–16)
BASOPHILS # BLD AUTO: 0.01 K/UL (ref 0–0.2)
BASOPHILS NFR BLD: 0.2 % (ref 0–1.9)
BUN SERPL-MCNC: 27 MG/DL (ref 8–23)
CALCIUM SERPL-MCNC: 8.7 MG/DL (ref 8.7–10.5)
CHLORIDE SERPL-SCNC: 103 MMOL/L (ref 95–110)
CO2 SERPL-SCNC: 21 MMOL/L (ref 23–29)
CREAT SERPL-MCNC: 0.6 MG/DL (ref 0.5–1.4)
DIFFERENTIAL METHOD: ABNORMAL
EOSINOPHIL # BLD AUTO: 0 K/UL (ref 0–0.5)
EOSINOPHIL NFR BLD: 0.2 % (ref 0–8)
ERYTHROCYTE [DISTWIDTH] IN BLOOD BY AUTOMATED COUNT: 15 % (ref 11.5–14.5)
EST. GFR  (AFRICAN AMERICAN): >60 ML/MIN/1.73 M^2
EST. GFR  (NON AFRICAN AMERICAN): >60 ML/MIN/1.73 M^2
GLUCOSE SERPL-MCNC: 85 MG/DL (ref 70–110)
HCT VFR BLD AUTO: 34.9 % (ref 37–48.5)
HGB BLD-MCNC: 11 G/DL (ref 12–16)
IMM GRANULOCYTES # BLD AUTO: 0.01 K/UL (ref 0–0.04)
IMM GRANULOCYTES NFR BLD AUTO: 0.2 % (ref 0–0.5)
LYMPHOCYTES # BLD AUTO: 0.9 K/UL (ref 1–4.8)
LYMPHOCYTES NFR BLD: 17.8 % (ref 18–48)
MCH RBC QN AUTO: 32 PG (ref 27–31)
MCHC RBC AUTO-ENTMCNC: 31.5 G/DL (ref 32–36)
MCV RBC AUTO: 102 FL (ref 82–98)
MONOCYTES # BLD AUTO: 0.5 K/UL (ref 0.3–1)
MONOCYTES NFR BLD: 9.5 % (ref 4–15)
NEUTROPHILS # BLD AUTO: 3.8 K/UL (ref 1.8–7.7)
NEUTROPHILS NFR BLD: 72.1 % (ref 38–73)
NRBC BLD-RTO: 0 /100 WBC
PLATELET # BLD AUTO: 307 K/UL (ref 150–450)
PMV BLD AUTO: 10.9 FL (ref 9.2–12.9)
POTASSIUM SERPL-SCNC: 4 MMOL/L (ref 3.5–5.1)
RBC # BLD AUTO: 3.44 M/UL (ref 4–5.4)
SODIUM SERPL-SCNC: 134 MMOL/L (ref 136–145)
WBC # BLD AUTO: 5.29 K/UL (ref 3.9–12.7)

## 2022-07-13 PROCEDURE — 97535 SELF CARE MNGMENT TRAINING: CPT

## 2022-07-13 PROCEDURE — 99232 SBSQ HOSP IP/OBS MODERATE 35: CPT | Mod: ,,, | Performed by: PHYSICIAN ASSISTANT

## 2022-07-13 PROCEDURE — 85025 COMPLETE CBC W/AUTO DIFF WBC: CPT

## 2022-07-13 PROCEDURE — 25000003 PHARM REV CODE 250

## 2022-07-13 PROCEDURE — 63600175 PHARM REV CODE 636 W HCPCS: Performed by: STUDENT IN AN ORGANIZED HEALTH CARE EDUCATION/TRAINING PROGRAM

## 2022-07-13 PROCEDURE — 11000001 HC ACUTE MED/SURG PRIVATE ROOM

## 2022-07-13 PROCEDURE — 80048 BASIC METABOLIC PNL TOTAL CA: CPT

## 2022-07-13 PROCEDURE — 36415 COLL VENOUS BLD VENIPUNCTURE: CPT

## 2022-07-13 PROCEDURE — 99232 PR SUBSEQUENT HOSPITAL CARE,LEVL II: ICD-10-PCS | Mod: GC,,, | Performed by: HOSPITALIST

## 2022-07-13 PROCEDURE — 99232 SBSQ HOSP IP/OBS MODERATE 35: CPT | Mod: GC,,, | Performed by: HOSPITALIST

## 2022-07-13 PROCEDURE — 99232 PR SUBSEQUENT HOSPITAL CARE,LEVL II: ICD-10-PCS | Mod: ,,, | Performed by: PHYSICIAN ASSISTANT

## 2022-07-13 PROCEDURE — 25000003 PHARM REV CODE 250: Performed by: NEUROLOGICAL SURGERY

## 2022-07-13 PROCEDURE — 97530 THERAPEUTIC ACTIVITIES: CPT

## 2022-07-13 RX ORDER — SODIUM CHLORIDE 9 MG/ML
INJECTION, SOLUTION INTRAVENOUS CONTINUOUS
Status: ACTIVE | OUTPATIENT
Start: 2022-07-13 | End: 2022-07-13

## 2022-07-13 RX ADMIN — DONEPEZIL HYDROCHLORIDE 10 MG: 10 TABLET ORAL at 08:07

## 2022-07-13 RX ADMIN — LEVOTHYROXINE SODIUM 88 MCG: 88 TABLET ORAL at 05:07

## 2022-07-13 RX ADMIN — FAMOTIDINE 20 MG: 20 TABLET ORAL at 08:07

## 2022-07-13 RX ADMIN — FERROUS SULFATE TAB 325 MG (65 MG ELEMENTAL FE) 1 EACH: 325 (65 FE) TAB at 08:07

## 2022-07-13 RX ADMIN — ACETAMINOPHEN 650 MG: 325 TABLET ORAL at 11:07

## 2022-07-13 RX ADMIN — MONTELUKAST 10 MG: 10 TABLET, FILM COATED ORAL at 10:07

## 2022-07-13 RX ADMIN — SODIUM CHLORIDE: 0.9 INJECTION, SOLUTION INTRAVENOUS at 12:07

## 2022-07-13 RX ADMIN — FAMOTIDINE 20 MG: 20 TABLET ORAL at 10:07

## 2022-07-13 RX ADMIN — HEPARIN SODIUM 5000 UNITS: 5000 INJECTION INTRAVENOUS; SUBCUTANEOUS at 05:07

## 2022-07-13 NOTE — PROGRESS NOTES
[facility]an Juarez - Neurosurgery ([facility]  Neurosurgery  Progress Note    Subjective:     History of Present Illness: 73yo F PMH AVNRT, frequent falls, presenting as neurosurgery consult due to L temporal SAH noted on CTH in AM. Per patient's  was found down after hearing fall at 0830. More confusion present than baseline. Patient states she takes aspirin and plavix but hasn't taken the plavix in a while. She is supposed to use a walker at baseline but now is NWB to left arm due a left clavicle fx 7/3. CT c-spine showed C2-C3 anterolisthesis        Post-Op Info:  Procedure(s) (LRB):  C1-C3 Posterior cervical fusion. depuy. neuromonitoring. sadaf harris. [place]. (N/A)         Interval History: NAEON. AFVSS. Calm and cooperative on exam, no attempts to climb out of bed. Oriented x 4. Sodium improved after IVF. She states she has a little pain over the left clavicle, otherwise no pain. Pediatric aspen collar fitted to patient, wear when OOB. Pending discharge to SNF. Discussed with patient's  over the phone. All questions answered.     Medications:  Continuous Infusions:   sodium chloride 0.9%       Scheduled Meds:   donepeziL  10 mg Oral Daily    famotidine  20 mg Oral BID    ferrous sulfate  1 tablet Oral Daily    heparin (porcine)  5,000 Units Subcutaneous Q8H    levothyroxine  88 mcg Oral Before breakfast    montelukast  10 mg Oral QHS     PRN Meds:acetaminophen, albuterol, dextrose 10%, dextrose 10%, glucagon (human recombinant), glucose, glucose, glucose, methocarbamoL, polyethylene glycol     Objective:     Weight: 40 kg (88 lb 2.9 oz)  Body mass index is 16.12 kg/m².  Vital Signs (Most Recent):  Temp: 98.1 °F (36.7 °C) (07/13/22 0826)  Pulse: 80 (07/13/22 0826)  Resp: 18 (07/13/22 0826)  BP: 98/66 (07/13/22 0826)  SpO2: (!) 91 % (07/13/22 0826) Vital Signs (24h Range):  Temp:  [97.6 °F (36.4 °C)-98.1 °F (36.7 °C)] 98.1 °F (36.7 °C)  Pulse:  [62-95] 80  Resp:  [16-18] 18  SpO2:  [91 %-97 %] 91  %  BP: ()/(54-69) 98/66                     Female External Urinary Catheter 07/07/22 1500 (Active)   Skin female external urine collection device repositioned 07/08/22 0800   Tolerance no signs/symptoms of discomfort 07/08/22 0800       Physical Exam    Neurosurgery Physical Exam  General: well developed, well nourished, no distress.   Head: normocephalic  Neurologic: Alert and orientedx4. Thought content appropriate, higher order confusion present  GCS: Motor: 6/Verbal: 5/Eyes: 4 GCS Total: 15  Mental Status: Awake, Alert, Oriented x 4, with higher order confusion  Language: No aphasia  Speech: No dysarthria  Cranial nerves: face symmetric, tongue midline, CN II-XII grossly intact.   Eyes: pupils equal, round, reactive to light with accommodation, EOMI. Mild ptosis noted to left eye   Pulmonary: normal respirations, no signs of respiratory distress  Abdomen: soft, non-distended, not tender to palpation  Skin: Skin is warm, dry and intact.  Sensory: responds to light touch throughout  Motor Strength: Moves all extremities spontaneously, increased tone to BUE, at least 4/5 in BUE, 3/5 in left deltoid 2/2 rigidity and left clavicle fracture. BLE 4/5. No abnormal movements seen.      Pain limited motion in left shoulder 2/2 clavicle fracture     Reflexes:   Plascencia's: positive bilaterally        Significant Labs:  Recent Labs   Lab 07/12/22  0514 07/13/22  0459   GLU 78 85   * 134*   K 4.4 4.0    103   CO2 22* 21*   BUN 19 27*   CREATININE 0.6 0.6   CALCIUM 8.6* 8.7       Recent Labs   Lab 07/12/22  0514 07/13/22  0459   WBC 5.63 5.29   HGB 11.9* 11.0*   HCT 36.8* 34.9*    307       No results for input(s): LABPT, INR, APTT in the last 48 hours.    Microbiology Results (last 7 days)       ** No results found for the last 168 hours. **          All pertinent labs from the last 24 hours have been reviewed.    Significant Diagnostics:  I have reviewed and interpreted all pertinent imaging  results/findings within the past 24 hours.  No results found in the last 24 hours.      Assessment/Plan:     SAH (subarachnoid hemorrhage)  73yo F PMH AVNRT, frequent falls, presenting as neurosurgery consult due to L temporal SAH noted on CTH in AM. Per patient's  was found down after hearing fall at 0830. More confusion present than baseline. Patient states she takes aspirin and plavix but hasn't taken the plavix in a while. She is supposed to use a walker at baseline but now is NWB to left arm due a left clavicle fx 7/3. CT c-spine showed C2-C3 anterolisthesis.    --Neuro exam stable without focal weakness. Wax/waning mental status.   --Further discussed surgical need/timing with patient and . Given that she does not have s/s acute cervical myelopathy will hold off on urgent surgical decompression until she is better optimized. With the clavicle fracture and NWB status it will be difficult to undergo rehab and therapy after a posterior cervical fusion. Her current cognitive status (hx Alzheimers dementia with current intermittent delirium), poor nutrition, and frail appearance would also make her post-operative course more challenging. Patient and  v/u. Will work on discharge planning once medically stable.  --C-collar when OOB. Pediatric collar fitting appropriately.       -- q4h neurochecks on floor  --All labs and diagnostics reviewed       --CTH 7/7: L temporal SAH, stable on repeat scan       --CTA 7/8 without aneurysm, stable appearing bleed       --CT c-spine 7/7: anterolisthesis C2-C3       --MRI cervical spine 7/7 shows anterlisthesis of C2 on C3 with severe spinal cord compression C2-4 with cord signal change       --XR flex/ex of cervical spine shows no dynamic instability        --MRI thoracic spine shows remote compression fracture of T2, T3, T10, L2. Severe compression fracture of T8 with almost complete loss of height, chronic appearing.  --Afib: s/p ablation and left atrial  appendage occlusion on 2/18/22: Xarelto and Plavix stopped in May per EP physician. Patient still taking ASA, Hold ASA at this time given SAH.  --PTT/INR wnl  --Left clavicle fracture: fracture from 7/3, missed f/u due to fall. Per Ortho recs at that time: NWB to LUE, sling for comfort, bactrim x 7 days for open fracture. Per  will be able to bear weight 3 weeks from injury, will f/u outpatient. Ortho consulted for this admission, appreciate their assistance. NWB LUE in figure-of-eight brace (patient not tolerating sling). Keep wound over left shoulder covered. Bactrim for a total of 7 days. Recommend mobilization with a wheelchair given patient condition and inability to bear weight through the left upper extremity.   --Psychiatry consulted 7/10 for suicidal ideation (told nurse she wanted to drown self), following now for delirium. Recommending to discontinue melatonin. Limit use of restraints.   --Continue to monitor clinically, notify NSGY immediately with any changes in neuro status  --HM consulted for co-management, medical optimization, appreciate recs; RCRI 1, Class 2 risk. Hyponatremia w/u ordered. Na improved with IVF.     --Dispo: SNF. Medically stable for discharge, pending placement.        Gina Ramesh PA-C  Neurosurgery  [facility]an Juarez - Neurosurgery ([facility])

## 2022-07-13 NOTE — PLAN OF CARE
CM in communication with [facility] with [facility].  [facility] had potentially denied acceptance today due to restraints.  CM communicated that restraints will be removed and that patient had delerium due to patient being in hospital as patient has alzheimers.  No bed available today or tomorrow.  CM requested that we plan for admit on Friday.

## 2022-07-13 NOTE — SUBJECTIVE & OBJECTIVE
Interval History: NAEON. AFVSS. Calm and cooperative on exam, no attempts to climb out of bed. Oriented x 4. Sodium improved after IVF. She states she has a little pain over the left clavicle, otherwise no pain. Pediatric aspen collar fitted to patient, wear when OOB. Pending discharge to SNF. Discussed with patient's  over the phone. All questions answered.     Medications:  Continuous Infusions:   sodium chloride 0.9%       Scheduled Meds:   donepeziL  10 mg Oral Daily    famotidine  20 mg Oral BID    ferrous sulfate  1 tablet Oral Daily    heparin (porcine)  5,000 Units Subcutaneous Q8H    levothyroxine  88 mcg Oral Before breakfast    montelukast  10 mg Oral QHS     PRN Meds:acetaminophen, albuterol, dextrose 10%, dextrose 10%, glucagon (human recombinant), glucose, glucose, glucose, methocarbamoL, polyethylene glycol     Objective:     Weight: 40 kg (88 lb 2.9 oz)  Body mass index is 16.12 kg/m².  Vital Signs (Most Recent):  Temp: 98.1 °F (36.7 °C) (07/13/22 0826)  Pulse: 80 (07/13/22 0826)  Resp: 18 (07/13/22 0826)  BP: 98/66 (07/13/22 0826)  SpO2: (!) 91 % (07/13/22 0826) Vital Signs (24h Range):  Temp:  [97.6 °F (36.4 °C)-98.1 °F (36.7 °C)] 98.1 °F (36.7 °C)  Pulse:  [62-95] 80  Resp:  [16-18] 18  SpO2:  [91 %-97 %] 91 %  BP: ()/(54-69) 98/66                     Female External Urinary Catheter 07/07/22 1500 (Active)   Skin female external urine collection device repositioned 07/08/22 0800   Tolerance no signs/symptoms of discomfort 07/08/22 0800       Physical Exam    Neurosurgery Physical Exam  General: well developed, well nourished, no distress.   Head: normocephalic  Neurologic: Alert and orientedx4. Thought content appropriate, higher order confusion present  GCS: Motor: 6/Verbal: 5/Eyes: 4 GCS Total: 15  Mental Status: Awake, Alert, Oriented x 4, with higher order confusion  Language: No aphasia  Speech: No dysarthria  Cranial nerves: face symmetric, tongue midline, CN II-XII  grossly intact.   Eyes: pupils equal, round, reactive to light with accommodation, EOMI. Mild ptosis noted to left eye   Pulmonary: normal respirations, no signs of respiratory distress  Abdomen: soft, non-distended, not tender to palpation  Skin: Skin is warm, dry and intact.  Sensory: responds to light touch throughout  Motor Strength: Moves all extremities spontaneously, increased tone to BUE, at least 4/5 in BUE, 3/5 in left deltoid 2/2 rigidity and left clavicle fracture. BLE 4/5. No abnormal movements seen.      Pain limited motion in left shoulder 2/2 clavicle fracture     Reflexes:   Plascencia's: positive bilaterally        Significant Labs:  Recent Labs   Lab 07/12/22  0514 07/13/22  0459   GLU 78 85   * 134*   K 4.4 4.0    103   CO2 22* 21*   BUN 19 27*   CREATININE 0.6 0.6   CALCIUM 8.6* 8.7       Recent Labs   Lab 07/12/22  0514 07/13/22  0459   WBC 5.63 5.29   HGB 11.9* 11.0*   HCT 36.8* 34.9*    307       No results for input(s): LABPT, INR, APTT in the last 48 hours.    Microbiology Results (last 7 days)       ** No results found for the last 168 hours. **          All pertinent labs from the last 24 hours have been reviewed.    Significant Diagnostics:  I have reviewed and interpreted all pertinent imaging results/findings within the past 24 hours.  No results found in the last 24 hours.

## 2022-07-13 NOTE — PT/OT/SLP PROGRESS
Occupational Therapy   Treatment    Name: Yvette Crews  MRN: [id number]  Admitting Diagnosis:  Cervical stenosis of spinal canal       Recommendations:     Discharge Recommendations: nursing facility, skilled  Discharge Equipment Recommendations:   (TBD)  Barriers to discharge:   (Increased assist required)    Assessment:     Yvette Crews is a 72 y.o. female with a medical diagnosis of Cervical stenosis of spinal canal.  She presents with the following performance deficits affecting function are weakness, gait instability, decreased upper extremity function, decreased lower extremity function, impaired balance, impaired endurance, decreased safety awareness, impaired self care skills, impaired sensation.     Rehab Prognosis:  Good; patient would benefit from acute skilled OT services to address these deficits and reach maximum level of function.       Plan:     Patient to be seen 3 x/week to address the above listed problems via self-care/home management, therapeutic activities, therapeutic exercises, neuromuscular re-education, cognitive retraining  · Plan of Care Expires: 08/08/22  · Plan of Care Reviewed with: patient    Subjective     Pain/Comfort:  · Pain Rating 1: 0/10  · Pain Rating Post-Intervention 1: 0/10    Objective:     Communicated with: Nursing prior to session.  Patient found supine with bed alarm, telemetry, restraints upon OT entry to room.    General Precautions: Standard, fall   Orthopedic Precautions:LUE non weight bearing   Braces:  (Figure of 8 brace)  Respiratory Status: Room air     Occupational Performance:     Bed Mobility:    · Patient completed Rolling/Turning to Right with maximal assistance  · Patient completed Scooting/Bridging with maximal assistance  · Patient completed Supine to Sit with maximal assistance  · Patient completed Sit to Supine with maximal assistance     Functional Mobility/Transfers:  · Patient sat edge of bed x ~15 minutes with close stand by to  minimal assist due to posterolateral lean to R; reporting fatigue and requesting to return to supine    Activities of Daily Living:  · Grooming: moderate assistance to manage brush edge of bed to brush to comb hair; stand by assist to wash hands with head of bed elevated at end of session, required cues for thoroughness as patient distractible  · Upper Body Dressing: moderate assistance to manage gown seated edge o fbed  · Lower Body Dressing: maximal assistance seated edge of bed due to impaired static and dynamic sitting balance; able to reach full ROM with head of bed elevated    [facility] 6 Click ADL: 13    Treatment & Education:  -Patient and family educated on roles/goals of OT and POC.  -White board updated.  -Therapist provided time for questions and answered within scope of practice.  -Patient educated on importance of EOB/OOB activity to maximize recovery.    Patient left supine with call button in reach, bed alarm on and nursing and PCT presentEducation:      GOALS:   Multidisciplinary Problems     Occupational Therapy Goals        Problem: Occupational Therapy    Goal Priority Disciplines Outcome Interventions   Occupational Therapy Goal     OT, PT/OT Ongoing, Progressing    Description: Goals to be met by: 7/22/2022       Patient will increase functional independence with ADLs by performing:    UE Dressing with Moderate Assistance.  LE Dressing with Moderate Assistance.  Grooming while seated with Moderate Assistance.  Toileting from bedside commode with Moderate Assistance for hygiene and clothing management.   Sitting at edge of bed x10 minutes with Contact Guard Assistance and correcting posterior lean with 2/2 verbal cues.  Supine to sit with Stand-by Assistance.  Step transfer with Mod A                     Time Tracking:     OT Date of Treatment: 07/13/22  OT Start Time: 1100  OT Stop Time: 1155  OT Total Time (min): 55 min    Billable Minutes:Self Care/Home Management 30  Therapeutic Activity  25    OT/SYLVIE: OT          7/13/2022

## 2022-07-13 NOTE — PROGRESS NOTES
Devan Juarez - Neurosurgery ([facility])  Hospital Medicine  Progress Note    Patient Name: Yvette Crews  MRN: [id number]  Patient Class: IP- Inpatient   Admission Date: 7/7/2022  Length of Stay: 6 days  Attending Physician: Mario Alberto King MD  Primary Care Provider: Sally Green MD        Subjective:     Principal Problem:Cervical stenosis of spinal canal        HPI:  Mrs. Crews is a 73 yo woman with paroxysmal atrial fibrillation s/p Watchman device, hypertension, bronchiectasis, Alzheimer's, hypogammaglobulinemia (on monthly IVIG last treatment 7/1), acquired hypothyroidism after total thyroidectomy, and history of SAH s/p fall in 2016 presenting with a subarachnoid hemorrhage after a fall. Per patient's , she was found down in the bathroom yesterday morning but patient was alert. Unclear cause of her fall. She had another fall the week prior which resulted in a clavicular fracture. Patient's  reports patient is more confused than her baseline. She lives in an assisted living facility and is usually able to ambulate around the center with a walker, and do ADLs on her own. Neurosurgery is planning on surgical intervention and hospital medicine was consulted for pre-op evaluation.       Overview/Hospital Course:  No notes on file    Interval History: Patient is doing well. She was sleepy on exam but alert and following commands. She is A/Ox3. She denies CP, palpitations, SOB, HA, dizziness      Objective:     Vital Signs (Most Recent):  Temp: 98.2 °F (36.8 °C) (07/13/22 1208)  Pulse: 85 (07/13/22 1208)  Resp: 18 (07/13/22 1208)  BP: 113/64 (07/13/22 1208)  SpO2: 97 % (07/13/22 1208) Vital Signs (24h Range):  Temp:  [97.6 °F (36.4 °C)-98.2 °F (36.8 °C)] 98.2 °F (36.8 °C)  Pulse:  [62-95] 85  Resp:  [16-18] 18  SpO2:  [91 %-97 %] 97 %  BP: ()/(54-69) 113/64     Weight: 40 kg (88 lb 2.9 oz)  Body mass index is 16.12 kg/m².  No intake or output data in the 24 hours ending 07/13/22 5691    Physical Exam  Constitutional:       Appearance: Normal appearance.   HENT:      Head: Normocephalic and atraumatic.      Nose: Nose normal. No rhinorrhea.      Mouth/Throat:      Mouth: Mucous membranes are moist.      Pharynx: No posterior oropharyngeal erythema.   Eyes:      Extraocular Movements: Extraocular movements intact.      Conjunctiva/sclera: Conjunctivae normal.   Cardiovascular:      Rate and Rhythm: Normal rate and regular rhythm.      Pulses: Normal pulses.      Heart sounds: Normal heart sounds.   Pulmonary:      Effort: Pulmonary effort is normal.      Breath sounds: Normal breath sounds.   Abdominal:      General: Abdomen is flat.      Palpations: Abdomen is soft.      Tenderness: There is no abdominal tenderness.   Musculoskeletal:         General: No tenderness. Normal range of motion.      Cervical back: Normal range of motion and neck supple.      Left lower leg: No edema.   Skin:     General: Skin is warm and dry.   Neurological:      General: No focal deficit present.      Mental Status: She is alert and oriented to person, place, and time.       Significant Labs: All pertinent labs within the past 24 hours have been reviewed.  Recent Lab Results         07/13/22  0459        Anion Gap 10       Baso # 0.01       Basophil % 0.2       BUN 27       Calcium 8.7       Chloride 103       CO2 21       Creatinine 0.6       Differential Method Automated       eGFR if  >60.0       eGFR if non  >60.0  Comment: Calculation used to obtain the estimated glomerular filtration  rate (eGFR) is the CKD-EPI equation.          Eos # 0.0       Eosinophil % 0.2       Glucose 85       Gran # (ANC) 3.8       Gran % 72.1       Hematocrit 34.9       Hemoglobin 11.0       Immature Grans (Abs) 0.01  Comment: Mild elevation in immature granulocytes is non specific and   can be seen in a variety of conditions including stress response,   acute inflammation, trauma and pregnancy.  Correlation with other   laboratory and clinical findings is essential.         Immature Granulocytes 0.2       Lymph # 0.9       Lymph % 17.8       MCH 32.0       MCHC 31.5              Mono # 0.5       Mono % 9.5       MPV 10.9       nRBC 0       Platelets 307       Potassium 4.0       RBC 3.44       RDW 15.0       Sodium 134       WBC 5.29               Significant Imaging: I have reviewed all pertinent imaging results/findings within the past 24 hours.      Assessment/Plan:      Hyponatremia  Chronic hyponatremia vs hypovolemic hyponatremia 2/2 poor PO intake  Na improved 132 > 134 with gentle IVF rehydration with NS  Very little PO intake over last 48 hours  Appears hypovolemic on exam    Plan:    - Hyponatremia panel pending  - PO intake encouraged  - Continue gentle IV fluid resuscitation     - Patient is cleared from a medical from a medical standpoint. We will sign off      Delirium due to general medical condition  Patient confused and A/Ox2  RN states she is refusing medication and pulling at lines overnight    Plan:     - Continue strict delirium precautions  - Hold any sedating medications  - Wean restraints as tolerated          Pre-operative exam  Surgical Risk Assessment:    Active Cardiac Issues:  Active decompensated heart failure? No   Unstable angina?  No   Significant uncontrolled arrhythmias? No   Severe valvular heart disease-Aortic or Mitral Stenosis? No   Recent MI or coronary revascularization < 30 days? No     Cardiac Risk Factors:  High risk surgery? Yes   History of CAD/ischemic heart disease? No   History of cerebrovascular disease? No   History of compensated heart failure? No   Type 2 diabetes requiring insulin? No   Serum Creatinine > 2? No   Total cardiac risk factors 1     Functional mets <4    < 4 METs -unable to walk > 2 blocks on level ground without stopping due to symptoms  - eating, dressing, toileting, walking indoors, light housework. POOR   > 4 METs -climbing > 1  flight of stairs without stopping  -walking up hill > 1-2 blocks  -scrubbing floors  -moving furniture  - golf, bowling, dancing or tennis  -running short distance MODERATE to EXCELLENT     Perioperative Risk Assessment:  RCRI Score 1, Class II risk with 6% risk of cardiopulmonary complications      Perioperative Management Recommendations:    Patient is a high risk surgical candidate with:  Chronic bronchiectasis, stable on room air.  Paroxysmal atrial fibrillation s/p watchman device  No acute renal disease  On Bactrim for open clavicular wound  Patient does not meet 4 functional METS      SAH (subarachnoid hemorrhage)  Management per primary  No longer planned for surgery    Fracture of left clavicle  Evaluated by orthopedics, no surgical intervention indicated      Iron deficiency anemia  Monitor CBC  Continue home iron supplememt      Paroxysmal atrial fibrillation  Patient with Paroxysmal (<7 days) atrial fibrillation which is controlled currently without medications. Patient is currently in sinus rhythm.[id number] Score: 2. Anticoagulation not indicated due to watchman device.  EKG 7/7/22 showed normal sinus rhythm.   JAMEL showed EF of 60%        Postoperative hypothyroidism  Acquired hypothyroidism with hx of MNG s/p total thyroidectomy 1/25/17    -Continue synthroid    Adult bronchiectasis  Chronic, stable  On singulair and mucinex  Patient saturating well on room air        VTE Risk Mitigation (From admission, onward)         Ordered     heparin (porcine) injection 5,000 Units  Every 8 hours         07/08/22 1734     IP VTE HIGH RISK PATIENT  Once         07/07/22 1322     Place sequential compression device  Until discontinued         07/07/22 1322                Discharge Planning   SANTIAGO: 7/15/2022     Code Status: Full Code   Is the patient medically ready for discharge?:     Reason for patient still in hospital (select all that apply): Pending disposition  Discharge Plan A: Skilled Nursing Facility    Discharge Delays: None known at this time              Juan Alberto Jimenez,   Department of Hospital Medicine   Devan fahad - Neurosurgery ([facility])

## 2022-07-13 NOTE — ASSESSMENT & PLAN NOTE
71yo F PMH AVNRT, frequent falls, presenting as neurosurgery consult due to L temporal SAH noted on CTH in AM. Per patient's  was found down after hearing fall at 0830. More confusion present than baseline. Patient states she takes aspirin and plavix but hasn't taken the plavix in a while. She is supposed to use a walker at baseline but now is NWB to left arm due a left clavicle fx 7/3. CT c-spine showed C2-C3 anterolisthesis.    --Neuro exam stable without focal weakness. Wax/waning mental status.   --Further discussed surgical need/timing with patient and . Given that she does not have s/s acute cervical myelopathy will hold off on urgent surgical decompression until she is better optimized. With the clavicle fracture and NWB status it will be difficult to undergo rehab and therapy after a posterior cervical fusion. Her current cognitive status (hx Alzheimers dementia with current intermittent delirium), poor nutrition, and frail appearance would also make her post-operative course more challenging. Patient and  v/u. Will work on discharge planning once medically stable.  --C-collar when OOB. Pediatric collar fitting appropriately.       -- q4h neurochecks on floor  --All labs and diagnostics reviewed       --CTH 7/7: L temporal SAH, stable on repeat scan       --CTA 7/8 without aneurysm, stable appearing bleed       --CT c-spine 7/7: anterolisthesis C2-C3       --MRI cervical spine 7/7 shows anterlisthesis of C2 on C3 with severe spinal cord compression C2-4 with cord signal change       --XR flex/ex of cervical spine shows no dynamic instability        --MRI thoracic spine shows remote compression fracture of T2, T3, T10, L2. Severe compression fracture of T8 with almost complete loss of height, chronic appearing.  --Afib: s/p ablation and left atrial appendage occlusion on 2/18/22: Xarelto and Plavix stopped in May per EP physician. Patient still taking ASA, Hold ASA at this time given  SAH.  --PTT/INR wnl  --Left clavicle fracture: fracture from 7/3, missed f/u due to fall. Per Ortho recs at that time: NWB to LUE, sling for comfort, bactrim x 7 days for open fracture. Per  will be able to bear weight 3 weeks from injury, will f/u outpatient. Ortho consulted for this admission, appreciate their assistance. NWB LUE in figure-of-eight brace (patient not tolerating sling). Keep wound over left shoulder covered. Bactrim for a total of 7 days. Recommend mobilization with a wheelchair given patient condition and inability to bear weight through the left upper extremity.   --Psychiatry consulted 7/10 for suicidal ideation (told nurse she wanted to drown self), following now for delirium. Recommending to discontinue melatonin. Limit use of restraints.   --Continue to monitor clinically, notify NSGY immediately with any changes in neuro status  --HM consulted for co-management, medical optimization, appreciate recs; RCRI 1, Class 2 risk. Hyponatremia w/u ordered. Na improved with IVF.     --Dispo: SNF. Medically stable for discharge, pending placement.

## 2022-07-13 NOTE — DISCHARGE INSTRUCTIONS
Clavicle Fracture Instructions  -Left arm non-weight bearing with range of motion as tolerated. Wear figure of eight brace, may remove as needed for hygiene purposes. Follow up in Ortho trauma clinic for nonoperative management of clavicle fracture, scheduled for 7/21/22 with Dr. Tamez.      Neurosurgery Spine Instructions  Activity Restrictions:  [x]  No lifting greater than 5-10 pounds.  [x]  Avoid bending and twisting the neck more than 45 degrees from neutral position in any direction.  [x]  Wear your brace when you are out of the bed and walking.     Discharge Medication/Follow-up:  [x]  Please refer to discharge medication reconciliation form.  [x]  Do not take ANY Aspirin or Aspirin containing products for 2 weeks.   [x]  Do not take ANY herbal supplements for 2 weeks    [x]  Do not take ANY non-steroidal anti-inflammatory drugs (NSAIDS), including the following: ibuprofen, naprosyn, Aleve, Advil, Indocin, Mobic, or Celebrex for 2 weeks.   [x]  Follow-up appointment:  [x]  4-6 weeks with MD:  [x]  with x-rays and CT head  [x]  An appointment will be mailed to you.    Call your doctor or go to the Emergency Room if there are any localized neurological changes; problems with speech, vision, numbness, tingling, weakness, or severe headache; inability to control urination or bowel movements; inability to urinate; or for other concerns.    Special Instructions:  [x]  No use of tobacco products.  [x]  Diet: Please eat a regular diet as tolerated.    If you have any questions about this form, please call the Neurosurgery Department at [phone].    Form No. 64578 (Revised 10/31/2013)    Non weight bearing left upper extremity  No lifting greater than 10 lbs  Call your doctor for the following:  Temperature greater than 100.4 F  Persistent nausea, vomiting or diarrhea  Severe uncontrolled pain  Redness, tenderness, signs of infection (pain, swelling, redness, odor, green/yellow discharge)  Difficulty breathing  or increased cough  Severe, persistent headache  Worsening rash  Persistent dizziness, light headedness, visual disturbances  Increased confusion/weakness

## 2022-07-13 NOTE — PLAN OF CARE
Problem: Adult Inpatient Plan of Care  Goal: Plan of Care Review  Outcome: Ongoing, Progressing  Goal: Patient-Specific Goal (Individualized)  Outcome: Ongoing, Progressing  Goal: Absence of Hospital-Acquired Illness or Injury  Outcome: Ongoing, Progressing  Goal: Optimal Comfort and Wellbeing  Outcome: Ongoing, Progressing  Goal: Readiness for Transition of Care  Outcome: Ongoing, Progressing     Problem: Fall Injury Risk  Goal: Absence of Fall and Fall-Related Injury  Outcome: Ongoing, Progressing     Problem: Skin Injury Risk Increased  Goal: Skin Health and Integrity  Outcome: Ongoing, Progressing     Problem: Impaired Wound Healing  Goal: Optimal Wound Healing  Outcome: Ongoing, Progressing     Problem: Restraint, Nonbehavioral (Nonviolent)  Goal: Absence of Harm or Injury  Outcome: Ongoing, Progressing   Pt rested well overnight with minimal events of confusion.  Pt offered and drank 2 cartons of milk overnight. Pt re-oriented to place and situation.  No signs or symptoms of distress observed.

## 2022-07-13 NOTE — PLAN OF CARE
Problem: Adult Inpatient Plan of Care  Goal: Optimal Comfort and Wellbeing  Outcome: Ongoing, Progressing  Intervention: Monitor Pain and Promote Comfort  Flowsheets (Taken 7/13/2022 0956)  Pain Management Interventions:   care clustered   diversional activity provided   pillow support provided   position adjusted     Problem: Fall Injury Risk  Goal: Absence of Fall and Fall-Related Injury  Outcome: Ongoing, Progressing  Intervention: Promote Injury-Free Environment  Flowsheets (Taken 7/13/2022 0956)  Safety Promotion/Fall Prevention:   bed alarm set   Fall Risk reviewed with patient/family   Fall Risk signage in place   medications reviewed   nonskid shoes/socks when out of bed   side rails raised x 3   supervised activity

## 2022-07-13 NOTE — SUBJECTIVE & OBJECTIVE
Interval History: Patient is doing well. She was sleepy on exam but alert and following commands. She is A/Ox3. She denies CP, palpitations, SOB, HA, dizziness      Objective:     Vital Signs (Most Recent):  Temp: 98.2 °F (36.8 °C) (07/13/22 1208)  Pulse: 85 (07/13/22 1208)  Resp: 18 (07/13/22 1208)  BP: 113/64 (07/13/22 1208)  SpO2: 97 % (07/13/22 1208) Vital Signs (24h Range):  Temp:  [97.6 °F (36.4 °C)-98.2 °F (36.8 °C)] 98.2 °F (36.8 °C)  Pulse:  [62-95] 85  Resp:  [16-18] 18  SpO2:  [91 %-97 %] 97 %  BP: ()/(54-69) 113/64     Weight: 40 kg (88 lb 2.9 oz)  Body mass index is 16.12 kg/m².  No intake or output data in the 24 hours ending 07/13/22 1541   Physical Exam  Constitutional:       Appearance: Normal appearance.   HENT:      Head: Normocephalic and atraumatic.      Nose: Nose normal. No rhinorrhea.      Mouth/Throat:      Mouth: Mucous membranes are moist.      Pharynx: No posterior oropharyngeal erythema.   Eyes:      Extraocular Movements: Extraocular movements intact.      Conjunctiva/sclera: Conjunctivae normal.   Cardiovascular:      Rate and Rhythm: Normal rate and regular rhythm.      Pulses: Normal pulses.      Heart sounds: Normal heart sounds.   Pulmonary:      Effort: Pulmonary effort is normal.      Breath sounds: Normal breath sounds.   Abdominal:      General: Abdomen is flat.      Palpations: Abdomen is soft.      Tenderness: There is no abdominal tenderness.   Musculoskeletal:         General: No tenderness. Normal range of motion.      Cervical back: Normal range of motion and neck supple.      Left lower leg: No edema.   Skin:     General: Skin is warm and dry.   Neurological:      General: No focal deficit present.      Mental Status: She is alert and oriented to person, place, and time.       Significant Labs: All pertinent labs within the past 24 hours have been reviewed.  Recent Lab Results         07/13/22  2842        Anion Gap 10       Baso # 0.01       Basophil % 0.2        BUN 27       Calcium 8.7       Chloride 103       CO2 21       Creatinine 0.6       Differential Method Automated       eGFR if  >60.0       eGFR if non  >60.0  Comment: Calculation used to obtain the estimated glomerular filtration  rate (eGFR) is the CKD-EPI equation.          Eos # 0.0       Eosinophil % 0.2       Glucose 85       Gran # (ANC) 3.8       Gran % 72.1       Hematocrit 34.9       Hemoglobin 11.0       Immature Grans (Abs) 0.01  Comment: Mild elevation in immature granulocytes is non specific and   can be seen in a variety of conditions including stress response,   acute inflammation, trauma and pregnancy. Correlation with other   laboratory and clinical findings is essential.         Immature Granulocytes 0.2       Lymph # 0.9       Lymph % 17.8       MCH 32.0       MCHC 31.5              Mono # 0.5       Mono % 9.5       MPV 10.9       nRBC 0       Platelets 307       Potassium 4.0       RBC 3.44       RDW 15.0       Sodium 134       WBC 5.29               Significant Imaging: I have reviewed all pertinent imaging results/findings within the past 24 hours.

## 2022-07-13 NOTE — NURSING
Requested that we take restraints off for a trial run. Removed all restraints at 14:36. Patient resting comfortably with bed alarm on.

## 2022-07-14 PROBLEM — E43 SEVERE PROTEIN-CALORIE MALNUTRITION: Status: ACTIVE | Noted: 2022-07-14

## 2022-07-14 LAB
ANION GAP SERPL CALC-SCNC: 5 MMOL/L (ref 8–16)
BASOPHILS # BLD AUTO: 0.04 K/UL (ref 0–0.2)
BASOPHILS NFR BLD: 0.7 % (ref 0–1.9)
BUN SERPL-MCNC: 21 MG/DL (ref 8–23)
CALCIUM SERPL-MCNC: 8.6 MG/DL (ref 8.7–10.5)
CHLORIDE SERPL-SCNC: 108 MMOL/L (ref 95–110)
CO2 SERPL-SCNC: 24 MMOL/L (ref 23–29)
CREAT SERPL-MCNC: 0.6 MG/DL (ref 0.5–1.4)
DIFFERENTIAL METHOD: ABNORMAL
EOSINOPHIL # BLD AUTO: 0 K/UL (ref 0–0.5)
EOSINOPHIL NFR BLD: 0.5 % (ref 0–8)
ERYTHROCYTE [DISTWIDTH] IN BLOOD BY AUTOMATED COUNT: 14.7 % (ref 11.5–14.5)
EST. GFR  (AFRICAN AMERICAN): >60 ML/MIN/1.73 M^2
EST. GFR  (NON AFRICAN AMERICAN): >60 ML/MIN/1.73 M^2
GLUCOSE SERPL-MCNC: 100 MG/DL (ref 70–110)
HCT VFR BLD AUTO: 33.6 % (ref 37–48.5)
HGB BLD-MCNC: 11.1 G/DL (ref 12–16)
IMM GRANULOCYTES # BLD AUTO: 0.13 K/UL (ref 0–0.04)
IMM GRANULOCYTES NFR BLD AUTO: 2.2 % (ref 0–0.5)
LYMPHOCYTES # BLD AUTO: 1.1 K/UL (ref 1–4.8)
LYMPHOCYTES NFR BLD: 18.7 % (ref 18–48)
MCH RBC QN AUTO: 31.7 PG (ref 27–31)
MCHC RBC AUTO-ENTMCNC: 33 G/DL (ref 32–36)
MCV RBC AUTO: 96 FL (ref 82–98)
MONOCYTES # BLD AUTO: 0.7 K/UL (ref 0.3–1)
MONOCYTES NFR BLD: 11.9 % (ref 4–15)
NEUTROPHILS # BLD AUTO: 3.9 K/UL (ref 1.8–7.7)
NEUTROPHILS NFR BLD: 66 % (ref 38–73)
NRBC BLD-RTO: 0 /100 WBC
PLATELET # BLD AUTO: 273 K/UL (ref 150–450)
PMV BLD AUTO: 10.1 FL (ref 9.2–12.9)
POTASSIUM SERPL-SCNC: 4.1 MMOL/L (ref 3.5–5.1)
RBC # BLD AUTO: 3.5 M/UL (ref 4–5.4)
SODIUM SERPL-SCNC: 137 MMOL/L (ref 136–145)
WBC # BLD AUTO: 5.95 K/UL (ref 3.9–12.7)

## 2022-07-14 PROCEDURE — 99232 PR SUBSEQUENT HOSPITAL CARE,LEVL II: ICD-10-PCS | Mod: ,,, | Performed by: PHYSICIAN ASSISTANT

## 2022-07-14 PROCEDURE — 36415 COLL VENOUS BLD VENIPUNCTURE: CPT

## 2022-07-14 PROCEDURE — 97116 GAIT TRAINING THERAPY: CPT

## 2022-07-14 PROCEDURE — 99232 SBSQ HOSP IP/OBS MODERATE 35: CPT | Mod: ,,, | Performed by: PHYSICIAN ASSISTANT

## 2022-07-14 PROCEDURE — 85025 COMPLETE CBC W/AUTO DIFF WBC: CPT

## 2022-07-14 PROCEDURE — 25000003 PHARM REV CODE 250: Performed by: NEUROLOGICAL SURGERY

## 2022-07-14 PROCEDURE — 25000003 PHARM REV CODE 250: Performed by: STUDENT IN AN ORGANIZED HEALTH CARE EDUCATION/TRAINING PROGRAM

## 2022-07-14 PROCEDURE — 97530 THERAPEUTIC ACTIVITIES: CPT

## 2022-07-14 PROCEDURE — 25000003 PHARM REV CODE 250

## 2022-07-14 PROCEDURE — 80048 BASIC METABOLIC PNL TOTAL CA: CPT

## 2022-07-14 PROCEDURE — 11000001 HC ACUTE MED/SURG PRIVATE ROOM

## 2022-07-14 RX ADMIN — DONEPEZIL HYDROCHLORIDE 10 MG: 10 TABLET ORAL at 09:07

## 2022-07-14 RX ADMIN — METHOCARBAMOL 500 MG: 500 TABLET ORAL at 11:07

## 2022-07-14 RX ADMIN — ACETAMINOPHEN 650 MG: 325 TABLET ORAL at 04:07

## 2022-07-14 RX ADMIN — FAMOTIDINE 20 MG: 20 TABLET ORAL at 09:07

## 2022-07-14 RX ADMIN — POLYETHYLENE GLYCOL 3350 17 G: 17 POWDER, FOR SOLUTION ORAL at 12:07

## 2022-07-14 RX ADMIN — LEVOTHYROXINE SODIUM 88 MCG: 88 TABLET ORAL at 05:07

## 2022-07-14 RX ADMIN — FERROUS SULFATE TAB 325 MG (65 MG ELEMENTAL FE) 1 EACH: 325 (65 FE) TAB at 09:07

## 2022-07-14 NOTE — PROGRESS NOTES
Devan Juarez - Neurosurgery ([facility])  Neurosurgery  Progress Note    Subjective:     History of Present Illness: 73yo F PMH AVNRT, frequent falls, presenting as neurosurgery consult due to L temporal SAH noted on CTH in AM. Per patient's  was found down after hearing fall at 0830. More confusion present than baseline. Patient states she takes aspirin and plavix but hasn't taken the plavix in a while. She is supposed to use a walker at baseline but now is NWB to left arm due a left clavicle fx 7/3. CT c-spine showed C2-C3 anterolisthesis        Post-Op Info:  Procedure(s) (LRB):  C1-C3 Posterior cervical fusion. Haztucesta. neuromonitoring. sadaf harris. tirado. (N/A)         Interval History: NAEON. AFVSS. Oriented x 2. Neuro exam stable. Pending discharge to SNF.    Medications:  Continuous Infusions:    Scheduled Meds:   donepeziL  10 mg Oral Daily    famotidine  20 mg Oral BID    ferrous sulfate  1 tablet Oral Daily    heparin (porcine)  5,000 Units Subcutaneous Q8H    levothyroxine  88 mcg Oral Before breakfast    montelukast  10 mg Oral QHS     PRN Meds:acetaminophen, albuterol, dextrose 10%, dextrose 10%, glucagon (human recombinant), glucose, glucose, glucose, methocarbamoL, polyethylene glycol     Objective:     Weight: 40 kg (88 lb 2.9 oz)  Body mass index is 16.13 kg/m².  Vital Signs (Most Recent):  Temp: 98.2 °F (36.8 °C) (07/14/22 1618)  Pulse: 76 (07/14/22 1618)  Resp: 18 (07/14/22 1618)  BP: (!) 163/78 (07/14/22 1618)  SpO2: 96 % (07/14/22 1618) Vital Signs (24h Range):  Temp:  [97.9 °F (36.6 °C)-98.5 °F (36.9 °C)] 98.2 °F (36.8 °C)  Pulse:  [68-76] 76  Resp:  [18] 18  SpO2:  [95 %-98 %] 96 %  BP: (112-163)/(71-78) 163/78                     Female External Urinary Catheter 07/07/22 1500 (Active)   Skin female external urine collection device repositioned 07/08/22 0800   Tolerance no signs/symptoms of discomfort 07/08/22 0800       Physical Exam    Neurosurgery Physical Exam  General: well  developed, well nourished, no distress.   Head: normocephalic  Neurologic: Alert and orientedx4. Thought content appropriate, higher order confusion present  GCS: Motor: 6/Verbal: 5/Eyes: 4 GCS Total: 15  Mental Status: Awake, Alert, Oriented x 4, with higher order confusion  Language: No aphasia  Speech: No dysarthria  Cranial nerves: face symmetric, tongue midline, CN II-XII grossly intact.   Eyes: pupils equal, round, reactive to light with accommodation, EOMI. Mild ptosis noted to left eye   Pulmonary: normal respirations, no signs of respiratory distress  Abdomen: soft, non-distended, not tender to palpation  Skin: Skin is warm, dry and intact.  Sensory: responds to light touch throughout  Motor Strength: Moves all extremities spontaneously, increased tone to BUE, at least 4/5 in BUE, 3/5 in left deltoid 2/2 rigidity and left clavicle fracture. BLE 4/5. No abnormal movements seen.      Pain limited motion in left shoulder 2/2 clavicle fracture          Significant Labs:  Recent Labs   Lab 07/13/22  0459 07/14/22  0756   GLU 85 100   * 137   K 4.0 4.1    108   CO2 21* 24   BUN 27* 21   CREATININE 0.6 0.6   CALCIUM 8.7 8.6*       Recent Labs   Lab 07/13/22 0459 07/14/22  0756   WBC 5.29 5.95   HGB 11.0* 11.1*   HCT 34.9* 33.6*    273       No results for input(s): LABPT, INR, APTT in the last 48 hours.    Microbiology Results (last 7 days)       ** No results found for the last 168 hours. **          All pertinent labs from the last 24 hours have been reviewed.    Significant Diagnostics:  I have reviewed and interpreted all pertinent imaging results/findings within the past 24 hours.  No results found in the last 24 hours.        Assessment/Plan:     SAH (subarachnoid hemorrhage)  73yo F PMH AVNRT, frequent falls, presenting as neurosurgery consult due to L temporal SAH noted on CTH in AM. Per patient's  was found down after hearing fall at 0830. More confusion present than baseline.  Patient states she takes aspirin and plavix but hasn't taken the plavix in a while. She is supposed to use a walker at baseline but now is NWB to left arm due a left clavicle fx 7/3. CT c-spine showed C2-C3 anterolisthesis.    --Neuro exam stable without focal weakness. Wax/waning mental status.   --Further discussed surgical need/timing with patient and . Given that she does not have s/s acute cervical myelopathy will hold off on urgent surgical decompression until she is better optimized. With the clavicle fracture and NWB status it will be difficult to undergo rehab and therapy after a posterior cervical fusion. Her current cognitive status (hx Alzheimers dementia with current intermittent delirium), poor nutrition, and frail appearance would also make her post-operative course more challenging. Patient and  v/u. Will work on discharge planning once medically stable.  --C-collar when OOB. Pediatric collar fitting appropriately.       -- q4h neurochecks on floor  --All labs and diagnostics reviewed       --CTH 7/7: L temporal SAH, stable on repeat scan       --CTA 7/8 without aneurysm, stable appearing bleed       --CT c-spine 7/7: anterolisthesis C2-C3       --MRI cervical spine 7/7 shows anterlisthesis of C2 on C3 with severe spinal cord compression C2-4 with cord signal change       --XR flex/ex of cervical spine shows no dynamic instability        --MRI thoracic spine shows remote compression fracture of T2, T3, T10, L2. Severe compression fracture of T8 with almost complete loss of height, chronic appearing.  --Afib: s/p ablation and left atrial appendage occlusion on 2/18/22: Xarelto and Plavix stopped in May per EP physician. Patient still taking ASA, Hold ASA at this time given SAH.  --PTT/INR wnl  --Left clavicle fracture: fracture from 7/3, missed f/u due to fall. Per Ortho recs at that time: NWB to LUE, sling for comfort, bactrim x 7 days for open fracture. Per  will be able to bear  weight 3 weeks from injury, will f/u outpatient. Ortho consulted for this admission, appreciate their assistance. NWB LUE in figure-of-eight brace (patient not tolerating sling). Keep wound over left shoulder covered. Bactrim for a total of 7 days. Recommend mobilization with a wheelchair given patient condition and inability to bear weight through the left upper extremity.   --Psychiatry consulted 7/10 for suicidal ideation (told nurse she wanted to drown self), following now for delirium. Recommending to discontinue melatonin. Limit use of restraints.   --Continue to monitor clinically, notify NSGY immediately with any changes in neuro status  --HM consulted for co-management, medical optimization, appreciate recs; RCRI 1, Class 2 risk. Hyponatremia w/u ordered. Na improved with IVF. Signed off, stable.     --Dispo: SNF. Medically stable for discharge, pending placement.        Gina Ramesh PA-C  Neurosurgery  Devan Juarez - Neurosurgery ([facility])

## 2022-07-14 NOTE — PLAN OF CARE
Recommendations  1. Continue regular diet -fluid per MD   2. When medically feasible, add Boost Breeze TID to optimize calorie and protein intake   3. RD following    Goals: Meet % EEN/EPN by RD f/u  Nutrition Goal Status: new  Communication of RD Recs: other (POC)

## 2022-07-14 NOTE — PLAN OF CARE
Problem: Adult Inpatient Plan of Care  Goal: Plan of Care Review  Outcome: Ongoing, Progressing  Goal: Absence of Hospital-Acquired Illness or Injury  Outcome: Ongoing, Progressing  Intervention: Prevent Skin Injury  Flowsheets (Taken 7/14/2022 1128)  Body Position:   turned   position changed independently  Skin Protection:   adhesive use limited   incontinence pads utilized   silicone foam dressing in place     Problem: Fall Injury Risk  Goal: Absence of Fall and Fall-Related Injury  Outcome: Ongoing, Progressing  Intervention: Promote Injury-Free Environment  Flowsheets (Taken 7/14/2022 1128)  Safety Promotion/Fall Prevention:   assistive device/personal item within reach   bed alarm set   Fall Risk signage in place   Fall Risk reviewed with patient/family   medications reviewed   nonskid shoes/socks when out of bed   side rails raised x 3

## 2022-07-14 NOTE — SUBJECTIVE & OBJECTIVE
Interval History: NAEON. AFVSS. Oriented x 2. Neuro exam stable. Pending discharge to SNF.    Medications:  Continuous Infusions:    Scheduled Meds:   donepeziL  10 mg Oral Daily    famotidine  20 mg Oral BID    ferrous sulfate  1 tablet Oral Daily    heparin (porcine)  5,000 Units Subcutaneous Q8H    levothyroxine  88 mcg Oral Before breakfast    montelukast  10 mg Oral QHS     PRN Meds:acetaminophen, albuterol, dextrose 10%, dextrose 10%, glucagon (human recombinant), glucose, glucose, glucose, methocarbamoL, polyethylene glycol     Objective:     Weight: 40 kg (88 lb 2.9 oz)  Body mass index is 16.13 kg/m².  Vital Signs (Most Recent):  Temp: 98.2 °F (36.8 °C) (07/14/22 1618)  Pulse: 76 (07/14/22 1618)  Resp: 18 (07/14/22 1618)  BP: (!) 163/78 (07/14/22 1618)  SpO2: 96 % (07/14/22 1618) Vital Signs (24h Range):  Temp:  [97.9 °F (36.6 °C)-98.5 °F (36.9 °C)] 98.2 °F (36.8 °C)  Pulse:  [68-76] 76  Resp:  [18] 18  SpO2:  [95 %-98 %] 96 %  BP: (112-163)/(71-78) 163/78                     Female External Urinary Catheter 07/07/22 1500 (Active)   Skin female external urine collection device repositioned 07/08/22 0800   Tolerance no signs/symptoms of discomfort 07/08/22 0800       Physical Exam    Neurosurgery Physical Exam  General: well developed, well nourished, no distress.   Head: normocephalic  Neurologic: Alert and orientedx4. Thought content appropriate, higher order confusion present  GCS: Motor: 6/Verbal: 5/Eyes: 4 GCS Total: 15  Mental Status: Awake, Alert, Oriented x 4, with higher order confusion  Language: No aphasia  Speech: No dysarthria  Cranial nerves: face symmetric, tongue midline, CN II-XII grossly intact.   Eyes: pupils equal, round, reactive to light with accommodation, EOMI. Mild ptosis noted to left eye   Pulmonary: normal respirations, no signs of respiratory distress  Abdomen: soft, non-distended, not tender to palpation  Skin: Skin is warm, dry and intact.  Sensory: responds to light touch  throughout  Motor Strength: Moves all extremities spontaneously, increased tone to BUE, at least 4/5 in BUE, 3/5 in left deltoid 2/2 rigidity and left clavicle fracture. BLE 4/5. No abnormal movements seen.      Pain limited motion in left shoulder 2/2 clavicle fracture          Significant Labs:  Recent Labs   Lab 07/13/22 0459 07/14/22  0756   GLU 85 100   * 137   K 4.0 4.1    108   CO2 21* 24   BUN 27* 21   CREATININE 0.6 0.6   CALCIUM 8.7 8.6*       Recent Labs   Lab 07/13/22 0459 07/14/22  0756   WBC 5.29 5.95   HGB 11.0* 11.1*   HCT 34.9* 33.6*    273       No results for input(s): LABPT, INR, APTT in the last 48 hours.    Microbiology Results (last 7 days)       ** No results found for the last 168 hours. **          All pertinent labs from the last 24 hours have been reviewed.    Significant Diagnostics:  I have reviewed and interpreted all pertinent imaging results/findings within the past 24 hours.  No results found in the last 24 hours.

## 2022-07-14 NOTE — PROGRESS NOTES
Devan Juarez - Neurosurgery ([facility])  Adult Nutrition  Progress Note    SUMMARY       Recommendations  1. Continue regular diet -fluid per MD   2. When medically feasible, add Boost Breeze TID to optimize calorie and protein intake   3. RD following    Goals: Meet % EEN/EPN by RD f/u  Nutrition Goal Status: new  Communication of RD Recs: other (POC)    Assessment and Plan    Nutrition Problem:  Severe Protein-Calorie Malnutrition  Malnutrition in the context of Acute Illness/Injury    Related to (etiology):  physiological demands  Pt slightly disoriented related to Alzheimer's     Signs and Symptoms (as evidenced by):  Energy Intake: < 50% of estimated energy requirement for 1 wk  Body Fat Depletion: severe depletion of orbitals, triceps, thoracic and lumbar region   Muscle Mass Depletion:severe depletion of temples, clavicle region, scapular region, and lower extremities     Interventions(treatment strategy):  Collaboration of Nutrition Care w/ other providers  [employer]    Nutrition Diagnosis Status:  New    Malnutrition Assessment  Malnutrition Type: acute illness or injury  Energy Intake: severe energy intake  Skin (Micronutrient): thinned       Energy Intake (Malnutrition): less than or equal to 50% for greater than or equal to 5 days   Orbital Region (Subcutaneous Fat Loss): severe depletion  Upper Arm Region (Subcutaneous Fat Loss): severe depletion   Wallis Region (Muscle Loss): severe depletion  Clavicle Bone Region (Muscle Loss): severe depletion  Anterior Thigh Region (Muscle Loss): severe depletion  Posterior Calf Region (Muscle Loss): severe depletion            Reason for Assessment    Reason For Assessment: RD follow-up  Diagnosis: other (see comments)  Relevant Medical History: Paraoxysmal Atrial Fibrillation, Hyponatremia, Alzheimers  Interdisciplinary Rounds: did not attend  General Information Comments: Spoke w/ pt and care team member at bedside. Pt reports good appetie since last  "RD visit, 2022. Pt reports % meal intake since last week. Per RN documentation, 25-50% meal intake. Per chart review pt wt remains stable since last RD visit, 88kg. Pt expericning constipation, no n/v/d. No issues chewing or swallowing. NFPE conducted 2022, pt has severe depletion in upper extremities, lower extremities, temple and orbital region, pt meets criteria for Severe Malnutriton in the context of Acute illness/injury. LBM: 2022  Nutrition Discharge Planning: regular    Nutrition Risk Screen    Nutrition Risk Screen: no indicators present    Nutrition/Diet History    Spiritual, Cultural Beliefs, Religion Practices, Values that Affect Care: no    Anthropometrics    Temp: 98.1 °F (36.7 °C)  Height Method: Measured  Height: 5' 2" (157.5 cm)  Height (inches): 62 in  Weight Method: Bed Scale  Weight: 40 kg (88 lb 2.9 oz)  Weight (lb): 88.18 lb  Ideal Body Weight (IBW), Female: 110 lb  % Ideal Body Weight, Female (lb): 80.16 %  BMI (Calculated): 16.1  Usual Body Weight (UBW), k.1 kg  % Usual Body Weight: 99.96  % Weight Change From Usual Weight: -0.25 %       Lab/Procedures/Meds    Pertinent Labs Reviewed: reviewed  Pertinent Labs Comments: Anion Gap 5, Calcium 8.6  Pertinent Medications Reviewed: reviewed  Pertinent Medications Comments: Famotidine, Levothyroxine, Acetaminophen, Donepezil    Estimated/Assessed Needs    Weight Used For Calorie Calculations: 40 kg (88 lb 2.9 oz)  Energy Calorie Requirements (kcal): 1400kcal  Energy Need Method: Kcal/kg (35kcal/kg)  Protein Requirements: 52-56g (1.3-1.5g/kg)  Weight Used For Protein Calculations: 40 kg (88 lb 2.9 oz)  Fluid Requirements (mL): 1mL/kcal or fluid per MD     RDA Method (mL): 1400         Nutrition Prescription Ordered    Current Diet Order: Regular    Evaluation of Received Nutrient/Fluid Intake    I/O: -0.07L since admit  Energy Calories Required: not meeting needs  Protein Required: not meeting needs  Fluid Required: not " meeting needs  Total Fluid Intake (mL/kg): 1mL/kcal or fluid per MD  Tolerance: tolerating  % Intake of Estimated Energy Needs: 25-50%  % Meal Intake: 25-50%    Nutrition Risk    Level of Risk/Frequency of Follow-up: low     Monitor and Evaluation    Food and Nutrient Intake: energy intake, food and beverage intake  Food and Nutrient Adminstration: diet order  Knowledge/Beliefs/Attitudes: food and nutrition knowledge/skill  Physical Activity and Function: nutrition-related ADLs and IADLs, factors affecting access to physical activity  Anthropometric Measurements: growth pattern indices/percentile ranks, weight change, height/length, weight, body mass index  Biochemical Data, Medical Tests and Procedures: lipid profile, inflammatory profile, glucose/endocrine profile, gastrointestinal profile, electrolyte and renal panel  Nutrition-Focused Physical Findings: skin, head and eyes, overall appearance     Nutrition Follow-Up    RD Follow-up?: Yes     By Jigar Carrasquillo, Dietetic Intern

## 2022-07-14 NOTE — PT/OT/SLP PROGRESS
Physical Therapy  Treatment    Yvette Crews   [id number]    Time Tracking:     PT Received On: 07/14/22   PT Start Time: 1503   PT Stop Time: 1527   PT Total Time (min): 24 min    Billable Minutes: Gait Training 14 mins and Therapeutic Activity 10 mins      Recommendations:     Discharge recommendations: Skilled Nursing Facility     Equipment recommendations: TBD    Barriers to Discharge: Increased level of assist    Patient Information:     Recent Surgery: Procedure(s) (LRB):  C1-C3 Posterior cervical fusion. depuy. neuromonitoring. gell rolls. tirado. (N/A)      Diagnosis: Cervical stenosis of spinal canal    Length of Stay: 7 days    General Precautions: Standard, fall  Orthopedic Precautions: LUE NWB  Brace: figure of 8, cervical collar     Assessment:     Yvette Crews tolerated treatment well today. She was found in bed upon PT arrival and reported no pain. Yvette was able to sit up at EOB with mod A. Therapist helped put new socks on since the ones she had on felt slippery. Yvette had some trouble identifying her foot and confused therapist hand for her foot while attempting to put on socks herself. She then stood 3x with max A from therapist, no AD. Upon standing patient would spontaneously take steps forward and lean posteriorly (this limited further steps due to safety concerns). Therapist attempted to instruct between trials but patient did not change. She was still able to take a total of 9 steps in standing with max assist (On first stand she took 3 side steps to the left, and 3 to the back. Third stand she took 3 steps back to sit back in bed). Patient was left supine in bed with bed alarm on and RN notified. Discussed PT role, POC, goals and recommendations (Skilled Nursing Facility) with patient and/or family; verbalized understanding. Yvette Crews will continue to benefit from acute PT services to promote mobility during this admission and improve return to  "PLOF.    Problem List: weakness, decreased endurance, impaired self-care skills, impaired mobility, decreased sitting or standing balance, gait instability, decreased cognition, orthopedic precautions and impaired cardiopulmonary response to activity    Rehab Prognosis: Good; patient would benefit from acute skilled PT services to address these deficits and reach maximum level of function.    Plan:     Patient to be seen 3 x/week to address the above listed problems via gait training, therapeutic activities, neuromuscular re-education, therapeutic exercises    Plan of Care Expires: 08/07/22  Plan of Care reviewed with: patient    Subjective:     Communicated with RN prior to treatment, appropriate to see for treatment.    Pt found supine in bed (HOB elevated) upon PT entry to room, agreeable to treatment.    Patient commenting: "I'm not doing (PT session) very well"    Does this patient have any cultural, spiritual, Zoroastrian conflicts given the current situation? Patient/family has no barriers to learning. Patient/family verbalizes understanding of his/her program and goals and demonstrates them correctly. No cultural, spiritual, or educational needs identified.    Objective:     Patient found with: bed alarm ([employer] camera)    Pain:  Pain Rating 1: 0/10  Pain Rating Post-Intervention 1: 0/10    Functional Mobility:    · Bed Mobility:  · Supine to Sitting: mod A  · Sitting to Supine: mod A  · Scooting towards EOB in sitting: max A    · Transfers:  · Sit to Stand: max A from EOB with no AD x 3 trial(s)  · Stand to Sit: max A to EOB with no AD x 3 trial(s)     · Gait:   · Total of 9 steps in standing with max assist (On first stand she took 3 side steps to the left, and 3 to the back. Third stand she took 3 steps back to sit back in bed).    · Assist level: Max Assist  · Device: no AD    · Balance:  · Static Sit: Stand-By Assist at EOB    · Static Stand: Mod Assist with no AD    Additional Therapeutic " Activity/Exercises:     1. Yvette was found in bed upon PT arrival and reported no pain. Yvette was able to sit up at EOB with mod A. Therapist helped put new socks on since the ones she had on felt slippery. Yvette had some trouble identifying her foot and confused therapist hand for her foot while attempting to put on socks herself.     2. She then stood 3x with max A from therapist, no AD. Upon standing patient would consistently take steps forward and lean posteriorly, which limited the amount of steps she could take. Therapist attempted to instruct between trials but patient did not change. She was still able to take a total of 9 steps in standing with max assist (On first stand she took 3 side steps to the left, and 3 to the back. Third stand she took 3 steps back to sit back in bed).     3. Patient was left supine in bed with bed alarm on and RN notified. Discussed PT role, POC, goals and recommendations (Skilled Nursing Facility) with patient and/or family; verbalized understanding. Yvette Crews will continue to benefit from acute PT services to promote mobility during this admission and improve return to PLOF.    AM-PAC 6 CLICK MOBILITY  Turning over in bed (including adjusting bedclothes, sheets and blankets)?: 3  Sitting down on and standing up from a chair with arms (e.g., wheelchair, bedside commode, etc.): 3  Moving from lying on back to sitting on the side of the bed?: 3  Moving to and from a bed to a chair (including a wheelchair)?: 2  Need to walk in hospital room?: 2  Climbing 3-5 steps with a railing?: 1  Basic Mobility Total Score: 14    Patient was left supine in bed (HOB elevated) with call button in reach, bed alarm on and RN notified.    GOALS:   Multidisciplinary Problems     Physical Therapy Goals        Problem: Physical Therapy    Goal Priority Disciplines Outcome Goal Variances Interventions   Physical Therapy Goal     PT, PT/OT Ongoing, Progressing     Description: Goals to  be met by: 2022    Patient will increase functional independence with mobility by performin. Supine to sit with Contact Guard Assistance  2. Sit to supine with Stand-by Assistance  3. Rolling to Left and Right with Stand-by Assistance.  4. Sit to stand transfer with Stand-by Assistance  5. Bed to chair transfer with Minimal Assistance using LRAD  6. Gait  x 20 feet with Minimal Assistance using LRAD  7. Lower extremity exercise program x10 reps per handout, with supervision as needed                      LAMONT Bansal   2022

## 2022-07-14 NOTE — PLAN OF CARE
Devan Juarez - Neurosurgery (Hospital)  Discharge Reassessment    Primary Care Provider: Sally Green MD    Expected Discharge Date: 7/15/2022     Patient is medically ready for discharge.  Patient is pending acceptance to [facility].    Reassessment (most recent)     Discharge Reassessment - 07/14/22 1058        Discharge Reassessment    Assessment Type Discharge Planning Reassessment     Did the patient's condition or plan change since previous assessment? No     Discharge Plan discussed with: Patient;Spouse/sig other     Communicated SANTIAGO with patient/caregiver Yes     Discharge Plan A Skilled Nursing Facility     Discharge Plan B Assisted Living     DME Needed Upon Discharge  none     Discharge Barriers Identified None     Why the patient remains in the hospital Placement issues        Post-Acute Status    Post-Acute Authorization Placement     Post-Acute Placement Status Pending post-acute provider review/more information requested     Discharge Delays None known at this time

## 2022-07-15 LAB
ANION GAP SERPL CALC-SCNC: 4 MMOL/L (ref 8–16)
BASOPHILS # BLD AUTO: 0.02 K/UL (ref 0–0.2)
BASOPHILS NFR BLD: 0.4 % (ref 0–1.9)
BUN SERPL-MCNC: 16 MG/DL (ref 8–23)
CALCIUM SERPL-MCNC: 8.7 MG/DL (ref 8.7–10.5)
CHLORIDE SERPL-SCNC: 105 MMOL/L (ref 95–110)
CO2 SERPL-SCNC: 26 MMOL/L (ref 23–29)
CREAT SERPL-MCNC: 0.6 MG/DL (ref 0.5–1.4)
DIFFERENTIAL METHOD: ABNORMAL
EOSINOPHIL # BLD AUTO: 0 K/UL (ref 0–0.5)
EOSINOPHIL NFR BLD: 0.6 % (ref 0–8)
ERYTHROCYTE [DISTWIDTH] IN BLOOD BY AUTOMATED COUNT: 14.4 % (ref 11.5–14.5)
EST. GFR  (AFRICAN AMERICAN): >60 ML/MIN/1.73 M^2
EST. GFR  (NON AFRICAN AMERICAN): >60 ML/MIN/1.73 M^2
GLUCOSE SERPL-MCNC: 123 MG/DL (ref 70–110)
HCT VFR BLD AUTO: 32.8 % (ref 37–48.5)
HGB BLD-MCNC: 10.9 G/DL (ref 12–16)
IMM GRANULOCYTES # BLD AUTO: 0.01 K/UL (ref 0–0.04)
IMM GRANULOCYTES NFR BLD AUTO: 0.2 % (ref 0–0.5)
LYMPHOCYTES # BLD AUTO: 1.2 K/UL (ref 1–4.8)
LYMPHOCYTES NFR BLD: 22.3 % (ref 18–48)
MCH RBC QN AUTO: 32.1 PG (ref 27–31)
MCHC RBC AUTO-ENTMCNC: 33.2 G/DL (ref 32–36)
MCV RBC AUTO: 97 FL (ref 82–98)
MONOCYTES # BLD AUTO: 0.4 K/UL (ref 0.3–1)
MONOCYTES NFR BLD: 8 % (ref 4–15)
NEUTROPHILS # BLD AUTO: 3.6 K/UL (ref 1.8–7.7)
NEUTROPHILS NFR BLD: 68.5 % (ref 38–73)
NRBC BLD-RTO: 0 /100 WBC
PLATELET # BLD AUTO: 313 K/UL (ref 150–450)
PMV BLD AUTO: 9.7 FL (ref 9.2–12.9)
POTASSIUM SERPL-SCNC: 3.9 MMOL/L (ref 3.5–5.1)
RBC # BLD AUTO: 3.4 M/UL (ref 4–5.4)
SODIUM SERPL-SCNC: 135 MMOL/L (ref 136–145)
WBC # BLD AUTO: 5.24 K/UL (ref 3.9–12.7)

## 2022-07-15 PROCEDURE — 99233 PR SUBSEQUENT HOSPITAL CARE,LEVL III: ICD-10-PCS | Mod: ,,, | Performed by: PHYSICIAN ASSISTANT

## 2022-07-15 PROCEDURE — 36415 COLL VENOUS BLD VENIPUNCTURE: CPT

## 2022-07-15 PROCEDURE — 11000001 HC ACUTE MED/SURG PRIVATE ROOM

## 2022-07-15 PROCEDURE — 99233 SBSQ HOSP IP/OBS HIGH 50: CPT | Mod: ,,, | Performed by: PHYSICIAN ASSISTANT

## 2022-07-15 PROCEDURE — 99222 1ST HOSP IP/OBS MODERATE 55: CPT | Mod: ,,, | Performed by: NURSE PRACTITIONER

## 2022-07-15 PROCEDURE — 80048 BASIC METABOLIC PNL TOTAL CA: CPT

## 2022-07-15 PROCEDURE — 25000003 PHARM REV CODE 250

## 2022-07-15 PROCEDURE — 99222 PR INITIAL HOSPITAL CARE,LEVL II: ICD-10-PCS | Mod: ,,, | Performed by: NURSE PRACTITIONER

## 2022-07-15 PROCEDURE — 85025 COMPLETE CBC W/AUTO DIFF WBC: CPT

## 2022-07-15 RX ADMIN — LEVOTHYROXINE SODIUM 88 MCG: 88 TABLET ORAL at 06:07

## 2022-07-15 RX ADMIN — DONEPEZIL HYDROCHLORIDE 10 MG: 10 TABLET ORAL at 09:07

## 2022-07-15 NOTE — ASSESSMENT & PLAN NOTE
-MRI cervical spine 7/7 shows anterlisthesis of C2 on C3 with severe spinal cord compression C2-4 with cord signal change  -MRI thoracic spine shows remote compression fracture of T2, T3, T10, L2. Severe compression fracture of T8 with almost complete loss of height, chronic appearing. Hospital course further complicated by Left clavicle fracture from 7/3

## 2022-07-15 NOTE — PLAN OF CARE
..POC and meds reviewed with patient. Questions encouraged and answered. V/S stable throughout shift; please see flowsheets for V/S information and full assessment data. NAEO. Siderails up x 3, bed locked in lowest position, call bell in reach, O2 & suction at bedside, WCTM.

## 2022-07-15 NOTE — HPI
Per chart review, Yvette Crews is a 72-year-old female with PMHx of frequent falls, SAH s/p fall 2016, hypogammaglobulinemia (on monthly IVIG last treatment 7/1), acquired hypothyroidism after total thyroidectomy, Alzheimer's dementia, AV eliezer re-entry tachycardia s/p RFA 2012 and A-fib s/p ablation and left atrial appendage occlusion on 2/18/22 with plavix and xarelto stopped in 05/2022.  Patient presented to [facility] on 7/7 s/p fall with head trauma.  CTH revealed a L temporal SAH. MRI cervical spine 7/7 shows anterlisthesis of C2 on C3 with severe spinal cord compression C2-4 with cord signal change. MRI thoracic spine shows remote compression fracture of T2, T3, T10, L2. Severe compression fracture of T8 with almost complete loss of height, chronic appearing. Hospital course further complicated by Left clavicle fracture from 7/3. Per Ortho recs at that time: NWB to E, in a figure-of-eight brace bactrim x 7 days for open fracture. Recommend mobilization with a wheelchair given patient condition and inability to bear weight through the left upper extremity, suidical ideation on 7/10 (psych consulted and rec to d/c melatonin), delirium, hyponatremia now improved, and impaired functional mobility.     Functional History: Patient lives with  in an assisted living home with 0 ANIRUDH. Assisted living helps with meals, laundry and transportation. Prior to admission, patients level of function was modified independent with rollator for mobility and assist from assisted living staff.  Equipment used at home: rollator, shower chair.

## 2022-07-15 NOTE — PROGRESS NOTES
Devan Juarez - Neurosurgery ([facility])  Neurosurgery  Progress Note    Subjective:     History of Present Illness: 73yo F PMH AVNRT, frequent falls, presenting as neurosurgery consult due to L temporal SAH noted on CTH in AM. Per patient's  was found down after hearing fall at 0830. More confusion present than baseline. Patient states she takes aspirin and plavix but hasn't taken the plavix in a while. She is supposed to use a walker at baseline but now is NWB to left arm due a left clavicle fx 7/3. CT c-spine showed C2-C3 anterolisthesis        Post-Op Info:  Procedure(s) (LRB):  C1-C3 Posterior cervical fusion. Trilibis. neuromonitoring. sadaf [place]field. (N/A)         Interval History: NAEON. AFVSS. Pt calm and cooperative this am, remains out of restraints w/o incident. Ongoing delirium per baseline, oriented x 2. Reports some mild pain to L shoulder/clavicle upon waking, o/w no complaints. Participating with therapy sessions. Neuro exam stable. Pending placement vs discharge to home assisted living facility.    Medications:  Continuous Infusions:  Scheduled Meds:   donepeziL  10 mg Oral Daily    famotidine  20 mg Oral BID    ferrous sulfate  1 tablet Oral Daily    heparin (porcine)  5,000 Units Subcutaneous Q8H    levothyroxine  88 mcg Oral Before breakfast    montelukast  10 mg Oral QHS     PRN Meds:acetaminophen, albuterol, dextrose 10%, dextrose 10%, glucagon (human recombinant), glucose, glucose, glucose, methocarbamoL, polyethylene glycol     Review of Systems   Constitutional:  Negative for chills and fever.   Eyes:  Negative for photophobia and visual disturbance.   Respiratory:  Negative for cough and shortness of breath.    Gastrointestinal:  Negative for nausea and vomiting.   Genitourinary:  Negative for difficulty urinating and dysuria.   Musculoskeletal:  Positive for arthralgias and gait problem. Negative for neck pain.   Neurological:  Positive for speech difficulty and weakness.  Negative for dizziness, facial asymmetry and headaches.   Psychiatric/Behavioral:  Positive for confusion. Negative for agitation.    Objective:     Weight: 40 kg (88 lb 2.9 oz)  Body mass index is 16.13 kg/m².  Vital Signs (Most Recent):  Temp: 96.8 °F (36 °C) (07/15/22 0920)  Pulse: 95 (07/15/22 0920)  Resp: 18 (07/15/22 0920)  BP: 121/71 (07/15/22 0920)  SpO2: 96 % (07/15/22 0920) Vital Signs (24h Range):  Temp:  [96.8 °F (36 °C)-98.2 °F (36.8 °C)] 96.8 °F (36 °C)  Pulse:  [63-95] 95  Resp:  [16-18] 18  SpO2:  [96 %-97 %] 96 %  BP: (115-163)/(65-80) 121/71                          Physical Exam    Neurosurgery Physical Exam  General: well developed, well nourished, no distress.   Head: normocephalic  GCS: Motor: 6/Verbal: 4/Eyes: 4 GCS Total: 14  Mental Status: Awake, Alert, Oriented x 3 (person, place, situation), with higher order confusion. Follows commands.  Language: No aphasia  Speech: No dysarthria  Cranial nerves: face symmetric, tongue midline, CN II-XII grossly intact.   Eyes: pupils equal, round, reactive to light with accommodation, EOMI. Mild ptosis noted to left eye   Pulmonary: normal respirations, no signs of respiratory distress  Abdomen: soft, non-distended, not tender to palpation  Skin: Skin is warm, dry and intact.  Sensory: responds to light touch throughout  Motor Strength: Moves all extremities spontaneously, increased tone to BUE, at least 4/5 in BUE, 3/5 in left deltoid 2/2 rigidity and left clavicle fracture. BLE 4+/5. Decreased muscle mass throughout. No abnormal movements seen.      Pain limited motion in left shoulder 2/2 clavicle fracture. Figure of 8 brace in place.    Significant Labs:  Recent Labs   Lab 07/14/22  0756 07/15/22  0310    123*    135*   K 4.1 3.9    105   CO2 24 26   BUN 21 16   CREATININE 0.6 0.6   CALCIUM 8.6* 8.7     Recent Labs   Lab 07/14/22  0756 07/15/22  0310   WBC 5.95 5.24   HGB 11.1* 10.9*   HCT 33.6* 32.8*    313     No results  for input(s): LABPT, INR, APTT in the last 48 hours.  Microbiology Results (last 7 days)       ** No results found for the last 168 hours. **          All pertinent labs from the last 24 hours have been reviewed.    Significant Diagnostics:  I have reviewed and interpreted all pertinent imaging results/findings within the past 24 hours.    Assessment/Plan:     SAH (subarachnoid hemorrhage)  71yo F PMH AVNRT, frequent falls, presenting as neurosurgery consult due to L temporal SAH noted on CTH in AM. Per patient's  was found down after hearing fall at 0830. More confusion present than baseline. Patient states she takes aspirin and plavix but hasn't taken the plavix in a while. She is supposed to use a walker at baseline but now is NWB to left arm due a left clavicle fx 7/3. CT c-spine showed C2-C3 anterolisthesis.    --Neuro exam stable without focal weakness. Wax/waning mental status.        -- q4h neurochecks on floor  --All labs and diagnostics reviewed       --CTH 7/7: L temporal SAH, stable on repeat scan       --CTA 7/8 without aneurysm, stable appearing bleed       --CT c-spine 7/7: anterolisthesis C2-C3       --MRI cervical spine 7/7 shows anterlisthesis of C2 on C3 with severe spinal cord compression C2-4 with cord signal change       --XR flex/ex of cervical spine shows no dynamic instability        --MRI thoracic spine shows remote compression fracture of T2, T3, T10, L2. Severe compression fracture of T8 with almost complete loss of height, chronic appearing.  --Further discussed surgical need/timing with patient and . Given that she does not have s/s acute cervical myelopathy will hold off on urgent surgical decompression until she is better optimized. With the clavicle fracture and NWB status it will be difficult to undergo rehab and therapy after a posterior cervical fusion. Her current cognitive status (hx Alzheimers dementia with current intermittent delirium), poor nutrition, and frail  appearance would also make her post-operative course more challenging. Patient and  v/u.   - Plan for outpatient follow-up after discharge.  --C-collar when OOB. Pediatric collar fitting appropriately.  --Afib: s/p ablation and left atrial appendage occlusion on 2/18/22: Xarelto and Plavix stopped in May per EP physician. Patient still taking ASA,    - Hold ASA at this time given acute SAH.  --PTT/INR wnl  --Left clavicle fracture: fracture from 7/3, missed f/u due to fall. Per Ortho recs at that time: NWB to LUE, sling for comfort, bactrim x 7 days for open fracture. Per  will be able to bear weight 3 weeks from injury, will f/u outpatient. Ortho consulted for this admission, appreciate their assistance:   - NWB LUE in figure-of-eight brace (patient not tolerating sling)   - Keep wound over left shoulder covered. Completed Bactrim for a total of 7 days.   - Recommend mobilization with a wheelchair given patient condition and inability to bear weight through the left upper extremity.   --Delirium: Psychiatry consulted 7/10 for suicidal ideation (told nurse she wanted to drown self) and for delirium.    - Recommended to discontinue melatonin.   - Delirium precautions. Reorient frequently. Avoid restraints.   --Continue to monitor clinically, notify NSGY immediately with any changes in neuro status  --HM consulted for co-management, medical optimization, appreciate recs; RCRI 1, Class 2 risk. Hyponatremia w/u ordered. Na improved with IVF. Signed off, stable.     --Dispo: Medically stable for discharge, pending placement. PT/OT and PM&R recs for SNF, pt was denied by facility.         Gloria Carreon PA-C  Neurosurgery  Devan Juarez - Neurosurgery ([facility])

## 2022-07-15 NOTE — PT/OT/SLP PROGRESS
Physical Therapy      Patient Name:  Yvette Crews   MRN:  [id number]    Patient not seen today secondary to Nurse/ HELENA hold. On AM attempt, RN requested to let patient rest, unable to make second attempt. Will follow-up as appropriate.

## 2022-07-15 NOTE — CONSULTS
Inpatient consult to Physical Medicine Rehab  Consult performed by: Kerry Bethea NP  Consult ordered by: Mario Alberto King MD  Reason for consult: assess rehab needs        Reviewed patient history and current admission.  PM&R following.     MERA Agudelo, FNP-C  Physical Medicine & Rehabilitation   07/15/2022

## 2022-07-15 NOTE — SUBJECTIVE & OBJECTIVE
Interval History: NAEON. AFVSS. Pt calm and cooperative this am, remains out of restraints w/o incident. Ongoing delirium per baseline, oriented x 2. Reports some mild pain to L shoulder/clavicle upon waking, o/w no complaints. Participating with therapy sessions. Neuro exam stable. Pending placement vs discharge to home assisted living facility.    Medications:  Continuous Infusions:  Scheduled Meds:   donepeziL  10 mg Oral Daily    famotidine  20 mg Oral BID    ferrous sulfate  1 tablet Oral Daily    heparin (porcine)  5,000 Units Subcutaneous Q8H    levothyroxine  88 mcg Oral Before breakfast    montelukast  10 mg Oral QHS     PRN Meds:acetaminophen, albuterol, dextrose 10%, dextrose 10%, glucagon (human recombinant), glucose, glucose, glucose, methocarbamoL, polyethylene glycol     Review of Systems   Constitutional:  Negative for chills and fever.   Eyes:  Negative for photophobia and visual disturbance.   Respiratory:  Negative for cough and shortness of breath.    Gastrointestinal:  Negative for nausea and vomiting.   Genitourinary:  Negative for difficulty urinating and dysuria.   Musculoskeletal:  Positive for arthralgias and gait problem. Negative for neck pain.   Neurological:  Positive for speech difficulty and weakness. Negative for dizziness, facial asymmetry and headaches.   Psychiatric/Behavioral:  Positive for confusion. Negative for agitation.    Objective:     Weight: 40 kg (88 lb 2.9 oz)  Body mass index is 16.13 kg/m².  Vital Signs (Most Recent):  Temp: 96.8 °F (36 °C) (07/15/22 0920)  Pulse: 95 (07/15/22 0920)  Resp: 18 (07/15/22 0920)  BP: 121/71 (07/15/22 0920)  SpO2: 96 % (07/15/22 0920) Vital Signs (24h Range):  Temp:  [96.8 °F (36 °C)-98.2 °F (36.8 °C)] 96.8 °F (36 °C)  Pulse:  [63-95] 95  Resp:  [16-18] 18  SpO2:  [96 %-97 %] 96 %  BP: (115-163)/(65-80) 121/71                          Physical Exam    Neurosurgery Physical Exam  General: well developed, well nourished, no distress.    Head: normocephalic  GCS: Motor: 6/Verbal: 4/Eyes: 4 GCS Total: 14  Mental Status: Awake, Alert, Oriented x 3 (person, place, situation), with higher order confusion. Follows commands.  Language: No aphasia  Speech: No dysarthria  Cranial nerves: face symmetric, tongue midline, CN II-XII grossly intact.   Eyes: pupils equal, round, reactive to light with accommodation, EOMI. Mild ptosis noted to left eye   Pulmonary: normal respirations, no signs of respiratory distress  Abdomen: soft, non-distended, not tender to palpation  Skin: Skin is warm, dry and intact.  Sensory: responds to light touch throughout  Motor Strength: Moves all extremities spontaneously, increased tone to BUE, at least 4/5 in BUE, 3/5 in left deltoid 2/2 rigidity and left clavicle fracture. BLE 4+/5. Decreased muscle mass throughout. No abnormal movements seen.      Pain limited motion in left shoulder 2/2 clavicle fracture. Figure of 8 brace in place.    Significant Labs:  Recent Labs   Lab 07/14/22  0756 07/15/22  0310    123*    135*   K 4.1 3.9    105   CO2 24 26   BUN 21 16   CREATININE 0.6 0.6   CALCIUM 8.6* 8.7     Recent Labs   Lab 07/14/22  0756 07/15/22  0310   WBC 5.95 5.24   HGB 11.1* 10.9*   HCT 33.6* 32.8*    313     No results for input(s): LABPT, INR, APTT in the last 48 hours.  Microbiology Results (last 7 days)       ** No results found for the last 168 hours. **          All pertinent labs from the last 24 hours have been reviewed.    Significant Diagnostics:  I have reviewed and interpreted all pertinent imaging results/findings within the past 24 hours.

## 2022-07-15 NOTE — CONSULTS
Devan Juarez - Neurosurgery ([facility])  Physical Medicine & Rehab  Consult Note    Patient Name: Yvette Crews  MRN: [id number]  Admission Date: 7/7/2022  Hospital Length of Stay: 8 days  Attending Physician: Mario Alberto King MD     Inpatient consult to Physical Medicine & Rehabilitation  Consult performed by: Kerry Bethea NP  Consult requested by:  Mario Alberto King MD    Reason for Consult:  assess rehabilitation needs    Consults  Subjective:     Principal Problem: Cervical stenosis of spinal canal    HPI: Per chart review, Yvette Crews is a 72-year-old female with PMHx of frequent falls, Alzheimer's dementia, AV eliezer re-entry tachycardia s/p RFA 2012 and A-fib s/p ablation and left atrial appendage occlusion on 2/18/22 with plavix and xarelto stopped in 05/2022.  Patient presented to [facility] on 7/7 s/p fall with head trauma.  CTH revealed a L temporal SAH. MRI cervical spine 7/7 shows anterlisthesis of C2 on C3 with severe spinal cord compression C2-4 with cord signal change. MRI thoracic spine shows remote compression fracture of T2, T3, T10, L2. Severe compression fracture of T8 with almost complete loss of height, chronic appearing. Hospital course further complicated by Left clavicle fracture from 7/3. Per Ortho recs at that time: NWB to LUE, in a figure-of-eight brace bactrim x 7 days for open fracture. Recommend mobilization with a wheelchair given patient condition and inability to bear weight through the left upper extremity, suidical ideation on 7/10 (psych consulted and rec to d/c melatonin), delirium, hyponatremia now improved, and impaired functional mobility.     Functional History: Patient lives with  in an assisted living home with 0 ANIRUDH. Assisted living helps with meals, laundry and transportation. Prior to admission, patients level of function was modified independent with rollator for mobility and assist from assisted living staff.  Equipment used at home: rollator, shower chair.       Hospital Course: 07/13/2022: Bed mobility max-mod. Sit to stand max. Ambulated 9 steps max.     Past Medical History:   Diagnosis Date    Adult bronchiectasis     Age-related osteoporosis with current pathological fracture with routine healing 8/28/2013    Amblyopia     Anxiety     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    Cataract     Cellulitis of right lower extremity 1/19/2021    Chondrocalcinosis, cause unspecified, involving hand(712.34) 7/12/2011     Dx updated per 2019 IMO Load    Chronic sinusitis 4/6/2017    Colonic constipation 6/5/2013    Concussion 10/27/2016    Frequent falls 3/14/2017    Gait disturbance 3/14/2017    History of cardiac radiofrequency ablation (RFA) 2/3/2018    History of cardiac radiofrequency ablation (RFA) 2/3/2018    History of subarachnoid hemorrhage 10/30/2016    Hypogammaglobulinemia 9/7/2011    Irritable bowel syndrome 12/18/2015    Major depressive disorder, recurrent episode, in full remission 8/19/2010    Onychomycosis     Osteoarthritis     Postoperative hypothyroidism 12/18/2015    Presence of Watchman left atrial appendage closure device 5/14/2022    Rectal prolapse 6/5/2013    Reflux esophagitis 2/7/2010    Status post thyroidectomy 6/1/2017    Strabismus     SVT (supraventricular tachycardia) 11/22/2013    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     Underweight 1/23/2013     Past Surgical History:   Procedure Laterality Date    BREAST CYST ASPIRATION      x2    CATARACT EXTRACTION W/  INTRAOCULAR LENS IMPLANT Left 3/11/2019    Procedure: EXTRACTION, CATARACT, WITH IOL INSERTION;  Surgeon: Vera Sams MD;  Location: [facility] OR;  Service: Ophthalmology;  Laterality: Left;    CATARACT EXTRACTION W/  INTRAOCULAR LENS IMPLANT Right 4/15/2019    Procedure: EXTRACTION, CATARACT, WITH IOL INSERTION LASER;  Surgeon: Vera Sams MD;  Location: [facility] OR;  Service: Ophthalmology;  Laterality:  Right;    COLON SURGERY      EYE SURGERY      FRACTURE SURGERY      NASAL SEPTUM SURGERY      OCCLUSION OF LEFT ATRIAL APPENDAGE N/A 2/18/2022    Procedure: Left atrial appendage occlusion;  Surgeon: Chase Gill MD;  Location: [facility] EP LAB;  Service: Cardiology;  Laterality: N/A;  afib, watchman, BSCI, osiris, anes, MB/EH, 3prep    OPEN REDUCTION AND INTERNAL FIXATION (ORIF) OF INJURY OF SACROILIAC JOINT Bilateral 1/20/2021    Procedure: ORIF, SACROILIAC JOINT;  Surgeon: Alcides Tamez MD;  Location: [facility] OR [address] [place] [address];  Service: Orthopedics;  Laterality: Bilateral;    RADIOFREQUENCY ABLATION      RECTAL PROLAPSE REPAIR  june 2013    STRABISMUS SURGERY      TONSILLECTOMY      TRANSESOPHAGEAL ECHOCARDIOGRAPHY N/A 2/18/2022    Procedure: ECHOCARDIOGRAM, TRANSESOPHAGEAL;  Surgeon: Nils Diagnostic Provider;  Location: [facility] EP LAB;  Service: Cardiology;  Laterality: N/A;     Review of patient's allergies indicates:   Allergen Reactions    Adhesive      Tape tears skin    Buspar [buspirone]      headaches    Escitalopram oxalate Nausea Only     hyponatremia      Rifampin Rash       Scheduled Medications:    donepeziL  10 mg Oral Daily    famotidine  20 mg Oral BID    ferrous sulfate  1 tablet Oral Daily    heparin (porcine)  5,000 Units Subcutaneous Q8H    levothyroxine  88 mcg Oral Before breakfast    montelukast  10 mg Oral QHS       PRN Medications: acetaminophen, albuterol, dextrose 10%, dextrose 10%, glucagon (human recombinant), glucose, glucose, glucose, methocarbamoL, polyethylene glycol    Family History       Problem Relation (Age of Onset)    Breast cancer Paternal Grandmother    Heart disease Father, Brother    Stroke Father          Tobacco Use    Smoking status: Never Smoker    Smokeless tobacco: Never Used   Substance and Sexual Activity    Alcohol use: No    Drug use: No    Sexual activity: Not Currently     Review of Systems   Reason unable to perform ROS: dementia.    Objective:     Vital Signs (Most Recent):  Temp: 98.1 °F (36.7 °C) (07/15/22 0352)  Pulse: 63 (07/15/22 0352)  Resp: 16 (07/15/22 0352)  BP: 136/71 (07/15/22 0352)  SpO2: 97 % (07/15/22 0352)    Vital Signs (24h Range):  Temp:  [98 °F (36.7 °C)-98.2 °F (36.8 °C)] 98.1 °F (36.7 °C)  Pulse:  [63-91] 63  Resp:  [16-18] 16  SpO2:  [96 %-98 %] 97 %  BP: (112-163)/(65-80) 136/71     Body mass index is 16.13 kg/m².    Physical Exam  Vitals reviewed.   Constitutional:       General: She is not in acute distress.     Appearance: She is well-developed.      Comments: Appears frail   HENT:      Head: Normocephalic and atraumatic.      Right Ear: External ear normal.      Left Ear: External ear normal.   Eyes:      General:         Right eye: No discharge.         Left eye: No discharge.   Cardiovascular:      Rate and Rhythm: Normal rate.      Pulses: Normal pulses.   Pulmonary:      Effort: Pulmonary effort is normal. No respiratory distress.   Abdominal:      Palpations: Abdomen is soft.      Tenderness: There is no abdominal tenderness.   Musculoskeletal:         General: No deformity.      Cervical back: Neck supple.      Comments: Figure of eight brace in place, LUE decreased ROM/tendnerness, decreased strength   Skin:     General: Skin is warm and dry.   Neurological:      Mental Status: She is alert. She is disoriented and confused.      Comments: Follows simple commands    Psychiatric:         Behavior: Behavior is slowed. Behavior is cooperative.         Cognition and Memory: Cognition is impaired.     Diagnostic Results:   Labs: Reviewed  X-Ray: Reviewed  CT: Reviewed    Assessment/Plan:     * Cervical stenosis of spinal canal  -MRI cervical spine 7/7 shows anterlisthesis of C2 on C3 with severe spinal cord compression C2-4 with cord signal change  -MRI thoracic spine shows remote compression fracture of T2, T3, T10, L2. Severe compression fracture of T8 with almost complete loss of height, chronic appearing.  Hospital course further complicated by Left clavicle fracture from 7/3    Hyponatremia  -primary team managing, improved    Delirium due to general medical condition  -camera sitter at bedside    SAH (subarachnoid hemorrhage)  -NSGY managing  -CTH revealed a L temporal SAH, no surgical intervention    See hospital course for functional, cognitive/speech/language, and nutrition/swallow status.      Recommendations  -  Monitor sleep disturbances and establish consistent sleep-wake cycle  -  Environmental modifications to limit agitation/confusion   -  Reorient patient to person, place, time, and situation on each encounter  -  Avoid restraints  -  May benefit from 24/7 supervision  -  Avoid/limit medications that can worsen delirium (benzodiazepines, antihistamines, anticholinergics, hypnotics, opiates)  -  Encourage mobility, OOB in chair, and early ambulation as appropriate  -  PT/OT evaluate and treat  -  SLP speech and cognitive evaluate and treat  -  Monitor for bowel and bladder dysfunction  -  Monitor for and prevent skin breakdown and pressure ulcers  · Early mobility, repositioning/weight shifting every 20-30 minutes when sitting, turn patient every 2 hours, proper mattress/overlay and chair cushioning, pressure relief/heel protector boots      Fracture of left clavicle  -ortho consulted  -Left clavicle fracture from 7/3. Per Ortho recs at that time: NWB to LUE, in a figure-of-eight brace bactrim x 7 days for open fracture. Recommend mobilization with a wheelchair given patient condition and inability to bear weight through the left upper extremity    Major depressive disorder, recurrent episode, in full remission  -psych consulted for suicidal ideation on 7/10, not PEC'd    PM&R Recommendation:     At this time, the PM&R team has reviewed this patient's ongoing medical case including inpatient diagnosis, medical history, clinical examination, labs, vitals, current social and functional history to provide the  post-acute recommendation as follows:     RECOMMENDATIONS: skilled nursing facility due to poor potential for improvement with rehabilitation.      We will sign off due to discharge soon.      Thank you for your consult.     Kerry Bethea NP  Department of Physical Medicine & Rehab  [facility] Neurosurgery [facility])

## 2022-07-15 NOTE — ASSESSMENT & PLAN NOTE
-ortho consulted  -Left clavicle fracture from 7/3. Per Ortho recs at that time: NWB to KRISTIN, in a figure-of-eight brace bactrim x 7 days for open fracture. Recommend mobilization with a wheelchair given patient condition and inability to bear weight through the left upper extremity

## 2022-07-15 NOTE — ASSESSMENT & PLAN NOTE
73yo F PMH AVNRT, frequent falls, presenting as neurosurgery consult due to L temporal SAH noted on CTH in AM. Per patient's  was found down after hearing fall at 0830. More confusion present than baseline. Patient states she takes aspirin and plavix but hasn't taken the plavix in a while. She is supposed to use a walker at baseline but now is NWB to left arm due a left clavicle fx 7/3. CT c-spine showed C2-C3 anterolisthesis.    --Neuro exam stable without focal weakness. Wax/waning mental status.        -- q4h neurochecks on floor  --All labs and diagnostics reviewed       --CTH 7/7: L temporal SAH, stable on repeat scan       --CTA 7/8 without aneurysm, stable appearing bleed       --CT c-spine 7/7: anterolisthesis C2-C3       --MRI cervical spine 7/7 shows anterlisthesis of C2 on C3 with severe spinal cord compression C2-4 with cord signal change       --XR flex/ex of cervical spine shows no dynamic instability        --MRI thoracic spine shows remote compression fracture of T2, T3, T10, L2. Severe compression fracture of T8 with almost complete loss of height, chronic appearing.  --Further discussed surgical need/timing with patient and . Given that she does not have s/s acute cervical myelopathy will hold off on urgent surgical decompression until she is better optimized. With the clavicle fracture and NWB status it will be difficult to undergo rehab and therapy after a posterior cervical fusion. Her current cognitive status (hx Alzheimers dementia with current intermittent delirium), poor nutrition, and frail appearance would also make her post-operative course more challenging. Patient and  v/u.   - Plan for outpatient follow-up after discharge.  --C-collar when OOB. Pediatric collar fitting appropriately.  --Afib: s/p ablation and left atrial appendage occlusion on 2/18/22: Xarelto and Plavix stopped in May per EP physician. Patient still taking ASA,    - Hold ASA at this time given  acute SAH.  --PTT/INR wnl  --Left clavicle fracture: fracture from 7/3, missed f/u due to fall. Per Ortho recs at that time: NWB to LUE, sling for comfort, bactrim x 7 days for open fracture. Per  will be able to bear weight 3 weeks from injury, will f/u outpatient. Ortho consulted for this admission, appreciate their assistance:   - NWB LUE in figure-of-eight brace (patient not tolerating sling)   - Keep wound over left shoulder covered. Completed Bactrim for a total of 7 days.   - Recommend mobilization with a wheelchair given patient condition and inability to bear weight through the left upper extremity.   --Delirium: Psychiatry consulted 7/10 for suicidal ideation (told nurse she wanted to drown self) and for delirium.    - Recommended to discontinue melatonin.   - Delirium precautions. Reorient frequently. Avoid restraints.   --Continue to monitor clinically, notify NSGY immediately with any changes in neuro status  --HM consulted for co-management, medical optimization, appreciate recs; RCRI 1, Class 2 risk. Hyponatremia w/u ordered. Na improved with IVF. Signed off, stable.     --Dispo: Medically stable for discharge, pending placement. PT/OT and PM&R recs for SNF, pt was denied by facility.

## 2022-07-15 NOTE — PLAN OF CARE
[facility] (Hospital)  Discharge Final Note    Primary Care Provider: Sally Green MD    Expected Discharge Date: 7/15/2022     Patient to be discharged home.  The patient will have home health upon discharge, spouse in agreement to plan.   attempted to get patient to O SNF or  [facility] without success due to history of delirium.  Patient to be transported via stretcher.  Neurosurgery clinic to schedule follow up appointment.    Future Appointments   Date Time Provider Department Center   7/21/2022  9:45 AM Alcides Tamez MD [facility] ORTHO [facility]   7/28/2022  9:30 AM INFUSION, CHAIR 2 [facility] AMB INF [facility]   8/2/2022 11:30 AM INFUSION, CHAIR 3 [facility] AMB INF [facility]   8/24/2022 12:00 PM [facility] OIC-CT1 500 LB LIMIT [facility]   8/24/2022  1:30 PM Gina Ramesh PA-C [facility] NEUROS8 [facility]   9/14/2022  9:40 AM [facility] [place]   10/6/2022  2:00 PM Marilee Lloyd, OD [facility] OPTICLB [facility]       Final Discharge Note (most recent)     Final Note - 07/15/22 1221        Final Note    Assessment Type Final Discharge Note     Anticipated Discharge Disposition Home-Health Care Svc        Post-Acute Status    Post-Acute Authorization Home Health;Placement     Post-Acute Placement Status Discharge Plan Changed     Home Health Status Referrals Sent     Discharge Delays None known at this time                 Important Message from Medicare             Contact Info     [facility]th[facility]uma [facility]        Next Steps: Go in 1 week(s)    Instructions: for clavicle fracture follow-up    [facility] 8th Fl   Specialty: Neurosurgery    [address]  [facility] [address]   Phone: [phone]       Next Steps: Follow up in 1 month(s)    Instructions: for follow-up head bleed and cervical stenosis

## 2022-07-15 NOTE — SUBJECTIVE & OBJECTIVE
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    Cataract     Cellulitis of right lower extremity 1/19/2021    Chondrocalcinosis, cause unspecified, involving hand(712.34) 7/12/2011     Dx updated per 2019 IMO Load    Chronic sinusitis 4/6/2017    Colonic constipation 6/5/2013    Concussion 10/27/2016    Frequent falls 3/14/2017    Gait disturbance 3/14/2017    History of cardiac radiofrequency ablation (RFA) 2/3/2018    History of cardiac radiofrequency ablation (RFA) 2/3/2018    History of subarachnoid hemorrhage 10/30/2016    Hypogammaglobulinemia 9/7/2011    Irritable bowel syndrome 12/18/2015    Major depressive disorder, recurrent episode, in full remission 8/19/2010    Onychomycosis     Osteoarthritis     Postoperative hypothyroidism 12/18/2015    Presence of Watchman left atrial appendage closure device 5/14/2022    Rectal prolapse 6/5/2013    Reflux esophagitis 2/7/2010    Status post thyroidectomy 6/1/2017    Strabismus     SVT (supraventricular tachycardia) 11/22/2013    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     Underweight 1/23/2013     Past Surgical History:   Procedure Laterality Date    BREAST CYST ASPIRATION      x2    CATARACT EXTRACTION W/  INTRAOCULAR LENS IMPLANT Left 3/11/2019    Procedure: EXTRACTION, CATARACT, WITH IOL INSERTION;  Surgeon: Vera Sams MD;  Location: [facility] OR;  Service: Ophthalmology;  Laterality: Left;    CATARACT EXTRACTION W/  INTRAOCULAR LENS IMPLANT Right 4/15/2019    Procedure: EXTRACTION, CATARACT, WITH IOL INSERTION LASER;  Surgeon: Vera Sams MD;  Location: [facility] OR;  Service: Ophthalmology;  Laterality: Right;    COLON SURGERY      EYE SURGERY      FRACTURE SURGERY      NASAL SEPTUM SURGERY      OCCLUSION OF LEFT ATRIAL APPENDAGE  N/A 2/18/2022    Procedure: Left atrial appendage occlusion;  Surgeon: Chase Gill MD;  Location: [facility] LAB;  Service: Cardiology;  Laterality: N/A;  afib, watchman, BSCI, osiris, anes, MB/EH, 3prep    OPEN REDUCTION AND INTERNAL FIXATION (ORIF) OF INJURY OF SACROILIAC JOINT Bilateral 1/20/2021    Procedure: ORIF, SACROILIAC JOINT;  Surgeon: Alcides Tamez MD;  Location: [facility] OR [address] [place] [address];  Service: Orthopedics;  Laterality: Bilateral;    RADIOFREQUENCY ABLATION      RECTAL PROLAPSE REPAIR  june 2013    STRABISMUS SURGERY      TONSILLECTOMY      TRANSESOPHAGEAL ECHOCARDIOGRAPHY N/A 2/18/2022    Procedure: ECHOCARDIOGRAM, TRANSESOPHAGEAL;  Surgeon: Nils Diagnostic Provider;  Location: [facility] LAB;  Service: Cardiology;  Laterality: N/A;     Review of patient's allergies indicates:   Allergen Reactions    Adhesive      Tape tears skin    Buspar [buspirone]      headaches    Escitalopram oxalate Nausea Only     hyponatremia      Rifampin Rash       Scheduled Medications:    donepeziL  10 mg Oral Daily    famotidine  20 mg Oral BID    ferrous sulfate  1 tablet Oral Daily    heparin (porcine)  5,000 Units Subcutaneous Q8H    levothyroxine  88 mcg Oral Before breakfast    montelukast  10 mg Oral QHS       PRN Medications: acetaminophen, albuterol, dextrose 10%, dextrose 10%, glucagon (human recombinant), glucose, glucose, glucose, methocarbamoL, polyethylene glycol    Family History       Problem Relation (Age of Onset)    Breast cancer Paternal Grandmother    Heart disease Father, Brother    Stroke Father          Tobacco Use    Smoking status: Never Smoker    Smokeless tobacco: Never Used   Substance and Sexual Activity    Alcohol use: No    Drug use: No    Sexual activity: Not Currently     Review of Systems   Reason unable to perform ROS: dementia.   Objective:     Vital Signs (Most Recent):  Temp: 98.1 °F (36.7 °C) (07/15/22 0352)  Pulse: 63 (07/15/22 0352)  Resp: 16 (07/15/22 0352)  BP: 136/71  (07/15/22 0352)  SpO2: 97 % (07/15/22 0352)    Vital Signs (24h Range):  Temp:  [98 °F (36.7 °C)-98.2 °F (36.8 °C)] 98.1 °F (36.7 °C)  Pulse:  [63-91] 63  Resp:  [16-18] 16  SpO2:  [96 %-98 %] 97 %  BP: (112-163)/(65-80) 136/71     Body mass index is 16.13 kg/m².    Physical Exam  Vitals reviewed.   Constitutional:       General: She is not in acute distress.     Appearance: She is well-developed.      Comments: Appears frail   HENT:      Head: Normocephalic and atraumatic.      Right Ear: External ear normal.      Left Ear: External ear normal.   Eyes:      General:         Right eye: No discharge.         Left eye: No discharge.   Cardiovascular:      Rate and Rhythm: Normal rate.      Pulses: Normal pulses.   Pulmonary:      Effort: Pulmonary effort is normal. No respiratory distress.   Abdominal:      Palpations: Abdomen is soft.      Tenderness: There is no abdominal tenderness.   Musculoskeletal:         General: No deformity.      Cervical back: Neck supple.      Comments: Figure of eight brace in place, LUE decreased ROM/tendnerness, decreased strength   Skin:     General: Skin is warm and dry.   Neurological:      Mental Status: She is alert. She is disoriented and confused.      Comments: Follows simple commands    Psychiatric:         Behavior: Behavior is slowed. Behavior is cooperative.         Cognition and Memory: Cognition is impaired.          Diagnostic Results:   Labs: Reviewed  X-Ray: Reviewed  CT: Reviewed

## 2022-07-15 NOTE — ASSESSMENT & PLAN NOTE
-NSGY managing  -CTH revealed a L temporal SAH, no surgical intervention    See hospital course for functional, cognitive/speech/language, and nutrition/swallow status.      Recommendations  -  Monitor sleep disturbances and establish consistent sleep-wake cycle  -  Environmental modifications to limit agitation/confusion   -  Reorient patient to person, place, time, and situation on each encounter  -  Avoid restraints  -  May benefit from 24/7 supervision  -  Avoid/limit medications that can worsen delirium (benzodiazepines, antihistamines, anticholinergics, hypnotics, opiates)  -  Encourage mobility, OOB in chair, and early ambulation as appropriate  -  PT/OT evaluate and treat  -  SLP speech and cognitive evaluate and treat  -  Monitor for bowel and bladder dysfunction  -  Monitor for and prevent skin breakdown and pressure ulcers  · Early mobility, repositioning/weight shifting every 20-30 minutes when sitting, turn patient every 2 hours, proper mattress/overlay and chair cushioning, pressure relief/heel protector boots

## 2022-07-16 LAB
ANION GAP SERPL CALC-SCNC: 8 MMOL/L (ref 8–16)
BASOPHILS # BLD AUTO: 0.02 K/UL (ref 0–0.2)
BASOPHILS NFR BLD: 0.4 % (ref 0–1.9)
BUN SERPL-MCNC: 18 MG/DL (ref 8–23)
CALCIUM SERPL-MCNC: 9.2 MG/DL (ref 8.7–10.5)
CHLORIDE SERPL-SCNC: 101 MMOL/L (ref 95–110)
CO2 SERPL-SCNC: 23 MMOL/L (ref 23–29)
CREAT SERPL-MCNC: 0.6 MG/DL (ref 0.5–1.4)
DIFFERENTIAL METHOD: ABNORMAL
EOSINOPHIL # BLD AUTO: 0 K/UL (ref 0–0.5)
EOSINOPHIL NFR BLD: 0.6 % (ref 0–8)
ERYTHROCYTE [DISTWIDTH] IN BLOOD BY AUTOMATED COUNT: 14.5 % (ref 11.5–14.5)
EST. GFR  (AFRICAN AMERICAN): >60 ML/MIN/1.73 M^2
EST. GFR  (NON AFRICAN AMERICAN): >60 ML/MIN/1.73 M^2
GLUCOSE SERPL-MCNC: 78 MG/DL (ref 70–110)
HCT VFR BLD AUTO: 38.3 % (ref 37–48.5)
HGB BLD-MCNC: 12.5 G/DL (ref 12–16)
IMM GRANULOCYTES # BLD AUTO: 0.07 K/UL (ref 0–0.04)
IMM GRANULOCYTES NFR BLD AUTO: 1.3 % (ref 0–0.5)
LYMPHOCYTES # BLD AUTO: 1.4 K/UL (ref 1–4.8)
LYMPHOCYTES NFR BLD: 26.5 % (ref 18–48)
MCH RBC QN AUTO: 32.1 PG (ref 27–31)
MCHC RBC AUTO-ENTMCNC: 32.6 G/DL (ref 32–36)
MCV RBC AUTO: 99 FL (ref 82–98)
MONOCYTES # BLD AUTO: 0.5 K/UL (ref 0.3–1)
MONOCYTES NFR BLD: 9.1 % (ref 4–15)
NEUTROPHILS # BLD AUTO: 3.4 K/UL (ref 1.8–7.7)
NEUTROPHILS NFR BLD: 62.1 % (ref 38–73)
NRBC BLD-RTO: 0 /100 WBC
PLATELET # BLD AUTO: 322 K/UL (ref 150–450)
PMV BLD AUTO: 10.2 FL (ref 9.2–12.9)
POTASSIUM SERPL-SCNC: 4.2 MMOL/L (ref 3.5–5.1)
RBC # BLD AUTO: 3.89 M/UL (ref 4–5.4)
SODIUM SERPL-SCNC: 132 MMOL/L (ref 136–145)
WBC # BLD AUTO: 5.39 K/UL (ref 3.9–12.7)

## 2022-07-16 PROCEDURE — 99232 PR SUBSEQUENT HOSPITAL CARE,LEVL II: ICD-10-PCS | Mod: ,,, | Performed by: PHYSICIAN ASSISTANT

## 2022-07-16 PROCEDURE — 80048 BASIC METABOLIC PNL TOTAL CA: CPT

## 2022-07-16 PROCEDURE — 25000003 PHARM REV CODE 250

## 2022-07-16 PROCEDURE — 63600175 PHARM REV CODE 636 W HCPCS: Performed by: STUDENT IN AN ORGANIZED HEALTH CARE EDUCATION/TRAINING PROGRAM

## 2022-07-16 PROCEDURE — 85025 COMPLETE CBC W/AUTO DIFF WBC: CPT

## 2022-07-16 PROCEDURE — 36415 COLL VENOUS BLD VENIPUNCTURE: CPT

## 2022-07-16 PROCEDURE — 99232 SBSQ HOSP IP/OBS MODERATE 35: CPT | Mod: ,,, | Performed by: PHYSICIAN ASSISTANT

## 2022-07-16 PROCEDURE — 11000001 HC ACUTE MED/SURG PRIVATE ROOM

## 2022-07-16 RX ADMIN — MONTELUKAST 10 MG: 10 TABLET, FILM COATED ORAL at 08:07

## 2022-07-16 RX ADMIN — HEPARIN SODIUM 5000 UNITS: 5000 INJECTION INTRAVENOUS; SUBCUTANEOUS at 09:07

## 2022-07-16 RX ADMIN — DONEPEZIL HYDROCHLORIDE 10 MG: 10 TABLET ORAL at 08:07

## 2022-07-16 RX ADMIN — LEVOTHYROXINE SODIUM 88 MCG: 88 TABLET ORAL at 05:07

## 2022-07-16 NOTE — ASSESSMENT & PLAN NOTE
73yo F PMH AVNRT, frequent falls, presenting as neurosurgery consult due to L temporal SAH noted on CTH in AM. Per patient's  was found down after hearing fall at 0830. More confusion present than baseline. Patient states she takes aspirin and plavix but hasn't taken the plavix in a while. She is supposed to use a walker at baseline but now is NWB to left arm due a left clavicle fx 7/3. CT c-spine showed C2-C3 anterolisthesis.    --Neuro exam stable without focal weakness. Wax/waning mental status.        -- q4h neurochecks on floor  --All labs and diagnostics reviewed       --CTH 7/7: L temporal SAH, stable on repeat scan       --CTA 7/8 without aneurysm, stable appearing bleed       --CT c-spine 7/7: anterolisthesis C2-C3       --MRI cervical spine 7/7 shows anterlisthesis of C2 on C3 with severe spinal cord compression C2-4 with cord signal change       --XR flex/ex of cervical spine shows no dynamic instability        --MRI thoracic spine shows remote compression fracture of T2, T3, T10, L2. Severe compression fracture of T8 with almost complete loss of height, chronic appearing.  --Further discussed surgical need/timing with patient and . Given that she does not have s/s acute cervical myelopathy will hold off on urgent surgical decompression until she is better optimized. With the clavicle fracture and NWB status it will be difficult to undergo rehab and therapy after a posterior cervical fusion. Her current cognitive status (hx Alzheimers dementia with current intermittent delirium), poor nutrition, and frail appearance would also make her post-operative course more challenging. Patient and  v/u.   - Plan for outpatient follow-up after discharge.  --C-collar when OOB. Pediatric collar fitting appropriately.  --Afib: s/p ablation and left atrial appendage occlusion on 2/18/22: Xarelto and Plavix stopped in May per EP physician. Patient still taking ASA at home.    - Hold ASA at this time  given acute SAH.   - PTT/INR wnl  --Left clavicle fracture: fracture from 7/3, missed f/u due to fall. Per Ortho recs at that time: NWB to LUE, sling for comfort, bactrim x 7 days for open fracture. Per  will be able to bear weight 3 weeks from injury, will f/u outpatient. Ortho consulted for this admission, appreciate their assistance:   - NWB LUE in figure-of-eight brace (patient not tolerating sling)   - Keep wound over left shoulder covered. Completed Bactrim for a total of 7 days.   - Recommend mobilization with a wheelchair given patient condition and inability to bear weight through the left upper extremity.   --Delirium: Psychiatry consulted 7/10 for suicidal ideation (told nurse she wanted to drown self) and for delirium.    - Recommended to discontinue melatonin.   - Delirium precautions. Reorient frequently. Avoid restraints.   --Continue to monitor clinically, notify NSGY immediately with any changes in neuro status  --HM consulted for co-management, medical optimization, appreciate recs; RCRI 1, Class 2 risk. Hyponatremia w/u ordered. Na improved with IVF. Signed off, stable.     --Dispo: Medically stable for discharge, pending placement. PT/OT and PM&R recs for SNF, pt was denied by facility. Plan for discharge to nursing unit of home assisted living facility.

## 2022-07-16 NOTE — NURSING
Dressings to left FA, right FA, left shin removed, sites cleansed and re-dressed per orders, pt tolerated without difficulty.  No s/s infection noted at any of sites.  Pt tolerated without difficulty.

## 2022-07-16 NOTE — PROGRESS NOTES
[facility] ([facility])  Neurosurgery  Progress Note    Subjective:     History of Present Illness: 73yo F PMH AVNRT, frequent falls, presenting as neurosurgery consult due to L temporal SAH noted on CTH in AM. Per patient's  was found down after hearing fall at 0830. More confusion present than baseline. Patient states she takes aspirin and plavix but hasn't taken the plavix in a while. She is supposed to use a walker at baseline but now is NWB to left arm due a left clavicle fx 7/3. CT c-spine showed C2-C3 anterolisthesis        Post-Op Info:  Procedure(s) (LRB):  C1-C3 Posterior cervical fusion. [employer]. neuromonitoring. sadaf art [place]. (N/A)         Interval History: NAEON. AFVSS. Pt remains neuro stable, eating breakfast, dementia per baseline. No acute complaints this am, states mild shoulder pain resolved w/ Tylenol. Planning for discharge to home facility nursing unit on Monday.    Medications:  Continuous Infusions:  Scheduled Meds:   donepeziL  10 mg Oral Daily    famotidine  20 mg Oral BID    ferrous sulfate  1 tablet Oral Daily    heparin (porcine)  5,000 Units Subcutaneous Q8H    levothyroxine  88 mcg Oral Before breakfast    montelukast  10 mg Oral QHS     PRN Meds:acetaminophen, albuterol, dextrose 10%, dextrose 10%, glucagon (human recombinant), glucose, glucose, glucose, methocarbamoL, polyethylene glycol     Review of Systems  Constitutional:  Negative for chills and fever.   Eyes:  Negative for photophobia and visual disturbance.   Respiratory:  Negative for cough and shortness of breath.    Gastrointestinal:  Negative for nausea and vomiting.   Genitourinary:  Negative for difficulty urinating and dysuria.   Musculoskeletal:  Positive for arthralgias and gait problem. Negative for neck pain.   Neurological:  Positive for speech difficulty and weakness. Negative for dizziness, facial asymmetry and headaches.   Psychiatric/Behavioral:  Positive for confusion. Negative  for agitation.      Objective:     Weight: 40 kg (88 lb 2.9 oz)  Body mass index is 16.13 kg/m².  Vital Signs (Most Recent):  Temp: 98.5 °F (36.9 °C) (07/16/22 1142)  Pulse: 85 (07/16/22 1142)  Resp: 17 (07/16/22 1142)  BP: (!) 147/87 (07/16/22 1142)  SpO2: 96 % (07/16/22 1142)   Vital Signs (24h Range):  Temp:  [97.5 °F (36.4 °C)-98.5 °F (36.9 °C)] 98.5 °F (36.9 °C)  Pulse:  [62-89] 85  Resp:  [12-17] 17  SpO2:  [96 %-99 %] 96 %  BP: (115-147)/(67-87) 147/87                          Physical Exam    Neurosurgery Physical Exam    General: well developed, well nourished, no distress.   Head: normocephalic  GCS: Motor: 6/Verbal: 4/Eyes: 4 GCS Total: 14  Mental Status: Awake, Alert, Oriented x 3 (person, place, situation), with higher order confusion. Follows commands.  Language: No aphasia  Speech: No dysarthria  Cranial nerves: face symmetric, tongue midline, CN II-XII grossly intact.   Eyes: pupils equal, round, reactive to light with accommodation, EOMI. Mild ptosis noted to left eye   Pulmonary: normal respirations, no signs of respiratory distress  Abdomen: soft, non-distended, not tender to palpation  Skin: Skin is warm, dry and intact.  Sensory: responds to light touch throughout  Motor Strength: Moves all extremities spontaneously, increased tone to BUE, at least 4/5 in BUE, 3/5 in left deltoid 2/2 rigidity and left clavicle fracture. BLE 4+/5. Decreased muscle mass throughout. No abnormal movements seen.     Pain limited motion in left shoulder 2/2 clavicle fracture. Figure of 8 brace in place.    Significant Labs:  Recent Labs   Lab 07/15/22  0310 07/16/22  0625   * 78   * 132*   K 3.9 4.2    101   CO2 26 23   BUN 16 18   CREATININE 0.6 0.6   CALCIUM 8.7 9.2     Recent Labs   Lab 07/15/22  0310 07/16/22  0625   WBC 5.24 5.39   HGB 10.9* 12.5   HCT 32.8* 38.3    322     No results for input(s): LABPT, INR, APTT in the last 48 hours.  Microbiology Results (last 7 days)       ** No  results found for the last 168 hours. **          All pertinent labs from the last 24 hours have been reviewed.    Significant Diagnostics:  I have reviewed and interpreted all pertinent imaging results/findings within the past 24 hours.    Assessment/Plan:     SAH (subarachnoid hemorrhage)  73yo F PMH AVNRT, frequent falls, presenting as neurosurgery consult due to L temporal SAH noted on CTH in AM. Per patient's  was found down after hearing fall at 0830. More confusion present than baseline. Patient states she takes aspirin and plavix but hasn't taken the plavix in a while. She is supposed to use a walker at baseline but now is NWB to left arm due a left clavicle fx 7/3. CT c-spine showed C2-C3 anterolisthesis.    --Neuro exam stable without focal weakness. Wax/waning mental status.        -- q4h neurochecks on floor  --All labs and diagnostics reviewed       --CTH 7/7: L temporal SAH, stable on repeat scan       --CTA 7/8 without aneurysm, stable appearing bleed       --CT c-spine 7/7: anterolisthesis C2-C3       --MRI cervical spine 7/7 shows anterlisthesis of C2 on C3 with severe spinal cord compression C2-4 with cord signal change       --XR flex/ex of cervical spine shows no dynamic instability        --MRI thoracic spine shows remote compression fracture of T2, T3, T10, L2. Severe compression fracture of T8 with almost complete loss of height, chronic appearing.  --Further discussed surgical need/timing with patient and . Given that she does not have s/s acute cervical myelopathy will hold off on urgent surgical decompression until she is better optimized. With the clavicle fracture and NWB status it will be difficult to undergo rehab and therapy after a posterior cervical fusion. Her current cognitive status (hx Alzheimers dementia with current intermittent delirium), poor nutrition, and frail appearance would also make her post-operative course more challenging. Patient and  v/u.   -  Plan for outpatient follow-up after discharge.  --C-collar when OOB. Pediatric collar fitting appropriately.  --Afib: s/p ablation and left atrial appendage occlusion on 2/18/22: Xarelto and Plavix stopped in May per EP physician. Patient still taking ASA at home.    - Hold ASA at this time given acute SAH.   - PTT/INR wnl  --Left clavicle fracture: fracture from 7/3, missed f/u due to fall. Per Ortho recs at that time: NWB to LUE, sling for comfort, bactrim x 7 days for open fracture. Per  will be able to bear weight 3 weeks from injury, will f/u outpatient. Ortho consulted for this admission, appreciate their assistance:   - NWB LUE in figure-of-eight brace (patient not tolerating sling)   - Keep wound over left shoulder covered. Completed Bactrim for a total of 7 days.   - Recommend mobilization with a wheelchair given patient condition and inability to bear weight through the left upper extremity.   --Delirium: Psychiatry consulted 7/10 for suicidal ideation (told nurse she wanted to drown self) and for delirium.    - Recommended to discontinue melatonin.   - Delirium precautions. Reorient frequently. Avoid restraints.   --Continue to monitor clinically, notify NSGY immediately with any changes in neuro status  --HM consulted for co-management, medical optimization, appreciate recs; RCRI 1, Class 2 risk. Hyponatremia w/u ordered. Na improved with IVF. Signed off, stable.     --Dispo: Medically stable for discharge, pending placement. PT/OT and PM&R recs for SNF, pt was denied by facility. Plan for discharge to nursing unit of home assisted living facility.        Gloria Carreon PA-C  Neurosurgery  [facility] ([facility])

## 2022-07-16 NOTE — PLAN OF CARE
..POC and meds reviewed with patient. Questions encouraged and answered. Patient has dementia remains confused.Reorient prn. . V/S stable throughout shift; please see flowsheets for V/S information and full assessment data. NAEO. Siderails up x 3, bed locked in lowest position, call bell in reach, O2 & suction at bedside, TM.

## 2022-07-16 NOTE — PLAN OF CARE
Problem: Adult Inpatient Plan of Care  Goal: Plan of Care Review  Outcome: Ongoing, Progressing  Goal: Patient-Specific Goal (Individualized)  Outcome: Ongoing, Progressing  Goal: Absence of Hospital-Acquired Illness or Injury  Outcome: Ongoing, Progressing  Goal: Optimal Comfort and Wellbeing  Outcome: Ongoing, Progressing     Problem: Fall Injury Risk  Goal: Absence of Fall and Fall-Related Injury  Outcome: Ongoing, Progressing     Problem: Skin Injury Risk Increased  Goal: Skin Health and Integrity  Outcome: Ongoing, Progressing

## 2022-07-16 NOTE — SUBJECTIVE & OBJECTIVE
Interval History: NAEON. AFVSS. Pt remains neuro stable, eating breakfast, dementia per baseline. No acute complaints this am, states mild shoulder pain resolved w/ Tylenol. Planning for discharge to home facility nursing unit on Monday.    Medications:  Continuous Infusions:  Scheduled Meds:   donepeziL  10 mg Oral Daily    famotidine  20 mg Oral BID    ferrous sulfate  1 tablet Oral Daily    heparin (porcine)  5,000 Units Subcutaneous Q8H    levothyroxine  88 mcg Oral Before breakfast    montelukast  10 mg Oral QHS     PRN Meds:acetaminophen, albuterol, dextrose 10%, dextrose 10%, glucagon (human recombinant), glucose, glucose, glucose, methocarbamoL, polyethylene glycol     Review of Systems  Constitutional:  Negative for chills and fever.   Eyes:  Negative for photophobia and visual disturbance.   Respiratory:  Negative for cough and shortness of breath.    Gastrointestinal:  Negative for nausea and vomiting.   Genitourinary:  Negative for difficulty urinating and dysuria.   Musculoskeletal:  Positive for arthralgias and gait problem. Negative for neck pain.   Neurological:  Positive for speech difficulty and weakness. Negative for dizziness, facial asymmetry and headaches.   Psychiatric/Behavioral:  Positive for confusion. Negative for agitation.      Objective:     Weight: 40 kg (88 lb 2.9 oz)  Body mass index is 16.13 kg/m².  Vital Signs (Most Recent):  Temp: 98.5 °F (36.9 °C) (07/16/22 1142)  Pulse: 85 (07/16/22 1142)  Resp: 17 (07/16/22 1142)  BP: (!) 147/87 (07/16/22 1142)  SpO2: 96 % (07/16/22 1142)   Vital Signs (24h Range):  Temp:  [97.5 °F (36.4 °C)-98.5 °F (36.9 °C)] 98.5 °F (36.9 °C)  Pulse:  [62-89] 85  Resp:  [12-17] 17  SpO2:  [96 %-99 %] 96 %  BP: (115-147)/(67-87) 147/87                          Physical Exam    Neurosurgery Physical Exam    General: well developed, well nourished, no distress.   Head: normocephalic  GCS: Motor: 6/Verbal: 4/Eyes: 4 GCS Total: 14  Mental Status: Awake, Alert,  Oriented x 3 (person, place, situation), with higher order confusion. Follows commands.  Language: No aphasia  Speech: No dysarthria  Cranial nerves: face symmetric, tongue midline, CN II-XII grossly intact.   Eyes: pupils equal, round, reactive to light with accommodation, EOMI. Mild ptosis noted to left eye   Pulmonary: normal respirations, no signs of respiratory distress  Abdomen: soft, non-distended, not tender to palpation  Skin: Skin is warm, dry and intact.  Sensory: responds to light touch throughout  Motor Strength: Moves all extremities spontaneously, increased tone to BUE, at least 4/5 in BUE, 3/5 in left deltoid 2/2 rigidity and left clavicle fracture. BLE 4+/5. Decreased muscle mass throughout. No abnormal movements seen.     Pain limited motion in left shoulder 2/2 clavicle fracture. Figure of 8 brace in place.    Significant Labs:  Recent Labs   Lab 07/15/22  0310 07/16/22  0625   * 78   * 132*   K 3.9 4.2    101   CO2 26 23   BUN 16 18   CREATININE 0.6 0.6   CALCIUM 8.7 9.2     Recent Labs   Lab 07/15/22  0310 07/16/22  0625   WBC 5.24 5.39   HGB 10.9* 12.5   HCT 32.8* 38.3    322     No results for input(s): LABPT, INR, APTT in the last 48 hours.  Microbiology Results (last 7 days)       ** No results found for the last 168 hours. **          All pertinent labs from the last 24 hours have been reviewed.    Significant Diagnostics:  I have reviewed and interpreted all pertinent imaging results/findings within the past 24 hours.

## 2022-07-16 NOTE — PLAN OF CARE
Problem: Adult Inpatient Plan of Care  Goal: Plan of Care Review  7/16/2022 1249 by Rachael Harrington RN  Outcome: Ongoing, Progressing  7/16/2022 1247 by Rachael Harrington RN  Outcome: Ongoing, Progressing  Goal: Patient-Specific Goal (Individualized)  7/16/2022 1249 by Rachael Harrington RN  Outcome: Ongoing, Progressing  7/16/2022 1247 by Rachael Harrington RN  Outcome: Ongoing, Progressing  Goal: Absence of Hospital-Acquired Illness or Injury  7/16/2022 1249 by Rachael Harrington RN  Outcome: Ongoing, Progressing  7/16/2022 1247 by Rachael Harrington RN  Outcome: Ongoing, Progressing  Goal: Optimal Comfort and Wellbeing  7/16/2022 1249 by Rachael Harrington RN  Outcome: Ongoing, Progressing  7/16/2022 1247 by Rachael Harrington RN  Outcome: Ongoing, Progressing     Problem: Fall Injury Risk  Goal: Absence of Fall and Fall-Related Injury  7/16/2022 1249 by Rachael Harrington RN  Outcome: Ongoing, Progressing  7/16/2022 1247 by Rachael Harrington RN  Outcome: Ongoing, Progressing     Problem: Skin Injury Risk Increased  Goal: Skin Health and Integrity  7/16/2022 1249 by Rachael Harrington RN  Outcome: Ongoing, Progressing  7/16/2022 1247 by Rachael Harrington RN  Outcome: Ongoing, Progressing     Problem: Impaired Wound Healing  Goal: Optimal Wound Healing  Outcome: Ongoing, Progressing

## 2022-07-17 PROCEDURE — 25000003 PHARM REV CODE 250

## 2022-07-17 PROCEDURE — 11000001 HC ACUTE MED/SURG PRIVATE ROOM

## 2022-07-17 RX ADMIN — DONEPEZIL HYDROCHLORIDE 10 MG: 10 TABLET ORAL at 08:07

## 2022-07-17 RX ADMIN — ACETAMINOPHEN 650 MG: 325 TABLET ORAL at 05:07

## 2022-07-17 RX ADMIN — METHOCARBAMOL 500 MG: 500 TABLET ORAL at 06:07

## 2022-07-17 RX ADMIN — ACETAMINOPHEN 650 MG: 325 TABLET ORAL at 09:07

## 2022-07-17 RX ADMIN — ACETAMINOPHEN 650 MG: 325 TABLET ORAL at 06:07

## 2022-07-17 RX ADMIN — MONTELUKAST 10 MG: 10 TABLET, FILM COATED ORAL at 08:07

## 2022-07-17 RX ADMIN — LEVOTHYROXINE SODIUM 88 MCG: 88 TABLET ORAL at 05:07

## 2022-07-17 RX ADMIN — METHOCARBAMOL 500 MG: 500 TABLET ORAL at 09:07

## 2022-07-17 NOTE — PLAN OF CARE
..POC and meds reviewed with patient. Questions encouraged and answered. Patient verbalized understanding. V/S stable throughout shift; please see flowsheets for V/S information and full assessment data. NAEO. Siderails up x 3, bed locked in lowest position, call bell in reach, O2 & suction at bedside, WCTM.

## 2022-07-18 VITALS
TEMPERATURE: 98 F | OXYGEN SATURATION: 97 % | HEIGHT: 62 IN | WEIGHT: 88.19 LBS | RESPIRATION RATE: 16 BRPM | BODY MASS INDEX: 16.23 KG/M2 | HEART RATE: 84 BPM | DIASTOLIC BLOOD PRESSURE: 77 MMHG | SYSTOLIC BLOOD PRESSURE: 136 MMHG

## 2022-07-18 PROCEDURE — 99232 SBSQ HOSP IP/OBS MODERATE 35: CPT | Mod: ,,, | Performed by: PHYSICIAN ASSISTANT

## 2022-07-18 PROCEDURE — 25000003 PHARM REV CODE 250

## 2022-07-18 PROCEDURE — 63600175 PHARM REV CODE 636 W HCPCS: Performed by: STUDENT IN AN ORGANIZED HEALTH CARE EDUCATION/TRAINING PROGRAM

## 2022-07-18 PROCEDURE — 99232 PR SUBSEQUENT HOSPITAL CARE,LEVL II: ICD-10-PCS | Mod: ,,, | Performed by: PHYSICIAN ASSISTANT

## 2022-07-18 PROCEDURE — 97530 THERAPEUTIC ACTIVITIES: CPT | Mod: CQ

## 2022-07-18 PROCEDURE — 97110 THERAPEUTIC EXERCISES: CPT | Mod: CQ

## 2022-07-18 RX ADMIN — DONEPEZIL HYDROCHLORIDE 10 MG: 10 TABLET ORAL at 09:07

## 2022-07-18 RX ADMIN — LEVOTHYROXINE SODIUM 88 MCG: 88 TABLET ORAL at 05:07

## 2022-07-18 NOTE — SUBJECTIVE & OBJECTIVE
Interval History: 7/17: TIFFANIE. RADHAVSS. Neuro stable. Asking for crackers at bedside.    Medications:  Continuous Infusions:  Scheduled Meds:   donepeziL  10 mg Oral Daily    famotidine  20 mg Oral BID    ferrous sulfate  1 tablet Oral Daily    heparin (porcine)  5,000 Units Subcutaneous Q8H    levothyroxine  88 mcg Oral Before breakfast    montelukast  10 mg Oral QHS     PRN Meds:acetaminophen, albuterol, dextrose 10%, dextrose 10%, glucagon (human recombinant), glucose, glucose, glucose, methocarbamoL, polyethylene glycol     Review of Systems  Objective:     Weight: 40 kg (88 lb 2.9 oz)  Body mass index is 16.13 kg/m².  Vital Signs (Most Recent):  Temp: 98.6 °F (37 °C) (07/17/22 2000)  Pulse: 67 (07/17/22 2000)  Resp: 18 (07/17/22 2000)  BP: 124/62 (07/17/22 2000)  SpO2: 95 % (07/17/22 2000) Vital Signs (24h Range):  Temp:  [97.8 °F (36.6 °C)-98.6 °F (37 °C)] 98.6 °F (37 °C)  Pulse:  [] 67  Resp:  [16-18] 18  SpO2:  [95 %-98 %] 95 %  BP: (118-148)/(58-75) 124/62     Date 07/17/22 0700 - 07/18/22 0659   Shift 8641-4321 3699-3224 9848-5596 24 Hour Total   INTAKE   P.O. 1300 118  1418   Shift Total(mL/kg) 1300(32.5) 118(3)  1418(35.5)   OUTPUT   Shift Total(mL/kg)       Weight (kg) 40 40 40 40                        Physical Exam    Neurosurgery Physical Exam    General: well developed, well nourished, no distress.   Head: normocephalic  GCS: Motor: 6/Verbal: 4/Eyes: 4 GCS Total: 14  Mental Status: Awake, Alert, Oriented x 3 (person, place, situation), with higher order confusion. Follows commands.  Language: No aphasia  Speech: No dysarthria  Cranial nerves: face symmetric, tongue midline, CN II-XII grossly intact.   Eyes: pupils equal, round, reactive to light with accommodation, EOMI. Mild ptosis noted to left eye   Pulmonary: normal respirations, no signs of respiratory distress  Abdomen: soft, non-distended, not tender to palpation  Skin: Skin is warm, dry and intact.  Sensory: responds to light touch  throughout  Motor Strength: Moves all extremities spontaneously, increased tone to BUE, at least 4/5 in BUE, 3/5 in left deltoid 2/2 rigidity and left clavicle fracture. BLE 4+/5. Decreased muscle mass throughout. No abnormal movements seen.                Pain limited motion in left shoulder 2/2 clavicle fracture. Figure of 8 brace in place  Significant Labs:  Recent Labs   Lab 07/16/22  0625   GLU 78   *   K 4.2      CO2 23   BUN 18   CREATININE 0.6   CALCIUM 9.2     Recent Labs   Lab 07/16/22  0625   WBC 5.39   HGB 12.5   HCT 38.3        No results for input(s): LABPT, INR, APTT in the last 48 hours.  Microbiology Results (last 7 days)       ** No results found for the last 168 hours. **          All pertinent labs from the last 24 hours have been reviewed.    Significant Diagnostics:  I have reviewed and interpreted all pertinent imaging results/findings within the past 24 hours.

## 2022-07-18 NOTE — PROGRESS NOTES
[facility] - Neurosurgery ([facility])  Neurosurgery  Progress Note    Subjective:     History of Present Illness: 71yo F PMH AVNRT, frequent falls, presenting as neurosurgery consult due to L temporal SAH noted on CTH in AM. Per patient's  was found down after hearing fall at 0830. More confusion present than baseline. Patient states she takes aspirin and plavix but hasn't taken the plavix in a while. She is supposed to use a walker at baseline but now is NWB to left arm due a left clavicle fx 7/3. CT c-spine showed C2-C3 anterolisthesis        Post-Op Info:  Procedure(s) (LRB):  C1-C3 Posterior cervical fusion. [employer]. neuromonitoring. sadaf harris. [place]. (N/A)         Interval History: 7/17: TIFFANIE. AFVSS. Neuro stable. Asking for crackers at bedside.    Medications:  Continuous Infusions:  Scheduled Meds:   donepeziL  10 mg Oral Daily    famotidine  20 mg Oral BID    ferrous sulfate  1 tablet Oral Daily    heparin (porcine)  5,000 Units Subcutaneous Q8H    levothyroxine  88 mcg Oral Before breakfast    montelukast  10 mg Oral QHS     PRN Meds:acetaminophen, albuterol, dextrose 10%, dextrose 10%, glucagon (human recombinant), glucose, glucose, glucose, methocarbamoL, polyethylene glycol     Review of Systems  Objective:     Weight: 40 kg (88 lb 2.9 oz)  Body mass index is 16.13 kg/m².  Vital Signs (Most Recent):  Temp: 98.6 °F (37 °C) (07/17/22 2000)  Pulse: 67 (07/17/22 2000)  Resp: 18 (07/17/22 2000)  BP: 124/62 (07/17/22 2000)  SpO2: 95 % (07/17/22 2000) Vital Signs (24h Range):  Temp:  [97.8 °F (36.6 °C)-98.6 °F (37 °C)] 98.6 °F (37 °C)  Pulse:  [] 67  Resp:  [16-18] 18  SpO2:  [95 %-98 %] 95 %  BP: (118-148)/(58-75) 124/62     Date 07/17/22 0700 - 07/18/22 0659   Shift 5233-2868 4878-5435 6225-4538 24 Hour Total   INTAKE   P.O. 1300 118  1418   Shift Total(mL/kg) 1300(32.5) 118(3)  1418(35.5)   OUTPUT   Shift Total(mL/kg)       Weight (kg) 40 40 40 40                        Physical  Exam    Neurosurgery Physical Exam    General: well developed, well nourished, no distress.   Head: normocephalic  GCS: Motor: 6/Verbal: 4/Eyes: 4 GCS Total: 14  Mental Status: Awake, Alert, Oriented x 3 (person, place, situation), with higher order confusion. Follows commands.  Language: No aphasia  Speech: No dysarthria  Cranial nerves: face symmetric, tongue midline, CN II-XII grossly intact.   Eyes: pupils equal, round, reactive to light with accommodation, EOMI. Mild ptosis noted to left eye   Pulmonary: normal respirations, no signs of respiratory distress  Abdomen: soft, non-distended, not tender to palpation  Skin: Skin is warm, dry and intact.  Sensory: responds to light touch throughout  Motor Strength: Moves all extremities spontaneously, increased tone to BUE, at least 4/5 in BUE, 3/5 in left deltoid 2/2 rigidity and left clavicle fracture. BLE 4+/5. Decreased muscle mass throughout. No abnormal movements seen.                Pain limited motion in left shoulder 2/2 clavicle fracture. Figure of 8 brace in place  Significant Labs:  Recent Labs   Lab 07/16/22  0625   GLU 78   *   K 4.2      CO2 23   BUN 18   CREATININE 0.6   CALCIUM 9.2     Recent Labs   Lab 07/16/22  0625   WBC 5.39   HGB 12.5   HCT 38.3        No results for input(s): LABPT, INR, APTT in the last 48 hours.  Microbiology Results (last 7 days)       ** No results found for the last 168 hours. **          All pertinent labs from the last 24 hours have been reviewed.    Significant Diagnostics:  I have reviewed and interpreted all pertinent imaging results/findings within the past 24 hours.    Assessment/Plan:     SAH (subarachnoid hemorrhage)  71yo F PMH AVNRT, frequent falls, presenting as neurosurgery consult due to L temporal SAH noted on CTH in AM. Per patient's  was found down after hearing fall at 0830. More confusion present than baseline. Patient states she takes aspirin and plavix but hasn't taken the  plavix in a while. She is supposed to use a walker at baseline but now is NWB to left arm due a left clavicle fx 7/3. CT c-spine showed C2-C3 anterolisthesis.    --Neuro exam stable without focal weakness. Wax/waning mental status.        -- q4h neurochecks on floor  --All labs and diagnostics reviewed       --CTH 7/7: L temporal SAH, stable on repeat scan       --CTA 7/8 without aneurysm, stable appearing bleed       --CT c-spine 7/7: anterolisthesis C2-C3       --MRI cervical spine 7/7 shows anterlisthesis of C2 on C3 with severe spinal cord compression C2-4 with cord signal change       --XR flex/ex of cervical spine shows no dynamic instability        --MRI thoracic spine shows remote compression fracture of T2, T3, T10, L2. Severe compression fracture of T8 with almost complete loss of height, chronic appearing.  --Further discussed surgical need/timing with patient and . Given that she does not have s/s acute cervical myelopathy will hold off on urgent surgical decompression until she is better optimized. With the clavicle fracture and NWB status it will be difficult to undergo rehab and therapy after a posterior cervical fusion. Her current cognitive status (hx Alzheimers dementia with current intermittent delirium), poor nutrition, and frail appearance would also make her post-operative course more challenging. Patient and  v/u.   - Plan for outpatient follow-up after discharge.  --C-collar when OOB. Pediatric collar fitting appropriately.  --Afib: s/p ablation and left atrial appendage occlusion on 2/18/22: Xarelto and Plavix stopped in May per EP physician. Patient still taking ASA at home.    - Hold ASA at this time given acute SAH.   - PTT/INR wnl  --Left clavicle fracture: fracture from 7/3, missed f/u due to fall. Per Ortho recs at that time: NWB to LUE, sling for comfort, bactrim x 7 days for open fracture. Per  will be able to bear weight 3 weeks from injury, will f/u outpatient.  Ortho consulted for this admission, appreciate their assistance:   - NWB LUE in figure-of-eight brace (patient not tolerating sling)   - Keep wound over left shoulder covered. Completed Bactrim for a total of 7 days.   - Recommend mobilization with a wheelchair given patient condition and inability to bear weight through the left upper extremity.   --Delirium: Psychiatry consulted 7/10 for suicidal ideation (told nurse she wanted to drown self) and for delirium.    - Recommended to discontinue melatonin.   - Delirium precautions. Reorient frequently. Avoid restraints.   --Continue to monitor clinically, notify NSGY immediately with any changes in neuro status  --HM consulted for co-management, medical optimization, appreciate recs; RCRI 1, Class 2 risk. Hyponatremia w/u ordered. Na improved with IVF. Signed off, stable.     --Dispo: Medically stable for discharge, pending placement. PT/OT and PM&R recs for SNF, pt was denied by facility. Plan for discharge to nursing unit of home assisted living facility.        Chip Lorenzana MD  Neurosurgery  [facility] - Neurosurgery ([facility])

## 2022-07-18 NOTE — PLAN OF CARE
[facility]    FACILITY TRANSFER ORDERS      Patient Name: Yvette Crews  YOB: 1950    PCP: Sally Green MD   PCP Address: [address] / [place] [address]  PCP Phone Number: [phone]  PCP Fax: [phone]    Encounter Date: 07/18/2022    Admit to: SNF    Vital Signs:  Routine    Diagnoses:   Active Hospital Problems    Diagnosis  POA    *Cervical stenosis of spinal canal [M48.02]  Yes    Severe protein-calorie malnutrition [E43]  Yes    Delirium due to general medical condition [F05]  No    Hyponatremia [E87.1]  Unknown    Pre-operative exam [Z01.818]  Not Applicable    SAH (subarachnoid hemorrhage) [I60.9]  Yes    Open displaced fracture of acromial end of left clavicle [S42.032B]  Yes    Fracture of left clavicle [S42.002A]  Yes    Iron deficiency anemia [D50.9]  Yes    Paroxysmal atrial fibrillation [I48.0]  Yes     Chronic    Postoperative hypothyroidism [E89.0]  Yes     Chronic    Adult bronchiectasis [J47.9]  Yes    Major depressive disorder, recurrent episode, in full remission [F33.42]  Yes      Resolved Hospital Problems   No resolved problems to display.       Allergies:  Review of patient's allergies indicates:   Allergen Reactions    Adhesive      Tape tears skin    Buspar [buspirone]      headaches    Escitalopram oxalate Nausea Only     hyponatremia      Rifampin Rash       Diet: regular diet    Activities: Activity as tolerated  Clavicle Fracture Instructions  -Left arm non-weight bearing with range of motion as tolerated. Wear figure of eight brace, may remove as needed for hygiene purposes. Follow up in Ortho trauma clinic for nonoperative management of clavicle fracture, scheduled for 7/21/22 with Dr. Tamez.    Neurosurgery Spine Instructions  Activity Restrictions:  [x]  No lifting greater than 10 pounds.  [x]  Avoid bending and twisting the neck more than 45 degrees from neutral position in any direction.  [x]  Wear your cervical  collar when you are out of the bed and walking.     Goals of Care Treatment Preferences:  Code Status: Full Code    Living Will: Yes          Nursing:   - fall precautions  - delirium precautions:   -During the day,open the shades and turn on the lights.   -If patient is in private room, please make sure they are close to the window.     -Make sure the correct date and time are written on the white board, as well as the current care team   - Minimize interruptions at night-time (close shades and turn off TV.)   - No vitals between 11PM and 5AM   - When possible during daytime, make sure patient is in chair and provide activities that engage patient.   - Please make sure patient has hearing aids and glasses while awake (if applicable).      Labs: CBC and BMP per facility protocol     CONSULTS:     Physical Therapy to evaluate and treat. , Occupational Therapy to evaluate and treat., Speech Therapy to evaluate and treat for Cognition. and  to evaluate for community resources/long-range planning.    MISCELLANEOUS CARE:  Facility provider to assess ongoing need for subcutaneous heparin for DVT prophylaxis.      WOUND CARE ORDERS  None    Medications: Review discharge medications with patient and family and provide education.      [x]  Do not take ANY Aspirin or Aspirin containing products for 2 weeks.   [x]  Do not take ANY herbal supplements for 2 weeks    [x]  Do not take ANY non-steroidal anti-inflammatory drugs (NSAIDS), including the following: ibuprofen, naprosyn, Aleve, Advil, Indocin, Mobic, or Celebrex for 2 weeks.       Current Discharge Medication List      CONTINUE these medications which have NOT CHANGED    Details   acetaminophen (TYLENOL) 325 MG tablet Take 2 tablets (650 mg total) by mouth every 6 (six) hours as needed for Pain.  Refills: 0      albuterol (PROVENTIL/VENTOLIN HFA) 90 mcg/actuation inhaler Inhale 2 puffs into the lungs every 6 (six) hours as needed for Wheezing. Rescue  Qty:  18 g, Refills: 1    Associated Diagnoses: Adult bronchiectasis; Cough      calcium-vitamin D3-vitamin K (VIACTIV) 650 mg-12.5 mcg-40 mcg Chew Chew 1 by mouth twice daily      docusate calcium (SURFAK) 240 mg capsule Take 240 mg by mouth 3 (three) times daily.      donepeziL (ARICEPT) 10 MG tablet Take 1 tablet (10 mg total) by mouth once daily.  Qty: 30 tablet, Refills: 11    Associated Diagnoses: Late onset Alzheimer's dementia without behavioral disturbance      famotidine (PEPCID) 40 MG tablet Take 1 tablet (40 mg total) by mouth once daily.  Qty: 30 tablet, Refills: 5    Associated Diagnoses: Iron deficiency anemia due to chronic blood loss      ferrous sulfate 325 (65 FE) MG EC tablet Take 1 tablet (325 mg total) by mouth once daily.  Qty: 30 tablet, Refills: 5    Associated Diagnoses: Iron deficiency anemia due to chronic blood loss      guaiFENesin (MUCINEX) 600 mg 12 hr tablet Take 2 tablets (1,200 mg total) by mouth 2 (two) times daily.  Qty: 120 tablet, Refills: 6    Associated Diagnoses: Adult bronchiectasis      Immune Globulin G, IGG,-PRO-IGA 10 % injection, Privigen, (PRIVIGEN) 10 % Soln Infuse 20 g into the vein every 4 weeks.  Qty: 200 mL, Refills: 0      levothyroxine (SYNTHROID) 88 MCG tablet Take 1 tablet (88 mcg total) by mouth before breakfast.  Qty: 90 tablet, Refills: 1    Associated Diagnoses: Postoperative hypothyroidism      loratadine (CLARITIN) 10 mg tablet Take 1 tablet (10 mg total) by mouth once daily.  Qty: 30 tablet, Refills: 5      methocarbamoL (ROBAXIN) 500 MG Tab Take 1 tablet (500 mg total) by mouth 3 (three) times daily as needed (Musce spasms).  Qty: 20 tablet, Refills: 0      montelukast (SINGULAIR) 10 mg tablet Take 1 tablet (10 mg total) by mouth every evening.  Qty: 90 tablet, Refills: 1    Associated Diagnoses: Adult bronchiectasis      pedi multivit no.7/folic acid (FLINTSTONES MULTI-VIT GUMMIES ORAL) Chew 2 gummies by mouth every day      polyethylene glycol (GLYCOLAX)  17 gram PwPk Take 17 g by mouth once daily.  Refills: 0      pumpkin seed extract/soy germ (AZO BLADDER CONTROL ORAL) Take 1 tablet by mouth 2 (two) times a day.         STOP taking these medications       aspirin (ECOTRIN) 81 MG EC tablet Comments:   Reason for Stopping:         clopidogreL (PLAVIX) 75 mg tablet Comments:   Reason for Stopping:         sulfamethoxazole-trimethoprim 800-160mg (BACTRIM DS) 800-160 mg Tab Comments:   Reason for Stopping:         XARELTO 20 mg Tab Comments:   Reason for Stopping:                  Immunizations Administered as of 7/18/2022     Name Date Dose VIS Date Route Exp Date    COVID-19, MRNA, LN-S, PF (Moderna) 9/20/2021  1:19 PM 0.5 mL 12/19/2020 Intramuscular 12/1/2021    Site: Left deltoid     Given By: Rocio Zabala, PharmD     : [employer]     Lot: [id number]     COVID-19, MRNA, LN-S, PF (Moderna) 2/1/2021 0.5 mL -- Intramuscular --    Site: Right arm     Lot: [id number]     COVID-19, MRNA, LN-S, PF (Moderna) 12/31/2020 0.5 mL -- Intramuscular --    Site: Left arm     Lot: [id number]           This patient has had both covid vaccinations    Some patients may experience side effects after vaccination.  These may include fever, headache, muscle or joint aches.  Most symptoms resolve with 24-48 hours and do not require urgent medical evaluation unless they persist for more than 72 hours or symptoms are concerning for an unrelated medical condition.          _________________________________  Gina Ramesh PA-C  07/18/2022

## 2022-07-18 NOTE — PROGRESS NOTES
Devan Juarez - Neurosurgery ([facility])  Neurosurgery  Progress Note    Subjective:     History of Present Illness: 73yo F PMH AVNRT, frequent falls, presenting as neurosurgery consult due to L temporal SAH noted on CTH in AM. Per patient's  was found down after hearing fall at 0830. More confusion present than baseline. Patient states she takes aspirin and plavix but hasn't taken the plavix in a while. She is supposed to use a walker at baseline but now is NWB to left arm due a left clavicle fx 7/3. CT c-spine showed C2-C3 anterolisthesis        Post-Op Info:  Procedure(s) (LRB):  C1-C3 Posterior cervical fusion. A4 Data. neuromonitoring. sadaf harris. [place]. (N/A)         Interval History: 7/18: TIFFANIE. AFVSS. Neuro stable. Planning for discharge today to SNF pending bed availability.    Medications:  Continuous Infusions:  Scheduled Meds:   donepeziL  10 mg Oral Daily    famotidine  20 mg Oral BID    ferrous sulfate  1 tablet Oral Daily    heparin (porcine)  5,000 Units Subcutaneous Q8H    levothyroxine  88 mcg Oral Before breakfast    montelukast  10 mg Oral QHS     PRN Meds:acetaminophen, albuterol, dextrose 10%, dextrose 10%, glucagon (human recombinant), glucose, glucose, glucose, methocarbamoL, polyethylene glycol     Review of Systems   Constitutional:  Negative for chills and fever.   HENT:  Negative for hearing loss.    Eyes:  Negative for visual disturbance.   Respiratory:  Negative for cough and shortness of breath.    Cardiovascular:  Negative for chest pain, palpitations and leg swelling.   Gastrointestinal:  Negative for abdominal pain, nausea and vomiting.   Genitourinary:  Negative for dysuria.   Musculoskeletal:  Positive for arthralgias.   Neurological:  Positive for weakness. Negative for dizziness, facial asymmetry and headaches.   Psychiatric/Behavioral:  Positive for confusion.    Objective:     Weight: 40 kg (88 lb 2.9 oz)  Body mass index is 16.13 kg/m².  Vital Signs (Most  Recent):  Temp: 97.4 °F (36.3 °C) (07/18/22 0809)  Pulse: 82 (07/18/22 0809)  Resp: 16 (07/18/22 0809)  BP: (!) 177/94 (07/18/22 0809)  SpO2: 98 % (07/18/22 0809)   Vital Signs (24h Range):  Temp:  [97.4 °F (36.3 °C)-98.6 °F (37 °C)] 97.4 °F (36.3 °C)  Pulse:  [67-82] 82  Resp:  [16-18] 16  SpO2:  [95 %-98 %] 98 %  BP: (122-177)/(62-94) 177/94                 General: well developed, well nourished, no distress.   Head: normocephalic, atraumatic  Neurologic: Alert and oriented. Thought content appropriate.  GCS: Motor: 6/Verbal: 4/Eyes: 4 GCS Total: 14  Mental Status: Awake, Alert, Oriented x 3, with higher order confusion  Language: No aphasia  Speech: No dysarthria  Cranial nerves: face symmetric, tongue midline, CN II-XII grossly intact.   Eyes: pupils equal, round, reactive to light with accomodation, EOMI. Mild ptosis noted to left eye  Pulmonary: normal respirations, no signs of respiratory distress  Abdomen: soft, non-distended, not tender to palpation  Skin: Skin is warm, dry and intact.  Sensory: responds to light touch throughout  Plascencia: present  Motor Strength: Moves all extremities spontaneously, increased tone to BUE, at least 4/5 in BUE, 3/5 in left deltoid 2/2 rigidity and left clavicle fracture. BLE 4/5. Decreased muscle mass throughout.  No abnormal movements seen.      Pain limited motion in left shoulder 2/2 clavicle fracture.  Figure of 8 brace in place.        Significant Labs:  No results for input(s): GLU, NA, K, CL, CO2, BUN, CREATININE, CALCIUM, MG in the last 48 hours.  No results for input(s): WBC, HGB, HCT, PLT in the last 48 hours.  No results for input(s): LABPT, INR, APTT in the last 48 hours.  Microbiology Results (last 7 days)       ** No results found for the last 168 hours. **          Recent Lab Results       None          All pertinent labs from the last 24 hours have been reviewed.    Significant Diagnostics:  I have reviewed all pertinent imaging results/findings within the  past 24 hours.    Assessment/Plan:     SAH (subarachnoid hemorrhage)  73yo F PMH AVNRT, frequent falls, presenting as neurosurgery consult due to L temporal SAH noted on CTH in AM. Per patient's  was found down after hearing fall at 0830. More confusion present than baseline. Patient states she takes aspirin and plavix but hasn't taken the plavix in a while. She is supposed to use a walker at baseline but now is NWB to left arm due a left clavicle fx 7/3. CT c-spine showed C2-C3 anterolisthesis.    --Neuro exam stable without focal weakness. Wax/waning mental status.        -- q4h neurochecks on floor  --All labs and diagnostics reviewed       --CTH 7/7: L temporal SAH, stable on repeat scan       --CTA 7/8 without aneurysm, stable appearing bleed       --CT c-spine 7/7: anterolisthesis C2-C3       --MRI cervical spine 7/7 shows anterlisthesis of C2 on C3 with severe spinal cord compression C2-4 with cord signal change       --XR flex/ex of cervical spine shows no dynamic instability        --MRI thoracic spine shows remote compression fracture of T2, T3, T10, L2. Severe compression fracture of T8 with almost complete loss of height, chronic appearing.  --Further discussed surgical need/timing with patient and . Given that she does not have s/s acute cervical myelopathy will hold off on urgent surgical decompression until she is better optimized. With the clavicle fracture and NWB status it will be difficult to undergo rehab and therapy after a posterior cervical fusion. Her current cognitive status (hx Alzheimers dementia with current intermittent delirium), poor nutrition, and frail appearance would also make her post-operative course more challenging. Patient and  v/u.   - Plan for outpatient follow-up after discharge.  --C-collar when OOB. Pediatric collar fitting appropriately.  --Afib: s/p ablation and left atrial appendage occlusion on 2/18/22: Xarelto and Plavix stopped in May per EP  physician. Patient still taking ASA at home.    - Hold ASA at this time given acute SAH.   - PTT/INR wnl   - [id number] Score: 2. Anticoagulation not indicated due to watchman device.  --Left clavicle fracture: fracture from 7/3, missed f/u due to fall. Per Ortho recs at that time: NWB to LUE, sling for comfort, bactrim x 7 days for open fracture. Per  will be able to bear weight 3 weeks from injury, will f/u outpatient. Ortho consulted for this admission, appreciate their assistance:   - NWB LUE in figure-of-eight brace (patient not tolerating sling)   - Keep wound over left shoulder covered. Completed Bactrim for a total of 7 days.   - Recommend mobilization with a wheelchair given patient condition and inability to bear weight through the left upper extremity.   --Delirium: Psychiatry consulted 7/10 for suicidal ideation (told nurse she wanted to drown self) and for delirium.    - Recommended to discontinue melatonin.   - Delirium precautions. Reorient frequently. Avoid restraints.   --Continue to monitor clinically, notify NSGY immediately with any changes in neuro status  --HM consulted for co-management, medical optimization, appreciate recs; RCRI 1, Class 2 risk. Hyponatremia w/u ordered. Na improved with IVF. Signed off, stable.     --Dispo: Medically stable for discharge, pending placement. PT/OT and PM&R recs for SNF, pt was denied by facility. Plan for discharge to nursing unit of home assisted living facility.        Gina Ramesh PA-C  Neurosurgery  Devan Juarez - Neurosurgery ([facility])

## 2022-07-18 NOTE — SUBJECTIVE & OBJECTIVE
Interval History: 7/18: LINOON. AFVSS. Neuro stable. Planning for discharge today to SNF pending bed availability.    Medications:  Continuous Infusions:  Scheduled Meds:   donepeziL  10 mg Oral Daily    famotidine  20 mg Oral BID    ferrous sulfate  1 tablet Oral Daily    heparin (porcine)  5,000 Units Subcutaneous Q8H    levothyroxine  88 mcg Oral Before breakfast    montelukast  10 mg Oral QHS     PRN Meds:acetaminophen, albuterol, dextrose 10%, dextrose 10%, glucagon (human recombinant), glucose, glucose, glucose, methocarbamoL, polyethylene glycol     Review of Systems   Constitutional:  Negative for chills and fever.   HENT:  Negative for hearing loss.    Eyes:  Negative for visual disturbance.   Respiratory:  Negative for cough and shortness of breath.    Cardiovascular:  Negative for chest pain, palpitations and leg swelling.   Gastrointestinal:  Negative for abdominal pain, nausea and vomiting.   Genitourinary:  Negative for dysuria.   Musculoskeletal:  Positive for arthralgias.   Neurological:  Positive for weakness. Negative for dizziness, facial asymmetry and headaches.   Psychiatric/Behavioral:  Positive for confusion.    Objective:     Weight: 40 kg (88 lb 2.9 oz)  Body mass index is 16.13 kg/m².  Vital Signs (Most Recent):  Temp: 97.4 °F (36.3 °C) (07/18/22 0809)  Pulse: 82 (07/18/22 0809)  Resp: 16 (07/18/22 0809)  BP: (!) 177/94 (07/18/22 0809)  SpO2: 98 % (07/18/22 0809)   Vital Signs (24h Range):  Temp:  [97.4 °F (36.3 °C)-98.6 °F (37 °C)] 97.4 °F (36.3 °C)  Pulse:  [67-82] 82  Resp:  [16-18] 16  SpO2:  [95 %-98 %] 98 %  BP: (122-177)/(62-94) 177/94                 General: well developed, well nourished, no distress.   Head: normocephalic, atraumatic  Neurologic: Alert and oriented. Thought content appropriate.  GCS: Motor: 6/Verbal: 4/Eyes: 4 GCS Total: 14  Mental Status: Awake, Alert, Oriented x 3, with higher order confusion  Language: No aphasia  Speech: No dysarthria  Cranial nerves: face  symmetric, tongue midline, CN II-XII grossly intact.   Eyes: pupils equal, round, reactive to light with accomodation, EOMI. Mild ptosis noted to left eye  Pulmonary: normal respirations, no signs of respiratory distress  Abdomen: soft, non-distended, not tender to palpation  Skin: Skin is warm, dry and intact.  Sensory: responds to light touch throughout  Plascencia: present  Motor Strength: Moves all extremities spontaneously, increased tone to BUE, at least 4/5 in BUE, 3/5 in left deltoid 2/2 rigidity and left clavicle fracture. BLE 4/5. Decreased muscle mass throughout.  No abnormal movements seen.      Pain limited motion in left shoulder 2/2 clavicle fracture.  Figure of 8 brace in place.        Significant Labs:  No results for input(s): GLU, NA, K, CL, CO2, BUN, CREATININE, CALCIUM, MG in the last 48 hours.  No results for input(s): WBC, HGB, HCT, PLT in the last 48 hours.  No results for input(s): LABPT, INR, APTT in the last 48 hours.  Microbiology Results (last 7 days)       ** No results found for the last 168 hours. **          Recent Lab Results       None          All pertinent labs from the last 24 hours have been reviewed.    Significant Diagnostics:  I have reviewed all pertinent imaging results/findings within the past 24 hours.

## 2022-07-18 NOTE — PLAN OF CARE
Devan Juarez - Neurosurgery (Hospital)  Discharge Final Note    Primary Care Provider: Sally Green MD    Expected Discharge Date: 7/18/2022     Patient to be discharged to [facility].  Care deferred to [facility]. Patient to be transported via stretcher provided per [facility].  Neurosurgery clinic to schedule follow up appointment.    Nurse to call report to [facility] at [phone], ext [phone].  3rd Floor Room 313.  Stretcher requested for 5:00 pm which is not a guaranteed arrival time.    Future Appointments   Date Time Provider Department Center   7/21/2022  9:45 AM Alcides Tamez MD [facility] ORTHO Devan fahad   7/28/2022  9:30 AM INFUSION, CHAIR 2 [facility] AMB INF [facility]   8/24/2022 12:00 PM [facility] OI-CT1 500 LB LIMIT [facility]   8/24/2022  1:30 PM Gina Ramesh PA-C [facility] NEUROS8 Devan On license of [facility]   8/24/2022  2:00 PM INFUSION, CHAIR 9 [facility] AMB INF [facility]   9/14/2022  9:40 AM [facility] [place]   10/6/2022  2:00 PM Marilee Lloyd, OD [facility] OPTICLB [facility]an Juarez       Final Discharge Note (most recent)     Final Note - 07/18/22 1512        Final Note    Assessment Type Final Discharge Note        Post-Acute Status    Post-Acute Authorization Placement;Home Health     Post-Acute Placement Status Set-up Complete/Auth obtained     Home Health Status Discharge Plan Changed     Discharge Delays None known at this time                 Important Message from Medicare  Important Message from Medicare regarding Discharge Appeal Rights: Given to patient/caregiver, Explained to patient/caregiver, Signed/date by patient/caregiver     Date IMM was signed: 07/18/22  Time IMM was signed: 1144    Contact Info     [facility]        Next Steps: Go in 1 week(s)    Instructions: for clavicle fracture follow-up    Devan Juarez - Neurosurgery 8th Fl   Specialty: Neurosurgery    [address]  [facility] [address]   Phone: [phone]       Next Steps: Follow up in 1  month(s)    Instructions: for follow-up head bleed and cervical stenosis

## 2022-07-18 NOTE — PT/OT/SLP PROGRESS
Physical Therapy Treatment    Patient Name:  Yvette Crews   MRN:  [id number]    Recommendations:     Discharge Recommendations:  nursing facility, skilled   Discharge Equipment Recommendations:  (TBD)   Barriers to discharge: Increased level of assistance     Assessment:     Yvette Crews is a 72 y.o. female admitted with a medical diagnosis of Cervical stenosis of spinal canal.  She presents with the following impairments/functional limitations:  weakness, impaired endurance, impaired self care skills, impaired functional mobility, impaired balance, impaired cognition, orthopedic precautions, impaired cardiopulmonary response to activity . Pt participated, and tolerated treatment well. Pt will continue to benefit from skilled PT services to improve all deficits noted above. Resume PT poc as indicated.     Rehab Prognosis: Good; patient would benefit from acute skilled PT services to address these deficits and reach maximum level of function.    Recent Surgery: Procedure(s) (LRB):  C1-C3 Posterior cervical fusion. depuy. neuromonitoring. sadaf tirado. (N/A)      Plan:     During this hospitalization, patient to be seen 3 x/week to address the identified rehab impairments via gait training, therapeutic exercises, therapeutic activities, neuromuscular re-education and progress toward the following goals:    · Plan of Care Expires:  08/07/22    Subjective     Chief Complaint: none stated  Patient/Family Comments/goals: none stated  Pain/Comfort:  · Pain Rating 1: 0/10  · Pain Rating Post-Intervention 1: 0/10      Objective:     Communicated with nursing prior to session.  Patient found HOB elevated with  (all lines intact, avasys on) upon PT entry to room.     General Precautions: Standard, fall   Orthopedic Precautions:LUE non weight bearing   Braces: Cervical collar (figure of 8)     Functional Mobility:  · Bed Mobility:  Scooting: moderate assistance  · Supine to Sit: moderate  assistance  · Sit to Supine: moderate assistance  · Transfers:  Sit to Stand: x6 trials to/from EOB no AD Mod A  · Gait: Pt took ~5 side steps to HOB w/o AD and Mod A       AM-PAC 6 CLICK MOBILITY  Turning over in bed (including adjusting bedclothes, sheets and blankets)?: 3  Sitting down on and standing up from a chair with arms (e.g., wheelchair, bedside commode, etc.): 3  Moving from lying on back to sitting on the side of the bed?: 3  Moving to and from a bed to a chair (including a wheelchair)?: 2  Need to walk in hospital room?: 2  Climbing 3-5 steps with a railing?: 1  Basic Mobility Total Score: 14       Therapeutic Activities and Exercises:   -BLE therex 2x10 reps: AP,LAQ,HF,Hip abd/add  -Answered all questions/concerns within PTA scope of practice.    Patient left HOB elevated with all lines intact, call button in reach, nursing notified and avasys on..    GOALS:   Multidisciplinary Problems     Physical Therapy Goals        Problem: Physical Therapy    Goal Priority Disciplines Outcome Goal Variances Interventions   Physical Therapy Goal     PT, PT/OT Ongoing, Progressing     Description: Goals to be met by: 2022    Patient will increase functional independence with mobility by performin. Supine to sit with Contact Guard Assistance  2. Sit to supine with Stand-by Assistance  3. Rolling to Left and Right with Stand-by Assistance.  4. Sit to stand transfer with Stand-by Assistance  5. Bed to chair transfer with Minimal Assistance using LRAD  6. Gait  x 20 feet with Minimal Assistance using LRAD  7. Lower extremity exercise program x10 reps per handout, with supervision as needed                      Time Tracking:     PT Received On: 22  PT Start Time: 752     PT Stop Time: 825  PT Total Time (min): 33 min     Billable Minutes: Therapeutic Activity 22 and Therapeutic Exercise 11    Treatment Type: Treatment  PT/PTA: PTA     PTA Visit Number: 1     2022

## 2022-07-18 NOTE — ASSESSMENT & PLAN NOTE
71yo F PMH AVNRT, frequent falls, presenting as neurosurgery consult due to L temporal SAH noted on CTH in AM. Per patient's  was found down after hearing fall at 0830. More confusion present than baseline. Patient states she takes aspirin and plavix but hasn't taken the plavix in a while. She is supposed to use a walker at baseline but now is NWB to left arm due a left clavicle fx 7/3. CT c-spine showed C2-C3 anterolisthesis.    --Neuro exam stable without focal weakness. Wax/waning mental status.        -- q4h neurochecks on floor  --All labs and diagnostics reviewed       --CTH 7/7: L temporal SAH, stable on repeat scan       --CTA 7/8 without aneurysm, stable appearing bleed       --CT c-spine 7/7: anterolisthesis C2-C3       --MRI cervical spine 7/7 shows anterlisthesis of C2 on C3 with severe spinal cord compression C2-4 with cord signal change       --XR flex/ex of cervical spine shows no dynamic instability        --MRI thoracic spine shows remote compression fracture of T2, T3, T10, L2. Severe compression fracture of T8 with almost complete loss of height, chronic appearing.  --Further discussed surgical need/timing with patient and . Given that she does not have s/s acute cervical myelopathy will hold off on urgent surgical decompression until she is better optimized. With the clavicle fracture and NWB status it will be difficult to undergo rehab and therapy after a posterior cervical fusion. Her current cognitive status (hx Alzheimers dementia with current intermittent delirium), poor nutrition, and frail appearance would also make her post-operative course more challenging. Patient and  v/u.   - Plan for outpatient follow-up after discharge.  --C-collar when OOB. Pediatric collar fitting appropriately.  --Afib: s/p ablation and left atrial appendage occlusion on 2/18/22: Xarelto and Plavix stopped in May per EP physician. Patient still taking ASA at home.    - Hold ASA at this time  given acute SAH.   - PTT/INR wnl  --Left clavicle fracture: fracture from 7/3, missed f/u due to fall. Per Ortho recs at that time: NWB to LUE, sling for comfort, bactrim x 7 days for open fracture. Per  will be able to bear weight 3 weeks from injury, will f/u outpatient. Ortho consulted for this admission, appreciate their assistance:   - NWB LUE in figure-of-eight brace (patient not tolerating sling)   - Keep wound over left shoulder covered. Completed Bactrim for a total of 7 days.   - Recommend mobilization with a wheelchair given patient condition and inability to bear weight through the left upper extremity.   --Delirium: Psychiatry consulted 7/10 for suicidal ideation (told nurse she wanted to drown self) and for delirium.    - Recommended to discontinue melatonin.   - Delirium precautions. Reorient frequently. Avoid restraints.   --Continue to monitor clinically, notify NSGY immediately with any changes in neuro status  --HM consulted for co-management, medical optimization, appreciate recs; RCRI 1, Class 2 risk. Hyponatremia w/u ordered. Na improved with IVF. Signed off, stable.     --Dispo: Medically stable for discharge, pending placement. PT/OT and PM&R recs for SNF, pt was denied by facility. Plan for discharge to nursing unit of home assisted living facility.

## 2022-07-18 NOTE — DISCHARGE SUMMARY
Devna Juarez - Neurosurgery ([facility])  Neurosurgery  Discharge Summary      Patient Name: Yvette Crews  MRN: [id number]  Admission Date: 7/7/2022  Hospital Length of Stay: 11 days  Discharge Date and Time:  07/18/2022 2:34 PM  Attending Physician: Mario Alberto King MD   Discharging Provider: Gina Ramesh PA-C  Primary Care Provider: Sally Green MD    HPI:   71yo F PMH AVNRT, frequent falls, presenting as neurosurgery consult due to L temporal SAH noted on CTH in AM. Per patient's  was found down after hearing fall at 0830. More confusion present than baseline. Patient states she takes aspirin and plavix but hasn't taken the plavix in a while. She is supposed to use a walker at baseline but now is NWB to left arm due a left clavicle fx 7/3. CT c-spine showed C2-C3 anterolisthesis        Procedure(s) (LRB):  C1-C3 Posterior cervical fusion. Lumatic. neuromonitoring. sadaf art [place]. (N/A)     Hospital Course: 7/7: NSGY consulted for L temporal SAH. Floor status, monitoring progression of SAH and clinical signs/symptoms  78: Neuro stable. MRI cervical spine shows severe cord compression as well as C2 on C3 anterolisthesis. Multiple compression fractures of the thoracic spine, worst at T8 however chronic appearing. CTH stable, CTA without aneurysm. Patient poor historian due to Alzheimer's disease.   7/9: NAEON. Pending surgery next week. AM labs pending.   7/10: NAEON. AFVSS. Exam stable. Tentative OR 7/13 for C1-C3 PCF. Cleared for OR by .   7/11: NAEON. AFVSS. Labs stable. Seen by Psychiatry yesterday, no acute intervention. Patient is in better spirits today. Neuro exam stable. Denies neck or back pain. Oriented x 3.  and daughter at bedside, all wish to proceed with surgical intervention 7/13.  7/12: Placed in restraints overnight due to attempts to get OOB. Further discussed surgical need/timing with patient and . Given that she does not have s/s acute cervical myelopathy will hold  off on urgent surgical decompression until she is better optimized. With the clavicle fracture and NWB status it will be difficult to undergo rehab and therapy after a posterior cervical fusion. Her current cognitive status (hx Alzheimers dementia with current intermittent delirium), poor nutrition, and frail appearance would also make her post-operative course more challenging. Patient and  v/u. Will work on discharge planning once medically stable.  7/13: NAEON. AFVSS. Calm and cooperative on exam, no attempts to climb out of bed. Oriented x 4. Sodium improved after IVF. She states she has a little pain over the left clavicle, otherwise no pain. Pediatric aspen collar fitted to patient, wear when OOB. Pending discharge to SNF.  7/14: NAEON. AFVSS. Oriented x 2. Neuro exam stable. Pending discharge to SNF.  7/15: NAEON. AFVSS. Pt calm and cooperative this am, remains out of restraints w/o incident. Ongoing delirium per baseline, oriented x 2. Reports some mild pain to L shoulder/clavicle upon waking, o/w no complaints. Participating with therapy sessions. Neuro exam stable. Pending placement vs discharge to home assisted living facility.  7/16: NAEON. AFVSS. Pt remains neuro stable, eating breakfast, dementia per baseline. No acute complaints this am, states mild shoulder pain resolved w/ Tylenol. Planning for discharge to home facility nursing unit on Monday.  7/17: NAEON. AFVSS. Neuro stable. Asking for crackers at bedside.   7/18: NAEON. AFVSS. Neuro stable. Planning for discharge today to SNF pending bed availability.      Goals of Care Treatment Preferences:  Code Status: Full Code    Living Will: Yes              Consults:   Consults (From admission, onward)        Status Ordering Provider     Inpatient consult to Physical Medicine Rehab  Once        Provider:  (Not yet assigned)    Completed LINA REYES     Inpatient consult to SN[facility]  Once        Provider:  (Not yet assigned)    Acknowledged  LINA REYES     Inpatient consult to Midline team  Once        Provider:  (Not yet assigned)    Completed LINA REYES     Inpatient consult to Psychiatry  Once        Provider:  (Not yet assigned)    Completed CHRIS GARCÍA     Inpatient consult to [facility] Medicine-General  Once        Provider:  (Not yet assigned)    Completed LOPEZ ALDRIDGE     Inpatient consult to Orthopedics  Once        Provider:  (Not yet assigned)    Completed LOPEZ ALDRIDGE     Inpatient consult to Registered Dietitian/Nutritionist  Once        Provider:  (Not yet assigned)    Completed JOSE DANIEL MASON     Inpatient consult to Neurosurgery  Once        Provider:  (Not yet assigned)    Completed OMAR TOVAR     Inpatient consult to Social Work  Once        Provider:  (Not yet assigned)    Completed OMAR TOVAR          Significant Diagnostic Studies: Labs: BMP: No results for input(s): GLU, NA, K, CL, CO2, BUN, CREATININE, CALCIUM, MG in the last 48 hours., CBC No results for input(s): WBC, HGB, HCT, PLT in the last 48 hours. and All labs within the past 24 hours have been reviewed  Radiology:   Imaging Results           CTA Head and Neck (xpd) (Final result)  Result time 07/08/22 07:10:31    Final result by Marisol Santiago MD (07/08/22 07:10:31)                 Impression:      1. Evolving appearance of trace subarachnoid hemorrhage along the medial left temporal lobe, overall unchanged from exam.  No significant interval detrimental change compared to prior head CT of 07/07/2022.  2. Allowing for motion artifact, CTA head and neck appears to be within normal limits.  3. Fluid in the esophagus at the level of the aortic arch.  Correlation for reflux/dysmotility.  4. Redemonstration of anterolisthesis of C2 on C3 and compression deformities of the T2 and T3 vertebral bodies, unchanged.  5.  Additional findings as above.  This report was flagged in Epic as abnormal.    Electronically signed by resident: Charles  Donald  Date:    07/08/2022  Time:    05:06    Electronically signed by: Marisol Santiago MD  Date:    07/08/2022  Time:    07:10             Narrative:    EXAMINATION:  CTA HEAD AND NECK (XPD)    CLINICAL HISTORY:  rule out aneurysm in small cisternal SAH, story unclear now if traumatic or spontaneous;    TECHNIQUE:  Axial CT images obtained throughout the region of the head before and after the administration of intravenous contrast.  CT angiogram was performed from through the cervical and intracranial vasculature during the IV bolus administration of 100mL of Omnipaque 350.  Multiplanar MPR and MIP reformats were performed.    COMPARISON:  CT head 07/07/2022, 07/03/2022, cervical spine CT 07/07/2022    FINDINGS:  Evolving appearance of trace subarachnoid hemorrhage along the medial left temporal lobe.  Overall appearance is unchanged from prior, without significant mass effect.    Prominence of the ventricles and sulci compatible with generalized cerebral volume loss.  No hydrocephalus.    Patchy and confluent hypoattenuation in the supratentorial white matter, nonspecific but most likely reflecting chronic microvascular ischemic changes.  Gray-white matter differentiation is maintained.  No mass effect or midline shift.    No extra-axial blood or fluid collections.    The cranium appears intact. Mastoid air cells and paranasal sinuses are essentially clear.      CTA:    The aortic arch maintains a normal branching pattern.    The common and internal carotid arteries are normal in course and caliber. No significant stenosis in either carotid bifurcation, noting significant motion artifact limits evaluation.  Minimal calcific atherosclerosis of the supraclinoid ICAs, without hemodynamically stenosis.    The vertebral origins are patent. The cervical vertebral arteries are normal in course and caliber. Vertebrobasilar system is within normal limits without focal abnormality.    The anterior, middle, and posterior  cerebral arteries are within normal limits, without evidence of significant stenosis, focal occlusion, or intracranial aneurysm formation.    Other: Stable appearance of right lung apex calcified granuloma with surrounding subsegmental atelectasis versus scarring.  Fluid within the esophagus at the level of the aortic arch.  Few foci of intravenous air within the right internal jugular and right subclavian veins.  The visualized mucosal surfaces of the aerodigestive tract appear to be within normal limits allowing for motion artifact.  The vocal cords are relatively symmetric.  The parotid glands, submandibular glands and thyroid gland are within normal limits.  The visualized osseous structures once again demonstrate anterolisthesis of C2 on C3 and compression deformities of the visualized T2 and T3 vertebral bodies, similar to prior CT and MRI exams.                               X-Ray Cervical Spine Flexion And Extension Only (Final result)  Result time 07/08/22 00:01:49    Final result by Krishna Torres MD (07/08/22 00:01:49)                 Impression:      As above.      Electronically signed by: Krishna Torres MD  Date:    07/08/2022  Time:    00:01             Narrative:    EXAMINATION:  XR CERVICAL SPINE FLEXION  AND EXTENSION ONLY    CLINICAL HISTORY:  rule out dynamic instability;    TECHNIQUE:  AP and lateral neutral view of the cervical spine was obtained.  Flexion and extension views of the cervical spine were also obtained.    COMPARISON:  X-ray of the cervical spine dated 10/14/2021 and CT scan of the cervical spine dated 07/07/2022.    FINDINGS:  There is 7 mm anterolisthesis of C2 on C3.  There is no change in the alignment on the flexion and extension views to suggest dynamic instability.    The remainder of the cervical spine is stable with advanced multilevel degenerative changes.  These overall diffuse osteopenia.  No acute fracture is identified.                                MRI Thoracic Spine  Without Contrast (Final result)  Result time 07/07/22 22:18:36    Final result by Krishna Torres MD (07/07/22 22:18:36)                 Impression:      1. Compression fracture of the T8 vertebral body, with near complete vertebral height loss and 6 mm retropulsion of the fracture fragments.  No significant central canal stenosis or abnormal cord signal at this level.  Overall appearance similar to prior.  2. Redemonstration of 6 mm anterolisthesis of C2 on C3.  Short-segment of STIR signal hyperintensity, extending from the C2-C3 to C3-C4 levels, concerning for cord edema/malacia.  3. Similar appearance compression deformities of the T2, T3, T10, L2 vertebral bodies.  L3 compression fracture outside the field of view of this study.  4. Degenerative changes as above.  Moderate to severe canal stenosis at C2-C4.  Severe left neural foraminal narrowing at C2-C3.  5.  Additional findings as above.  This report was flagged in Epic as abnormal.    Electronically signed by resident: Charles Bennett  Date:    07/07/2022  Time:    21:18    Electronically signed by: Krishna Torres MD  Date:    07/07/2022  Time:    22:18             Narrative:    EXAMINATION:  MRI CERVICAL SPINE WITHOUT CONTRAST; MRI THORACIC SPINE WITHOUT CONTRAST    CLINICAL HISTORY:  anterolisthesis on CT-spine in the setting of spasticity of b/l UE;; wedge fracture seen on CT-spine in thoracic area; MRI for further eval;  .    TECHNIQUE:  Multiplanar, multisequence MR images of the cervical and thoracic spine were acquired without intravenous contrast.    COMPARISON:  CT cervical spine 07/07/2022, 07/03/2022.  MRI thoracic spine 07/03/2022    FINDINGS:  CERVICAL SPINE    Alignment: Grade 2 anterolisthesis of C2 on to C3, measuring approximately 6 mm, similar to prior study.    Vertebra: Cervical vertebral body heights are within normal limits.  There is no marrow replacing process.    Discs: Multilevel degenerative disc disease, with disc height loss and  desiccation throughout cervical spine.    Cord: Short-segment of STIR signal hyperintensity extending from C2-C3 to C3-C4.    There are findings of cervical spondylosis as below.    C2-C3: Moderate spinal canal stenosis.  Severe left and mild right neural foraminal narrowing.    C3-C4: Severe spinal canal stenosis.  Moderate left and mild right neural foraminal narrowing.    C4-C5:  No spinal canal stenosis.  Mild left neural foraminal narrowing.    C5-C6:  Mild spinal canal stenosis.  No neural foraminal narrowing.    C6-C7:  No spinal canal stenosis.  Mild right neural foraminal narrowing.    C7-T1:  No spinal canal stenosis.  No neural foraminal narrowing.    Miscellaneous: The craniocervical junction and visualized intracranial contents are unremarkable.  The adjacent soft tissue structures demonstrate right apical subsegmental atelectasis, similar to prior CT.    THORACIC SPINE    Redemonstration of severe compression fracture of the T8 vertebral body, with near complete vertebral height loss.  Involvement of the posterior cortical margin with 6 mm retropulsion of fracture fragments, effacing the anterior thecal sac and abutting the thoracic cord at this level, similar to prior.  No associated cord signal abnormality.    Additional compression fracture of the T10 vertebral body with approximately 50% height loss, and of the L2 vertebral body with approximately 40% height loss.  Mild height loss of the T2 and T3 vertebral bodies.  Overall appearance is similar to prior.  Compression fracture of the L3 vertebral body noted on prior is outside the field of view on this study.    The vertebral bodies demonstrate no evidence of osseous destructive process or aggressive bone marrow replacement process.    Increased thoracic kyphosis.  No spondylolisthesis.    Multilevel degenerative disc disease.  Small posterior disc bulges noted at the T9-L2 levels.  No significant central canal stenosis or neural foraminal  narrowing within the thoracic spine.  Mild central canal stenosis at L1-L2.    The visualized thoracic spinal cord demonstrates normal caliber, morphology, and signal.    No spinal canal or paraspinous mass is identified. The visualized paraspinous soft tissue structures appear within normal limits.                                MRI Cervical Spine Without Contrast (Final result)  Result time 07/07/22 22:18:36    Final result by Krishna Torres MD (07/07/22 22:18:36)                 Impression:      1. Compression fracture of the T8 vertebral body, with near complete vertebral height loss and 6 mm retropulsion of the fracture fragments.  No significant central canal stenosis or abnormal cord signal at this level.  Overall appearance similar to prior.  2. Redemonstration of 6 mm anterolisthesis of C2 on C3.  Short-segment of STIR signal hyperintensity, extending from the C2-C3 to C3-C4 levels, concerning for cord edema/malacia.  3. Similar appearance compression deformities of the T2, T3, T10, L2 vertebral bodies.  L3 compression fracture outside the field of view of this study.  4. Degenerative changes as above.  Moderate to severe canal stenosis at C2-C4.  Severe left neural foraminal narrowing at C2-C3.  5.  Additional findings as above.  This report was flagged in Epic as abnormal.    Electronically signed by resident: Charles Bennett  Date:    07/07/2022  Time:    21:18    Electronically signed by: Krishna Torres MD  Date:    07/07/2022  Time:    22:18             Narrative:    EXAMINATION:  MRI CERVICAL SPINE WITHOUT CONTRAST; MRI THORACIC SPINE WITHOUT CONTRAST    CLINICAL HISTORY:  anterolisthesis on CT-spine in the setting of spasticity of b/l UE;; wedge fracture seen on CT-spine in thoracic area; MRI for further eval;  .    TECHNIQUE:  Multiplanar, multisequence MR images of the cervical and thoracic spine were acquired without intravenous contrast.    COMPARISON:  CT cervical spine 07/07/2022, 07/03/2022.  MRI  thoracic spine 07/03/2022    FINDINGS:  CERVICAL SPINE    Alignment: Grade 2 anterolisthesis of C2 on to C3, measuring approximately 6 mm, similar to prior study.    Vertebra: Cervical vertebral body heights are within normal limits.  There is no marrow replacing process.    Discs: Multilevel degenerative disc disease, with disc height loss and desiccation throughout cervical spine.    Cord: Short-segment of STIR signal hyperintensity extending from C2-C3 to C3-C4.    There are findings of cervical spondylosis as below.    C2-C3: Moderate spinal canal stenosis.  Severe left and mild right neural foraminal narrowing.    C3-C4: Severe spinal canal stenosis.  Moderate left and mild right neural foraminal narrowing.    C4-C5:  No spinal canal stenosis.  Mild left neural foraminal narrowing.    C5-C6:  Mild spinal canal stenosis.  No neural foraminal narrowing.    C6-C7:  No spinal canal stenosis.  Mild right neural foraminal narrowing.    C7-T1:  No spinal canal stenosis.  No neural foraminal narrowing.    Miscellaneous: The craniocervical junction and visualized intracranial contents are unremarkable.  The adjacent soft tissue structures demonstrate right apical subsegmental atelectasis, similar to prior CT.    THORACIC SPINE    Redemonstration of severe compression fracture of the T8 vertebral body, with near complete vertebral height loss.  Involvement of the posterior cortical margin with 6 mm retropulsion of fracture fragments, effacing the anterior thecal sac and abutting the thoracic cord at this level, similar to prior.  No associated cord signal abnormality.    Additional compression fracture of the T10 vertebral body with approximately 50% height loss, and of the L2 vertebral body with approximately 40% height loss.  Mild height loss of the T2 and T3 vertebral bodies.  Overall appearance is similar to prior.  Compression fracture of the L3 vertebral body noted on prior is outside the field of view on this  study.    The vertebral bodies demonstrate no evidence of osseous destructive process or aggressive bone marrow replacement process.    Increased thoracic kyphosis.  No spondylolisthesis.    Multilevel degenerative disc disease.  Small posterior disc bulges noted at the T9-L2 levels.  No significant central canal stenosis or neural foraminal narrowing within the thoracic spine.  Mild central canal stenosis at L1-L2.    The visualized thoracic spinal cord demonstrates normal caliber, morphology, and signal.    No spinal canal or paraspinous mass is identified. The visualized paraspinous soft tissue structures appear within normal limits.                               CT Head Without Contrast (Final result)  Result time 07/07/22 15:57:19    Final result by Juan Alberto Vargas MD (07/07/22 15:57:19)                 Impression:      Stable trace left temporal subarachnoid hemorrhage.    No new hemorrhage or infarction.      Electronically signed by: Juan Alberto Vargas MD  Date:    07/07/2022  Time:    15:57             Narrative:    EXAMINATION:  CT HEAD WITHOUT CONTRAST    CLINICAL HISTORY:  Interval CT for eval of progression of L temporal SAH;    TECHNIQUE:  Low dose axial CT images obtained throughout the head without the use of intravenous contrast.  Axial, sagittal and coronal reconstructions were performed.    COMPARISON:  07/07/2022 at 09:11    FINDINGS:  Intracranial compartment:    Ventricles are stable in size.  There is no evidence of hydrocephalus.    Trace subarachnoid hemorrhage along the medial left temporal lobe, stable.  No new extra-axial blood or fluid collections elsewhere.    The brain parenchyma appears unchanged.  Mild chronic small vessel ischemic change throughout the supratentorial white matter.  No new parenchymal mass, hemorrhage, edema or major vascular distribution infarct.    Skull/extracranial contents (limited evaluation):    No displaced calvarial fracture.    The mastoid air cells and  visualized paranasal sinuses are essentially clear.                               CT Head Without Contrast (Final result)  Result time 07/07/22 09:56:31    Final result by Dougie Pineda MD (07/07/22 09:56:31)                 Impression:      Small focus of likely subarachnoid hemorrhage along the medial left temporal lobe.      Electronically signed by: Dougie Pineda  Date:    07/07/2022  Time:    09:56             Narrative:    EXAMINATION:  CT HEAD WITHOUT CONTRAST    CLINICAL HISTORY:  Head trauma, moderate-severe;    TECHNIQUE:  Low dose axial images were obtained through the head.  Coronal and sagittal reformations were also performed. Contrast was not administered.    COMPARISON:  07/03/2022    FINDINGS:  There is a small focus of increased density along the medial left temporal lobe that likely represents a small focus of subarachnoid hemorrhage.  No definite parenchymal changes are identified.  No hydrocephalus mass effect or acute territorial infarct.  No calvarial fracture.    Decreased attenuation the periventricular white matter is nonspecific but may reflect mild to moderate chronic small vessel ischemic change with stable volume loss.    These findings were communicated to Dr. Alonzo at 09:55 on 07/07/2022                               CT Cervical Spine Without Contrast (Final result)  Result time 07/07/22 09:58:34    Final result by Jose Alfredo Nicole MD (07/07/22 09:58:34)                 Impression:      1. No acute fracture.  2. Stable appearance of mild T2 and T3 anterior vertebral body height loss.  3. Stable appearance of grade 2 anterolisthesis at C2-C3.  4. Multilevel degenerative changes of the cervical spine.  Moderate to severe spinal canal stenosis and left-sided neural foraminal narrowing noted at C2-C4.      Electronically signed by: Jose Alfredo Nicole MD  Date:    07/07/2022  Time:    09:58             Narrative:    EXAMINATION:  CT CERVICAL SPINE WITHOUT CONTRAST    CLINICAL  HISTORY:  Spine fracture, cervical, traumatic;    TECHNIQUE:  Low dose axial images, sagittal and coronal reformations were performed though the cervical spine.  Contrast was not administered.    COMPARISON:  07/03/2022    FINDINGS:  Alignment: There is grade 2 anterolisthesis at C2-C3 measuring approximately 6 mm and similar to prior study.    Vertebrae: No acute fracture.  No lytic or blastic lesion.  Mild loss of T2 and T3 anterior vertebral body height, similar to prior study.    Discs: Severe disc height loss with degenerative endplate changes seen throughout the cervical spine.    C1-2: Dens is intact.  Pre-dens space is maintained.  Thickening and calcification of the transverse ligament noted.    Skull base and craniocervical junction: Degenerative changes of the craniocervical junction.    Degenerative findings:    C2-C3: Malalignment with posterior disc osteophyte complex, uncovertebral spurring, and severe facet arthropathy result in moderate spinal canal stenosis and severe left, mild right neural foraminal narrowing.    C3-C4: Posterior disc osteophyte complex with uncovertebral spurring and severe left facet arthropathy result in severe spinal canal stenosis and moderate left, mild right neural foraminal narrowing.    C4-C5: Posterior disc osteophyte complex with uncovertebral spurring and moderate facet arthropathy result in mild spinal canal stenosis and mild left neural foraminal narrowing.    C5-C6: Right posterior disc osteophyte complex with uncovertebral spurring and mild facet arthropathy result in mild spinal canal stenosis.    C6-C7: Uncovertebral spurring results in mild right neural foraminal narrowing.    C7-T1: Posterior disc osteophyte complex noted.  No spinal canal stenosis or neural foraminal narrowing.    Paraspinal muscles & soft tissues: Unremarkable.                               X-Ray Hips Bilateral 2 View Incl AP Pelvis (Final result)  Result time 07/07/22 08:48:04    Final  result by Chase Dunne MD (07/07/22 08:48:04)                 Impression:      See above      Electronically signed by: Chase Dunne MD  Date:    07/07/2022  Time:    08:48             Narrative:    EXAMINATION:  XR HIPS BILATERAL 2 VIEW INCL AP PELVIS    CLINICAL HISTORY:  Unspecified fall, initial encounter    TECHNIQUE:  AP view of the pelvis and frogleg lateral views of both hips were performed.    COMPARISON:  05/13/2022    FINDINGS:  Patient has a trans sacral screw present in good position.  Degenerative changes seen in both hips but no fractures of the hips are obvious                                  Pending Diagnostic Studies:     Procedure Component Value Units Date/Time    X-Ray Chest AP Portable [[id number]]     Order Status: Sent Lab Status: No result         Final Active Diagnoses:    Diagnosis Date Noted POA    PRINCIPAL PROBLEM:  Cervical stenosis of spinal canal [M48.02] 07/11/2022 Yes    Severe protein-calorie malnutrition [E43] 07/14/2022 Yes    Delirium due to general medical condition [F05] 07/12/2022 No    Hyponatremia [E87.1] 07/12/2022 Unknown    Pre-operative exam [Z01.818] 07/08/2022 Not Applicable    SAH (subarachnoid hemorrhage) [I60.9] 07/07/2022 Yes    Open displaced fracture of acromial end of left clavicle [S42.032B] 07/03/2022 Yes    Fracture of left clavicle [S42.002A] 07/03/2022 Yes    Iron deficiency anemia [D50.9] 05/13/2022 Yes    Paroxysmal atrial fibrillation [I48.0] 12/17/2021 Yes     Chronic    Postoperative hypothyroidism [E89.0] 12/18/2015 Yes     Chronic    Adult bronchiectasis [J47.9] 02/26/2015 Yes    Major depressive disorder, recurrent episode, in full remission [F33.42] 08/19/2010 Yes      Problems Resolved During this Admission:      Discharged Condition: good     Disposition: Home or Self Care    Follow Up:   Follow-up Information     [facility]. Go in 1 week(s).    Why: for clavicle fracture follow-up           Devan Fountain  Neurosurgery 8th Fl Follow up in 1 month(s).    Specialty: Neurosurgery  Why: for follow-up head bleed and cervical stenosis  Contact information:  Tasha Juarez  [facility] [address] [phone]  Additional information:  8th Floor [facility] [place]   Please park in [facility].   Check in desk is located in the lobby. Please take the C elevator to 8th floor which opens to the lobby.                     Patient Instructions:      Ambulatory referral/consult to Home Health   Standing Status: Future   Referral Priority: Routine Referral Type: Home Health   Referral Reason: Specialty Services Required   Requested Specialty: Home Health Services   Number of Visits Requested: 1     Lifting restrictions   Order Comments: No lifting greater than 10 lbs     Notify your health care provider if you experience any of the following:  temperature >100.4     Notify your health care provider if you experience any of the following:  persistent nausea and vomiting or diarrhea     Notify your health care provider if you experience any of the following:  severe uncontrolled pain     Notify your health care provider if you experience any of the following:  redness, tenderness, or signs of infection (pain, swelling, redness, odor or green/yellow discharge around incision site)     Notify your health care provider if you experience any of the following:  difficulty breathing or increased cough     Notify your health care provider if you experience any of the following:  severe persistent headache     Notify your health care provider if you experience any of the following:  worsening rash     Notify your health care provider if you experience any of the following:  persistent dizziness, light-headedness, or visual disturbances     Notify your health care provider if you experience any of the following:  increased confusion or weakness     Notify your health care provider if you experience any of the following:  temperature  >100.4     Notify your health care provider if you experience any of the following:  persistent nausea and vomiting or diarrhea     Notify your health care provider if you experience any of the following:  severe uncontrolled pain     Notify your health care provider if you experience any of the following:  redness, tenderness, or signs of infection (pain, swelling, redness, odor or green/yellow discharge around incision site)     Notify your health care provider if you experience any of the following:  difficulty breathing or increased cough     Notify your health care provider if you experience any of the following:  severe persistent headache     Notify your health care provider if you experience any of the following:  worsening rash     Notify your health care provider if you experience any of the following:  persistent dizziness, light-headedness, or visual disturbances     Notify your health care provider if you experience any of the following:  increased confusion or weakness     Activity as tolerated     Weight bearing restrictions (specify):   Order Comments: Non-weight bearing LUE     Activity as tolerated     Medications:  Reconciled Home Medications:      Medication List      CHANGE how you take these medications    guaiFENesin 600 mg 12 hr tablet  Commonly known as: MUCINEX  Take 2 tablets (1,200 mg total) by mouth 2 (two) times daily.  What changed: when to take this        CONTINUE taking these medications    acetaminophen 325 MG tablet  Commonly known as: TYLENOL  Take 2 tablets (650 mg total) by mouth every 6 (six) hours as needed for Pain.     albuterol 90 mcg/actuation inhaler  Commonly known as: PROVENTIL/VENTOLIN HFA  Inhale 2 puffs into the lungs every 6 (six) hours as needed for Wheezing. Rescue     AZO BLADDER CONTROL ORAL  Take 1 tablet by mouth 2 (two) times a day.     docusate calcium 240 mg capsule  Commonly known as: SURFAK  Take 240 mg by mouth 3 (three) times daily.     donepeziL 10 MG  tablet  Commonly known as: ARICEPT  Take 1 tablet (10 mg total) by mouth once daily.     famotidine 40 MG tablet  Commonly known as: PEPCID  Take 1 tablet (40 mg total) by mouth once daily.     ferrous sulfate 325 (65 FE) MG EC tablet  Take 1 tablet (325 mg total) by mouth once daily.     FLINTSTONES MULTI-VIT GUMMIES ORAL  Chew 2 gummies by mouth every day     Immune Globulin G (IGG)-PRO-IGA 10 % injection (Privigen) 10 % Soln  Commonly known as: PRIVIGEN  Infuse 20 g into the vein every 4 weeks.     levothyroxine 88 MCG tablet  Commonly known as: SYNTHROID  Take 1 tablet (88 mcg total) by mouth before breakfast.     loratadine 10 mg tablet  Commonly known as: CLARITIN  Take 1 tablet (10 mg total) by mouth once daily.     methocarbamoL 500 MG Tab  Commonly known as: ROBAXIN  Take 1 tablet (500 mg total) by mouth 3 (three) times daily as needed (Musce spasms).     montelukast 10 mg tablet  Commonly known as: SINGULAIR  Take 1 tablet (10 mg total) by mouth every evening.     polyethylene glycol 17 gram Pwpk  Commonly known as: GLYCOLAX  Take 17 g by mouth once daily.     VIACTIV 650 mg-12.5 mcg-40 mcg Chew  Generic drug: calcium-vitamin D3-vitamin K  Chew 1 by mouth twice daily        STOP taking these medications    aspirin 81 MG EC tablet  Commonly known as: ECOTRIN     clopidogreL 75 mg tablet  Commonly known as: PLAVIX     sulfamethoxazole-trimethoprim 800-160mg 800-160 mg Tab  Commonly known as: BACTRIM DS     XARELTO 20 mg Tab  Generic drug: rivaroxaban            Gina Ramesh PA-C  Neurosurgery  [facility] Neurosurgery [facility])

## 2022-07-18 NOTE — PLAN OF CARE
..POC and meds reviewed with patient. Questions encouraged and answered. Patient poor short term memory. V/S stable throughout shift; please see flowsheets for V/S information and full assessment data. NAEO. Siderails up x 3, bed locked in lowest position, call bell in reach, O2 & suction at bedside, WCTM.

## 2022-07-19 ENCOUNTER — PATIENT OUTREACH (OUTPATIENT)
Dept: ADMINISTRATIVE | Facility: CLINIC | Age: 72
End: 2022-07-19
Payer: MEDICARE

## 2022-07-20 NOTE — PHYSICIAN QUERY
"PT Name: Yvette Crews  MR #: [id number]    DOCUMENTATION CLARIFICATION     CDS/: Yoko Fenton, RN, CDS               Contact information: [email or web address]  This form is a permanent document in the medical record.    Query Date: 2022      By submitting this query, we are merely seeking further clarification of documentation.  Please utilize your independent clinical judgment when addressing the question(s) below.    The Medical Record reflects the following:    Clinical Information Location in Medical Record     Nutrition consult received stating "new admit; 88 pounds".  Spoke w/ RN, who reports pt tolerating diet w/ 75% PO intake. Pt slightly disoriented, but reports appetite PTA was good.  Per chart review, pt's UBW: 90# x 2 years.     Pt appears thin, however no indicators of malnutrition noted      --Nutrition Problem:                Severe Protein-Calorie Malnutrition/Malnutrition in the context of Acute Illness/Injury     -When medically feasible, add Boost Breeze TID to optimize calorie and protein intake   --Pt reports good appetie since last RD visit, 2022. Pt reports % meal intake since last week. Per RN documentation, 25-50% meal intake.   --NFPE conducted 2022, pt has severe depletion in upper extremities, lower extremities, temple and orbital region, pt meets criteria for Severe Malnutriton in the context of Acute illness/injury    Weight (lb): 88.18 lb  BMI (Calculated): 16.1  Usual Body Weight (UBW), k.1 kg  % Weight Change From Usual Weight: -0.25 %  --Per chart review pt wt remains stable since last RD visit, 88kg    --Final Active Diagnosis:           -Severe protein-calorie malnutrition      RD consult           RD consult       RD Note                       RD Note           Discharge Summary     Academy of Nutrition and Dietetics (Academy) and the American Society for Parenteral and Enteral Nutrition (A.S.P.E.N.) Clinical " Characteristics to support Malnutrition   Malnutrition in the Context of Acute Illness or Injury   Malnutrition Level Moderate Severe   Energy Intake <75%                   >7 days <50%                 >5 days   Weight Loss   1-2% in 1 week >2% in 1 week    5% in 1 month >5% in 1 month    7.5% in 3 months >7.5% in 3 months          Subcutaneous Fat Loss Mild  Moderate    Muscle Loss Mild depletion Moderate depletion    Edema/Fluid Accumulation Mild Moderate to severe    Reduced  Strength         (based on standards supplied by  of dynamometer) N/A Measurably reduced     Criteria for mild malnutrition is defined as 1 characteristic outlined above within the established moderate or severe parameters.  A minimum of 2 out of the 6 characteristics noted above are recommended for a diagnosis of moderate or severe malnutrition.  Chronic illness/injury is a disease/condition lasting 3 months or longer.      Please clarify/confirm the diagnosis of __Severe Protein-Calorie Malnutrition___:     [ x ] Diagnosis ruled in   [  ] Diagnosis ruled out   [  ] Other diagnosis (please specify): _____________________________   [  ] Clinically undetermined     Present on admission (POA) status:   [   ] Yes (Y)                          [  ] Clinically Undetermined (W)  [   ] No (N)                            [   ] Documentation insufficient to determine if condition is POA (U)

## 2022-07-21 ENCOUNTER — HOSPITAL ENCOUNTER (OUTPATIENT)
Dept: RADIOLOGY | Facility: HOSPITAL | Age: 72
Discharge: HOME | End: 2022-07-21
Attending: ORTHOPAEDIC SURGERY
Payer: MEDICARE

## 2022-07-21 ENCOUNTER — TELEPHONE (OUTPATIENT)
Dept: NEUROSURGERY | Facility: CLINIC | Age: 72
End: 2022-07-21
Payer: MEDICARE

## 2022-07-21 ENCOUNTER — OFFICE VISIT (OUTPATIENT)
Dept: ORTHOPEDICS | Facility: CLINIC | Age: 72
End: 2022-07-21
Payer: MEDICARE

## 2022-07-21 DIAGNOSIS — M48.02 CERVICAL STENOSIS OF SPINAL CANAL: Primary | ICD-10-CM

## 2022-07-21 DIAGNOSIS — S42.032D: Primary | ICD-10-CM

## 2022-07-21 DIAGNOSIS — S42.032D: ICD-10-CM

## 2022-07-21 PROCEDURE — 73000 X-RAY EXAM OF COLLAR BONE: CPT | Mod: 26,LT,, | Performed by: RADIOLOGY

## 2022-07-21 PROCEDURE — 99213 PR OFFICE/OUTPT VISIT, EST, LEVL III, 20-29 MIN: ICD-10-PCS | Mod: S$PBB,,, | Performed by: ORTHOPAEDIC SURGERY

## 2022-07-21 PROCEDURE — 99213 OFFICE O/P EST LOW 20 MIN: CPT | Mod: S$PBB,,, | Performed by: ORTHOPAEDIC SURGERY

## 2022-07-21 PROCEDURE — 73000 X-RAY EXAM OF COLLAR BONE: CPT | Mod: TC,LT

## 2022-07-21 PROCEDURE — 99213 OFFICE O/P EST LOW 20 MIN: CPT | Mod: PBBFAC | Performed by: ORTHOPAEDIC SURGERY

## 2022-07-21 PROCEDURE — 99999 PR PBB SHADOW E&M-EST. PATIENT-LVL III: ICD-10-PCS | Mod: PBBFAC,,, | Performed by: ORTHOPAEDIC SURGERY

## 2022-07-21 PROCEDURE — 73000 XR CLAVICLE LEFT: ICD-10-PCS | Mod: 26,LT,, | Performed by: RADIOLOGY

## 2022-07-21 PROCEDURE — 99999 PR PBB SHADOW E&M-EST. PATIENT-LVL III: CPT | Mod: PBBFAC,,, | Performed by: ORTHOPAEDIC SURGERY

## 2022-07-21 NOTE — PROGRESS NOTES
CC:  Left distal 3rd clavicle fracture      HISTORY       HPI:  Yvette Crews is a 72 y.o. female with PMH significant for paroxisomal afib, hypothyroidism, AD, bronciectasis, hypogammaglobulinemia/CVID, and osteoporosis  presenting with left shoulder and clavicle pain after a fall 07/03/2022.     She does not remember the event, but her  is at bedside to assist with the history. He states that he was in the opposite room when he heard a thud and found his wife to have fallen down. Unknown LOC but pt does have scalp bleeding at time of presentation. The patient denies prior hx of falls. Patient denies numbness and tingling. Denies any other musculoskeletal pain or injuries. No known history of prior left shoulder injury or surgery.  Walks w/out assisted devices at baseline. ASA at baseline for anticoagulation.  They deny IV drug use.   They deny tobacco use.   They deny alcohol use.   They deny immunosuppressant medications.  They deny chemotherapy.  They deny radiation therapy    Diagnosis was distal 3rd clavicle fracture, put possibly poke hole open.  She was treated with IV antibiotics at that time.    She also had a head bleed, and well as as cervical spine issue, for which neuro surgical team is managing non operatively at this time    Since that time she has been in a figure-of-eight brace    She is here for routine orthopedic follow-up    States her shoulder is doing okay not a great deal of pain, has been wearing the figure-of-eight brace but would like to remove it    Left shoulder pain 1/10, dull achy.  No numbness no tingling    ROS:  Constitutional: Denies fever/chills  Neurological: Denies numbness/tingling (any exceptions noted in orthopaedic exam)   Psychiatric/Behavioral: Denies change in normal mood  Eyes: Denies change in vision  Cardiovascular: Denies chest pain  Respiratory: Denies shortness of breath  Hematologic/Lymphatic: Denies easy bleeding/bruising   Skin: Denies new  rash or skin lesions   Gastrointestinal: Denies nausea/vomitting/diarrhea, change in bowel habits, abdominal pain   Allergic/Immunologic: Denies adverse reactions to current medications  Musculoskeletal: see HPI    PAST MEDICAL HISTORY:   Past Medical History:   Diagnosis Date    Adult bronchiectasis     Age-related osteoporosis with current pathological fracture with routine healing 8/28/2013    Amblyopia     Anxiety     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    Cataract     Cellulitis of right lower extremity 1/19/2021    Chondrocalcinosis, cause unspecified, involving hand(712.34) 7/12/2011     Dx updated per 2019 IMO Load    Chronic sinusitis 4/6/2017    Colonic constipation 6/5/2013    Concussion 10/27/2016    Frequent falls 3/14/2017    Gait disturbance 3/14/2017    History of cardiac radiofrequency ablation (RFA) 2/3/2018    History of cardiac radiofrequency ablation (RFA) 2/3/2018    History of subarachnoid hemorrhage 10/30/2016    Hypogammaglobulinemia 9/7/2011    Irritable bowel syndrome 12/18/2015    Major depressive disorder, recurrent episode, in full remission 8/19/2010    Onychomycosis     Osteoarthritis     Postoperative hypothyroidism 12/18/2015    Presence of Watchman left atrial appendage closure device 5/14/2022    Rectal prolapse 6/5/2013    Reflux esophagitis 2/7/2010    Status post thyroidectomy 6/1/2017    Strabismus     SVT (supraventricular tachycardia) 11/22/2013    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     Underweight 1/23/2013     PAST SURGICAL HISTORY:   Past Surgical History:   Procedure Laterality Date    BREAST CYST ASPIRATION      x2    CATARACT EXTRACTION W/  INTRAOCULAR LENS IMPLANT Left 3/11/2019    Procedure: EXTRACTION, CATARACT, WITH IOL INSERTION;  Surgeon: Vera Sams MD;  Location[facility];  Service: Ophthalmology;  Laterality: Left;    CATARACT EXTRACTION W/  INTRAOCULAR LENS IMPLANT  Right 4/15/2019    Procedure: EXTRACTION, CATARACT, WITH IOL INSERTION LASER;  Surgeon: Vera Sams MD;  Location: [facility] [place] OR;  Service: Ophthalmology;  Laterality: Right;    COLON SURGERY      EYE SURGERY      FRACTURE SURGERY      NASAL SEPTUM SURGERY      OCCLUSION OF LEFT ATRIAL APPENDAGE N/A 2/18/2022    Procedure: Left atrial appendage occlusion;  Surgeon: Chase Gill MD;  Location: [facility] EP LAB;  Service: Cardiology;  Laterality: N/A;  afib, watchman, BSCI, osiris, anes, MB/EH, 3prep    OPEN REDUCTION AND INTERNAL FIXATION (ORIF) OF INJURY OF SACROILIAC JOINT Bilateral 1/20/2021    Procedure: ORIF, SACROILIAC JOINT;  Surgeon: Alcides Tamez MD;  Location: [address];  Service: Orthopedics;  Laterality: Bilateral;    RADIOFREQUENCY ABLATION      RECTAL PROLAPSE REPAIR  june 2013    STRABISMUS SURGERY      TONSILLECTOMY      TRANSESOPHAGEAL ECHOCARDIOGRAPHY N/A 2/18/2022    Procedure: ECHOCARDIOGRAM, TRANSESOPHAGEAL;  Surgeon: Nils Diagnostic Provider;  Location: [facility] EP LAB;  Service: Cardiology;  Laterality: N/A;     FAMILY HISTORY:   Family History   Problem Relation Age of Onset    Heart disease Father     Stroke Father     Heart disease Brother     Breast cancer Paternal Grandmother     Melanoma Neg Hx     Psoriasis Neg Hx     Lupus Neg Hx     Eczema Neg Hx     Amblyopia Neg Hx     Blindness Neg Hx     Cataracts Neg Hx     Glaucoma Neg Hx     Macular degeneration Neg Hx     Retinal detachment Neg Hx     Strabismus Neg Hx      SOCIAL HISTORY:   Social History     Socioeconomic History    Marital status:     Number of children: 2   Occupational History    Occupation: retired   Tobacco Use    Smoking status: Never Smoker    Smokeless tobacco: Never Used   Substance and Sexual Activity    Alcohol use: No    Drug use: No    Sexual activity: Not Currently     Social Determinants of Health     Financial Resource Strain: Low Risk     Difficulty of  Paying Living Expenses: Not hard at all   Food Insecurity: No Food Insecurity    Worried About Running Out of Food in the Last Year: Never true    Ran Out of Food in the Last Year: Never true   Transportation Needs: No Transportation Needs    Lack of Transportation (Medical): No    Lack of Transportation (Non-Medical): No   Physical Activity: Sufficiently Active    Days of Exercise per Week: 5 days    Minutes of Exercise per Session: 30 min   Stress: No Stress Concern Present    Feeling of Stress : Not at all   Social Connections: Moderately Isolated    Frequency of Communication with Friends and Family: Twice a week    Frequency of Social Gatherings with Friends and Family: Twice a week    Attends Zoroastrianism Services: Never    Active Member of Clubs or Organizations: No    Attends Club or Organization Meetings: Never    Marital Status:    Housing Stability: Low Risk     Unable to Pay for Housing in the Last Year: No    Number of Places Lived in the Last Year: 1    Unstable Housing in the Last Year: No     MEDICATIONS:   Current Outpatient Medications:     acetaminophen (TYLENOL) 325 MG tablet, Take 2 tablets (650 mg total) by mouth every 6 (six) hours as needed for Pain., Disp: , Rfl: 0    albuterol (PROVENTIL/VENTOLIN HFA) 90 mcg/actuation inhaler, Inhale 2 puffs into the lungs every 6 (six) hours as needed for Wheezing. Rescue, Disp: 18 g, Rfl: 1    calcium-vitamin D3-vitamin K 650 mg-12.5 mcg-40 mcg Chew, Chew 1 by mouth twice daily, Disp: , Rfl:     docusate calcium (SURFAK) 240 mg capsule, Take 240 mg by mouth 3 (three) times daily., Disp: , Rfl:     donepeziL (ARICEPT) 10 MG tablet, Take 1 tablet (10 mg total) by mouth once daily., Disp: 30 tablet, Rfl: 11    famotidine (PEPCID) 40 MG tablet, Take 1 tablet (40 mg total) by mouth once daily., Disp: 30 tablet, Rfl: 5    ferrous sulfate 325 (65 FE) MG EC tablet, Take 1 tablet (325 mg total) by mouth once daily., Disp: 30 tablet, Rfl:  5    guaiFENesin (MUCINEX) 600 mg 12 hr tablet, Take 2 tablets (1,200 mg total) by mouth 2 (two) times daily. (Patient taking differently: Take 1,200 mg by mouth every 12 (twelve) hours.), Disp: 120 tablet, Rfl: 6    Immune Globulin G, IGG,-PRO-IGA 10 % injection, Privigen, (PRIVIGEN) 10 % Soln, Infuse 20 g into the vein every 4 weeks., Disp: 200 mL, Rfl: 0    levothyroxine (SYNTHROID) 88 MCG tablet, Take 1 tablet (88 mcg total) by mouth before breakfast., Disp: 90 tablet, Rfl: 1    loratadine (CLARITIN) 10 mg tablet, Take 1 tablet (10 mg total) by mouth once daily., Disp: 30 tablet, Rfl: 5    methocarbamoL (ROBAXIN) 500 MG Tab, Take 1 tablet (500 mg total) by mouth 3 (three) times daily as needed (Musce spasms)., Disp: 20 tablet, Rfl: 0    montelukast (SINGULAIR) 10 mg tablet, Take 1 tablet (10 mg total) by mouth every evening., Disp: 90 tablet, Rfl: 1    pedi multivit no.7/folic acid (FLINTSTONES MULTI-VIT GUMMIES ORAL), Chew 2 gummies by mouth every day, Disp: , Rfl:     polyethylene glycol (GLYCOLAX) 17 gram PwPk, Take 17 g by mouth once daily., Disp: , Rfl: 0    pumpkin seed extract/soy germ (AZO BLADDER CONTROL ORAL), Take 1 tablet by mouth 2 (two) times a day., Disp: , Rfl:   No current facility-administered medications for this visit.    Facility-Administered Medications Ordered in Other Visits:     balanced salt irrigation solution 1 drop, 1 drop, Right Eye, On Call Procedure, Vera Sams MD    moxifloxacin 0.5 % ophthalmic solution 1 drop, 1 drop, Right Eye, On Call Procedure, Vera Sams MD, 1 drop at 04/15/19 0922    phenylephrine HCL 2.5% ophthalmic solution 1 drop, 1 drop, Right Eye, On Call Procedure, Vera Sams MD, 1 drop at 04/15/19 0922    sodium chloride 0.9% bolus 1,000 mL, 1,000 mL, Intravenous, Once, Ada Montoya, NP    sodium chloride 0.9% bolus 1,000 mL, 1,000 mL, Intravenous, Once, Ada Montoya, NP    sodium chloride 0.9% bolus 1,000 mL, 1,000 mL,  Intravenous, Once, Ada Montoya NP    tropicamide 1% ophthalmic solution 1 drop, 1 drop, Right Eye, On Call Procedure, Vera Sams MD, 1 drop at 04/15/19 6208  ALLERGIES:   Review of patient's allergies indicates:   Allergen Reactions    Adhesive      Tape tears skin    Buspar [buspirone]      headaches    Escitalopram oxalate Nausea Only     hyponatremia      Rifampin Rash         EXAM      VITAL SIGNS:   There were no vitals taken for this visit.      PE:  General:  no acute distress, appears stated age    Neuro: alert and oriented x3  Psych: normal mood  Head: normocephalic, atraumatic.   Eyes: no scleral icterus  Mouth: moist mucous membranes  Cardiovascular: extremities warm and well perfused  Lungs: breathing comfortably, equal chest rise bilat  Skin: clean, dry, intact (any exceptions noted in below musculoskeletal exam)    Musculoskeletal:  Left upper extremity  Minimal bony tenderness at left distal clavicle  No open wounds, no signs of infection prior small laceration/skin tear is now well healed with no apparent issues  Shoulder range of motion 90° forward flexion both active and passive no pain  Motor intact deltoid, ER/IR, biceps, triceps, wrist flexion/extension, interossei, finger flexion/extension  Sensation intact to light touch axillary, radial, ulnar, median nerves  Palpable radial pulse, black brisk cap refill        XRAYS:  X-ray left clavicle demonstrate distal 3rd clavicle fracture, + AC joint injury, moderately displaced  (I independently reviewed and interpreted the above imaging)    MEDICAL DECISION MAKING       Encounter Diagnosis   Name Primary?    Open displaced fracture of acromial end of left clavicle with routine healing, subsequent encounter Yes       72-year-old female, appears older than stated age, cachectic chronic bronchitic stasis, lives in assisted living  mechanical fall from standing 07/03/2022 with left distal 3rd clavicle fracture, possible poke hole open,  moderately displaced, treated with bedside irrigation, and IV antibiotics.    Recommend weightbear as tolerated, range of motion as tolerated no need for further bracing  Able to use walker for ambulation  No restrictions left upper extremity  Okay for any neuro surgical positioning or procedure if indicated    --weight bearing:  WBAT  --followup:  P.r.n.  --XR at next visit:  Left clavicle      =====================  Alcides Tamez MD  Orthopaedic Surgery

## 2022-07-21 NOTE — TELEPHONE ENCOUNTER
----- Message from Mario Alberto King MD sent at 7/21/2022  2:24 PM CDT -----  Regarding: RE: cervical collar    ----- Message -----  From: June Linder MA  Sent: 7/21/2022   1:57 PM CDT  To: Mario Alberto King MD  Subject: FW: cervical collar                                ----- Message -----  From: Debi Reeder  Sent: 7/21/2022   1:02 PM CDT  To: Joi Mcgarry Staff  Subject: cervical collar                                  Nursing  Home calling in regards to pt wearing a cervical collar not , needs a hard copy of order       Confirmed patient's contact info below:  Contact Name: Yvette Crews  Phone Number:[phone]  fax [phone]

## 2022-07-22 ENCOUNTER — PATIENT MESSAGE (OUTPATIENT)
Dept: NEUROSURGERY | Facility: CLINIC | Age: 72
End: 2022-07-22
Payer: MEDICARE

## 2022-07-22 ENCOUNTER — EXTERNAL HOME HEALTH (OUTPATIENT)
Dept: HOME HEALTH SERVICES | Facility: HOSPITAL | Age: 72
End: 2022-07-22
Payer: MEDICARE

## 2022-07-28 ENCOUNTER — TELEPHONE (OUTPATIENT)
Dept: INFECTIOUS DISEASES | Facility: HOSPITAL | Age: 72
End: 2022-07-28
Payer: MEDICARE

## 2022-07-28 ENCOUNTER — INFUSION (OUTPATIENT)
Dept: INFECTIOUS DISEASES | Facility: HOSPITAL | Age: 72
End: 2022-07-28
Attending: INTERNAL MEDICINE
Payer: MEDICARE

## 2022-07-28 VITALS
SYSTOLIC BLOOD PRESSURE: 112 MMHG | BODY MASS INDEX: 15.65 KG/M2 | OXYGEN SATURATION: 97 % | RESPIRATION RATE: 16 BRPM | DIASTOLIC BLOOD PRESSURE: 60 MMHG | WEIGHT: 85.56 LBS | TEMPERATURE: 99 F | HEART RATE: 77 BPM

## 2022-07-28 DIAGNOSIS — J47.9 ADULT BRONCHIECTASIS: ICD-10-CM

## 2022-07-28 DIAGNOSIS — D83.9 CVID (COMMON VARIABLE IMMUNODEFICIENCY): Primary | ICD-10-CM

## 2022-07-28 PROCEDURE — 63600175 PHARM REV CODE 636 W HCPCS: Mod: JG | Performed by: ALLERGY & IMMUNOLOGY

## 2022-07-28 PROCEDURE — 96366 THER/PROPH/DIAG IV INF ADDON: CPT

## 2022-07-28 PROCEDURE — 96365 THER/PROPH/DIAG IV INF INIT: CPT

## 2022-07-28 PROCEDURE — 25000003 PHARM REV CODE 250: Performed by: ALLERGY & IMMUNOLOGY

## 2022-07-28 RX ORDER — SODIUM CHLORIDE 0.9 % (FLUSH) 0.9 %
10 SYRINGE (ML) INJECTION
Status: CANCELLED | OUTPATIENT
Start: 2022-08-25

## 2022-07-28 RX ORDER — SODIUM CHLORIDE 0.9 % (FLUSH) 0.9 %
10 SYRINGE (ML) INJECTION
Status: DISCONTINUED | OUTPATIENT
Start: 2022-07-28 | End: 2022-07-28 | Stop reason: HOSPADM

## 2022-07-28 RX ORDER — DIPHENHYDRAMINE HYDROCHLORIDE 50 MG/ML
25 INJECTION INTRAMUSCULAR; INTRAVENOUS
Status: DISCONTINUED | OUTPATIENT
Start: 2022-07-28 | End: 2022-10-06 | Stop reason: HOSPADM

## 2022-07-28 RX ORDER — ACETAMINOPHEN 325 MG/1
650 TABLET ORAL
Status: CANCELLED | OUTPATIENT
Start: 2022-08-25

## 2022-07-28 RX ORDER — ACETAMINOPHEN 325 MG/1
650 TABLET ORAL
Status: DISCONTINUED | OUTPATIENT
Start: 2022-07-28 | End: 2022-07-28 | Stop reason: HOSPADM

## 2022-07-28 RX ADMIN — SODIUM CHLORIDE: 0.9 INJECTION, SOLUTION INTRAVENOUS at 09:07

## 2022-07-28 RX ADMIN — HUMAN IMMUNOGLOBULIN G 25 G: 20 LIQUID INTRAVENOUS at 09:07

## 2022-07-28 NOTE — PROGRESS NOTES
Pt arrived to infusion suite for Privigen infusion, premeds tylenol given po and refused benadryl. Received IVIG PRIVIGEN 25 GRAMS.  Infusion started @ 11 cc/hr and increased rate every 30 mins, 23 cc/hr., 46 cc /hr and max rate @ 93 cc/hr, IV NS ran concurrently at 25 ml/hr. Tolerated well and left in NAD. Sitter at side. Return appt provided.

## 2022-08-17 ENCOUNTER — PATIENT MESSAGE (OUTPATIENT)
Dept: NEUROSURGERY | Facility: CLINIC | Age: 72
End: 2022-08-17
Payer: MEDICARE

## 2022-08-24 ENCOUNTER — OFFICE VISIT (OUTPATIENT)
Dept: NEUROSURGERY | Facility: CLINIC | Age: 72
End: 2022-08-24
Payer: MEDICARE

## 2022-08-24 ENCOUNTER — INFUSION (OUTPATIENT)
Dept: INFECTIOUS DISEASES | Facility: HOSPITAL | Age: 72
End: 2022-08-24
Attending: INTERNAL MEDICINE
Payer: MEDICARE

## 2022-08-24 ENCOUNTER — HOSPITAL ENCOUNTER (OUTPATIENT)
Dept: RADIOLOGY | Facility: HOSPITAL | Age: 72
Discharge: HOME | End: 2022-08-24
Attending: PHYSICIAN ASSISTANT
Payer: MEDICARE

## 2022-08-24 VITALS
HEART RATE: 67 BPM | WEIGHT: 85 LBS | HEIGHT: 62 IN | SYSTOLIC BLOOD PRESSURE: 135 MMHG | BODY MASS INDEX: 15.64 KG/M2 | DIASTOLIC BLOOD PRESSURE: 84 MMHG

## 2022-08-24 VITALS
DIASTOLIC BLOOD PRESSURE: 67 MMHG | SYSTOLIC BLOOD PRESSURE: 132 MMHG | HEART RATE: 70 BPM | OXYGEN SATURATION: 98 % | TEMPERATURE: 99 F

## 2022-08-24 DIAGNOSIS — M81.0 OSTEOPOROSIS, UNSPECIFIED OSTEOPOROSIS TYPE, UNSPECIFIED PATHOLOGICAL FRACTURE PRESENCE: ICD-10-CM

## 2022-08-24 DIAGNOSIS — M81.6 LOCALIZED OSTEOPOROSIS OF LEQUESNE: ICD-10-CM

## 2022-08-24 DIAGNOSIS — M48.02 CERVICAL STENOSIS OF SPINAL CANAL: Primary | ICD-10-CM

## 2022-08-24 DIAGNOSIS — M80.80XA PATHOLOGICAL FRACTURE DUE TO OSTEOPOROSIS, UNSPECIFIED FRACTURE SITE, UNSPECIFIED OSTEOPOROSIS TYPE, INITIAL ENCOUNTER: Primary | ICD-10-CM

## 2022-08-24 DIAGNOSIS — I60.9 SAH (SUBARACHNOID HEMORRHAGE): ICD-10-CM

## 2022-08-24 PROCEDURE — 99999 PR PBB SHADOW E&M-EST. PATIENT-LVL III: ICD-10-PCS | Mod: PBBFAC,,, | Performed by: PHYSICIAN ASSISTANT

## 2022-08-24 PROCEDURE — 70450 CT HEAD/BRAIN W/O DYE: CPT | Mod: 26,,, | Performed by: RADIOLOGY

## 2022-08-24 PROCEDURE — 25000003 PHARM REV CODE 250: Performed by: PHYSICIAN ASSISTANT

## 2022-08-24 PROCEDURE — 99999 PR PBB SHADOW E&M-EST. PATIENT-LVL III: CPT | Mod: PBBFAC,,, | Performed by: PHYSICIAN ASSISTANT

## 2022-08-24 PROCEDURE — 70450 CT HEAD WITHOUT CONTRAST: ICD-10-PCS | Mod: 26,,, | Performed by: RADIOLOGY

## 2022-08-24 PROCEDURE — 70450 CT HEAD/BRAIN W/O DYE: CPT | Mod: TC

## 2022-08-24 PROCEDURE — 99214 OFFICE O/P EST MOD 30 MIN: CPT | Mod: S$PBB,,, | Performed by: PHYSICIAN ASSISTANT

## 2022-08-24 PROCEDURE — 63600175 PHARM REV CODE 636 W HCPCS: Performed by: PHYSICIAN ASSISTANT

## 2022-08-24 PROCEDURE — 99214 PR OFFICE/OUTPT VISIT, EST, LEVL IV, 30-39 MIN: ICD-10-PCS | Mod: S$PBB,,, | Performed by: PHYSICIAN ASSISTANT

## 2022-08-24 PROCEDURE — 99213 OFFICE O/P EST LOW 20 MIN: CPT | Mod: PBBFAC,25 | Performed by: PHYSICIAN ASSISTANT

## 2022-08-24 PROCEDURE — 96365 THER/PROPH/DIAG IV INF INIT: CPT

## 2022-08-24 RX ORDER — ZOLEDRONIC ACID 5 MG/100ML
5 INJECTION, SOLUTION INTRAVENOUS
Status: COMPLETED | OUTPATIENT
Start: 2022-08-24 | End: 2022-08-24

## 2022-08-24 RX ORDER — SODIUM CHLORIDE 0.9 % (FLUSH) 0.9 %
10 SYRINGE (ML) INJECTION
Status: CANCELLED | OUTPATIENT
Start: 2022-08-24

## 2022-08-24 RX ORDER — HEPARIN 100 UNIT/ML
500 SYRINGE INTRAVENOUS
Status: CANCELLED | OUTPATIENT
Start: 2022-08-24

## 2022-08-24 RX ORDER — ZOLEDRONIC ACID 5 MG/100ML
5 INJECTION, SOLUTION INTRAVENOUS
Status: CANCELLED | OUTPATIENT
Start: 2022-08-24

## 2022-08-24 RX ORDER — SODIUM CHLORIDE 0.9 % (FLUSH) 0.9 %
10 SYRINGE (ML) INJECTION
Status: DISCONTINUED | OUTPATIENT
Start: 2022-08-24 | End: 2022-08-24 | Stop reason: HOSPADM

## 2022-08-24 RX ADMIN — ZOLEDRONIC ACID 5 MG: 5 INJECTION, SOLUTION INTRAVENOUS at 12:08

## 2022-08-24 RX ADMIN — SODIUM CHLORIDE: 9 INJECTION, SOLUTION INTRAVENOUS at 12:08

## 2022-08-24 NOTE — PROGRESS NOTES
[facility] - Neurosurgery 8th Fl  Neurosurgery    SUBJECTIVE:     History of Present Illness:  Yvette Crews is a 72 y.o. female with PMH AVNRT, frequent falls who presents for follow-up left temporal SAH after a fall. She was also found to have significant degenerative changes in her cervical spine, notably C2-3 anterolisthesis with severe cord compression. Her hospital course was complicated by delirium. It was decided that because she did not have s/s acute myelopathy that we would hold off on urgent surgery for spinal cord decompression until she was better medically optimized. Her dementia and frailty would make her post-op course more challenging. She also had a clavicle fracture after a previous fall, but this is now stable after outpatient evaluation by orthopedics. She was ultimately discharged from the hospital in stable condition to the skilled nursing part of her home at the [facility]. Today, her 's main concern is how she would do if surgery was the only treatment option. Currently she is not compliant with use of the cervical collar. He states the facility took away the walker because she was using it incorrectly increasing her risk for falls. She uses a wheelchair to get around, but he states she will still occasionally get up from the wheelchair and leave it behind when ambulating. She does not appear to be in any pain. He states she is more forgetful with use of her collar and other DME equipment she needs for safety. She is not functionally worse than she was before. Her  thinks she has been dropping things more, but the patient has not noticed a difference. She presents with a CTH.        Review of patient's allergies indicates:   Allergen Reactions    Adhesive      Tape tears skin    Buspar [buspirone]      headaches    Escitalopram oxalate Nausea Only     hyponatremia      Rifampin Rash       Past Medical History:   Diagnosis Date    Adult bronchiectasis      Age-related osteoporosis with current pathological fracture with routine healing 8/28/2013    Amblyopia     Anxiety     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    Cataract     Cellulitis of right lower extremity 1/19/2021    Chondrocalcinosis, cause unspecified, involving hand(712.34) 7/12/2011     Dx updated per 2019 IMO Load    Chronic sinusitis 4/6/2017    Colonic constipation 6/5/2013    Concussion 10/27/2016    Frequent falls 3/14/2017    Gait disturbance 3/14/2017    History of cardiac radiofrequency ablation (RFA) 2/3/2018    History of cardiac radiofrequency ablation (RFA) 2/3/2018    History of subarachnoid hemorrhage 10/30/2016    Hypogammaglobulinemia 9/7/2011    Irritable bowel syndrome 12/18/2015    Major depressive disorder, recurrent episode, in full remission 8/19/2010    Onychomycosis     Osteoarthritis     Postoperative hypothyroidism 12/18/2015    Presence of Watchman left atrial appendage closure device 5/14/2022    Rectal prolapse 6/5/2013    Reflux esophagitis 2/7/2010    Status post thyroidectomy 6/1/2017    Strabismus     SVT (supraventricular tachycardia) 11/22/2013    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     Underweight 1/23/2013       Past Surgical History:   Procedure Laterality Date    BREAST CYST ASPIRATION      x2    CATARACT EXTRACTION W/  INTRAOCULAR LENS IMPLANT Left 3/11/2019    Procedure: EXTRACTION, CATARACT, WITH IOL INSERTION;  Surgeon: Vera Sams MD;  Location: [facility], [place] OR;  Service: Ophthalmology;  Laterality: Left;    CATARACT EXTRACTION W/  INTRAOCULAR LENS IMPLANT Right 4/15/2019    Procedure: EXTRACTION, CATARACT, WITH IOL INSERTION LASER;  Surgeon: Vera Sams MD;  Location: [facility], [place] OR;  Service: Ophthalmology;  Laterality: Right;    COLON SURGERY      EYE SURGERY      FRACTURE SURGERY      NASAL SEPTUM SURGERY      OCCLUSION OF LEFT ATRIAL APPENDAGE N/A 2/18/2022    Procedure: Left  atrial appendage occlusion;  Surgeon: Chase Gill MD;  Location: [facility] EP LAB;  Service: Cardiology;  Laterality: N/A;  afib, watchman, BSCI, osiris, anes, MB/EH, 3prep    OPEN REDUCTION AND INTERNAL FIXATION (ORIF) OF INJURY OF SACROILIAC JOINT Bilateral 1/20/2021    Procedure: ORIF, SACROILIAC JOINT;  Surgeon: Alcides Tamez MD;  Location: [facility] OR [address] [place] [address];  Service: Orthopedics;  Laterality: Bilateral;    RADIOFREQUENCY ABLATION      RECTAL PROLAPSE REPAIR  june 2013    STRABISMUS SURGERY      TONSILLECTOMY      TRANSESOPHAGEAL ECHOCARDIOGRAPHY N/A 2/18/2022    Procedure: ECHOCARDIOGRAM, TRANSESOPHAGEAL;  Surgeon: Nils Diagnostic Provider;  Location: [facility] EP LAB;  Service: Cardiology;  Laterality: N/A;        Family History   Problem Relation Age of Onset    Heart disease Father     Stroke Father     Heart disease Brother     Breast cancer Paternal Grandmother     Melanoma Neg Hx     Psoriasis Neg Hx     Lupus Neg Hx     Eczema Neg Hx     Amblyopia Neg Hx     Blindness Neg Hx     Cataracts Neg Hx     Glaucoma Neg Hx     Macular degeneration Neg Hx     Retinal detachment Neg Hx     Strabismus Neg Hx        Social History     Socioeconomic History    Marital status:     Number of children: 2   Occupational History    Occupation: retired   Tobacco Use    Smoking status: Never Smoker    Smokeless tobacco: Never Used   Substance and Sexual Activity    Alcohol use: No    Drug use: No    Sexual activity: Not Currently     Social Determinants of Health     Financial Resource Strain: Low Risk     Difficulty of Paying Living Expenses: Not hard at all   Food Insecurity: No Food Insecurity    Worried About Running Out of Food in the Last Year: Never true    Ran Out of Food in the Last Year: Never true   Transportation Needs: No Transportation Needs    Lack of Transportation (Medical): No    Lack of Transportation (Non-Medical): No   Physical Activity: Sufficiently Active  "   Days of Exercise per Week: 5 days    Minutes of Exercise per Session: 30 min   Stress: No Stress Concern Present    Feeling of Stress : Not at all   Social Connections: Moderately Isolated    Frequency of Communication with Friends and Family: Twice a week    Frequency of Social Gatherings with Friends and Family: Twice a week    Attends Protestant Services: Never    Active Member of Clubs or Organizations: No    Attends Club or Organization Meetings: Never    Marital Status:    Housing Stability: Low Risk     Unable to Pay for Housing in the Last Year: No    Number of Places Lived in the Last Year: 1    Unstable Housing in the Last Year: No       Review of Systems:  Constitutional: no fever or chills   Eyes: no visual changes   ENT: no nasal congestion or sore throat   Respiratory: no cough or shortness of breath   Cardiovascular: no chest pain or palpitations   Gastrointestinal: no nausea or vomiting   Genitourinary: no hematuria or dysuria   Musculoskeletal: no arthralgias or myalgias.   Neurological: no seizures or tremors       OBJECTIVE:     Vital Signs (Most Recent):  Vitals:    08/24/22 1331   BP: 135/84   Pulse: 67   Weight: 38.6 kg (85 lb)   Height: 5' 2" (1.575 m)       Physical Exam:  General: well developed, well nourished, no distress.   Head: normocephalic, atraumatic  Neurologic: Alert and oriented. Thought content appropriate.  GCS: Motor: 6/Verbal: 5/Eyes: 4 GCS Total: 15  Mental Status: Awake, Alert, Oriented to self  Language: No aphasia  Speech: No dysarthria  Cranial nerves: face symmetric, tongue midline, CN II-XII grossly intact.   Eyes: pupils equal, round, reactive to light with accomodation, EOMI.  Pulmonary: normal respirations, no signs of respiratory distress  Abdomen: soft, non-distended, not tender to palpation  Sensory: intact to light touch throughout  Motor Strength: Moves all extremities spontaneously with good tone.  Grossly full strength upper and lower " extremities. Difficulty following complex commands. No abnormal movements seen.   DTR's: 2 + and symmetric in UE and LE  Pronator Drift: no drift noted  Finger-to-nose: Intact bilaterally  Plascencia: absent  Clonus: absent  Skin: Skin is warm, dry and intact.  No midline tenderness to palpation. No surrounding paraspinal muscle tenderness.    Diagnostic Results:  I have personally reviewed all pertinent imaging.    CTH 8/24/22:  - resolution of prior SAH, no new hemorrhage    ASSESSMENT/PLAN:     Yvette Crews is a 72 y.o. female with PMH AVNRT, frequent falls who presents for follow-up left temporal SAH after a fall. CTH shows resolution of prior hemorrhage. Given that she remains clinically at her baseline without focal neuro deficits, we recommend to monitor her cervical myelopathy. No surgical intervention at this time as the concern remains that she would likely not do well post-op with her comorbidities. Patient and  v/u. Any clinical decline could change this recommendation. Discussed with Dr. King. Will plan to follow-up in 2 months with XR cervical spine flexion/extension. All questions answered. Encouraged to call with questions/concerns.     - f/u 2 months with XR cervical spine flex/ext       Gina Ramesh PA-C  Neurosurgery      Note dictated with voice recognition software, please excuse any grammatical errors.

## 2022-08-25 ENCOUNTER — INFUSION (OUTPATIENT)
Dept: INFECTIOUS DISEASES | Facility: HOSPITAL | Age: 72
End: 2022-08-25
Attending: INTERNAL MEDICINE
Payer: MEDICARE

## 2022-08-25 VITALS
BODY MASS INDEX: 15.56 KG/M2 | TEMPERATURE: 98 F | OXYGEN SATURATION: 100 % | DIASTOLIC BLOOD PRESSURE: 56 MMHG | WEIGHT: 85.13 LBS | SYSTOLIC BLOOD PRESSURE: 108 MMHG | RESPIRATION RATE: 16 BRPM | HEART RATE: 99 BPM

## 2022-08-25 DIAGNOSIS — J47.9 ADULT BRONCHIECTASIS: ICD-10-CM

## 2022-08-25 DIAGNOSIS — D83.9 CVID (COMMON VARIABLE IMMUNODEFICIENCY): Primary | ICD-10-CM

## 2022-08-25 PROCEDURE — 63600175 PHARM REV CODE 636 W HCPCS: Mod: JG | Performed by: ALLERGY & IMMUNOLOGY

## 2022-08-25 PROCEDURE — 96366 THER/PROPH/DIAG IV INF ADDON: CPT

## 2022-08-25 PROCEDURE — 25000003 PHARM REV CODE 250: Performed by: ALLERGY & IMMUNOLOGY

## 2022-08-25 PROCEDURE — 96365 THER/PROPH/DIAG IV INF INIT: CPT

## 2022-08-25 RX ORDER — ACETAMINOPHEN 325 MG/1
650 TABLET ORAL
Status: CANCELLED | OUTPATIENT
Start: 2022-09-22

## 2022-08-25 RX ORDER — ACETAMINOPHEN 325 MG/1
650 TABLET ORAL
Status: DISCONTINUED | OUTPATIENT
Start: 2022-08-25 | End: 2022-08-25 | Stop reason: HOSPADM

## 2022-08-25 RX ORDER — DIPHENHYDRAMINE HYDROCHLORIDE 50 MG/ML
25 INJECTION INTRAMUSCULAR; INTRAVENOUS
Status: DISCONTINUED | OUTPATIENT
Start: 2022-08-25 | End: 2022-10-06 | Stop reason: HOSPADM

## 2022-08-25 RX ORDER — SODIUM CHLORIDE 0.9 % (FLUSH) 0.9 %
10 SYRINGE (ML) INJECTION
Status: DISCONTINUED | OUTPATIENT
Start: 2022-08-25 | End: 2022-08-25 | Stop reason: HOSPADM

## 2022-08-25 RX ORDER — SODIUM CHLORIDE 0.9 % (FLUSH) 0.9 %
10 SYRINGE (ML) INJECTION
Status: CANCELLED | OUTPATIENT
Start: 2022-09-22

## 2022-08-25 RX ADMIN — SODIUM CHLORIDE: 0.9 INJECTION, SOLUTION INTRAVENOUS at 09:08

## 2022-08-25 RX ADMIN — HUMAN IMMUNOGLOBULIN G 25 G: 20 LIQUID INTRAVENOUS at 10:08

## 2022-08-25 NOTE — PROGRESS NOTES
Pt arrived to infusion suite for Privigen infusion, premeds tylenol given po and refused benadryl. Received IVIG PRIVIGEN 25 GRAMS.  Infusion started @ 12 cc/hr and increased rate every 30 mins, 23 cc/hr., 46 cc /hr and max rate @ 93 cc/hr, IV NS ran concurrently at 25 ml/hr. Tolerated well and left in NAD. Sitter at side. Return appt provided.

## 2022-09-14 ENCOUNTER — TELEPHONE (OUTPATIENT)
Dept: NEUROLOGY | Facility: CLINIC | Age: 72
End: 2022-09-14
Payer: MEDICARE

## 2022-09-14 NOTE — TELEPHONE ENCOUNTER
----- Message from Diana Donovan MA sent at 9/14/2022  3:09 PM CDT -----  Contact: Pavel  Patient  requesting a call back in regards to scheduling a follow up. Please call patient  to assist him with getting the patient scheduled. Patient would like to schedule a virtual visit.         Confirmed Contact below:  Contact Name: Pavel Eleonora  Phone Number: [phone]

## 2022-09-15 ENCOUNTER — PATIENT MESSAGE (OUTPATIENT)
Dept: NEUROLOGY | Facility: CLINIC | Age: 72
End: 2022-09-15
Payer: MEDICARE

## 2022-09-19 ENCOUNTER — HOSPITAL ENCOUNTER (EMERGENCY)
Facility: HOSPITAL | Age: 72
Discharge: HOME | End: 2022-09-20
Attending: EMERGENCY MEDICINE
Payer: MEDICARE

## 2022-09-19 DIAGNOSIS — M25.519 SHOULDER PAIN: Primary | ICD-10-CM
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
## 2022-09-19 LAB
ALBUMIN SERPL BCP-MCNC: 2.9 G/DL (ref 3.5–5.2)
ALP SERPL-CCNC: 128 U/L (ref 55–135)
ALT SERPL W/O P-5'-P-CCNC: 26 U/L (ref 10–44)
ANION GAP SERPL CALC-SCNC: 6 MMOL/L (ref 8–16)
AST SERPL-CCNC: 25 U/L (ref 10–40)
BASOPHILS # BLD AUTO: 0.01 K/UL (ref 0–0.2)
BASOPHILS NFR BLD: 0.1 % (ref 0–1.9)
BILIRUB SERPL-MCNC: 0.2 MG/DL (ref 0.1–1)
BUN SERPL-MCNC: 28 MG/DL (ref 8–23)
CALCIUM SERPL-MCNC: 8.4 MG/DL (ref 8.7–10.5)
CHLORIDE SERPL-SCNC: 107 MMOL/L (ref 95–110)
CO2 SERPL-SCNC: 28 MMOL/L (ref 23–29)
CREAT SERPL-MCNC: 0.6 MG/DL (ref 0.5–1.4)
DIFFERENTIAL METHOD: ABNORMAL
EOSINOPHIL # BLD AUTO: 0 K/UL (ref 0–0.5)
EOSINOPHIL NFR BLD: 0.3 % (ref 0–8)
ERYTHROCYTE [DISTWIDTH] IN BLOOD BY AUTOMATED COUNT: 13.9 % (ref 11.5–14.5)
EST. GFR  (NO RACE VARIABLE): >60 ML/MIN/1.73 M^2
GLUCOSE SERPL-MCNC: 79 MG/DL (ref 70–110)
HCT VFR BLD AUTO: 34.7 % (ref 37–48.5)
HGB BLD-MCNC: 11.1 G/DL (ref 12–16)
HIV 1+2 AB+HIV1 P24 AG SERPL QL IA: NORMAL
IMM GRANULOCYTES # BLD AUTO: 0.03 K/UL (ref 0–0.04)
IMM GRANULOCYTES NFR BLD AUTO: 0.3 % (ref 0–0.5)
LYMPHOCYTES # BLD AUTO: 1.6 K/UL (ref 1–4.8)
LYMPHOCYTES NFR BLD: 18.1 % (ref 18–48)
MAGNESIUM SERPL-MCNC: 2.3 MG/DL (ref 1.6–2.6)
MCH RBC QN AUTO: 31.4 PG (ref 27–31)
MCHC RBC AUTO-ENTMCNC: 32 G/DL (ref 32–36)
MCV RBC AUTO: 98 FL (ref 82–98)
MONOCYTES # BLD AUTO: 0.6 K/UL (ref 0.3–1)
MONOCYTES NFR BLD: 6.6 % (ref 4–15)
NEUTROPHILS # BLD AUTO: 6.5 K/UL (ref 1.8–7.7)
NEUTROPHILS NFR BLD: 74.6 % (ref 38–73)
NRBC BLD-RTO: 0 /100 WBC
PHOSPHATE SERPL-MCNC: 3.4 MG/DL (ref 2.7–4.5)
PLATELET # BLD AUTO: 236 K/UL (ref 150–450)
PMV BLD AUTO: 9.6 FL (ref 9.2–12.9)
POTASSIUM SERPL-SCNC: 3.7 MMOL/L (ref 3.5–5.1)
PROT SERPL-MCNC: 6.1 G/DL (ref 6–8.4)
RBC # BLD AUTO: 3.54 M/UL (ref 4–5.4)
SODIUM SERPL-SCNC: 141 MMOL/L (ref 136–145)
TROPONIN I SERPL DL<=0.01 NG/ML-MCNC: <0.006 NG/ML (ref 0–0.03)
TSH SERPL DL<=0.005 MIU/L-ACNC: 0.6 UIU/ML (ref 0.4–4)
WBC # BLD AUTO: 8.66 K/UL (ref 3.9–12.7)

## 2022-09-19 PROCEDURE — 87389 HIV-1 AG W/HIV-1&-2 AB AG IA: CPT | Performed by: PHYSICIAN ASSISTANT

## 2022-09-19 PROCEDURE — 99285 EMERGENCY DEPT VISIT HI MDM: CPT | Mod: GC,,, | Performed by: EMERGENCY MEDICINE

## 2022-09-19 PROCEDURE — 85025 COMPLETE CBC W/AUTO DIFF WBC: CPT

## 2022-09-19 PROCEDURE — 93010 EKG 12-LEAD: ICD-10-PCS | Mod: ,,, | Performed by: INTERNAL MEDICINE

## 2022-09-19 PROCEDURE — 99285 PR EMERGENCY DEPT VISIT,LEVEL V: ICD-10-PCS | Mod: GC,,, | Performed by: EMERGENCY MEDICINE

## 2022-09-19 PROCEDURE — 84484 ASSAY OF TROPONIN QUANT: CPT

## 2022-09-19 PROCEDURE — 83735 ASSAY OF MAGNESIUM: CPT

## 2022-09-19 PROCEDURE — 82962 GLUCOSE BLOOD TEST: CPT

## 2022-09-19 PROCEDURE — 84443 ASSAY THYROID STIM HORMONE: CPT

## 2022-09-19 PROCEDURE — 80053 COMPREHEN METABOLIC PANEL: CPT

## 2022-09-19 PROCEDURE — 84100 ASSAY OF PHOSPHORUS: CPT

## 2022-09-19 PROCEDURE — 93010 ELECTROCARDIOGRAM REPORT: CPT | Mod: ,,, | Performed by: INTERNAL MEDICINE

## 2022-09-19 PROCEDURE — 93005 ELECTROCARDIOGRAM TRACING: CPT

## 2022-09-19 PROCEDURE — 99285 EMERGENCY DEPT VISIT HI MDM: CPT | Mod: 25

## 2022-09-19 PROCEDURE — 96360 HYDRATION IV INFUSION INIT: CPT

## 2022-09-19 NOTE — LETTER
Fax Transmission                                                                                                                                                       Date: September 22, 2022       To:   From:    Fax:   Fax:    Phone:   Phone:      Special Instructions:     For questions or issues, please contact department listed on attached report.                            IF THERE ARE ANY PROBLEMS WITH THIS TRANSMISSION, PLEASE CALL IMMEDIATELY. THANK YOU    CONFIDENTIALITY NOTICE: The accompanying facsimile is intended solely for the use of the recipient designated above. Document(s) transmitted herewith may contain information that is confidential and privileged. Delivery, distribution of dissemination of this communication other than to the intended recipient is strictly prohibited. If you are another healthcare provider and have received this facsimile in error, please properly dispose and notify the sender. If you are NOT a healthcare provider and have received this facsimile in error, please notify [facility]s Compliance & Privacy Department immediately by email at [email or web address]

## 2022-09-20 VITALS
TEMPERATURE: 98 F | OXYGEN SATURATION: 100 % | BODY MASS INDEX: 15.18 KG/M2 | WEIGHT: 83 LBS | RESPIRATION RATE: 16 BRPM | SYSTOLIC BLOOD PRESSURE: 123 MMHG | HEART RATE: 67 BPM | DIASTOLIC BLOOD PRESSURE: 68 MMHG

## 2022-09-20 LAB
BACTERIA #/AREA URNS AUTO: ABNORMAL /HPF
BILIRUB UR QL STRIP: NEGATIVE
CLARITY UR REFRACT.AUTO: ABNORMAL
COLOR UR AUTO: YELLOW
GLUCOSE UR QL STRIP: NEGATIVE
HGB UR QL STRIP: NEGATIVE
HYALINE CASTS UR QL AUTO: 0 /LPF
KETONES UR QL STRIP: NEGATIVE
LEUKOCYTE ESTERASE UR QL STRIP: ABNORMAL
MICROSCOPIC COMMENT: ABNORMAL
NITRITE UR QL STRIP: POSITIVE
PH UR STRIP: 8 [PH] (ref 5–8)
POCT GLUCOSE: 81 MG/DL (ref 70–110)
PROT UR QL STRIP: ABNORMAL
RBC #/AREA URNS AUTO: 6 /HPF (ref 0–4)
SP GR UR STRIP: >1.03 (ref 1–1.03)
SQUAMOUS #/AREA URNS AUTO: 1 /HPF
TRI-PHOS CRY UR QL COMP ASSIST: ABNORMAL
URN SPEC COLLECT METH UR: ABNORMAL
WBC #/AREA URNS AUTO: 78 /HPF (ref 0–5)

## 2022-09-20 PROCEDURE — 87086 URINE CULTURE/COLONY COUNT: CPT

## 2022-09-20 PROCEDURE — 25000003 PHARM REV CODE 250

## 2022-09-20 PROCEDURE — 81001 URINALYSIS AUTO W/SCOPE: CPT

## 2022-09-20 PROCEDURE — 87186 SC STD MICRODIL/AGAR DIL: CPT

## 2022-09-20 PROCEDURE — 87088 URINE BACTERIA CULTURE: CPT

## 2022-09-20 PROCEDURE — 87077 CULTURE AEROBIC IDENTIFY: CPT

## 2022-09-20 RX ADMIN — SODIUM CHLORIDE 1000 ML: 0.9 INJECTION, SOLUTION INTRAVENOUS at 12:09

## 2022-09-20 NOTE — ED PROVIDER NOTES
"Encounter Date: 9/19/2022       History     Chief Complaint   Patient presents with    Fall     Pt fell at 1500. Now has black eye present (R eye), hematoma and bump present to R side of forehead. Pt A&Ox4. Hx of SAH. - blood thinners.      73 y/o F with a PMHx of previous SAH, MDD, OA and amblyopia presents to the ED c/o "bruising all over". She told the nurse that she was with a group of friends and she fell down. On my evaluation she was able to elicit a similar story but was unable to detail whether or not she lost consciousness or the mechanism of the fall. She denies all other symptoms at this time. History is limited due to condition of the patient.     The history is provided by the patient and medical records. The history is limited by the condition of the patient.   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    Cataract     Cellulitis of right lower extremity 1/19/2021    Chondrocalcinosis, cause unspecified, involving hand(712.34) 7/12/2011     Dx updated per 2019 IMO Load    Chronic sinusitis 4/6/2017    Colonic constipation 6/5/2013    Concussion 10/27/2016    Frequent falls 3/14/2017    Gait disturbance 3/14/2017    History of cardiac radiofrequency ablation (RFA) 2/3/2018    History of cardiac radiofrequency ablation (RFA) 2/3/2018    History of subarachnoid hemorrhage 10/30/2016    Hypogammaglobulinemia 9/7/2011    Irritable bowel syndrome 12/18/2015    Major depressive disorder, recurrent episode, in full remission 8/19/2010    Onychomycosis     Osteoarthritis     " Postoperative hypothyroidism 12/18/2015    Presence of Watchman left atrial appendage closure device 5/14/2022    Rectal prolapse 6/5/2013    Reflux esophagitis 2/7/2010    Status post thyroidectomy 6/1/2017    Strabismus     SVT (supraventricular tachycardia) 11/22/2013    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     Underweight 1/23/2013     Past Surgical History:   Procedure Laterality Date    BREAST CYST ASPIRATION      x2    CATARACT EXTRACTION W/  INTRAOCULAR LENS IMPLANT Left 3/11/2019    Procedure: EXTRACTION, CATARACT, WITH IOL INSERTION;  Surgeon: Vera Sams MD;  Location: [facility];  Service: Ophthalmology;  Laterality: Left;    CATARACT EXTRACTION W/  INTRAOCULAR LENS IMPLANT Right 4/15/2019    Procedure: EXTRACTION, CATARACT, WITH IOL INSERTION LASER;  Surgeon: Vera Sams MD;  Location: [facility];  Service: Ophthalmology;  Laterality: Right;    COLON SURGERY      EYE SURGERY      FRACTURE SURGERY      NASAL SEPTUM SURGERY      OCCLUSION OF LEFT ATRIAL APPENDAGE N/A 2/18/2022    Procedure: Left atrial appendage occlusion;  Surgeon: Chase Gill MD;  Location: [facility];  Service: Cardiology;  Laterality: N/A;  afib, watchman, BSCI, osiris, anes, MB/EH, 3prep    OPEN REDUCTION AND INTERNAL FIXATION (ORIF) OF INJURY OF SACROILIAC JOINT Bilateral 1/20/2021    Procedure: ORIF, SACROILIAC JOINT;  Surgeon: Alcides Tamez MD;  [address]  Service: Orthopedics;  Laterality: Bilateral;    RADIOFREQUENCY ABLATION      RECTAL PROLAPSE REPAIR  june 2013    STRABISMUS SURGERY      TONSILLECTOMY      TRANSESOPHAGEAL ECHOCARDIOGRAPHY N/A 2/18/2022    Procedure: ECHOCARDIOGRAM, TRANSESOPHAGEAL;  Surgeon: Nils Diagnostic Provider;  Location: [facility];  Service: Cardiology;  Laterality: N/A;     Family History   Problem Relation Age of Onset    Heart disease Father     Stroke Father     Heart disease Brother     Breast cancer Paternal Grandmother     Melanoma Neg Hx     Psoriasis  Neg Hx     Lupus Neg Hx     Eczema Neg Hx     Amblyopia Neg Hx     Blindness Neg Hx     Cataracts Neg Hx     Glaucoma Neg Hx     Macular degeneration Neg Hx     Retinal detachment Neg Hx     Strabismus Neg Hx      Social History     Tobacco Use    Smoking status: Never    Smokeless tobacco: Never   Substance Use Topics    Alcohol use: No    Drug use: No     Review of Systems   Unable to perform ROS: Acuity of condition     Physical Exam     Initial Vitals [09/19/22 2045]   BP Pulse Resp Temp SpO2   122/84 86 18 97.9 °F (36.6 °C) 98 %      MAP       --         Physical Exam    Nursing note and vitals reviewed.  Constitutional: Vital signs are normal. She appears well-developed and well-nourished. She is not diaphoretic. She appears distressed (mild).   HENT:   Head: Normocephalic and atraumatic.   Right Ear: External ear normal.   Left Ear: External ear normal.   3 cm diameter hematoma present on the R frontal forehead hear the hairline.   Eyes: Right eye exhibits no discharge. Left eye exhibits no discharge.   Neck: Trachea normal. Neck supple. No thyroid mass present.   No TTP of the C/T/L spine.   Normal range of motion.  Cardiovascular:  Normal rate, regular rhythm, normal heart sounds and intact distal pulses.     Exam reveals no gallop and no friction rub.       No murmur heard.  Pulmonary/Chest: Breath sounds normal. No respiratory distress. She has no wheezes. She has no rhonchi. She has no rales.   Abdominal: Abdomen is soft. Bowel sounds are normal. She exhibits no distension. There is no abdominal tenderness. There is no rebound and no guarding.   Musculoskeletal:         General: Tenderness present.      Cervical back: Normal range of motion and neck supple.      Comments: TTP of the bilateral shoulder and upper arms.      Neurological: She is alert and oriented to person, place, and time. She has normal strength. No cranial nerve deficit or sensory deficit. GCS score is 15. GCS eye subscore is 4. GCS  verbal subscore is 5. GCS motor subscore is 6.   Skin: Skin is warm and dry. Capillary refill takes less than 2 seconds. No rash noted.   Abrasion on the R shoulder. Bruising present surround the R orbit.    Psychiatric: She has a normal mood and affect.       ED Course   Procedures  Labs Reviewed   CBC W/ AUTO DIFFERENTIAL - Abnormal; Notable for the following components:       Result Value    RBC 3.54 (*)     Hemoglobin 11.1 (*)     Hematocrit 34.7 (*)     MCH 31.4 (*)     Gran % 74.6 (*)     All other components within normal limits   COMPREHENSIVE METABOLIC PANEL - Abnormal; Notable for the following components:    BUN 28 (*)     Calcium 8.4 (*)     Albumin 2.9 (*)     Anion Gap 6 (*)     All other components within normal limits   URINALYSIS, REFLEX TO URINE CULTURE - Abnormal; Notable for the following components:    Appearance, UA Hazy (*)     Specific Gravity, UA >1.030 (*)     Protein, UA 1+ (*)     Nitrite, UA Positive (*)     Leukocytes, UA 3+ (*)     All other components within normal limits    Narrative:     Specimen Source->Urine   URINALYSIS MICROSCOPIC - Abnormal; Notable for the following components:    RBC, UA 6 (*)     WBC, UA 78 (*)     All other components within normal limits    Narrative:     Specimen Source->Urine   CULTURE, URINE   HIV 1 / 2 ANTIBODY    Narrative:     Release to patient->Immediate   TROPONIN I   TSH   MAGNESIUM   PHOSPHORUS   POCT GLUCOSE     EKG Readings: (Independently Interpreted)   Initial Reading: No STEMI. Previous EKG: Compared with most recent EKG Rhythm: Normal Sinus Rhythm. Heart Rate: 69. ST Segments: Non-Specific ST Segment Depression.     Imaging Results              CT Head Without Contrast (Final result)  Result time 09/19/22 23:37:14      Final result by Isaiah Santiago MD (09/19/22 23:37:14)                   Impression:      No CT evidence of acute intracranial abnormality. Clinical correlation and further evaluation as warranted.    Right periorbital  soft tissue edema extending into the right frontoparietal scalp.  No depressed calvarial fracture identified.    Chronic senescent and microvascular ischemic changes.      Electronically signed by: Isaiah Santiago MD  Date:    09/19/2022  Time:    23:37               Narrative:    EXAMINATION:  CT HEAD WITHOUT CONTRAST    CLINICAL HISTORY:  Head trauma, minor (Age >= 65y);Facial trauma, blunt;    TECHNIQUE:  Low dose axial images were obtained through the head.  Coronal and sagittal reformations were also performed. Contrast was not administered.    COMPARISON:  08/24/2022    FINDINGS:  There is generalized cerebral volume loss with compensatory sulcal widening and ventricular enlargement.  There is periventricular white matter hypoattenuation suggesting sequelae of chronic microvascular ischemic change.  No definite CT evidence of acute intracranial hemorrhage.  There is no midline shift.  No large extra-axial collection identified.  The basal cisterns are patent. The mastoid air cells and paranasal sinuses are clear of acute process. There is postsurgical change of the bilateral globes.  There is soft tissue edema involving the right periorbital region extending into the right frontoparietal scalp.  No definite acute depressed calvarial fracture identified.                                       CT Cervical Spine Without Contrast (Final result)  Result time 09/19/22 23:26:49      Final result by Isaiah Santiago MD (09/19/22 23:26:49)                   Impression:      No definite CT evidence of acute displaced fracture the cervical spine.  Redemonstration of advanced multilevel degenerative changes with chronic grade 2 anterolisthesis of C2 on C3.      Electronically signed by: Isaiah Santiago MD  Date:    09/19/2022  Time:    23:26               Narrative:    EXAMINATION:  CT CERVICAL SPINE WITHOUT CONTRAST    CLINICAL HISTORY:  Neck trauma (Age >= 65y);    TECHNIQUE:  Low dose axial images, sagittal and  "coronal reformations were performed though the cervical spine.  Contrast was not administered.    COMPARISON:  CT cervical spine 07/07/2022, MRI cervical spine 07/07/2022    FINDINGS:  There is approximately 0.6 cm of anterolisthesis of C2 on C3, stable from prior examination.  Remaining cervical spine alignment is stable from prior study.  Cervical vertebral body heights appear adequately maintained.  No definite acute displaced fracture appreciated.  There is stable mild height loss of the T2 and T3 vertebral bodies.  There is multilevel intervertebral disc space height loss and endplate degenerative change.  There are advanced multilevel degenerative changes with varying degrees of spinal canal stenosis and bilateral neural foraminal narrowing, stable compared to prior CT and MRI examinations of 07/07/2022.    There is mild atherosclerotic calcification of the carotid vasculature.  The trachea is patent.  There is chronic calcified right apical scarring.  There is no pneumothorax.                                       X-Ray Chest AP Portable (Final result)  Result time 09/19/22 23:12:33      Final result by Earnest Kerr MD (09/19/22 23:12:33)                   Impression:      Overall limited quality examination.  Consider follow-up when clinically appropriate.      Electronically signed by: Earnest Kerr MD  Date:    09/19/2022  Time:    23:12               Narrative:    EXAMINATION:  XR CHEST AP PORTABLE    CLINICAL HISTORY:  Provided history is "  Unspecified fall, initial encounter".    TECHNIQUE:  One view of the chest.    COMPARISON:  07/18/2022.    FINDINGS:  Overall poor quality study with near complete obscuration of the left lung apex and partial exclusion of the right medial lung apex.    Cardiomediastinal silhouette is stable.  There are prominent perihilar interstitial lung markings which could be related to interstitial edema or pneumonitis.  Probable scarring in the right upper lung zone.  " Healing rib fractures.                                       X-Ray Shoulder Trauma Bilateral (Final result)  Result time 09/19/22 23:09:40      Final result by Earnest Kerr MD (09/19/22 23:09:40)                   Impression:      Position limited examination.  Further evaluation/follow-up as warranted clinically.    Questionable subluxation of the right shoulder with widening of the acromiohumeral interval.  Calcifications along the superior right humerus which could be related to calcific tendinitis or chondrocalcinosis.    Left distal clavicle and acromioclavicular joint injury as seen on prior exam.      Electronically signed by: Earnest Kerr MD  Date:    09/19/2022  Time:    23:09               Narrative:    EXAMINATION:  XR SHOULDER TRAUMA 3 VIEW BILATERAL    CLINICAL HISTORY:  Pain in unspecified shoulder    TECHNIQUE:  Three views of each shoulder were performed.    COMPARISON:  Left shoulder radiograph, 07/03/2022.  Bilateral shoulder radiograph, 10/14/2021.    FINDINGS:  Right shoulder: Exam quality is limited by suboptimal positioning.  There is widening of the acromial humeral interval and possible slight anterior subluxation of the humerus with respect to the glenoid, though scapular Y-view is significantly limited by positioning.  There are small calcifications noted along the superior aspect of the humeral head.  Scarring in the right lung apex.    Left shoulder: Exam quality is limited as the patient's head overlaps the shoulder and clavicle on the frontal views.  There is superior elevation of the left distal clavicle with respect to the acromion.  Possible healing fracture along the inferior aspect of the left distal clavicle.  No additional acute fracture identified.  No additional dislocation.  No convincing unexpected radiopaque foreign body.                                    X-Rays:   Independently Interpreted Readings:   Chest X-Ray: Normal heart size.  No infiltrates.  No acute  abnormalities. No pneumothorax or free air   Head CT: No hemorrhage.  No skull fracture.  No acute stroke.   Medications   sodium chloride 0.9% bolus 1,000 mL (0 mLs Intravenous Stopped 9/20/22 0203)     Medical Decision Making:   History:   Old Medical Records: I decided to obtain old medical records.  Old Records Summarized: other records.  Initial Assessment:   71 y/o F who appears to be in mild distress presents to the ED s/p likely fall. ABCs intact. Physical exam reveals bruising, TTP of the bilateral shoulders and hematoma to the R forehead.   Differential Diagnosis:   Syncope  Mechanical fall  Electrolyte abnormality  Anemia    Independently Interpreted Test(s):   I have ordered and independently interpreted X-rays - see prior notes.  I have ordered and independently interpreted EKG Reading(s) - see prior notes  Clinical Tests:   Lab Tests: Ordered and Reviewed  Radiological Study: Ordered  Medical Tests: Ordered and Reviewed  ED Management:  Patient is active pending radiographic studies. CBC shows a mild anemia which is consistent with historical data. CMP shows an elevated BUN/Cr ratio and with a recent normal echo I will give the patient 1L fluid bolus. The remaining labs are unremarkable. I will sign out the patient to oncoming provider.           Attending Attestation:   Physician Attestation Statement for Resident:  As the supervising MD   Physician Attestation Statement: I have personally seen and examined this patient.   I agree with the above history.  -:   As the supervising MD I agree with the above PE.     As the supervising MD I agree with the above treatment, course, plan, and disposition.                  I have reviewed and concur with the resident's history, physical, assessment, and plan.  I have personally interviewed and examined the patient at bedside.   I did supervise any and all procedures and was present for any critical portion, and was always immediately available for help and as  a resource.     The above history physical, review of symptoms, HPI and physical exam reflect my independent interpretation and evaluation.  Initial ECG without ischemia or STEMI.  Placed on cardiac and telemetry monitoring including basic labs obtained.  Chest x-ray negative for pneumothorax or free air on my read.  CT head negative for acute findings on my read.  Patient signed out to oncoming ED team with final disposition pending full imaging, re-evaluation with likely plan for discharge.    Complexity: High - level 5    Final diagnoses:  [M25.519] Shoulder pain (Primary)  [W19.XXXA] Fall     David Riddle DO, FAAEM  Emergency Staff Physician   Dept of Emergency Medicine   [facility]  Spectralink: [id number]        Disclaimer: This note has been generated using voice-recognition software. There may be typographical errors that have been missed during proof-reading.            ED Course as of 09/20/22 1508   Mon Sep 19, 2022   2337 X-Ray Shoulder Trauma Bilateral [BP]      ED Course User Index  [BP] Bay Henry MD                 Clinical Impression:   Final diagnoses:  [M25.519] Shoulder pain (Primary)  [W19.XXXA] Fall      ED Disposition Condition    Discharge Stable          ED Prescriptions    None       Follow-up Information       Follow up With Specialties Details Why Contact Info    Sally Green MD Family Medicine  As needed [address]  [place] [address]  [phone]               Theresa Reyes MD  Resident  09/19/22 [id number]       David Riddle DO  09/20/22 1511

## 2022-09-20 NOTE — ED NOTES
Received report from off going RN. Per report pt awaiting transport back to living facility with report given.

## 2022-09-20 NOTE — DISCHARGE INSTRUCTIONS
Diagnosis: Shoulder pain    Tests today showed:   Labs Reviewed   CBC W/ AUTO DIFFERENTIAL - Abnormal; Notable for the following components:       Result Value    RBC 3.54 (*)     Hemoglobin 11.1 (*)     Hematocrit 34.7 (*)     MCH 31.4 (*)     Gran % 74.6 (*)     All other components within normal limits   COMPREHENSIVE METABOLIC PANEL - Abnormal; Notable for the following components:    BUN 28 (*)     Calcium 8.4 (*)     Albumin 2.9 (*)     Anion Gap 6 (*)     All other components within normal limits   HIV 1 / 2 ANTIBODY    Narrative:     Release to patient->Immediate   TROPONIN I   TSH   MAGNESIUM   PHOSPHORUS   URINALYSIS, REFLEX TO URINE CULTURE   POCT GLUCOSE MONITORING CONTINUOUS     Imaging Results              CT Head Without Contrast (Final result)  Result time 09/19/22 23:37:14      Final result by Isaiah Santiago MD (09/19/22 23:37:14)                   Impression:      No CT evidence of acute intracranial abnormality. Clinical correlation and further evaluation as warranted.    Right periorbital soft tissue edema extending into the right frontoparietal scalp.  No depressed calvarial fracture identified.    Chronic senescent and microvascular ischemic changes.      Electronically signed by: Isaiah Santiago MD  Date:    09/19/2022  Time:    23:37               Narrative:    EXAMINATION:  CT HEAD WITHOUT CONTRAST    CLINICAL HISTORY:  Head trauma, minor (Age >= 65y);Facial trauma, blunt;    TECHNIQUE:  Low dose axial images were obtained through the head.  Coronal and sagittal reformations were also performed. Contrast was not administered.    COMPARISON:  08/24/2022    FINDINGS:  There is generalized cerebral volume loss with compensatory sulcal widening and ventricular enlargement.  There is periventricular white matter hypoattenuation suggesting sequelae of chronic microvascular ischemic change.  No definite CT evidence of acute intracranial hemorrhage.  There is no midline shift.  No large  extra-axial collection identified.  The basal cisterns are patent. The mastoid air cells and paranasal sinuses are clear of acute process. There is postsurgical change of the bilateral globes.  There is soft tissue edema involving the right periorbital region extending into the right frontoparietal scalp.  No definite acute depressed calvarial fracture identified.                                       CT Cervical Spine Without Contrast (Final result)  Result time 09/19/22 23:26:49      Final result by Isaiah Santiago MD (09/19/22 23:26:49)                   Impression:      No definite CT evidence of acute displaced fracture the cervical spine.  Redemonstration of advanced multilevel degenerative changes with chronic grade 2 anterolisthesis of C2 on C3.      Electronically signed by: Isaiah Santiago MD  Date:    09/19/2022  Time:    23:26               Narrative:    EXAMINATION:  CT CERVICAL SPINE WITHOUT CONTRAST    CLINICAL HISTORY:  Neck trauma (Age >= 65y);    TECHNIQUE:  Low dose axial images, sagittal and coronal reformations were performed though the cervical spine.  Contrast was not administered.    COMPARISON:  CT cervical spine 07/07/2022, MRI cervical spine 07/07/2022    FINDINGS:  There is approximately 0.6 cm of anterolisthesis of C2 on C3, stable from prior examination.  Remaining cervical spine alignment is stable from prior study.  Cervical vertebral body heights appear adequately maintained.  No definite acute displaced fracture appreciated.  There is stable mild height loss of the T2 and T3 vertebral bodies.  There is multilevel intervertebral disc space height loss and endplate degenerative change.  There are advanced multilevel degenerative changes with varying degrees of spinal canal stenosis and bilateral neural foraminal narrowing, stable compared to prior CT and MRI examinations of 07/07/2022.    There is mild atherosclerotic calcification of the carotid vasculature.  The trachea is  "patent.  There is chronic calcified right apical scarring.  There is no pneumothorax.                                       X-Ray Chest AP Portable (Final result)  Result time 09/19/22 23:12:33      Final result by Earnest Kerr MD (09/19/22 23:12:33)                   Impression:      Overall limited quality examination.  Consider follow-up when clinically appropriate.      Electronically signed by: Earnest Kerr MD  Date:    09/19/2022  Time:    23:12               Narrative:    EXAMINATION:  XR CHEST AP PORTABLE    CLINICAL HISTORY:  Provided history is "  Unspecified fall, initial encounter".    TECHNIQUE:  One view of the chest.    COMPARISON:  07/18/2022.    FINDINGS:  Overall poor quality study with near complete obscuration of the left lung apex and partial exclusion of the right medial lung apex.    Cardiomediastinal silhouette is stable.  There are prominent perihilar interstitial lung markings which could be related to interstitial edema or pneumonitis.  Probable scarring in the right upper lung zone.  Healing rib fractures.                                       X-Ray Shoulder Trauma Bilateral (Final result)  Result time 09/19/22 23:09:40      Final result by Earnest Kerr MD (09/19/22 23:09:40)                   Impression:      Position limited examination.  Further evaluation/follow-up as warranted clinically.    Questionable subluxation of the right shoulder with widening of the acromiohumeral interval.  Calcifications along the superior right humerus which could be related to calcific tendinitis or chondrocalcinosis.    Left distal clavicle and acromioclavicular joint injury as seen on prior exam.      Electronically signed by: Earnest Kerr MD  Date:    09/19/2022  Time:    23:09               Narrative:    EXAMINATION:  XR SHOULDER TRAUMA 3 VIEW BILATERAL    CLINICAL HISTORY:  Pain in unspecified shoulder    TECHNIQUE:  Three views of each shoulder were performed.    COMPARISON:  Left " shoulder radiograph, 07/03/2022.  Bilateral shoulder radiograph, 10/14/2021.    FINDINGS:  Right shoulder: Exam quality is limited by suboptimal positioning.  There is widening of the acromial humeral interval and possible slight anterior subluxation of the humerus with respect to the glenoid, though scapular Y-view is significantly limited by positioning.  There are small calcifications noted along the superior aspect of the humeral head.  Scarring in the right lung apex.    Left shoulder: Exam quality is limited as the patient's head overlaps the shoulder and clavicle on the frontal views.  There is superior elevation of the left distal clavicle with respect to the acromion.  Possible healing fracture along the inferior aspect of the left distal clavicle.  No additional acute fracture identified.  No additional dislocation.  No convincing unexpected radiopaque foreign body.                                      Treatments you had today:   Medications   sodium chloride 0.9% bolus 1,000 mL (has no administration in time range)       Home Care Instructions:  - Stay well hydrated and well rested      Follow-Up Plan:  - Follow-up with: Primary care doctor within 3 - 5 days  - Additional testing and/or evaluation as directed by your primary doctor    Return to the Emergency Department for symptoms including: worsening symptoms, headache with neck stiffness or fever, numbness or tingling, weakness, problems with coordination, speech changes, vomiting with inability to hold down fluids, severe headache different from previous headaches, dizziness, passing out/fainting/unconsciousness, or other concerning symptoms.

## 2022-09-20 NOTE — ED TRIAGE NOTES
Yvette Crews, a 72 y.o. female presents to the ED w/ complaint of fall. Pt fell at 1500. Pt R eye is bruised, hematoma and bump present to R side of forehead. Pt AAOx4. Hx of SAH. Denies blood thinners.     Triage note:  Chief Complaint   Patient presents with    Fall     Pt fell at 1500. Now has black eye present (R eye), hematoma and bump present to R side of forehead. Pt A&Ox4. Hx of SAH. - blood thinners.      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    Cataract     Cellulitis of right lower extremity 1/19/2021    Chondrocalcinosis, cause unspecified, involving hand(712.34) 7/12/2011     Dx updated per 2019 IMO Load    Chronic sinusitis 4/6/2017    Colonic constipation 6/5/2013    Concussion 10/27/2016    Frequent falls 3/14/2017    Gait disturbance 3/14/2017    History of cardiac radiofrequency ablation (RFA) 2/3/2018    History of cardiac radiofrequency ablation (RFA) 2/3/2018    History of subarachnoid hemorrhage 10/30/2016    Hypogammaglobulinemia 9/7/2011    Irritable bowel syndrome 12/18/2015    Major depressive disorder, recurrent episode, in full remission 8/19/2010    Onychomycosis     Osteoarthritis     Postoperative hypothyroidism 12/18/2015    Presence of Watchman left atrial appendage closure device 5/14/2022    Rectal prolapse 6/5/2013    Reflux esophagitis 2/7/2010    Status post thyroidectomy 6/1/2017    Strabismus     SVT (supraventricular tachycardia) 11/22/2013    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     Underweight 1/23/2013

## 2022-09-21 ENCOUNTER — TELEPHONE (OUTPATIENT)
Dept: EMERGENCY MEDICINE | Facility: HOSPITAL | Age: 72
End: 2022-09-21
Payer: MEDICARE

## 2022-09-21 NOTE — TELEPHONE ENCOUNTER
Patient's  who is a physician called the ED to discuss patient's urinalysis results which showed positive nitrites and 79 wbc's with occasional bacteria on microscopic urinalysis. Pt was not discharged with antibiotics.  Patient is a resident of [facility] on the 3rd floor.   states that he called the facility to relay these findings to the nurse there, but requested that I also call to confirm that the information was the received and that it was or will be communicated to the facility provider.  Spoke with nursing staff at [facility] who confirmed receipt of the info. Treatment with antibiotics was recommended. I also stated that we will send over urine culture results to the facility if positive and when available. [phone].

## 2022-09-22 ENCOUNTER — INFUSION (OUTPATIENT)
Dept: INFECTIOUS DISEASES | Facility: HOSPITAL | Age: 72
End: 2022-09-22
Attending: INTERNAL MEDICINE
Payer: MEDICARE

## 2022-09-22 VITALS
TEMPERATURE: 98 F | BODY MASS INDEX: 15.16 KG/M2 | DIASTOLIC BLOOD PRESSURE: 80 MMHG | RESPIRATION RATE: 16 BRPM | WEIGHT: 82.88 LBS | OXYGEN SATURATION: 95 % | HEART RATE: 86 BPM | SYSTOLIC BLOOD PRESSURE: 132 MMHG

## 2022-09-22 DIAGNOSIS — D83.9 CVID (COMMON VARIABLE IMMUNODEFICIENCY): Primary | ICD-10-CM

## 2022-09-22 DIAGNOSIS — J47.9 ADULT BRONCHIECTASIS: ICD-10-CM

## 2022-09-22 LAB — BACTERIA UR CULT: ABNORMAL

## 2022-09-22 PROCEDURE — 96366 THER/PROPH/DIAG IV INF ADDON: CPT

## 2022-09-22 PROCEDURE — 63600175 PHARM REV CODE 636 W HCPCS: Mod: JG | Performed by: ALLERGY & IMMUNOLOGY

## 2022-09-22 PROCEDURE — 96365 THER/PROPH/DIAG IV INF INIT: CPT

## 2022-09-22 PROCEDURE — 25000003 PHARM REV CODE 250: Performed by: ALLERGY & IMMUNOLOGY

## 2022-09-22 RX ORDER — SODIUM CHLORIDE 0.9 % (FLUSH) 0.9 %
10 SYRINGE (ML) INJECTION
Status: DISCONTINUED | OUTPATIENT
Start: 2022-09-22 | End: 2022-09-22 | Stop reason: HOSPADM

## 2022-09-22 RX ORDER — DIPHENHYDRAMINE HYDROCHLORIDE 50 MG/ML
25 INJECTION INTRAMUSCULAR; INTRAVENOUS
Status: DISCONTINUED | OUTPATIENT
Start: 2022-09-22 | End: 2022-10-06 | Stop reason: HOSPADM

## 2022-09-22 RX ORDER — ACETAMINOPHEN 325 MG/1
650 TABLET ORAL
Status: CANCELLED | OUTPATIENT
Start: 2022-10-20

## 2022-09-22 RX ORDER — SODIUM CHLORIDE 0.9 % (FLUSH) 0.9 %
10 SYRINGE (ML) INJECTION
Status: CANCELLED | OUTPATIENT
Start: 2022-10-20

## 2022-09-22 RX ORDER — ACETAMINOPHEN 325 MG/1
650 TABLET ORAL
Status: COMPLETED | OUTPATIENT
Start: 2022-09-22 | End: 2022-09-22

## 2022-09-22 RX ADMIN — SODIUM CHLORIDE: 0.9 INJECTION, SOLUTION INTRAVENOUS at 09:09

## 2022-09-22 RX ADMIN — ACETAMINOPHEN 650 MG: 325 TABLET ORAL at 11:09

## 2022-09-22 RX ADMIN — HUMAN IMMUNOGLOBULIN G 25 G: 5 LIQUID INTRAVENOUS at 09:09

## 2022-09-22 NOTE — PROGRESS NOTES
Pt arrived to infusion suite for Privigen infusion, pt refused premeds tylenol given po and benadryl. Large bruise to OD noted from fall, unsure of when it occurred, denies pain at present.  Received IVIG PRIVIGEN 25 GRAMS.  Infusion started @ 11 cc/hr and increased rate every 30 mins, 23 cc/hr., 45 cc /hr and max rate @ 90 cc/hr, IV NS ran concurrently at 25 ml/hr.     1130 Pt c/o HA unable to give number on pain scale 1/10, yelling out, messaged Dr. Yates for tylenol order, decreased Privigen rate to 45 cc/hr, vs stable, awaiting to hear from MD.    1142  Tylenol 650 mg given po, was ordered as premed, but given now, still awaiting MD response.    1210 Dr. Yates okay with pt receiving tylenol for HA, pt feeling better, no c/o HA at present. Privigen infusing @ 90cc/hr with sitter at side.    1245 Trying to get out of recliner, agitated, pudding given to help settle pt, sitter at side.      Tolerated well and left in NAD. Sitter at side. Return appt provided.

## 2022-09-23 ENCOUNTER — TELEPHONE (OUTPATIENT)
Dept: ALLERGY | Facility: CLINIC | Age: 72
End: 2022-09-23
Payer: MEDICARE

## 2022-09-23 NOTE — TELEPHONE ENCOUNTER
----- Message from Toshia Salinas RN sent at 9/22/2022 11:48 AM CDT -----  Regarding: DESTINEY Yates,  [id number]    Ms. Crews is c/o HA, I realize she had a fall recently with bruised OD, but she refused the tylenol as premed to Privigen, her rate is at her max rate 90cc/hr which I decreased to 45 cc/hr with slight relief.  BP-132/80, P-86.  She keeps yelliing out for tylenol, I gave her the tylenol ordered as premed and I'll re-challenge her @ 90 cc/hr @ 1153.      She's feeling better now, her max rate is 90cc/hr., please let me know if there's anything else you recommend, she's also getting NS @25cc/hr which we could increase if you'd like maybe 75cc/hr?  Thank you

## 2022-09-29 NOTE — TELEPHONE ENCOUNTER
Please set her up with immunology to monitor her immunoglobulin infusions   Olumiant Pregnancy And Lactation Text: Based on animal studies, Olumiant may cause embryo-fetal harm when administered to pregnant women.  The medication should not be used in pregnancy.  Breastfeeding is not recommended during treatment.

## 2022-10-03 ENCOUNTER — HOSPITAL ENCOUNTER (OUTPATIENT)
Facility: HOSPITAL | Age: 72
Discharge: PSYCHIATRIC OR SUBSTANCE USE FACILITY | End: 2022-10-07
Attending: STUDENT IN AN ORGANIZED HEALTH CARE EDUCATION/TRAINING PROGRAM | Admitting: STUDENT IN AN ORGANIZED HEALTH CARE EDUCATION/TRAINING PROGRAM
Payer: MEDICARE

## 2022-10-03 DIAGNOSIS — R63.6 UNDERWEIGHT: ICD-10-CM

## 2022-10-03 DIAGNOSIS — G96.08 TRAUMATIC SUBDURAL HYGROMA: ICD-10-CM

## 2022-10-03 DIAGNOSIS — S22.000A COMPRESSION FRACTURE OF THORACIC VERTEBRA, UNSPECIFIED THORACIC VERTEBRAL LEVEL, INITIAL ENCOUNTER: ICD-10-CM

## 2022-10-03 DIAGNOSIS — G30.1 LATE ONSET ALZHEIMER'S DEMENTIA WITHOUT BEHAVIORAL DISTURBANCE: Chronic | ICD-10-CM
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
## 2022-10-03 DIAGNOSIS — M80.80XA PATHOLOGICAL FRACTURE DUE TO OSTEOPOROSIS, UNSPECIFIED FRACTURE SITE, UNSPECIFIED OSTEOPOROSIS TYPE, INITIAL ENCOUNTER: ICD-10-CM

## 2022-10-03 DIAGNOSIS — I10 PRIMARY HYPERTENSION: Chronic | ICD-10-CM

## 2022-10-03 DIAGNOSIS — F02.80 LATE ONSET ALZHEIMER'S DEMENTIA WITHOUT BEHAVIORAL DISTURBANCE: Chronic | ICD-10-CM

## 2022-10-03 DIAGNOSIS — R29.6 FREQUENT FALLS: ICD-10-CM

## 2022-10-03 DIAGNOSIS — R93.0 ABNORMAL HEAD CT: ICD-10-CM

## 2022-10-03 DIAGNOSIS — R07.9 CHEST PAIN: ICD-10-CM

## 2022-10-03 DIAGNOSIS — S22.030A CLOSED WEDGE COMPRESSION FRACTURE OF T3 VERTEBRA, INITIAL ENCOUNTER: ICD-10-CM

## 2022-10-03 DIAGNOSIS — Z86.79 HISTORY OF PERISTENT ATRIAL FIBRILLATION: ICD-10-CM

## 2022-10-03 DIAGNOSIS — J47.9 ADULT BRONCHIECTASIS: ICD-10-CM

## 2022-10-03 DIAGNOSIS — M48.02 CERVICAL STENOSIS OF SPINAL CANAL: ICD-10-CM

## 2022-10-03 DIAGNOSIS — T14.8XXA BRUISE: ICD-10-CM

## 2022-10-03 DIAGNOSIS — M54.2 NECK PAIN: Primary | ICD-10-CM

## 2022-10-03 LAB
ALBUMIN SERPL BCP-MCNC: 2.9 G/DL (ref 3.5–5.2)
ALP SERPL-CCNC: 109 U/L (ref 55–135)
ALT SERPL W/O P-5'-P-CCNC: 15 U/L (ref 10–44)
ANION GAP SERPL CALC-SCNC: 9 MMOL/L (ref 8–16)
APTT BLDCRRT: 28.8 SEC (ref 21–32)
AST SERPL-CCNC: 18 U/L (ref 10–40)
BASOPHILS # BLD AUTO: 0.02 K/UL (ref 0–0.2)
BASOPHILS NFR BLD: 0.3 % (ref 0–1.9)
BILIRUB SERPL-MCNC: 0.4 MG/DL (ref 0.1–1)
BILIRUB UR QL STRIP: NEGATIVE
BUN SERPL-MCNC: 24 MG/DL (ref 8–23)
CALCIUM SERPL-MCNC: 8.3 MG/DL (ref 8.7–10.5)
CHLORIDE SERPL-SCNC: 107 MMOL/L (ref 95–110)
CLARITY UR REFRACT.AUTO: CLEAR
CO2 SERPL-SCNC: 26 MMOL/L (ref 23–29)
COLOR UR AUTO: YELLOW
CREAT SERPL-MCNC: 0.5 MG/DL (ref 0.5–1.4)
DIFFERENTIAL METHOD: ABNORMAL
EOSINOPHIL # BLD AUTO: 0 K/UL (ref 0–0.5)
EOSINOPHIL NFR BLD: 0.1 % (ref 0–8)
ERYTHROCYTE [DISTWIDTH] IN BLOOD BY AUTOMATED COUNT: 13.6 % (ref 11.5–14.5)
EST. GFR  (NO RACE VARIABLE): >60 ML/MIN/1.73 M^2
GLUCOSE SERPL-MCNC: 94 MG/DL (ref 70–110)
GLUCOSE UR QL STRIP: NEGATIVE
HCT VFR BLD AUTO: 36.7 % (ref 37–48.5)
HGB BLD-MCNC: 11.5 G/DL (ref 12–16)
HGB UR QL STRIP: NEGATIVE
IMM GRANULOCYTES # BLD AUTO: 0.03 K/UL (ref 0–0.04)
IMM GRANULOCYTES NFR BLD AUTO: 0.4 % (ref 0–0.5)
INR PPP: 1.1 (ref 0.8–1.2)
KETONES UR QL STRIP: NEGATIVE
LEUKOCYTE ESTERASE UR QL STRIP: NEGATIVE
LYMPHOCYTES # BLD AUTO: 1.3 K/UL (ref 1–4.8)
LYMPHOCYTES NFR BLD: 18.6 % (ref 18–48)
MCH RBC QN AUTO: 31.3 PG (ref 27–31)
MCHC RBC AUTO-ENTMCNC: 31.3 G/DL (ref 32–36)
MCV RBC AUTO: 100 FL (ref 82–98)
MONOCYTES # BLD AUTO: 0.5 K/UL (ref 0.3–1)
MONOCYTES NFR BLD: 7.2 % (ref 4–15)
NEUTROPHILS # BLD AUTO: 5.3 K/UL (ref 1.8–7.7)
NEUTROPHILS NFR BLD: 73.4 % (ref 38–73)
NITRITE UR QL STRIP: NEGATIVE
NRBC BLD-RTO: 0 /100 WBC
PH UR STRIP: 7 [PH] (ref 5–8)
PLATELET # BLD AUTO: 261 K/UL (ref 150–450)
PMV BLD AUTO: 9.7 FL (ref 9.2–12.9)
POTASSIUM SERPL-SCNC: 3.9 MMOL/L (ref 3.5–5.1)
PROT SERPL-MCNC: 6.8 G/DL (ref 6–8.4)
PROT UR QL STRIP: ABNORMAL
PROTHROMBIN TIME: 11.4 SEC (ref 9–12.5)
RBC # BLD AUTO: 3.67 M/UL (ref 4–5.4)
SODIUM SERPL-SCNC: 142 MMOL/L (ref 136–145)
SP GR UR STRIP: >1.03 (ref 1–1.03)
TROPONIN I SERPL DL<=0.01 NG/ML-MCNC: <0.006 NG/ML (ref 0–0.03)
URN SPEC COLLECT METH UR: ABNORMAL
WBC # BLD AUTO: 7.22 K/UL (ref 3.9–12.7)

## 2022-10-03 PROCEDURE — 99285 PR EMERGENCY DEPT VISIT,LEVEL V: ICD-10-PCS | Mod: 25,,, | Performed by: STUDENT IN AN ORGANIZED HEALTH CARE EDUCATION/TRAINING PROGRAM

## 2022-10-03 PROCEDURE — G0378 HOSPITAL OBSERVATION PER HR: HCPCS

## 2022-10-03 PROCEDURE — 85025 COMPLETE CBC W/AUTO DIFF WBC: CPT | Performed by: STUDENT IN AN ORGANIZED HEALTH CARE EDUCATION/TRAINING PROGRAM

## 2022-10-03 PROCEDURE — 99285 EMERGENCY DEPT VISIT HI MDM: CPT | Mod: 25

## 2022-10-03 PROCEDURE — 12001 RPR S/N/AX/GEN/TRNK 2.5CM/<: CPT

## 2022-10-03 PROCEDURE — 96372 THER/PROPH/DIAG INJ SC/IM: CPT | Mod: 59 | Performed by: STUDENT IN AN ORGANIZED HEALTH CARE EDUCATION/TRAINING PROGRAM

## 2022-10-03 PROCEDURE — 85730 THROMBOPLASTIN TIME PARTIAL: CPT | Performed by: STUDENT IN AN ORGANIZED HEALTH CARE EDUCATION/TRAINING PROGRAM

## 2022-10-03 PROCEDURE — 63600175 PHARM REV CODE 636 W HCPCS: Performed by: STUDENT IN AN ORGANIZED HEALTH CARE EDUCATION/TRAINING PROGRAM

## 2022-10-03 PROCEDURE — 81003 URINALYSIS AUTO W/O SCOPE: CPT | Performed by: STUDENT IN AN ORGANIZED HEALTH CARE EDUCATION/TRAINING PROGRAM

## 2022-10-03 PROCEDURE — 85610 PROTHROMBIN TIME: CPT | Performed by: STUDENT IN AN ORGANIZED HEALTH CARE EDUCATION/TRAINING PROGRAM

## 2022-10-03 PROCEDURE — 93005 ELECTROCARDIOGRAM TRACING: CPT

## 2022-10-03 PROCEDURE — 25000003 PHARM REV CODE 250: Performed by: STUDENT IN AN ORGANIZED HEALTH CARE EDUCATION/TRAINING PROGRAM

## 2022-10-03 PROCEDURE — 99285 EMERGENCY DEPT VISIT HI MDM: CPT | Mod: 25,,, | Performed by: STUDENT IN AN ORGANIZED HEALTH CARE EDUCATION/TRAINING PROGRAM

## 2022-10-03 PROCEDURE — 12001 PR RESUPERF WND BODY <2.5CM: ICD-10-PCS | Mod: ,,, | Performed by: STUDENT IN AN ORGANIZED HEALTH CARE EDUCATION/TRAINING PROGRAM

## 2022-10-03 PROCEDURE — 80053 COMPREHEN METABOLIC PANEL: CPT | Performed by: STUDENT IN AN ORGANIZED HEALTH CARE EDUCATION/TRAINING PROGRAM

## 2022-10-03 PROCEDURE — 12001 RPR S/N/AX/GEN/TRNK 2.5CM/<: CPT | Mod: ,,, | Performed by: STUDENT IN AN ORGANIZED HEALTH CARE EDUCATION/TRAINING PROGRAM

## 2022-10-03 PROCEDURE — 93010 ELECTROCARDIOGRAM REPORT: CPT | Mod: ,,, | Performed by: INTERNAL MEDICINE

## 2022-10-03 PROCEDURE — 84484 ASSAY OF TROPONIN QUANT: CPT | Performed by: STUDENT IN AN ORGANIZED HEALTH CARE EDUCATION/TRAINING PROGRAM

## 2022-10-03 PROCEDURE — 93010 EKG 12-LEAD: ICD-10-PCS | Mod: ,,, | Performed by: INTERNAL MEDICINE

## 2022-10-03 RX ORDER — TALC
6 POWDER (GRAM) TOPICAL NIGHTLY PRN
Status: DISCONTINUED | OUTPATIENT
Start: 2022-10-03 | End: 2022-10-07 | Stop reason: HOSPADM

## 2022-10-03 RX ORDER — ACETAMINOPHEN 500 MG
1000 TABLET ORAL
Status: COMPLETED | OUTPATIENT
Start: 2022-10-03 | End: 2022-10-03

## 2022-10-03 RX ORDER — CALCITONIN SALMON 200 [IU]/.09ML
1 SPRAY, METERED NASAL DAILY
Status: DISCONTINUED | OUTPATIENT
Start: 2022-10-04 | End: 2022-10-07 | Stop reason: HOSPADM

## 2022-10-03 RX ORDER — SODIUM CHLORIDE 0.9 % (FLUSH) 0.9 %
5 SYRINGE (ML) INJECTION
Status: DISCONTINUED | OUTPATIENT
Start: 2022-10-03 | End: 2022-10-07 | Stop reason: HOSPADM

## 2022-10-03 RX ORDER — IBUPROFEN 200 MG
16 TABLET ORAL
Status: DISCONTINUED | OUTPATIENT
Start: 2022-10-03 | End: 2022-10-07 | Stop reason: HOSPADM

## 2022-10-03 RX ORDER — HALOPERIDOL 5 MG/ML
2.5 INJECTION INTRAMUSCULAR
Status: COMPLETED | OUTPATIENT
Start: 2022-10-03 | End: 2022-10-03

## 2022-10-03 RX ORDER — POLYETHYLENE GLYCOL 3350 17 G/17G
17 POWDER, FOR SOLUTION ORAL DAILY
Status: DISCONTINUED | OUTPATIENT
Start: 2022-10-04 | End: 2022-10-07 | Stop reason: HOSPADM

## 2022-10-03 RX ORDER — IPRATROPIUM BROMIDE AND ALBUTEROL SULFATE 2.5; .5 MG/3ML; MG/3ML
3 SOLUTION RESPIRATORY (INHALATION) EVERY 4 HOURS PRN
Status: DISCONTINUED | OUTPATIENT
Start: 2022-10-03 | End: 2022-10-07 | Stop reason: HOSPADM

## 2022-10-03 RX ORDER — BISACODYL 10 MG
10 SUPPOSITORY, RECTAL RECTAL DAILY PRN
Status: DISCONTINUED | OUTPATIENT
Start: 2022-10-03 | End: 2022-10-07 | Stop reason: HOSPADM

## 2022-10-03 RX ORDER — MAG HYDROX/ALUMINUM HYD/SIMETH 200-200-20
30 SUSPENSION, ORAL (FINAL DOSE FORM) ORAL 4 TIMES DAILY PRN
Status: DISCONTINUED | OUTPATIENT
Start: 2022-10-03 | End: 2022-10-07 | Stop reason: HOSPADM

## 2022-10-03 RX ORDER — GLUCAGON 1 MG
1 KIT INJECTION
Status: DISCONTINUED | OUTPATIENT
Start: 2022-10-03 | End: 2022-10-07 | Stop reason: HOSPADM

## 2022-10-03 RX ORDER — IBUPROFEN 200 MG
24 TABLET ORAL
Status: DISCONTINUED | OUTPATIENT
Start: 2022-10-03 | End: 2022-10-07 | Stop reason: HOSPADM

## 2022-10-03 RX ORDER — ONDANSETRON 8 MG/1
8 TABLET, ORALLY DISINTEGRATING ORAL EVERY 8 HOURS PRN
Status: DISCONTINUED | OUTPATIENT
Start: 2022-10-03 | End: 2022-10-07 | Stop reason: HOSPADM

## 2022-10-03 RX ORDER — NALOXONE HCL 0.4 MG/ML
0.02 VIAL (ML) INJECTION
Status: DISCONTINUED | OUTPATIENT
Start: 2022-10-03 | End: 2022-10-07 | Stop reason: HOSPADM

## 2022-10-03 RX ORDER — ACETAMINOPHEN 325 MG/1
650 TABLET ORAL EVERY 4 HOURS PRN
Status: DISCONTINUED | OUTPATIENT
Start: 2022-10-03 | End: 2022-10-07 | Stop reason: HOSPADM

## 2022-10-03 RX ORDER — SIMETHICONE 80 MG
1 TABLET,CHEWABLE ORAL 4 TIMES DAILY PRN
Status: DISCONTINUED | OUTPATIENT
Start: 2022-10-03 | End: 2022-10-07 | Stop reason: HOSPADM

## 2022-10-03 RX ORDER — ACETAMINOPHEN 500 MG
1000 TABLET ORAL EVERY 8 HOURS PRN
Status: DISCONTINUED | OUTPATIENT
Start: 2022-10-03 | End: 2022-10-07 | Stop reason: HOSPADM

## 2022-10-03 RX ORDER — PROCHLORPERAZINE EDISYLATE 5 MG/ML
5 INJECTION INTRAMUSCULAR; INTRAVENOUS EVERY 6 HOURS PRN
Status: DISCONTINUED | OUTPATIENT
Start: 2022-10-03 | End: 2022-10-07 | Stop reason: HOSPADM

## 2022-10-03 RX ADMIN — ACETAMINOPHEN 1000 MG: 500 TABLET ORAL at 09:10

## 2022-10-03 RX ADMIN — HALOPERIDOL LACTATE 2.5 MG: 5 INJECTION, SOLUTION INTRAMUSCULAR at 02:10

## 2022-10-03 NOTE — ASSESSMENT & PLAN NOTE
72 F with h/o dementia, extensive cardiac history including Watchman device/multiple RFA presenting s/p fall; CTH with concern for chronic subdural hematoma vs hygroma, not seen on recent CT 9/19/22:    --No acute neurosurgical intervention. Patient w/o acute blood on CTH. Noted chronic hematoma vs hygroma on CT w/o mass effect  --Rec admission to  for syncopal work up given reported increase fall frequency (recently in ED 9/19/22), extensive cardiac history  --Rec follow-up CTH given motion artifact on 1445 scan  --Reverse anti-plt/coag medications - patient reportedly off ASA/Xarelto/Plavix -continue to hold  --SBP <160  --Na >135  --No evidence of seizure activity, AED not indicated at this time  --HOB >30  --Follow-up full labs (CBC/CMP/PT-INR/PTT/T&S/UA)  --Continue to monitor clinically, notify NSGY immediately with any changes in neuro status  --Neck pain/additional work up per ED/Ortho; consider C collar until C spine can be cleared given neck pain on exam.    Dispo: ongoing    D/w Dr. Washington

## 2022-10-03 NOTE — HPI
72 F with h/o dementia, extensive cardiac history requiring radiofrequency ablations/Watchman device presenting s/p witnessed fall from wheelchair. No LOC. Patient is a resident of the [facility], was supposed to be going to [facility] today for geriatric psych appointment per ED but came to [facility] after fall. Reportedly patient with increased behavioral issues and falls.     Patient is poor historian given dementia and unable to provide full collateral history. History of previously being on asa/plavix/xarelto but per ED that has been discontinued at least as of July 2022.     Has laceration to forehead that was repaired by ED.      Reportedly normally oriented to self only, yells often, agitated.

## 2022-10-03 NOTE — ED NOTES
"Refusing xrays and ct scan per radiology pt states "I just want to go home and die". MD notified  "

## 2022-10-03 NOTE — ED NOTES
Patient identifiers for Yvette Crews 72 y.o. female checked and correct.  Chief Complaint   Patient presents with    Fall     From [facility]. Fell out of wheelchair, has laceration to forehead.      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    Cataract     Cellulitis of right lower extremity 1/19/2021    Chondrocalcinosis, cause unspecified, involving hand(712.34) 7/12/2011     Dx updated per 2019 IMO Load    Chronic sinusitis 4/6/2017    Colonic constipation 6/5/2013    Concussion 10/27/2016    Frequent falls 3/14/2017    Gait disturbance 3/14/2017    History of cardiac radiofrequency ablation (RFA) 2/3/2018    History of cardiac radiofrequency ablation (RFA) 2/3/2018    History of subarachnoid hemorrhage 10/30/2016    Hypogammaglobulinemia 9/7/2011    Irritable bowel syndrome 12/18/2015    Major depressive disorder, recurrent episode, in full remission 8/19/2010    Onychomycosis     Osteoarthritis     Postoperative hypothyroidism 12/18/2015    Presence of Watchman left atrial appendage closure device 5/14/2022    Rectal prolapse 6/5/2013    Reflux esophagitis 2/7/2010    Status post thyroidectomy 6/1/2017    Strabismus     SVT (supraventricular tachycardia) 11/22/2013    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     Underweight 1/23/2013     Allergies reported:   Review of patient's allergies indicates:   Allergen Reactions    Adhesive      Tape tears skin    Buspar [buspirone]      headaches    Escitalopram oxalate Nausea Only     hyponatremia      Rifampin Rash     Pt to ED from [facility] with c/o fall forward from wheelchair @1130. Denies LOC, unclear if blood thinner use. Hx of multiple falls, behavioral issues. Was being admitted to [facility] @  to Dr Palomo prior  to fall per spouse    LOC: Patient is awake, alert, and aware of environment. Patient is oriented x 4 and speaking appropriately.  APPEARANCE: Patient resting comfortably and in no acute distress, intermittently screaming . Patient is clean and well groomed, patient's clothing is properly fastened.  HEENT: lac to frontal forehead at hairline no active bleeding at this time.   SKIN: The skin is warm and dry. Patient has normal skin turgor and moist mucus membranes. Ecchymosis to face r/t previous falls   MUSKULOSKELETAL: Patient is contracted. Pulses intact.   RESPIRATORY: Airway is open and patent. Respirations are spontaneous and non-labored with normal effort and rate.  CARDIAC: Patient has a normal rate and rhythm. No peripheral edema noted.   ABDOMEN: No distention noted. Soft and non-tender upon palpation.  NEUROLOGICAL: PERRL. Facial expression is symmetrical. Hand grasps are equal bilaterally. Normal sensation in all extremities when touched with finger.

## 2022-10-03 NOTE — ED PROVIDER NOTES
Source of History  Patient  NH reports/phone call    Chief Complaint    Fall (From [facility]. Fell out of wheelchair, has laceration to forehead. )      History of Present Illness    Yvette Crews is a 72 y.o. female presenting with fall from wheelchair    Time 1140  Witnessed  No LOC  Increased behavior issues, increased falls  Was going to  geriatric psych later today but came for eval after fall    Patient has dementia so history is limited    History of previously being on asa/plavix/xarelto but looks like that has been discontinued. At least as of July 2022    Has laceration to forehead    Normally oriented to self only, yells often, agitated    Tdap up to date    Review of Systems    As per HPI and below:  Review of Systems:    Pertinent information obtained as above.  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    Cataract     Cellulitis of right lower extremity 1/19/2021    Chondrocalcinosis, cause unspecified, involving hand(712.34) 7/12/2011     Dx updated per 2019 IMO Load    Chronic sinusitis 4/6/2017    Colonic constipation 6/5/2013    Concussion 10/27/2016    Frequent falls 3/14/2017    Gait disturbance 3/14/2017    History of cardiac radiofrequency ablation (RFA) 2/3/2018    History of cardiac radiofrequency ablation (RFA) 2/3/2018    History of subarachnoid hemorrhage 10/30/2016    Hypogammaglobulinemia 9/7/2011    Irritable bowel syndrome 12/18/2015    Major depressive disorder, recurrent episode, in full remission 8/19/2010    Onychomycosis     Osteoarthritis     Postoperative hypothyroidism 12/18/2015    Presence of Watchman left atrial appendage closure device 5/14/2022     Rectal prolapse 6/5/2013    Reflux esophagitis 2/7/2010    Status post thyroidectomy 6/1/2017    Strabismus     SVT (supraventricular tachycardia) 11/22/2013    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     Underweight 1/23/2013       Past Surgical History:   Procedure Laterality Date    BREAST CYST ASPIRATION      x2    CATARACT EXTRACTION W/  INTRAOCULAR LENS IMPLANT Left 3/11/2019    Procedure: EXTRACTION, CATARACT, WITH IOL INSERTION;  Surgeon: Vera Sams MD;  [facility]: [facility];  Service: Ophthalmology;  Laterality: Left;    CATARACT EXTRACTION W/  INTRAOCULAR LENS IMPLANT Right 4/15/2019    Procedure: EXTRACTION, CATARACT, WITH IOL INSERTION LASER;  Surgeon: Vera Sams MD;  Location: [facility] OR;  Service: Ophthalmology;  Laterality: Right;    COLON SURGERY      EYE SURGERY      FRACTURE SURGERY      NASAL SEPTUM SURGERY      OCCLUSION OF LEFT ATRIAL APPENDAGE N/A 2/18/2022    Procedure: Left atrial appendage occlusion;  Surgeon: Chase Gill MD;  Location: [facility] LAB;  Service: Cardiology;  Laterality: N/A;  afib, watchman, BSCI, osiris, anes, MB/EH, 3prep    OPEN REDUCTION AND INTERNAL FIXATION (ORIF) OF INJURY OF SACROILIAC JOINT Bilateral 1/20/2021    Procedure: ORIF, SACROILIAC JOINT;  Surgeon: Alcides Tamez MD;  Location: [address];  Service: Orthopedics;  Laterality: Bilateral;    RADIOFREQUENCY ABLATION      RECTAL PROLAPSE REPAIR  june 2013    STRABISMUS SURGERY      TONSILLECTOMY      TRANSESOPHAGEAL ECHOCARDIOGRAPHY N/A 2/18/2022    Procedure: ECHOCARDIOGRAM, TRANSESOPHAGEAL;  Surgeon: Nils Diagnostic Provider;  Location: [facility] LAB;  Service: Cardiology;  Laterality: N/A;       Social History     Socioeconomic History    Marital status:     Number of children: 2   Occupational History    Occupation: retired   Tobacco Use    Smoking status: Never    Smokeless tobacco: Never   Substance and Sexual Activity    Alcohol use: No    Drug use: No    Sexual activity: Not  Currently     Social Determinants of Health     Financial Resource Strain: Low Risk     Difficulty of Paying Living Expenses: Not hard at all   Food Insecurity: No Food Insecurity    Worried About Running Out of Food in the Last Year: Never true    Ran Out of Food in the Last Year: Never true   Transportation Needs: No Transportation Needs    Lack of Transportation (Medical): No    Lack of Transportation (Non-Medical): No   Physical Activity: Sufficiently Active    Days of Exercise per Week: 5 days    Minutes of Exercise per Session: 30 min   Stress: No Stress Concern Present    Feeling of Stress : Not at all   Social Connections: Moderately Isolated    Frequency of Communication with Friends and Family: Twice a week    Frequency of Social Gatherings with Friends and Family: Twice a week    Attends Mandaeism Services: Never    Active Member of Clubs or Organizations: No    Attends Club or Organization Meetings: Never    Marital Status:    Housing Stability: Low Risk     Unable to Pay for Housing in the Last Year: No    Number of Places Lived in the Last Year: 1    Unstable Housing in the Last Year: No       Family History   Problem Relation Age of Onset    Heart disease Father     Stroke Father     Heart disease Brother     Breast cancer Paternal Grandmother     Melanoma Neg Hx     Psoriasis Neg Hx     Lupus Neg Hx     Eczema Neg Hx     Amblyopia Neg Hx     Blindness Neg Hx     Cataracts Neg Hx     Glaucoma Neg Hx     Macular degeneration Neg Hx     Retinal detachment Neg Hx     Strabismus Neg Hx        Review of patient's allergies indicates:   Allergen Reactions    Adhesive      Tape tears skin    Buspar [buspirone]      headaches    Escitalopram oxalate Nausea Only     hyponatremia      Rifampin Rash       Current Facility-Administered Medications on File Prior to Encounter   Medication Dose Route Frequency Provider Last Rate Last Admin    balanced salt irrigation solution 1 drop  1 drop Right Eye On Call  Procedure Vera Sams MD        diphenhydrAMINE injection 25 mg  25 mg Intravenous 1 time in Clinic/HOD Atif Salgado MD        diphenhydrAMINE injection 25 mg  25 mg Intravenous 1 time in Clinic/HOD Atif Salgado MD        diphenhydrAMINE injection 25 mg  25 mg Intravenous 1 time in Clinic/HOD Atif Salgado MD        moxifloxacin 0.5 % ophthalmic solution 1 drop  1 drop Right Eye On Call Procedure Vera Sams MD   1 drop at 04/15/19 0922    phenylephrine HCL 2.5% ophthalmic solution 1 drop  1 drop Right Eye On Call Procedure Vera Sams MD   1 drop at 04/15/19 0922    sodium chloride 0.9% bolus 1,000 mL  1,000 mL Intravenous Once Ada Montoya NP        sodium chloride 0.9% bolus 1,000 mL  1,000 mL Intravenous Once Ada Montoya NP        sodium chloride 0.9% bolus 1,000 mL  1,000 mL Intravenous Once Ada Montoya NP        tropicamide 1% ophthalmic solution 1 drop  1 drop Right Eye On Call Procedure Vera Sams MD   1 drop at 04/15/19 0922     Current Outpatient Medications on File Prior to Encounter   Medication Sig Dispense Refill    acetaminophen (TYLENOL) 325 MG tablet Take 2 tablets (650 mg total) by mouth every 6 (six) hours as needed for Pain.  0    albuterol (PROVENTIL/VENTOLIN HFA) 90 mcg/actuation inhaler Inhale 2 puffs into the lungs every 6 (six) hours as needed for Wheezing. Rescue 18 g 1    calcium-vitamin D3-vitamin K 650 mg-12.5 mcg-40 mcg Chew Chew 1 by mouth twice daily      docusate calcium (SURFAK) 240 mg capsule Take 240 mg by mouth 3 (three) times daily.      donepeziL (ARICEPT) 10 MG tablet Take 1 tablet (10 mg total) by mouth once daily. 30 tablet 11    famotidine (PEPCID) 40 MG tablet Take 1 tablet (40 mg total) by mouth once daily. 30 tablet 5    ferrous sulfate 325 (65 FE) MG EC tablet Take 1 tablet (325 mg total) by mouth once daily. 30 tablet 5    guaiFENesin (MUCINEX) 600 mg 12 hr tablet Take 2 tablets (1,200 mg total) by  mouth 2 (two) times daily. (Patient taking differently: Take 1,200 mg by mouth every 12 (twelve) hours.) 120 tablet 6    Immune Globulin G, IGG,-PRO-IGA 10 % injection, Privigen, (PRIVIGEN) 10 % Soln Infuse 20 g into the vein every 4 weeks. 200 mL 0    levothyroxine (SYNTHROID) 88 MCG tablet Take 1 tablet (88 mcg total) by mouth before breakfast. 90 tablet 1    loratadine (CLARITIN) 10 mg tablet Take 1 tablet (10 mg total) by mouth once daily. 30 tablet 5    methocarbamoL (ROBAXIN) 500 MG Tab Take 1 tablet (500 mg total) by mouth 3 (three) times daily as needed (Musce spasms). (Patient not taking: Reported on 8/24/2022) 20 tablet 0    montelukast (SINGULAIR) 10 mg tablet Take 1 tablet (10 mg total) by mouth every evening. 90 tablet 1    pedi multivit no.7/folic acid (FLINTSTONES MULTI-VIT GUMMIES ORAL) Chew 2 gummies by mouth every day      polyethylene glycol (GLYCOLAX) 17 gram PwPk Take 17 g by mouth once daily.  0    pumpkin seed extract/soy germ (AZO BLADDER CONTROL ORAL) Take 1 tablet by mouth 2 (two) times a day.      [DISCONTINUED] aspirin (ECOTRIN) 81 MG EC tablet Take 1 tablet (81 mg total) by mouth once daily. (Patient taking differently: Take 81 mg by mouth once daily. Stop after 8/18/22) 30 tablet 11    [DISCONTINUED] clopidogreL (PLAVIX) 75 mg tablet Take 1 tablet (75 mg total) by mouth once daily. 30 tablet 4    [DISCONTINUED] XARELTO 20 mg Tab TAKE 1 TAB BY MOUTH EVERY EVENING WITH DINNER (BEGIN AFTER ELIQUIS IS COMPLETED) 30 tablet 11       Physical Exam    Reviewed nursing notes.  Vitals:    10/03/22 1206 10/03/22 1222   BP: (!) 157/68 125/77   Pulse: 85 87   Resp: 19    Temp: 98.6 °F (37 °C)    TempSrc: Oral    SpO2: 100% 98%     General:  Alert, no acute distress.    Skin:  Warm, dry, multiple stages of bruising on her extremities.  There is a small laceration to the anterior forehead in the hairline  Head:  Normocephalic    Neck:  Supple.   HEENT:  Pupils are equal and round, appropriate for  room, extraocular movements are intact.  Normal phonation.  Dry mucous membranes.  Bruising over the face along the nasal bridge and over the cheekbone  Small laceration on forehead   Cardiovascular:  Regular rate and rhythm, Normal peripheral perfusion, No edema.    Respiratory:  Lungs are clear to auscultation, respirations are non-labored, breath sounds are equal.  Nontender.  No bruising on rib cage.  Gastrointestinal:  Soft, Nontender, Non distended.    Back:  Nontender.  kyphotic.  Unable to assess ambulation.  Musculoskeletal:  Contracted extremities, but able to straighten them out with instruction.  She has bruising on the upper extremities particularly over the left forearm.  Neurological:  Alert and oriented to person but confused to place, time, situation.  She yells with each movement.  It is unclear if this is due to specific pain or known behavioral issue.  Psychiatric:  not cooperative, unable to fully assess , agitated / yells    Initial MDM    72-year-old lady with history of dementia at baseline who is only oriented to self with fall from wheelchair with small laceration to forehead, bruises to face, bruise to forearm.  Images ordered.  Tdap is up-to-date.  Patient may require haloperidol for sedative affects prior to imaging due to ongoing behavioral issues.    I decided to obtain the patient's medical records.    Medications - No data to display    Results and ED Course    Labs Reviewed - No data to display    Imaging Results    None               Impression and Plan    72 y.o. female with fall from wheelchair awaiting imaging studies, signed out to oncoming team for further evaluation and disposition.  If x-rays reveal no acute traumatic findings that would require intervention, patient likely stable to return back to the facility for his her inpatient bed at the geriatric psychiatry facility.  Would touch base with the nursing home that currently houses the patient and discuss disposition  options.    As far as the laceration on forehead, we will irrigate and plan for hair apposition technique for repair which will require dermabond.    2:46 PM  Images obtained  Awaiting final read          Final diagnoses:  [T14.8XXA] Bruise  [W19.XXXA] Chloe Ferguson,   10/03/22 1531

## 2022-10-03 NOTE — SUBJECTIVE & OBJECTIVE
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    Cataract     Cellulitis of right lower extremity 1/19/2021    Chondrocalcinosis, cause unspecified, involving hand(712.34) 7/12/2011     Dx updated per 2019 IMO Load    Chronic sinusitis 4/6/2017    Colonic constipation 6/5/2013    Concussion 10/27/2016    Frequent falls 3/14/2017    Gait disturbance 3/14/2017    History of cardiac radiofrequency ablation (RFA) 2/3/2018    History of cardiac radiofrequency ablation (RFA) 2/3/2018    History of subarachnoid hemorrhage 10/30/2016    Hypogammaglobulinemia 9/7/2011    Irritable bowel syndrome 12/18/2015    Major depressive disorder, recurrent episode, in full remission 8/19/2010    Onychomycosis     Osteoarthritis     Postoperative hypothyroidism 12/18/2015    Presence of Watchman left atrial appendage closure device 5/14/2022    Rectal prolapse 6/5/2013    Reflux esophagitis 2/7/2010    Status post thyroidectomy 6/1/2017    Strabismus     SVT (supraventricular tachycardia) 11/22/2013    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     Underweight 1/23/2013     Past Surgical History:   Procedure Laterality Date    BREAST CYST ASPIRATION      x2    CATARACT EXTRACTION W/  INTRAOCULAR LENS IMPLANT Left 3/11/2019    Procedure: EXTRACTION, CATARACT, WITH IOL INSERTION;  Surgeon: Vera Sams MD;  Location: [facility];  Service: Ophthalmology;  Laterality: Left;    CATARACT EXTRACTION W/  INTRAOCULAR LENS IMPLANT Right 4/15/2019    Procedure: EXTRACTION, CATARACT,  WITH IOL INSERTION LASER;  Surgeon: Vera Sams MD;  Location: [facility] OR;  Service: Ophthalmology;  Laterality: Right;    COLON SURGERY      EYE SURGERY      FRACTURE SURGERY      NASAL SEPTUM SURGERY      OCCLUSION OF LEFT ATRIAL APPENDAGE N/A 2/18/2022    Procedure: Left atrial appendage occlusion;  Surgeon: Chase Gill MD;  Location: [facility] EP LAB;  Service: Cardiology;  Laterality: N/A;  afib, watchman, BSCI, osiris, anes, MB/EH, 3prep    OPEN REDUCTION AND INTERNAL FIXATION (ORIF) OF INJURY OF SACROILIAC JOINT Bilateral 1/20/2021    Procedure: ORIF, SACROILIAC JOINT;  Surgeon: Alcides Tamez MD;  [address];  Service: Orthopedics;  Laterality: Bilateral;    RADIOFREQUENCY ABLATION      RECTAL PROLAPSE REPAIR  june 2013    STRABISMUS SURGERY      TONSILLECTOMY      TRANSESOPHAGEAL ECHOCARDIOGRAPHY N/A 2/18/2022    Procedure: ECHOCARDIOGRAM, TRANSESOPHAGEAL;  Surgeon: Nils Diagnostic Provider;  Location: [facility] EP LAB;  Service: Cardiology;  Laterality: N/A;     Family History       Problem Relation (Age of Onset)    Breast cancer Paternal Grandmother    Heart disease Father, Brother    Stroke Father          Tobacco Use    Smoking status: Never    Smokeless tobacco: Never   Substance and Sexual Activity    Alcohol use: No    Drug use: No    Sexual activity: Not Currently     Review of Systems   Reason unable to perform ROS: altered, dementia.   Objective:        There is no height or weight on file to calculate BMI.  Vital Signs (Most Recent):  Temp: 98.6 °F (37 °C) (10/03/22 1206)  Pulse: 85 (10/03/22 1236)  Resp: 19 (10/03/22 1206)  BP: 135/77 (10/03/22 1421)  SpO2: 99 % (10/03/22 1236) Vital Signs (24h Range):  Temp:  [98.6 °F (37 °C)] 98.6 °F (37 °C)  Pulse:  [85-87] 85  Resp:  [19] 19  SpO2:  [98 %-100 %] 99 %  BP: (125-157)/(68-77) 135/77                          Physical Exam    Neurosurgery Physical Exam  General: extremely cachectic, contracted  Head: Non-traumatic,  normocephalic  Eyes: Pupils equal, EOMi  Neck: +tenderness to palpation  CVS: Normal rate and rhythm, distal pulses present  Pulm: Symmetric expansion, no respiratory distress  GI: Abdomen nondistended, nontender  MSK: bruising LUE, multiple bruises on BLE  Skin: see MSK  Psych: confused, baseline dementia  Neuro: Aox2 to self and place, GCS [id number]  CNII-XII: PERRL, unable to participate with full CN exam  Extremities:  Motor:  Moves all limbs spontaneously legs>uppers. Keeps BUE contracted near face. Will squeeze hands and wiggle toes. Lifts BLE off bed.     Reflexes:     Patient with significant contractures, will not relax for full reflex exam    Sensory:      Sensorium intact throughout, no sensory level present    Coordination:      Coordination intact throughout     Cervical Spine:      Midline TTP: Pos     Thoracic Spine:     Midline TTP: Negative     Lumbar Spine:     Midline TTP: Negative        Significant Labs:  No results for input(s): GLU, NA, K, CL, CO2, BUN, CREATININE, CALCIUM, MG in the last 48 hours.  No results for input(s): WBC, HGB, HCT, PLT in the last 48 hours.  No results for input(s): LABPT, INR, APTT in the last 48 hours.  Microbiology Results (last 7 days)       ** No results found for the last 168 hours. **          All pertinent labs from the last 24 hours have been reviewed.    Significant Diagnostics:  I have reviewed and interpreted all pertinent imaging results/findings within the past 24 hours. Agree with below    X-Ray Chest 1 View    Result Date: 10/3/2022  Allowing for significant limitations, no acute abnormality.  Consider follow-up when/if clinically appropriate. Electronically signed by resident: Chase Christensen Date:    10/03/2022 Time:    17:31 Electronically signed by: Krishna Torres MD Date:    10/03/2022 Time:    17:51    X-Ray Forearm Left    Result Date: 10/3/2022  1. No convincing acute displaced fracture or dislocation of the forearm noting degenerative changes of the  wrist.  If there is discrete pain in the wrist, dedicated wrist radiography is advised.  There is edema along the dorsal aspect of the wrist. Electronically signed by: Marc Amor MD Date:    10/03/2022 Time:    17:32    CT Head Without Contrast    Result Date: 10/3/2022  1. Examination is significantly degraded by suboptimal patient positioning and motion artifact. 2. Hypodense extra-axial collection overlying the right cerebral hemisphere, new from 09/19/2022, suspicious for subdural effusion or chronic hematoma.  Mild regional sulcal effacement without significant midline shift. 3. Small area of soft tissue swelling over the forehead. 4. Stable 6 mm anterolisthesis of C2-3.  Stable T2 and T3 mild compression fractures.  No evidence of traumatic malalignment or fracture. 5. Additional findings as above. Report was flagged in Epic as abnormal. The critical finding above was discovered/received at 15:10.  The critical information above was relayed directly by Dr. Raheem Donahue by telephone to Dr. Oleg Ferguson on 10/3/2022 at 15:15. Electronically signed by resident: Raheem Donahue Date:    10/03/2022 Time:    14:45 Electronically signed by: Mario Alberto Knight MD Date:    10/03/2022 Time:    17:08    CT Cervical Spine Without Contrast    Result Date: 10/3/2022  1. Examination is significantly degraded by suboptimal patient positioning and motion artifact. 2. Hypodense extra-axial collection overlying the right cerebral hemisphere, new from 09/19/2022, suspicious for subdural effusion or chronic hematoma.  Mild regional sulcal effacement without significant midline shift. 3. Small area of soft tissue swelling over the forehead. 4. Stable 6 mm anterolisthesis of C2-3.  Stable T2 and T3 mild compression fractures.  No evidence of traumatic malalignment or fracture. 5. Additional findings as above. Report was flagged in Epic as abnormal. The critical finding above was discovered/received at 15:10.  The critical  information above was relayed directly by Dr. Raheem Donahue by telephone to Dr. Oleg Ferguson on 10/3/2022 at 15:15. Electronically signed by resident: Raheem Donahue Date:    10/03/2022 Time:    14:45 Electronically signed by: Mario Alberto Knight MD Date:    10/03/2022 Time:    17:08    CT Maxillofacial Without Contrast    Result Date: 10/3/2022  1. Examination is significantly degraded by suboptimal patient positioning and motion artifact. 2. Hypodense extra-axial collection overlying the right cerebral hemisphere, new from 09/19/2022, suspicious for subdural effusion or chronic hematoma.  Mild regional sulcal effacement without significant midline shift. 3. Small area of soft tissue swelling over the forehead. 4. Stable 6 mm anterolisthesis of C2-3.  Stable T2 and T3 mild compression fractures.  No evidence of traumatic malalignment or fracture. 5. Additional findings as above. Report was flagged in Epic as abnormal. The critical finding above was discovered/received at 15:10.  The critical information above was relayed directly by Dr. Raheem Donahue by telephone to Dr. Oleg Ferguson on 10/3/2022 at 15:15. Electronically signed by resident: Raheem Donahue Date:    10/03/2022 Time:    14:45 Electronically signed by: Mario Alberto Knight MD Date:    10/03/2022 Time:

## 2022-10-03 NOTE — CONSULTS
[facility] - Emergency Dept  Neurosurgery  Consult Note    Inpatient consult to Neurosurgery  Consult performed by: Xenia Castro MD  Consult ordered by: Madiha Morataya MD  Reason for consult: fall, abnormal cth        Subjective:     Chief Complaint/Reason for Admission: Fall    History of Present Illness: 72 F with h/o dementia, extensive cardiac history requiring radiofrequency ablations/Watchman device presenting s/p witnessed fall from wheelchair. No LOC. Patient is a resident of the [facility], was supposed to be going to [facility] today for geriatric psych appointment per ED but came to [facility] after fall. Reportedly patient with increased behavioral issues and falls.     Patient is poor historian given dementia and unable to provide full collateral history. History of previously being on asa/plavix/xarelto but per ED that has been discontinued at least as of July 2022.     Has laceration to forehead that was repaired by ED.      Reportedly normally oriented to self only, yells often, agitated.      (Not in a hospital admission)      Review of patient's allergies indicates:   Allergen Reactions    Adhesive      Tape tears skin    Buspar [buspirone]      headaches    Escitalopram oxalate Nausea Only     hyponatremia      Rifampin Rash       Past Medical History:   Diagnosis Date    Adult bronchiectasis     Age-related osteoporosis with current pathological fracture with routine healing 8/28/2013    Amblyopia     Anxiety     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    Cataract     Cellulitis of right lower extremity 1/19/2021    Chondrocalcinosis, cause unspecified, involving hand(712.34) 7/12/2011     Dx updated per 2019 IMO Load    Chronic sinusitis 4/6/2017    Colonic constipation 6/5/2013    Concussion 10/27/2016    Frequent falls 3/14/2017    Gait disturbance 3/14/2017    History of cardiac radiofrequency ablation (RFA) 2/3/2018     History of cardiac radiofrequency ablation (RFA) 2/3/2018    History of subarachnoid hemorrhage 10/30/2016    Hypogammaglobulinemia 9/7/2011    Irritable bowel syndrome 12/18/2015    Major depressive disorder, recurrent episode, in full remission 8/19/2010    Onychomycosis     Osteoarthritis     Postoperative hypothyroidism 12/18/2015    Presence of Watchman left atrial appendage closure device 5/14/2022    Rectal prolapse 6/5/2013    Reflux esophagitis 2/7/2010    Status post thyroidectomy 6/1/2017    Strabismus     SVT (supraventricular tachycardia) 11/22/2013    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     Underweight 1/23/2013     Past Surgical History:   Procedure Laterality Date    BREAST CYST ASPIRATION      x2    CATARACT EXTRACTION W/  INTRAOCULAR LENS IMPLANT Left 3/11/2019    Procedure: EXTRACTION, CATARACT, WITH IOL INSERTION;  Surgeon: Vera Sams MD;  [facility];  Service: Ophthalmology;  Laterality: Left;    CATARACT EXTRACTION W/  INTRAOCULAR LENS IMPLANT Right 4/15/2019    Procedure: EXTRACTION, CATARACT, WITH IOL INSERTION LASER;  Surgeon: Vera Sams MD;  Location: [facility] OR;  Service: Ophthalmology;  Laterality: Right;    COLON SURGERY      EYE SURGERY      FRACTURE SURGERY      NASAL SEPTUM SURGERY      OCCLUSION OF LEFT ATRIAL APPENDAGE N/A 2/18/2022    Procedure: Left atrial appendage occlusion;  Surgeon: Chase Gill MD;  Location: [facility] EP LAB;  Service: Cardiology;  Laterality: N/A;  afib, watchman, BSCI, osiris, anes, MB/EH, 3prep    OPEN REDUCTION AND INTERNAL FIXATION (ORIF) OF INJURY OF SACROILIAC JOINT Bilateral 1/20/2021    Procedure: ORIF, SACROILIAC JOINT;  Surgeon: Alcides Tamez MD;  Location: [address] FLR;  Service: Orthopedics;  Laterality: Bilateral;    RADIOFREQUENCY ABLATION      RECTAL PROLAPSE REPAIR  june 2013    STRABISMUS SURGERY      TONSILLECTOMY      TRANSESOPHAGEAL ECHOCARDIOGRAPHY N/A 2/18/2022    Procedure:  ECHOCARDIOGRAM, TRANSESOPHAGEAL;  Surgeon: [facility];  Location: [facility] EP LAB;  Service: Cardiology;  Laterality: N/A;     Family History       Problem Relation (Age of Onset)    Breast cancer Paternal Grandmother    Heart disease Father, Brother    Stroke Father          Tobacco Use    Smoking status: Never    Smokeless tobacco: Never   Substance and Sexual Activity    Alcohol use: No    Drug use: No    Sexual activity: Not Currently     Review of Systems   Reason unable to perform ROS: altered, dementia.   Objective:        There is no height or weight on file to calculate BMI.  Vital Signs (Most Recent):  Temp: 98.6 °F (37 °C) (10/03/22 1206)  Pulse: 85 (10/03/22 1236)  Resp: 19 (10/03/22 1206)  BP: 135/77 (10/03/22 1421)  SpO2: 99 % (10/03/22 1236) Vital Signs (24h Range):  Temp:  [98.6 °F (37 °C)] 98.6 °F (37 °C)  Pulse:  [85-87] 85  Resp:  [19] 19  SpO2:  [98 %-100 %] 99 %  BP: (125-157)/(68-77) 135/77                          Physical Exam    Neurosurgery Physical Exam  General: extremely cachectic, contracted  Head: Non-traumatic, normocephalic  Eyes: Pupils equal, EOMi  Neck: +tenderness to palpation  CVS: Normal rate and rhythm, distal pulses present  Pulm: Symmetric expansion, no respiratory distress  GI: Abdomen nondistended, nontender  MSK: bruising LUE, multiple bruises on BLE  Skin: see MSK  Psych: confused, baseline dementia  Neuro: Aox2 to self and place, GCS [id number]  CNII-XII: PERRL, unable to participate with full CN exam  Extremities:  Motor:  Moves all limbs spontaneously legs>uppers. Keeps BUE contracted near face. Will squeeze hands and wiggle toes. Lifts BLE off bed.     Reflexes:     Patient with significant contractures, will not relax for full reflex exam    Sensory:      Sensorium intact throughout, no sensory level present    Coordination:      Coordination intact throughout     Cervical Spine:      Midline TTP: Pos     Thoracic Spine:     Midline TTP: Negative      Lumbar Spine:     Midline TTP: Negative        Significant Labs:  No results for input(s): GLU, NA, K, CL, CO2, BUN, CREATININE, CALCIUM, MG in the last 48 hours.  No results for input(s): WBC, HGB, HCT, PLT in the last 48 hours.  No results for input(s): LABPT, INR, APTT in the last 48 hours.  Microbiology Results (last 7 days)       ** No results found for the last 168 hours. **          All pertinent labs from the last 24 hours have been reviewed.    Significant Diagnostics:  I have reviewed and interpreted all pertinent imaging results/findings within the past 24 hours. Agree with below    X-Ray Chest 1 View    Result Date: 10/3/2022  Allowing for significant limitations, no acute abnormality.  Consider follow-up when/if clinically appropriate. Electronically signed by resident: Chase Christensen Date:    10/03/2022 Time:    17:31 Electronically signed by: Krishna Torres MD Date:    10/03/2022 Time:    17:51    X-Ray Forearm Left    Result Date: 10/3/2022  1. No convincing acute displaced fracture or dislocation of the forearm noting degenerative changes of the wrist.  If there is discrete pain in the wrist, dedicated wrist radiography is advised.  There is edema along the dorsal aspect of the wrist. Electronically signed by: Marc Amor MD Date:    10/03/2022 Time:    17:32    CT Head Without Contrast    Result Date: 10/3/2022  1. Examination is significantly degraded by suboptimal patient positioning and motion artifact. 2. Hypodense extra-axial collection overlying the right cerebral hemisphere, new from 09/19/2022, suspicious for subdural effusion or chronic hematoma.  Mild regional sulcal effacement without significant midline shift. 3. Small area of soft tissue swelling over the forehead. 4. Stable 6 mm anterolisthesis of C2-3.  Stable T2 and T3 mild compression fractures.  No evidence of traumatic malalignment or fracture. 5. Additional findings as above. Report was flagged in Epic as abnormal. The  critical finding above was discovered/received at 15:10.  The critical information above was relayed directly by Dr. Raheem Donahue by telephone to Dr. Oleg Ferguson on 10/3/2022 at 15:15. Electronically signed by resident: Raheem Donahue Date:    10/03/2022 Time:    14:45 Electronically signed by: Mario Alberto Knight MD Date:    10/03/2022 Time:    17:08    CT Cervical Spine Without Contrast    Result Date: 10/3/2022  1. Examination is significantly degraded by suboptimal patient positioning and motion artifact. 2. Hypodense extra-axial collection overlying the right cerebral hemisphere, new from 09/19/2022, suspicious for subdural effusion or chronic hematoma.  Mild regional sulcal effacement without significant midline shift. 3. Small area of soft tissue swelling over the forehead. 4. Stable 6 mm anterolisthesis of C2-3.  Stable T2 and T3 mild compression fractures.  No evidence of traumatic malalignment or fracture. 5. Additional findings as above. Report was flagged in Epic as abnormal. The critical finding above was discovered/received at 15:10.  The critical information above was relayed directly by Dr. Raheem Donahue by telephone to Dr. Oleg Ferguson on 10/3/2022 at 15:15. Electronically signed by resident: Raheem Donahue Date:    10/03/2022 Time:    14:45 Electronically signed by: Mario Alberto Knight MD Date:    10/03/2022 Time:    17:08    CT Maxillofacial Without Contrast    Result Date: 10/3/2022  1. Examination is significantly degraded by suboptimal patient positioning and motion artifact. 2. Hypodense extra-axial collection overlying the right cerebral hemisphere, new from 09/19/2022, suspicious for subdural effusion or chronic hematoma.  Mild regional sulcal effacement without significant midline shift. 3. Small area of soft tissue swelling over the forehead. 4. Stable 6 mm anterolisthesis of C2-3.  Stable T2 and T3 mild compression fractures.  No evidence of traumatic malalignment or fracture. 5. Additional  findings as above. Report was flagged in Epic as abnormal. The critical finding above was discovered/received at 15:10.  The critical information above was relayed directly by Dr. Raheem Donahue by telephone to Dr. Oleg Ferguson on 10/3/2022 at 15:15. Electronically signed by resident: Raheem Donahue Date:    10/03/2022 Time:    14:45 Electronically signed by: Mario Alberto Knight MD Date:    10/03/2022 Time:              Assessment/Plan:     Frequent falls  72 F with h/o dementia, extensive cardiac history including Watchman device/multiple RFA presenting s/p fall; CTH with concern for chronic subdural hematoma vs hygroma, not seen on recent CT 9/19/22:    --No acute neurosurgical intervention. Patient w/o acute blood on CTH. Noted chronic hematoma vs hygroma on CT w/o mass effect  --Rec admission to  for syncopal work up given reported increase fall frequency (recently in ED 9/19/22), extensive cardiac history  --Rec follow-up CTH given motion artifact on 1445 scan  --Reverse anti-plt/coag medications - patient reportedly off ASA/Xarelto/Plavix -continue to hold  --SBP <160  --Na >135  --No evidence of seizure activity, AED not indicated at this time  --HOB >30  --Follow-up full labs (CBC/CMP/PT-INR/PTT/T&S/UA)  --Continue to monitor clinically, notify NSGY immediately with any changes in neuro status  --Neck pain/additional work up per ED/Ortho; consider C collar until C spine can be cleared given neck pain on exam.    Dispo: ongoing    D/w Dr. Washington          Thank you for your consult. I will follow-up with patient. Please contact us if you have any additional questions.    Xenia Lyon MD  Neurosurgery  [facility] - [facility] Dept

## 2022-10-03 NOTE — PROGRESS NOTES
Patient received in sign-out from Dr. Ferguson.  Patient is a 72-year-old female who presents from her nursing home after a fall out of her wheelchair.  No preceding chest pain, shortness of breath, lightheadedness.  Fall was witnessed by nursing home staff.  No loss of consciousness.  She is not on anticoagulation.  She did have a small lack to her anterior scalp measuring less than 1 cm that is relatively superficial.  This was well approximated and closed using Dermabond in here opposition.  Her tetanus is up-to-date.  She does have a history of dementia and her nursing home is working on getting her a [facility] psych bed at another facility.  Imaging pending at time of changeover.  Plan to follow-up imaging, re-evaluate and talk with the patient's nursing home to see if patient can return back there.    Imaging with reviewed. See impressions below. NSGY consulted for the head CT findings. They recommend repeat HCT which was ordered but feel this is chronic. They also note patient is complaining of cervical spine tenderness of their exam. Ortho covering spine and consulted. CT cervical spine without acute fracture. She does have compressions fractures in her thoracic vertebrae. Ortho feels the compression fractures could be acute as she has significant point tenderness associated with it. They recommend admission for pain control and plan to continue to follow along although likely no surgical intervention. Patient admitted to  for pain control.       Imaging Results              CT Head Without Contrast (Final result)  Result time 10/03/22 21:15:30      Final result by Lc Caballero DO (10/03/22 21:15:30)                   Impression:      Stable small right subdural hygroma versus chronic subdural hematoma with minimal mass effect as above and no evidence of significant midline shift.      Electronically signed by: Lc Caballero  Date:    10/03/2022  Time:    21:15               Narrative:    EXAMINATION:  CT  HEAD WITHOUT CONTRAST    CLINICAL HISTORY:  hygroma vs chronic sdh;    TECHNIQUE:  Low dose axial CT images obtained throughout the head without intravenous contrast. Sagittal and coronal reconstructions were performed.    COMPARISON:  CT head from 10/03/2022.    FINDINGS:  Examination is significantly limited by patient motion artifact and by positioning.    There is brain parenchymal volume loss and prominence of the CSF spaces.  No hydrocephalus.  Again seen is a right cerebral convexity hypodense (CSF density) fluid collection measuring up to 7 mm in maximal thickness, compatible with a subdural hygroma versus chronic subdural hematoma.  There continues to be no evidence of significant midline shift, however there is continued mild mass effect on the right lateral ventricle.  There is hypoattenuation in the supratentorial white matter compatible with chronic microvascular ischemic changes, similar to prior.  No parenchymal mass, hemorrhage, edema or major vascular distribution infarct.    No calvarial fracture.  The scalp is unremarkable.  Bilateral paranasal sinuses and mastoid air cells are clear.  There are prosthetic lenses.                                       CT Thoracic Spine Without Contrast (Final result)  Result time 10/03/22 22:18:34      Final result by Lc Caballero DO (10/03/22 22:18:34)                   Impression:      1. Significant exam limitations as detailed above  2. Numerous compression fractures of the thoracic and lumbar spine as above, with new fractures seen at T3 and T9.  Correlate with point tenderness.      Electronically signed by: Lc Caballero  Date:    10/03/2022  Time:    22:18               Narrative:    EXAMINATION:  CT THORACIC SPINE WITHOUT CONTRAST    CLINICAL HISTORY:  Mid-back pain;Compression fracture, thoracic;    TECHNIQUE:  Axial CT images of the thoracic spine with sagittal and coronal reformats without intravenous contrast.    COMPARISON:  Radiographs from  earlier the same date.  MRI thoracic spine from 07/07/2022    FINDINGS:  Examination is significantly limited due to motion artifact, patient positioning, significant kyphotic deformity, noninclusion of the T1 and T2 vertebral bodies, and severe osteopenia.    Alignment: There is severe kyphotic deformity of the thoracic spine.  There is grade 1 anterolisthesis of L1 on L2.    Vertebra: Limited evaluation of the T11 through L1 vertebrae due to motion artifact.  The T1 and T2 vertebra are not included in the field of view.  There are numerous compression fracture of the thoracic and lumbar spine involving T3, T8, T9, T10, and L2.  Questionable compression fracture of the superior endplate of L3 also noted.  In comparison with prior MRI of the thoracic spine from 07/07 22, the T3 and T9 compression fractures are new.  The T8, T10, and L2 compression fractures were seen previously and demonstrate unchanged height loss from prior.  There is a Schmorl's node within the T1 superior endplate.  There is continued retropulsion of the T8 compression fracture with unchanged encroachment on the anterior aspect of the spinal canal, overall measuring approximately 4 mm.  There is also retropulsion of the T9 and T10 compression deformities with approximately 2 mm of encroachment at these levels.    Discs: There is mild multilevel disc height loss.    Degenerative changes: There are multilevel degenerative changes of the thoracic spine, similar to prior.    Miscellaneous: There is a filter in the left atrial appendage.  Partially imaged lungs demonstrate bandlike atelectasis or scarring.  There are multiple remote bilateral rib fractures.                                        X-Ray Thoracic Spine AP Lateral (Final result)  Result time 10/03/22 21:08:06      Final result by Cholo Cadet MD (10/03/22 21:08:06)                   Impression:      1. Compression deformities of T9-L3 vertebral bodies, which are new and/or  progressed compared to MRI thoracic spine 07/07/2022.  2. Known T8 vertebral body compression deformity with progressive height loss.  This report was flagged in Epic as abnormal.    Electronically signed by resident: Natalie Michele  Date:    10/03/2022  Time:    20:33    Electronically signed by: Cholo Cadet  Date:    10/03/2022  Time:    21:08               Narrative:    EXAMINATION:  XR THORACIC SPINE AP LATERAL    CLINICAL HISTORY:  fx;    TECHNIQUE:  AP and lateral views of the thoracic spine were performed.    COMPARISON:  MRI thoracic spine 07/07/2022, 07/03/2022    FINDINGS:  The superior thoracic spine is obscured by the overlying shadows of the shoulders.    Pronounced kyphosis of the thoracic spine which appears more prominent compared to MRI thoracic spine 07/07/2022.    Known T8 vertebral body compression deformity with progression of height loss compared to MRI thoracic spine 07/07/2022.  Compression deformities of the T9-L3 vertebral bodies are new compared to MRI thoracic spine 07/07/2022 the T10 compression and superior endplate compression L1 and L2 were previously present but may be slightly progressed.    Partially visualized postoperative changes of the pelvis.    No acute abnormality seen in the visualized chest and abdomen.                                       X-Ray Wrist Complete Left (Final result)  Result time 10/03/22 18:52:50      Final result by Lc Caballero DO (10/03/22 18:52:50)                   Impression:      No acute osseous abnormality of the left wrist.    Chondrocalcinosis which can be seen with CPPD.      Electronically signed by: Lc Caballero  Date:    10/03/2022  Time:    18:52               Narrative:    EXAMINATION:  XR WRIST COMPLETE 3 VIEWS LEFT    CLINICAL HISTORY:  Unspecified fall, initial encounter    TECHNIQUE:  PA, lateral, and oblique views of the left wrist were performed.    COMPARISON:  Correlation made to contralateral wrist radiographs from  10/28/2016.    FINDINGS:  There is diffuse osteopenia.  No acute fracture or dislocation.  There is extensive chondrocalcinosis in the TFCC and the radiocarpal articulations which can be seen with CPPD.  There is mild negative ulnar variance.  Alignment is otherwise normal.  There is soft tissue edema.                                       X-Ray Hip 2 or 3 views Right (with Pelvis when performed) (Final result)  Result time 10/03/22 18:47:12      Final result by Marc Amor MD (10/03/22 18:47:12)                   Impression:      1. No acute displaced fracture or dislocation of the right hip.      Electronically signed by: Marc Amor MD  Date:    10/03/2022  Time:    18:47               Narrative:    EXAMINATION:  XR HIP WITH PELVIS WHEN PERFORMED, 2 OR 3  VIEWS RIGHT    CLINICAL HISTORY:  Unspecified fall, initial encounter    TECHNIQUE:  AP view of the pelvis and frog leg lateral view of the right hip were performed.    COMPARISON:  07/07/2022, 10/03/2022    FINDINGS:  Two views right hip.    No acute displaced fracture or dislocation of the right hip.  The right femoroacetabular joint is intact noting degenerative changes.  There is osteopenia.  Sacral transfixation screws are stable.                                       X-Ray Pelvis Routine AP (Final result)  Result time 10/03/22 17:35:05      Final result by Marc Amor MD (10/03/22 17:35:05)                   Impression:      1. Somewhat differing configuration of the proximal right femur as compared to the prior exam.  This may be on the basis of positional change however correlation with any focal tenderness recommended.  Dedicated right hip radiography as warranted.      Electronically signed by: Marc Amor MD  Date:    10/03/2022  Time:    17:35               Narrative:    EXAMINATION:  XR PELVIS ROUTINE AP    CLINICAL HISTORY:  fall;    TECHNIQUE:  AP view of the pelvis was  performed.    COMPARISON:  10/14/2021    FINDINGS:  Single-view pelvis.    Sacral transfixation screws are stable.  There is osteopenia.  There are degenerative changes of the bilateral femoroacetabular joints without dislocation.  The pubic symphysis is intact.  The orientation of the proximal right femur 1st from previous exam, this may be on the basis of positioning however correlation with any focal tenderness recommended and dedicated right hip radiography as warranted.  There appears to be remote fracture involving the inferior pubic ramus on the right.                                       X-Ray Chest 1 View (Final result)  Result time 10/03/22 17:51:07      Final result by Krishna Torres MD (10/03/22 17:51:07)                   Impression:      Allowing for significant limitations, no acute abnormality.  Consider follow-up when/if clinically appropriate.    Electronically signed by resident: Chase Christensen  Date:    10/03/2022  Time:    17:31    Electronically signed by: Krishna Torres MD  Date:    10/03/2022  Time:    17:51               Narrative:    EXAMINATION:  XR CHEST 1 VIEW    CLINICAL HISTORY:  Unspecified fall, initial encounter    TECHNIQUE:  Single frontal view of the chest was performed.    COMPARISON:  Chest radiograph 09/19/2022    FINDINGS:  Examination severely limited secondary to patient's head tilted inferiorly obscuring portions of the field of view.    Trachea is not well visualized.  Visualized cardiomediastinal silhouette appears within normal limits.  Visualized lungs appear clear.  No sizable pleural effusion.  No pneumothorax.  No confluent consolidation.  Healing rib fractures.  No free air below the diaphragm.                                       X-Ray Forearm Left (Final result)  Result time 10/03/22 17:32:33      Final result by Marc Amor MD (10/03/22 17:32:33)                   Impression:      1. No convincing acute displaced fracture or dislocation of the forearm  noting degenerative changes of the wrist.  If there is discrete pain in the wrist, dedicated wrist radiography is advised.  There is edema along the dorsal aspect of the wrist.      Electronically signed by: Marc Amor MD  Date:    10/03/2022  Time:    17:32               Narrative:    EXAMINATION:  XR FOREARM LEFT    CLINICAL HISTORY:  Other injury of unspecified body region, initial encounter    TECHNIQUE:  AP and lateral views of the left forearm were performed.    COMPARISON:  None    FINDINGS:  Two views left forearm.    There is osteopenia.  No acute displaced fracture or dislocation of the forearm.  The elbow appears intact.  There are degenerative changes of the wrist noting calcification in the region of the TFCC.  There is edema overlying the dorsal aspect of the wrist.  Dedicated wrist radiography is recommended if there is discrete wrist pain for further evaluation.                                       CT Head Without Contrast (Final result)  Result time 10/03/22 17:08:00      Final result by Mario Alberto Knight MD (10/03/22 17:08:00)                   Impression:      1. Examination is significantly degraded by suboptimal patient positioning and motion artifact.  2. Hypodense extra-axial collection overlying the right cerebral hemisphere, new from 09/19/2022, suspicious for subdural effusion or chronic hematoma.  Mild regional sulcal effacement without significant midline shift.  3. Small area of soft tissue swelling over the forehead.  4. Stable 6 mm anterolisthesis of C2-3.  Stable T2 and T3 mild compression fractures.  No evidence of traumatic malalignment or fracture.  5. Additional findings as above.  Report was flagged in Epic as abnormal.    The critical finding above was discovered/received at 15:10.  The critical information above was relayed directly by Dr. Raheem Donahue by telephone to Dr. Oleg Ferguson on 10/3/2022 at 15:15.    Electronically signed by resident: Raheem  Rowan  Date:    10/03/2022  Time:    14:45    Electronically signed by: Mario Alberto Knight MD  Date:    10/03/2022  Time:    17:08               Narrative:    EXAMINATION:  CT HEAD WITHOUT CONTRAST; CT CERVICAL SPINE WITHOUT CONTRAST; CT MAXILLOFACIAL WITHOUT CONTRAST    CLINICAL HISTORY:  Facial trauma, blunt;; Ataxia, cervical trauma;    TECHNIQUE:  Low dose CT images obtained throughout the head and cervical spine without the use of intravenous contrast.  Axial, sagittal and coronal reconstructions were performed.    Additionally, thin images through the face were obtained as a separate acquisition with multiplanar reformats.    Examination is significantly degraded by suboptimal patient positioning, motion, and artifact from and along the side of the head.    COMPARISON:  CT head 09/19/2022; MRI brain 07/26/2021; MRI cervical spine 07/07/2021; CT cervical spine 07/07/2021    FINDINGS:  CT head/maxillofacial:    Ventricles are stable in size for age without evidence of hydrocephalus.    Mild generalized cerebral volume loss.  Patchy regions of hypoattenuation in the supratentorial white matter, nonspecific but likely reflecting chronic microvascular ischemic changes.  No parenchymal mass, hemorrhage, edema or major vascular distribution infarct.    Hypoattenuating extra-axial fluid collection overlying the right cerebral hemisphere measuring up to 0.9 cm in thickness.  Associated regional sulcal effacement without evidence for subfalcine herniation or significant midline shift.    Bony calvarium is intact without evidence of displaced fracture.  Mastoid air cells and paranasal sinuses are essentially clear.  Nasal septal deviation to the right with a septal spur.  Sofya bullosa involving the left middle turbinate.    Small region of focal swelling within the scalp overlying the frontal bone to the left of midline.  Additional areas of mild soft tissue swelling over the bilateral cheeks.  Questionable deformity of  the right zygomatic arch, appears remote without discrete fracture line.  Temporomandibular joints are normally aligned.  No mandibular fracture.  The orbital walls appear intact.    Postsurgical changes in the bilateral globes.    CT cervical spine:    Stable alignment with 6 mm anterolisthesis of C    Generalized osteopenia.  Stable remote mild compression deformities of the superior endplates of the T2 and T3 vertebral bodies.  No acute fracture.    Advanced multilevel degenerative disc height loss throughout the cervical spine with intradiscal calcification and vacuum disc phenomenon.  Disc height loss worst at C6-C7.    Stable multilevel facet and uncovertebral joint degenerative changes, not well evaluated given limitations by positioning.    Right apical fibronodular scarring and atelectasis.    No acute findings within the visualized soft tissues.                                       CT Cervical Spine Without Contrast (Final result)  Result time 10/03/22 17:08:00      Final result by Mario Alberto Knight MD (10/03/22 17:08:00)                   Impression:      1. Examination is significantly degraded by suboptimal patient positioning and motion artifact.  2. Hypodense extra-axial collection overlying the right cerebral hemisphere, new from 09/19/2022, suspicious for subdural effusion or chronic hematoma.  Mild regional sulcal effacement without significant midline shift.  3. Small area of soft tissue swelling over the forehead.  4. Stable 6 mm anterolisthesis of C2-3.  Stable T2 and T3 mild compression fractures.  No evidence of traumatic malalignment or fracture.  5. Additional findings as above.  Report was flagged in Epic as abnormal.    The critical finding above was discovered/received at 15:10.  The critical information above was relayed directly by Dr. Raheem Donahue by telephone to Dr. Oleg Ferguson on 10/3/2022 at 15:15.    Electronically signed by resident: Raheem  the right zygomatic arch, appears remote without discrete fracture line.  Temporomandibular joints are normally aligned.  No mandibular fracture.  The orbital walls appear intact.    Postsurgical changes in the bilateral globes.    CT cervical spine:    Stable alignment with 6 mm anterolisthesis of C    Generalized osteopenia.  Stable remote mild compression deformities of the superior endplates of the T2 and T3 vertebral bodies.  No acute fracture.    Advanced multilevel degenerative disc height loss throughout the cervical spine with intradiscal calcification and vacuum disc phenomenon.  Disc height loss worst at C6-C7.    Stable multilevel facet and uncovertebral joint degenerative changes, not well evaluated given limitations by positioning.    Right apical fibronodular scarring and atelectasis.    No acute findings within the visualized soft tissues.                                       CT Maxillofacial Without Contrast (Final result)  Result time 10/03/22 17:08:00      Final result by Mario Alberto Knight MD (10/03/22 17:08:00)                   Impression:      1. Examination is significantly degraded by suboptimal patient positioning and motion artifact.  2. Hypodense extra-axial collection overlying the right cerebral hemisphere, new from 09/19/2022, suspicious for subdural effusion or chronic hematoma.  Mild regional sulcal effacement without significant midline shift.  3. Small area of soft tissue swelling over the forehead.  4. Stable 6 mm anterolisthesis of C2-3.  Stable T2 and T3 mild compression fractures.  No evidence of traumatic malalignment or fracture.  5. Additional findings as above.  Report was flagged in Epic as abnormal.    The critical finding above was discovered/received at 15:10.  The critical information above was relayed directly by Dr. Raheem Donahue by telephone to Dr. Oleg Ferguson on 10/3/2022 at 15:15.    Electronically signed by resident: Raheem  the right zygomatic arch, appears remote without discrete fracture line.  Temporomandibular joints are normally aligned.  No mandibular fracture.  The orbital walls appear intact.    Postsurgical changes in the bilateral globes.    CT cervical spine:    Stable alignment with 6 mm anterolisthesis of C    Generalized osteopenia.  Stable remote mild compression deformities of the superior endplates of the T2 and T3 vertebral bodies.  No acute fracture.    Advanced multilevel degenerative disc height loss throughout the cervical spine with intradiscal calcification and vacuum disc phenomenon.  Disc height loss worst at C6-C7.    Stable multilevel facet and uncovertebral joint degenerative changes, not well evaluated given limitations by positioning.    Right apical fibronodular scarring and atelectasis.    No acute findings within the visualized soft tissues.                                         Lac Repair    Date/Time: 10/3/2022 4:58 PM  Performed by: Madiha Morataya MD  Authorized by: Madiha Morataya MD     Consent:     Consent obtained:  Verbal    Consent given by:  Patient    Risks, benefits, and alternatives were discussed: yes      Risks discussed:  Infection, pain and poor cosmetic result    Alternatives discussed:  No treatment  Anesthesia:     Anesthesia method:  None  Laceration details:     Location:  Scalp    Scalp location:  Frontal    Length (cm):  1  Exploration:     Hemostasis achieved with:  Direct pressure    Wound exploration: entire depth of wound visualized      Wound extent: no foreign bodies/material noted      Contaminated: no    Treatment:     Area cleansed with:  Saline    Amount of cleaning:  Standard    Irrigation solution:  Sterile saline    Debridement:  None  Skin repair:     Repair method:  Tissue adhesive  Approximation:     Approximation:  Close  Repair type:     Repair type:  Simple  Post-procedure details:     Dressing:  Open (no dressing)

## 2022-10-04 PROBLEM — S22.030A CLOSED WEDGE COMPRESSION FRACTURE OF T3 VERTEBRA: Status: ACTIVE | Noted: 2022-10-04

## 2022-10-04 PROBLEM — R93.0 ABNORMAL HEAD CT: Status: ACTIVE | Noted: 2022-10-04

## 2022-10-04 PROBLEM — G96.08 TRAUMATIC SUBDURAL HYGROMA: Status: ACTIVE | Noted: 2022-10-04

## 2022-10-04 LAB
ABO + RH BLD: NORMAL
ALBUMIN SERPL BCP-MCNC: 3 G/DL (ref 3.5–5.2)
ALP SERPL-CCNC: 111 U/L (ref 55–135)
ALT SERPL W/O P-5'-P-CCNC: 14 U/L (ref 10–44)
ANION GAP SERPL CALC-SCNC: 11 MMOL/L (ref 8–16)
ASCENDING AORTA: 2.11 CM
AST SERPL-CCNC: 18 U/L (ref 10–40)
AV INDEX (PROSTH): 1.06
AV MEAN GRADIENT: 1 MMHG
AV PEAK GRADIENT: 2 MMHG
AV VALVE AREA: 3.39 CM2
AV VELOCITY RATIO: 1.03
BASOPHILS # BLD AUTO: 0.01 K/UL (ref 0–0.2)
BASOPHILS NFR BLD: 0.1 % (ref 0–1.9)
BILIRUB SERPL-MCNC: 0.6 MG/DL (ref 0.1–1)
BLD GP AB SCN CELLS X3 SERPL QL: NORMAL
BSA FOR ECHO PROCEDURE: 1.32 M2
BUN SERPL-MCNC: 25 MG/DL (ref 8–23)
CALCIUM SERPL-MCNC: 8.3 MG/DL (ref 8.7–10.5)
CHLORIDE SERPL-SCNC: 106 MMOL/L (ref 95–110)
CO2 SERPL-SCNC: 20 MMOL/L (ref 23–29)
CREAT SERPL-MCNC: 0.6 MG/DL (ref 0.5–1.4)
CV ECHO LV RWT: 0.56 CM
DIFFERENTIAL METHOD: ABNORMAL
DOP CALC AO PEAK VEL: 0.78 M/S
DOP CALC AO VTI: 19.23 CM
DOP CALC LVOT AREA: 3.2 CM2
DOP CALC LVOT DIAMETER: 2.02 CM
DOP CALC LVOT PEAK VEL: 0.8 M/S
DOP CALC LVOT STROKE VOLUME: 65.18 CM3
DOP CALCLVOT PEAK VEL VTI: 20.35 CM
E WAVE DECELERATION TIME: 215.08 MSEC
E/A RATIO: 1.25
ECHO LV POSTERIOR WALL: 0.71 CM (ref 0.6–1.1)
EJECTION FRACTION: 65 %
EOSINOPHIL # BLD AUTO: 0 K/UL (ref 0–0.5)
EOSINOPHIL NFR BLD: 0 % (ref 0–8)
ERYTHROCYTE [DISTWIDTH] IN BLOOD BY AUTOMATED COUNT: 13.5 % (ref 11.5–14.5)
EST. GFR  (NO RACE VARIABLE): >60 ML/MIN/1.73 M^2
FRACTIONAL SHORTENING: 25 % (ref 28–44)
GLUCOSE SERPL-MCNC: 83 MG/DL (ref 70–110)
HCT VFR BLD AUTO: 36.9 % (ref 37–48.5)
HGB BLD-MCNC: 11.5 G/DL (ref 12–16)
IMM GRANULOCYTES # BLD AUTO: 0.02 K/UL (ref 0–0.04)
IMM GRANULOCYTES NFR BLD AUTO: 0.3 % (ref 0–0.5)
INTERVENTRICULAR SEPTUM: 0.84 CM (ref 0.6–1.1)
LA MAJOR: 3.01 CM
LA MINOR: 3.93 CM
LA WIDTH: 3.15 CM
LEFT ATRIUM SIZE: 2.46 CM
LEFT ATRIUM VOLUME INDEX MOD: 26.7 ML/M2
LEFT ATRIUM VOLUME INDEX: 16.6 ML/M2
LEFT ATRIUM VOLUME MOD: 36 CM3
LEFT ATRIUM VOLUME: 22.45 CM3
LEFT INTERNAL DIMENSION IN SYSTOLE: 1.92 CM (ref 2.1–4)
LEFT VENTRICLE DIASTOLIC VOLUME INDEX: 17.41 ML/M2
LEFT VENTRICLE DIASTOLIC VOLUME: 23.51 ML
LEFT VENTRICLE MASS INDEX: 33 G/M2
LEFT VENTRICLE SYSTOLIC VOLUME INDEX: 8.5 ML/M2
LEFT VENTRICLE SYSTOLIC VOLUME: 11.43 ML
LEFT VENTRICULAR INTERNAL DIMENSION IN DIASTOLE: 2.55 CM (ref 3.5–6)
LEFT VENTRICULAR MASS: 44.15 G
LYMPHOCYTES # BLD AUTO: 1.3 K/UL (ref 1–4.8)
LYMPHOCYTES NFR BLD: 19.5 % (ref 18–48)
MCH RBC QN AUTO: 31.3 PG (ref 27–31)
MCHC RBC AUTO-ENTMCNC: 31.2 G/DL (ref 32–36)
MCV RBC AUTO: 100 FL (ref 82–98)
MONOCYTES # BLD AUTO: 0.5 K/UL (ref 0.3–1)
MONOCYTES NFR BLD: 7.9 % (ref 4–15)
MV PEAK A VEL: 0.81 M/S
MV PEAK E VEL: 1.01 M/S
MV STENOSIS PRESSURE HALF TIME: 62.37 MS
MV VALVE AREA P 1/2 METHOD: 3.53 CM2
NEUTROPHILS # BLD AUTO: 5 K/UL (ref 1.8–7.7)
NEUTROPHILS NFR BLD: 72.2 % (ref 38–73)
NRBC BLD-RTO: 0 /100 WBC
PLATELET # BLD AUTO: 280 K/UL (ref 150–450)
PMV BLD AUTO: 9.7 FL (ref 9.2–12.9)
POCT GLUCOSE: 81 MG/DL (ref 70–110)
POCT GLUCOSE: 82 MG/DL (ref 70–110)
POCT GLUCOSE: 83 MG/DL (ref 70–110)
POTASSIUM SERPL-SCNC: 3.7 MMOL/L (ref 3.5–5.1)
PROT SERPL-MCNC: 6.9 G/DL (ref 6–8.4)
RA MAJOR: 2.89 CM
RA PRESSURE: 3 MMHG
RA WIDTH: 2.63 CM
RBC # BLD AUTO: 3.68 M/UL (ref 4–5.4)
RIGHT VENTRICULAR END-DIASTOLIC DIMENSION: 2.46 CM
SINUS: 2.2 CM
SODIUM SERPL-SCNC: 137 MMOL/L (ref 136–145)
STJ: 2.13 CM
TSH SERPL DL<=0.005 MIU/L-ACNC: 1.02 UIU/ML (ref 0.4–4)
WBC # BLD AUTO: 6.87 K/UL (ref 3.9–12.7)

## 2022-10-04 PROCEDURE — 25000003 PHARM REV CODE 250

## 2022-10-04 PROCEDURE — 80053 COMPREHEN METABOLIC PANEL: CPT

## 2022-10-04 PROCEDURE — 97535 SELF CARE MNGMENT TRAINING: CPT

## 2022-10-04 PROCEDURE — 99220 PR INITIAL OBSERVATION CARE,LEVL III: ICD-10-PCS | Mod: ,,,

## 2022-10-04 PROCEDURE — 97161 PT EVAL LOW COMPLEX 20 MIN: CPT

## 2022-10-04 PROCEDURE — 99214 PR OFFICE/OUTPT VISIT, EST, LEVL IV, 30-39 MIN: ICD-10-PCS | Mod: GC,,, | Performed by: STUDENT IN AN ORGANIZED HEALTH CARE EDUCATION/TRAINING PROGRAM

## 2022-10-04 PROCEDURE — 99214 OFFICE O/P EST MOD 30 MIN: CPT | Mod: GC,,, | Performed by: STUDENT IN AN ORGANIZED HEALTH CARE EDUCATION/TRAINING PROGRAM

## 2022-10-04 PROCEDURE — 86901 BLOOD TYPING SEROLOGIC RH(D): CPT

## 2022-10-04 PROCEDURE — 87593 ORTHOPOXVIRUS AMP PRB EACH: CPT

## 2022-10-04 PROCEDURE — 63600175 PHARM REV CODE 636 W HCPCS

## 2022-10-04 PROCEDURE — 85025 COMPLETE CBC W/AUTO DIFF WBC: CPT

## 2022-10-04 PROCEDURE — 97112 NEUROMUSCULAR REEDUCATION: CPT

## 2022-10-04 PROCEDURE — G0378 HOSPITAL OBSERVATION PER HR: HCPCS

## 2022-10-04 PROCEDURE — 36415 COLL VENOUS BLD VENIPUNCTURE: CPT

## 2022-10-04 PROCEDURE — 97165 OT EVAL LOW COMPLEX 30 MIN: CPT

## 2022-10-04 PROCEDURE — 84443 ASSAY THYROID STIM HORMONE: CPT

## 2022-10-04 PROCEDURE — 99220 PR INITIAL OBSERVATION CARE,LEVL III: CPT | Mod: ,,,

## 2022-10-04 RX ADMIN — CALCITONIN SALMON 1 SPRAY: 200 SPRAY, METERED NASAL at 10:10

## 2022-10-04 RX ADMIN — MELATONIN TAB 3 MG 6 MG: 3 TAB at 11:10

## 2022-10-04 RX ADMIN — ACETAMINOPHEN 1000 MG: 500 TABLET ORAL at 10:10

## 2022-10-04 RX ADMIN — POLYETHYLENE GLYCOL 3350 17 G: 17 POWDER, FOR SOLUTION ORAL at 10:10

## 2022-10-04 NOTE — HPI
Yvette Crews is a 72 y.o. 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 Tamez in 2021, osteoarthritis, irritable bowel syndrome with chronic constipation, hypogammaglobulinemia, anxiety, major depressive disorder, rectal prolapse, GERD, and chronic underweight BMI emergency [facility] after a fall at the skilled nursing facility.  Per verbal report from emergency department physician patient has been having increasing falls.  This fall was not witnessed.  And on examination patient was alone and there was no family at bedside.  She was oriented to self and place, but was not oriented to time.  She stated the date was October 16, 2020.  As such history is limited due to the mental status of the patient.    History of previously being on asa/plavix/xarelto but per chart review these medications have been discontinued.  Patient has been steadily declining and uses a wheelchair for transportation.

## 2022-10-04 NOTE — ASSESSMENT & PLAN NOTE
-BMI 15.16 kg/m²  -Encourage oral intake and oral supplements with meals.  -Will get bedside swallow to ensure safety with swallowing given her current condition

## 2022-10-04 NOTE — PLAN OF CARE
Problem: Adult Inpatient Plan of Care  Goal: Plan of Care Review  10/4/2022 0513 by Lani Velasquez RN  Outcome: Ongoing, Progressing  10/4/2022 0512 by Lani Velasquez RN  Outcome: Ongoing, Progressing  Goal: Patient-Specific Goal (Individualized)  10/4/2022 0513 by Lani Velasquez RN  Outcome: Ongoing, Progressing  10/4/2022 0512 by Lani Velasquez RN  Outcome: Ongoing, Progressing  Goal: Absence of Hospital-Acquired Illness or Injury  10/4/2022 0513 by Lani Velasquez RN  Outcome: Ongoing, Progressing  10/4/2022 0512 by Lani Velasquez RN  Outcome: Ongoing, Progressing  Goal: Optimal Comfort and Wellbeing  Outcome: Ongoing, Progressing

## 2022-10-04 NOTE — ASSESSMENT & PLAN NOTE
Choric and controlled. Patient at cognitive baseline. Continue home Aricept to treat.   Delirium precautions

## 2022-10-04 NOTE — PT/OT/SLP EVAL
"Physical Therapy Evaluation  Co-Evaluation with OT    Patient Name:  Yvette Crews   MRN:  [id number]    Co-treatment performed due to patient's multiple deficits requiring two skilled therapists to appropriately and safely assess patient's strength and endurance while facilitating functional tasks in addition to accommodating for patient's activity tolerance.      Recommendations:     Discharge Recommendations:  nursing facility, skilled   Discharge Equipment Recommendations:  (TBD pending progress)   Barriers to discharge: Increased skilled assistance needed; fall risk     Assessment:     Yvette Crews is a 72 y.o. female admitted with a medical diagnosis of Frequent falls.  She presents with the following impairments/functional limitations:  weakness, impaired endurance, impaired functional mobility, impaired self care skills, gait instability, impaired balance, impaired cognition, decreased coordination, decreased upper extremity function, decreased lower extremity function, decreased safety awareness, impaired fine motor, orthopedic precautions . Patient tolerated session fairly this date. Patient screaming in pain on arrival - reported "It helps her feel better."  Patient will benefit from PT services to address the mentioned deficits in order to promote an improve functional mobility status. Patient is currently functioning below PLOF Patient would require increased assistance should they discharge home . Upon d/c, recommendation SNF  for Yvette Crews in order to progress towards an improved level of functional mobility independence.     Rehab Prognosis:  Fair+ ; patient would benefit from acute skilled PT services to address these deficits and reach maximum level of function.    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    Cataract     Cellulitis of right lower extremity 1/19/2021    Chondrocalcinosis, cause unspecified, involving hand(712.34) 7/12/2011     Dx updated per 2019 IMO Load    Chronic sinusitis 4/6/2017    Colonic constipation 6/5/2013    Concussion 10/27/2016    Frequent falls 3/14/2017    Gait disturbance 3/14/2017    History of cardiac radiofrequency ablation (RFA) 2/3/2018    History of cardiac radiofrequency ablation (RFA) 2/3/2018    History of subarachnoid hemorrhage 10/30/2016    Hypogammaglobulinemia 9/7/2011    Irritable bowel syndrome 12/18/2015    Major depressive disorder, recurrent episode, in full remission 8/19/2010    Onychomycosis     Osteoarthritis     Postoperative hypothyroidism 12/18/2015    Presence of Watchman left atrial appendage closure device 5/14/2022    Rectal prolapse 6/5/2013    Reflux esophagitis 2/7/2010    Status post thyroidectomy 6/1/2017    Strabismus     SVT (supraventricular tachycardia) 11/22/2013    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     Underweight 1/23/2013       Past Surgical History:   Procedure Laterality Date    BREAST CYST ASPIRATION      x2    CATARACT EXTRACTION W/  INTRAOCULAR LENS IMPLANT Left 3/11/2019    Procedure: EXTRACTION, CATARACT, WITH IOL INSERTION;  Surgeon: Vera Sams MD;  Location: [facility] OR;  Service: Ophthalmology;  Laterality: Left;    CATARACT EXTRACTION W/  INTRAOCULAR LENS IMPLANT Right 4/15/2019    Procedure: EXTRACTION, CATARACT, WITH IOL INSERTION LASER;  Surgeon: Vera Sams MD;  Location: [facility] OR;  Service: Ophthalmology;  Laterality:  "Right;    COLON SURGERY      EYE SURGERY      FRACTURE SURGERY      NASAL SEPTUM SURGERY      OCCLUSION OF LEFT ATRIAL APPENDAGE N/A 2/18/2022    Procedure: Left atrial appendage occlusion;  Surgeon: Chase Gill MD;  Location: [facility] EP LAB;  Service: Cardiology;  Laterality: N/A;  afib, watchman, BSCI, osiris, anes, MB/EH, 3prep    OPEN REDUCTION AND INTERNAL FIXATION (ORIF) OF INJURY OF SACROILIAC JOINT Bilateral 1/20/2021    Procedure: ORIF, SACROILIAC JOINT;  Surgeon: Alcides Tamez MD;  [facility];  Service: Orthopedics;  Laterality: Bilateral;    RADIOFREQUENCY ABLATION      RECTAL PROLAPSE REPAIR  june 2013    STRABISMUS SURGERY      TONSILLECTOMY      TRANSESOPHAGEAL ECHOCARDIOGRAPHY N/A 2/18/2022    Procedure: ECHOCARDIOGRAM, TRANSESOPHAGEAL;  Surgeon: Nils Diagnostic Provider;  Location: [facility] EP LAB;  Service: Cardiology;  Laterality: N/A;       Subjective     Chief Complaint: none   Patient/Family Comments/goals: "I scream because it makes me feel better."  Pain/Comfort:  Pain Rating 1:  (did not rate; screaming prior to entry)  Pain Addressed 1: Reposition, Distraction    Patients cultural, spiritual, Confucianist conflicts given the current situation: no    Living Environment:  Patient's living environment is as follows: *Patient resident of [facility]*   Home type apartment    1 or 2 stories  single-story    Number of ANIRUDH/ rails 0 ANIRUDH    AD used?/Owned?  RW, w/c    Bathroom set-up  walk-in shower    Working? No   Driving? No   Individuals living with patient:  Spouse    Hobbies/Roles: None stated    Prior to admission, patients level of function was per chart review - patient uses wheelchair to mobilize, has had steady decline of function needs assist for ADLs. Patient reports getting bed baths. Patient reports 1 fall. Equipment used at home: walker, standard, wheelchair.  DME owned (not currently used): none.  Upon discharge, patient will have assistance from NH staff and " spouse, but plan in chart states patient with previous plan [facility].  Objective:   Communicated with RN prior to session.  Patient found  right sidelying with BLE off of bed   with telemetry, peripheral IV, bed alarm, cervical collar  upon PT entry to room. See below for detailed functional assessment. Patient agreeable to participate in initial evaluation.    General Precautions: Standard, fall   Orthopedic Precautions:spinal precautions   Braces: Cervical collar     Vitals:    10/04/22 0839   BP: (!) 151/68   Pulse: 72   Resp:    Temp: 97.4 °F (36.3 °C)       Exams:  Cognitive Exam:  Patient is oriented to Person, Place, Time  Patient follows % of one -step commands   Fine Motor Coordination:  Test:   Comments    Rapid ankle DF/PF  Intact     Postural Exam:  Patient presented with the following abnormalities:    -       Rounded shoulders  -       Forward head  Sensation:    LEFT LE: Intact light touch to BLEs RIGHT LE: Intact light touch to BLEs     ROM and Strength  RLE - grossly at least 3/5 all major muscle groups; decreased mobility compared to LLE  LLE - grossly at least 3+/5 all major muscle groups     Functional Mobility:  Bed Mobility:    Scooting: total assistance  Supine to Sit: total assistance for LE management and trunk management  Sit to Supine: total assistance for LE management and trunk management    Transfers deferred this date due to level of assist needed EOB and impaired cognition     Balance:    Level of assist Total Time  Comments   Static Sitting  Total - progressed to CGA briefly 10min Right lateral lean, slouched posture, increased cervical flexion with c-collar donned, posterior lean    Dynamic Sitting  TotalA        Therapeutic Activities and Education:  -Patient educated on the role and goal of PT services during acute care LOS. Question and concerns acknowledged and answered as appropriate.   -Will continue to educated as needed.      - Educated on the  importance of OOB mobility within safe range in order to decrease adverse effects of prolonged bedrest.        - Patient encouraged to get OOBTC 3x daily with assistance  -White board updated in patients room to reflect level of assistance needed with nursing.       - Patient is not clear to transfer with RN/PCT for OOB mobility with RN.     AM-PAC 6 CLICK MOBILITY  Total Score:9     Patient left HOB elevated with all lines intact, call button in reach, bed alarm on, and RN notified.  White board updated in patient room to reflect level of function with nursing.     GOALS:   Multidisciplinary Problems       Physical Therapy Goals          Problem: Physical Therapy    Goal Priority Disciplines Outcome Goal Variances Interventions   Physical Therapy Goal     PT, PT/OT Ongoing, Progressing     Description: Goals to be met by: 10/14/22     Patient will increase functional independence with mobility by performin. Supine to sit with Moderate Assistance  2. Sit to supine with Moderate Assistance  3. Sit to stand transfer with Moderate Assistance  4. Gait  x 25 feet with Moderate Assistance using LRAD, if needed.   5. Wheelchair propulsion x75 feet with Minimal Assistance using bilateral upper extremities  6. Sitting at edge of bed x10 minutes with Minimal Assistance  7. Stand for 3 minutes with Moderate Assistance using LRAD, if needed  8. Lower extremity exercise program x10 reps per handout, with independence                         Time Tracking:     PT Received On: 10/04/22  PT Start Time: 0958     PT Stop Time: 1017  PT Total Time (min): 19 min     Billable Minutes: Evaluation 10 and Neuromuscular Re-education 9      Vani [place], PT  10/04/2022

## 2022-10-04 NOTE — ASSESSMENT & PLAN NOTE
Yvette Crews is a 72 y.o. female with a complicated past medical history and pertinent orthopedic past medical history of osteopenia, sacral fracture, distal clavicle fracture, and multiple compression fractures throughout the thoracic and lumbar spine presenting the [facility] after a fall.    - On exam patient has point tenderness at the cervical spine upper thoracic spine, the upper thoracic spine tenderness correlates with newfound T3 compression fracture, no cervical spine tenderness does not have clinical correlation with imaging as CT cervical spine was without any new evidence of compression fracture, will order x-rays of cervical spine to assess for overall alignment and characteristics of cervical vertebrae  - Examination is severely limited due to patient's mental status, no on my exam it appeared that the patient was cooperative and was able to respond to my questions (able to state that sensation to light touch was intact, able to move extremities on command) incomplete in formal neurologic evaluation is difficult to obtain at this time, the patient is able to move extremities on command but formal strength testing was unable to be obtained but it should be noted that patient is overall very weak and cachectic at baseline  - Continue cervical collar until cleared after imaging review with staff  - Intranasal calcitonin ordered for pain control compression fractures  - Weight-bearing as tolerated, range of motion as tolerated for compression fractures of thoracic and lumbar spine  - Will discuss with staff

## 2022-10-04 NOTE — H&P
[facility] - Telemetry Stepdown ([address] Medicine  History & Physical    Patient Name: Yvette Crews  MRN: [id number]  Patient Class: OP- Observation  Admission Date: 10/3/2022  Attending Physician: Liza Zhang MD   Primary Care Provider: Sally Green MD         Patient information was obtained from past medical records and ER records.     Subjective:     Principal Problem:Frequent falls    Chief Complaint:   Chief Complaint   Patient presents with    Fall     From [facility]. Fell out of wheelchair, has laceration to forehead.         HPI: Yvette Crews is a 72 y.o. 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 Per chart review and discussion with ED physician, the patient fell out of her wheelchair today without LOC. The fall was witnessed by staff. She has been having increasing behavioral issues as well as increased falls. She was supposed to go to inpatient [facility] at . History is limited due to dementia. No family at bedside at time of evaluation.    In the ED: AFVSS. No leukocytosis, hemoglobin at baseline. CMP without abnormality. UA noninfectious. Trop negative. Chest XR no acute abnormality. L forearm xray no acute fracture. L wrist xray chondrocalcinosis. Xray R hip without fracture. Xray thoracic spine with Compression deformities of T9-L3 vertebral bodies, which are new and/or progressed compared to MRI thoracic spine 07/07/2022. CT thoracic spine Numerous compression fractures of the thoracic and lumbar spine as above, with new fractures seen at T3 and T9. CT maxillofacial, head, cervical spine with ypodense extra-axial collection overlying  the right cerebral hemisphere, new from 09/19/2022, suspicious for subdural effusion or chronic hematoma. Stable 6 mm anterolisthesis of C2-3. Neurosurgery consulted. Repeat CTH done. Orthopedics consulted. MRI spine ordered.       Past Medical History:   Diagnosis Date    Adult bronchiectasis     Age-related osteoporosis with current pathological fracture with routine healing 8/28/2013    Amblyopia     Anxiety     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    Cataract     Cellulitis of right lower extremity 1/19/2021    Chondrocalcinosis, cause unspecified, involving hand(712.34) 7/12/2011     Dx updated per 2019 IMO Load    Chronic sinusitis 4/6/2017    Colonic constipation 6/5/2013    Concussion 10/27/2016    Frequent falls 3/14/2017    Gait disturbance 3/14/2017    History of cardiac radiofrequency ablation (RFA) 2/3/2018    History of cardiac radiofrequency ablation (RFA) 2/3/2018    History of subarachnoid hemorrhage 10/30/2016    Hypogammaglobulinemia 9/7/2011    Irritable bowel syndrome 12/18/2015    Major depressive disorder, recurrent episode, in full remission 8/19/2010    Onychomycosis     Osteoarthritis     Postoperative hypothyroidism 12/18/2015    Presence of Watchman left atrial appendage closure device 5/14/2022    Rectal prolapse 6/5/2013    Reflux esophagitis 2/7/2010    Status post thyroidectomy 6/1/2017    Strabismus     SVT (supraventricular tachycardia) 11/22/2013    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     Underweight 1/23/2013       Past Surgical History:   Procedure Laterality Date    BREAST CYST ASPIRATION      x2    CATARACT EXTRACTION W/  INTRAOCULAR LENS IMPLANT Left 3/11/2019    Procedure: EXTRACTION, CATARACT, WITH IOL INSERTION;  Surgeon: Vera Sams MD;  Location: [facility];  Service: Ophthalmology;  Laterality: Left;    CATARACT EXTRACTION W/  INTRAOCULAR LENS IMPLANT Right 4/15/2019    Procedure:  EXTRACTION, CATARACT, WITH IOL INSERTION LASER;  Surgeon: Vera Sams MD;  Location: [facility] OR;  Service: Ophthalmology;  Laterality: Right;    COLON SURGERY      EYE SURGERY      FRACTURE SURGERY      NASAL SEPTUM SURGERY      OCCLUSION OF LEFT ATRIAL APPENDAGE N/A 2/18/2022    Procedure: Left atrial appendage occlusion;  Surgeon: Chase Gill MD;  Location: [facility];  Service: Cardiology;  Laterality: N/A;  afib, watchman, BSCI, osiris, anes, MB/EH, 3prep    OPEN REDUCTION AND INTERNAL FIXATION (ORIF) OF INJURY OF SACROILIAC JOINT Bilateral 1/20/2021    Procedure: ORIF, SACROILIAC JOINT;  Surgeon: Alcides Tamez MD;  Location: [address];  Service: Orthopedics;  Laterality: Bilateral;    RADIOFREQUENCY ABLATION      RECTAL PROLAPSE REPAIR  june 2013    STRABISMUS SURGERY      TONSILLECTOMY      TRANSESOPHAGEAL ECHOCARDIOGRAPHY N/A 2/18/2022    Procedure: ECHOCARDIOGRAM, TRANSESOPHAGEAL;  Surgeon: Nils Diagnostic Provider;  Location: [facility];  Service: Cardiology;  Laterality: N/A;       Review of patient's allergies indicates:   Allergen Reactions    Adhesive      Tape tears skin    Buspar [buspirone]      headaches    Escitalopram oxalate Nausea Only     hyponatremia      Rifampin Rash       Current Facility-Administered Medications on File Prior to Encounter   Medication    balanced salt irrigation solution 1 drop    moxifloxacin 0.5 % ophthalmic solution 1 drop    phenylephrine HCL 2.5% ophthalmic solution 1 drop    sodium chloride 0.9% bolus 1,000 mL    sodium chloride 0.9% bolus 1,000 mL    sodium chloride 0.9% bolus 1,000 mL    tropicamide 1% ophthalmic solution 1 drop     Current Outpatient Medications on File Prior to Encounter   Medication Sig    acetaminophen (TYLENOL) 325 MG tablet Take 2 tablets (650 mg total) by mouth every 6 (six) hours as needed for Pain.    albuterol (PROVENTIL/VENTOLIN HFA) 90 mcg/actuation inhaler Inhale 2 puffs into the lungs  every 6 (six) hours as needed for Wheezing. Rescue    calcium-vitamin D3-vitamin K 650 mg-12.5 mcg-40 mcg Chew Chew 1 by mouth twice daily    docusate calcium (SURFAK) 240 mg capsule Take 240 mg by mouth 3 (three) times daily.    donepeziL (ARICEPT) 10 MG tablet Take 1 tablet (10 mg total) by mouth once daily.    famotidine (PEPCID) 40 MG tablet Take 1 tablet (40 mg total) by mouth once daily.    ferrous sulfate 325 (65 FE) MG EC tablet Take 1 tablet (325 mg total) by mouth once daily.    guaiFENesin (MUCINEX) 600 mg 12 hr tablet Take 2 tablets (1,200 mg total) by mouth 2 (two) times daily. (Patient taking differently: Take 1,200 mg by mouth every 12 (twelve) hours.)    Immune Globulin G, IGG,-PRO-IGA 10 % injection, Privigen, (PRIVIGEN) 10 % Soln Infuse 20 g into the vein every 4 weeks.    levothyroxine (SYNTHROID) 88 MCG tablet Take 1 tablet (88 mcg total) by mouth before breakfast.    loratadine (CLARITIN) 10 mg tablet Take 1 tablet (10 mg total) by mouth once daily.    methocarbamoL (ROBAXIN) 500 MG Tab Take 1 tablet (500 mg total) by mouth 3 (three) times daily as needed (Musce spasms). (Patient not taking: Reported on 8/24/2022)    montelukast (SINGULAIR) 10 mg tablet Take 1 tablet (10 mg total) by mouth every evening.    pedi multivit no.7/folic acid (FLINTSTONES MULTI-VIT GUMMIES ORAL) Chew 2 gummies by mouth every day    polyethylene glycol (GLYCOLAX) 17 gram PwPk Take 17 g by mouth once daily.    pumpkin seed extract/soy germ (AZO BLADDER CONTROL ORAL) Take 1 tablet by mouth 2 (two) times a day.    [DISCONTINUED] aspirin (ECOTRIN) 81 MG EC tablet Take 1 tablet (81 mg total) by mouth once daily. (Patient taking differently: Take 81 mg by mouth once daily. Stop after 8/18/22)    [DISCONTINUED] clopidogreL (PLAVIX) 75 mg tablet Take 1 tablet (75 mg total) by mouth once daily.    [DISCONTINUED] XARELTO 20 mg Tab TAKE 1 TAB BY MOUTH EVERY EVENING WITH DINNER (BEGIN AFTER ELIQUIS IS COMPLETED)      Family History       Problem Relation (Age of Onset)    Breast cancer Paternal Grandmother    Heart disease Father, Brother    Stroke Father          Tobacco Use    Smoking status: Never    Smokeless tobacco: Never   Substance and Sexual Activity    Alcohol use: No    Drug use: No    Sexual activity: Not Currently     Review of Systems   Unable to perform ROS: Dementia   Objective:     Vital Signs (Most Recent):  Temp: 98.6 °F (37 °C) (10/03/22 1206)  Pulse: 67 (10/03/22 2201)  Resp: 18 (10/03/22 1901)  BP: 118/60 (10/03/22 2201)  SpO2: 97 % (10/03/22 2201) Vital Signs (24h Range):  Temp:  [98.6 °F (37 °C)] 98.6 °F (37 °C)  Pulse:  [67-87] 67  Resp:  [18-19] 18  SpO2:  [97 %-100 %] 97 %  BP: (118-158)/(60-77) 118/60        There is no height or weight on file to calculate BMI.    Physical Exam  Vitals and nursing note reviewed.   Constitutional:       General: She is not in acute distress.     Appearance: She is cachectic.      Interventions: Cervical collar in place.      Comments: In fetal position, arms tucked by her face and held close to chest   HENT:      Head: Normocephalic. Laceration present.      Nose: Nose normal.      Mouth/Throat:      Mouth: Mucous membranes are dry.      Pharynx: No oropharyngeal exudate.   Eyes:      Extraocular Movements: Extraocular movements intact.      Conjunctiva/sclera: Conjunctivae normal.   Neck:      Comments: Cervical collar in place  Cardiovascular:      Rate and Rhythm: Normal rate and regular rhythm.      Heart sounds: Normal heart sounds.   Pulmonary:      Effort: Pulmonary effort is normal. No respiratory distress.      Breath sounds: Normal breath sounds. No wheezing.   Abdominal:      General: Bowel sounds are normal. There is no distension.      Palpations: Abdomen is soft.      Tenderness: There is no abdominal tenderness.   Musculoskeletal:         General: Swelling (Swelling to right hand), tenderness (generalized, worst Left shoulder/wrist on my exam)  "and signs of injury present.      Right lower leg: No edema.      Left lower leg: No edema.      Comments: ROM limited due to pain with all movements  Rigid upper and lower extremities    Skin:     General: Skin is warm and dry.      Capillary Refill: Capillary refill takes less than 2 seconds.      Findings: Lesion present. No rash.      Comments: Abrasion on all extremities, face, fingers  Discrete erythematous lesion on L suprapubic area with small amount of pus, additional erythematous lesions without pus extend to left hip. See image below     Neurological:      Mental Status: Mental status is at baseline.      Sensory: No sensory deficit.      Comments: Oriented to person and place, not time, situation- states she fell from a horse today  Difficult to assess strength - patient not following verbal directions and states it hurts to move     Psychiatric:         Speech: Speech normal.         Behavior: Behavior is uncooperative and agitated.         Cognition and Memory: Memory is impaired.      Comments: Mood "furious"             Significant Labs: All pertinent labs within the past 24 hours have been reviewed.  CBC:   Recent Labs   Lab 10/03/22  1856   WBC 7.22   HGB 11.5*   HCT 36.7*        CMP:   Recent Labs   Lab 10/03/22  1856      K 3.9      CO2 26   GLU 94   BUN 24*   CREATININE 0.5   CALCIUM 8.3*   PROT 6.8   ALBUMIN 2.9*   BILITOT 0.4   ALKPHOS 109   AST 18   ALT 15   ANIONGAP 9     Coagulation:   Recent Labs   Lab 10/03/22  1856   INR 1.1   APTT 28.8     Troponin:   Recent Labs   Lab 10/03/22  1856   TROPONINI <0.006     TSH:   Recent Labs   Lab 09/19/22  2205   TSH 0.598     Urine Studies:   Recent Labs   Lab 10/03/22  1954   COLORU Yellow   APPEARANCEUA Clear   PHUR 7.0   SPECGRAV >1.030*   PROTEINUA Trace*   GLUCUA Negative   KETONESU Negative   BILIRUBINUA Negative   OCCULTUA Negative   NITRITE Negative   LEUKOCYTESUR Negative       Significant Imaging: I have reviewed all " pertinent imaging results/findings within the past 24 hours.  CT Thoracic Spine Without Contrast  Narrative: EXAMINATION:  CT THORACIC SPINE WITHOUT CONTRAST    CLINICAL HISTORY:  Mid-back pain;Compression fracture, thoracic;    TECHNIQUE:  Axial CT images of the thoracic spine with sagittal and coronal reformats without intravenous contrast.    COMPARISON:  Radiographs from earlier the same date.  MRI thoracic spine from 07/07/2022    FINDINGS:  Examination is significantly limited due to motion artifact, patient positioning, significant kyphotic deformity, noninclusion of the T1 and T2 vertebral bodies, and severe osteopenia.    Alignment: There is severe kyphotic deformity of the thoracic spine.  There is grade 1 anterolisthesis of L1 on L2.    Vertebra: Limited evaluation of the T11 through L1 vertebrae due to motion artifact.  The T1 and T2 vertebra are not included in the field of view.  There are numerous compression fracture of the thoracic and lumbar spine involving T3, T8, T9, T10, and L2.  Questionable compression fracture of the superior endplate of L3 also noted.  In comparison with prior MRI of the thoracic spine from 07/07 22, the T3 and T9 compression fractures are new.  The T8, T10, and L2 compression fractures were seen previously and demonstrate unchanged height loss from prior.  There is a Schmorl's node within the T1 superior endplate.  There is continued retropulsion of the T8 compression fracture with unchanged encroachment on the anterior aspect of the spinal canal, overall measuring approximately 4 mm.  There is also retropulsion of the T9 and T10 compression deformities with approximately 2 mm of encroachment at these levels.    Discs: There is mild multilevel disc height loss.    Degenerative changes: There are multilevel degenerative changes of the thoracic spine, similar to prior.    Miscellaneous: There is a filter in the left atrial appendage.  Partially imaged lungs demonstrate  bandlike atelectasis or scarring.  There are multiple remote bilateral rib fractures.  Impression: 1. Significant exam limitations as detailed above  2. Numerous compression fractures of the thoracic and lumbar spine as above, with new fractures seen at T3 and T9.  Correlate with point tenderness.    Electronically signed by: Lc Caballero  Date:    10/03/2022  Time:    22:18  CT Head Without Contrast  Narrative: EXAMINATION:  CT HEAD WITHOUT CONTRAST    CLINICAL HISTORY:  hygroma vs chronic sdh;    TECHNIQUE:  Low dose axial CT images obtained throughout the head without intravenous contrast. Sagittal and coronal reconstructions were performed.    COMPARISON:  CT head from 10/03/2022.    FINDINGS:  Examination is significantly limited by patient motion artifact and by positioning.    There is brain parenchymal volume loss and prominence of the CSF spaces.  No hydrocephalus.  Again seen is a right cerebral convexity hypodense (CSF density) fluid collection measuring up to 7 mm in maximal thickness, compatible with a subdural hygroma versus chronic subdural hematoma.  There continues to be no evidence of significant midline shift, however there is continued mild mass effect on the right lateral ventricle.  There is hypoattenuation in the supratentorial white matter compatible with chronic microvascular ischemic changes, similar to prior.  No parenchymal mass, hemorrhage, edema or major vascular distribution infarct.    No calvarial fracture.  The scalp is unremarkable.  Bilateral paranasal sinuses and mastoid air cells are clear.  There are prosthetic lenses.  Impression: Stable small right subdural hygroma versus chronic subdural hematoma with minimal mass effect as above and no evidence of significant midline shift.    Electronically signed by: Lc Caballero  Date:    10/03/2022  Time:    21:15  X-Ray Thoracic Spine AP Lateral  Narrative: EXAMINATION:  XR THORACIC SPINE AP LATERAL    CLINICAL  HISTORY:  fx;    TECHNIQUE:  AP and lateral views of the thoracic spine were performed.    COMPARISON:  MRI thoracic spine 07/07/2022, 07/03/2022    FINDINGS:  The superior thoracic spine is obscured by the overlying shadows of the shoulders.    Pronounced kyphosis of the thoracic spine which appears more prominent compared to MRI thoracic spine 07/07/2022.    Known T8 vertebral body compression deformity with progression of height loss compared to MRI thoracic spine 07/07/2022.  Compression deformities of the T9-L3 vertebral bodies are new compared to MRI thoracic spine 07/07/2022 the T10 compression and superior endplate compression L1 and L2 were previously present but may be slightly progressed.    Partially visualized postoperative changes of the pelvis.    No acute abnormality seen in the visualized chest and abdomen.  Impression: 1. Compression deformities of T9-L3 vertebral bodies, which are new and/or progressed compared to MRI thoracic spine 07/07/2022.  2. Known T8 vertebral body compression deformity with progressive height loss.  This report was flagged in Epic as abnormal.    Electronically signed by resident: Natalie Michele  Date:    10/03/2022  Time:    20:33    Electronically signed by: Cholo Cadet  Date:    10/03/2022  Time:    21:08  X-Ray Wrist Complete Left  Narrative: EXAMINATION:  XR WRIST COMPLETE 3 VIEWS LEFT    CLINICAL HISTORY:  Unspecified fall, initial encounter    TECHNIQUE:  PA, lateral, and oblique views of the left wrist were performed.    COMPARISON:  Correlation made to contralateral wrist radiographs from 10/28/2016.    FINDINGS:  There is diffuse osteopenia.  No acute fracture or dislocation.  There is extensive chondrocalcinosis in the TFCC and the radiocarpal articulations which can be seen with CPPD.  There is mild negative ulnar variance.  Alignment is otherwise normal.  There is soft tissue edema.  Impression: No acute osseous abnormality of the left  wrist.    Chondrocalcinosis which can be seen with CPPD.    Electronically signed by: Lc Caballero  Date:    10/03/2022  Time:    18:52  X-Ray Hip 2 or 3 views Right (with Pelvis when performed)  Narrative: EXAMINATION:  XR HIP WITH PELVIS WHEN PERFORMED, 2 OR 3  VIEWS RIGHT    CLINICAL HISTORY:  Unspecified fall, initial encounter    TECHNIQUE:  AP view of the pelvis and frog leg lateral view of the right hip were performed.    COMPARISON:  07/07/2022, 10/03/2022    FINDINGS:  Two views right hip.    No acute displaced fracture or dislocation of the right hip.  The right femoroacetabular joint is intact noting degenerative changes.  There is osteopenia.  Sacral transfixation screws are stable.  Impression: 1. No acute displaced fracture or dislocation of the right hip.    Electronically signed by: Marc Amor MD  Date:    10/03/2022  Time:    18:47  X-Ray Chest 1 View  Narrative: EXAMINATION:  XR CHEST 1 VIEW    CLINICAL HISTORY:  Unspecified fall, initial encounter    TECHNIQUE:  Single frontal view of the chest was performed.    COMPARISON:  Chest radiograph 09/19/2022    FINDINGS:  Examination severely limited secondary to patient's head tilted inferiorly obscuring portions of the field of view.    Trachea is not well visualized.  Visualized cardiomediastinal silhouette appears within normal limits.  Visualized lungs appear clear.  No sizable pleural effusion.  No pneumothorax.  No confluent consolidation.  Healing rib fractures.  No free air below the diaphragm.  Impression: Allowing for significant limitations, no acute abnormality.  Consider follow-up when/if clinically appropriate.    Electronically signed by resident: Chase Christensen  Date:    10/03/2022  Time:    17:31    Electronically signed by: Krishna Torres MD  Date:    10/03/2022  Time:    17:51  X-Ray Pelvis Routine AP  Narrative: EXAMINATION:  XR PELVIS ROUTINE AP    CLINICAL HISTORY:  fall;    TECHNIQUE:  AP view of the pelvis was  performed.    COMPARISON:  10/14/2021    FINDINGS:  Single-view pelvis.    Sacral transfixation screws are stable.  There is osteopenia.  There are degenerative changes of the bilateral femoroacetabular joints without dislocation.  The pubic symphysis is intact.  The orientation of the proximal right femur 1st from previous exam, this may be on the basis of positioning however correlation with any focal tenderness recommended and dedicated right hip radiography as warranted.  There appears to be remote fracture involving the inferior pubic ramus on the right.  Impression: 1. Somewhat differing configuration of the proximal right femur as compared to the prior exam.  This may be on the basis of positional change however correlation with any focal tenderness recommended.  Dedicated right hip radiography as warranted.    Electronically signed by: Marc Amor MD  Date:    10/03/2022  Time:    17:35  X-Ray Forearm Left  Narrative: EXAMINATION:  XR FOREARM LEFT    CLINICAL HISTORY:  Other injury of unspecified body region, initial encounter    TECHNIQUE:  AP and lateral views of the left forearm were performed.    COMPARISON:  None    FINDINGS:  Two views left forearm.    There is osteopenia.  No acute displaced fracture or dislocation of the forearm.  The elbow appears intact.  There are degenerative changes of the wrist noting calcification in the region of the TFCC.  There is edema overlying the dorsal aspect of the wrist.  Dedicated wrist radiography is recommended if there is discrete wrist pain for further evaluation.  Impression: 1. No convincing acute displaced fracture or dislocation of the forearm noting degenerative changes of the wrist.  If there is discrete pain in the wrist, dedicated wrist radiography is advised.  There is edema along the dorsal aspect of the wrist.    Electronically signed by: Marc Amor MD  Date:    10/03/2022  Time:    17:32  CT Maxillofacial Without Contrast  Narrative:  EXAMINATION:  CT HEAD WITHOUT CONTRAST; CT CERVICAL SPINE WITHOUT CONTRAST; CT MAXILLOFACIAL WITHOUT CONTRAST    CLINICAL HISTORY:  Facial trauma, blunt;; Ataxia, cervical trauma;    TECHNIQUE:  Low dose CT images obtained throughout the head and cervical spine without the use of intravenous contrast.  Axial, sagittal and coronal reconstructions were performed.    Additionally, thin images through the face were obtained as a separate acquisition with multiplanar reformats.    Examination is significantly degraded by suboptimal patient positioning, motion, and artifact from and along the side of the head.    COMPARISON:  CT head 09/19/2022; MRI brain 07/26/2021; MRI cervical spine 07/07/2021; CT cervical spine 07/07/2021    FINDINGS:  CT head/maxillofacial:    Ventricles are stable in size for age without evidence of hydrocephalus.    Mild generalized cerebral volume loss.  Patchy regions of hypoattenuation in the supratentorial white matter, nonspecific but likely reflecting chronic microvascular ischemic changes.  No parenchymal mass, hemorrhage, edema or major vascular distribution infarct.    Hypoattenuating extra-axial fluid collection overlying the right cerebral hemisphere measuring up to 0.9 cm in thickness.  Associated regional sulcal effacement without evidence for subfalcine herniation or significant midline shift.    Bony calvarium is intact without evidence of displaced fracture.  Mastoid air cells and paranasal sinuses are essentially clear.  Nasal septal deviation to the right with a septal spur.  Sofya bullosa involving the left middle turbinate.    Small region of focal swelling within the scalp overlying the frontal bone to the left of midline.  Additional areas of mild soft tissue swelling over the bilateral cheeks.  Questionable deformity of the right zygomatic arch, appears remote without discrete fracture line.  Temporomandibular joints are normally aligned.  No mandibular fracture.  The  orbital walls appear intact.    Postsurgical changes in the bilateral globes.    CT cervical spine:    Stable alignment with 6 mm anterolisthesis of C    Generalized osteopenia.  Stable remote mild compression deformities of the superior endplates of the T2 and T3 vertebral bodies.  No acute fracture.    Advanced multilevel degenerative disc height loss throughout the cervical spine with intradiscal calcification and vacuum disc phenomenon.  Disc height loss worst at C6-C7.    Stable multilevel facet and uncovertebral joint degenerative changes, not well evaluated given limitations by positioning.    Right apical fibronodular scarring and atelectasis.    No acute findings within the visualized soft tissues.  Impression: 1. Examination is significantly degraded by suboptimal patient positioning and motion artifact.  2. Hypodense extra-axial collection overlying the right cerebral hemisphere, new from 09/19/2022, suspicious for subdural effusion or chronic hematoma.  Mild regional sulcal effacement without significant midline shift.  3. Small area of soft tissue swelling over the forehead.  4. Stable 6 mm anterolisthesis of C2-3.  Stable T2 and T3 mild compression fractures.  No evidence of traumatic malalignment or fracture.  5. Additional findings as above.  Report was flagged in Epic as abnormal.    The critical finding above was discovered/received at 15:10.  The critical information above was relayed directly by Dr. Raheem Donahue by telephone to Dr. Oleg Ferguson on 10/3/2022 at 15:15.    Electronically signed by resident: Raheem Donahue  Date:    10/03/2022  Time:    14:45    Electronically signed by: Mario Alberto Knight MD  Date:    10/03/2022  Time:    17:08  CT Cervical Spine Without Contrast  Narrative: EXAMINATION:  CT HEAD WITHOUT CONTRAST; CT CERVICAL SPINE WITHOUT CONTRAST; CT MAXILLOFACIAL WITHOUT CONTRAST    CLINICAL HISTORY:  Facial trauma, blunt;; Ataxia, cervical trauma;    TECHNIQUE:  Low dose CT  images obtained throughout the head and cervical spine without the use of intravenous contrast.  Axial, sagittal and coronal reconstructions were performed.    Additionally, thin images through the face were obtained as a separate acquisition with multiplanar reformats.    Examination is significantly degraded by suboptimal patient positioning, motion, and artifact from and along the side of the head.    COMPARISON:  CT head 09/19/2022; MRI brain 07/26/2021; MRI cervical spine 07/07/2021; CT cervical spine 07/07/2021    FINDINGS:  CT head/maxillofacial:    Ventricles are stable in size for age without evidence of hydrocephalus.    Mild generalized cerebral volume loss.  Patchy regions of hypoattenuation in the supratentorial white matter, nonspecific but likely reflecting chronic microvascular ischemic changes.  No parenchymal mass, hemorrhage, edema or major vascular distribution infarct.    Hypoattenuating extra-axial fluid collection overlying the right cerebral hemisphere measuring up to 0.9 cm in thickness.  Associated regional sulcal effacement without evidence for subfalcine herniation or significant midline shift.    Bony calvarium is intact without evidence of displaced fracture.  Mastoid air cells and paranasal sinuses are essentially clear.  Nasal septal deviation to the right with a septal spur.  Sofya bullosa involving the left middle turbinate.    Small region of focal swelling within the scalp overlying the frontal bone to the left of midline.  Additional areas of mild soft tissue swelling over the bilateral cheeks.  Questionable deformity of the right zygomatic arch, appears remote without discrete fracture line.  Temporomandibular joints are normally aligned.  No mandibular fracture.  The orbital walls appear intact.    Postsurgical changes in the bilateral globes.    CT cervical spine:    Stable alignment with 6 mm anterolisthesis of C    Generalized osteopenia.  Stable remote mild compression  deformities of the superior endplates of the T2 and T3 vertebral bodies.  No acute fracture.    Advanced multilevel degenerative disc height loss throughout the cervical spine with intradiscal calcification and vacuum disc phenomenon.  Disc height loss worst at C6-C7.    Stable multilevel facet and uncovertebral joint degenerative changes, not well evaluated given limitations by positioning.    Right apical fibronodular scarring and atelectasis.    No acute findings within the visualized soft tissues.  Impression: 1. Examination is significantly degraded by suboptimal patient positioning and motion artifact.  2. Hypodense extra-axial collection overlying the right cerebral hemisphere, new from 09/19/2022, suspicious for subdural effusion or chronic hematoma.  Mild regional sulcal effacement without significant midline shift.  3. Small area of soft tissue swelling over the forehead.  4. Stable 6 mm anterolisthesis of C2-3.  Stable T2 and T3 mild compression fractures.  No evidence of traumatic malalignment or fracture.  5. Additional findings as above.  Report was flagged in Epic as abnormal.    The critical finding above was discovered/received at 15:10.  The critical information above was relayed directly by Dr. Raheem Donahue by telephone to Dr. Oleg Ferguson on 10/3/2022 at 15:15.    Electronically signed by resident: Raheem Donahue  Date:    10/03/2022  Time:    14:45    Electronically signed by: Mario Alberto Knight MD  Date:    10/03/2022  Time:    17:08  CT Head Without Contrast  Narrative: EXAMINATION:  CT HEAD WITHOUT CONTRAST; CT CERVICAL SPINE WITHOUT CONTRAST; CT MAXILLOFACIAL WITHOUT CONTRAST    CLINICAL HISTORY:  Facial trauma, blunt;; Ataxia, cervical trauma;    TECHNIQUE:  Low dose CT images obtained throughout the head and cervical spine without the use of intravenous contrast.  Axial, sagittal and coronal reconstructions were performed.    Additionally, thin images through the face were obtained as a  separate acquisition with multiplanar reformats.    Examination is significantly degraded by suboptimal patient positioning, motion, and artifact from and along the side of the head.    COMPARISON:  CT head 09/19/2022; MRI brain 07/26/2021; MRI cervical spine 07/07/2021; CT cervical spine 07/07/2021    FINDINGS:  CT head/maxillofacial:    Ventricles are stable in size for age without evidence of hydrocephalus.    Mild generalized cerebral volume loss.  Patchy regions of hypoattenuation in the supratentorial white matter, nonspecific but likely reflecting chronic microvascular ischemic changes.  No parenchymal mass, hemorrhage, edema or major vascular distribution infarct.    Hypoattenuating extra-axial fluid collection overlying the right cerebral hemisphere measuring up to 0.9 cm in thickness.  Associated regional sulcal effacement without evidence for subfalcine herniation or significant midline shift.    Bony calvarium is intact without evidence of displaced fracture.  Mastoid air cells and paranasal sinuses are essentially clear.  Nasal septal deviation to the right with a septal spur.  Sofya bullosa involving the left middle turbinate.    Small region of focal swelling within the scalp overlying the frontal bone to the left of midline.  Additional areas of mild soft tissue swelling over the bilateral cheeks.  Questionable deformity of the right zygomatic arch, appears remote without discrete fracture line.  Temporomandibular joints are normally aligned.  No mandibular fracture.  The orbital walls appear intact.    Postsurgical changes in the bilateral globes.    CT cervical spine:    Stable alignment with 6 mm anterolisthesis of C    Generalized osteopenia.  Stable remote mild compression deformities of the superior endplates of the T2 and T3 vertebral bodies.  No acute fracture.    Advanced multilevel degenerative disc height loss throughout the cervical spine with intradiscal calcification and vacuum disc  phenomenon.  Disc height loss worst at C6-C7.    Stable multilevel facet and uncovertebral joint degenerative changes, not well evaluated given limitations by positioning.    Right apical fibronodular scarring and atelectasis.    No acute findings within the visualized soft tissues.  Impression: 1. Examination is significantly degraded by suboptimal patient positioning and motion artifact.  2. Hypodense extra-axial collection overlying the right cerebral hemisphere, new from 09/19/2022, suspicious for subdural effusion or chronic hematoma.  Mild regional sulcal effacement without significant midline shift.  3. Small area of soft tissue swelling over the forehead.  4. Stable 6 mm anterolisthesis of C2-3.  Stable T2 and T3 mild compression fractures.  No evidence of traumatic malalignment or fracture.  5. Additional findings as above.  Report was flagged in Epic as abnormal.    The critical finding above was discovered/received at 15:10.  The critical information above was relayed directly by Dr. Raheem Donahue by telephone to Dr. Oleg Ferguson on 10/3/2022 at 15:15.    Electronically signed by resident: Raheem Donahue  Date:    10/03/2022  Time:    14:45    Electronically signed by: Mario Alberto Knight MD  Date:    10/03/2022  Time:    17:08     Assessment/Plan:     * Frequent falls  Gait disturbance  Cervical stenosis of spinal canal  Pathological fracture due to osteoporosis  Closed wedge compression fracture of T3 vertebra    Patient presents to [facility] after a witnessed fall from wheelchair at [facility] earlier today. From chart review, she was ambulating with a walker a few months ago, required wheelchair after fall resulting in left clavicle fracture 7/3/22. Continues to have frequent falls and be NWB in wheelchair.    CTH with concern for chronic subdural hematoma vs hygroma, not seen on recent CT 9/19/22  NSGY consulted in ED, appreicate recommendations:  --no acute NSGY intervention, recommend syncopal work up given  extensive cardiac history  --continue to hold ASA/Xarelto/Plavix\  --SBP <160  --Na >135  --No evidence of seizure activity, AED not indicated at this time  --HOB >30  --Follow-up full labs (CBC/CMP/PT-INR/PTT/T&S/UA)  --Continue to monitor clinically, notify NSGY immediately with any changes in neuro status  --Neck pain/additional work up per ED/Ortho; consider C collar until C spine can be cleared given neck pain on exam.    Spine imagining with numerous compression fractures of the thoracic and lumbar spine with new fractures seen at T3 and T9. Stable 6 mm anterolisthesis of C2-3   Ortho consulted in ED, appreciate recommendations:  --Continue cervical collar until cleared after imaging review with staff  --Intranasal calcitonin ordered for pain control compression fractures  --Weight-bearing as tolerated, range of motion as tolerated for compression fractures of thoracic and lumbar spine  --Will discuss with staff    -Pain control prn  -PT/OT consulted, however ability to participate is questionable   -orthostatic vitals, echo ordered for syncope work up, though from the history this was a witnessed mechanical fall with no LOC    SAH (subarachnoid hemorrhage)  History noted  -asa/plavix/xarelto were discontinued previously, will continue to hold in setting of falls      Iron deficiency anemia  Iron deficient anemia  Monitor CBC  Continue home iron supplement    Primary hypertension  Chronic, stable  -Not on any medications per chart review  -prn medications for systolic>160 (NSGY recs)    Paroxysmal atrial fibrillation  Patient with Paroxysmal (<7 days) atrial fibrillation which is controlled currently with without medications. Patient is currently in sinus rhythm.[id number] Score: 2. HASBLED Score: Unable to calculate. Anticoagulation not indicated due to watchman device. Previously on Xarelto - discontinued months ago, continue to hold per neurosurgery   EKG 10/3/22 NSR  JAMEL 4/4/22 EF 60%    Late onset  Alzheimer's dementia without behavioral disturbance  Choric and controlled. Patient at cognitive baseline. Continue home Aricept to treat.   Delirium precautions    CVID (common variable immunodeficiency)  Receives IVIG infusions as outpatient      Postoperative hypothyroidism  Acquired hypothyroidism with hx of MNG s/p total thyroidectomy 1/25/17  -TSH ordered  -Continue synthroid    Adult bronchiectasis  Chronic, stable  -On singulair and mucinex  -Patient saturating well on room air    Underweight  -BMI 15.16 kg/m²  -Encourage oral intake and oral supplements with meals.  -Will get bedside swallow to ensure safety with swallowing given her current condition      VTE Risk Mitigation (From admission, onward)         Ordered     Place sequential compression device  Until discontinued         10/03/22 2251     Reason for No Pharmacological VTE Prophylaxis  Once        Question:  Reasons:  Answer:  Risk of Bleeding  Comment:  CHRISTIAN rec holding ASA/xarelto/plavix    10/03/22 2251                   Abi Mills PA-C  Department of Hospital Medicine   [facility] - Telemetry Stepdown ([place])

## 2022-10-04 NOTE — PLAN OF CARE
met with patient at bedside, unable to assess due to dementia.  contacted patients spouse Pavel Crews for collateral.  Spouse states patient  previously resided at [facility] and was transitioned to [facility] recently. Spouse states they begin to see a steady decline in the patient as she previously used a walker but had to progress to a wheelchair due to not being able to stand or walk well and frequent falls. Patients spouse states patient is unable to care for herself like bathing and dressing and has started yelling and screaming for no reason.  Patient was scheduled to be transferred to [facility] psychiatry [facility] but fell from her wheelchair prior to transfer and needed to seek treatment in the ED. Patients reason for admission is fall from wheelchair, hitting her head, and neck pain. Patient takes her medications as prescribed given to her by the facility. Patient had an inpatient hospitalization in the last 30 days.  Patient's spouse states the patient has an established bed at [facility] and patient will go there upon discharge.        [facility] - Telemetry Stepdown ([place]  Initial Discharge Assessment       Primary Care Provider: Sally Green MD    Admission Diagnosis: Neck pain [M54.2]  Bruise [T14.8XXA]  Fall [W19.XXXA]  Abnormal head CT [R93.0]  Chest pain [R07.9]  Compression fracture of thoracic vertebra, unspecified thoracic vertebral level, initial encounter [S22.000A]    Admission Date: 10/3/2022  Expected Discharge Date:     Discharge Barriers Identified: Mental illness, Other (see comments) (Dementia discharged to [facility])    Payor: MEDICARE / Plan: MEDICARE PART A & B / Product Type: Government /     Extended Emergency Contact Information  Primary Emergency Contact: Pavel Crews  Address: [address] [place] [address] [id number] [place] [address] of [place]  Mobile Phone:  [phone]  Relation: Spouse    Discharge Plan A: Psychiatric hospital, Skilled Nursing Facility  Discharge Plan B: Psychiatric hospital, Skilled Nursing Facility      [facility] - [address]  [address]  [address]  [place] [address]  Phone: [phone] Fax: [phone]      Initial Assessment (most recent)       Adult Discharge Assessment - 10/04/22 0020          Discharge Assessment    Assessment Type Discharge Planning Assessment     Confirmed/corrected address, phone number and insurance Yes     Confirmed Demographics Correct on Facesheet     Source of Information family     If unable to respond/provide information was family/caregiver contacted? Yes     Contact Name/Number Pavel Crews (Spouse)     Does patient/caregiver understand observation status Yes     Communicated SANTIAGO with patient/caregiver Yes     Reason For Admission Fell from wheelchair, hit head     Lives With facility resident     Facility Arrived From: [facility]     Do you expect to return to your current living situation? Yes     Do you have help at home or someone to help you manage your care at home? No     Prior to hospitilization cognitive status: Unable to Assess     Current cognitive status: Unable to Assess     Walking or Climbing Stairs Difficulty ambulation difficulty, dependent;stair climbing difficulty, dependent;transferring difficulty, dependent     Dressing/Bathing Difficulty dressing difficulty, dependent;bathing difficulty, dependent     Do you have any problems with: Needs other help     Specify other help Cannot do anything for herself anymore according to spouse Pavel     Home Accessibility other (see comments)   Will return to facility    Home Layout Other (see comments)   Return to facility    Equipment Currently Used at Home walker, standard;wheelchair     Readmission within 30 days? Yes     Patient currently being followed by outpatient case management? No      Do you currently have service(s) that help you manage your care at home? No     Do you take prescription medications? Yes     Do you have prescription coverage? Yes     Coverage Medicare A & B and Blue Cross Blue Shield Medicare     Do you have any problems affording any of your prescribed medications? No     Is the patient taking medications as prescribed? yes     Who is going to help you get home at discharge? Return to facility     How do you get to doctors appointments? other (see comments)   Facility    Are you on dialysis? No     Do you take coumadin? No     Discharge Plan A Skilled Nursing Facility;Psychiatric hospital     Discharge Plan B Psychiatric hospital;Skilled Nursing Facility     DME Needed Upon Discharge  none     Discharge Plan discussed with: Spouse/sig other     Name(s) and Number(s) Pavel Eleonora [phone]     Discharge Barriers Identified Mental illness;Other (see comments)   Dementia discharged to [facility]       Relationship/Environment    Name(s) of Who Lives With Patient Patient resides with spouse Pavel at [facility] upon discharge                       SANDI Salomon, MSW-LMSW  Medical Social Worker/  ER Department

## 2022-10-04 NOTE — HOSPITAL COURSE
10/4: NAEON. AFVSS. Exam stable.   10/5: No acute events overnight. AFVSS. Neuro stable. F/u CT for stability today.

## 2022-10-04 NOTE — SUBJECTIVE & OBJECTIVE
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    Cataract     Cellulitis of right lower extremity 1/19/2021    Chondrocalcinosis, cause unspecified, involving hand(712.34) 7/12/2011     Dx updated per 2019 IMO Load    Chronic sinusitis 4/6/2017    Colonic constipation 6/5/2013    Concussion 10/27/2016    Frequent falls 3/14/2017    Gait disturbance 3/14/2017    History of cardiac radiofrequency ablation (RFA) 2/3/2018    History of cardiac radiofrequency ablation (RFA) 2/3/2018    History of subarachnoid hemorrhage 10/30/2016    Hypogammaglobulinemia 9/7/2011    Irritable bowel syndrome 12/18/2015    Major depressive disorder, recurrent episode, in full remission 8/19/2010    Onychomycosis     Osteoarthritis     Postoperative hypothyroidism 12/18/2015    Presence of Watchman left atrial appendage closure device 5/14/2022    Rectal prolapse 6/5/2013    Reflux esophagitis 2/7/2010    Status post thyroidectomy 6/1/2017    Strabismus     SVT (supraventricular tachycardia) 11/22/2013    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     Underweight 1/23/2013       Past Surgical History:   Procedure Laterality Date    BREAST CYST ASPIRATION      x2    CATARACT EXTRACTION W/  INTRAOCULAR LENS IMPLANT Left 3/11/2019    Procedure: EXTRACTION, CATARACT, WITH IOL INSERTION;  Surgeon: Vera Sams MD;  Location: [facility] OR;  Service: Ophthalmology;  Laterality: Left;    CATARACT EXTRACTION W/  INTRAOCULAR LENS IMPLANT Right 4/15/2019    Procedure: EXTRACTION, CATARACT, WITH IOL INSERTION LASER;  Surgeon: Vera Sams MD;  Location: [facility] OR;  Service: Ophthalmology;  Laterality: Right;    COLON SURGERY      EYE SURGERY      FRACTURE SURGERY      NASAL SEPTUM SURGERY      OCCLUSION OF LEFT ATRIAL APPENDAGE  N/A 2/18/2022    Procedure: Left atrial appendage occlusion;  Surgeon: Chase Gill MD;  Location: [facility] EP LAB;  Service: Cardiology;  Laterality: N/A;  afib, watchman, BSCI, osiris, anes, MB/EH, 3prep    OPEN REDUCTION AND INTERNAL FIXATION (ORIF) OF INJURY OF SACROILIAC JOINT Bilateral 1/20/2021    Procedure: ORIF, SACROILIAC JOINT;  Surgeon: Alcides Tamez MD;  Location: [facility] OR [address] [place] [address];  Service: Orthopedics;  Laterality: Bilateral;    RADIOFREQUENCY ABLATION      RECTAL PROLAPSE REPAIR  june 2013    STRABISMUS SURGERY      TONSILLECTOMY      TRANSESOPHAGEAL ECHOCARDIOGRAPHY N/A 2/18/2022    Procedure: ECHOCARDIOGRAM, TRANSESOPHAGEAL;  Surgeon: Nils Diagnostic Provider;  Location: [facility] EP LAB;  Service: Cardiology;  Laterality: N/A;       Review of patient's allergies indicates:   Allergen Reactions    Adhesive      Tape tears skin    Buspar [buspirone]      headaches    Escitalopram oxalate Nausea Only     hyponatremia      Rifampin Rash       Current Facility-Administered Medications on File Prior to Encounter   Medication    balanced salt irrigation solution 1 drop    moxifloxacin 0.5 % ophthalmic solution 1 drop    phenylephrine HCL 2.5% ophthalmic solution 1 drop    sodium chloride 0.9% bolus 1,000 mL    sodium chloride 0.9% bolus 1,000 mL    sodium chloride 0.9% bolus 1,000 mL    tropicamide 1% ophthalmic solution 1 drop     Current Outpatient Medications on File Prior to Encounter   Medication Sig    acetaminophen (TYLENOL) 325 MG tablet Take 2 tablets (650 mg total) by mouth every 6 (six) hours as needed for Pain.    albuterol (PROVENTIL/VENTOLIN HFA) 90 mcg/actuation inhaler Inhale 2 puffs into the lungs every 6 (six) hours as needed for Wheezing. Rescue    calcium-vitamin D3-vitamin K 650 mg-12.5 mcg-40 mcg Chew Chew 1 by mouth twice daily    docusate calcium (SURFAK) 240 mg capsule Take 240 mg by mouth 3 (three) times daily.    donepeziL (ARICEPT) 10 MG tablet Take 1 tablet (10 mg total)  by mouth once daily.    famotidine (PEPCID) 40 MG tablet Take 1 tablet (40 mg total) by mouth once daily.    ferrous sulfate 325 (65 FE) MG EC tablet Take 1 tablet (325 mg total) by mouth once daily.    guaiFENesin (MUCINEX) 600 mg 12 hr tablet Take 2 tablets (1,200 mg total) by mouth 2 (two) times daily. (Patient taking differently: Take 1,200 mg by mouth every 12 (twelve) hours.)    Immune Globulin G, IGG,-PRO-IGA 10 % injection, Privigen, (PRIVIGEN) 10 % Soln Infuse 20 g into the vein every 4 weeks.    levothyroxine (SYNTHROID) 88 MCG tablet Take 1 tablet (88 mcg total) by mouth before breakfast.    loratadine (CLARITIN) 10 mg tablet Take 1 tablet (10 mg total) by mouth once daily.    methocarbamoL (ROBAXIN) 500 MG Tab Take 1 tablet (500 mg total) by mouth 3 (three) times daily as needed (Musce spasms). (Patient not taking: Reported on 8/24/2022)    montelukast (SINGULAIR) 10 mg tablet Take 1 tablet (10 mg total) by mouth every evening.    pedi multivit no.7/folic acid (FLINTSTONES MULTI-VIT GUMMIES ORAL) Chew 2 gummies by mouth every day    polyethylene glycol (GLYCOLAX) 17 gram PwPk Take 17 g by mouth once daily.    pumpkin seed extract/soy germ (AZO BLADDER CONTROL ORAL) Take 1 tablet by mouth 2 (two) times a day.    [DISCONTINUED] aspirin (ECOTRIN) 81 MG EC tablet Take 1 tablet (81 mg total) by mouth once daily. (Patient taking differently: Take 81 mg by mouth once daily. Stop after 8/18/22)    [DISCONTINUED] clopidogreL (PLAVIX) 75 mg tablet Take 1 tablet (75 mg total) by mouth once daily.    [DISCONTINUED] XARELTO 20 mg Tab TAKE 1 TAB BY MOUTH EVERY EVENING WITH DINNER (BEGIN AFTER ELIQUIS IS COMPLETED)     Family History       Problem Relation (Age of Onset)    Breast cancer Paternal Grandmother    Heart disease Father, Brother    Stroke Father          Tobacco Use    Smoking status: Never    Smokeless tobacco: Never   Substance and Sexual Activity    Alcohol use: No    Drug use: No    Sexual activity: Not  Currently     Review of Systems   Unable to perform ROS: Dementia   Objective:     Vital Signs (Most Recent):  Temp: 98.6 °F (37 °C) (10/03/22 1206)  Pulse: 67 (10/03/22 2201)  Resp: 18 (10/03/22 1901)  BP: 118/60 (10/03/22 2201)  SpO2: 97 % (10/03/22 2201) Vital Signs (24h Range):  Temp:  [98.6 °F (37 °C)] 98.6 °F (37 °C)  Pulse:  [67-87] 67  Resp:  [18-19] 18  SpO2:  [97 %-100 %] 97 %  BP: (118-158)/(60-77) 118/60        There is no height or weight on file to calculate BMI.    Physical Exam  Vitals and nursing note reviewed.   Constitutional:       General: She is not in acute distress.     Appearance: She is cachectic.      Interventions: Cervical collar in place.      Comments: In fetal position, arms tucked by her face and held close to chest   HENT:      Head: Normocephalic. Laceration present.      Nose: Nose normal.      Mouth/Throat:      Mouth: Mucous membranes are dry.      Pharynx: No oropharyngeal exudate.   Eyes:      Extraocular Movements: Extraocular movements intact.      Conjunctiva/sclera: Conjunctivae normal.   Neck:      Comments: Cervical collar in place  Cardiovascular:      Rate and Rhythm: Normal rate and regular rhythm.      Heart sounds: Normal heart sounds.   Pulmonary:      Effort: Pulmonary effort is normal. No respiratory distress.      Breath sounds: Normal breath sounds. No wheezing.   Abdominal:      General: Bowel sounds are normal. There is no distension.      Palpations: Abdomen is soft.      Tenderness: There is no abdominal tenderness.   Musculoskeletal:         General: Swelling (Swelling to right hand), tenderness (generalized, worst Left shoulder/wrist on my exam) and signs of injury present.      Right lower leg: No edema.      Left lower leg: No edema.      Comments: ROM limited due to pain with all movements  Rigid upper and lower extremities    Skin:     General: Skin is warm and dry.      Capillary Refill: Capillary refill takes less than 2 seconds.      Findings:  "Lesion present. No rash.      Comments: Abrasion on all extremities, face, fingers  Discrete erythematous lesion on L suprapubic area with small amount of pus, additional erythematous lesions without pus extend to left hip. See image below     Neurological:      Mental Status: Mental status is at baseline.      Sensory: No sensory deficit.      Comments: Oriented to person and place, not time, situation- states she fell from a horse today  Difficult to assess strength - patient not following verbal directions and states it hurts to move     Psychiatric:         Speech: Speech normal.         Behavior: Behavior is uncooperative and agitated.         Cognition and Memory: Memory is impaired.      Comments: Mood "furious"             Significant Labs: All pertinent labs within the past 24 hours have been reviewed.  CBC:   Recent Labs   Lab 10/03/22  1856   WBC 7.22   HGB 11.5*   HCT 36.7*        CMP:   Recent Labs   Lab 10/03/22  1856      K 3.9      CO2 26   GLU 94   BUN 24*   CREATININE 0.5   CALCIUM 8.3*   PROT 6.8   ALBUMIN 2.9*   BILITOT 0.4   ALKPHOS 109   AST 18   ALT 15   ANIONGAP 9     Coagulation:   Recent Labs   Lab 10/03/22  1856   INR 1.1   APTT 28.8     Troponin:   Recent Labs   Lab 10/03/22  1856   TROPONINI <0.006     TSH:   Recent Labs   Lab 09/19/22  2205   TSH 0.598     Urine Studies:   Recent Labs   Lab 10/03/22  1954   COLORU Yellow   APPEARANCEUA Clear   PHUR 7.0   SPECGRAV >1.030*   PROTEINUA Trace*   GLUCUA Negative   KETONESU Negative   BILIRUBINUA Negative   OCCULTUA Negative   NITRITE Negative   LEUKOCYTESUR Negative       Significant Imaging: I have reviewed all pertinent imaging results/findings within the past 24 hours.  CT Thoracic Spine Without Contrast  Narrative: EXAMINATION:  CT THORACIC SPINE WITHOUT CONTRAST    CLINICAL HISTORY:  Mid-back pain;Compression fracture, thoracic;    TECHNIQUE:  Axial CT images of the thoracic spine with sagittal and coronal reformats " without intravenous contrast.    COMPARISON:  Radiographs from earlier the same date.  MRI thoracic spine from 07/07/2022    FINDINGS:  Examination is significantly limited due to motion artifact, patient positioning, significant kyphotic deformity, noninclusion of the T1 and T2 vertebral bodies, and severe osteopenia.    Alignment: There is severe kyphotic deformity of the thoracic spine.  There is grade 1 anterolisthesis of L1 on L2.    Vertebra: Limited evaluation of the T11 through L1 vertebrae due to motion artifact.  The T1 and T2 vertebra are not included in the field of view.  There are numerous compression fracture of the thoracic and lumbar spine involving T3, T8, T9, T10, and L2.  Questionable compression fracture of the superior endplate of L3 also noted.  In comparison with prior MRI of the thoracic spine from 07/07 22, the T3 and T9 compression fractures are new.  The T8, T10, and L2 compression fractures were seen previously and demonstrate unchanged height loss from prior.  There is a Schmorl's node within the T1 superior endplate.  There is continued retropulsion of the T8 compression fracture with unchanged encroachment on the anterior aspect of the spinal canal, overall measuring approximately 4 mm.  There is also retropulsion of the T9 and T10 compression deformities with approximately 2 mm of encroachment at these levels.    Discs: There is mild multilevel disc height loss.    Degenerative changes: There are multilevel degenerative changes of the thoracic spine, similar to prior.    Miscellaneous: There is a filter in the left atrial appendage.  Partially imaged lungs demonstrate bandlike atelectasis or scarring.  There are multiple remote bilateral rib fractures.  Impression: 1. Significant exam limitations as detailed above  2. Numerous compression fractures of the thoracic and lumbar spine as above, with new fractures seen at T3 and T9.  Correlate with point tenderness.    Electronically  signed by: Lc Caballero  Date:    10/03/2022  Time:    22:18  CT Head Without Contrast  Narrative: EXAMINATION:  CT HEAD WITHOUT CONTRAST    CLINICAL HISTORY:  hygroma vs chronic sdh;    TECHNIQUE:  Low dose axial CT images obtained throughout the head without intravenous contrast. Sagittal and coronal reconstructions were performed.    COMPARISON:  CT head from 10/03/2022.    FINDINGS:  Examination is significantly limited by patient motion artifact and by positioning.    There is brain parenchymal volume loss and prominence of the CSF spaces.  No hydrocephalus.  Again seen is a right cerebral convexity hypodense (CSF density) fluid collection measuring up to 7 mm in maximal thickness, compatible with a subdural hygroma versus chronic subdural hematoma.  There continues to be no evidence of significant midline shift, however there is continued mild mass effect on the right lateral ventricle.  There is hypoattenuation in the supratentorial white matter compatible with chronic microvascular ischemic changes, similar to prior.  No parenchymal mass, hemorrhage, edema or major vascular distribution infarct.    No calvarial fracture.  The scalp is unremarkable.  Bilateral paranasal sinuses and mastoid air cells are clear.  There are prosthetic lenses.  Impression: Stable small right subdural hygroma versus chronic subdural hematoma with minimal mass effect as above and no evidence of significant midline shift.    Electronically signed by: Lc Caballero  Date:    10/03/2022  Time:    21:15  X-Ray Thoracic Spine AP Lateral  Narrative: EXAMINATION:  XR THORACIC SPINE AP LATERAL    CLINICAL HISTORY:  fx;    TECHNIQUE:  AP and lateral views of the thoracic spine were performed.    COMPARISON:  MRI thoracic spine 07/07/2022, 07/03/2022    FINDINGS:  The superior thoracic spine is obscured by the overlying shadows of the shoulders.    Pronounced kyphosis of the thoracic spine which appears more prominent compared to MRI  thoracic spine 07/07/2022.    Known T8 vertebral body compression deformity with progression of height loss compared to MRI thoracic spine 07/07/2022.  Compression deformities of the T9-L3 vertebral bodies are new compared to MRI thoracic spine 07/07/2022 the T10 compression and superior endplate compression L1 and L2 were previously present but may be slightly progressed.    Partially visualized postoperative changes of the pelvis.    No acute abnormality seen in the visualized chest and abdomen.  Impression: 1. Compression deformities of T9-L3 vertebral bodies, which are new and/or progressed compared to MRI thoracic spine 07/07/2022.  2. Known T8 vertebral body compression deformity with progressive height loss.  This report was flagged in Epic as abnormal.    Electronically signed by resident: Natalie Michele  Date:    10/03/2022  Time:    20:33    Electronically signed by: Cholo Cadet  Date:    10/03/2022  Time:    21:08  X-Ray Wrist Complete Left  Narrative: EXAMINATION:  XR WRIST COMPLETE 3 VIEWS LEFT    CLINICAL HISTORY:  Unspecified fall, initial encounter    TECHNIQUE:  PA, lateral, and oblique views of the left wrist were performed.    COMPARISON:  Correlation made to contralateral wrist radiographs from 10/28/2016.    FINDINGS:  There is diffuse osteopenia.  No acute fracture or dislocation.  There is extensive chondrocalcinosis in the TFCC and the radiocarpal articulations which can be seen with CPPD.  There is mild negative ulnar variance.  Alignment is otherwise normal.  There is soft tissue edema.  Impression: No acute osseous abnormality of the left wrist.    Chondrocalcinosis which can be seen with CPPD.    Electronically signed by: Lc Caballero  Date:    10/03/2022  Time:    18:52  X-Ray Hip 2 or 3 views Right (with Pelvis when performed)  Narrative: EXAMINATION:  XR HIP WITH PELVIS WHEN PERFORMED, 2 OR 3  VIEWS RIGHT    CLINICAL HISTORY:  Unspecified fall, initial encounter    TECHNIQUE:  AP  view of the pelvis and frog leg lateral view of the right hip were performed.    COMPARISON:  07/07/2022, 10/03/2022    FINDINGS:  Two views right hip.    No acute displaced fracture or dislocation of the right hip.  The right femoroacetabular joint is intact noting degenerative changes.  There is osteopenia.  Sacral transfixation screws are stable.  Impression: 1. No acute displaced fracture or dislocation of the right hip.    Electronically signed by: Marc Amor MD  Date:    10/03/2022  Time:    18:47  X-Ray Chest 1 View  Narrative: EXAMINATION:  XR CHEST 1 VIEW    CLINICAL HISTORY:  Unspecified fall, initial encounter    TECHNIQUE:  Single frontal view of the chest was performed.    COMPARISON:  Chest radiograph 09/19/2022    FINDINGS:  Examination severely limited secondary to patient's head tilted inferiorly obscuring portions of the field of view.    Trachea is not well visualized.  Visualized cardiomediastinal silhouette appears within normal limits.  Visualized lungs appear clear.  No sizable pleural effusion.  No pneumothorax.  No confluent consolidation.  Healing rib fractures.  No free air below the diaphragm.  Impression: Allowing for significant limitations, no acute abnormality.  Consider follow-up when/if clinically appropriate.    Electronically signed by resident: Chase Christensen  Date:    10/03/2022  Time:    17:31    Electronically signed by: Krishna Torres MD  Date:    10/03/2022  Time:    17:51  X-Ray Pelvis Routine AP  Narrative: EXAMINATION:  XR PELVIS ROUTINE AP    CLINICAL HISTORY:  fall;    TECHNIQUE:  AP view of the pelvis was performed.    COMPARISON:  10/14/2021    FINDINGS:  Single-view pelvis.    Sacral transfixation screws are stable.  There is osteopenia.  There are degenerative changes of the bilateral femoroacetabular joints without dislocation.  The pubic symphysis is intact.  The orientation of the proximal right femur 1st from previous exam, this may be on the basis of  positioning however correlation with any focal tenderness recommended and dedicated right hip radiography as warranted.  There appears to be remote fracture involving the inferior pubic ramus on the right.  Impression: 1. Somewhat differing configuration of the proximal right femur as compared to the prior exam.  This may be on the basis of positional change however correlation with any focal tenderness recommended.  Dedicated right hip radiography as warranted.    Electronically signed by: Marc Amor MD  Date:    10/03/2022  Time:    17:35  X-Ray Forearm Left  Narrative: EXAMINATION:  XR FOREARM LEFT    CLINICAL HISTORY:  Other injury of unspecified body region, initial encounter    TECHNIQUE:  AP and lateral views of the left forearm were performed.    COMPARISON:  None    FINDINGS:  Two views left forearm.    There is osteopenia.  No acute displaced fracture or dislocation of the forearm.  The elbow appears intact.  There are degenerative changes of the wrist noting calcification in the region of the TFCC.  There is edema overlying the dorsal aspect of the wrist.  Dedicated wrist radiography is recommended if there is discrete wrist pain for further evaluation.  Impression: 1. No convincing acute displaced fracture or dislocation of the forearm noting degenerative changes of the wrist.  If there is discrete pain in the wrist, dedicated wrist radiography is advised.  There is edema along the dorsal aspect of the wrist.    Electronically signed by: Marc Amor MD  Date:    10/03/2022  Time:    17:32  CT Maxillofacial Without Contrast  Narrative: EXAMINATION:  CT HEAD WITHOUT CONTRAST; CT CERVICAL SPINE WITHOUT CONTRAST; CT MAXILLOFACIAL WITHOUT CONTRAST    CLINICAL HISTORY:  Facial trauma, blunt;; Ataxia, cervical trauma;    TECHNIQUE:  Low dose CT images obtained throughout the head and cervical spine without the use of intravenous contrast.  Axial, sagittal and coronal reconstructions were  performed.    Additionally, thin images through the face were obtained as a separate acquisition with multiplanar reformats.    Examination is significantly degraded by suboptimal patient positioning, motion, and artifact from and along the side of the head.    COMPARISON:  CT head 09/19/2022; MRI brain 07/26/2021; MRI cervical spine 07/07/2021; CT cervical spine 07/07/2021    FINDINGS:  CT head/maxillofacial:    Ventricles are stable in size for age without evidence of hydrocephalus.    Mild generalized cerebral volume loss.  Patchy regions of hypoattenuation in the supratentorial white matter, nonspecific but likely reflecting chronic microvascular ischemic changes.  No parenchymal mass, hemorrhage, edema or major vascular distribution infarct.    Hypoattenuating extra-axial fluid collection overlying the right cerebral hemisphere measuring up to 0.9 cm in thickness.  Associated regional sulcal effacement without evidence for subfalcine herniation or significant midline shift.    Bony calvarium is intact without evidence of displaced fracture.  Mastoid air cells and paranasal sinuses are essentially clear.  Nasal septal deviation to the right with a septal spur.  Sofya bullosa involving the left middle turbinate.    Small region of focal swelling within the scalp overlying the frontal bone to the left of midline.  Additional areas of mild soft tissue swelling over the bilateral cheeks.  Questionable deformity of the right zygomatic arch, appears remote without discrete fracture line.  Temporomandibular joints are normally aligned.  No mandibular fracture.  The orbital walls appear intact.    Postsurgical changes in the bilateral globes.    CT cervical spine:    Stable alignment with 6 mm anterolisthesis of C    Generalized osteopenia.  Stable remote mild compression deformities of the superior endplates of the T2 and T3 vertebral bodies.  No acute fracture.    Advanced multilevel degenerative disc height loss  throughout the cervical spine with intradiscal calcification and vacuum disc phenomenon.  Disc height loss worst at C6-C7.    Stable multilevel facet and uncovertebral joint degenerative changes, not well evaluated given limitations by positioning.    Right apical fibronodular scarring and atelectasis.    No acute findings within the visualized soft tissues.  Impression: 1. Examination is significantly degraded by suboptimal patient positioning and motion artifact.  2. Hypodense extra-axial collection overlying the right cerebral hemisphere, new from 09/19/2022, suspicious for subdural effusion or chronic hematoma.  Mild regional sulcal effacement without significant midline shift.  3. Small area of soft tissue swelling over the forehead.  4. Stable 6 mm anterolisthesis of C2-3.  Stable T2 and T3 mild compression fractures.  No evidence of traumatic malalignment or fracture.  5. Additional findings as above.  Report was flagged in Epic as abnormal.    The critical finding above was discovered/received at 15:10.  The critical information above was relayed directly by Dr. Raheem Donahue by telephone to Dr. Oleg Ferguson on 10/3/2022 at 15:15.    Electronically signed by resident: Raheem Donahue  Date:    10/03/2022  Time:    14:45    Electronically signed by: Mario Alberto Knight MD  Date:    10/03/2022  Time:    17:08  CT Cervical Spine Without Contrast  Narrative: EXAMINATION:  CT HEAD WITHOUT CONTRAST; CT CERVICAL SPINE WITHOUT CONTRAST; CT MAXILLOFACIAL WITHOUT CONTRAST    CLINICAL HISTORY:  Facial trauma, blunt;; Ataxia, cervical trauma;    TECHNIQUE:  Low dose CT images obtained throughout the head and cervical spine without the use of intravenous contrast.  Axial, sagittal and coronal reconstructions were performed.    Additionally, thin images through the face were obtained as a separate acquisition with multiplanar reformats.    Examination is significantly degraded by suboptimal patient positioning, motion, and  artifact from and along the side of the head.    COMPARISON:  CT head 09/19/2022; MRI brain 07/26/2021; MRI cervical spine 07/07/2021; CT cervical spine 07/07/2021    FINDINGS:  CT head/maxillofacial:    Ventricles are stable in size for age without evidence of hydrocephalus.    Mild generalized cerebral volume loss.  Patchy regions of hypoattenuation in the supratentorial white matter, nonspecific but likely reflecting chronic microvascular ischemic changes.  No parenchymal mass, hemorrhage, edema or major vascular distribution infarct.    Hypoattenuating extra-axial fluid collection overlying the right cerebral hemisphere measuring up to 0.9 cm in thickness.  Associated regional sulcal effacement without evidence for subfalcine herniation or significant midline shift.    Bony calvarium is intact without evidence of displaced fracture.  Mastoid air cells and paranasal sinuses are essentially clear.  Nasal septal deviation to the right with a septal spur.  Sofya bullosa involving the left middle turbinate.    Small region of focal swelling within the scalp overlying the frontal bone to the left of midline.  Additional areas of mild soft tissue swelling over the bilateral cheeks.  Questionable deformity of the right zygomatic arch, appears remote without discrete fracture line.  Temporomandibular joints are normally aligned.  No mandibular fracture.  The orbital walls appear intact.    Postsurgical changes in the bilateral globes.    CT cervical spine:    Stable alignment with 6 mm anterolisthesis of C    Generalized osteopenia.  Stable remote mild compression deformities of the superior endplates of the T2 and T3 vertebral bodies.  No acute fracture.    Advanced multilevel degenerative disc height loss throughout the cervical spine with intradiscal calcification and vacuum disc phenomenon.  Disc height loss worst at C6-C7.    Stable multilevel facet and uncovertebral joint degenerative changes, not well evaluated  given limitations by positioning.    Right apical fibronodular scarring and atelectasis.    No acute findings within the visualized soft tissues.  Impression: 1. Examination is significantly degraded by suboptimal patient positioning and motion artifact.  2. Hypodense extra-axial collection overlying the right cerebral hemisphere, new from 09/19/2022, suspicious for subdural effusion or chronic hematoma.  Mild regional sulcal effacement without significant midline shift.  3. Small area of soft tissue swelling over the forehead.  4. Stable 6 mm anterolisthesis of C2-3.  Stable T2 and T3 mild compression fractures.  No evidence of traumatic malalignment or fracture.  5. Additional findings as above.  Report was flagged in Epic as abnormal.    The critical finding above was discovered/received at 15:10.  The critical information above was relayed directly by Dr. Raheem Donahue by telephone to Dr. Oleg Ferguson on 10/3/2022 at 15:15.    Electronically signed by resident: Raheem Donahue  Date:    10/03/2022  Time:    14:45    Electronically signed by: Mario Alberto Knight MD  Date:    10/03/2022  Time:    17:08  CT Head Without Contrast  Narrative: EXAMINATION:  CT HEAD WITHOUT CONTRAST; CT CERVICAL SPINE WITHOUT CONTRAST; CT MAXILLOFACIAL WITHOUT CONTRAST    CLINICAL HISTORY:  Facial trauma, blunt;; Ataxia, cervical trauma;    TECHNIQUE:  Low dose CT images obtained throughout the head and cervical spine without the use of intravenous contrast.  Axial, sagittal and coronal reconstructions were performed.    Additionally, thin images through the face were obtained as a separate acquisition with multiplanar reformats.    Examination is significantly degraded by suboptimal patient positioning, motion, and artifact from and along the side of the head.    COMPARISON:  CT head 09/19/2022; MRI brain 07/26/2021; MRI cervical spine 07/07/2021; CT cervical spine 07/07/2021    FINDINGS:  CT head/maxillofacial:    Ventricles are stable  in size for age without evidence of hydrocephalus.    Mild generalized cerebral volume loss.  Patchy regions of hypoattenuation in the supratentorial white matter, nonspecific but likely reflecting chronic microvascular ischemic changes.  No parenchymal mass, hemorrhage, edema or major vascular distribution infarct.    Hypoattenuating extra-axial fluid collection overlying the right cerebral hemisphere measuring up to 0.9 cm in thickness.  Associated regional sulcal effacement without evidence for subfalcine herniation or significant midline shift.    Bony calvarium is intact without evidence of displaced fracture.  Mastoid air cells and paranasal sinuses are essentially clear.  Nasal septal deviation to the right with a septal spur.  Sofya bullosa involving the left middle turbinate.    Small region of focal swelling within the scalp overlying the frontal bone to the left of midline.  Additional areas of mild soft tissue swelling over the bilateral cheeks.  Questionable deformity of the right zygomatic arch, appears remote without discrete fracture line.  Temporomandibular joints are normally aligned.  No mandibular fracture.  The orbital walls appear intact.    Postsurgical changes in the bilateral globes.    CT cervical spine:    Stable alignment with 6 mm anterolisthesis of C    Generalized osteopenia.  Stable remote mild compression deformities of the superior endplates of the T2 and T3 vertebral bodies.  No acute fracture.    Advanced multilevel degenerative disc height loss throughout the cervical spine with intradiscal calcification and vacuum disc phenomenon.  Disc height loss worst at C6-C7.    Stable multilevel facet and uncovertebral joint degenerative changes, not well evaluated given limitations by positioning.    Right apical fibronodular scarring and atelectasis.    No acute findings within the visualized soft tissues.  Impression: 1. Examination is significantly degraded by suboptimal patient  positioning and motion artifact.  2. Hypodense extra-axial collection overlying the right cerebral hemisphere, new from 09/19/2022, suspicious for subdural effusion or chronic hematoma.  Mild regional sulcal effacement without significant midline shift.  3. Small area of soft tissue swelling over the forehead.  4. Stable 6 mm anterolisthesis of C2-3.  Stable T2 and T3 mild compression fractures.  No evidence of traumatic malalignment or fracture.  5. Additional findings as above.  Report was flagged in Epic as abnormal.    The critical finding above was discovered/received at 15:10.  The critical information above was relayed directly by Dr. Raheem Donahue by telephone to Dr. Oleg Ferguson on 10/3/2022 at 15:15.    Electronically signed by resident: Raheem Donahue  Date:    10/03/2022  Time:    14:45    Electronically signed by: Mario Alberto Knight MD  Date:    10/03/2022  Time:    17:08

## 2022-10-04 NOTE — SUBJECTIVE & OBJECTIVE
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    Cataract     Cellulitis of right lower extremity 1/19/2021    Chondrocalcinosis, cause unspecified, involving hand(712.34) 7/12/2011     Dx updated per 2019 IMO Load    Chronic sinusitis 4/6/2017    Colonic constipation 6/5/2013    Concussion 10/27/2016    Frequent falls 3/14/2017    Gait disturbance 3/14/2017    History of cardiac radiofrequency ablation (RFA) 2/3/2018    History of cardiac radiofrequency ablation (RFA) 2/3/2018    History of subarachnoid hemorrhage 10/30/2016    Hypogammaglobulinemia 9/7/2011    Irritable bowel syndrome 12/18/2015    Major depressive disorder, recurrent episode, in full remission 8/19/2010    Onychomycosis     Osteoarthritis     Postoperative hypothyroidism 12/18/2015    Presence of Watchman left atrial appendage closure device 5/14/2022    Rectal prolapse 6/5/2013    Reflux esophagitis 2/7/2010    Status post thyroidectomy 6/1/2017    Strabismus     SVT (supraventricular tachycardia) 11/22/2013    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     Underweight 1/23/2013       Past Surgical History:   Procedure Laterality Date    BREAST CYST ASPIRATION      x2    CATARACT EXTRACTION W/  INTRAOCULAR LENS IMPLANT Left 3/11/2019    Procedure: EXTRACTION, CATARACT, WITH IOL INSERTION;  Surgeon: Vera Sams MD;  Location: [facility] OR;  Service: Ophthalmology;  Laterality: Left;    CATARACT EXTRACTION W/  INTRAOCULAR LENS IMPLANT Right 4/15/2019    Procedure: EXTRACTION, CATARACT, WITH IOL INSERTION LASER;  Surgeon: Vera Sams MD;  Location: [facility] OR;  Service: Ophthalmology;  Laterality: Right;    COLON SURGERY      EYE SURGERY      FRACTURE SURGERY      NASAL SEPTUM SURGERY      OCCLUSION OF LEFT ATRIAL APPENDAGE  N/A 2/18/2022    Procedure: Left atrial appendage occlusion;  Surgeon: Chase Gill MD;  Location: [facility] EP LAB;  Service: Cardiology;  Laterality: N/A;  afib, watchman, BSCI, osiris, anes, MB/EH, 3prep    OPEN REDUCTION AND INTERNAL FIXATION (ORIF) OF INJURY OF SACROILIAC JOINT Bilateral 1/20/2021    Procedure: ORIF, SACROILIAC JOINT;  Surgeon: Alcides Tamez MD;  [facility];  Service: Orthopedics;  Laterality: Bilateral;    RADIOFREQUENCY ABLATION      RECTAL PROLAPSE REPAIR  june 2013    STRABISMUS SURGERY      TONSILLECTOMY      TRANSESOPHAGEAL ECHOCARDIOGRAPHY N/A 2/18/2022    Procedure: ECHOCARDIOGRAM, TRANSESOPHAGEAL;  Surgeon: Nils Diagnostic Provider;  Location: [facility] EP LAB;  Service: Cardiology;  Laterality: N/A;       Review of patient's allergies indicates:   Allergen Reactions    Adhesive      Tape tears skin    Buspar [buspirone]      headaches    Escitalopram oxalate Nausea Only     hyponatremia      Rifampin Rash       Current Facility-Administered Medications   Medication    acetaminophen tablet 1,000 mg    acetaminophen tablet 650 mg    albuterol-ipratropium 2.5 mg-0.5 mg/3 mL nebulizer solution 3 mL    aluminum-magnesium hydroxide-simethicone 200-200-20 mg/5 mL suspension 30 mL    bisacodyL suppository 10 mg    calcitonin (salmon) nasal spray 1 spray    dextrose 10% bolus 125 mL    dextrose 10% bolus 250 mL    glucagon (human recombinant) injection 1 mg    glucose chewable tablet 16 g    glucose chewable tablet 24 g    melatonin tablet 6 mg    naloxone 0.4 mg/mL injection 0.02 mg    ondansetron disintegrating tablet 8 mg    polyethylene glycol packet 17 g    prochlorperazine injection Soln 5 mg    simethicone chewable tablet 80 mg    sodium chloride 0.9% flush 5 mL     Facility-Administered Medications Ordered in Other Encounters   Medication    balanced salt irrigation solution 1 drop    moxifloxacin 0.5 % ophthalmic solution 1 drop    phenylephrine HCL 2.5% ophthalmic  solution 1 drop    sodium chloride 0.9% bolus 1,000 mL    sodium chloride 0.9% bolus 1,000 mL    sodium chloride 0.9% bolus 1,000 mL    tropicamide 1% ophthalmic solution 1 drop     Family History       Problem Relation (Age of Onset)    Breast cancer Paternal Grandmother    Heart disease Father, Brother    Stroke Father          Tobacco Use    Smoking status: Never    Smokeless tobacco: Never   Substance and Sexual Activity    Alcohol use: No    Drug use: No    Sexual activity: Not Currently     Review of Systems   Unable to perform ROS: Dementia   Objective:     Vital Signs (Most Recent):  Temp: 98.6 °F (37 °C) (10/03/22 1206)  Pulse: 67 (10/03/22 2201)  Resp: 18 (10/03/22 1901)  BP: 118/60 (10/03/22 2201)  SpO2: 97 % (10/03/22 2201) Vital Signs (24h Range):  Temp:  [98.6 °F (37 °C)] 98.6 °F (37 °C)  Pulse:  [67-87] 67  Resp:  [18-19] 18  SpO2:  [97 %-100 %] 97 %  BP: (118-158)/(60-77) 118/60           There is no height or weight on file to calculate BMI.    No intake or output data in the 24 hours ending 10/04/22 0048    Ortho/SPM Exam  Gen:  No acute distress, cachectic, underweight, curled in bed with minimal movement.  CV:  Peripherally well-perfused. 2+ radial pulses.  Respiratory:  Normal respiratory effort. No accessory muscle use.   Head/Neck:  Normocephalic.   Neuro: CN 2-12 grossly intact. No FND. Awake. Alert. Oriented to person and location only.      MSK:  Right Upper extremity  Inspection  - Minor abrasions noted  - No swelling  - No ecchymosis, erythema, or signs of cellulitis  Palpation  - NonTTP throughout, no palpable abnormality  Range of motion  - AROM and PROM of the shoulder, elbow, wrist, and hand intact without pain  Stability  - No evidence of joint dislocation or abnormal laxity  Neurovascular  - SILT throughout  - Compartments soft  - Radial artery palpated  - Muscle tone decreased    Left Upper extremity  Inspection  - Minor abrasions noted  - No swelling  - No ecchymosis, erythema, or  signs of cellulitis  Palpation  - NonTTP throughout, no palpable abnormality  Range of motion  - AROM and PROM of the shoulder, elbow, wrist, and hand intact without pain  Stability  - No evidence of joint dislocation or abnormal laxity  Neurovascular  - SILT throughout  - Compartments soft  - Radial artery palpated  - Muscle tone decreased      Right Lower Extremity  Inspection  - Chronic wounds to anteromedial shin  - No swelling  - No ecchymosis, erythema, or signs of cellulitis  Palpation  - NonTTP throughout, no palpable abnormality  Range of motion  - AROM and PROM of the hip, knee, ankle, and foot intact though minimal  Stability  - No evidence of joint dislocation or abnormal laxity  Neurovascular  - TA/EHL/Gastroc/FHL assessed in isolation without deficit  - SILT throughout  - Compartments soft  - DP palpated   - Muscle tone decreased    Left Lower Extremity  Inspection  - Chronic wounds to anteromedial shin  - No swelling  - No ecchymosis, erythema, or signs of cellulitis  Palpation  - NonTTP throughout, no palpable abnormality  Range of motion  - AROM and PROM of the hip, knee, ankle, and foot intact though minimal  Stability  - No evidence of joint dislocation or abnormal laxity  Neurovascular  - TA/EHL/Gastroc/FHL assessed in isolation without deficit  - SILT throughout  - Compartments soft  - DP palpated   - Muscle tone decreased    Spine/pelvis/axial body:  Tenderness to palpation of cervical spine and upper thoracic spine      Significant Labs: CBC:   Recent Labs   Lab 10/03/22  1856   WBC 7.22   HGB 11.5*   HCT 36.7*        CMP:   Recent Labs   Lab 10/03/22  1856      K 3.9      CO2 26   GLU 94   BUN 24*   CREATININE 0.5   CALCIUM 8.3*   PROT 6.8   ALBUMIN 2.9*   BILITOT 0.4   ALKPHOS 109   AST 18   ALT 15   ANIONGAP 9     All pertinent labs within the past 24 hours have been reviewed.    Significant Imaging: X-Ray: I have reviewed all pertinent results/findings and my personal  findings are:  Kyphotic alignment thoracic spine with compression fracture with loss of vertebral body height at T8 and osteopenic bone throughout  Results for orders placed or performed during the hospital encounter of 10/03/22 (from the past 2160 hour(s))   CT Maxillofacial Without Contrast    Impression    1. Examination is significantly degraded by suboptimal patient positioning and motion artifact.  2. Hypodense extra-axial collection overlying the right cerebral hemisphere, new from 09/19/2022, suspicious for subdural effusion or chronic hematoma.  Mild regional sulcal effacement without significant midline shift.  3. Small area of soft tissue swelling over the forehead.  4. Stable 6 mm anterolisthesis of C2-3.  Stable T2 and T3 mild compression fractures.  No evidence of traumatic malalignment or fracture.  5. Additional findings as above.  Report was flagged in Epic as abnormal.    The critical finding above was discovered/received at 15:10.  The critical information above was relayed directly by Dr. Raheem Donahue by telephone to Dr. Oleg Ferguson on 10/3/2022 at 15:15.    Electronically signed by resident: Raheem Donahue  Date:    10/03/2022  Time:    14:45    Electronically signed by: Mario Alberto Knight MD  Date:    10/03/2022  Time:    17:08   CT Thoracic Spine Without Contrast    Impression    1. Significant exam limitations as detailed above  2. Numerous compression fractures of the thoracic and lumbar spine as above, with new fractures seen at T3 and T9.  Correlate with point tenderness.      Electronically signed by: Lc Caballero  Date:    10/03/2022  Time:    22:18   CT Cervical Spine Without Contrast    Impression    1. Examination is significantly degraded by suboptimal patient positioning and motion artifact.  2. Hypodense extra-axial collection overlying the right cerebral hemisphere, new from 09/19/2022, suspicious for subdural effusion or chronic hematoma.  Mild regional sulcal effacement  without significant midline shift.  3. Small area of soft tissue swelling over the forehead.  4. Stable 6 mm anterolisthesis of C2-3.  Stable T2 and T3 mild compression fractures.  No evidence of traumatic malalignment or fracture.  5. Additional findings as above.  Report was flagged in Epic as abnormal.    The critical finding above was discovered/received at 15:10.  The critical information above was relayed directly by Dr. Raheem Donahue by telephone to Dr. Oleg Ferguson on 10/3/2022 at 15:15.    Electronically signed by resident: Raheem Donahue  Date:    10/03/2022  Time:    14:45    Electronically signed by: Mario Alberto Knight MD  Date:    10/03/2022  Time:    17:08   CT Head Without Contrast    Impression    Stable small right subdural hygroma versus chronic subdural hematoma with minimal mass effect as above and no evidence of significant midline shift.      Electronically signed by: Lc Caballero  Date:    10/03/2022  Time:    21:15   Results for orders placed or performed during the hospital encounter of 07/07/22 (from the past 2160 hour(s))   CTA Head and Neck (xpd)    Impression    1. Evolving appearance of trace subarachnoid hemorrhage along the medial left temporal lobe, overall unchanged from exam.  No significant interval detrimental change compared to prior head CT of 07/07/2022.  2. Allowing for motion artifact, CTA head and neck appears to be within normal limits.  3. Fluid in the esophagus at the level of the aortic arch.  Correlation for reflux/dysmotility.  4. Redemonstration of anterolisthesis of C2 on C3 and compression deformities of the T2 and T3 vertebral bodies, unchanged.  5.  Additional findings as above.  This report was flagged in Epic as abnormal.    Electronically signed by resident: Charles Bennett  Date:    07/08/2022  Time:    05:06    Electronically signed by: Marisol Santiago MD  Date:    07/08/2022  Time:    07:10     *Note: Due to a large number of results and/or encounters for the  requested time period, some results have not been displayed. A complete set of results can be found in Results Review.     Results for orders placed or performed during the hospital encounter of 07/07/22 (from the past 2160 hour(s))   MRI Thoracic Spine Without Contrast    Impression    1. Compression fracture of the T8 vertebral body, with near complete vertebral height loss and 6 mm retropulsion of the fracture fragments.  No significant central canal stenosis or abnormal cord signal at this level.  Overall appearance similar to prior.  2. Redemonstration of 6 mm anterolisthesis of C2 on C3.  Short-segment of STIR signal hyperintensity, extending from the C2-C3 to C3-C4 levels, concerning for cord edema/malacia.  3. Similar appearance compression deformities of the T2, T3, T10, L2 vertebral bodies.  L3 compression fracture outside the field of view of this study.  4. Degenerative changes as above.  Moderate to severe canal stenosis at C2-C4.  Severe left neural foraminal narrowing at C2-C3.  5.  Additional findings as above.  This report was flagged in Epic as abnormal.    Electronically signed by resident: Charles Bennett  Date:    07/07/2022  Time:    21:18    Electronically signed by: Krishna Torres MD  Date:    07/07/2022  Time:    22:18   MRI Cervical Spine Without Contrast    Impression    1. Compression fracture of the T8 vertebral body, with near complete vertebral height loss and 6 mm retropulsion of the fracture fragments.  No significant central canal stenosis or abnormal cord signal at this level.  Overall appearance similar to prior.  2. Redemonstration of 6 mm anterolisthesis of C2 on C3.  Short-segment of STIR signal hyperintensity, extending from the C2-C3 to C3-C4 levels, concerning for cord edema/malacia.  3. Similar appearance compression deformities of the T2, T3, T10, L2 vertebral bodies.  L3 compression fracture outside the field of view of this study.  4. Degenerative changes as above.  Moderate  to severe canal stenosis at C2-C4.  Severe left neural foraminal narrowing at C2-C3.  5.  Additional findings as above.  This report was flagged in Epic as abnormal.    Electronically signed by resident: Charles Bennett  Date:    07/07/2022  Time:    21:18    Electronically signed by: Krishna Torres MD  Date:    07/07/2022  Time:    22:18     *Note: Due to a large number of results and/or encounters for the requested time period, some results have not been displayed. A complete set of results can be found in Results Review.

## 2022-10-04 NOTE — PROGRESS NOTES
[facility] - Observation 11H  Neurosurgery  Progress Note    Subjective:     History of Present Illness: 72 F with h/o dementia, extensive cardiac history requiring radiofrequency ablations/Watchman device presenting s/p witnessed fall from wheelchair. No LOC. Patient is a resident of the [facility], was supposed to be going to [facility] today for geriatric psych appointment per ED but came to [facility] after fall. Reportedly patient with increased behavioral issues and falls.     Patient is poor historian given dementia and unable to provide full collateral history. History of previously being on asa/plavix/xarelto but per ED that has been discontinued at least as of July 2022.     Has laceration to forehead that was repaired by ED.      Reportedly normally oriented to self only, yells often, agitated.      Post-Op Info:  * No surgery found *         Interval History: 10/4: LINOON. AFVSS. Exam stable.     Medications:  Continuous Infusions:  Scheduled Meds:   calcitonin (salmon)  1 spray Alternating Nostrils Daily    polyethylene glycol  17 g Oral Daily     PRN Meds:acetaminophen, acetaminophen, albuterol-ipratropium, aluminum-magnesium hydroxide-simethicone, bisacodyL, dextrose 10%, dextrose 10%, glucagon (human recombinant), glucose, glucose, melatonin, naloxone, ondansetron, prochlorperazine, simethicone, sodium chloride 0.9%     Review of Systems  Objective:     Weight: 39.9 kg (88 lb)  Body mass index is 16.1 kg/m².  Vital Signs (Most Recent):  Temp: 98.5 °F (36.9 °C) (10/04/22 1752)  Pulse: 69 (10/04/22 1752)  Resp: 16 (10/04/22 1752)  BP: (!) 163/73 (10/04/22 1752)  SpO2: 96 % (10/04/22 1752)   Vital Signs (24h Range):  Temp:  [97.4 °F (36.3 °C)-98.5 °F (36.9 °C)] 98.5 °F (36.9 °C)  Pulse:  [67-82] 69  Resp:  [16-18] 16  SpO2:  [95 %-99 %] 96 %  BP: (118-163)/(60-84) 163/73                          Physical Exam    Neurosurgery Physical Exam    Neurosurgery Physical Exam  General: extremely cachectic,  contracted  Head: Non-traumatic, normocephalic  Eyes: Pupils equal, EOMi  Neck: +tenderness to palpation  CVS: Normal rate and rhythm, distal pulses present  Pulm: Symmetric expansion, no respiratory distress  GI: Abdomen nondistended, nontender  MSK: bruising LUE, multiple bruises on BLE  Skin: see MSK  Psych: confused, baseline dementia  Neuro: Aox2 to self and place, GCS [id number]  CNII-XII: PERRL, unable to participate with full CN exam  Extremities:  Motor:  Moves all limbs spontaneously legs>uppers. Keeps BUE contracted near face. Will squeeze hands and wiggle toes. Lifts BLE off bed.      Reflexes:     Patient with significant contractures, will not relax for full reflex exam     Sensory:      Sensorium intact throughout, no sensory level present     Coordination:      Coordination intact throughout     Cervical Spine:      Midline TTP: Pos     Thoracic Spine:     Midline TTP: Negative     Lumbar Spine:     Midline TTP: Negative    Significant Labs:  Recent Labs   Lab 10/03/22  1856 10/04/22  0530   GLU 94 83    137   K 3.9 3.7    106   CO2 26 20*   BUN 24* 25*   CREATININE 0.5 0.6   CALCIUM 8.3* 8.3*     Recent Labs   Lab 10/03/22  1856 10/04/22  0530   WBC 7.22 6.87   HGB 11.5* 11.5*   HCT 36.7* 36.9*    280     Recent Labs   Lab 10/03/22  1856   INR 1.1   APTT 28.8     Microbiology Results (last 7 days)       ** No results found for the last 168 hours. **          All pertinent labs from the last 24 hours have been reviewed.    Significant Diagnostics:  I have reviewed all pertinent imaging results/findings within the past 24 hours.  X-Ray Thoracic Spine AP Lateral    Result Date: 10/3/2022  1. Compression deformities of T9-L3 vertebral bodies, which are new and/or progressed compared to MRI thoracic spine 07/07/2022. 2. Known T8 vertebral body compression deformity with progressive height loss. This report was flagged in Epic as abnormal. Electronically signed by resident: Natalie Michele  Date:    10/03/2022 Time:    20:33 Electronically signed by: Cholo Cadet Date:    10/03/2022 Time:    21:08    X-Ray Wrist Complete Left    Result Date: 10/3/2022  No acute osseous abnormality of the left wrist. Chondrocalcinosis which can be seen with CPPD. Electronically signed by: Lc Caballero Date:    10/03/2022 Time:    18:52    X-Ray Hip 2 or 3 views Right (with Pelvis when performed)    Result Date: 10/3/2022  1. No acute displaced fracture or dislocation of the right hip. Electronically signed by: Marc Amor MD Date:    10/03/2022 Time:    18:47    CT Head Without Contrast    Result Date: 10/3/2022  Stable small right subdural hygroma versus chronic subdural hematoma with minimal mass effect as above and no evidence of significant midline shift. Electronically signed by: Lc Caballero Date:    10/03/2022 Time:    21:15    CT Thoracic Spine Without Contrast    Result Date: 10/3/2022  1. Significant exam limitations as detailed above 2. Numerous compression fractures of the thoracic and lumbar spine as above, with new fractures seen at T3 and T9.  Correlate with point tenderness. Electronically signed by: Lc Caballero Date:    10/03/2022 Time:    22:18       Assessment/Plan:     * Frequent falls  72 F with h/o dementia, extensive cardiac history including Watchman device/multiple RFA presenting s/p fall; CTH with concern for chronic subdural hematoma vs hygroma, not seen on recent CT 9/19/22:    --Admitted to  for syncopal work up given reported increase fall frequency (recently in ED 9/19/22), extensive cardiac history  --No acute neurosurgical intervention.   --All labs and diagnostics reviewed.    --Patient w/o acute blood on CTH. Noted chronic hematoma vs hygroma on CT w/o mass effect   --Repeat CTH grossly stable.   --Hold anti-plt/coag medications - patient reportedly off ASA/Xarelto/Plavix -continue to hold  --SBP <160  --Na >135  --No evidence of seizure activity, AED not indicated at this  time  --HOB >30  --Continue to monitor clinically, notify NSGY immediately with any changes in neuro status  --Neck pain/additional work up per ED/Ortho; consider C collar until C spine can be cleared given neck pain on exam.    Dispo: ongoing    D/w Dr. Felix Sy MD  Neurosurgery  [facility] - Observation 11H

## 2022-10-04 NOTE — ASSESSMENT & PLAN NOTE
Acquired hypothyroidism with hx of MNG s/p total thyroidectomy 1/25/17  -TSH ordered  -Continue synthroid

## 2022-10-04 NOTE — HPI
Yvette Crews is a 72 y.o. 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 Per chart review and discussion with ED physician, the patient fell out of her wheelchair today without LOC. The fall was witnessed by staff. She has been having increasing behavioral issues as well as increased falls. She was supposed to go to inpatient roger-psych at . History is limited due to dementia. No family at bedside at time of evaluation.    In the ED: AFVSS. No leukocytosis, hemoglobin at baseline. CMP without abnormality. UA noninfectious. Trop negative. Chest XR no acute abnormality. L forearm xray no acute fracture. L wrist xray chondrocalcinosis. Xray R hip without fracture. Xray thoracic spine with Compression deformities of T9-L3 vertebral bodies, which are new and/or progressed compared to MRI thoracic spine 07/07/2022. CT thoracic spine Numerous compression fractures of the thoracic and lumbar spine as above, with new fractures seen at T3 and T9. CT maxillofacial, head, cervical spine with ypodense extra-axial collection overlying the right cerebral hemisphere, new from 09/19/2022, suspicious for subdural effusion or chronic hematoma. Stable 6 mm anterolisthesis of C2-3. Neurosurgery consulted. Repeat CTH done. Orthopedics consulted. MRI spine ordered.

## 2022-10-04 NOTE — PLAN OF CARE
Problem: Physical Therapy  Goal: Physical Therapy Goal  Description: Goals to be met by: 10/14/22     Patient will increase functional independence with mobility by performin. Supine to sit with Moderate Assistance  2. Sit to supine with Moderate Assistance  3. Sit to stand transfer with Moderate Assistance  4. Gait  x 25 feet with Moderate Assistance using LRAD, if needed.   5. Wheelchair propulsion x75 feet with Minimal Assistance using bilateral upper extremities  6. Sitting at edge of bed x10 minutes with Minimal Assistance  7. Stand for 3 minutes with Moderate Assistance using LRAD, if needed  8. Lower extremity exercise program x10 reps per handout, with independence    Outcome: Ongoing, Progressing     Initial evaluation completed. Patient tolerated assessment well. Established POC and goals. Patient would continue to benefit from skilled PT services in order to improve functional mobility independence.     Vani Beach, PT, DPT  10/4/2022

## 2022-10-04 NOTE — SUBJECTIVE & OBJECTIVE
Interval History: 10/4: NAEON. AFVSS. Exam stable.     Medications:  Continuous Infusions:  Scheduled Meds:   calcitonin (salmon)  1 spray Alternating Nostrils Daily    polyethylene glycol  17 g Oral Daily     PRN Meds:acetaminophen, acetaminophen, albuterol-ipratropium, aluminum-magnesium hydroxide-simethicone, bisacodyL, dextrose 10%, dextrose 10%, glucagon (human recombinant), glucose, glucose, melatonin, naloxone, ondansetron, prochlorperazine, simethicone, sodium chloride 0.9%     Review of Systems  Objective:     Weight: 39.9 kg (88 lb)  Body mass index is 16.1 kg/m².  Vital Signs (Most Recent):  Temp: 98.5 °F (36.9 °C) (10/04/22 1752)  Pulse: 69 (10/04/22 1752)  Resp: 16 (10/04/22 1752)  BP: (!) 163/73 (10/04/22 1752)  SpO2: 96 % (10/04/22 1752)   Vital Signs (24h Range):  Temp:  [97.4 °F (36.3 °C)-98.5 °F (36.9 °C)] 98.5 °F (36.9 °C)  Pulse:  [67-82] 69  Resp:  [16-18] 16  SpO2:  [95 %-99 %] 96 %  BP: (118-163)/(60-84) 163/73                          Physical Exam    Neurosurgery Physical Exam    Neurosurgery Physical Exam  General: extremely cachectic, contracted  Head: Non-traumatic, normocephalic  Eyes: Pupils equal, EOMi  Neck: +tenderness to palpation  CVS: Normal rate and rhythm, distal pulses present  Pulm: Symmetric expansion, no respiratory distress  GI: Abdomen nondistended, nontender  MSK: bruising LUE, multiple bruises on BLE  Skin: see MSK  Psych: confused, baseline dementia  Neuro: Aox2 to self and place, GCS E4V4M6  CNII-XII: PERRL, unable to participate with full CN exam  Extremities:  Motor:  Moves all limbs spontaneously legs>uppers. Keeps BUE contracted near face. Will squeeze hands and wiggle toes. Lifts BLE off bed.      Reflexes:     Patient with significant contractures, will not relax for full reflex exam     Sensory:      Sensorium intact throughout, no sensory level present     Coordination:      Coordination intact throughout     Cervical Spine:      Midline TTP: Pos     Thoracic  Spine:     Midline TTP: Negative     Lumbar Spine:     Midline TTP: Negative    Significant Labs:  Recent Labs   Lab 10/03/22  1856 10/04/22  0530   GLU 94 83    137   K 3.9 3.7    106   CO2 26 20*   BUN 24* 25*   CREATININE 0.5 0.6   CALCIUM 8.3* 8.3*     Recent Labs   Lab 10/03/22  1856 10/04/22  0530   WBC 7.22 6.87   HGB 11.5* 11.5*   HCT 36.7* 36.9*    280     Recent Labs   Lab 10/03/22  1856   INR 1.1   APTT 28.8     Microbiology Results (last 7 days)       ** No results found for the last 168 hours. **          All pertinent labs from the last 24 hours have been reviewed.    Significant Diagnostics:  I have reviewed all pertinent imaging results/findings within the past 24 hours.  X-Ray Thoracic Spine AP Lateral    Result Date: 10/3/2022  1. Compression deformities of T9-L3 vertebral bodies, which are new and/or progressed compared to MRI thoracic spine 07/07/2022. 2. Known T8 vertebral body compression deformity with progressive height loss. This report was flagged in Epic as abnormal. Electronically signed by resident: Natalie Michele Date:    10/03/2022 Time:    20:33 Electronically signed by: Cholo Cadet Date:    10/03/2022 Time:    21:08    X-Ray Wrist Complete Left    Result Date: 10/3/2022  No acute osseous abnormality of the left wrist. Chondrocalcinosis which can be seen with CPPD. Electronically signed by: Lc Caballero Date:    10/03/2022 Time:    18:52    X-Ray Hip 2 or 3 views Right (with Pelvis when performed)    Result Date: 10/3/2022  1. No acute displaced fracture or dislocation of the right hip. Electronically signed by: Marc Amor MD Date:    10/03/2022 Time:    18:47    CT Head Without Contrast    Result Date: 10/3/2022  Stable small right subdural hygroma versus chronic subdural hematoma with minimal mass effect as above and no evidence of significant midline shift. Electronically signed by: Lc Caballero Date:    10/03/2022 Time:    21:15    CT Thoracic Spine  Without Contrast    Result Date: 10/3/2022  1. Significant exam limitations as detailed above 2. Numerous compression fractures of the thoracic and lumbar spine as above, with new fractures seen at T3 and T9.  Correlate with point tenderness. Electronically signed by: Lc Caballero Date:    10/03/2022 Time:    22:18

## 2022-10-04 NOTE — ASSESSMENT & PLAN NOTE
History noted  -asa/plavix/xarelto were discontinued previously, will continue to hold in setting of falls

## 2022-10-04 NOTE — PLAN OF CARE
TC to [facility] Psych unit, spoke with Mary Kay in admissions. She stated that they do not have a bed today, possibly 10/5/22, definitely 10/6/22. They requested a referral packet be faxed when pt is medically ready. Informed Dr Amin via Secure Chat.      10/04/22 [id number]   Post-Acute Status   Post-Acute Authorization Placement   Discharge Plan   Discharge Plan A Psychiatric hospital   Discharge Plan B [facility]     Natanael Walker, RN,BSN

## 2022-10-04 NOTE — CONSULTS
[facility] - Telemetry Stepdown ([place] [address])  Orthopedics  Consult Note    Patient Name: Yvette Crews  MRN: [id number]  Admission Date: 10/3/2022  Hospital Length of Stay: 0 days  Attending Provider: Liza Zhang MD  Primary Care Provider: Sally Geren MD        Inpatient consult to Orthopedic Surgery  Consult performed by: Logan Caballero MD  Consult ordered by: Madiha Morataya MD        Subjective:     Principal Problem:Frequent falls    Chief Complaint:   Chief Complaint   Patient presents with    Fall     From [facility]. Fell out of wheelchair, has laceration to forehead.         HPI: Yvette Crews is a 72 y.o. 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 Tamez in 2021, osteoarthritis, irritable bowel syndrome with chronic constipation, hypogammaglobulinemia, anxiety, major depressive disorder, rectal prolapse, GERD, and chronic underweight BMI emergency [facility] Emergency Department after a fall at the skilled nursing facility.  Per verbal report from emergency department physician patient has been having increasing falls.  This fall was not witnessed.  And on examination patient was alone and there was no family at bedside.  She was oriented to self and place, but was not oriented to time.  She stated the date was October 16, 2020.  As such history is limited due to the mental status of the patient.    History of previously being on asa/plavix/xarelto but per chart review these medications have been discontinued.  Patient has been steadily declining and uses a wheelchair for transportation.          Past Medical History:   Diagnosis Date    Adult bronchiectasis     Age-related  osteoporosis with current pathological fracture with routine healing 8/28/2013    Amblyopia     Anxiety     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    Cataract     Cellulitis of right lower extremity 1/19/2021    Chondrocalcinosis, cause unspecified, involving hand(712.34) 7/12/2011     Dx updated per 2019 IMO Load    Chronic sinusitis 4/6/2017    Colonic constipation 6/5/2013    Concussion 10/27/2016    Frequent falls 3/14/2017    Gait disturbance 3/14/2017    History of cardiac radiofrequency ablation (RFA) 2/3/2018    History of cardiac radiofrequency ablation (RFA) 2/3/2018    History of subarachnoid hemorrhage 10/30/2016    Hypogammaglobulinemia 9/7/2011    Irritable bowel syndrome 12/18/2015    Major depressive disorder, recurrent episode, in full remission 8/19/2010    Onychomycosis     Osteoarthritis     Postoperative hypothyroidism 12/18/2015    Presence of Watchman left atrial appendage closure device 5/14/2022    Rectal prolapse 6/5/2013    Reflux esophagitis 2/7/2010    Status post thyroidectomy 6/1/2017    Strabismus     SVT (supraventricular tachycardia) 11/22/2013    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     Underweight 1/23/2013       Past Surgical History:   Procedure Laterality Date    BREAST CYST ASPIRATION      x2    CATARACT EXTRACTION W/  INTRAOCULAR LENS IMPLANT Left 3/11/2019    Procedure: EXTRACTION, CATARACT, WITH IOL INSERTION;  Surgeon: Vera Sams MD;  Location: [facility] OR;  Service: Ophthalmology;  Laterality: Left;    CATARACT EXTRACTION W/  INTRAOCULAR LENS IMPLANT Right 4/15/2019    Procedure: EXTRACTION, CATARACT, WITH IOL INSERTION LASER;  Surgeon: Vera Sams MD;  Location: [facility] OR;  Service: Ophthalmology;  Laterality: Right;    COLON SURGERY      EYE SURGERY      FRACTURE SURGERY      NASAL SEPTUM SURGERY      OCCLUSION OF LEFT ATRIAL APPENDAGE N/A 2/18/2022    Procedure: Left atrial  appendage occlusion;  Surgeon: Chase Gill MD;  Location: [facility] EP LAB;  Service: Cardiology;  Laterality: N/A;  afib, watchman, BSCI, osiris, anes, MB/EH, 3prep    OPEN REDUCTION AND INTERNAL FIXATION (ORIF) OF INJURY OF SACROILIAC JOINT Bilateral 1/20/2021    Procedure: ORIF, SACROILIAC JOINT;  Surgeon: Alcides Tamez MD;  Location: [facility] OR [address] [place] [address];  Service: Orthopedics;  Laterality: Bilateral;    RADIOFREQUENCY ABLATION      RECTAL PROLAPSE REPAIR  june 2013    STRABISMUS SURGERY      TONSILLECTOMY      TRANSESOPHAGEAL ECHOCARDIOGRAPHY N/A 2/18/2022    Procedure: ECHOCARDIOGRAM, TRANSESOPHAGEAL;  Surgeon: [facility] Diagnostic Provider;  Location: [facility] EP LAB;  Service: Cardiology;  Laterality: N/A;       Review of patient's allergies indicates:   Allergen Reactions    Adhesive      Tape tears skin    Buspar [buspirone]      headaches    Escitalopram oxalate Nausea Only     hyponatremia      Rifampin Rash       Current Facility-Administered Medications   Medication    acetaminophen tablet 1,000 mg    acetaminophen tablet 650 mg    albuterol-ipratropium 2.5 mg-0.5 mg/3 mL nebulizer solution 3 mL    aluminum-magnesium hydroxide-simethicone 200-200-20 mg/5 mL suspension 30 mL    bisacodyL suppository 10 mg    calcitonin (salmon) nasal spray 1 spray    dextrose 10% bolus 125 mL    dextrose 10% bolus 250 mL    glucagon (human recombinant) injection 1 mg    glucose chewable tablet 16 g    glucose chewable tablet 24 g    melatonin tablet 6 mg    naloxone 0.4 mg/mL injection 0.02 mg    ondansetron disintegrating tablet 8 mg    polyethylene glycol packet 17 g    prochlorperazine injection Soln 5 mg    simethicone chewable tablet 80 mg    sodium chloride 0.9% flush 5 mL     Facility-Administered Medications Ordered in Other Encounters   Medication    balanced salt irrigation solution 1 drop    moxifloxacin 0.5 % ophthalmic solution 1 drop    phenylephrine HCL 2.5% ophthalmic solution  1 drop    sodium chloride 0.9% bolus 1,000 mL    sodium chloride 0.9% bolus 1,000 mL    sodium chloride 0.9% bolus 1,000 mL    tropicamide 1% ophthalmic solution 1 drop     Family History       Problem Relation (Age of Onset)    Breast cancer Paternal Grandmother    Heart disease Father, Brother    Stroke Father          Tobacco Use    Smoking status: Never    Smokeless tobacco: Never   Substance and Sexual Activity    Alcohol use: No    Drug use: No    Sexual activity: Not Currently     Review of Systems   Unable to perform ROS: Dementia   Objective:     Vital Signs (Most Recent):  Temp: 98.6 °F (37 °C) (10/03/22 1206)  Pulse: 67 (10/03/22 2201)  Resp: 18 (10/03/22 1901)  BP: 118/60 (10/03/22 2201)  SpO2: 97 % (10/03/22 2201) Vital Signs (24h Range):  Temp:  [98.6 °F (37 °C)] 98.6 °F (37 °C)  Pulse:  [67-87] 67  Resp:  [18-19] 18  SpO2:  [97 %-100 %] 97 %  BP: (118-158)/(60-77) 118/60           There is no height or weight on file to calculate BMI.    No intake or output data in the 24 hours ending 10/04/22 0048    Ortho/SPM Exam  Gen:  No acute distress, cachectic, underweight, curled in bed with minimal movement.  CV:  Peripherally well-perfused. 2+ radial pulses.  Respiratory:  Normal respiratory effort. No accessory muscle use.   Head/Neck:  Normocephalic.   Neuro: CN 2-12 grossly intact. No FND. Awake. Alert. Oriented to person and location only.      MSK:  Right Upper extremity  Inspection  - Minor abrasions noted  - No swelling  - No ecchymosis, erythema, or signs of cellulitis  Palpation  - NonTTP throughout, no palpable abnormality  Range of motion  - AROM and PROM of the shoulder, elbow, wrist, and hand intact without pain  Stability  - No evidence of joint dislocation or abnormal laxity  Neurovascular  - SILT throughout  - Compartments soft  - Radial artery palpated  - Muscle tone decreased    Left Upper extremity  Inspection  - Minor abrasions noted  - No swelling  - No ecchymosis, erythema, or  signs of cellulitis  Palpation  - NonTTP throughout, no palpable abnormality  Range of motion  - AROM and PROM of the shoulder, elbow, wrist, and hand intact without pain  Stability  - No evidence of joint dislocation or abnormal laxity  Neurovascular  - SILT throughout  - Compartments soft  - Radial artery palpated  - Muscle tone decreased      Right Lower Extremity  Inspection  - Chronic wounds to anteromedial shin  - No swelling  - No ecchymosis, erythema, or signs of cellulitis  Palpation  - NonTTP throughout, no palpable abnormality  Range of motion  - AROM and PROM of the hip, knee, ankle, and foot intact though minimal  Stability  - No evidence of joint dislocation or abnormal laxity  Neurovascular  - TA/EHL/Gastroc/FHL assessed in isolation without deficit  - SILT throughout  - Compartments soft  - DP palpated   - Muscle tone decreased    Left Lower Extremity  Inspection  - Chronic wounds to anteromedial shin  - No swelling  - No ecchymosis, erythema, or signs of cellulitis  Palpation  - NonTTP throughout, no palpable abnormality  Range of motion  - AROM and PROM of the hip, knee, ankle, and foot intact though minimal  Stability  - No evidence of joint dislocation or abnormal laxity  Neurovascular  - TA/EHL/Gastroc/FHL assessed in isolation without deficit  - SILT throughout  - Compartments soft  - DP palpated   - Muscle tone decreased    Spine/pelvis/axial body:  Tenderness to palpation of cervical spine and upper thoracic spine      Significant Labs: CBC:   Recent Labs   Lab 10/03/22  1856   WBC 7.22   HGB 11.5*   HCT 36.7*        CMP:   Recent Labs   Lab 10/03/22  1856      K 3.9      CO2 26   GLU 94   BUN 24*   CREATININE 0.5   CALCIUM 8.3*   PROT 6.8   ALBUMIN 2.9*   BILITOT 0.4   ALKPHOS 109   AST 18   ALT 15   ANIONGAP 9     All pertinent labs within the past 24 hours have been reviewed.    Significant Imaging: X-Ray: I have reviewed all pertinent results/findings and my personal  findings are:  Kyphotic alignment thoracic spine with compression fracture with loss of vertebral body height at T8 and osteopenic bone throughout  Results for orders placed or performed during the hospital encounter of 10/03/22 (from the past 2160 hour(s))   CT Maxillofacial Without Contrast    Impression    1. Examination is significantly degraded by suboptimal patient positioning and motion artifact.  2. Hypodense extra-axial collection overlying the right cerebral hemisphere, new from 09/19/2022, suspicious for subdural effusion or chronic hematoma.  Mild regional sulcal effacement without significant midline shift.  3. Small area of soft tissue swelling over the forehead.  4. Stable 6 mm anterolisthesis of C2-3.  Stable T2 and T3 mild compression fractures.  No evidence of traumatic malalignment or fracture.  5. Additional findings as above.  Report was flagged in Epic as abnormal.    The critical finding above was discovered/received at 15:10.  The critical information above was relayed directly by Dr. Raheem Donahue by telephone to Dr. Oleg Ferguson on 10/3/2022 at 15:15.    Electronically signed by resident: Raheem Donahue  Date:    10/03/2022  Time:    14:45    Electronically signed by: Mario Alberto Knight MD  Date:    10/03/2022  Time:    17:08   CT Thoracic Spine Without Contrast    Impression    1. Significant exam limitations as detailed above  2. Numerous compression fractures of the thoracic and lumbar spine as above, with new fractures seen at T3 and T9.  Correlate with point tenderness.      Electronically signed by: Lc Caballero  Date:    10/03/2022  Time:    22:18   CT Cervical Spine Without Contrast    Impression    1. Examination is significantly degraded by suboptimal patient positioning and motion artifact.  2. Hypodense extra-axial collection overlying the right cerebral hemisphere, new from 09/19/2022, suspicious for subdural effusion or chronic hematoma.  Mild regional sulcal effacement  without significant midline shift.  3. Small area of soft tissue swelling over the forehead.  4. Stable 6 mm anterolisthesis of C2-3.  Stable T2 and T3 mild compression fractures.  No evidence of traumatic malalignment or fracture.  5. Additional findings as above.  Report was flagged in Epic as abnormal.    The critical finding above was discovered/received at 15:10.  The critical information above was relayed directly by Dr. Raheem Donahue by telephone to Dr. Oleg Ferguson on 10/3/2022 at 15:15.    Electronically signed by resident: Raheem Donahue  Date:    10/03/2022  Time:    14:45    Electronically signed by: Mario Alberto Knight MD  Date:    10/03/2022  Time:    17:08   CT Head Without Contrast    Impression    Stable small right subdural hygroma versus chronic subdural hematoma with minimal mass effect as above and no evidence of significant midline shift.      Electronically signed by: Lc Caballero  Date:    10/03/2022  Time:    21:15   Results for orders placed or performed during the hospital encounter of 07/07/22 (from the past 2160 hour(s))   CTA Head and Neck (xpd)    Impression    1. Evolving appearance of trace subarachnoid hemorrhage along the medial left temporal lobe, overall unchanged from exam.  No significant interval detrimental change compared to prior head CT of 07/07/2022.  2. Allowing for motion artifact, CTA head and neck appears to be within normal limits.  3. Fluid in the esophagus at the level of the aortic arch.  Correlation for reflux/dysmotility.  4. Redemonstration of anterolisthesis of C2 on C3 and compression deformities of the T2 and T3 vertebral bodies, unchanged.  5.  Additional findings as above.  This report was flagged in Epic as abnormal.    Electronically signed by resident: Charles Bennett  Date:    07/08/2022  Time:    05:06    Electronically signed by: Marisol Santiago MD  Date:    07/08/2022  Time:    07:10     *Note: Due to a large number of results and/or encounters for the  requested time period, some results have not been displayed. A complete set of results can be found in Results Review.     Results for orders placed or performed during the hospital encounter of 07/07/22 (from the past 2160 hour(s))   MRI Thoracic Spine Without Contrast    Impression    1. Compression fracture of the T8 vertebral body, with near complete vertebral height loss and 6 mm retropulsion of the fracture fragments.  No significant central canal stenosis or abnormal cord signal at this level.  Overall appearance similar to prior.  2. Redemonstration of 6 mm anterolisthesis of C2 on C3.  Short-segment of STIR signal hyperintensity, extending from the C2-C3 to C3-C4 levels, concerning for cord edema/malacia.  3. Similar appearance compression deformities of the T2, T3, T10, L2 vertebral bodies.  L3 compression fracture outside the field of view of this study.  4. Degenerative changes as above.  Moderate to severe canal stenosis at C2-C4.  Severe left neural foraminal narrowing at C2-C3.  5.  Additional findings as above.  This report was flagged in Epic as abnormal.    Electronically signed by resident: Charles Bennett  Date:    07/07/2022  Time:    21:18    Electronically signed by: Krishna Torres MD  Date:    07/07/2022  Time:    22:18   MRI Cervical Spine Without Contrast    Impression    1. Compression fracture of the T8 vertebral body, with near complete vertebral height loss and 6 mm retropulsion of the fracture fragments.  No significant central canal stenosis or abnormal cord signal at this level.  Overall appearance similar to prior.  2. Redemonstration of 6 mm anterolisthesis of C2 on C3.  Short-segment of STIR signal hyperintensity, extending from the C2-C3 to C3-C4 levels, concerning for cord edema/malacia.  3. Similar appearance compression deformities of the T2, T3, T10, L2 vertebral bodies.  L3 compression fracture outside the field of view of this study.  4. Degenerative changes as above.  Moderate  to severe canal stenosis at C2-C4.  Severe left neural foraminal narrowing at C2-C3.  5.  Additional findings as above.  This report was flagged in Epic as abnormal.    Electronically signed by resident: Charles Bennett  Date:    07/07/2022  Time:    21:18    Electronically signed by: Krishna Torres MD  Date:    07/07/2022  Time:    22:18     *Note: Due to a large number of results and/or encounters for the requested time period, some results have not been displayed. A complete set of results can be found in Results Review.         Assessment/Plan:     Closed wedge compression fracture of T3 vertebra  Yvette Crews is a 72 y.o. female with a complicated past medical history and pertinent orthopedic past medical history of osteopenia, sacral fracture, distal clavicle fracture, and multiple compression fractures throughout the thoracic and lumbar spine presenting the [facility] Emergency Department after a fall.    - On exam patient has point tenderness at the cervical spine upper thoracic spine, the upper thoracic spine tenderness correlates with newfound T3 compression fracture, no cervical spine tenderness does not have clinical correlation with imaging as CT cervical spine was without any new evidence of compression fracture, will order x-rays of cervical spine to assess for overall alignment and characteristics of cervical vertebrae  - Examination is severely limited due to patient's mental status, no on my exam it appeared that the patient was cooperative and was able to respond to my questions (able to state that sensation to light touch was intact, able to move extremities on command) incomplete in formal neurologic evaluation is difficult to obtain at this time, the patient is able to move extremities on command but formal strength testing was unable to be obtained but it should be noted that patient is overall very weak and cachectic at baseline  - Continue cervical collar until cleared after  imaging review with staff  - Intranasal calcitonin ordered for pain control compression fractures  - Weight-bearing as tolerated, range of motion as tolerated for compression fractures of thoracic and lumbar spine  - Will discuss with staff          Logan Caballero MD  Orthopedics  [facility] - Telemetry Stepdown ([place])

## 2022-10-04 NOTE — ASSESSMENT & PLAN NOTE
72 F with h/o dementia, extensive cardiac history including Watchman device/multiple RFA presenting s/p fall; CTH with concern for chronic subdural hematoma vs hygroma, not seen on recent CT 9/19/22:    --Admitted to  for syncopal work up given reported increase fall frequency (recently in ED 9/19/22), extensive cardiac history  --No acute neurosurgical intervention.   --All labs and diagnostics reviewed.    --Patient w/o acute blood on CTH. Noted chronic hematoma vs hygroma on CT w/o mass effect   --Repeat CTH grossly stable.   --Hold anti-plt/coag medications - patient reportedly off ASA/Xarelto/Plavix -continue to hold  --SBP <160  --Na >135  --No evidence of seizure activity, AED not indicated at this time  --HOB >30  --Continue to monitor clinically, notify NSGY immediately with any changes in neuro status  --Neck pain/additional work up per ED/Ortho; consider C collar until C spine can be cleared given neck pain on exam.    Dispo: ongoing    D/w Dr. Washington

## 2022-10-04 NOTE — ASSESSMENT & PLAN NOTE
Gait disturbance  Cervical stenosis of spinal canal  Pathological fracture due to osteoporosis  Closed wedge compression fracture of T3 vertebra    [place] [facility] [place] at [facility] earlier today. From chart review, she was ambulating with a walker a few months ago, required wheelchair after fall resulting in left clavicle fracture 7/3/22. Continues to have frequent falls and be NWB in wheelchair.    CTH with concern for chronic subdural hematoma vs hygroma, not seen on recent CT 9/19/22  NSGY consulted in ED, appreicate recommendations:  --no acute NSGY intervention, recommend syncopal work up given extensive cardiac history  --continue to hold ASA/Xarelto/Plavix\  --SBP <160  --Na >135  --No evidence of seizure activity, AED not indicated at this time  --HOB >30  --Follow-up full labs (CBC/CMP/PT-INR/PTT/T&S/UA)  --Continue to monitor clinically, notify NSGY immediately with any changes in neuro status  --Neck pain/additional work up per ED/Ortho; consider C collar until C spine can be cleared given neck pain on exam.    Spine imagining with numerous compression fractures of the thoracic and lumbar spine with new fractures seen at T3 and T9. Stable 6 mm anterolisthesis of C2-3   Ortho consulted in ED, appreciate recommendations:  --Continue cervical collar until cleared after imaging review with staff  --Intranasal calcitonin ordered for pain control compression fractures  --Weight-bearing as tolerated, range of motion as tolerated for compression fractures of thoracic and lumbar spine  --Will discuss with staff    -Pain control prn  -PT/OT consulted, however ability to participate is questionable   -orthostatic vitals, echo ordered for syncope work up, though from the history this was a witnessed mechanical fall with no LOC

## 2022-10-04 NOTE — ASSESSMENT & PLAN NOTE
Patient with Paroxysmal (<7 days) atrial fibrillation which is controlled currently with without medications. Patient is currently in sinus rhythm.[id number] Score: 2. HASBLED Score: Unable to calculate. Anticoagulation not indicated due to watchman device. Previously on Xarelto - discontinued months ago, continue to hold per neurosurgery   EKG 10/3/22 NSR  JAMEL 4/4/22 EF 60%

## 2022-10-04 NOTE — PLAN OF CARE
10/04/22 0030   Post-Acute Status   Post-Acute Authorization Other  (Patient to be discharged to [facility] upon discharge)   Coverage Medicare A & B and BCBS Medicare   Other Status Community [facility]   Discharge Delays None known at this time   Discharge Plan   Discharge Plan A Psychiatric hospital;Skilled Nursing Facility   Discharge Plan B Psychiatric hospital;Skilled Nursing Facility       Spoke with patients spouse Pavel, patient has a previous established bed at [facility].    SANDI Salomon, MSW-LMSW  Medical Social Worker/  ER Department

## 2022-10-04 NOTE — ASSESSMENT & PLAN NOTE
Chronic, stable  -Not on any medications per chart review  -prn medications for systolic>160 (NSGY recs)

## 2022-10-04 NOTE — PT/OT/SLP EVAL
"Occupational Therapy   Evaluation    Name: Yvette Crews  MRN: [id number]  Admitting Diagnosis:  Frequent falls  Recent Surgery: * No surgery found *      Recommendations:     Discharge Recommendations: nursing facility, skilled  Discharge Equipment Recommendations:     Barriers to discharge:  Decreased caregiver support    Assessment:     Yvette Crews is a 72 y.o. female with a medical diagnosis of Frequent falls.  Performance deficits affecting function: weakness, impaired endurance, impaired self care skills, impaired functional mobility, gait instability, impaired cognition, decreased upper extremity function, decreased safety awareness, pain, orthopedic precautions, impaired fine motor, decreased ROM.      Rehab Prognosis: Fair; patient would benefit from acute skilled OT services to address these deficits and reach maximum level of function.       Plan:     Patient to be seen 2 x/week to address the above listed problems via self-care/home management, therapeutic activities, therapeutic exercises, neuromuscular re-education, cognitive retraining  Plan of Care Expires: 11/03/22  Plan of Care Reviewed with: patient    Subjective     Chief Complaint: "It helps me feel better" pt. response when asking about yelling/screaming upon enter the room.  Patient/Family Comments/goals: none stated at this time    Occupational Profile:  Living Environment: [facility] resident , with her    Previous level of function: Mod I assistance from [facility] Staff  Roles and Routines: wife  Equipment Used at Home:  walker, standard, wheelchair  Assistance upon Discharge:  and Staff at [facility],however pt. D/c plan is to attend a roger-psych facility.    Pain/Comfort:  Pain Rating 1: 0/10  Pain Addressed 1: Reposition    Patients cultural, spiritual, Tenriism conflicts given the current situation: no    Objective:     Communicated with: RN prior to session.  Patient found with PAULA skinner " with HOB elevated with bed alarm, peripheral IV, telemetry upon OT entry to room.    General Precautions: Standard, fall   Orthopedic Precautions:spinal precautions   Braces: Cervical collar  Respiratory Status: Room air    Occupational Performance:    Bed Mobility:    Patient completed Scooting/Bridging with total assistance  Patient completed Supine to Sit with total assistance  Patient completed Sit to Supine with total assistance  Pt. Sat at EOB ~ 10 mins with Total assistance to CGA momentarily    Functional Mobility/Transfers:  Deferred at time of session 2/2 pt. Demonstrating poor sitting balance     Activities of Daily Living:  Feeding:  supervision at bed level to eat crackers  Grooming: moderate assistance at EOB to wash face    Cognitive/Visual Perceptual:  Cognitive/Psychosocial Skills:     -       Oriented to: Person, Place, and Time   -       Follows Commands/attention:Follows one-step commands  -       Memory: LTM intact   -       Safety awareness/insight to disability: impaired   -       Mood/Affect/Coping skills/emotional control: Cooperative and Withdrawn    Physical Exam:   Throughout evaluation pt's performance and participation was inconsistent 2/2 to cognition (Per chart review pt. Has dementia)  Balance:    -       Static Sitting: Total A with Right lateral L throughout the session  -       Dynamic Sitting:  Total A  Postural examination/scapula alignment:    -       Kyphosis, cervical flexion   Skin integrity: Thin  Dominant hand:    -       Right  Bilateral Upper Extremity Range of Motion: seemingly WFL pt. Presents with crepitus   Bilateral Upper Extremity Strength: seemingly  WFL pt. Presents with crepitus   Bilateral  Strength: seemingly  WFL     AMPAC 6 Click ADL:  AMPAC Total Score: 12    Treatment & Education:  Pt. Educated on the role of OT and OT goal   Pt. Educated on spinal precautions.  Pt. Educated on the importance of OOB and EOB activities     POC reviewed with  pt.    Patient left HOB elevated with all lines intact and call button in reach    GOALS:   Multidisciplinary Problems       Occupational Therapy Goals          Problem: Occupational Therapy    Goal Priority Disciplines Outcome Interventions   Occupational Therapy Goal     OT, PT/OT     Description: Goals to be met by: 10/11/22    Patient will increase functional independence with ADLs by performing:    LE Dressing with Maximum Assistance.  Grooming while EOB with Minimum Assistance.  Toileting from bedside commode with Maximum Assistance for hygiene and clothing management.   Supine to sit with Maximum Assistance.  Toilet transfer to bedside commode with Maximum Assistance.                         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    Cataract     Cellulitis of right lower extremity 1/19/2021    Chondrocalcinosis, cause unspecified, involving hand(712.34) 7/12/2011     Dx updated per 2019 IMO Load    Chronic sinusitis 4/6/2017    Colonic constipation 6/5/2013    Concussion 10/27/2016    Frequent falls 3/14/2017    Gait disturbance 3/14/2017    History of cardiac radiofrequency ablation (RFA) 2/3/2018    History of cardiac radiofrequency ablation (RFA) 2/3/2018    History of subarachnoid hemorrhage 10/30/2016    Hypogammaglobulinemia 9/7/2011    Irritable bowel syndrome 12/18/2015    Major depressive disorder, recurrent episode, in full remission 8/19/2010    Onychomycosis     Osteoarthritis     Postoperative hypothyroidism 12/18/2015    Presence of Watchman left atrial appendage closure device 5/14/2022    Rectal prolapse 6/5/2013    Reflux esophagitis 2/7/2010    Status post thyroidectomy 6/1/2017    Strabismus     SVT (supraventricular tachycardia) 11/22/2013     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     Underweight 1/23/2013         Past Surgical History:   Procedure Laterality Date    BREAST CYST ASPIRATION      x2    CATARACT EXTRACTION W/  INTRAOCULAR LENS IMPLANT Left 3/11/2019    Procedure: EXTRACTION, CATARACT, WITH IOL INSERTION;  Surgeon: Vera Sams MD;  Location: [facility];  Service: Ophthalmology;  Laterality: Left;    CATARACT EXTRACTION W/  INTRAOCULAR LENS IMPLANT Right 4/15/2019    Procedure: EXTRACTION, CATARACT, WITH IOL INSERTION LASER;  Surgeon: Vera Sams MD;  Location: [facility];  Service: Ophthalmology;  Laterality: Right;    COLON SURGERY      EYE SURGERY      FRACTURE SURGERY      NASAL SEPTUM SURGERY      OCCLUSION OF LEFT ATRIAL APPENDAGE N/A 2/18/2022    Procedure: Left atrial appendage occlusion;  Surgeon: Chase Gill MD;  Location: [facility] EP [facility];  Service: Cardiology;  Laterality: N/A;  afib, watchman, BSCI, osiris, anes, MB/EH, 3prep    OPEN REDUCTION AND INTERNAL FIXATION (ORIF) OF INJURY OF SACROILIAC JOINT Bilateral 1/20/2021    Procedure: ORIF, SACROILIAC JOINT;  Surgeon: Alcides Tamez MD;  Location: [address];  Service: Orthopedics;  Laterality: Bilateral;    RADIOFREQUENCY ABLATION      RECTAL PROLAPSE REPAIR  june 2013    STRABISMUS SURGERY      TONSILLECTOMY      TRANSESOPHAGEAL ECHOCARDIOGRAPHY N/A 2/18/2022    Procedure: ECHOCARDIOGRAM, TRANSESOPHAGEAL;  Surgeon: Nils Diagnostic Provider;  Location: [facility] EP LAB;  Service: Cardiology;  Laterality: N/A;       Time Tracking:     OT Date of Treatment: 10/04/22  OT Start Time: 0958  OT Stop Time: 1016  OT Total Time (min): 18 min    Billable Minutes:Evaluation 8  Self Care/Home Management 10    10/4/2022

## 2022-10-05 LAB
ALBUMIN SERPL BCP-MCNC: 2.6 G/DL (ref 3.5–5.2)
ALP SERPL-CCNC: 107 U/L (ref 55–135)
ALT SERPL W/O P-5'-P-CCNC: 13 U/L (ref 10–44)
ANION GAP SERPL CALC-SCNC: 9 MMOL/L (ref 8–16)
AST SERPL-CCNC: 18 U/L (ref 10–40)
BASOPHILS # BLD AUTO: 0.01 K/UL (ref 0–0.2)
BASOPHILS NFR BLD: 0.2 % (ref 0–1.9)
BILIRUB SERPL-MCNC: 0.6 MG/DL (ref 0.1–1)
BUN SERPL-MCNC: 26 MG/DL (ref 8–23)
CALCIUM SERPL-MCNC: 7.7 MG/DL (ref 8.7–10.5)
CHLORIDE SERPL-SCNC: 105 MMOL/L (ref 95–110)
CO2 SERPL-SCNC: 23 MMOL/L (ref 23–29)
CREAT SERPL-MCNC: 0.6 MG/DL (ref 0.5–1.4)
DIFFERENTIAL METHOD: ABNORMAL
EOSINOPHIL # BLD AUTO: 0 K/UL (ref 0–0.5)
EOSINOPHIL NFR BLD: 0.2 % (ref 0–8)
ERYTHROCYTE [DISTWIDTH] IN BLOOD BY AUTOMATED COUNT: 13.5 % (ref 11.5–14.5)
EST. GFR  (NO RACE VARIABLE): >60 ML/MIN/1.73 M^2
GLUCOSE SERPL-MCNC: 80 MG/DL (ref 70–110)
HCT VFR BLD AUTO: 36.4 % (ref 37–48.5)
HGB BLD-MCNC: 11.2 G/DL (ref 12–16)
IMM GRANULOCYTES # BLD AUTO: 0.02 K/UL (ref 0–0.04)
IMM GRANULOCYTES NFR BLD AUTO: 0.4 % (ref 0–0.5)
LYMPHOCYTES # BLD AUTO: 1 K/UL (ref 1–4.8)
LYMPHOCYTES NFR BLD: 17.4 % (ref 18–48)
MCH RBC QN AUTO: 30.3 PG (ref 27–31)
MCHC RBC AUTO-ENTMCNC: 30.8 G/DL (ref 32–36)
MCV RBC AUTO: 98 FL (ref 82–98)
MONOCYTES # BLD AUTO: 0.5 K/UL (ref 0.3–1)
MONOCYTES NFR BLD: 9 % (ref 4–15)
NEUTROPHILS # BLD AUTO: 4 K/UL (ref 1.8–7.7)
NEUTROPHILS NFR BLD: 72.8 % (ref 38–73)
NRBC BLD-RTO: 0 /100 WBC
PLATELET # BLD AUTO: 281 K/UL (ref 150–450)
PMV BLD AUTO: 10.4 FL (ref 9.2–12.9)
POCT GLUCOSE: 114 MG/DL (ref 70–110)
POCT GLUCOSE: 148 MG/DL (ref 70–110)
POCT GLUCOSE: 152 MG/DL (ref 70–110)
POCT GLUCOSE: 65 MG/DL (ref 70–110)
POCT GLUCOSE: 73 MG/DL (ref 70–110)
POTASSIUM SERPL-SCNC: 3.4 MMOL/L (ref 3.5–5.1)
PROT SERPL-MCNC: 6.2 G/DL (ref 6–8.4)
RBC # BLD AUTO: 3.7 M/UL (ref 4–5.4)
SODIUM SERPL-SCNC: 137 MMOL/L (ref 136–145)
WBC # BLD AUTO: 5.46 K/UL (ref 3.9–12.7)

## 2022-10-05 PROCEDURE — 25000003 PHARM REV CODE 250

## 2022-10-05 PROCEDURE — 85025 COMPLETE CBC W/AUTO DIFF WBC: CPT

## 2022-10-05 PROCEDURE — 99214 PR OFFICE/OUTPT VISIT, EST, LEVL IV, 30-39 MIN: ICD-10-PCS | Mod: GC,,, | Performed by: STUDENT IN AN ORGANIZED HEALTH CARE EDUCATION/TRAINING PROGRAM

## 2022-10-05 PROCEDURE — G0378 HOSPITAL OBSERVATION PER HR: HCPCS

## 2022-10-05 PROCEDURE — 99219 PR INITIAL OBSERVATION CARE,LEVL II: CPT | Mod: ,,, | Performed by: ORTHOPAEDIC SURGERY

## 2022-10-05 PROCEDURE — 80053 COMPREHEN METABOLIC PANEL: CPT

## 2022-10-05 PROCEDURE — 99219 PR INITIAL OBSERVATION CARE,LEVL II: ICD-10-PCS | Mod: ,,, | Performed by: ORTHOPAEDIC SURGERY

## 2022-10-05 PROCEDURE — 36415 COLL VENOUS BLD VENIPUNCTURE: CPT

## 2022-10-05 PROCEDURE — 99214 OFFICE O/P EST MOD 30 MIN: CPT | Mod: GC,,, | Performed by: STUDENT IN AN ORGANIZED HEALTH CARE EDUCATION/TRAINING PROGRAM

## 2022-10-05 PROCEDURE — 99225 PR SUBSEQUENT OBSERVATION CARE,LEVEL II: ICD-10-PCS | Mod: ,,, | Performed by: INTERNAL MEDICINE

## 2022-10-05 PROCEDURE — 99225 PR SUBSEQUENT OBSERVATION CARE,LEVEL II: CPT | Mod: ,,, | Performed by: INTERNAL MEDICINE

## 2022-10-05 RX ADMIN — POLYETHYLENE GLYCOL 3350 17 G: 17 POWDER, FOR SOLUTION ORAL at 09:10

## 2022-10-05 RX ADMIN — CALCITONIN SALMON 1 SPRAY: 200 SPRAY, METERED NASAL at 09:10

## 2022-10-05 RX ADMIN — Medication 16 G: at 09:10

## 2022-10-05 NOTE — PROGRESS NOTES
[facility] - Observation 11H  Neurosurgery  Progress Note    Subjective:     History of Present Illness: 72 F with h/o dementia, extensive cardiac history requiring radiofrequency ablations/Watchman device presenting s/p witnessed fall from wheelchair. No LOC. Patient is a resident of the [facility], was supposed to be going to [facility] today for geriatric psych appointment per ED [place] [facility] [place] Reportedly patient with increased behavioral issues and falls.     Patient is poor historian given dementia and unable to provide full collateral history. History of previously being on asa/plavix/xarelto but per ED that has been discontinued at least as of July 2022.     Has laceration to forehead that was repaired by ED.      Reportedly normally oriented to self only, yells often, agitated.      Post-Op Info:  * No surgery found *         Interval History: 10/5: No acute events overnight. AFVSS. Neuro stable. F/u CTH for stability today.    Medications:  Continuous Infusions:  Scheduled Meds:   calcitonin (salmon)  1 spray Alternating Nostrils Daily    polyethylene glycol  17 g Oral Daily     PRN Meds:acetaminophen, acetaminophen, albuterol-ipratropium, aluminum-magnesium hydroxide-simethicone, bisacodyL, dextrose 10%, dextrose 10%, glucagon (human recombinant), glucose, glucose, melatonin, naloxone, ondansetron, prochlorperazine, simethicone, sodium chloride 0.9%     Review of Systems  Objective:     Weight: 39.9 kg (88 lb)  Body mass index is 16.1 kg/m².  Vital Signs (Most Recent):  Temp: 97.6 °F (36.4 °C) (10/05/22 1138)  Pulse: 97 (10/05/22 1138)  Resp: 17 (10/05/22 1138)  BP: 125/78 (10/05/22 1138)  SpO2: 96 % (10/05/22 1138)   Vital Signs (24h Range):  Temp:  [97.5 °F (36.4 °C)-98.5 °F (36.9 °C)] 97.6 °F (36.4 °C)  Pulse:  [66-98] 97  Resp:  [16-17] 17  SpO2:  [94 %-100 %] 96 %  BP: (115-163)/(64-78) 125/78                          Physical Exam    Neurosurgery Physical Exam    Neurosurgery  Physical Exam  General: extremely cachectic, contracted  Head: Non-traumatic, normocephalic  Eyes: Pupils equal, EOMi  Neck: +tenderness to palpation  CVS: Normal rate and rhythm, distal pulses present  Pulm: Symmetric expansion, no respiratory distress  GI: Abdomen nondistended, nontender  MSK: bruising LUE, multiple bruises on BLE  Skin: see MSK  Psych: confused, baseline dementia  Neuro: AOx2 to self and place, GCS E4V4M6  CNII-XII: PERRL, unable to participate with full CN exam  Extremities:  Motor:  Moves all limbs spontaneously legs>uppers. Keeps BUE contracted near face. Will squeeze hands and wiggle toes. Lifts BLE off bed.      Reflexes:     Patient with significant contractures, will not relax for full reflex exam     Sensory:      Sensorium intact throughout, no sensory level present     Coordination:      Coordination intact throughout     Cervical Spine:      Midline TTP: Pos     Thoracic Spine:     Midline TTP: Negative     Lumbar Spine:     Midline TTP: Negative    Significant Labs:  Recent Labs   Lab 10/03/22  1856 10/04/22  0530 10/05/22  0448   GLU 94 83 80    137 137   K 3.9 3.7 3.4*    106 105   CO2 26 20* 23   BUN 24* 25* 26*   CREATININE 0.5 0.6 0.6   CALCIUM 8.3* 8.3* 7.7*       Recent Labs   Lab 10/03/22  1856 10/04/22  0530 10/05/22  0448   WBC 7.22 6.87 5.46   HGB 11.5* 11.5* 11.2*   HCT 36.7* 36.9* 36.4*    280 281       Recent Labs   Lab 10/03/22  1856   INR 1.1   APTT 28.8       Microbiology Results (last 7 days)       ** No results found for the last 168 hours. **          All pertinent labs from the last 24 hours have been reviewed.    Significant Diagnostics:  I have reviewed all pertinent imaging results/findings within the past 24 hours.  X-Ray Thoracic Spine AP Lateral    Result Date: 10/3/2022  1. Compression deformities of T9-L3 vertebral bodies, which are new and/or progressed compared to MRI thoracic spine 07/07/2022. 2. Known T8 vertebral body compression  deformity with progressive height loss. This report was flagged in Epic as abnormal. Electronically signed by resident: Natalie Michele Date:    10/03/2022 Time:    20:33 Electronically signed by: Cholo Cadet Date:    10/03/2022 Time:    21:08    X-Ray Wrist Complete Left    Result Date: 10/3/2022  No acute osseous abnormality of the left wrist. Chondrocalcinosis which can be seen with CPPD. Electronically signed by: Lc Caballero Date:    10/03/2022 Time:    18:52    X-Ray Hip 2 or 3 views Right (with Pelvis when performed)    Result Date: 10/3/2022  1. No acute displaced fracture or dislocation of the right hip. Electronically signed by: Marc Amor MD Date:    10/03/2022 Time:    18:47    CT Head Without Contrast    Result Date: 10/3/2022  Stable small right subdural hygroma versus chronic subdural hematoma with minimal mass effect as above and no evidence of significant midline shift. Electronically signed by: Lc Caballero Date:    10/03/2022 Time:    21:15    CT Thoracic Spine Without Contrast    Result Date: 10/3/2022  1. Significant exam limitations as detailed above 2. Numerous compression fractures of the thoracic and lumbar spine as above, with new fractures seen at T3 and T9.  Correlate with point tenderness. Electronically signed by: Lc Caballero Date:    10/03/2022 Time:    22:18       Assessment/Plan:     * Frequent falls  72 F with h/o dementia, extensive cardiac history including Watchman device/multiple RFA presenting s/p fall; CTH with concern for chronic subdural hematoma vs hygroma, not seen on recent CT 9/19/22:    --Admitted to  for syncopal work up given reported increase fall frequency (recently in ED 9/19/22), extensive cardiac history  --No acute neurosurgical intervention.   --All labs and diagnostics reviewed.    --Patient w/o acute blood on CTH. Noted chronic hematoma vs hygroma on CT w/o mass effect   --Repeat CTH grossly stable.   --Follow up repeat CTH today for stability    --Hold anti-plt/coag medications - patient reportedly off ASA/Xarelto/Plavix -continue to hold  --SBP <160  --Na >135  --No evidence of seizure activity, AED not indicated at this time  --HOB >30  --Continue to monitor clinically, notify NSGY immediately with any changes in neuro status  --Neck pain/additional work up per ED/Ortho; consider C collar until C spine can be cleared given neck pain on exam.    Dispo: ongoing    D/w Dr. Felix Mehta MD  Neurosurgery  [facility] - Observation 11H

## 2022-10-05 NOTE — ASSESSMENT & PLAN NOTE
Patient with Paroxysmal (<7 days) atrial fibrillation which is controlled currently with without medications. Patient is currently in sinus rhythm.[id number] Score: 2. HASBLED Score: Unable to calculate. Anticoagulation not indicated due to watchman device. Previously on Xarelto - discontinued months ago, continue to hold per neurosurgery   EKG 10/3/22 NSR  JAMEL 4/4/22 EF 60%

## 2022-10-05 NOTE — NURSING
Call placed to MRI to check on the status of pending MRI from 10/3/22. I was told that per rad note, they were waiting from ortho to see if patient needed the exam. Dr. Amin notified of previously mentioned via secure chat.

## 2022-10-05 NOTE — CARE UPDATE
Patient with several thoracic and lumbar chronic fractures with acute compression fractures at T3 and T9.  CT C-spine stable - can clear C-collar.  Given no evidence of posterior column involvement of compression fractures on CT, unlikely to have any injury to the posterior ligamentous complex.  After discussion with staff, patient can be weightbear as tolerated, and would recommend TLSO if still has continued pain during weight-bearing.  Otherwise MRI of spine unlikely to  given her presentation and exam so can be done as outpatient if needed.  Recommend chemical DVT prophylaxis while in house given her lack of mobility, Intranasal calcitonin, and PT/OT daily.  No acute orthopedic intervention at this time.  Will schedule follow-up in our clinic.

## 2022-10-05 NOTE — ASSESSMENT & PLAN NOTE
Gait disturbance  Cervical stenosis of spinal canal  Pathological fracture due to osteoporosis  Closed wedge compression fracture of T3 vertebra     Patient presents to [facility] after a witnessed fall from wheelchair at SNF earlier today. From chart review, she was ambulating with a walker a few months ago, required wheelchair after fall resulting in left clavicle fracture 7/3/22. Continues to have frequent falls and be NWB in wheelchair.     CTH with concern for chronic subdural hematoma vs hygroma, not seen on recent CT 9/19/22  NSGY consulted in ED, appreicate recommendations:  --no acute NSGY intervention, recommend syncopal work up given extensive cardiac history  --continue to hold ASA/Xarelto/Plavix\  --SBP <160  --Na >135  --No evidence of seizure activity, AED not indicated at this time  --HOB >30  --Follow-up full labs (CBC/CMP/PT-INR/PTT/T&S/UA)  --Continue to monitor clinically, notify NSGY immediately with any changes in neuro status  --Neck pain/additional work up per ED/Ortho; consider C collar until C spine can be cleared given neck pain on exam.     Spine imagining with numerous compression fractures of the thoracic and lumbar spine with new fractures seen at T3 and T9. Stable 6 mm anterolisthesis of C2-3   Ortho consulted in ED, appreciate recommendations:  --Continue cervical collar until cleared after imaging review with staff  --Intranasal calcitonin ordered for pain control compression fractures  --Weight-bearing as tolerated, range of motion as tolerated for compression fractures of thoracic and lumbar spine  --Will discuss with staff     -Pain control prn  -PT/OT consulted, however ability to participate is questionable   -orthostatic vitals, echo ordered for syncope work up, though from the history this was a witnessed mechanical fall with no LOC

## 2022-10-05 NOTE — SUBJECTIVE & OBJECTIVE
Interval History: 10/5: No acute events overnight. AFVSS. Neuro stable. F/u CTH for stability today.    Medications:  Continuous Infusions:  Scheduled Meds:   calcitonin (salmon)  1 spray Alternating Nostrils Daily    polyethylene glycol  17 g Oral Daily     PRN Meds:acetaminophen, acetaminophen, albuterol-ipratropium, aluminum-magnesium hydroxide-simethicone, bisacodyL, dextrose 10%, dextrose 10%, glucagon (human recombinant), glucose, glucose, melatonin, naloxone, ondansetron, prochlorperazine, simethicone, sodium chloride 0.9%     Review of Systems  Objective:     Weight: 39.9 kg (88 lb)  Body mass index is 16.1 kg/m².  Vital Signs (Most Recent):  Temp: 97.6 °F (36.4 °C) (10/05/22 1138)  Pulse: 97 (10/05/22 1138)  Resp: 17 (10/05/22 1138)  BP: 125/78 (10/05/22 1138)  SpO2: 96 % (10/05/22 1138)   Vital Signs (24h Range):  Temp:  [97.5 °F (36.4 °C)-98.5 °F (36.9 °C)] 97.6 °F (36.4 °C)  Pulse:  [66-98] 97  Resp:  [16-17] 17  SpO2:  [94 %-100 %] 96 %  BP: (115-163)/(64-78) 125/78                          Physical Exam    Neurosurgery Physical Exam    Neurosurgery Physical Exam  General: extremely cachectic, contracted  Head: Non-traumatic, normocephalic  Eyes: Pupils equal, EOMi  Neck: +tenderness to palpation  CVS: Normal rate and rhythm, distal pulses present  Pulm: Symmetric expansion, no respiratory distress  GI: Abdomen nondistended, nontender  MSK: bruising LUE, multiple bruises on BLE  Skin: see MSK  Psych: confused, baseline dementia  Neuro: AOx2 to self and place, GCS E4V4M6  CNII-XII: PERRL, unable to participate with full CN exam  Extremities:  Motor:  Moves all limbs spontaneously legs>uppers. Keeps BUE contracted near face. Will squeeze hands and wiggle toes. Lifts BLE off bed.      Reflexes:     Patient with significant contractures, will not relax for full reflex exam     Sensory:      Sensorium intact throughout, no sensory level present     Coordination:      Coordination intact throughout      Cervical Spine:      Midline TTP: Pos     Thoracic Spine:     Midline TTP: Negative     Lumbar Spine:     Midline TTP: Negative    Significant Labs:  Recent Labs   Lab 10/03/22  1856 10/04/22  0530 10/05/22  0448   GLU 94 83 80    137 137   K 3.9 3.7 3.4*    106 105   CO2 26 20* 23   BUN 24* 25* 26*   CREATININE 0.5 0.6 0.6   CALCIUM 8.3* 8.3* 7.7*       Recent Labs   Lab 10/03/22  1856 10/04/22  0530 10/05/22  0448   WBC 7.22 6.87 5.46   HGB 11.5* 11.5* 11.2*   HCT 36.7* 36.9* 36.4*    280 281       Recent Labs   Lab 10/03/22  1856   INR 1.1   APTT 28.8       Microbiology Results (last 7 days)       ** No results found for the last 168 hours. **          All pertinent labs from the last 24 hours have been reviewed.    Significant Diagnostics:  I have reviewed all pertinent imaging results/findings within the past 24 hours.  X-Ray Thoracic Spine AP Lateral    Result Date: 10/3/2022  1. Compression deformities of T9-L3 vertebral bodies, which are new and/or progressed compared to MRI thoracic spine 07/07/2022. 2. Known T8 vertebral body compression deformity with progressive height loss. This report was flagged in Epic as abnormal. Electronically signed by resident: Natalie Michele Date:    10/03/2022 Time:    20:33 Electronically signed by: Cholo Cadet Date:    10/03/2022 Time:    21:08    X-Ray Wrist Complete Left    Result Date: 10/3/2022  No acute osseous abnormality of the left wrist. Chondrocalcinosis which can be seen with CPPD. Electronically signed by: Lc Caballero Date:    10/03/2022 Time:    18:52    X-Ray Hip 2 or 3 views Right (with Pelvis when performed)    Result Date: 10/3/2022  1. No acute displaced fracture or dislocation of the right hip. Electronically signed by: Marc Amor MD Date:    10/03/2022 Time:    18:47    CT Head Without Contrast    Result Date: 10/3/2022  Stable small right subdural hygroma versus chronic subdural hematoma with minimal mass effect as above  and no evidence of significant midline shift. Electronically signed by: Lc Caballero Date:    10/03/2022 Time:    21:15    CT Thoracic Spine Without Contrast    Result Date: 10/3/2022  1. Significant exam limitations as detailed above 2. Numerous compression fractures of the thoracic and lumbar spine as above, with new fractures seen at T3 and T9.  Correlate with point tenderness. Electronically signed by: Lc Caballero Date:    10/03/2022 Time:    22:18

## 2022-10-05 NOTE — SUBJECTIVE & OBJECTIVE
Interval History: Spoke to  who is an MD, he would like her to go from hospital to [facility] [place], which was the plan prior to fall.    Needs ortho consult was seen in ER by them but no f/u from spine service yet. MRI is pending.    PCR to r/u unlikely monkeypox is pending.    Review of Systems   Constitutional: Negative.    HENT: Negative.     Eyes: Negative.    Respiratory: Negative.     Cardiovascular: Negative.    Gastrointestinal: Negative.    Endocrine: Negative.    Genitourinary: Negative.    Musculoskeletal: Negative.    Allergic/Immunologic: Negative.    Neurological: Negative.    Hematological: Negative.    Psychiatric/Behavioral:  Positive for agitation, behavioral problems and confusion.    Objective:     Vital Signs (Most Recent):  Temp: 97.6 °F (36.4 °C) (10/05/22 1138)  Pulse: 97 (10/05/22 1138)  Resp: 17 (10/05/22 1138)  BP: 125/78 (10/05/22 1138)  SpO2: 96 % (10/05/22 1138) Vital Signs (24h Range):  Temp:  [97.5 °F (36.4 °C)-98.5 °F (36.9 °C)] 97.6 °F (36.4 °C)  Pulse:  [66-98] 97  Resp:  [16-17] 17  SpO2:  [94 %-100 %] 96 %  BP: (115-163)/(64-78) 125/78     Weight: 39.9 kg (88 lb)  Body mass index is 16.1 kg/m².    Intake/Output Summary (Last 24 hours) at 10/5/2022 1451  Last data filed at 10/5/2022 0241  Gross per 24 hour   Intake --   Output 0 ml   Net 0 ml      Physical Exam  Constitutional:       Appearance: She is ill-appearing.   HENT:      Head: Normocephalic.      Nose: Nose normal.      Mouth/Throat:      Mouth: Mucous membranes are dry.   Eyes:      Pupils: Pupils are equal, round, and reactive to light.   Cardiovascular:      Rate and Rhythm: Normal rate.   Pulmonary:      Effort: Pulmonary effort is normal.   Abdominal:      General: Abdomen is flat.   Musculoskeletal:         General: Normal range of motion.   Neurological:      Mental Status: Mental status is at baseline. She is disoriented.       Significant Labs: All pertinent labs within the past 24 hours have been  reviewed.  CBC:   Recent Labs   Lab 10/03/22  1856 10/04/22  0530 10/05/22  0448   WBC 7.22 6.87 5.46   HGB 11.5* 11.5* 11.2*   HCT 36.7* 36.9* 36.4*    280 281     CMP:   Recent Labs   Lab 10/03/22  1856 10/04/22  0530 10/05/22  0448    137 137   K 3.9 3.7 3.4*    106 105   CO2 26 20* 23   GLU 94 83 80   BUN 24* 25* 26*   CREATININE 0.5 0.6 0.6   CALCIUM 8.3* 8.3* 7.7*   PROT 6.8 6.9 6.2   ALBUMIN 2.9* 3.0* 2.6*   BILITOT 0.4 0.6 0.6   ALKPHOS 109 111 107   AST 18 18 18   ALT 15 14 13   ANIONGAP 9 11 9       Significant Imaging: I have reviewed all pertinent imaging results/findings within the past 24 hours.

## 2022-10-05 NOTE — PLAN OF CARE
"Plan of care reviewed with pt.  Pt restless and confused.  Pt responded correctly several times.  Pt recalls long term memory like where she was born, but cannot recall short term memories like how she fell.  Pt often attempts to exit bed, verbalizing that she "needs to get dressed".  Pt is agreeable and pleasant when position adjusted in bed.  Pt consumed 75% of orange juice and 90% of applejuice. Pt takes pills without issue- mimics eating after she swallows.  Pt urinated in diaper once, when bladder scanned 0ml shown in bladder.    Problem: Adult Inpatient Plan of Care  Goal: Plan of Care Review  Outcome: Ongoing, Progressing  Flowsheets (Taken 10/5/2022 0542)  Plan of Care Reviewed With: patient  Goal: Patient-Specific Goal (Individualized)  Outcome: Ongoing, Progressing  Goal: Optimal Comfort and Wellbeing  Outcome: Ongoing, Progressing     Problem: Infection  Goal: Absence of Infection Signs and Symptoms  Outcome: Ongoing, Progressing     Problem: Impaired Wound Healing  Goal: Optimal Wound Healing  Outcome: Ongoing, Progressing     "

## 2022-10-05 NOTE — NURSING
Patient alert and stable leaving unit via bed in route to CT. No complaints voiced at this time. No acute resp distress noted at this time. Patient removed c-collar. Attempted to reapply c-collar but patient refused. Explained the importance of the c-collar. Patient refused to have it reapplied at this time.

## 2022-10-05 NOTE — NURSING
Xray of cervical spine still pending. I spoke with Tonia in xray via phone and she told me she had to check with her supervisor since the patient was a monkeypox r/o. Dr. Amin notified via secure chat. Also, informed I sent the monkeypox test to the lab yesterday, its showing in process.

## 2022-10-05 NOTE — ASSESSMENT & PLAN NOTE
72 F with h/o dementia, extensive cardiac history including Watchman device/multiple RFA presenting s/p fall; CTH with concern for chronic subdural hematoma vs hygroma, not seen on recent CT 9/19/22:    --Admitted to  for syncopal work up given reported increase fall frequency (recently in ED 9/19/22), extensive cardiac history  --No acute neurosurgical intervention.   --All labs and diagnostics reviewed.    --Patient w/o acute blood on CTH. Noted chronic hematoma vs hygroma on CT w/o mass effect   --Repeat CTH grossly stable.   --Follow up repeat CTH today for stability   --Hold anti-plt/coag medications - patient reportedly off ASA/Xarelto/Plavix -continue to hold  --SBP <160  --Na >135  --No evidence of seizure activity, AED not indicated at this time  --HOB >30  --Continue to monitor clinically, notify NSGY immediately with any changes in neuro status  --Neck pain/additional work up per ED/Ortho; consider C collar until C spine can be cleared given neck pain on exam.    Dispo: ongoing    D/w Dr. Washington

## 2022-10-05 NOTE — NURSING
Patient alert/stable returning to room via bed from CT. Patient without complaints at this time. No acute resp distress noted. Bed in low position. Call light within reach. Bed alarm turned on. No needs made known at this time.

## 2022-10-05 NOTE — ASSESSMENT & PLAN NOTE
History noted  -asa/plavix/xarelto were discontinued previously, will continue to hold in setting of falls  NSG following

## 2022-10-05 NOTE — PLAN OF CARE
CTH from today is stable. No acute neurosurgical intervention required. Rest of care per primary team. Neurosurgery will sign off, please page on call resident with any questions.

## 2022-10-05 NOTE — PROGRESS NOTES
[facility]  Department of Hospital Medicine  Progress Note    Patient Name: Yvette Crews  MRN: [id number]  Code Status: Full Code  Admission Date: 10/3/2022  Length of Stay: 0 days  Attending Physician: Chip Amin MD  Primary Care Provider: Sally Green MD    Subjective:     Principal Problem:Frequent falls    HPI:   Yvette Crews is a 72 y.o. 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 Per chart review and discussion with ED physician, the patient fell out of her wheelchair today without LOC. The fall was witnessed by staff. She has been having increasing behavioral issues as well as increased falls. She was supposed to go to inpatient [facility] at . History is limited due to dementia. No family at bedside at time of evaluation.     In the ED: AFVSS. No leukocytosis, hemoglobin at baseline. CMP without abnormality. UA noninfectious. Trop negative. Chest XR no acute abnormality. L forearm xray no acute fracture. L wrist xray chondrocalcinosis. Xray R hip without fracture. Xray thoracic spine with Compression deformities of T9-L3 vertebral bodies, which are new and/or progressed compared to MRI thoracic spine 07/07/2022. CT thoracic spine Numerous compression fractures of the thoracic and lumbar spine as above, with new fractures seen at T3 and T9. CT maxillofacial, head, cervical spine with ypodense extra-axial collection overlying the right cerebral hemisphere, new from 09/19/2022, suspicious for subdural effusion or chronic hematoma. Stable 6 mm anterolisthesis of C2-3. Neurosurgery consulted. Repeat CTH done. Orthopedics consulted. MRI spine ordered.              Interval History: Spoke to  who is an MD, he would like her to go from hospital to [facility], which was the plan prior to fall.    Needs ortho consult was seen in ER by them but no f/u from spine service yet. MRI is pending.    PCR to r/u unlikely monkeypox is pending.    Review of Systems   Constitutional: Negative.    HENT: Negative.     Eyes: Negative.    Respiratory: Negative.     Cardiovascular: Negative.    Gastrointestinal: Negative.    Endocrine: Negative.    Genitourinary: Negative.    Musculoskeletal: Negative.    Allergic/Immunologic: Negative.    Neurological: Negative.    Hematological: Negative.    Psychiatric/Behavioral:  Positive for agitation, behavioral problems and confusion.    Objective:     Vital Signs (Most Recent):  Temp: 97.6 °F (36.4 °C) (10/05/22 1138)  Pulse: 97 (10/05/22 1138)  Resp: 17 (10/05/22 1138)  BP: 125/78 (10/05/22 1138)  SpO2: 96 % (10/05/22 1138) Vital Signs (24h Range):  Temp:  [97.5 °F (36.4 °C)-98.5 °F (36.9 °C)] 97.6 °F (36.4 °C)  Pulse:  [66-98] 97  Resp:  [16-17] 17  SpO2:  [94 %-100 %] 96 %  BP: (115-163)/(64-78) 125/78     Weight: 39.9 kg (88 lb)  Body mass index is 16.1 kg/m².    Intake/Output Summary (Last 24 hours) at 10/5/2022 1451  Last data filed at 10/5/2022 0241  Gross per 24 hour   Intake --   Output 0 ml   Net 0 ml      Physical Exam  Constitutional:       Appearance: She is ill-appearing.   HENT:      Head: Normocephalic.      Nose: Nose normal.      Mouth/Throat:      Mouth: Mucous membranes are dry.   Eyes:      Pupils: Pupils are equal, round, and reactive to light.   Cardiovascular:      Rate and Rhythm: Normal rate.   Pulmonary:      Effort: Pulmonary effort is normal.   Abdominal:      General: Abdomen is flat.   Musculoskeletal:         General: Normal range of motion.   Neurological:      Mental Status: Mental status is at baseline. She is disoriented.       Significant Labs: All pertinent labs within the past 24 hours have been  reviewed.  CBC:   Recent Labs   Lab 10/03/22  1856 10/04/22  0530 10/05/22  0448   WBC 7.22 6.87 5.46   HGB 11.5* 11.5* 11.2*   HCT 36.7* 36.9* 36.4*    280 281     CMP:   Recent Labs   Lab 10/03/22  1856 10/04/22  0530 10/05/22  0448    137 137   K 3.9 3.7 3.4*    106 105   CO2 26 20* 23   GLU 94 83 80   BUN 24* 25* 26*   CREATININE 0.5 0.6 0.6   CALCIUM 8.3* 8.3* 7.7*   PROT 6.8 6.9 6.2   ALBUMIN 2.9* 3.0* 2.6*   BILITOT 0.4 0.6 0.6   ALKPHOS 109 111 107   AST 18 18 18   ALT 15 14 13   ANIONGAP 9 11 9       Significant Imaging: I have reviewed all pertinent imaging results/findings within the past 24 hours.    Assessment/Plan:      * Frequent falls  Gait disturbance  Cervical stenosis of spinal canal  Pathological fracture due to osteoporosis  Closed wedge compression fracture of T3 vertebra     Patient presents to [facility] after a witnessed fall from wheelchair at SNF earlier today. From chart review, she was ambulating with a walker a few months ago, required wheelchair after fall resulting in left clavicle fracture 7/3/22. Continues to have frequent falls and be NWB in wheelchair.     CTH with concern for chronic subdural hematoma vs hygroma, not seen on recent CT 9/19/22  NSGY consulted in ED, appreicate recommendations:  --no acute NSGY intervention, recommend syncopal work up given extensive cardiac history  --continue to hold ASA/Xarelto/Plavix\  --SBP <160  --Na >135  --No evidence of seizure activity, AED not indicated at this time  --HOB >30  --Follow-up full labs (CBC/CMP/PT-INR/PTT/T&S/UA)  --Continue to monitor clinically, notify NSGY immediately with any changes in neuro status  --Neck pain/additional work up per ED/Ortho; consider C collar until C spine can be cleared given neck pain on exam.     Spine imagining with numerous compression fractures of the thoracic and lumbar spine with new fractures seen at T3 and T9. Stable 6 mm anterolisthesis of C2-3   Ortho consulted in ED,  appreciate recommendations:  --Continue cervical collar until cleared after imaging review with staff  --Intranasal calcitonin ordered for pain control compression fractures  --Weight-bearing as tolerated, range of motion as tolerated for compression fractures of thoracic and lumbar spine  --Will discuss with staff     -Pain control prn  -PT/OT consulted, however ability to participate is questionable   -orthostatic vitals, echo ordered for syncope work up, though from the history this was a witnessed mechanical fall with no LOC    SAH (subarachnoid hemorrhage)  History noted  -asa/plavix/xarelto were discontinued previously, will continue to hold in setting of falls  NSG following    Paroxysmal atrial fibrillation  Patient with Paroxysmal (<7 days) atrial fibrillation which is controlled currently with without medications. Patient is currently in sinus rhythm.[id number] Score: 2. HASBLED Score: Unable to calculate. Anticoagulation not indicated due to watchman device. Previously on Xarelto - discontinued months ago, continue to hold per neurosurgery   EKG 10/3/22 NSR  JAMEL 4/4/22 EF 60%    Late onset Alzheimer's dementia without behavioral disturbance  Plan is for [facility]  Avoid adjustment of meds until then    Pathological fracture due to osteoporosis  Ortho spine service to see  MRI is epnding        Chip Amin MD  Department of Hospital Medicine  [facility]

## 2022-10-06 LAB
ALBUMIN SERPL BCP-MCNC: 2.9 G/DL (ref 3.5–5.2)
ALP SERPL-CCNC: 121 U/L (ref 55–135)
ALT SERPL W/O P-5'-P-CCNC: 15 U/L (ref 10–44)
ANION GAP SERPL CALC-SCNC: 8 MMOL/L (ref 8–16)
AST SERPL-CCNC: 18 U/L (ref 10–40)
BASOPHILS # BLD AUTO: 0.02 K/UL (ref 0–0.2)
BASOPHILS NFR BLD: 0.3 % (ref 0–1.9)
BILIRUB SERPL-MCNC: 0.4 MG/DL (ref 0.1–1)
BUN SERPL-MCNC: 24 MG/DL (ref 8–23)
CALCIUM SERPL-MCNC: 8.3 MG/DL (ref 8.7–10.5)
CHLORIDE SERPL-SCNC: 108 MMOL/L (ref 95–110)
CO2 SERPL-SCNC: 24 MMOL/L (ref 23–29)
CREAT SERPL-MCNC: 0.5 MG/DL (ref 0.5–1.4)
DIFFERENTIAL METHOD: ABNORMAL
EOSINOPHIL # BLD AUTO: 0 K/UL (ref 0–0.5)
EOSINOPHIL NFR BLD: 0.3 % (ref 0–8)
ERYTHROCYTE [DISTWIDTH] IN BLOOD BY AUTOMATED COUNT: 13.5 % (ref 11.5–14.5)
EST. GFR  (NO RACE VARIABLE): >60 ML/MIN/1.73 M^2
GLUCOSE SERPL-MCNC: 87 MG/DL (ref 70–110)
HCT VFR BLD AUTO: 43.1 % (ref 37–48.5)
HGB BLD-MCNC: 13 G/DL (ref 12–16)
IMM GRANULOCYTES # BLD AUTO: 0.02 K/UL (ref 0–0.04)
IMM GRANULOCYTES NFR BLD AUTO: 0.3 % (ref 0–0.5)
LYMPHOCYTES # BLD AUTO: 1.1 K/UL (ref 1–4.8)
LYMPHOCYTES NFR BLD: 16.2 % (ref 18–48)
MCH RBC QN AUTO: 30.7 PG (ref 27–31)
MCHC RBC AUTO-ENTMCNC: 30.2 G/DL (ref 32–36)
MCV RBC AUTO: 102 FL (ref 82–98)
MONOCYTES # BLD AUTO: 0.5 K/UL (ref 0.3–1)
MONOCYTES NFR BLD: 7 % (ref 4–15)
NEUTROPHILS # BLD AUTO: 5 K/UL (ref 1.8–7.7)
NEUTROPHILS NFR BLD: 75.9 % (ref 38–73)
NRBC BLD-RTO: 0 /100 WBC
PLATELET # BLD AUTO: 308 K/UL (ref 150–450)
PMV BLD AUTO: 10.1 FL (ref 9.2–12.9)
POCT GLUCOSE: 105 MG/DL (ref 70–110)
POCT GLUCOSE: 83 MG/DL (ref 70–110)
POCT GLUCOSE: 84 MG/DL (ref 70–110)
POCT GLUCOSE: 88 MG/DL (ref 70–110)
POCT GLUCOSE: 92 MG/DL (ref 70–110)
POTASSIUM SERPL-SCNC: 3.8 MMOL/L (ref 3.5–5.1)
PROT SERPL-MCNC: 7 G/DL (ref 6–8.4)
RBC # BLD AUTO: 4.23 M/UL (ref 4–5.4)
SARS-COV-2 RNA RESP QL NAA+PROBE: NOT DETECTED
SODIUM SERPL-SCNC: 140 MMOL/L (ref 136–145)
WBC # BLD AUTO: 6.56 K/UL (ref 3.9–12.7)

## 2022-10-06 PROCEDURE — 97530 THERAPEUTIC ACTIVITIES: CPT

## 2022-10-06 PROCEDURE — 85025 COMPLETE CBC W/AUTO DIFF WBC: CPT

## 2022-10-06 PROCEDURE — 99226 PR SUBSEQUENT OBSERVATION CARE,LEVEL III: CPT | Mod: ,,, | Performed by: STUDENT IN AN ORGANIZED HEALTH CARE EDUCATION/TRAINING PROGRAM

## 2022-10-06 PROCEDURE — G0378 HOSPITAL OBSERVATION PER HR: HCPCS

## 2022-10-06 PROCEDURE — 36415 COLL VENOUS BLD VENIPUNCTURE: CPT

## 2022-10-06 PROCEDURE — 80053 COMPREHEN METABOLIC PANEL: CPT

## 2022-10-06 PROCEDURE — 99226 PR SUBSEQUENT OBSERVATION CARE,LEVEL III: ICD-10-PCS | Mod: ,,, | Performed by: STUDENT IN AN ORGANIZED HEALTH CARE EDUCATION/TRAINING PROGRAM

## 2022-10-06 PROCEDURE — U0003 INFECTIOUS AGENT DETECTION BY NUCLEIC ACID (DNA OR RNA); SEVERE ACUTE RESPIRATORY SYNDROME CORONAVIRUS 2 (SARS-COV-2) (CORONAVIRUS DISEASE [COVID-19]), AMPLIFIED PROBE TECHNIQUE, MAKING USE OF HIGH THROUGHPUT TECHNOLOGIES AS DESCRIBED BY CMS-2020-01-R: HCPCS | Performed by: STUDENT IN AN ORGANIZED HEALTH CARE EDUCATION/TRAINING PROGRAM

## 2022-10-06 PROCEDURE — 97535 SELF CARE MNGMENT TRAINING: CPT

## 2022-10-06 PROCEDURE — 25000003 PHARM REV CODE 250

## 2022-10-06 PROCEDURE — U0005 INFEC AGEN DETEC AMPLI PROBE: HCPCS | Performed by: STUDENT IN AN ORGANIZED HEALTH CARE EDUCATION/TRAINING PROGRAM

## 2022-10-06 RX ORDER — CALCITONIN SALMON 200 [IU]/.09ML
1 SPRAY, METERED NASAL DAILY
Qty: 3.7 ML | Refills: 1 | Status: ON HOLD | OUTPATIENT
Start: 2022-10-07 | End: 2023-02-06

## 2022-10-06 RX ADMIN — CALCITONIN SALMON 1 SPRAY: 200 SPRAY, METERED NASAL at 09:10

## 2022-10-06 RX ADMIN — POLYETHYLENE GLYCOL 3350 17 G: 17 POWDER, FOR SOLUTION ORAL at 09:10

## 2022-10-06 NOTE — PLAN OF CARE
Referral sent to [facility] unit via Fax.      10/06/22 8595   Post-Acute Status   Post-Acute Authorization Placement   Post-Acute Placement Status Referrals Sent   Discharge Plan   Discharge Plan A Psychiatric hospital   Discharge Plan B Psychiatric \[facility]\""     Natanael Walker RN,BSN

## 2022-10-06 NOTE — DISCHARGE SUMMARY
[facility] [address] [place] [address] Medicine  Discharge Summary      Patient Name: Yvette Crews  MRN: [id number]  Patient Class: OP- Observation  Admission Date: 10/3/2022  Hospital Length of Stay: 0 days  Discharge Date and Time:  10/06/2022 2:12 PM  Attending Physician: Didier Corrales MD   Discharging Provider: Didier Corrales MD  Primary Care Provider: Sally Green MD  [facility] Medicine Team: [facility] MED  Didier Corrales MD    HPI:   Yvette Crews is a 72 y.o. 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 Per chart review and discussion with ED physician, the patient fell out of her wheelchair today without LOC. The fall was witnessed by staff. She has been having increasing behavioral issues as well as increased falls. She was supposed to go to inpatient [facility] at . History is limited due to dementia. No family at bedside at time of evaluation.    In the ED: AFVSS. No leukocytosis, hemoglobin at baseline. CMP without abnormality. UA noninfectious. Trop negative. Chest XR no acute abnormality. L forearm xray no acute fracture. L wrist xray chondrocalcinosis. Xray R hip without fracture. Xray thoracic spine with Compression deformities of T9-L3 vertebral bodies, which are new and/or progressed compared to MRI thoracic spine 07/07/2022. CT thoracic spine Numerous compression fractures of the thoracic and lumbar spine as above, with new fractures seen at T3 and T9. CT maxillofacial, head, cervical spine with ypodense extra-axial collection overlying the right cerebral hemisphere, new from 09/19/2022, suspicious for subdural effusion or chronic hematoma. Stable 6 mm  anterolisthesis of C2-3. Neurosurgery consulted. Repeat CTH done. Orthopedics consulted. MRI spine ordered.       * No surgery found *      Hospital Course:   Pt admitted to [facility]. Found to have multiple compression fractures at multiple spinal levels; NSGY consulted, recommended TLSO brace and no acute surgical intervention. Intranasal calcitonin given for mild hypercalcemia, outpatient endocrine consult placed. Pt accepted to [facility] facility on 10/6, and pt deemed appropriate for discharge. Plan discussed with pt, who was agreeable and amenable; medications were discussed and reviewed, outpatient follow-up scheduled, ER precautions were given, all questions were answered to the pt's satisfaction, and Mrs. Crews was subsequently discharged.       Goals of Care Treatment Preferences:  Code Status: Full Code    Living Will: Yes              Consults:   Consults (From admission, onward)        Status Ordering Provider     Inpatient consult to Orthopedics  Once        Provider:  (Not yet assigned)    Acknowledged COLETTE CAIN     Case Management/  Once        Provider:  (Not yet assigned)    Acknowledged KYM CATES     Inpatient consult to Orthopedic Surgery  Once        Provider:  (Not yet assigned)    Completed TIAN HICKMAN     Inpatient consult to Neurosurgery  Once        Provider:  (Not yet assigned)    Completed TIAN HICKMAN          No new Assessment & Plan notes have been filed under this hospital service since the last note was generated.  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] 10/04/2022 Unknown    Cervical stenosis of spinal canal [M48.02] 07/11/2022 Yes    SAH (subarachnoid hemorrhage)  [I60.9] 07/07/2022 Yes    Iron deficiency anemia [D50.9] 05/13/2022 Yes    Paroxysmal atrial fibrillation [I48.0] 12/17/2021 Yes     Chronic    Primary hypertension [I10] 12/17/2021 Yes     Chronic    Late onset Alzheimer's dementia without behavioral disturbance [G30.1, F02.80] 11/02/2021 Yes     Chronic    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] 06/03/2021 Yes     Chronic    Pathological fracture due to osteoporosis [M80.80XA] 04/15/2021 Yes    Gait disturbance [R26.9] 03/14/2017 Yes    Postoperative hypothyroidism [E89.0] 12/18/2015 Yes     Chronic    Adult bronchiectasis [J47.9] 02/26/2015 Yes    Underweight [R63.6] 01/23/2013 Yes      Problems Resolved During this Admission:       Discharged Condition: stable    Disposition: Psychiatric Hospital    Follow Up:   Follow-up Information     Sally Green MD. Schedule an appointment as soon as possible for a visit.    Specialty: Family Medicine  Contact information:  [address]  [place] [address]  [phone]                       Patient Instructions:      Ambulatory referral/consult to Neurosurgery   Standing Status: Future   Referral Priority: Routine Referral Type: Consultation   Referral Reason: Specialty Services Required   Requested Specialty: Neurosurgery   Number of Visits Requested: 1     Ambulatory referral/consult to Endocrinology   Standing Status: Future   Referral Priority: Routine Referral Type: Consultation   Requested Specialty: Endocrinology   Number of Visits Requested: 1     Ambulatory referral/consult to Family Practice   Standing Status: Future   Referral Priority: Routine Referral Type: Consultation   Referral Reason: Specialty Services Required   Requested Specialty: Family Medicine   Number of Visits Requested: 1     Diet Cardiac     Notify your health care provider if you experience any of the following:  increased confusion or weakness     Notify your health care provider if you experience any of the  following:  persistent dizziness, light-headedness, or visual disturbances     Notify your health care provider if you experience any of the following:  worsening rash     Notify your health care provider if you experience any of the following:  severe persistent headache     Notify your health care provider if you experience any of the following:  difficulty breathing or increased cough     Notify your health care provider if you experience any of the following:  severe uncontrolled pain     Notify your health care provider if you experience any of the following:  persistent nausea and vomiting or diarrhea     Notify your health care provider if you experience any of the following:  temperature >100.4     Activity as tolerated       Significant Diagnostic Studies: Labs: All labs within the past 24 hours have been reviewed    Pending Diagnostic Studies:     Procedure Component Value Units Date/Time    Monkeypox (Orthopoxvirus), PCR [[id number]] Collected: 10/04/22 1117    Order Status: Sent Lab Status: In process Updated: 10/04/22 1149    Specimen: Lesion     X-Ray Cervical Spine AP And Lateral [[id number]]     Order Status: Sent Lab Status: No result          Medications:  Reconciled Home Medications:      Medication List      START taking these medications    calcitonin (salmon) 200 unit/actuation nasal spray  Commonly known as: FORTICAL  1 spray by Nasal route once daily. for 7 days  Start taking on: October 7, 2022        CHANGE how you take these medications    guaiFENesin 600 mg 12 hr tablet  Commonly known as: MUCINEX  Take 2 tablets (1,200 mg total) by mouth 2 (two) times daily.  What changed: when to take this        CONTINUE taking these medications    acetaminophen 325 MG tablet  Commonly known as: TYLENOL  Take 2 tablets (650 mg total) by mouth every 6 (six) hours as needed for Pain.     albuterol 90 mcg/actuation inhaler  Commonly known as: PROVENTIL/VENTOLIN HFA  Inhale 2 puffs into the lungs every 6  (six) hours as needed for Wheezing. Rescue     AZO BLADDER CONTROL ORAL  Take 1 tablet by mouth 2 (two) times a day.     calcium-vitamin D3-vitamin K 650 mg-12.5 mcg-40 mcg Chew  Chew 1 by mouth twice daily     docusate calcium 240 mg capsule  Commonly known as: SURFAK  Take 240 mg by mouth 3 (three) times daily.     donepeziL 10 MG tablet  Commonly known as: ARICEPT  Take 1 tablet (10 mg total) by mouth once daily.     famotidine 40 MG tablet  Commonly known as: PEPCID  Take 1 tablet (40 mg total) by mouth once daily.     ferrous sulfate 325 (65 FE) MG EC tablet  Take 1 tablet (325 mg total) by mouth once daily.     FLINTSTONES MULTI-VIT GUMMIES ORAL  Chew 2 gummies by mouth every day     Immune Globulin G (IGG)-PRO-IGA 10 % injection (Privigen) 10 % Soln  Commonly known as: PRIVIGEN  Infuse 20 g into the vein every 4 weeks.     levothyroxine 88 MCG tablet  Commonly known as: SYNTHROID  Take 1 tablet (88 mcg total) by mouth before breakfast.     loratadine 10 mg tablet  Commonly known as: CLARITIN  Take 1 tablet (10 mg total) by mouth once daily.     montelukast 10 mg tablet  Commonly known as: SINGULAIR  Take 1 tablet (10 mg total) by mouth every evening.     polyethylene glycol 17 gram Pwpk  Commonly known as: GLYCOLAX  Take 17 g by mouth once daily.        STOP taking these medications    methocarbamoL 500 MG Tab  Commonly known as: ROBAXIN            Indwelling Lines/Drains at time of discharge:   Lines/Drains/Airways     None                 Time spent on the discharge of patient: 35 minutes         Didier Corrales MD  Attending Physician  Department of Hospital Medicine  Epic secure chat preferred, or ext. [phone]  10/6/2022

## 2022-10-06 NOTE — PT/OT/SLP PROGRESS
Physical Therapy  Co-Treatment with OT    Patient Name:  Yvette Crews   MRN:  [id number]    Recommendations:     Discharge Recommendations:  nursing facility, skilled   Discharge Equipment Recommendations:  (will deterrmine DME needs closer to discharge.)   Barriers to discharge: Decreased caregiver support family will not be able to assist at current functional level.     Assessment:     Yvette Crews is a 72 y.o. female admitted with a medical diagnosis of Frequent falls.  She presents with the following impairments/functional limitations:  weakness, impaired endurance, gait instability, impaired functional mobility, impaired balance, decreased safety awareness, decreased lower extremity function pt tolerated treatment but is at a very low functional level and significant increased burden of care. Pt will benefit from cont skilled PT 2x/wk to progress physically. Pt will need SNF placement to maximize rehab potential.     Rehab Prognosis: Fair; patient would benefit from acute skilled PT services to address these deficits and reach maximum level of function.    Recent Surgery: * No surgery found *      Plan:     During this hospitalization, patient to be seen 2 x/week to address the identified rehab impairments via gait training, therapeutic activities, therapeutic exercises, neuromuscular re-education and progress toward the following goals:    Plan of Care Expires:  11/03/22    Subjective     Chief Complaint: pt had no complaints during treatment.   Patient/Family Comments/goals: pt unable to set goals and on family present to set goals.   Pain/Comfort:  Pain Rating 1: 0/10  Pain Rating Post-Intervention 1: 0/10      Objective:     Communicated with nurse prior to session.  Patient found right sidelying in fetal position with peripheral IV, bed alarm upon PT entry to room.     General Precautions: Standard, fall airborne, droplet, contact  Orthopedic Precautions:spinal precautions   Braces:     Respiratory Status: Room air     Functional Mobility:  Bed Mobility: pt needed verbal cues for hand placement and sequencing for functional mobility.    Rolling Left:  total assistance and of 2 persons  Rolling Right: total assistance and of 2 persons  Supine to Sit: total assistance and of 2 persons  Sit to Supine: total assistance and of 2 persons    Balance: pt sat on EOB x 3 min with total assist. Pt had significant kyphosis with sitting and head forward flexed. Pt needed tactile cues at the trunk by PT and verbal cues for balance with sitting. Pt leaned backwards with sitting.     Posture: pt was positioned with B knees and hips flexed and BUE elbows and wrists flexed. PT was able to extend BLE to ~ minus 15 degrees to neutral for hip and knee flexion BLE.     Due to pt complex medical condition, the skill of 2 licensed therapists is needed to maximize treatment session and progression towards goals      AM-PAC 6 CLICK MOBILITY  Turning over in bed (including adjusting bedclothes, sheets and blankets)?: 2  Sitting down on and standing up from a chair with arms (e.g., wheelchair, bedside commode, etc.): 1  Moving from lying on back to sitting on the side of the bed?: 2  Moving to and from a bed to a chair (including a wheelchair)?: 2  Need to walk in hospital room?: 1  Climbing 3-5 steps with a railing?: 1  Basic Mobility Total Score: 9       Therapeutic Activities and Exercises:   Pt received verbal instructions in PT POC but did not verbally expressed understanding of such. Pt will need to be re-instructed.     Patient left supine with all lines intact, call button in reach, and bed alarm on..    GOALS:   Multidisciplinary Problems       Physical Therapy Goals          Problem: Physical Therapy    Goal Priority Disciplines Outcome Goal Variances Interventions   Physical Therapy Goal     PT, PT/OT Ongoing, Progressing     Description: Goals to be met by: 10/14/22     Patient will increase functional  independence with mobility by performin. Supine to sit with Moderate Assistance -not met  2. Sit to supine with Moderate Assistance -not met  3. Sit to stand transfer with Moderate Assistance -not emt  4. Gait  x 25 feet with Moderate Assistance using LRAD, if needed. -not met  5. Wheelchair propulsion x75 feet with Minimal Assistance using bilateral upper extremities -not met  6. Sitting at edge of bed x10 minutes with Minimal Assistance-not met  7. Stand for 3 minutes with Moderate Assistance using LRAD, if needed -not met  8. Lower extremity exercise program x10 reps per handout, with independence -not met                         Time Tracking:     PT Received On: 10/06/22  PT Start Time: 0834     PT Stop Time: 0857  PT Total Time (min): 23 min     Billable Minutes: Therapeutic Activity 23 min       PT/PTA: PT     PTA Visit Number: 0     10/06/2022

## 2022-10-06 NOTE — ASSESSMENT & PLAN NOTE
Gait disturbance  Cervical stenosis of spinal canal  Pathological fracture due to osteoporosis  Closed wedge compression fracture of T3 vertebra    Patient presents to [facility] after a witnessed fall from wheelchair at SNF earlier today. From chart review, she was ambulating with a walker a few months ago, required wheelchair after fall resulting in left clavicle fracture 7/3/22. Continues to have frequent falls and be NWB in wheelchair.    CTH with concern for chronic subdural hematoma vs hygroma, not seen on recent CT 9/19/22  NSGY consulted in ED, appreicate recommendations:  --no acute NSGY intervention, recommend syncopal work up given extensive cardiac history  --continue to hold ASA/Xarelto/Plavix\  --SBP <160  --Na >135  --No evidence of seizure activity, AED not indicated at this time  --HOB >30  --Follow-up full labs (CBC/CMP/PT-INR/PTT/T&S/UA)  --Continue to monitor clinically, notify NSGY immediately with any changes in neuro status  --Neck pain/additional work up per ED/Ortho; consider C collar until C spine can be cleared given neck pain on exam.    Spine imagining with numerous compression fractures of the thoracic and lumbar spine with new fractures seen at T3 and T9. Stable 6 mm anterolisthesis of C2-3   Ortho consulted in ED, appreciate recommendations:  --Continue cervical collar until cleared after imaging review with staff  --Intranasal calcitonin ordered for pain control compression fractures  --Weight-bearing as tolerated, range of motion as tolerated for compression fractures of thoracic and lumbar spine  --Will discuss with staff    -Pain control prn  -PT/OT consulted, however ability to participate is questionable   -orthostatic vitals, echo ordered for syncope work up, though from the history this was a witnessed mechanical fall with no LOC    10/6: Awaiting acceptance [facility]

## 2022-10-06 NOTE — PLAN OF CARE
Problem: Physical Therapy  Goal: Physical Therapy Goal  Description: Goals to be met by: 10/14/22     Patient will increase functional independence with mobility by performin. Supine to sit with Moderate Assistance -not met  2. Sit to supine with Moderate Assistance -not met  3. Sit to stand transfer with Moderate Assistance -not emt  4. Gait  x 25 feet with Moderate Assistance using LRAD, if needed. -not met  5. Wheelchair propulsion x75 feet with Minimal Assistance using bilateral upper extremities -not met  6. Sitting at edge of bed x10 minutes with Minimal Assistance-not met  7. Stand for 3 minutes with Moderate Assistance using LRAD, if needed -not met  8. Lower extremity exercise program x10 reps per handout, with independence -not met    Outcome: Ongoing, Progressing   Goals remain appropriate. 10/6/2022

## 2022-10-06 NOTE — HOSPITAL COURSE
Pt admitted to [facility]. Found to have multiple compression fractures at multiple spinal levels; NSGY consulted, recommended TLSO brace and no acute surgical intervention. Intranasal calcitonin given for mild hypercalcemia, outpatient endocrine consult placed. Pt accepted to [facility] facility on 10/6, and pt deemed appropriate for discharge. Plan discussed with pt, who was agreeable and amenable; medications were discussed and reviewed, outpatient follow-up scheduled, ER precautions were given, all questions were answered to the pt's satisfaction, and Mrs. Cerws was subsequently discharged.

## 2022-10-06 NOTE — PT/OT/SLP PROGRESS
Occupational Therapy   Treatment    Name: Yvette Crews  MRN: [id number]  Admitting Diagnosis:  Frequent falls       Recommendations:     Discharge Recommendations: nursing facility, skilled  Discharge Equipment Recommendations:     Barriers to discharge:  Decreased caregiver support    Assessment:     Yvette Crews is a 72 y.o. female with a medical diagnosis of Frequent falls. Performance deficits affecting function are weakness, impaired skin, impaired functional mobility, impaired endurance, impaired cognition, impaired self care skills, gait instability, decreased safety awareness, decreased lower extremity function, decreased upper extremity function.     Rehab Prognosis:  Fair; patient would benefit from acute skilled OT services to address these deficits and reach maximum level of function.       Plan:     Patient to be seen 2 x/week to address the above listed problems via self-care/home management, neuromuscular re-education, therapeutic exercises, therapeutic activities  Plan of Care Expires: 11/03/22  Plan of Care Reviewed with: patient    Subjective     Pain/Comfort:  Pain Rating Post-Intervention 1: 0/10    Objective:     Communicated with: RN prior to session.  Patient found HOB elevated with bed alarm, peripheral IV upon OT entry to room.    General Precautions: Standard, fall   Orthopedic Precautions:spinal precautions   Braces:  (10/5 CCollar Clear via ortho)  Respiratory Status: Room air     Occupational Performance:     Bed Mobility:    Patient completed Rolling/Turning to Left with  total assistance and 2 persons  Patient completed Scooting/Bridging with total assistance and 2 persons  Patient completed Supine to Sit with total assistance and 2 persons     Functional Mobility/Transfers:    Activities of Daily Living:  Feeding:  total assistance Seated at EOB to eat and drink from breakfast tray. Pt. Required [place] to initiate to hold cup to drink from straw and failed to complete  task   Pt. Sat for ~ 3 min at EOB      [facility] 6 Click ADL: 8    Treatment & Education:  Pt. Required tactile and verbal cuing to for posture and to decrease cervical flexion throughout session.     Pt. Educated on POC     Patient left HOB elevated with all lines intact, call button in reach, bed alarm on, and RN notified    GOALS:   Multidisciplinary Problems       Occupational Therapy Goals          Problem: Occupational Therapy    Goal Priority Disciplines Outcome Interventions   Occupational Therapy Goal     OT, PT/OT     Description: Goals to be met by: 10/11/22    Patient will increase functional independence with ADLs by performing:    LE Dressing with Maximum Assistance.  Grooming while EOB with Minimum Assistance.  Toileting from bedside commode with Maximum Assistance for hygiene and clothing management.   Supine to sit with Maximum Assistance.  Toilet transfer to bedside commode with Maximum Assistance.                         Time Tracking:     OT Date of Treatment: 10/06/22  OT Start Time: 0834  OT Stop Time: 0857  OT Total Time (min): 23 min    Billable Minutes:Self Care/Home Management 23    OT/SYLVIE: OT          10/6/2022

## 2022-10-06 NOTE — SUBJECTIVE & OBJECTIVE
Interval History: Pt seen and examined this morning on rounds. JOEMOUNIKAON. Pleasantly demented, which limits interview.        Objective:     Vital Signs (Most Recent):  Temp: 97.1 °F (36.2 °C) (10/06/22 1215)  Pulse: 108 (10/06/22 1215)  Resp: 18 (10/06/22 1215)  BP: 126/81 (10/06/22 1215)  SpO2: 98 % (10/06/22 1215) Vital Signs (24h Range):  Temp:  [97.1 °F (36.2 °C)-98.2 °F (36.8 °C)] 97.1 °F (36.2 °C)  Pulse:  [] 108  Resp:  [16-18] 18  SpO2:  [92 %-100 %] 98 %  BP: (126-140)/(70-82) 126/81     Weight: 39.9 kg (88 lb)  Body mass index is 16.1 kg/m².  No intake or output data in the 24 hours ending 10/06/22 1623       Physical Exam  Gen: in NAD, appears stated age; cachectic  Neuro: AAOx0, CN2-12 grossly intact BL; motor, sensory, and strength grossly intact BL  HEENT: NTNC, EOMI, PERRLA, MMM; no thyromegaly or lymphadenopathy; no JVD appreciated  CVS: RRR, no m/r/g; S1/S2 auscultated with no S3 or S4; capillary refill < 2 sec  Resp: lungs CTAB, no w/r/r; no belabored breathing or accessory muscle use appreciated   Abd: BS+ in all 4 quadrants; NTND, soft to palpation; no organomegaly appreciated   Extrem: pulses full, equal, and regular over all 4 extremities; no UE or LE edema BL      Significant Labs: All pertinent labs within the past 24 hours have been reviewed.    Significant Imaging: I have reviewed all pertinent imaging results/findings within the past 24 hours.

## 2022-10-06 NOTE — PROGRESS NOTES
[facility] [address] [place] [address] Medicine  Progress Note    Patient Name: Yvette Crews  MRN: [id number]  Patient Class: OP- Observation   Admission Date: 10/3/2022  Length of Stay: 0 days  Attending Physician: Didier Corrales MD  Primary Care Provider: Sally Green MD        Subjective:     Principal Problem:Frequent falls        HPI:  Yvette Crews is a 72 y.o. 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 Per chart review and discussion with ED physician, the patient fell out of her wheelchair today without LOC. The fall was witnessed by staff. She has been having increasing behavioral issues as well as increased falls. She was supposed to go to [facility] at . History is limited due to dementia. No family at bedside at time of evaluation.    In the ED: AFVSS. No leukocytosis, hemoglobin at baseline. CMP without abnormality. UA noninfectious. Trop negative. Chest XR no acute abnormality. L forearm xray no acute fracture. L wrist xray chondrocalcinosis. Xray R hip without fracture. Xray thoracic spine with Compression deformities of T9-L3 vertebral bodies, which are new and/or progressed compared to MRI thoracic spine 07/07/2022. CT thoracic spine Numerous compression fractures of the thoracic and lumbar spine as above, with new fractures seen at T3 and T9. CT maxillofacial, head, cervical spine with ypodense extra-axial collection overlying the right cerebral hemisphere, new from 09/19/2022, suspicious for subdural effusion or chronic hematoma. Stable 6 mm anterolisthesis of C2-3. Neurosurgery consulted. Repeat CTH done. Orthopedics consulted. MRI spine ordered.        Overview/Hospital Course:  [place] [facility] Found to have multiple compression fractures at multiple spinal levels; NSGY consulted, recommended TLSO brace and no acute surgical intervention. Intranasal calcitonin given for mild hypercalcemia, outpatient endocrine consult placed. Pt accepted to [facility] facility on 10/6, and pt deemed appropriate for discharge. Plan discussed with pt, who was agreeable and amenable; medications were discussed and reviewed, outpatient follow-up scheduled, ER precautions were given, all questions were answered to the pt's satisfaction, and Mrs. Crews was subsequently discharged.    Discharge held pending EJ review, bed is available though.      Interval History: Pt seen and examined this morning on rounds. NAARIC. Pleasantly demented, which limits interview.        Objective:     Vital Signs (Most Recent):  Temp: 97.1 °F (36.2 °C) (10/06/22 1215)  Pulse: 108 (10/06/22 1215)  Resp: 18 (10/06/22 1215)  BP: 126/81 (10/06/22 1215)  SpO2: 98 % (10/06/22 1215) Vital Signs (24h Range):  Temp:  [97.1 °F (36.2 °C)-98.2 °F (36.8 °C)] 97.1 °F (36.2 °C)  Pulse:  [] 108  Resp:  [16-18] 18  SpO2:  [92 %-100 %] 98 %  BP: (126-140)/(70-82) 126/81     Weight: 39.9 kg (88 lb)  Body mass index is 16.1 kg/m².  No intake or output data in the 24 hours ending 10/06/22 1623       Physical Exam  Gen: in NAD, appears stated age; cachectic  Neuro: AAOx0, CN2-12 grossly intact BL; motor, sensory, and strength grossly intact BL  HEENT: NTNC, EOMI, PERRLA, MMM; no thyromegaly or lymphadenopathy; no JVD appreciated  CVS: RRR, no m/r/g; S1/S2 auscultated with no S3 or S4; capillary refill < 2 sec  Resp: lungs CTAB, no w/r/r; no belabored breathing or accessory muscle use appreciated   Abd: BS+ in all 4 quadrants; NTND, soft to palpation; no organomegaly appreciated   Extrem: pulses full, equal, and regular over all 4 extremities; no UE or LE edema BL      Significant Labs: All pertinent labs within  the past 24 hours have been reviewed.    Significant Imaging: I have reviewed all pertinent imaging results/findings within the past 24 hours.      Assessment/Plan:      * Frequent falls  Gait disturbance  Cervical stenosis of spinal canal  Pathological fracture due to osteoporosis  Closed wedge compression fracture of T3 vertebra    Patient presents to [facility] after a witnessed fall from wheelchair at SNF earlier today. From chart review, she was ambulating with a walker a few months ago, required wheelchair after fall resulting in left clavicle fracture 7/3/22. Continues to have frequent falls and be NWB in wheelchair.    CTH with concern for chronic subdural hematoma vs hygroma, not seen on recent CT 9/19/22  NSGY consulted in ED, appreicate recommendations:  --no acute NSGY intervention, recommend syncopal work up given extensive cardiac history  --continue to hold ASA/Xarelto/Plavix\  --SBP <160  --Na >135  --No evidence of seizure activity, AED not indicated at this time  --HOB >30  --Follow-up full labs (CBC/CMP/PT-INR/PTT/T&S/UA)  --Continue to monitor clinically, notify NSGY immediately with any changes in neuro status  --Neck pain/additional work up per ED/Ortho; consider C collar until C spine can be cleared given neck pain on exam.    Spine imagining with numerous compression fractures of the thoracic and lumbar spine with new fractures seen at T3 and T9. Stable 6 mm anterolisthesis of C2-3   Ortho consulted in ED, appreciate recommendations:  --Continue cervical collar until cleared after imaging review with staff  --Intranasal calcitonin ordered for pain control compression fractures  --Weight-bearing as tolerated, range of motion as tolerated for compression fractures of thoracic and lumbar spine  --Will discuss with staff    -Pain control prn  -PT/OT consulted, however ability to participate is questionable   -orthostatic vitals, echo ordered for syncope work up, though from the history this was a  witnessed mechanical fall with no LOC    10/6: Awaiting acceptance to [facility]    SAH (subarachnoid hemorrhage)  History noted  -asa/plavix/xarelto were discontinued previously, will continue to hold in setting of falls      Iron deficiency anemia  Iron deficient anemia  Monitor CBC  Continue home iron supplement    Primary hypertension  Chronic, stable  -Not on any medications per chart review  -prn medications for systolic>160 (NSGY recs)    Paroxysmal atrial fibrillation  Patient with Paroxysmal (<7 days) atrial fibrillation which is controlled currently with without medications. Patient is currently in sinus rhythm.[id number] Score: 2. HASBLED Score: Unable to calculate. Anticoagulation not indicated due to watchman device. Previously on Xarelto - discontinued months ago, continue to hold per neurosurgery   EKG 10/3/22 NSR  JAMEL 4/4/22 EF 60%    Late onset Alzheimer's dementia without behavioral disturbance  Choric and controlled. Patient at cognitive baseline. Continue home Aricept to treat.   Delirium precautions    CVID (common variable immunodeficiency)  Receives IVIG infusions as outpatient      Postoperative hypothyroidism  Acquired hypothyroidism with hx of MNG s/p total thyroidectomy 1/25/17  -TSH ordered  -Continue synthroid    Adult bronchiectasis  Chronic, stable  -On singulair and mucinex  -Patient saturating well on room air    Underweight  -BMI 15.16 kg/m²  -Encourage oral intake and oral supplements with meals.  -Will get bedside swallow to ensure safety with swallowing given her current condition      VTE Risk Mitigation (From admission, onward)         Ordered     Place sequential compression device  Until discontinued         10/03/22 2251     Reason for No Pharmacological VTE Prophylaxis  Once        Question:  Reasons:  Answer:  Risk of Bleeding  Comment:  NSGY rec holding ASA/xarelto/plavix    10/03/22 2251                Discharge Planning   SANTIAGO: 10/6/2022     Code Status: Full Code   Is  the patient medically ready for discharge?: Yes    Reason for patient still in hospital (select all that apply): Patient trending condition  Discharge Plan A: Psychiatric hospital   Discharge Delays: None known at this time        Didier Corrales MD  Attending Physician  Department of Hospital Medicine  Epic secure chat preferred, or ext. [phone]  10/6/2022

## 2022-10-06 NOTE — PLAN OF CARE
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       Allergies:  Review of patient's allergies indicates:   Allergen Reactions    Adhesive      Tape tears skin    Buspar [buspirone]      headaches    Escitalopram oxalate Nausea Only     hyponatremia      Rifampin Rash       Diet: regular diet    Activities: Activity as tolerated    Goals of Care Treatment Preferences:  Code Status: Full Code    Living Will: Yes              Nursing: Per facility routine     Labs: CBC and CMP Daily for 7 days     CONSULTS:    Physical Therapy to evaluate and treat. , Occupational Therapy to evaluate and treat., and  to evaluate for community resources/long-range  planning.    MISCELLANEOUS CARE:  NA    WOUND CARE ORDERS  None    Medications: Review discharge medications with patient and family and provide education.      Current Discharge Medication List        START taking these medications    Details   calcitonin, salmon, (FORTICAL) 200 unit/actuation nasal spray 1 spray by Nasal route once daily. for 7 days  Qty: 3.7 mL, Refills: 1           CONTINUE these medications which have NOT CHANGED    Details   acetaminophen (TYLENOL) 325 MG tablet Take 2 tablets (650 mg total) by mouth every 6 (six) hours as needed for Pain.  Refills: 0      albuterol (PROVENTIL/VENTOLIN HFA) 90 mcg/actuation inhaler Inhale 2 puffs into the lungs every 6 (six) hours as needed for Wheezing. Rescue  Qty: 18 g, Refills: 1    Associated Diagnoses: Adult bronchiectasis; Cough      calcium-vitamin D3-vitamin K 650 mg-12.5 mcg-40 mcg Chew Chew 1 by mouth twice daily      docusate calcium (SURFAK) 240 mg capsule Take 240 mg by mouth 3 (three) times daily.      donepeziL (ARICEPT) 10 MG tablet Take 1 tablet (10 mg total) by mouth once daily.  Qty: 30 tablet, Refills: 11    Associated Diagnoses: Late onset Alzheimer's dementia without behavioral disturbance      famotidine (PEPCID) 40 MG tablet Take 1 tablet (40 mg total) by mouth once daily.  Qty: 30 tablet, Refills: 5    Associated Diagnoses: Iron deficiency anemia due to chronic blood loss      ferrous sulfate 325 (65 FE) MG EC tablet Take 1 tablet (325 mg total) by mouth once daily.  Qty: 30 tablet, Refills: 5    Associated Diagnoses: Iron deficiency anemia due to chronic blood loss      guaiFENesin (MUCINEX) 600 mg 12 hr tablet Take 2 tablets (1,200 mg total) by mouth 2 (two) times daily.  Qty: 120 tablet, Refills: 6    Associated Diagnoses: Adult bronchiectasis      Immune Globulin G, IGG,-PRO-IGA 10 % injection, Privigen, (PRIVIGEN) 10 % Soln Infuse 20 g into the vein every 4 weeks.  Qty: 200 mL, Refills: 0      levothyroxine (SYNTHROID) 88 MCG  tablet Take 1 tablet (88 mcg total) by mouth before breakfast.  Qty: 90 tablet, Refills: 1    Associated Diagnoses: Postoperative hypothyroidism      loratadine (CLARITIN) 10 mg tablet Take 1 tablet (10 mg total) by mouth once daily.  Qty: 30 tablet, Refills: 5      montelukast (SINGULAIR) 10 mg tablet Take 1 tablet (10 mg total) by mouth every evening.  Qty: 90 tablet, Refills: 1    Associated Diagnoses: Adult bronchiectasis      pedi multivit no.7/folic acid (FLINTSTONES MULTI-VIT GUMMIES ORAL) Chew 2 gummies by mouth every day      polyethylene glycol (GLYCOLAX) 17 gram PwPk Take 17 g by mouth once daily.  Refills: 0      pumpkin seed extract/soy germ (AZO BLADDER CONTROL ORAL) Take 1 tablet by mouth 2 (two) times a day.           STOP taking these medications       methocarbamoL (ROBAXIN) 500 MG Tab Comments:   Reason for Stopping:                  Immunizations Administered as of 10/6/2022       Name Date Dose VIS Date Route Exp Date    COVID-19, MRNA, LN-S, PF (Moderna) 9/20/2021  1:19 PM 0.5 mL 12/19/2020 Intramuscular 12/1/2021    Site: Left deltoid     Given By: Rocio Zabala, MistiD     : [employer]     Lot: [id number]     COVID-19, MRNA, LN-S, PF (Moderna) 2/1/2021 0.5 mL -- Intramuscular --    Site: Right arm     Lot: [id number]     COVID-19, MRNA, LN-S, PF (Moderna) 12/31/2020 0.5 mL -- Intramuscular --    Site: Left arm     Lot: [id number]             Didier Corrales MD  Attending Physician  Department of Hospital Medicine  Epic secure chat preferred, or ext. [phone]  10/6/2022

## 2022-10-07 VITALS
RESPIRATION RATE: 17 BRPM | OXYGEN SATURATION: 98 % | SYSTOLIC BLOOD PRESSURE: 163 MMHG | HEART RATE: 87 BPM | HEIGHT: 62 IN | TEMPERATURE: 98 F | DIASTOLIC BLOOD PRESSURE: 80 MMHG | BODY MASS INDEX: 16.2 KG/M2 | WEIGHT: 88 LBS

## 2022-10-07 LAB
NONVAR ORTHPX DNA SPEC QL NAA+PROBE: NORMAL
POCT GLUCOSE: 87 MG/DL (ref 70–110)
POCT GLUCOSE: 88 MG/DL (ref 70–110)

## 2022-10-07 PROCEDURE — G0378 HOSPITAL OBSERVATION PER HR: HCPCS

## 2022-10-07 PROCEDURE — 99217 PR OBSERVATION CARE DISCHARGE: CPT | Mod: ,,, | Performed by: STUDENT IN AN ORGANIZED HEALTH CARE EDUCATION/TRAINING PROGRAM

## 2022-10-07 PROCEDURE — 99217 PR OBSERVATION CARE DISCHARGE: ICD-10-PCS | Mod: ,,, | Performed by: STUDENT IN AN ORGANIZED HEALTH CARE EDUCATION/TRAINING PROGRAM

## 2022-10-07 PROCEDURE — 25000003 PHARM REV CODE 250

## 2022-10-07 RX ADMIN — POLYETHYLENE GLYCOL 3350 17 G: 17 POWDER, FOR SOLUTION ORAL at 09:10

## 2022-10-07 RX ADMIN — CALCITONIN SALMON 1 SPRAY: 200 SPRAY, METERED NASAL at 09:10

## 2022-10-07 RX ADMIN — Medication 16 G: at 12:10

## 2022-10-07 NOTE — PLAN OF CARE
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       Allergies:  Review of patient's allergies indicates:   Allergen Reactions    Adhesive      Tape tears skin    Buspar [buspirone]      headaches    Escitalopram oxalate Nausea Only     hyponatremia      Rifampin Rash       Diet: regular diet    Activities: Activity as tolerated    Goals of Care Treatment Preferences:  Code Status: Full Code    Living Will: Yes              Nursing: Per facility routine     Labs: CBC and CMP Daily for 7 days     CONSULTS:    Physical Therapy to evaluate and treat. , Occupational Therapy to evaluate and treat., and  to evaluate for community resources/long-range  planning.    MISCELLANEOUS CARE:  NA    WOUND CARE ORDERS  None    Medications: Review discharge medications with patient and family and provide education.      Current Discharge Medication List        START taking these medications    Details   calcitonin, salmon, (FORTICAL) 200 unit/actuation nasal spray 1 spray by Nasal route once daily. for 7 days  Qty: 3.7 mL, Refills: 1           CONTINUE these medications which have NOT CHANGED    Details   acetaminophen (TYLENOL) 325 MG tablet Take 2 tablets (650 mg total) by mouth every 6 (six) hours as needed for Pain.  Refills: 0      albuterol (PROVENTIL/VENTOLIN HFA) 90 mcg/actuation inhaler Inhale 2 puffs into the lungs every 6 (six) hours as needed for Wheezing. Rescue  Qty: 18 g, Refills: 1    Associated Diagnoses: Adult bronchiectasis; Cough      calcium-vitamin D3-vitamin K 650 mg-12.5 mcg-40 mcg Chew Chew 1 by mouth twice daily      docusate calcium (SURFAK) 240 mg capsule Take 240 mg by mouth 3 (three) times daily.      donepeziL (ARICEPT) 10 MG tablet Take 1 tablet (10 mg total) by mouth once daily.  Qty: 30 tablet, Refills: 11    Associated Diagnoses: Late onset Alzheimer's dementia without behavioral disturbance      famotidine (PEPCID) 40 MG tablet Take 1 tablet (40 mg total) by mouth once daily.  Qty: 30 tablet, Refills: 5    Associated Diagnoses: Iron deficiency anemia due to chronic blood loss      ferrous sulfate 325 (65 FE) MG EC tablet Take 1 tablet (325 mg total) by mouth once daily.  Qty: 30 tablet, Refills: 5    Associated Diagnoses: Iron deficiency anemia due to chronic blood loss      guaiFENesin (MUCINEX) 600 mg 12 hr tablet Take 2 tablets (1,200 mg total) by mouth 2 (two) times daily.  Qty: 120 tablet, Refills: 6    Associated Diagnoses: Adult bronchiectasis      Immune Globulin G, IGG,-PRO-IGA 10 % injection, Privigen, (PRIVIGEN) 10 % Soln Infuse 20 g into the vein every 4 weeks.  Qty: 200 mL, Refills: 0      levothyroxine (SYNTHROID) 88 MCG  tablet Take 1 tablet (88 mcg total) by mouth before breakfast.  Qty: 90 tablet, Refills: 1    Associated Diagnoses: Postoperative hypothyroidism      loratadine (CLARITIN) 10 mg tablet Take 1 tablet (10 mg total) by mouth once daily.  Qty: 30 tablet, Refills: 5      montelukast (SINGULAIR) 10 mg tablet Take 1 tablet (10 mg total) by mouth every evening.  Qty: 90 tablet, Refills: 1    Associated Diagnoses: Adult bronchiectasis      pedi multivit no.7/folic acid (FLINTSTONES MULTI-VIT GUMMIES ORAL) Chew 2 gummies by mouth every day      polyethylene glycol (GLYCOLAX) 17 gram PwPk Take 17 g by mouth once daily.  Refills: 0      pumpkin seed extract/soy germ (AZO BLADDER CONTROL ORAL) Take 1 tablet by mouth 2 (two) times a day.           STOP taking these medications       methocarbamoL (ROBAXIN) 500 MG Tab Comments:   Reason for Stopping:                  Immunizations Administered as of 10/7/2022       Name Date Dose VIS Date Route Exp Date    COVID-19, MRNA, LN-S, PF (Moderna) 9/20/2021  1:19 PM 0.5 mL 12/19/2020 Intramuscular 12/1/2021    Site: Left deltoid     Given By: Rocio Zabala, PharmD     : Modern[employer]     Lot: [id number]     COVID-19, MRNA, LN-S, PF (Moderna) 2/1/2021 0.5 mL -- Intramuscular --    Site: Right arm     Lot: [id number]     COVID-19, MRNA, LN-S, PF (Moderna) 12/31/2020 0.5 mL -- Intramuscular --    Site: Left arm     Lot: [id number]             Didier Corrales MD  Attending Physician  Department of Hospital Medicine  Epic secure chat preferred, or ext. [phone]  10/7/2022

## 2022-10-07 NOTE — PLAN OF CARE
CM was notified of placement. Patient was accepted to [facility] on 10/7/22. Report can be called to [phone].    CM arranged ambulance transport via [facility]. Requested  time is 1400.  Requested  time does not guarantee arrival time.  If transport does not arrive by  1500 please contact assigned SW or on-call for assistance.       10/07/22 1355   Post-Acute Status   Post-Acute Authorization Placement   Post-Acute Placement Status Set-up Complete/Auth obtained   Hospital Resources/Appts/Education Provided Provided patient/caregiver with written discharge plan information;Appointments scheduled and added to AVS   Discharge Plan   Discharge Plan A Psychiatric hospital   Discharge Plan B Psychiatric hospital     Future Appointments   Date Time Provider Department Center   10/20/2022  9:00 AM INFUSION, CHAIR 3 [facility] AMB INF [facility]   10/31/2022  8:00 AM Angela Seaman PA-C [facility] SPINE [facility]   10/31/2022 12:15 PM [facility] OIC-XRAY [facility] XRAY [facility]   10/31/2022  1:30 PM Gina Ramesh PA-C [facility] NEUROS8 [facility]   11/11/2022 10:00 AM Devin Gtz MD [facility] NEURO8 [facility]   1/12/2023 11:00 AM Shayy Humphries MD [facility] ENDODIA [facility]     Natanael Walker, RN,BSN

## 2022-10-07 NOTE — DISCHARGE SUMMARY
[facility] [address] [place] [address] Medicine  Discharge Summary      Patient Name: Yvette Crews  MRN: [id number]  Patient Class: OP- Observation  Admission Date: 10/3/2022  Hospital Length of Stay: 0 days  Discharge Date and Time:  10/07/2022 10:36 AM  Attending Physician: Didier Corrales MD   Discharging Provider: Didier Corrales MD  Primary Care Provider: Sally Green MD  [facility] Medicine Team: [facility] MED  Didier Corrales MD    HPI:   Yvette Crews is a 72 y.o. 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 Per chart review and discussion with ED physician, the patient fell out of her wheelchair today without LOC. The fall was witnessed by staff. She has been having increasing behavioral issues as well as increased falls. She was supposed to go to inpatient [facility] . History is limited due to dementia. No family at bedside at time of evaluation.    In the ED: AFVSS. No leukocytosis, hemoglobin at baseline. CMP without abnormality. UA noninfectious. Trop negative. Chest XR no acute abnormality. L forearm xray no acute fracture. L wrist xray chondrocalcinosis. Xray R hip without fracture. Xray thoracic spine with Compression deformities of T9-L3 vertebral bodies, which are new and/or progressed compared to MRI thoracic spine 07/07/2022. CT thoracic spine Numerous compression fractures of the thoracic and lumbar spine as above, with new fractures seen at T3 and T9. CT maxillofacial, head, cervical spine with ypodense extra-axial collection overlying the right cerebral hemisphere, new from 09/19/2022, suspicious for subdural effusion or chronic hematoma. Stable 6 mm  anterolisthesis of C2-3. Neurosurgery consulted. Repeat CTH done. Orthopedics consulted. MRI spine ordered.       * No surgery found *      Hospital Course:   Pt admitted to [facility]. Found to have multiple compression fractures at multiple spinal levels; NSGY consulted, recommended TLSO brace and no acute surgical intervention. Intranasal calcitonin given for mild hypercalcemia, outpatient endocrine consult placed. Pt accepted to [facility] facility on 10/6, and pt deemed appropriate for discharge. Plan discussed with pt, who was agreeable and amenable; medications were discussed and reviewed, outpatient follow-up scheduled, ER precautions were given, all questions were answered to the pt's satisfaction, and Mrs. Crews was subsequently discharged.         Goals of Care Treatment Preferences:  Code Status: Full Code    Living Will: Yes              Consults:   Consults (From admission, onward)        Status Ordering Provider     Inpatient consult to Orthopedics  Once        Provider:  (Not yet assigned)    Acknowledged COLETTE CAIN     Case Management/  Once        Provider:  (Not yet assigned)    Acknowledged KYM CATES     Inpatient consult to Orthopedic Surgery  Once        Provider:  (Not yet assigned)    Completed TIAN HICKMAN     Inpatient consult to Neurosurgery  Once        Provider:  (Not yet assigned)    Completed TIAN HICKMAN          No new Assessment & Plan notes have been filed under this hospital service since the last note was generated.  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] 10/04/2022 Unknown    Cervical stenosis of spinal canal [M48.02] 07/11/2022 Yes    SAH (subarachnoid  hemorrhage) [I60.9] 07/07/2022 Yes    Iron deficiency anemia [D50.9] 05/13/2022 Yes    Paroxysmal atrial fibrillation [I48.0] 12/17/2021 Yes     Chronic    Primary hypertension [I10] 12/17/2021 Yes     Chronic    Late onset Alzheimer's dementia without behavioral disturbance [G30.1, F02.80] 11/02/2021 Yes     Chronic    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 Schedule an appointment as soon as possible for a visit.    Specialty: Family Medicine  Contact information:  [address]  [place] [address]  [phone]                       Patient Instructions:      Ambulatory referral/consult to Neurosurgery   Standing Status: Future   Referral Priority: Routine Referral Type: Consultation   Referral Reason: Specialty Services Required   Requested Specialty: Neurosurgery   Number of Visits Requested: 1     Ambulatory referral/consult to Endocrinology   Standing Status: Future   Referral Priority: Routine Referral Type: Consultation   Requested Specialty: Endocrinology   Number of Visits Requested: 1     Ambulatory referral/consult to Family Practice   Standing Status: Future   Referral Priority: Routine Referral Type: Consultation   Referral Reason: Specialty Services Required   Requested Specialty: Family Medicine   Number of Visits Requested: 1     Diet Cardiac     Notify your health care provider if you experience any of the following:  increased confusion or weakness     Notify your health care provider if you  experience any of the following:  persistent dizziness, light-headedness, or visual disturbances     Notify your health care provider if you experience any of the following:  worsening rash     Notify your health care provider if you experience any of the following:  severe persistent headache     Notify your health care provider if you experience any of the following:  difficulty breathing or increased cough     Notify your health care provider if you experience any of the following:  severe uncontrolled pain     Notify your health care provider if you experience any of the following:  persistent nausea and vomiting or diarrhea     Notify your health care provider if you experience any of the following:  temperature >100.4     Notify your health care provider if you experience any of the following:  increased confusion or weakness     Notify your health care provider if you experience any of the following:  persistent dizziness, light-headedness, or visual disturbances     Notify your health care provider if you experience any of the following:  worsening rash     Notify your health care provider if you experience any of the following:  severe persistent headache     Notify your health care provider if you experience any of the following:  difficulty breathing or increased cough     Notify your health care provider if you experience any of the following:  severe uncontrolled pain     Notify your health care provider if you experience any of the following:  persistent nausea and vomiting or diarrhea     Notify your health care provider if you experience any of the following:  temperature >100.4     Activity as tolerated     Activity as tolerated       Significant Diagnostic Studies: Labs: All labs within the past 24 hours have been reviewed    Pending Diagnostic Studies:     Procedure Component Value Units Date/Time    Monkeypox (Orthopoxvirus), PCR [[id number]] Collected: 10/04/22 1117    Order Status: Sent Lab  Status: In process Updated: 10/04/22 1149    Specimen: Lesion          Medications:  Reconciled Home Medications:      Medication List      START taking these medications    calcitonin (salmon) 200 unit/actuation nasal spray  Commonly known as: FORTICAL  1 spray by Nasal route once daily. for 7 days        CHANGE how you take these medications    guaiFENesin 600 mg 12 hr tablet  Commonly known as: MUCINEX  Take 2 tablets (1,200 mg total) by mouth 2 (two) times daily.  What changed: when to take this        CONTINUE taking these medications    acetaminophen 325 MG tablet  Commonly known as: TYLENOL  Take 2 tablets (650 mg total) by mouth every 6 (six) hours as needed for Pain.     albuterol 90 mcg/actuation inhaler  Commonly known as: PROVENTIL/VENTOLIN HFA  Inhale 2 puffs into the lungs every 6 (six) hours as needed for Wheezing. Rescue     AZO BLADDER CONTROL ORAL  Take 1 tablet by mouth 2 (two) times a day.     calcium-vitamin D3-vitamin K 650 mg-12.5 mcg-40 mcg Chew  Chew 1 by mouth twice daily     docusate calcium 240 mg capsule  Commonly known as: SURFAK  Take 240 mg by mouth 3 (three) times daily.     donepeziL 10 MG tablet  Commonly known as: ARICEPT  Take 1 tablet (10 mg total) by mouth once daily.     famotidine 40 MG tablet  Commonly known as: PEPCID  Take 1 tablet (40 mg total) by mouth once daily.     ferrous sulfate 325 (65 FE) MG EC tablet  Take 1 tablet (325 mg total) by mouth once daily.     FLINTSTONES MULTI-VIT GUMMIES ORAL  Chew 2 gummies by mouth every day     Immune Globulin G (IGG)-PRO-IGA 10 % injection (Privigen) 10 % Soln  Commonly known as: PRIVIGEN  Infuse 20 g into the vein every 4 weeks.     levothyroxine 88 MCG tablet  Commonly known as: SYNTHROID  Take 1 tablet (88 mcg total) by mouth before breakfast.     loratadine 10 mg tablet  Commonly known as: CLARITIN  Take 1 tablet (10 mg total) by mouth once daily.     montelukast 10 mg tablet  Commonly known as: SINGULAIR  Take 1 tablet (10  mg total) by mouth every evening.     polyethylene glycol 17 gram Pwpk  Commonly known as: GLYCOLAX  Take 17 g by mouth once daily.        STOP taking these medications    methocarbamoL 500 MG Tab  Commonly known as: ROBAXIN            Indwelling Lines/Drains at time of discharge:   Lines/Drains/Airways     None                 Time spent on the discharge of patient: 35 minutes         Didier Corrales MD  Attending Physician  Department of Hospital Medicine  Epic secure chat preferred, or ext. [phone]  10/7/2022

## 2022-10-08 ENCOUNTER — PATIENT MESSAGE (OUTPATIENT)
Dept: ORTHOPEDICS | Facility: CLINIC | Age: 72
End: 2022-10-08
Payer: MEDICARE

## 2022-10-14 ENCOUNTER — PATIENT MESSAGE (OUTPATIENT)
Dept: NEUROSURGERY | Facility: CLINIC | Age: 72
End: 2022-10-14
Payer: MEDICARE

## 2022-10-14 DIAGNOSIS — I62.00 NONTRAUMATIC SUBDURAL HEMORRHAGE, UNSPECIFIED: Primary | ICD-10-CM

## 2022-10-14 NOTE — TELEPHONE ENCOUNTER
They'd like to proceed with the new spine xray and CTH! The order for the c-spine xray is already in, can you put the orders in for the other two and I can call and get them scheduled? Thanks!

## 2022-10-15 ENCOUNTER — PATIENT MESSAGE (OUTPATIENT)
Dept: NEUROSURGERY | Facility: CLINIC | Age: 72
End: 2022-10-15
Payer: MEDICARE

## 2022-10-24 ENCOUNTER — EXTERNAL HOSPITAL ADMISSION (OUTPATIENT)
Dept: ADMINISTRATIVE | Facility: CLINIC | Age: 72
End: 2022-10-24
Payer: MEDICARE

## 2022-10-24 ENCOUNTER — PATIENT OUTREACH (OUTPATIENT)
Dept: ADMINISTRATIVE | Facility: CLINIC | Age: 72
End: 2022-10-24
Payer: MEDICARE

## 2022-10-26 ENCOUNTER — PATIENT MESSAGE (OUTPATIENT)
Dept: ALLERGY | Facility: CLINIC | Age: 72
End: 2022-10-26
Payer: MEDICARE

## 2022-10-26 ENCOUNTER — PATIENT MESSAGE (OUTPATIENT)
Dept: NEUROSURGERY | Facility: CLINIC | Age: 72
End: 2022-10-26
Payer: MEDICARE

## 2022-10-26 ENCOUNTER — PATIENT MESSAGE (OUTPATIENT)
Dept: NEUROLOGY | Facility: CLINIC | Age: 72
End: 2022-10-26
Payer: MEDICARE

## 2022-10-28 ENCOUNTER — PATIENT MESSAGE (OUTPATIENT)
Dept: OPTOMETRY | Facility: CLINIC | Age: 72
End: 2022-10-28
Payer: MEDICARE

## 2022-11-06 ENCOUNTER — PATIENT MESSAGE (OUTPATIENT)
Dept: OPTOMETRY | Facility: CLINIC | Age: 72
End: 2022-11-06
Payer: MEDICARE

## 2022-11-07 ENCOUNTER — PATIENT MESSAGE (OUTPATIENT)
Dept: ALLERGY | Facility: CLINIC | Age: 72
End: 2022-11-07
Payer: MEDICARE

## 2022-11-10 ENCOUNTER — PATIENT MESSAGE (OUTPATIENT)
Dept: ALLERGY | Facility: CLINIC | Age: 72
End: 2022-11-10
Payer: MEDICARE

## 2022-11-11 ENCOUNTER — OFFICE VISIT (OUTPATIENT)
Dept: NEUROLOGY | Facility: CLINIC | Age: 72
End: 2022-11-11
Payer: MEDICARE

## 2022-11-11 ENCOUNTER — PATIENT MESSAGE (OUTPATIENT)
Dept: ALLERGY | Facility: CLINIC | Age: 72
End: 2022-11-11
Payer: MEDICARE

## 2022-11-11 DIAGNOSIS — G30.1 LATE ONSET ALZHEIMER'S DEMENTIA WITH BEHAVIORAL DISTURBANCE: Primary | ICD-10-CM

## 2022-11-11 DIAGNOSIS — F02.818 LATE ONSET ALZHEIMER'S DEMENTIA WITH BEHAVIORAL DISTURBANCE: Primary | ICD-10-CM

## 2022-11-11 PROCEDURE — 99214 PR OFFICE/OUTPT VISIT, EST, LEVL IV, 30-39 MIN: ICD-10-PCS | Mod: 95,,, | Performed by: PSYCHIATRY & NEUROLOGY

## 2022-11-11 PROCEDURE — 99214 OFFICE O/P EST MOD 30 MIN: CPT | Mod: 95,,, | Performed by: PSYCHIATRY & NEUROLOGY

## 2022-11-11 NOTE — TELEPHONE ENCOUNTER
"I received the following message from Efraín at [facility]:  "Annalisa I have not been able to reach director of nursing for [facility] they should be responsible for all of her meds even IV.   Maybe her  can reach out to her.  We can provide but [facility] would have to pay us.   Thanks Efraín"    Sent message to Dr. Crews through the portal.  "

## 2022-11-11 NOTE — PROGRESS NOTES
[facility]    Name: Yvette Crews  : 1950  MRN: [id number]    Date: 2022    Follow-up Visit - Video     Assessment:     Ms. Crews is a 72 y.o. female with a history of SAH (2016), mTBI, AVNRT, CVID, MDD, IBS, osteoporosis, hypothyroidism who presents to the [facility] due to memory deficits.  Patient began experiencing memory symptoms in 2016, which have progressively worsened over time and now involve all cognitive domains.  In addition cognitive impairment, patient has experienced multiple recent falls.  On evaluation today, neurologic exam was notable for stooped posture and slow gait.  Patient required walker.  Patient had some difficulty with performing tasks that were asked of her but was able to perform with coaching.  She scored 12 30 on the MMSE deficits in orientation, attention, naming, 3 step command, delayed recall, and visuoconstructual ability.  MRI from 2021 was reviewed and is notable for moderate volume loss disproportionately affecting the medial temporal lobes and posterior parietal lobes.  Additionally, there is patchy T2/FLAIR hyperintensities throughout the supratentorial white matter consistent with a moderate chronic microvascular ischemia.  Ms. Crews's diagnosis is consistent with dementia as cognitive impairment has resulted in functional decline and inability to live independently.  The most likely underlying etiology is Alzheimer's disease given her early memory impairment which has progressed to involve most cognitive domains, age, and findings on MRI.    Recommendations:     Workup  No additional workup at this time.    Treatment  No changes in treatment plan.  Continue Donepezil 10 mg PO daily.  The patient's psychiatrist, Dr. Lexi Palomo, is prescribing Zoloft and Trazodone. Will defer management of behavior changes to Dr. Palomo.     Safety-related  We recommend that a medication management system be put in place  to avoid errors in taking medication. Helpful services include [employer] ([employer]; free service) and [employer] ([employer]; paid subscription service).  We recommend that Ms. Crews not drive.     Health maintenance   Continue to follow-up with primary care doctor to monitor and treat vascular risk factors.  Recommend staying socially and cognitively active.  Recommend eating a healthy and balanced diet (Mediterranean or DASH diet).    Follow-up: Follow up in about 6 months (around 5/11/2023). I provided Ms. Crews and her , Pavel, with our contact information should they have any questions or concerns.    Subjective:      Chief complaint:  Dementia    History of present illness:  Ms. Crews is a 72 y.o. female with a history of SAH (2016), mTBI, AVNRT, CVID, MDD, IBS, osteoporosis, hypothyroidism who presents today to the [facility] due to concerns regarding memory deficits. Ms. Crews is accompanied by her , Pavel (who is a retired neurologist), who participates in providing history. Additional information is obtained by reviewing available medical records.  Patient was initially seen in neurology clinic by Dr. Dixon followed by Dr. Shukla.    HPI by Dr. Dixon from 12/13/2018:  The patient was accompanied by her  and her daughter, who collaborated with the history.  Her  is a retired neurologist.  They both live at [facility], and assisted living facility.  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  In 2016 she had a fall when she may have tripped on a tree root or broken pavement.  She briefly lost consciousness and was seen at the [facility] ED and subsequently admitted to the hospital.  She was noted to be confused and disoriented when she arrived in ED.  An initial CT scan of the head revealed right maxillary blood products concerning for facial  fracture, and questionable SAH in bilat occipital lobes.  A CT scan done prior to discharge on 10/29/2016 revealed subarachnoid hemorrhage within the bilateral occipital lobes, left parietal lobe and along the left sylvian fissure.  Left cerebral convexity extra-axial fluid collection, increased in conspicuity compared to prior imaging examinations. There is minimal associated mass effect on the left cerebral convexity without significant midline shift. Increased prominence of the extra-axial space along the right frontal convexity slight with slight increased density. This could possibly be artifactual, however, interval development of new extra-axial fluid collection not excluded.      It is reported that she may have had some difficulty with her memory even prior to the fall however after the fall she has significant worsening in her memory primarily with retention of recent information, unable to recall conversations she may have had and getting confused very easily.  However, she was able to take care of her day-to-day needs at home, including personal hygiene without any problems.  She was using the fitness center on a regular basis.  She had no further falls however continues to have problems with her balance.  The daughter also reports that she has significant problem with depression and anxiety in the past but refuses to take any medications.  She has had poor appetite having lost weight over the past 1 year.    Interval history by Dr. Shukla from 07/16/2021:  Retired from work as psychiatrist in 2007 and moved into [facility] with  who is a retired neurologist in 2015, stopped driving around that time.  Fall with SAH as below in 2016 and family noted worsening difficulty recalling conversations and retaining information.  Recurrent fall when getting up to use the bathroom at night earlier this year with coxyx fracture - began using walker about 2018.   is not present today as he was  admitted to the hospital but he has noted several recent episodes in which she acutely becomes more confused.  She has long history of anxiety and depression which remains uncontrolled, repeatedly very self critical and endorses belief that family and caregivers will abandon her or that she is being put on display throughout testing and interview.  Continues engage in some activities at the home, sleep is variable.    Interval history (11/01/21):  Ms. Crews's daughter reports that since the patient's sacral fracture in January 2021, she has gone downhill.  Patient has experienced significant worsening of cognitive impairment over the past year.  There have been several episodes of confusion and on one occasion she thought that she needed of car to come pick her up, though she was actually near the door to her apartment at [facility].  Her  reports that she frequently mixes up up clothes and is unsure what to put on.  She is having trouble dressing herself and will sometimes put on clothes backwards or inside out.  Her memory has substantially worsened and she has difficulty remembering recent events and conversations.  She often forgets to take medications and is unable to recall if she had eaten earlier in the day.  She endorses lightheadedness when getting up from a chair or out of bed and had several falls last week.  Her , who is a physician, thought that her Effexor might be contributing to her falls and stop the medication.    Interval history (02/01/22):  Patient's cognition is relatively unchanged since last appointment. She is busy for much of the day going to activities, exercise classes, etc.  Will wake in the middle of the night and will get dressed and the go back to bed.  Has had episodes of difficulty getting around [facility] and even within the apartment. Patient fell recently and hit her arm which led to significant bleeding due to being on a blood thinner.  Tolerating  donepezil well.    Interval history (05/17/22):  Patient's cognition is relatively unchanged since last appointment.  She continues to be busy for much of the day going to activities, exercise classes, etc.  She continues to have episodes of waking during the night and getting dressed, but she is redirectable.  In general, she does not wander outside of the apartment, though on one occasion she did but only for a short distance before turning around and going back into the apartment.  [facility] staff is available to help should she wander throughout the facility during the night.  She continues to have difficulty with ambulation.  She does not always use her walker and has had numerous falls.  Pavel reports that the patient was recently found to have a hemoglobin of 5, though a source of bleeding could not be identified.  She received 2 units of PRBCs.  Patient was on DAPT after having WATCHMAN device placed.  Pavix was stopped after low Hgb was discovered.  Tolerating donepezil well.    Interval history (11/11/22):  Patient was irritable and yelling which led to hospitalization at . They have a sitter from 1:90-5:00 pm because patient was frustrated in the PM. Nursing aids are available at night if needed. Patient has been somewhat depressed. Patient has had a number of falls in the recent past and is now in a roger-chair. Medical appointments are now performed remotely. Sleeping is poor. Lexi Palomo MD prescribing Zoloft and Trazodone.     Review of systems  Negative except as noted in the HPI    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    Cataract     Cellulitis of right lower extremity 1/19/2021    Chondrocalcinosis, cause unspecified, involving hand(712.34) 7/12/2011     Dx  updated per 2019 IMO Load    Chronic sinusitis 4/6/2017    Colonic constipation 6/5/2013    Concussion 10/27/2016    Frequent falls 3/14/2017    Gait disturbance 3/14/2017    History of cardiac radiofrequency ablation (RFA) 2/3/2018    History of cardiac radiofrequency ablation (RFA) 2/3/2018    History of subarachnoid hemorrhage 10/30/2016    Hypogammaglobulinemia 9/7/2011    Irritable bowel syndrome 12/18/2015    Major depressive disorder, recurrent episode, in full remission 8/19/2010    Onychomycosis     Osteoarthritis     Postoperative hypothyroidism 12/18/2015    Presence of Watchman left atrial appendage closure device 5/14/2022    Rectal prolapse 6/5/2013    Reflux esophagitis 2/7/2010    Status post thyroidectomy 6/1/2017    Strabismus     SVT (supraventricular tachycardia) 11/22/2013    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     Underweight 1/23/2013     Past Surgical History:   Procedure Laterality Date    BREAST CYST ASPIRATION      x2    CATARACT EXTRACTION W/  INTRAOCULAR LENS IMPLANT Left 3/11/2019    Procedure: EXTRACTION, CATARACT, WITH IOL INSERTION;  Surgeon: Vera Sams MD;  Location: [facility];  Service: Ophthalmology;  Laterality: Left;    CATARACT EXTRACTION W/  INTRAOCULAR LENS IMPLANT Right 4/15/2019    Procedure: EXTRACTION, CATARACT, WITH IOL INSERTION LASER;  Surgeon: Vera Sams MD;  Location: [facility];  Service: Ophthalmology;  Laterality: Right;    COLON SURGERY      EYE SURGERY      FRACTURE SURGERY      NASAL SEPTUM SURGERY      OCCLUSION OF LEFT ATRIAL APPENDAGE N/A 2/18/2022    Procedure: Left atrial appendage occlusion;  Surgeon: Chase Gill MD;  Location: [facility] EP LAB;  Service: Cardiology;  Laterality: N/A;  afib, watchman, BSCI, osiris, anes, MB/EH, 3prep    OPEN REDUCTION AND INTERNAL FIXATION (ORIF) OF INJURY OF SACROILIAC JOINT Bilateral 1/20/2021    Procedure: ORIF, SACROILIAC JOINT;  Surgeon: Alcides Tamez MD;  Location: [address] FLR;  Service:  Orthopedics;  Laterality: Bilateral;    RADIOFREQUENCY ABLATION      RECTAL PROLAPSE REPAIR  june 2013    STRABISMUS SURGERY      TONSILLECTOMY      TRANSESOPHAGEAL ECHOCARDIOGRAPHY N/A 2/18/2022    Procedure: ECHOCARDIOGRAM, TRANSESOPHAGEAL;  Surgeon: Nils Diagnostic Provider;  Location: [facility] EP LAB;  Service: Cardiology;  Laterality: N/A;     Family History   Problem Relation Age of Onset    Heart disease Father     Stroke Father     Heart disease Brother     Breast cancer Paternal Grandmother     Melanoma Neg Hx     Psoriasis Neg Hx     Lupus Neg Hx     Eczema Neg Hx     Amblyopia Neg Hx     Blindness Neg Hx     Cataracts Neg Hx     Glaucoma Neg Hx     Macular degeneration Neg Hx     Retinal detachment Neg Hx     Strabismus Neg Hx      Social History     Socioeconomic History    Marital status:     Number of children: 2   Occupational History    Occupation: retired   Tobacco Use    Smoking status: Never    Smokeless tobacco: Never   Substance and Sexual Activity    Alcohol use: No    Drug use: No    Sexual activity: Not Currently     Current Outpatient Medications:     acetaminophen (TYLENOL) 325 MG tablet, Take 2 tablets (650 mg total) by mouth every 6 (six) hours as needed for Pain., Disp: , Rfl: 0    albuterol (PROVENTIL/VENTOLIN HFA) 90 mcg/actuation inhaler, Inhale 2 puffs into the lungs every 6 (six) hours as needed for Wheezing. Rescue, Disp: 18 g, Rfl: 1    calcitonin, salmon, (FORTICAL) 200 unit/actuation nasal spray, 1 spray by Nasal route once daily. for 7 days, Disp: 3.7 mL, Rfl: 1    calcium-vitamin D3-vitamin K 650 mg-12.5 mcg-40 mcg Chew, Chew 1 by mouth twice daily, Disp: , Rfl:     docusate calcium (SURFAK) 240 mg capsule, Take 240 mg by mouth 3 (three) times daily., Disp: , Rfl:     donepeziL (ARICEPT) 10 MG tablet, Take 1 tablet (10 mg total) by mouth once daily., Disp: 30 tablet, Rfl: 11    famotidine (PEPCID) 40 MG tablet, Take 1 tablet (40 mg total) by mouth once daily., Disp: 30  tablet, Rfl: 5    ferrous sulfate 325 (65 FE) MG EC tablet, Take 1 tablet (325 mg total) by mouth once daily., Disp: 30 tablet, Rfl: 5    guaiFENesin (MUCINEX) 600 mg 12 hr tablet, Take 2 tablets (1,200 mg total) by mouth 2 (two) times daily. (Patient taking differently: Take 1,200 mg by mouth every 12 (twelve) hours.), Disp: 120 tablet, Rfl: 6    Immune Globulin G, IGG,-PRO-IGA 10 % injection, Privigen, (PRIVIGEN) 10 % Soln, Infuse 20 g into the vein every 4 weeks., Disp: 200 mL, Rfl: 0    levothyroxine (SYNTHROID) 88 MCG tablet, Take 1 tablet (88 mcg total) by mouth before breakfast., Disp: 90 tablet, Rfl: 1    loratadine (CLARITIN) 10 mg tablet, Take 1 tablet (10 mg total) by mouth once daily., Disp: 30 tablet, Rfl: 5    montelukast (SINGULAIR) 10 mg tablet, Take 1 tablet (10 mg total) by mouth every evening., Disp: 90 tablet, Rfl: 1    pedi multivit no.7/folic acid (FLINTSTONES MULTI-VIT GUMMIES ORAL), Chew 2 gummies by mouth every day, Disp: , Rfl:     polyethylene glycol (GLYCOLAX) 17 gram PwPk, Take 17 g by mouth once daily., Disp: , Rfl: 0    pumpkin seed extract/soy germ (AZO BLADDER CONTROL ORAL), Take 1 tablet by mouth 2 (two) times a day., Disp: , Rfl:   No current facility-administered medications for this visit.    Facility-Administered Medications Ordered in Other Visits:     balanced salt irrigation solution 1 drop, 1 drop, Right Eye, On Call Procedure, Vera Sams MD    moxifloxacin 0.5 % ophthalmic solution 1 drop, 1 drop, Right Eye, On Call Procedure, Vera Sams MD, 1 drop at 04/15/19 0922    phenylephrine HCL 2.5% ophthalmic solution 1 drop, 1 drop, Right Eye, On Call Procedure, Vera Sams MD, 1 drop at 04/15/19 0922    sodium chloride 0.9% bolus 1,000 mL, 1,000 mL, Intravenous, Once, Ada Montoya NP    sodium chloride 0.9% bolus 1,000 mL, 1,000 mL, Intravenous, Once, Ada Montoya NP    sodium chloride 0.9% bolus 1,000 mL, 1,000 mL, Intravenous, Once, Ada Montoya,  NP    tropicamide 1% ophthalmic solution 1 drop, 1 drop, Right Eye, On Call Procedure, Vera Sams MD, 1 drop at 04/15/19 0922    Allergies:  Adhesive, Buspar [buspirone], Escitalopram oxalate, and Rifampin    Objective:     Vital signs:  There were no vitals taken for this visit.    Neurological exam:  Deferred    Neuroimaging:  Results for orders placed or performed during the hospital encounter of 07/26/21   MRI Brain Without Contrast    Narrative    EXAMINATION:  MRI BRAIN WITHOUT CONTRAST    CLINICAL HISTORY:  Altered mental status;.  Altered mental status, unspecified    TECHNIQUE:  Multiplanar multisequence MR imaging of the brain was performed without contrast.    COMPARISON:  12/20/2018    FINDINGS:  Mild generalized cerebral volume loss with mild compensatory dilatation of the lateral ventricles, similar compared to previous.  Ventricles are midline.  No acute extra-axial fluid collections.    No acute hemorrhage, edema or acute infarct. Patchy periventricular and subcortical foci of increased T2 and FLAIR signal.    Normal vascular flow voids are preserved.    Corpus callosum is intact.  Craniocervical junction is intact.    Mastoid air cells are clear.  Visualized paranasal sinuses are clear.      Impression    No acute intracranial abnormality identified.  Moderate chronic small vessel ischemic change.    There appears to be narrowing of the cervical spinal canal at C3, findings which can be further evaluated with cervical spine MRI.      Electronically signed by: Amy Ramos  Date:    07/26/2021  Time:    10:27   Results for orders placed or performed during the hospital encounter of 11/23/16   CT Head Without Contrast    Narrative    CT head without contrast    11/23/16 10:25:00    Accession# [id number]    CLINICAL INDICATION: 66 year old F with Injury head (959.01)     TECHNIQUE: Axial CT images obtained throughout the region of the head without the use of intravenous contrast. Axial, sagittal  and coronal reconstructions were performed.    COMPARISON: Comparison made to multiple prior studies, the most recent dated 10/29/2016    FINDINGS:    The ventricles are stable in size without evidence of hydrocephalus.    The brain appears within normal limits. No parenchymal mass, hemorrhage, edema or major vascular distribution infarct.    Previous subarachnoid hemorrhage over the occipital regions is no longer apparent. Neither are the subdural collections. No new extra-axial blood or fluid collections.      Healing fracture of the right zygomatic arch and maxilla, partially visualized. Cranium appears intact on today's study. No new fractures are apparent. Mastoid air cells and paranasal sinuses are essentially clear.    Degenerative changes at the craniocervical junction with prominent calcification of the ligaments around the dens.    Impression           Interval resolution of the occipital subarachnoid hemorrhage and bilateral subdural collections. No new hemorrhage or other acute intracranial pathology.    Healing fractures of the right zygomatic arch and maxilla, partially visualized. No new fractures are identified.      Electronically signed by: KIRILL VINCENT MD  Date:     11/23/16  Time:    11:14      *Note: Due to a large number of results and/or encounters for the requested time period, some results have not been displayed. A complete set of results can be found in Results Review.     Laboratory studies:  No results displayed because visit has over 200 results.                        I performed a total of 36 minutes on Ms. Crews's care on the day of their visit excluding time spent related to any billed procedures. This time includes time spent with the patient as well as time spent documenting in the medical record, reviewing patient's records and tests, obtaining history, placing orders, communicating with other healthcare professionals, counseling the patient, family, or caregiver, and/or care  coordination for the diagnoses above.    Patient location: [address] [facility] [address] [facility] [address]    I performed this consultation using real-time telehealth tools, including a live video connection between my location and the patient's location. Prior to initiating the consultation, I obtained informed verbal consent to perform this consultation using telehealth tools and answered all the questions about the telehealth interaction. The participants understand that only a limited neurological exam and limited neuropsychological testing can be performed using telehealth tools.     This note was dictated using iovation Fluency speech recognition software. Please excuse any spelling or grammatical errors. Word recognition mistakes are occasionally missed on review.      Devin Gtz MD   Behavioral Neurology & Neuropsychiatry  [facility]

## 2022-11-22 ENCOUNTER — PATIENT MESSAGE (OUTPATIENT)
Dept: ALLERGY | Facility: CLINIC | Age: 72
End: 2022-11-22
Payer: MEDICARE

## 2022-11-28 ENCOUNTER — PATIENT MESSAGE (OUTPATIENT)
Dept: ALLERGY | Facility: CLINIC | Age: 72
End: 2022-11-28
Payer: MEDICARE

## 2022-12-01 ENCOUNTER — PATIENT MESSAGE (OUTPATIENT)
Dept: ALLERGY | Facility: CLINIC | Age: 72
End: 2022-12-01
Payer: MEDICARE

## 2022-12-07 ENCOUNTER — PATIENT MESSAGE (OUTPATIENT)
Dept: ALLERGY | Facility: CLINIC | Age: 72
End: 2022-12-07
Payer: MEDICARE

## 2022-12-07 NOTE — TELEPHONE ENCOUNTER
Called Arnaldo DELGADILLO at [place].  NO answer, left voicemail asking that she return my call.

## 2022-12-09 ENCOUNTER — TELEPHONE (OUTPATIENT)
Dept: ALLERGY | Facility: CLINIC | Age: 72
End: 2022-12-09
Payer: MEDICARE

## 2022-12-09 NOTE — TELEPHONE ENCOUNTER
----- Message from Lalito Oconnor RN sent at 12/7/2022  4:28 PM CST -----  Regarding: FW: Returning Call    ----- Message -----  From: Abigail Cadena  Sent: 12/7/2022   4:23 PM CST  To: Damian Srivastava Staff  Subject: Returning Call                                   Caller Marisol CRAIN ([facility] ) is Requesting a call back from Gloria Regarding pt  injection Please Call to discuss further .      Marisol Phone   [phone]     ext [phone]

## 2022-12-13 ENCOUNTER — PATIENT MESSAGE (OUTPATIENT)
Dept: ALLERGY | Facility: CLINIC | Age: 72
End: 2022-12-13
Payer: MEDICARE

## 2022-12-15 ENCOUNTER — PATIENT MESSAGE (OUTPATIENT)
Dept: ALLERGY | Facility: CLINIC | Age: 72
End: 2022-12-15
Payer: MEDICARE

## 2022-12-22 ENCOUNTER — PATIENT MESSAGE (OUTPATIENT)
Dept: ALLERGY | Facility: CLINIC | Age: 72
End: 2022-12-22
Payer: MEDICARE

## 2023-02-05 ENCOUNTER — HOSPITAL ENCOUNTER (INPATIENT)
Facility: HOSPITAL | Age: 73
LOS: 1 days | Discharge: HOSPICE | DRG: 689 | End: 2023-02-08
Attending: EMERGENCY MEDICINE | Admitting: HOSPITALIST
Payer: MEDICARE

## 2023-02-05 DIAGNOSIS — N30.00 ACUTE CYSTITIS WITHOUT HEMATURIA: Primary | ICD-10-CM

## 2023-02-05 DIAGNOSIS — E87.0 HYPERNATREMIA: ICD-10-CM

## 2023-02-05 DIAGNOSIS — R52 PAIN: ICD-10-CM

## 2023-02-05 DIAGNOSIS — R07.9 CHEST PAIN: ICD-10-CM

## 2023-02-05 DIAGNOSIS — Z71.89 GOALS OF CARE, COUNSELING/DISCUSSION: ICD-10-CM

## 2023-02-05 DIAGNOSIS — Z71.89 ADVANCE CARE PLANNING: ICD-10-CM

## 2023-02-05 DIAGNOSIS — R53.81 DEBILITY: ICD-10-CM

## 2023-02-05 DIAGNOSIS — R41.82 ALTERED MENTAL STATUS: ICD-10-CM

## 2023-02-05 DIAGNOSIS — Z51.5 PALLIATIVE CARE ENCOUNTER: ICD-10-CM

## 2023-02-05 PROBLEM — R29.898 UPPER EXTREMITY WEAKNESS: Status: ACTIVE | Noted: 2023-02-05

## 2023-02-05 LAB
ALBUMIN SERPL BCP-MCNC: 2.7 G/DL (ref 3.5–5.2)
ALP SERPL-CCNC: 88 U/L (ref 55–135)
ALT SERPL W/O P-5'-P-CCNC: 23 U/L (ref 10–44)
ANION GAP SERPL CALC-SCNC: 11 MMOL/L (ref 8–16)
AST SERPL-CCNC: 22 U/L (ref 10–40)
BACTERIA #/AREA URNS AUTO: ABNORMAL /HPF
BASOPHILS # BLD AUTO: 0.01 K/UL (ref 0–0.2)
BASOPHILS NFR BLD: 0.2 % (ref 0–1.9)
BILIRUB SERPL-MCNC: 0.2 MG/DL (ref 0.1–1)
BILIRUB UR QL STRIP: NEGATIVE
BUN SERPL-MCNC: 27 MG/DL (ref 8–23)
CALCIUM SERPL-MCNC: 9 MG/DL (ref 8.7–10.5)
CAOX CRY UR QL COMP ASSIST: ABNORMAL
CHLORIDE SERPL-SCNC: 112 MMOL/L (ref 95–110)
CLARITY UR REFRACT.AUTO: ABNORMAL
CO2 SERPL-SCNC: 26 MMOL/L (ref 23–29)
COLOR UR AUTO: YELLOW
CREAT SERPL-MCNC: 0.6 MG/DL (ref 0.5–1.4)
DIFFERENTIAL METHOD: ABNORMAL
EOSINOPHIL # BLD AUTO: 0.4 K/UL (ref 0–0.5)
EOSINOPHIL NFR BLD: 7.6 % (ref 0–8)
ERYTHROCYTE [DISTWIDTH] IN BLOOD BY AUTOMATED COUNT: 15.9 % (ref 11.5–14.5)
EST. GFR  (NO RACE VARIABLE): >60 ML/MIN/1.73 M^2
GLUCOSE SERPL-MCNC: 79 MG/DL (ref 70–110)
GLUCOSE UR QL STRIP: NEGATIVE
HCT VFR BLD AUTO: 38.8 % (ref 37–48.5)
HGB BLD-MCNC: 12 G/DL (ref 12–16)
HGB UR QL STRIP: NEGATIVE
HYALINE CASTS UR QL AUTO: 21 /LPF
IMM GRANULOCYTES # BLD AUTO: 0.02 K/UL (ref 0–0.04)
IMM GRANULOCYTES NFR BLD AUTO: 0.4 % (ref 0–0.5)
INFLUENZA A, MOLECULAR: NOT DETECTED
INFLUENZA B, MOLECULAR: NOT DETECTED
KETONES UR QL STRIP: NEGATIVE
LACTATE SERPL-SCNC: 1.5 MMOL/L (ref 0.5–2.2)
LEUKOCYTE ESTERASE UR QL STRIP: ABNORMAL
LYMPHOCYTES # BLD AUTO: 1.4 K/UL (ref 1–4.8)
LYMPHOCYTES NFR BLD: 24 % (ref 18–48)
MCH RBC QN AUTO: 30.5 PG (ref 27–31)
MCHC RBC AUTO-ENTMCNC: 30.9 G/DL (ref 32–36)
MCV RBC AUTO: 99 FL (ref 82–98)
MICROSCOPIC COMMENT: ABNORMAL
MONOCYTES # BLD AUTO: 0.3 K/UL (ref 0.3–1)
MONOCYTES NFR BLD: 5.1 % (ref 4–15)
NEUTROPHILS # BLD AUTO: 3.6 K/UL (ref 1.8–7.7)
NEUTROPHILS NFR BLD: 62.7 % (ref 38–73)
NITRITE UR QL STRIP: POSITIVE
NRBC BLD-RTO: 0 /100 WBC
PH UR STRIP: 6 [PH] (ref 5–8)
PLATELET # BLD AUTO: 279 K/UL (ref 150–450)
PMV BLD AUTO: 10.3 FL (ref 9.2–12.9)
POTASSIUM SERPL-SCNC: 3.8 MMOL/L (ref 3.5–5.1)
PROT SERPL-MCNC: 6.3 G/DL (ref 6–8.4)
PROT UR QL STRIP: ABNORMAL
RBC # BLD AUTO: 3.93 M/UL (ref 4–5.4)
RBC #/AREA URNS AUTO: 1 /HPF (ref 0–4)
RSV AG BY MOLECULAR METHOD: NOT DETECTED
SARS-COV-2 RNA RESP QL NAA+PROBE: NOT DETECTED
SODIUM SERPL-SCNC: 149 MMOL/L (ref 136–145)
SP GR UR STRIP: >1.03 (ref 1–1.03)
T4 FREE SERPL-MCNC: 0.68 NG/DL (ref 0.71–1.51)
TSH SERPL DL<=0.005 MIU/L-ACNC: 30.61 UIU/ML (ref 0.4–4)
URN SPEC COLLECT METH UR: ABNORMAL
WBC # BLD AUTO: 5.67 K/UL (ref 3.9–12.7)
WBC #/AREA URNS AUTO: 31 /HPF (ref 0–5)

## 2023-02-05 PROCEDURE — 80053 COMPREHEN METABOLIC PANEL: CPT | Performed by: PHYSICIAN ASSISTANT

## 2023-02-05 PROCEDURE — 99285 EMERGENCY DEPT VISIT HI MDM: CPT | Mod: 25

## 2023-02-05 PROCEDURE — 81001 URINALYSIS AUTO W/SCOPE: CPT | Performed by: PHYSICIAN ASSISTANT

## 2023-02-05 PROCEDURE — 87186 SC STD MICRODIL/AGAR DIL: CPT | Performed by: PHYSICIAN ASSISTANT

## 2023-02-05 PROCEDURE — 96360 HYDRATION IV INFUSION INIT: CPT

## 2023-02-05 PROCEDURE — 25000003 PHARM REV CODE 250: Performed by: PHYSICIAN ASSISTANT

## 2023-02-05 PROCEDURE — 25000003 PHARM REV CODE 250

## 2023-02-05 PROCEDURE — 85025 COMPLETE CBC W/AUTO DIFF WBC: CPT | Performed by: PHYSICIAN ASSISTANT

## 2023-02-05 PROCEDURE — 87077 CULTURE AEROBIC IDENTIFY: CPT | Performed by: PHYSICIAN ASSISTANT

## 2023-02-05 PROCEDURE — 93010 EKG 12-LEAD: ICD-10-PCS | Mod: ,,, | Performed by: INTERNAL MEDICINE

## 2023-02-05 PROCEDURE — G0378 HOSPITAL OBSERVATION PER HR: HCPCS

## 2023-02-05 PROCEDURE — 63600175 PHARM REV CODE 636 W HCPCS: Performed by: PHYSICIAN ASSISTANT

## 2023-02-05 PROCEDURE — 87040 BLOOD CULTURE FOR BACTERIA: CPT | Performed by: PHYSICIAN ASSISTANT

## 2023-02-05 PROCEDURE — 93010 ELECTROCARDIOGRAM REPORT: CPT | Mod: ,,, | Performed by: INTERNAL MEDICINE

## 2023-02-05 PROCEDURE — 84443 ASSAY THYROID STIM HORMONE: CPT | Performed by: PHYSICIAN ASSISTANT

## 2023-02-05 PROCEDURE — 0241U SARS-COV2 (COVID) WITH FLU/RSV BY PCR: CPT | Performed by: PHYSICIAN ASSISTANT

## 2023-02-05 PROCEDURE — 87086 URINE CULTURE/COLONY COUNT: CPT | Performed by: PHYSICIAN ASSISTANT

## 2023-02-05 PROCEDURE — 93005 ELECTROCARDIOGRAM TRACING: CPT

## 2023-02-05 PROCEDURE — 83605 ASSAY OF LACTIC ACID: CPT | Performed by: PHYSICIAN ASSISTANT

## 2023-02-05 PROCEDURE — 99285 PR EMERGENCY DEPT VISIT,LEVEL V: ICD-10-PCS | Mod: CS,,, | Performed by: PHYSICIAN ASSISTANT

## 2023-02-05 PROCEDURE — 84439 ASSAY OF FREE THYROXINE: CPT | Performed by: PHYSICIAN ASSISTANT

## 2023-02-05 PROCEDURE — 99285 EMERGENCY DEPT VISIT HI MDM: CPT | Mod: CS,,, | Performed by: PHYSICIAN ASSISTANT

## 2023-02-05 PROCEDURE — 87088 URINE BACTERIA CULTURE: CPT | Performed by: PHYSICIAN ASSISTANT

## 2023-02-05 RX ORDER — IBUPROFEN 200 MG
24 TABLET ORAL
Status: DISCONTINUED | OUTPATIENT
Start: 2023-02-05 | End: 2023-02-08 | Stop reason: HOSPADM

## 2023-02-05 RX ORDER — GLUCAGON 1 MG
1 KIT INJECTION
Status: DISCONTINUED | OUTPATIENT
Start: 2023-02-05 | End: 2023-02-08 | Stop reason: HOSPADM

## 2023-02-05 RX ORDER — NALOXONE HCL 0.4 MG/ML
0.02 VIAL (ML) INJECTION
Status: DISCONTINUED | OUTPATIENT
Start: 2023-02-05 | End: 2023-02-08 | Stop reason: HOSPADM

## 2023-02-05 RX ORDER — POLYETHYLENE GLYCOL 3350 17 G/17G
17 POWDER, FOR SOLUTION ORAL DAILY
Status: DISCONTINUED | OUTPATIENT
Start: 2023-02-06 | End: 2023-02-08 | Stop reason: HOSPADM

## 2023-02-05 RX ORDER — LEVOTHYROXINE SODIUM 100 UG/1
100 TABLET ORAL
Status: DISCONTINUED | OUTPATIENT
Start: 2023-02-06 | End: 2023-02-08 | Stop reason: HOSPADM

## 2023-02-05 RX ORDER — ENOXAPARIN SODIUM 100 MG/ML
30 INJECTION SUBCUTANEOUS EVERY 24 HOURS
Status: DISCONTINUED | OUTPATIENT
Start: 2023-02-05 | End: 2023-02-05

## 2023-02-05 RX ORDER — SODIUM CHLORIDE 0.9 % (FLUSH) 0.9 %
10 SYRINGE (ML) INJECTION EVERY 12 HOURS PRN
Status: DISCONTINUED | OUTPATIENT
Start: 2023-02-05 | End: 2023-02-08 | Stop reason: HOSPADM

## 2023-02-05 RX ORDER — ACETAMINOPHEN 325 MG/1
650 TABLET ORAL EVERY 4 HOURS PRN
Status: DISCONTINUED | OUTPATIENT
Start: 2023-02-05 | End: 2023-02-08 | Stop reason: HOSPADM

## 2023-02-05 RX ORDER — LEVOTHYROXINE SODIUM 88 UG/1
88 TABLET ORAL
Status: DISCONTINUED | OUTPATIENT
Start: 2023-02-06 | End: 2023-02-05

## 2023-02-05 RX ORDER — DONEPEZIL HYDROCHLORIDE 10 MG/1
10 TABLET, FILM COATED ORAL DAILY
Status: DISCONTINUED | OUTPATIENT
Start: 2023-02-06 | End: 2023-02-08 | Stop reason: HOSPADM

## 2023-02-05 RX ORDER — SODIUM CHLORIDE 9 MG/ML
INJECTION, SOLUTION INTRAVENOUS CONTINUOUS
Status: ACTIVE | OUTPATIENT
Start: 2023-02-05 | End: 2023-02-06

## 2023-02-05 RX ORDER — FAMOTIDINE 20 MG/1
40 TABLET, FILM COATED ORAL DAILY
Status: DISCONTINUED | OUTPATIENT
Start: 2023-02-06 | End: 2023-02-08 | Stop reason: HOSPADM

## 2023-02-05 RX ORDER — IBUPROFEN 200 MG
16 TABLET ORAL
Status: DISCONTINUED | OUTPATIENT
Start: 2023-02-05 | End: 2023-02-08 | Stop reason: HOSPADM

## 2023-02-05 RX ADMIN — CEFTRIAXONE 1 G: 1 INJECTION, POWDER, FOR SOLUTION INTRAMUSCULAR; INTRAVENOUS at 08:02

## 2023-02-05 RX ADMIN — SODIUM CHLORIDE, SODIUM LACTATE, POTASSIUM CHLORIDE, AND CALCIUM CHLORIDE 1000 ML: .6; .31; .03; .02 INJECTION, SOLUTION INTRAVENOUS at 04:02

## 2023-02-05 RX ADMIN — SODIUM CHLORIDE: 9 INJECTION, SOLUTION INTRAVENOUS at 11:02

## 2023-02-05 NOTE — Clinical Note
Diagnosis: Acute cystitis without hematuria [087323]   Future Attending Provider: FRANCISCA ROBBINS [[id number]]   Admitting Provider:: FRANCISCA ROBBINS [[id number]]

## 2023-02-05 NOTE — ED PROVIDER NOTES
Encounter Date: 2/5/2023       History     Chief Complaint   Patient presents with    Altered Mental Status     Pt arrives via EMS from [facility] for altered mental status x 2 days. Staff states she is at baseline, not talking as much, not wanting to eat or drink. Pt contracted at baseline.      72-year-old female with history of AVNRT, anxiety, hypo gamma globulinemia, subarachnoid hemorrhage, hypothyroidism who presents to the ED for altered mental status.  Patient was sent from [facility] due to altered mental status states patient is lethargic not as talkative as usual and not eating for the past 2 days.    The history is provided by the patient.   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    Cataract     Cellulitis of right lower extremity 1/19/2021    Chondrocalcinosis, cause unspecified, involving hand(712.34) 7/12/2011     Dx updated per 2019 IMO Load    Chronic sinusitis 4/6/2017    Colonic constipation 6/5/2013    Concussion 10/27/2016    Frequent falls 3/14/2017    Gait disturbance 3/14/2017    History of cardiac radiofrequency ablation (RFA) 2/3/2018    History of cardiac radiofrequency ablation (RFA) 2/3/2018    History of subarachnoid hemorrhage 10/30/2016    Hypogammaglobulinemia 9/7/2011    Irritable bowel syndrome 12/18/2015    Major depressive disorder, recurrent episode, in full remission 8/19/2010    Onychomycosis     Osteoarthritis     Postoperative hypothyroidism 12/18/2015    Presence of Watchman left atrial appendage closure device 5/14/2022    Rectal  prolapse 6/5/2013    Reflux esophagitis 2/7/2010    Status post thyroidectomy 6/1/2017    Strabismus     SVT (supraventricular tachycardia) 11/22/2013    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     Underweight 1/23/2013     Past Surgical History:   Procedure Laterality Date    BREAST CYST ASPIRATION      x2    CATARACT EXTRACTION W/  INTRAOCULAR LENS IMPLANT Left 3/11/2019    Procedure: EXTRACTION, CATARACT, WITH IOL INSERTION;  Surgeon: Vera Sams MD;  Location: [facility];  Service: Ophthalmology;  Laterality: Left;    CATARACT EXTRACTION W/  INTRAOCULAR LENS IMPLANT Right 4/15/2019    Procedure: EXTRACTION, CATARACT, WITH IOL INSERTION LASER;  Surgeon: Vera Sams MD;  Location: [facility] OR;  Service: Ophthalmology;  Laterality: Right;    COLON SURGERY      EYE SURGERY      FRACTURE SURGERY      NASAL SEPTUM SURGERY      OCCLUSION OF LEFT ATRIAL APPENDAGE N/A 2/18/2022    Procedure: Left atrial appendage occlusion;  Surgeon: Chase Gill MD;  Location: [facility] EP LAB;  Service: Cardiology;  Laterality: N/A;  afib, watchman, BSCI, osiris, anes, MB/EH, 3prep    OPEN REDUCTION AND INTERNAL FIXATION (ORIF) OF INJURY OF SACROILIAC JOINT Bilateral 1/20/2021    Procedure: ORIF, SACROILIAC JOINT;  Surgeon: Alcides Tamez MD;  Location: [address];  Service: Orthopedics;  Laterality: Bilateral;    RADIOFREQUENCY ABLATION      RECTAL PROLAPSE REPAIR  june 2013    STRABISMUS SURGERY      TONSILLECTOMY      TRANSESOPHAGEAL ECHOCARDIOGRAPHY N/A 2/18/2022    Procedure: ECHOCARDIOGRAM, TRANSESOPHAGEAL;  Surgeon: Nils Diagnostic Provider;  Location: [facility] EP LAB;  Service: Cardiology;  Laterality: N/A;     Family History   Problem Relation Age of Onset    Heart disease Father     Stroke Father     Heart disease Brother     Breast cancer Paternal Grandmother     Melanoma Neg Hx     Psoriasis Neg Hx     Lupus Neg Hx     Eczema Neg Hx     Amblyopia Neg Hx     Blindness Neg Hx     Cataracts Neg Hx     Glaucoma Neg  Hx     Macular degeneration Neg Hx     Retinal detachment Neg Hx     Strabismus Neg Hx      Social History     Tobacco Use    Smoking status: Never    Smokeless tobacco: Never   Substance Use Topics    Alcohol use: No    Drug use: No     Review of Systems   Unable to perform ROS: Mental status change     Physical Exam     Initial Vitals [02/05/23 1446]   BP Pulse Resp Temp SpO2   (!) 108/50 60 16 97 °F (36.1 °C) 97 %      MAP       --         Physical Exam    Constitutional:   Chronically ill-appearing   HENT:   Head: Normocephalic and atraumatic.   Eyes: Conjunctivae are normal. Pupils are equal, round, and reactive to light.   Cardiovascular:  Normal rate, regular rhythm, normal heart sounds and intact distal pulses.           Pulmonary/Chest: Breath sounds normal.   Abdominal: Abdomen is soft. Bowel sounds are normal. There is no abdominal tenderness.   Musculoskeletal:      Comments: Contractures to the extremities     Neurological: She is alert.   Responds to questioning but mouth her responses unable to understand insert tone is extremely low   Skin: Skin is warm and dry. Capillary refill takes less than 2 seconds.       ED Course   Procedures  Labs Reviewed   CBC W/ AUTO DIFFERENTIAL - Abnormal; Notable for the following components:       Result Value    RBC 3.93 (*)     MCV 99 (*)     MCHC 30.9 (*)     RDW 15.9 (*)     All other components within normal limits   COMPREHENSIVE METABOLIC PANEL - Abnormal; Notable for the following components:    Sodium 149 (*)     Chloride 112 (*)     BUN 27 (*)     Albumin 2.7 (*)     All other components within normal limits   URINALYSIS, REFLEX TO URINE CULTURE - Abnormal; Notable for the following components:    Appearance, UA Hazy (*)     Specific Gravity, UA >1.030 (*)     Protein, UA Trace (*)     Nitrite, UA Positive (*)     Leukocytes, UA 2+ (*)     All other components within normal limits    Narrative:     Specimen Source->Urine   URINALYSIS MICROSCOPIC - Abnormal;  Notable for the following components:    WBC, UA 31 (*)     Bacteria Many (*)     Hyaline Casts, UA 21 (*)     All other components within normal limits    Narrative:     Specimen Source->Urine   CULTURE, BLOOD   CULTURE, BLOOD   CULTURE, URINE   LACTIC ACID, PLASMA   SARS-COV2 (COVID) WITH FLU/RSV BY PCR   POCT GLUCOSE MONITORING CONTINUOUS          Imaging Results              X-Ray Chest AP Portable (Final result)  Result time 02/05/23 17:53:46      Final result by Krishna Torres MD (02/05/23 17:53:46)                   Impression:      Limited examination as above.      Electronically signed by: Krishna Torres MD  Date:    02/05/2023  Time:    17:53               Narrative:    EXAMINATION:  XR CHEST AP PORTABLE    CLINICAL HISTORY:  Altered mental status;    TECHNIQUE:  Single frontal view of the chest was performed.    COMPARISON:  10/03/2022.    FINDINGS:  The examination is significantly degraded by the patient's chin and head obscuration the majority of the left hemithorax.    The cardiomediastinal silhouette is poorly delineated.  There are no pleural effusions.  There are interstitial opacities in the right hemithorax.    There are degenerative changes in the osseous structures.                                       CT Head Without Contrast (Final result)  Result time 02/05/23 17:59:03      Final result by Marc Amor MD (02/05/23 17:59:03)                   Impression:      1. There is extensive motion artifact and significantly abnormal positioning, limiting evaluation of the brain parenchyma.  Allowing for this, no convincing acute intracranial abnormalities noting sequela of chronic microvascular ischemic change and senescent change.  Correlation and follow-up is advised.      Electronically signed by: Marc Amor MD  Date:    02/05/2023  Time:    17:59               Narrative:    EXAMINATION:  CT HEAD WITHOUT CONTRAST    CLINICAL HISTORY:  Mental status change, unknown cause;    TECHNIQUE:  Low  dose axial images were obtained through the head.  Coronal and sagittal reformations were also performed. Contrast was not administered.    COMPARISON:  10/05/2022    FINDINGS:  There is significant motion artifact, and suboptimal patient positioning.  This significantly limits evaluation of the brain parenchyma.    There is generalized cerebral volume loss.  There is hypoattenuation in a periventricular fashion, likely sequela of chronic microvascular ischemic change.  There is a calcified extra-axial lesion overlying the right convexity measuring 3 mm suggesting meningioma.  There is no evidence of acute major vascular territory infarct, hemorrhage, or mass.  There is no hydrocephalus.  Previously identified extra-axial collections overlying the right and left convexities have decreased in size..  The paranasal sinuses and mastoid air cells are clear, and there is no evidence of calvarial fracture.  The visualized soft tissues are unremarkable.                                       Medications   cefTRIAXone (ROCEPHIN) 1 g in dextrose 5 % in water (D5W) 5 % 50 mL IVPB (MB+) (has no administration in time range)   lactated ringers bolus 1,000 mL (0 mLs Intravenous Stopped 2/5/23 1745)     Medical Decision Making:   History:   Old Medical Records: I decided to obtain old medical records.  Initial Assessment:   72-year-old female who was sent from [facility] due to altered mental status.  Reports that she is less talkative and more lethargic than usual for the past 2 days.  Differential Diagnosis:   Sepsis, encephalopathy, UTI, pneumonia, viral syndrome, electrolyte derangement  Independently Interpreted Test(s):   I have ordered and independently interpreted EKG Reading(s) - see summary below       <> Summary of EKG Reading(s): On my individual interpretation sinus bradycardia rate of 57.  Normal axis, normal intervals, no significant ischemic changes.  Does not meet STEMI criteria.    Clinical Tests:   Lab  Tests: Ordered and Reviewed  Radiological Study: Ordered and Reviewed  Medical Tests: Ordered and Reviewed  ED Management:  Patient reportedly talkative at baseline however on arrival to the ED she is only mildly words to me and is inaudible.  She does follow commands.  No focal weakness.  She does have chronic contractures but does not exhibit any your tona weakness.  Head CT shows no acute findings.  UA positive for nitrite positive UTI.  She was also noted to have a significant hypernatremia at 1:49 a.m..  She was given a bolus of 1 L LR a and IV Rocephin for her UTI.  Her vital signs do not represent sepsis and her venous lactate was normal.  Given her change in mentation electrolyte derangement and UTI will admit to hospital medicine for further IV antibiotics and management of her hypernatremia.           ED Course as of 02/05/23 1953   Sun Feb 05, 2023   1840 Lactate, Claudy: 1.5  Within normal limit [HJ]   1840 WBC: 5.67  No leukocytosis [HJ]   1840 Sodium(!): 149  Hypernatremia [HJ]   1920 NITRITE UA(!): Positive  Nitrite positive UTI [HJ]      ED Course User Index  [HJ] Raul Mcmullen PA-C                 Clinical Impression:   Final diagnoses:  [R41.82] Altered mental status  [E87.0] Hypernatremia  [N30.00] Acute cystitis without hematuria (Primary)        ED Disposition Condition    Observation                 Raul Mcmullen PA-C  02/05/23 1953

## 2023-02-06 PROBLEM — G95.20 CERVICAL SPINAL CORD COMPRESSION: Status: ACTIVE | Noted: 2022-07-11

## 2023-02-06 LAB
ALBUMIN SERPL BCP-MCNC: 2.4 G/DL (ref 3.5–5.2)
ALP SERPL-CCNC: 74 U/L (ref 55–135)
ALT SERPL W/O P-5'-P-CCNC: 17 U/L (ref 10–44)
ANION GAP SERPL CALC-SCNC: 8 MMOL/L (ref 8–16)
ANION GAP SERPL CALC-SCNC: 9 MMOL/L (ref 8–16)
AST SERPL-CCNC: 19 U/L (ref 10–40)
BASOPHILS # BLD AUTO: 0.02 K/UL (ref 0–0.2)
BASOPHILS NFR BLD: 0.4 % (ref 0–1.9)
BILIRUB SERPL-MCNC: 0.2 MG/DL (ref 0.1–1)
BUN SERPL-MCNC: 21 MG/DL (ref 8–23)
BUN SERPL-MCNC: 23 MG/DL (ref 8–23)
CALCIUM SERPL-MCNC: 7.8 MG/DL (ref 8.7–10.5)
CALCIUM SERPL-MCNC: 8.2 MG/DL (ref 8.7–10.5)
CHLORIDE SERPL-SCNC: 112 MMOL/L (ref 95–110)
CHLORIDE SERPL-SCNC: 113 MMOL/L (ref 95–110)
CO2 SERPL-SCNC: 22 MMOL/L (ref 23–29)
CO2 SERPL-SCNC: 22 MMOL/L (ref 23–29)
CREAT SERPL-MCNC: 0.5 MG/DL (ref 0.5–1.4)
CREAT SERPL-MCNC: 0.6 MG/DL (ref 0.5–1.4)
DIFFERENTIAL METHOD: ABNORMAL
EOSINOPHIL # BLD AUTO: 0.4 K/UL (ref 0–0.5)
EOSINOPHIL NFR BLD: 8.4 % (ref 0–8)
ERYTHROCYTE [DISTWIDTH] IN BLOOD BY AUTOMATED COUNT: 15.6 % (ref 11.5–14.5)
EST. GFR  (NO RACE VARIABLE): >60 ML/MIN/1.73 M^2
EST. GFR  (NO RACE VARIABLE): >60 ML/MIN/1.73 M^2
GLUCOSE SERPL-MCNC: 68 MG/DL (ref 70–110)
GLUCOSE SERPL-MCNC: 79 MG/DL (ref 70–110)
HCT VFR BLD AUTO: 37.3 % (ref 37–48.5)
HGB BLD-MCNC: 11 G/DL (ref 12–16)
IMM GRANULOCYTES # BLD AUTO: 0.02 K/UL (ref 0–0.04)
IMM GRANULOCYTES NFR BLD AUTO: 0.4 % (ref 0–0.5)
LACTATE SERPL-SCNC: 0.8 MMOL/L (ref 0.5–2.2)
LYMPHOCYTES # BLD AUTO: 1 K/UL (ref 1–4.8)
LYMPHOCYTES NFR BLD: 19.9 % (ref 18–48)
MAGNESIUM SERPL-MCNC: 1.8 MG/DL (ref 1.6–2.6)
MCH RBC QN AUTO: 29.8 PG (ref 27–31)
MCHC RBC AUTO-ENTMCNC: 29.5 G/DL (ref 32–36)
MCV RBC AUTO: 101 FL (ref 82–98)
MONOCYTES # BLD AUTO: 0.3 K/UL (ref 0.3–1)
MONOCYTES NFR BLD: 5.1 % (ref 4–15)
NEUTROPHILS # BLD AUTO: 3.2 K/UL (ref 1.8–7.7)
NEUTROPHILS NFR BLD: 65.8 % (ref 38–73)
NRBC BLD-RTO: 0 /100 WBC
OSMOLALITY SERPL: 309 MOSM/KG (ref 275–295)
PHOSPHATE SERPL-MCNC: 2.7 MG/DL (ref 2.7–4.5)
PLATELET # BLD AUTO: 198 K/UL (ref 150–450)
PLATELET BLD QL SMEAR: ABNORMAL
PMV BLD AUTO: 11.2 FL (ref 9.2–12.9)
POTASSIUM SERPL-SCNC: 3.7 MMOL/L (ref 3.5–5.1)
POTASSIUM SERPL-SCNC: 3.8 MMOL/L (ref 3.5–5.1)
PROT SERPL-MCNC: 5.4 G/DL (ref 6–8.4)
RBC # BLD AUTO: 3.69 M/UL (ref 4–5.4)
SODIUM SERPL-SCNC: 143 MMOL/L (ref 136–145)
SODIUM SERPL-SCNC: 147 MMOL/L (ref 136–145)
WBC # BLD AUTO: 4.87 K/UL (ref 3.9–12.7)

## 2023-02-06 PROCEDURE — 99223 PR INITIAL HOSPITAL CARE,LEVL III: ICD-10-PCS | Mod: AI,GC,, | Performed by: HOSPITALIST

## 2023-02-06 PROCEDURE — 99222 PR INITIAL HOSPITAL CARE,LEVL II: ICD-10-PCS | Mod: ,,, | Performed by: PHYSICIAN ASSISTANT

## 2023-02-06 PROCEDURE — 51798 US URINE CAPACITY MEASURE: CPT

## 2023-02-06 PROCEDURE — 83930 ASSAY OF BLOOD OSMOLALITY: CPT

## 2023-02-06 PROCEDURE — 83735 ASSAY OF MAGNESIUM: CPT

## 2023-02-06 PROCEDURE — 63600175 PHARM REV CODE 636 W HCPCS

## 2023-02-06 PROCEDURE — 25000003 PHARM REV CODE 250: Performed by: INTERNAL MEDICINE

## 2023-02-06 PROCEDURE — 25000003 PHARM REV CODE 250

## 2023-02-06 PROCEDURE — 80048 BASIC METABOLIC PNL TOTAL CA: CPT | Mod: XB | Performed by: HOSPITALIST

## 2023-02-06 PROCEDURE — G0378 HOSPITAL OBSERVATION PER HR: HCPCS

## 2023-02-06 PROCEDURE — 85025 COMPLETE CBC W/AUTO DIFF WBC: CPT

## 2023-02-06 PROCEDURE — 84100 ASSAY OF PHOSPHORUS: CPT

## 2023-02-06 PROCEDURE — 83605 ASSAY OF LACTIC ACID: CPT

## 2023-02-06 PROCEDURE — 99223 1ST HOSP IP/OBS HIGH 75: CPT | Mod: AI,GC,, | Performed by: HOSPITALIST

## 2023-02-06 PROCEDURE — 92610 EVALUATE SWALLOWING FUNCTION: CPT

## 2023-02-06 PROCEDURE — 99222 1ST HOSP IP/OBS MODERATE 55: CPT | Mod: ,,, | Performed by: PHYSICIAN ASSISTANT

## 2023-02-06 PROCEDURE — 84295 ASSAY OF SERUM SODIUM: CPT

## 2023-02-06 PROCEDURE — 36415 COLL VENOUS BLD VENIPUNCTURE: CPT

## 2023-02-06 PROCEDURE — 96372 THER/PROPH/DIAG INJ SC/IM: CPT

## 2023-02-06 PROCEDURE — 80053 COMPREHEN METABOLIC PANEL: CPT

## 2023-02-06 PROCEDURE — 84295 ASSAY OF SERUM SODIUM: CPT | Mod: 91

## 2023-02-06 PROCEDURE — 36415 COLL VENOUS BLD VENIPUNCTURE: CPT | Performed by: HOSPITALIST

## 2023-02-06 RX ORDER — METOPROLOL TARTRATE 25 MG/1
25 TABLET, FILM COATED ORAL 2 TIMES DAILY
Status: ON HOLD | COMMUNITY
End: 2023-02-08 | Stop reason: HOSPADM

## 2023-02-06 RX ORDER — POTASSIUM CHLORIDE 20 MEQ/1
40 TABLET, EXTENDED RELEASE ORAL ONCE
Status: COMPLETED | OUTPATIENT
Start: 2023-02-06 | End: 2023-02-06

## 2023-02-06 RX ORDER — TRAZODONE HYDROCHLORIDE 150 MG/1
150 TABLET ORAL NIGHTLY
COMMUNITY

## 2023-02-06 RX ORDER — DOCUSATE CALCIUM 240 MG
240 CAPSULE ORAL 3 TIMES DAILY
Status: ON HOLD | COMMUNITY
End: 2023-02-08 | Stop reason: HOSPADM

## 2023-02-06 RX ORDER — SODIUM CHLORIDE 9 MG/ML
INJECTION, SOLUTION INTRAVENOUS CONTINUOUS
Status: ACTIVE | OUTPATIENT
Start: 2023-02-06 | End: 2023-02-06

## 2023-02-06 RX ORDER — ADHESIVE BANDAGE
30 BANDAGE TOPICAL DAILY PRN
Status: ON HOLD | COMMUNITY
End: 2023-02-08 | Stop reason: HOSPADM

## 2023-02-06 RX ORDER — SERTRALINE HYDROCHLORIDE 50 MG/1
50 TABLET, FILM COATED ORAL 2 TIMES DAILY
COMMUNITY

## 2023-02-06 RX ORDER — ENOXAPARIN SODIUM 100 MG/ML
30 INJECTION SUBCUTANEOUS EVERY 24 HOURS
Status: DISCONTINUED | OUTPATIENT
Start: 2023-02-06 | End: 2023-02-08 | Stop reason: HOSPADM

## 2023-02-06 RX ORDER — TRAZODONE HYDROCHLORIDE 50 MG/1
50 TABLET ORAL EVERY MORNING
Status: ON HOLD | COMMUNITY
End: 2023-02-08 | Stop reason: HOSPADM

## 2023-02-06 RX ADMIN — DONEPEZIL HYDROCHLORIDE 10 MG: 10 TABLET ORAL at 10:02

## 2023-02-06 RX ADMIN — CEFTRIAXONE 1 G: 1 INJECTION, POWDER, FOR SOLUTION INTRAMUSCULAR; INTRAVENOUS at 10:02

## 2023-02-06 RX ADMIN — ENOXAPARIN SODIUM 30 MG: 30 INJECTION SUBCUTANEOUS at 05:02

## 2023-02-06 RX ADMIN — LEVOTHYROXINE SODIUM 100 MCG: 100 TABLET ORAL at 06:02

## 2023-02-06 RX ADMIN — SODIUM CHLORIDE: 0.9 INJECTION, SOLUTION INTRAVENOUS at 11:02

## 2023-02-06 RX ADMIN — POLYETHYLENE GLYCOL 3350 17 G: 17 POWDER, FOR SOLUTION ORAL at 10:02

## 2023-02-06 RX ADMIN — POTASSIUM CHLORIDE 40 MEQ: 1500 TABLET, EXTENDED RELEASE ORAL at 06:02

## 2023-02-06 RX ADMIN — FAMOTIDINE 40 MG: 20 TABLET ORAL at 10:02

## 2023-02-06 NOTE — CONSULTS
[facility]  Neurosurgery  Consult Note    Inpatient consult to Neurosurgery  Consult performed by: Nahed Booker PA-C  Consult ordered by: Maryam Kenny MD        Subjective:     Chief Complaint/Reason for Admission: AMS    History of Present Illness: Yvette Crews is a 72 y.o. female with PMH AVNRT, history of SDH and SAH, history of left clavicle fracture, known C2-3 spondylolisthesis with severe cord compression, and Alzheimer's dementia who presents for altered mental status and lethargy over the past week. She over all has had a decline in health since July of last year.  notes over all she has become more debilitated. She currently is wheel chair/ bed bound and resides in the full time nursing facility within [facility]. On previous admission she was found to have significant cord compression with a C2 on C3 anterolisthesis, she had no new signs of myelopathy and due to her cognitive status, poor nutrition, and frail appearance coupled with her left clavicle fracture the decision was made that recovery from surgery would be very difficult therefore the decision to forgo surgery was made between Dr. King and the patient's , Dr. Crews.    On admission, patient was found to have UTI and hypernatremic  to 149. Neurosurgery consulted for history of spondylolisthesis and cord compression given her over all decline. Patient altered on interview and only oriented to self.       Medications Prior to Admission   Medication Sig Dispense Refill Last Dose    acetaminophen (TYLENOL) 325 MG tablet Take 2 tablets (650 mg total) by mouth every 6 (six) hours as needed for Pain.  0     albuterol (PROVENTIL/VENTOLIN HFA) 90 mcg/actuation inhaler Inhale 2 puffs into the lungs every 6 (six) hours as needed for Wheezing. Rescue 18 g 1     calcitonin, salmon, (FORTICAL) 200 unit/actuation nasal spray 1 spray by Nasal route once daily. for 7 days 3.7 mL 1     calcium-vitamin  D3-vitamin K 650 mg-12.5 mcg-40 mcg Chew Chew 1 by mouth twice daily       docusate calcium (SURFAK) 240 mg capsule Take 240 mg by mouth 3 (three) times daily.       donepeziL (ARICEPT) 10 MG tablet Take 1 tablet (10 mg total) by mouth once daily. 30 tablet 11     famotidine (PEPCID) 40 MG tablet Take 1 tablet (40 mg total) by mouth once daily. 30 tablet 5     ferrous sulfate 325 (65 FE) MG EC tablet Take 1 tablet (325 mg total) by mouth once daily. 30 tablet 5     guaiFENesin (MUCINEX) 600 mg 12 hr tablet Take 2 tablets (1,200 mg total) by mouth 2 (two) times daily. (Patient taking differently: Take 1,200 mg by mouth every 12 (twelve) hours.) 120 tablet 6     Immune Globulin G, IGG,-PRO-IGA 10 % injection, Privigen, (PRIVIGEN) 10 % Soln Infuse 20 g into the vein every 4 weeks. 200 mL 0     levothyroxine (SYNTHROID) 88 MCG tablet Take 1 tablet (88 mcg total) by mouth before breakfast. 90 tablet 1     loratadine (CLARITIN) 10 mg tablet Take 1 tablet (10 mg total) by mouth once daily. 30 tablet 5     montelukast (SINGULAIR) 10 mg tablet Take 1 tablet (10 mg total) by mouth every evening. 90 tablet 1     pedi multivit no.7/folic acid (FLINTSTONES MULTI-VIT GUMMIES ORAL) Chew 2 gummies by mouth every day       polyethylene glycol (GLYCOLAX) 17 gram PwPk Take 17 g by mouth once daily.  0     pumpkin seed extract/soy germ (AZO BLADDER CONTROL ORAL) Take 1 tablet by mouth 2 (two) times a day.          Review of patient's allergies indicates:   Allergen Reactions    Adhesive      Tape tears skin    Buspar [buspirone]      headaches    Escitalopram oxalate Nausea Only     hyponatremia      Rifampin Rash       Past Medical History:   Diagnosis Date    Adult bronchiectasis     Age-related osteoporosis with current pathological fracture with routine healing 08/28/2013    Alzheimer's disease, unspecified (CODE) 2016    baseline repsonds simply yes/no    Amblyopia     Anxiety     Atypical mycobacterial  infection of lung 01/23/2013    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    Cataract     Chondrocalcinosis, cause unspecified, involving hand(712.34) 07/12/2011     Dx updated per 2019 IMO Load    Chronic sinusitis 04/06/2017    Colonic constipation 06/05/2013    Concussion 10/27/2016    Cord compression 07/2022    C2-C3 anterolisthesis with cord compression    CVID (common variable immunodeficiency)     Gait disturbance 03/14/2017 2/2023 wheelchair/bed bound    Irritable bowel syndrome 12/18/2015    Major depressive disorder, recurrent episode, in full remission 08/19/2010    Osteoarthritis     Postoperative hypothyroidism 12/18/2015    Presence of Watchman left atrial appendage closure device 05/14/2022    Rectal prolapse 06/05/2013    Reflux esophagitis 02/07/2010    Strabismus     Subarachnoid hemorrhage 10/30/2016    2016, 7/2022  L temporal SAH    Underweight 01/23/2013     Past Surgical History:   Procedure Laterality Date    BREAST CYST ASPIRATION      x2    CATARACT EXTRACTION W/  INTRAOCULAR LENS IMPLANT Left 3/11/2019    Procedure: EXTRACTION, CATARACT, WITH IOL INSERTION;  Surgeon: Vera Sams MD;  Location: [facility] OR;  Service: Ophthalmology;  Laterality: Left;    CATARACT EXTRACTION W/  INTRAOCULAR LENS IMPLANT Right 4/15/2019    Procedure: EXTRACTION, CATARACT, WITH IOL INSERTION LASER;  Surgeon: Vera Sams MD;  Location: [facility] OR;  Service: Ophthalmology;  Laterality: Right;    COLON SURGERY      EYE SURGERY      FRACTURE SURGERY      NASAL SEPTUM SURGERY      OCCLUSION OF LEFT ATRIAL APPENDAGE N/A 2/18/2022    Procedure: Left atrial appendage occlusion;  Surgeon: Chase iGll MD;  Location: [facility] EP LAB;  Service: Cardiology;  Laterality: N/A;  afib, watchman, BSCI, osiris, anes, MB/EH, 3prep    OPEN REDUCTION AND INTERNAL FIXATION (ORIF) OF INJURY OF SACROILIAC JOINT Bilateral 1/20/2021    Procedure: ORIF,  SACROILIAC JOINT;  Surgeon: Alcides Tamez MD;  Location: [facility] OR [address] [place] [address];  Service: Orthopedics;  Laterality: Bilateral;    RADIOFREQUENCY ABLATION      RECTAL PROLAPSE REPAIR  june 2013    STRABISMUS SURGERY      TONSILLECTOMY      TRANSESOPHAGEAL ECHOCARDIOGRAPHY N/A 2/18/2022    Procedure: ECHOCARDIOGRAM, TRANSESOPHAGEAL;  Surgeon: Nils Diagnostic Provider;  Location: [facility] EP LAB;  Service: Cardiology;  Laterality: N/A;     Family History       Problem Relation (Age of Onset)    Breast cancer Paternal Grandmother    Heart disease Father, Brother    Stroke Father          Tobacco Use    Smoking status: Never    Smokeless tobacco: Never   Substance and Sexual Activity    Alcohol use: No    Drug use: No    Sexual activity: Not Currently     Review of Systems   Unable to perform ROS: Dementia   Objective:     Weight: 45.2 kg (99 lb 10.4 oz)  Body mass index is 18.23 kg/m².  Vital Signs (Most Recent):  Temp: 97 °F (36.1 °C) (02/06/23 0835)  Pulse: 68 (02/06/23 0835)  Resp: 18 (02/06/23 0835)  BP: 139/67 (02/06/23 0835)  SpO2: 99 % (02/06/23 0835) Vital Signs (24h Range):  Temp:  [97 °F (36.1 °C)-97.1 °F (36.2 °C)] 97 °F (36.1 °C)  Pulse:  [56-68] 68  Resp:  [12-18] 18  SpO2:  [97 %-100 %] 99 %  BP: (108-149)/(50-82) 139/67                          Physical Exam    Neurosurgery Physical Exam  General: cachetic, frail female   Head: normocephalic, atraumatic  Neurologic: Alert and oriented to self only. Nonsensical speech   GCS: Motor: 6/Verbal: 4/Eyes: 4 GCS Total: 14  Mental Status: Awake, Alert, Oriented x 1  Language: No aphasia  Speech: No dysarthria  Cranial nerves: face symmetric, tongue midline, CN II-XII grossly intact.   Eyes: pupils equal, round, reactive to light with accommodation, EOMI.   Pulmonary: normal respirations, no signs of respiratory distress  Abdomen: soft, non-distended, not tender to palpation  Skin: Skin is warm, dry and intact.  Sensory: intact to light touch  throughout    Motor Strength:Contractures to BUE/BLE. Intermittently follows commands with prompting. Difficult to assess individual muscle groups due to AMS and contractures.    Plascencia's: positive on right, absent on left     Cervical:   ROM: extremely limited, patient with chin tucked to chest on rest   Midline TTP: positive         Significant Labs:  Recent Labs   Lab 02/05/23 1758 02/06/23  0016 02/06/23  0503 02/06/23  0810   GLU 79  --  68*  --    * 147* 143  143 143   K 3.8  --  3.7  --    *  --  112*  --    CO2 26  --  22*  --    BUN 27*  --  23  --    CREATININE 0.6  --  0.5  --    CALCIUM 9.0  --  8.2*  --    MG  --   --  1.8  --      Recent Labs   Lab 02/05/23 1758 02/06/23  0503   WBC 5.67 4.87   HGB 12.0 11.0*   HCT 38.8 37.3    198     No results for input(s): LABPT, INR, APTT in the last 48 hours.  Microbiology Results (last 7 days)       Procedure Component Value Units Date/Time    Blood culture #2 **CANNOT BE ORDERED STAT** [[id number]] Collected: 02/05/23 1758    Order Status: Completed Specimen: Blood from Other (Specify) Updated: 02/06/23 0315     Blood Culture, Routine No Growth to date    Blood culture #1 **CANNOT BE ORDERED STAT** [[id number]] Collected: 02/05/23 1758    Order Status: Completed Specimen: Blood from Other (Specify) Updated: 02/06/23 0315     Blood Culture, Routine No Growth to date    Urine culture [[id number]] Collected: 02/05/23 1847    Order Status: No result Specimen: Urine Updated: 02/05/23 1925          All pertinent labs from the last 24 hours have been reviewed.    Significant Diagnostics:  CT Head Without Contrast    Result Date: 2/5/2023  1. There is extensive motion artifact and significantly abnormal positioning, limiting evaluation of the brain parenchyma.  Allowing for this, no convincing acute intracranial abnormalities noting sequela of chronic microvascular ischemic change and senescent change.  Correlation and follow-up is advised.  Electronically signed by: Marc Amor MD Date:    02/05/2023 Time:    17:59    X-Ray Chest AP Portable    Result Date: 2/5/2023  Overall limited quality study without obvious acute abnormality. Electronically signed by: Earnest Kerr MD Date:    02/05/2023 Time:    20:40    X-Ray Chest AP Portable    Result Date: 2/5/2023  Limited examination as above. Electronically signed by: Krishna Torres MD Date:    02/05/2023 Time:    17:53       Assessment/Plan:     Cervical stenosis of spinal canal  Yvette Crews is a 72 y.o. female with PMH AVNRT, history of SDH and SAH, history of left clavicle fracture, known C2-3 spondylolisthesis with severe cord compression, and Alzheimer's dementia who presents for altered mental status and lethargy over the past week. Patient with functional decline since last July. NSGY consulted for evaluation of known C2-3 spondylolisthesis.     - CTH negative for acute process, subdural hygromas decreased in size  - UA positive for UTI started on abx per HM  - Na 149 on admit, 143 today. Medicine managing.  - Given patient's declining mentation, poor nutrition, and frail appearance patient is not an ideal surgical candidate at this time. She is at high risk for post operative complications. Surgical intervention risks outweigh over all benefit of surgery. This was discussed with the patient's  as well as the Hospital Medicine team. All parties in agreement. No further imaging recommended at this time.   - Recommend continuing C collar when patient is out of bed as patient is a fall risk.   - NSGY will sign off at this time.     Discussed with Dr. King        Thank you for your consult. I will sign off. Please contact us if you have any additional questions.    Nahed Booker PA-C  Neurosurgery  [facility] - [facility]

## 2023-02-06 NOTE — PT/OT/SLP EVAL
Speech Language Pathology Evaluation  Bedside Swallow    Patient Name:  Yvette Crews   MRN:  [id number]  Admitting Diagnosis: Acute cystitis without hematuria    Recommendations:                 General Recommendations:  Dysphagia therapy  Diet recommendations:  Puree, Thin   Aspiration Precautions: Small bites/sips and Standard aspiration precautions   General Precautions: Standard, aspiration, fall      History:     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    Cataract     Chondrocalcinosis, cause unspecified, involving hand(712.34) 07/12/2011     Dx updated per 2019 IMO Load    Chronic sinusitis 04/06/2017    Colonic constipation 06/05/2013    Concussion 10/27/2016    Cord compression 07/2022    C2-C3 anterolisthesis with cord compression    CVID (common variable immunodeficiency)     Gait disturbance 03/14/2017 2/2023 wheelchair/bed bound    Irritable bowel syndrome 12/18/2015    Major depressive disorder, recurrent episode, in full remission 08/19/2010    Osteoarthritis     Postoperative hypothyroidism 12/18/2015    Presence of Watchman left atrial appendage closure device 05/14/2022    Rectal prolapse 06/05/2013    Reflux esophagitis 02/07/2010    Strabismus     Subarachnoid hemorrhage 10/30/2016    2016, 7/2022  L temporal SAH    Underweight 01/23/2013       Past Surgical History:   Procedure Laterality Date    BREAST CYST ASPIRATION      x2    CATARACT EXTRACTION W/  INTRAOCULAR LENS IMPLANT Left 3/11/2019    Procedure: EXTRACTION, CATARACT, WITH IOL INSERTION;  Surgeon: Vera Sams MD;   Location: [facility] OR;  Service: Ophthalmology;  Laterality: Left;    CATARACT EXTRACTION W/  INTRAOCULAR LENS IMPLANT Right 4/15/2019    Procedure: EXTRACTION, CATARACT, WITH IOL INSERTION LASER;  Surgeon: Vera Sams MD;  Location: [facility] OR;  Service: Ophthalmology;  Laterality: Right;    COLON SURGERY      EYE SURGERY      FRACTURE SURGERY      NASAL SEPTUM SURGERY      OCCLUSION OF LEFT ATRIAL APPENDAGE N/A 2/18/2022    Procedure: Left atrial appendage occlusion;  Surgeon: Chase Gill MD;  Location: [facility] EP LAB;  Service: Cardiology;  Laterality: N/A;  afib, watchman, BSCI, osiris, anes, MB/EH, 3prep    OPEN REDUCTION AND INTERNAL FIXATION (ORIF) OF INJURY OF SACROILIAC JOINT Bilateral 1/20/2021    Procedure: ORIF, SACROILIAC JOINT;  Surgeon: Alcides Tamez MD;  Location: [address] FLR;  Service: Orthopedics;  Laterality: Bilateral;    RADIOFREQUENCY ABLATION      RECTAL PROLAPSE REPAIR  june 2013    STRABISMUS SURGERY      TONSILLECTOMY      TRANSESOPHAGEAL ECHOCARDIOGRAPHY N/A 2/18/2022    Procedure: ECHOCARDIOGRAM, TRANSESOPHAGEAL;  [facility];  Location: [facility] EP LAB;  Service: Cardiology;  Laterality: N/A;       Prior Intubation HX:  none this admit    Chest X-Rays: 2/5 Significantly limited exam quality related to patient positioning including exaggerated kyphosis and obscuration of the upper lungs by the patient's calvarium and facial bones.  Biapical scarring and bibasilar subsegmental atelectasis is present.  No obvious confluent area of consolidation.  No large pleural effusion.  No convincing pneumothorax.  Remote rib fractures.  Chronic appearing elevation of the left distal clavicle with respect to the acromion.       Prior diet: regular/thin.      Subjective     Awake/confused  Pt with poor positioning despite repositioning    Pain/Comfort:  Pain Rating 1: 0/10  Pain Rating Post-Intervention 1: 0/10    Respiratory Status: Room air    Objective:     Oral  Musculature Evaluation  Oral Musculature: unable to assess due to poor participation/comprehension  Dentition: present and adequate    Bedside Swallow Eval:   Consistencies Assessed:  Thin liquids x3 cup, unable to use straw x3  Puree x4      Oral Phase:   WFL  Mild anterior loss with thin liquids from cup    Pharyngeal Phase:   no overt clinical signs/symptoms of aspiration    Assessment:     Yvette Crews is a 72 y.o. female with an SLP diagnosis of Dysphagia.     Goals:   Multidisciplinary Problems       SLP Goals          Problem: SLP    Goal Priority Disciplines Outcome   SLP Goal     SLP    Description: Speech Language Pathology Goals  Goals expected to be met by 2/13    1. Pt will participate in ongoing swallow assessment                             Plan:     Patient to be seen:  3 x/week   Plan of Care reviewed with:  patient   SLP Follow-Up:  Yes       Discharge recommendations:    TBD    Time Tracking:     SLP Treatment Date:   02/06/23  Speech Start Time:  1137  Speech Stop Time:  1142     Speech Total Time (min):  5 min    Billable Minutes: Eval Swallow and Oral Function 5    02/06/2023

## 2023-02-06 NOTE — NURSING
Nurses Note -- 4 Eyes      2/6/2023   7:53 AM      Skin assessed during: Admit      [] No Pressure Injuries Present    []Prevention Measures Documented      [x] Yes- Altered Skin Integrity Present or Discovered   [] LDA Added if Not in Epic (Describe Wound)   [x] New Altered Skin Integrity was Present on Admit and Documented in LDA   [] Wound Image Taken    Wound Care Consulted? No    Attending Nurse:  Nidhi Bueno LPN     Second RN/Staff Member:  Drea   Pt has kyphosis, rash on back,  flat stomach  placed heel boots, placed  mepilex to sacrum.  Altered skin toshins, foams were present upon admit

## 2023-02-06 NOTE — CONSULTS
[facility] - [facility]  Adult Nutrition  Consult Note    SUMMARY     Recommendations    1.) Recommend continuing with current Cardiac diet order, encouraging PO intake     2.) Recommend continuing with providing Boost Plus TID to provide 1080kcal and 42gPRO, encouraging intake.     3.) RD to monitor.      Goals: To meet % of EEN/EPN by next RD f/u  Nutrition Goal Status: new  Communication of RD Recs:  (POC)    Assessment and Plan    Nutrition Problem:  Severe Protein-Calorie Malnutrition  Malnutrition in the context of Acute Illness/Injury     Related to (etiology):  physiological demands  Pt slightly disoriented related to Alzheimer's      Signs and Symptoms (as evidenced by):  Energy Intake: < 50% of estimated energy requirement for 1 wk  Body Fat Depletion: severe depletion of orbital, triceps, thoracic and lumbar region   Muscle Mass Depletion:severe depletion of temples, clavicle region, scapular region, and lower extremities      Interventions(treatment strategy):  Collaboration of Nutrition Care w/ other providers  [employer]     Nutrition Diagnosis Status:  New    Malnutrition Assessment  Malnutrition Type: acute illness or injury  Energy Intake: severe energy intake  Skin (Micronutrient): thinned       Weight Loss (Malnutrition):  (none noted)  Subcutaneous Fat (Malnutrition): other (see comments)  Muscle Mass (Malnutrition): other (see comments)   Orbital Region (Subcutaneous Fat Loss): severe depletion  Upper Arm Region (Subcutaneous Fat Loss): severe depletion   [place] Region (Muscle Loss): severe depletion  Clavicle Bone Region (Muscle Loss): severe depletion  Clavicle and Acromion Bone Region (Muscle Loss): severe depletion  Scapular Bone Region (Muscle Loss): severe depletion       Subcutaneous Fat Loss (Final Summary): severe protein-calorie malnutrition  Muscle Loss Evaluation (Final Summary): severe protein-calorie malnutrition         Reason for Assessment    Reason For Assessment:  "consult  Diagnosis: other (see comments) (Acute cystitis without hematuria)  Relevant Medical History: hypothyroidism, dementia, Alzheimer's  Interdisciplinary Rounds: did not attend  General Information Comments: RD consult: pt seen by RD. As per visual NFPE, RD observed severe wasting in the temporal, acromion process, clavicle and orbital regions. Pt was asleep at the time of RD visit, would not wake up, not easily aroused. Diet hx obtained from caregiver. Caregiver stated to RD that pt ate 50% of their breakfast and does not eat beef/pork.  Nutrition Discharge Planning: As per medical course    Nutrition Risk Screen    Nutrition Risk Screen: other (see comments) (NANCY)    Nutrition/Diet History    Patient Reported Diet/Restrictions/Preferences: other (see comments) (No beef/pork)    Anthropometrics    Temp: 97 °F (36.1 °C)  Height: 5' 2" (157.5 cm)  Height (inches): 62 in  Weight: 45.2 kg (99 lb 10.4 oz)  Weight (lb): 99.65 lb  Ideal Body Weight (IBW), Female: 110 lb  % Ideal Body Weight, Female (lb): 90.59 %  BMI (Calculated): 18.2  BMI Grade: 17 - 18.4 protein-energy malnutrition grade I     Lab/Procedures/Meds    Pertinent Labs Reviewed: reviewed  Pertinent Labs Comments: 2/6: Cl: 112, gluc: 68, PRO: 5.4, alb: 2.4, chol: 201  Pertinent Medications Reviewed: reviewed  Pertinent Medications Comments: polyethylene glycol, levothyroxine, famotidine, NaCl    Estimated/Assessed Needs    Weight Used For Calorie Calculations: 45.2 kg (99 lb 10.4 oz)  Energy Calorie Requirements (kcal): 1356- 1582kcal (30-35kcal/kg)  Energy Need Method: Kcal/kg  Protein Requirements: 45- 54g (1.0-1.2g/kg)  Weight Used For Protein Calculations: 45.2 kg (99 lb 10.4 oz)  Fluid Requirements (mL): 1ml/1kcal or per MD  Estimated Fluid Requirement Method: RDA Method  RDA Method (mL): 1356     Nutrition Prescription Ordered    Current Diet Order: Cardiac diet  Oral Nutrition Supplement: Boost Plus TID    Evaluation of Received Nutrient/Fluid " Intake    I/O: +50ml since admit  Energy Calories Required: not meeting needs  Protein Required: not meeting needs  Fluid Required:  (As per MD)  Comments: LBM 2/4  % Intake of Estimated Energy Needs: 25 - 50 %  % Meal Intake: 25 - 50 %    Nutrition Risk    Level of Risk/Frequency of Follow-up:  (RD to f/u x1/week)     Monitor and Evaluation    Food and Nutrient Intake: energy intake, food and beverage intake  Food and Nutrient Adminstration: diet order  Physical Activity and Function: nutrition-related ADLs and IADLs  Anthropometric Measurements: weight, weight change, body mass index  Biochemical Data, Medical Tests and Procedures: electrolyte and renal panel, gastrointestinal profile, glucose/endocrine profile, inflammatory profile, lipid profile  Nutrition-Focused Physical Findings: overall appearance, extremities, muscles and bones, head and eyes, skin     Nutrition Follow-Up    RD Follow-up?: Yes

## 2023-02-06 NOTE — H&P
[facility]  History & Physical    Patient Name: Yvette Crews  MRN: [id number]  Patient Class: OP- Observation  Admission Date: 2/5/2023  Attending Physician: Jay Carrasco MD   Primary Care Provider: Jeison Rodney MD         Patient information was obtained from relative(s), past medical records and ER records.     Subjective:     Principal Problem:Acute cystitis without hematuria    Chief Complaint:   Chief Complaint   Patient presents with    Altered Mental Status     Pt arrives via EMS from [facility] for altered mental status x 2 days. Staff states she is at baseline, not talking as much, not wanting to eat or drink. Pt contracted at baseline.         HPI: Ms. Crews is a 72 year old woman with a history of AVNRT, Alzheimer's dementia, C2-C3 anterolisthesis with cord compression, SAH (2016), CVID, s/p Watchman device who presents to the ED from [facility] with concerns of altered mental status, lethargy over the past week. Patient is at baseline aphasic, but will normally nod her head (y/no) to questions. On patient interview, patient unable to participate and non-responsive to questions. Further history obtained from , Pavel, over the phone. He states that she has not been herself for the past few days, with increased lethargy and decreased responsiveness noted. She is agitated at night and has been receiving more psychiatric medication, to prevent her crying out but  states he is unsure how much medication is causing her lethargy during the day. She has contractures in her lower extremities and  states she normally keeps them in bent. She was previously evaluated by neurosurgery for cord compression but surgery was deferred at that time. Per , her arm weakness seems to have progressed since then. From July 2022 she has declined in health. She was initially ambulator with a walker, but after falls she has progressed to a gerichair and now  in a wheelchair. She was meant to have a left arm splint placed but this has not arrived yet. On exam, patient's arms remain flexed at abdomen.     On admission to the ED, vital signs stable with HR 59, afebrile, /82. Labs significant for hypernatremia (149). Lactate 1.5 ,WBC 5.67, BUN/Cr 27/0.6. TSH 30.608. CT head and neck with no acute abnormalities. CXR did not show evidence of consolidation, but noted kyphosis, remote rib fractures and chronic appearing elevation of the left distal clavicle with respect to the acromion. UA significant for positive nitrites, 2+ leukocytes, many bacteria.      Past Medical History:   Diagnosis Date    Adult bronchiectasis     Age-related osteoporosis with current pathological fracture with routine healing 8/28/2013    Amblyopia     Anxiety     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    Cataract     Cellulitis of right lower extremity 1/19/2021    Chondrocalcinosis, cause unspecified, involving hand(712.34) 7/12/2011     Dx updated per 2019 IMO Load    Chronic sinusitis 4/6/2017    Colonic constipation 6/5/2013    Concussion 10/27/2016    Frequent falls 3/14/2017    Gait disturbance 3/14/2017    History of cardiac radiofrequency ablation (RFA) 2/3/2018    History of cardiac radiofrequency ablation (RFA) 2/3/2018    History of subarachnoid hemorrhage 10/30/2016    Hypogammaglobulinemia 9/7/2011    Irritable bowel syndrome 12/18/2015    Major depressive disorder, recurrent episode, in full remission 8/19/2010    Onychomycosis     Osteoarthritis     Postoperative hypothyroidism 12/18/2015    Presence of Watchman left atrial appendage closure device 5/14/2022    Rectal prolapse 6/5/2013    Reflux esophagitis 2/7/2010    Status post thyroidectomy 6/1/2017    Strabismus     SVT (supraventricular tachycardia) 11/22/2013    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     Underweight 1/23/2013       Past Surgical  History:   Procedure Laterality Date    BREAST CYST ASPIRATION      x2    CATARACT EXTRACTION W/  INTRAOCULAR LENS IMPLANT Left 3/11/2019    Procedure: EXTRACTION, CATARACT, WITH IOL INSERTION;  Surgeon: Vera Sams MD;  Location: [facility] OR;  Service: Ophthalmology;  Laterality: Left;    CATARACT EXTRACTION W/  INTRAOCULAR LENS IMPLANT Right 4/15/2019    Procedure: EXTRACTION, CATARACT, WITH IOL INSERTION LASER;  Surgeon: Vera Sams MD;  Location: [facility] OR;  Service: Ophthalmology;  Laterality: Right;    COLON SURGERY      EYE SURGERY      FRACTURE SURGERY      NASAL SEPTUM SURGERY      OCCLUSION OF LEFT ATRIAL APPENDAGE N/A 2/18/2022    Procedure: Left atrial appendage occlusion;  Surgeon: Chase Gill MD;  Location: [facility] EP LAB;  Service: Cardiology;  Laterality: N/A;  afib, watchman, BSCI, osiris, anes, MB/EH, 3prep    OPEN REDUCTION AND INTERNAL FIXATION (ORIF) OF INJURY OF SACROILIAC JOINT Bilateral 1/20/2021    Procedure: ORIF, SACROILIAC JOINT;  Surgeon: Alcides Tamez MD;  [address]  Service: Orthopedics;  Laterality: Bilateral;    RADIOFREQUENCY ABLATION      RECTAL PROLAPSE REPAIR  june 2013    STRABISMUS SURGERY      TONSILLECTOMY      TRANSESOPHAGEAL ECHOCARDIOGRAPHY N/A 2/18/2022    Procedure: ECHOCARDIOGRAM, TRANSESOPHAGEAL;  Surgeon: Nils Diagnostic Provider;  Location: [facility] EP LAB;  Service: Cardiology;  Laterality: N/A;       Review of patient's allergies indicates:   Allergen Reactions    Adhesive      Tape tears skin    Buspar [buspirone]      headaches    Escitalopram oxalate Nausea Only     hyponatremia      Rifampin Rash       Current Facility-Administered Medications on File Prior to Encounter   Medication    balanced salt irrigation solution 1 drop    moxifloxacin 0.5 % ophthalmic solution 1 drop    phenylephrine HCL 2.5% ophthalmic solution 1 drop    sodium chloride 0.9% bolus 1,000 mL    sodium chloride 0.9% bolus 1,000 mL     sodium chloride 0.9% bolus 1,000 mL    tropicamide 1% ophthalmic solution 1 drop     Current Outpatient Medications on File Prior to Encounter   Medication Sig    acetaminophen (TYLENOL) 325 MG tablet Take 2 tablets (650 mg total) by mouth every 6 (six) hours as needed for Pain.    albuterol (PROVENTIL/VENTOLIN HFA) 90 mcg/actuation inhaler Inhale 2 puffs into the lungs every 6 (six) hours as needed for Wheezing. Rescue    calcitonin, salmon, (FORTICAL) 200 unit/actuation nasal spray 1 spray by Nasal route once daily. for 7 days    calcium-vitamin D3-vitamin K 650 mg-12.5 mcg-40 mcg Chew Chew 1 by mouth twice daily    docusate calcium (SURFAK) 240 mg capsule Take 240 mg by mouth 3 (three) times daily.    donepeziL (ARICEPT) 10 MG tablet Take 1 tablet (10 mg total) by mouth once daily.    famotidine (PEPCID) 40 MG tablet Take 1 tablet (40 mg total) by mouth once daily.    ferrous sulfate 325 (65 FE) MG EC tablet Take 1 tablet (325 mg total) by mouth once daily.    guaiFENesin (MUCINEX) 600 mg 12 hr tablet Take 2 tablets (1,200 mg total) by mouth 2 (two) times daily. (Patient taking differently: Take 1,200 mg by mouth every 12 (twelve) hours.)    Immune Globulin G, IGG,-PRO-IGA 10 % injection, Privigen, (PRIVIGEN) 10 % Soln Infuse 20 g into the vein every 4 weeks.    levothyroxine (SYNTHROID) 88 MCG tablet Take 1 tablet (88 mcg total) by mouth before breakfast.    loratadine (CLARITIN) 10 mg tablet Take 1 tablet (10 mg total) by mouth once daily.    montelukast (SINGULAIR) 10 mg tablet Take 1 tablet (10 mg total) by mouth every evening.    pedi multivit no.7/folic acid (FLINTSTONES MULTI-VIT GUMMIES ORAL) Chew 2 gummies by mouth every day    polyethylene glycol (GLYCOLAX) 17 gram PwPk Take 17 g by mouth once daily.    pumpkin seed extract/soy germ (AZO BLADDER CONTROL ORAL) Take 1 tablet by mouth 2 (two) times a day.    [DISCONTINUED] aspirin (ECOTRIN) 81 MG EC tablet Take 1 tablet (81 mg total) by  mouth once daily. (Patient taking differently: Take 81 mg by mouth once daily. Stop after 8/18/22)    [DISCONTINUED] clopidogreL (PLAVIX) 75 mg tablet Take 1 tablet (75 mg total) by mouth once daily.    [DISCONTINUED] XARELTO 20 mg Tab TAKE 1 TAB BY MOUTH EVERY EVENING WITH DINNER (BEGIN AFTER ELIQUIS IS COMPLETED)     Family History       Problem Relation (Age of Onset)    Breast cancer Paternal Grandmother    Heart disease Father, Brother    Stroke Father          Tobacco Use    Smoking status: Never    Smokeless tobacco: Never   Substance and Sexual Activity    Alcohol use: No    Drug use: No    Sexual activity: Not Currently     Review of Systems   Unable to perform ROS: Dementia   Objective:     Vital Signs (Most Recent):  Temp: 97 °F (36.1 °C) (02/05/23 1446)  Pulse: (!) 59 (02/05/23 2106)  Resp: 12 (02/05/23 2106)  BP: (!) 149/82 (02/05/23 2106)  SpO2: 100 % (02/05/23 2106)   Vital Signs (24h Range):  Temp:  [97 °F (36.1 °C)] 97 °F (36.1 °C)  Pulse:  [56-60] 59  Resp:  [12-16] 12  SpO2:  [97 %-100 %] 100 %  BP: (108-149)/(50-82) 149/82     Weight: 39.9 kg (87 lb 15.4 oz)  Body mass index is 16.09 kg/m².    Physical Exam  Vitals reviewed.   Constitutional:       General: She is not in acute distress.     Comments: Thin, frail appearing, aphasic, minimally responsive   HENT:      Head: Normocephalic and atraumatic.      Mouth/Throat:      Mouth: Mucous membranes are moist.      Pharynx: Oropharynx is clear.   Eyes:      Pupils: Pupils are equal, round, and reactive to light.   Cardiovascular:      Rate and Rhythm: Regular rhythm. Bradycardia present.   Pulmonary:      Effort: Pulmonary effort is normal. No respiratory distress.      Breath sounds: Normal breath sounds. No wheezing.   Abdominal:      General: Bowel sounds are normal. There is no distension.      Palpations: Abdomen is soft.      Tenderness: There is no abdominal tenderness.   Musculoskeletal:      Right lower leg: No edema.      Left  lower leg: No edema.      Comments: Significant kyphosis, patient resting on her side with arms bent across her chest and leg bent. Resistant to straightening legs and arms.   Skin:     General: Skin is warm and dry.      Capillary Refill: Capillary refill takes less than 2 seconds.      Findings: No bruising or rash.   Neurological:      Motor: Weakness present.         CRANIAL NERVES     CN III, IV, VI   Pupils are equal, round, and reactive to light.     Significant Labs: All pertinent labs within the past 24 hours have been reviewed.    Significant Imaging: I have reviewed all pertinent imaging results/findings within the past 24 hours.    Assessment/Plan:     * Acute cystitis without hematuria  72 year old with Alzhemer's dementia, severe osteoporosis, C2-C3 anterolisthesis, hypothyroidism who presents from nursing home with increased lethargy and altered mental status. Labs concerning for hypernatremia and UTI.    On exam patient does not appear to have suprapubic tenderness, no elevated WBC, lactate 1.5. patient had a previous UTI in September 2022 with E.coli, treated with ceftriaxone.    Received 1L NS and 1 dose ceftriaxone in the ED.    Plan:  - Continue ceftriaxone for UTI  - Monitor urine output  - Monitor BUN/Cr with CMP        Hypernatremia  Admitted with altered mental status, UTI from [facility] home. Found to be hypernatremic to 149. Patient has not been able to feed herself and has late onset Alzheimer's dementia. Hypernatremia likely secondary to decreased PO intake. Received 1L IV fluids in ED for concerns of sepsis with UTI. Vitals signs stable.     Plan:  - Continue maintenance fluids 100ml/hr for 10 hours  -Monitor sodium q4h  -f/u urine studies    Upper extremity weakness  Worsening upper extremity weakness noted per  in the past few months. Was previously seen by neurosurgery for C2-C3 anterolisthesis with severe cord compression in August 2022 and discussion of surgery at that time  was postponed in favor of medical management at the time. Patient was able to use arms at that time, and has since has noted weakness, does not feed herself. Worsened weakness is concerning for progressive degenerative changes to the cervical spine.    Plan:  -F/u NSGY consult       Closed wedge compression fracture of T3 vertebra  See osteoporosis      SAH (subarachnoid hemorrhage)  History noted  -asa/plavix/xarelto were discontinued previously, will continue to hold      Open displaced fracture of acromial end of left clavicle  See osteoporosis      Presence of Watchman left atrial appendage closure device  EKG with sinus bradycardia. No atrial arrhythmias noted.      Late onset Alzheimer's dementia without behavioral disturbance  Hx of Alzheimer's dementia currently residing in nursing home. Patient is aphasic at baseline, able to nod y/n to questions. Currently using wheelchair to ambulate due to decline in the last six months.     Current medications include donepezil 10 mg QD. No sedating, night time medications noted in chart.      Plan:  Continue donepezil 10 mg QD      CVID (common variable immunodeficiency)  Last infusion received in September 2022. Sees Dr. Salgado in Allergy/Immunology clinic.      Altered mental status  See UTI      Postoperative hypothyroidism  Acquired hypothyroidism with hx of MNG s/p total thyroidectomy 1/25/17  Patient takes synthroid 88 mcg daily. TSH 4 months ago within normal limits, on admission significantly elevated to 30.6 concerning for inadequate dosage. Patient is also taking famotidine.    Plan:  -Increase levothyroxine dosage to 100 mcg and recheck in 4-6 weeks. TSH may be falsely elevated in setting of infection      AVNRT (AV eliezer re-entry tachycardia)  AVNRT s/p ablation 2012      Age-related osteoporosis with current pathological fracture with routine healing  Hx of osteoporosis with T3 compression fracture, C2-C3 anterolisthesis and concern for compression,  remote rib fractures, clavicular fracture. Was following with Dr. Samano and taking reclast, last administered in June? 2022. No new fractures noted on X-ray imaging. Patient has weakness in upper extremities concerning for worsening changes to cervical spine. Patient has had difficulty getting to appointments and arranging transportation    Plan:  -f/u Neurosug consult for arm weakness  - f/u plan with outpatient Dr. Samano regarding treatment plans for osteoporosis        VTE Risk Mitigation (From admission, onward)         Ordered     IP VTE HIGH RISK PATIENT  Once         02/05/23 2000     Place sequential compression device  Until discontinued         02/05/23 2000                   Maryam Kenny MD  Department of Hospital Medicine   [facility] - Surgery

## 2023-02-06 NOTE — PHARMACY MED REC
"  Admission Medication History     The home medication history was taken by Olena Murray.    You may go to "Admission" then "Reconcile Home Medications" tabs to review and/or act upon these items.     The home medication list has been updated by the Pharmacy department.   Please read ALL comments highlighted in yellow.   Please address this information as you see fit.    Feel free to contact us if you have any questions or require assistance.      The medications listed below were removed from the home medication list. Please reorder if appropriate:  Patient reports no longer taking the following medication(s):  ALBUTEROL HFA 90 MCG/ACT INH  CALCITONIN, SALMON, (FORTICAL) 200 UNIT/ACT NS  LORATADINE 10 MG   MONTELUKAST 10 MG  PUMPKIN SEED EXTRACT/SOY GERM (AZO BLADDER CONTROL)      Medications listed below were obtained from: Nursing home  Current Outpatient Medications on File Prior to Encounter   Medication Sig    acetaminophen (TYLENOL) 325 MG tablet   Take 2 tablets (650 mg total) by mouth every 6 (six) hours as needed for Pain.    calcium-vitamin D3-vitamin K 650 mg-12.5 mcg-40 mcg Chew   Chew 1 by mouth twice daily    docusate calcium (SURFAK) 240 mg capsule   Take 240 mg by mouth 3 (three) times daily.    donepeziL (ARICEPT) 10 MG tablet   Take 1 tablet (10 mg total) by mouth once daily.    famotidine (PEPCID) 40 MG tablet   Take 40 mg by mouth every morning.      ferrous sulfate 325 (65 FE) MG EC tablet   Take 1 tablet (325 mg total) by mouth once daily.    guaiFENesin (MUCINEX) 600 mg 12 hr tablet   Take 2 tablets (1,200 mg total) by mouth 2 (two) times daily.    Immune Globulin G, IGG,-PRO-IGA 10 % injection, Privigen, (PRIVIGEN) 10 % Soln   Infuse 20 g into the vein every 4 weeks.    levothyroxine (SYNTHROID) 88 MCG tablet   Take 1 tablet (88 mcg total) by mouth before breakfast.    magnesium hydroxide 400 mg/5 ml (MILK OF MAGNESIA) 400 mg/5 mL Susp   Take 30 mLs by mouth daily as needed (Constipation).    " metoprolol tartrate (LOPRESSOR) 25 MG tablet   Take 25 mg by mouth 2 (two) times daily.    polyethylene glycol (GLYCOLAX) 17 gram PwPk   Take 17 g by mouth once daily.    sertraline (ZOLOFT) 50 MG tablet   Take 50 mg by mouth 2 (two) times daily.    traZODone (DESYREL) 150 MG tablet   Take 150 mg by mouth every evening.    traZODone (DESYREL) 50 MG tablet   Take 50 mg by mouth every morning.       Potential issues to be addressed PRIOR TO DISCHARGE  The listed medications were obtained from another facility ([facility]). The patient may not have been able to fill these prescriptions prior to this admission and may require new scripts upon discharge.     Olena Murray  EXT [id number]                .

## 2023-02-06 NOTE — ASSESSMENT & PLAN NOTE
Admitted with altered mental status, UTI from nursning home. Found to be hypernatremic to 149. Patient has not been able to feed herself and has late onset Alzheimer's dementia. Hypernatremia likely secondary to decreased PO intake. Received 1L IV fluids in ED for concerns of sepsis with UTI. Vitals signs stable.     Plan:  - Continue maintenance fluids 100ml/hr for 10 hours  -Monitor sodium q4h  -f/u urine studies

## 2023-02-06 NOTE — ACP (ADVANCE CARE PLANNING)
Called patient's , Pavel to discuss admission to ED. Discussed patient's wishes with  who confirmed that she would want reasonable measures to be taken, but given frailty and decline in mentation and weakness the past few months would not want extraordinary measures taken. When asked to clarify,  stated that she would not want chest compressions but would be amenable to mechanical ventilation, pressor support, cardioversion. Conveyed to  that patient would be Partial Code status.    Maryam Kenny MD  PGY-2, Internal Medicine

## 2023-02-06 NOTE — NURSING
New admit report received from Maliha FAUST  ED RN @21:34. Room prepared awaiting patient arrival.

## 2023-02-06 NOTE — MEDICAL/APP STUDENT
[facility] Medicine Student   Progress Note  [facility]    Admit Date: 2/5/2023  Hospital Day: 0  02/06/2023  1:21 PM    SUBJECTIVE:   Ms. Yvette Crews is a 72 y.o. female with a relevant medical history of Alzheimer's dementia, AVNRT (s/p ablation 2012), AF (s/p Watchman device placement 02/2022), C2-C3 anterolisthesis with cord compression, SAH (2016), CVID, severe osteoporosis, and hypothyroidism (2/2 total thyroidectomy 2017), who is being followed up for Acute cystitis without hematuria after presenting to the ED for altered mental status on 2/5/23.    Interval history: No changes or acute events overnight. Patient has had no urine output since admission, bladder scan showed 0. Vital signs stable, no fevers.    ROS  Unable to perform ROS due to dementia, aphasia.    Please refer to the H&P for past medical, family, and social history.    OBJECTIVE:     Vital Signs Recent:  Temp: 97.6 °F (36.4 °C) (02/06/23 1227)  Pulse: 71 (02/06/23 1227)  Resp: 18 (02/06/23 1227)  BP: 133/70 (02/06/23 1227)  SpO2: 98 % (02/06/23 1227)  Oxygen Documentation:                Device (Oxygen Therapy): room air         Vital Signs Range (Last 24H):  Temp:  [97 °F (36.1 °C)-97.6 °F (36.4 °C)]   Pulse:  [56-74]   Resp:  [12-18]   BP: (108-149)/(50-82)   SpO2:  [97 %-100 %]        I & O (Last 24H):  Intake/Output Summary (Last 24 hours) at 2/6/2023 1321  Last data filed at 2/6/2023 0637  Gross per 24 hour   Intake 50 ml   Output 0 ml   Net 50 ml        Physical Exam:  Physical Exam  Vitals reviewed.  General: no acute distress, appears frail, cachectic, aphasic, minimally responsive  Head: normocephalic and atraumatic  Cardiovascular: regular rhythm, normal rate  Pulmonary: No respiratory distress. Normal breath sounds.  Abdominal: no distension, soft, no tenderness noted.  Musculoskeletal: Contractures of BUE and BLE. No edema of BLE.  Skin: Skin is warm, dry, and intact.  Neurological: Alert and oriented x 1 to person  only. Weakness noted on motor exam, BUE>BLE. Difficult to assess further, intermittently follows commands.    Labs:   Recent Labs   Lab 02/05/23  1758 02/06/23  0016 02/06/23  0503 02/06/23  0810   * 147* 143  143 143   K 3.8  --  3.7  --    *  --  112*  --    CO2 26  --  22*  --    BUN 27*  --  23  --    CREATININE 0.6  --  0.5  --    GLU 79  --  68*  --    CALCIUM 9.0  --  8.2*  --    MG  --   --  1.8  --    PHOS  --   --  2.7  --      Recent Labs   Lab 02/05/23  1758 02/06/23  0503   ALKPHOS 88 74   ALT 23 17   AST 22 19   ALBUMIN 2.7* 2.4*   PROT 6.3 5.4*   BILITOT 0.2 0.2     Recent Labs   Lab 02/05/23  1758 02/06/23  0503   WBC 5.67 4.87   HGB 12.0 11.0*   HCT 38.8 37.3    198       Diagnostic Results:  CXR - no acute pathology, significant kyphosis, biapical scarring and bibasilar subsegmental atelectasis, remote rib fractures, L distal clavicle elevated with respect to acromion  CTH - no acute changes, generalized volume loss, periventricular hypoattenuation (likely related to chronic microvascular ischemic change), extra-axial collections decreased in size compared to previous    Scheduled Meds:   cefTRIAXone (ROCEPHIN) IVPB  1 g Intravenous Q24H    donepeziL  10 mg Oral Daily    enoxaparin  30 mg Subcutaneous Daily    famotidine  40 mg Oral Daily    levothyroxine  100 mcg Oral Before breakfast    polyethylene glycol  17 g Oral Daily     Continuous Infusions:   sodium chloride 0.9% 100 mL/hr at 02/06/23 1134     PRN Meds:acetaminophen, dextrose 10%, dextrose 10%, glucagon (human recombinant), glucose, glucose, naloxone, sodium chloride 0.9%    ASSESSMENT/PLAN:   Ms. Yvette Crews is a 72 y.o. female with a relevant medical history of Alzheimer's dementia, AVNRT (s/p ablation 2012), AF (s/p Watchman device placement 02/2022), C2-C3 anterolisthesis with cord compression, SAH (2016), CVID, severe osteoporosis, and hypothyroidism (2/2 total thyroidectomy 2017),who is being followed  up for Acute cystitis without hematuria.    Active Hospital Problems    Diagnosis  POA    *Acute cystitis without hematuria [N30.00]  Yes    Upper extremity weakness [R29.898]  Yes    Hypernatremia [E87.0]  Yes    Closed wedge compression fracture of T3 vertebra [S22.030A]  Yes    Cervical stenosis of spinal canal [M48.02]  Yes    Open displaced fracture of acromial end of left clavicle [S42.032B]  Yes    Late onset Alzheimer's dementia without behavioral disturbance [G30.1, F02.80]  Yes     Chronic    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]  Yes     Chronic    Encephalopathy, metabolic [G93.41]  Yes    Postoperative hypothyroidism [E89.0]  Yes     Chronic    Age-related osteoporosis with current pathological fracture with routine healing [M80.00XD]  Not Applicable    Moderate protein malnutrition [E44.0]  Yes      Resolved Hospital Problems   No resolved problems to display.       Acute cystitis without hematuria  ASSESSMENT:  72 year old woman with Alzheimer's dementia, C2-C3 anterolisthesis, severe osteoporosis, hypothyroidism presented from nursing home with increasing lethargy and altered mental status. UA concerning for UTI, urine culture pending. Patient had a previous E. Coli UTI in September 2022. She has had 0 urine output since admission.  PLAN:  -continue IV ceftriaxone  -continue to monitor urine output, continue to monitor BUN and creatinine with CMP  -continue IV fluids as oral intake continues to be poor    2. Cervical stenosis of spinal canal   ASSESSMENT: Patient has known C2-3 spondylolisthesis with severe cord compression. At this admission, there was a reported increase in UE weakness, functional decline since July 2022. Neurosurgery consulted. CTH negative for acute changes.  PLAN:  -NSGY recommends continuing C collar when patient is out of bed due to fall risk  -patient is not a candidate for surgical intervention - discuss palliative care consult with patient's   -consult  PT/OT for UE weakness    3. Hypernatremia  ASSESSMENT: Admitted from nursing home with altered mental status and UTI. Found to be hypernatremic to 149 on 2/5/23. She has Alzheimer's dementia and has been unable to feed herself, hypernatremia thought to be secondary to decreased PO intake. She received 1L IV fluids in ED, as well as maintenance fluids 100ml/hr for 10 hours. Vital signs have remained stable. Today, her sodium is 143.  PLAN:  -hypernatremia has resolved with administration of IV fluids  -continue maintenance fluids  -monitor sodium daily with CMP    4. Postoperative hypothyroidism  ASSESSMENT: Acquired hypothyroidism 2/2 hx of MNG s/p total thyroidectomy 1/25/17. Patient takes levothyroxine 88mcg daily. On admission, TSH elevated to 30.6, free T4 0.68. TSH was within normal limits 4 months ago.  PLAN:  -increase levothyroxine dose to 100 mcg  -recheck TSH in 4-6 weeks    5. DVT prophylaxis  PLAN:  -enoxaparin injection 30mg daily    Discharge planning:   Plan to discharge back to nursing home when able. Partial code status - no chest compressions.

## 2023-02-06 NOTE — ED NOTES
Pt brief change, straight cath performed, linen changed, waffle mattress placed, mepilex placed on all bony prominences, purewick placed, pillow placed between knees, blankets provided

## 2023-02-06 NOTE — PLAN OF CARE
Recommendations     1.) Recommend continuing with current Cardiac diet order, encouraging PO intake      2.) Recommend continuing with providing Boost Plus TID to provide 1080kcal and 42gPRO, encouraging intake.      3.) RD to monitor.        Goals: To meet % of EEN/EPN by next RD f/u  Nutrition Goal Status: new  Communication of RD Recs:  (POC)

## 2023-02-06 NOTE — ASSESSMENT & PLAN NOTE
Acquired hypothyroidism with hx of MNG s/p total thyroidectomy 1/25/17  Patient takes synthroid 88 mcg daily. TSH 4 months ago within normal limits, on admission significantly elevated to 30.6 concerning for inadequate dosage. Patient is also taking famotidine.    Plan:  -Increase levothyroxine dosage to 100 mcg and recheck in 4-6 weeks. TSH may be falsely elevated in setting of infection

## 2023-02-06 NOTE — ASSESSMENT & PLAN NOTE
Worsening upper extremity weakness noted per  in the past few months. Was previously seen by neurosurgery for C2-C3 anterolisthesis with severe cord compression in August 2022 and discussion of surgery at that time was postponed in favor of medical management at the time. Patient was able to use arms at that time, and has since has noted weakness, does not feed herself. Worsened weakness is concerning for progressive degenerative changes to the cervical spine.    Plan:  -F/u NSGY consult

## 2023-02-06 NOTE — ASSESSMENT & PLAN NOTE
Yvette Crews is a 72 y.o. female with PMH AVNRT, history of SDH and SAH, history of left clavicle fracture, known C2-3 spondylolisthesis with severe cord compression, and Alzheimer's dementia who presents for altered mental status and lethargy over the past week. Patient with functional decline since last July. NSGY consulted for evaluation of known C2-3 spondylolisthesis.     - CTH negative for acute process, subdural hygromas decreased in size  - UA positive for UTI started on abx per HM  - Na 149 on admit, 143 today. Medicine managing.  - Given patient's declining mentation, poor nutrition, and frail appearance patient is not an ideal surgical candidate at this time. She is at high risk for post operative complications. Surgical intervention risks outweigh over all benefit of surgery. This was discussed with the patient's  as well as the Hospital Medicine team. All parties in agreement. No further imaging recommended at this time.   - Recommend continuing C collar when patient is out of bed as patient is a fall risk.   - NSGY will sign off at this time.     Discussed with Dr. King

## 2023-02-06 NOTE — HPI
Yvette Crews is a 72 y.o. female with PMH AVNRT, history of SDH and SAH, history of left clavicle fracture, known C2-3 spondylolisthesis with severe cord compression, and Alzheimer's dementia who presents for altered mental status and lethargy over the past week. She over all has had a decline in health since July of last year.  notes over all she has become more debilitated. She currently is wheel chair/ bed bound and resides in the full time nursing facility within [facility]. On previous admission she was found to have significant cord compression with a C2 on C3 anterolisthesis, she had no new signs of myelopathy and due to her cognitive status, poor nutrition, and frail appearance coupled with her left clavicle fracture the decision was made that recovery from surgery would be very difficult therefore the decision to forgo surgery was made between Dr. King and the patient's , Dr. Crews.    On admission, patient was found to have UTI and hypernatremic  to 149. Neurosurgery consulted for history of spondylolisthesis and cord compression given her over all decline. Patient altered on interview and only oriented to self.

## 2023-02-06 NOTE — HOSPITAL COURSE
Admitted to Hospital Medicine with acute encephalopathy in the setting of urinary tract infection. Started on Ceftriaxone. TSH elevated, T4 depressed - increased Levothyroxine dose from home dose with understanding that it may be related to present infection. NSGY was consulted for evaluation given history of chronic cord compression with extremity contractures and declining functionality since July 2022. NSGY felt patient is too high risk given her frailty, declining health, and medical comorbidities, and recommended against surgical intervention. NSGY team relayed these recommendations to  and Medicine team. Optimizing nutritional status with supplementation and rehydration in setting of malnutrition. GOC discussion with , consulted Palliative Care to provide further resources and education for long-term care planning going forward.  agreeable to hospice. Ceftriaxone transitioned to Amoxicillin-Clavulanate for UTI course completion. Medically stable for discharge to nursing home with hospice.

## 2023-02-06 NOTE — ASSESSMENT & PLAN NOTE
Hx of osteoporosis with T3 compression fracture, C2-C3 anterolisthesis and concern for compression, remote rib fractures, clavicular fracture. Was following with Dr. Samano and taking reclast, last administered in June? 2022. No new fractures noted on X-ray imaging. Patient has weakness in upper extremities concerning for worsening changes to cervical spine. Patient has had difficulty getting to appointments and arranging transportation    Plan:  -f/u Neurosug consult for arm weakness  - f/u plan with outpatient Dr. Samano regarding treatment plans for osteoporosis

## 2023-02-06 NOTE — SUBJECTIVE & OBJECTIVE
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    Cataract     Cellulitis of right lower extremity 1/19/2021    Chondrocalcinosis, cause unspecified, involving hand(712.34) 7/12/2011     Dx updated per 2019 IMO Load    Chronic sinusitis 4/6/2017    Colonic constipation 6/5/2013    Concussion 10/27/2016    Frequent falls 3/14/2017    Gait disturbance 3/14/2017    History of cardiac radiofrequency ablation (RFA) 2/3/2018    History of cardiac radiofrequency ablation (RFA) 2/3/2018    History of subarachnoid hemorrhage 10/30/2016    Hypogammaglobulinemia 9/7/2011    Irritable bowel syndrome 12/18/2015    Major depressive disorder, recurrent episode, in full remission 8/19/2010    Onychomycosis     Osteoarthritis     Postoperative hypothyroidism 12/18/2015    Presence of Watchman left atrial appendage closure device 5/14/2022    Rectal prolapse 6/5/2013    Reflux esophagitis 2/7/2010    Status post thyroidectomy 6/1/2017    Strabismus     SVT (supraventricular tachycardia) 11/22/2013    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     Underweight 1/23/2013       Past Surgical History:   Procedure Laterality Date    BREAST CYST ASPIRATION      x2    CATARACT EXTRACTION W/  INTRAOCULAR LENS IMPLANT Left 3/11/2019    Procedure: EXTRACTION, CATARACT, WITH IOL INSERTION;  Surgeon: Vera Sams MD;  Location: [facility] OR;  Service: Ophthalmology;  Laterality: Left;    CATARACT EXTRACTION W/  INTRAOCULAR LENS IMPLANT Right 4/15/2019    Procedure: EXTRACTION, CATARACT, WITH IOL INSERTION LASER;  Surgeon: Vera Sams MD;  Location: [facility] OR;  Service: Ophthalmology;  Laterality: Right;    COLON SURGERY      EYE SURGERY      FRACTURE SURGERY      NASAL SEPTUM SURGERY      OCCLUSION OF LEFT ATRIAL APPENDAGE  N/A 2/18/2022    Procedure: Left atrial appendage occlusion;  Surgeon: Chase Gill MD;  Location: [facility] EP LAB;  Service: Cardiology;  Laterality: N/A;  afib, watchman, BSCI, osiris, anes, MB/EH, 3prep    OPEN REDUCTION AND INTERNAL FIXATION (ORIF) OF INJURY OF SACROILIAC JOINT Bilateral 1/20/2021    Procedure: ORIF, SACROILIAC JOINT;  Surgeon: Alcides Tamez MD;  Location: [facility] OR [address] [place] [address];  Service: Orthopedics;  Laterality: Bilateral;    RADIOFREQUENCY ABLATION      RECTAL PROLAPSE REPAIR  june 2013    STRABISMUS SURGERY      TONSILLECTOMY      TRANSESOPHAGEAL ECHOCARDIOGRAPHY N/A 2/18/2022    Procedure: ECHOCARDIOGRAM, TRANSESOPHAGEAL;  Surgeon: Nils Diagnostic Provider;  Location: [facility] EP LAB;  Service: Cardiology;  Laterality: N/A;       Review of patient's allergies indicates:   Allergen Reactions    Adhesive      Tape tears skin    Buspar [buspirone]      headaches    Escitalopram oxalate Nausea Only     hyponatremia      Rifampin Rash       Current Facility-Administered Medications on File Prior to Encounter   Medication    balanced salt irrigation solution 1 drop    moxifloxacin 0.5 % ophthalmic solution 1 drop    phenylephrine HCL 2.5% ophthalmic solution 1 drop    sodium chloride 0.9% bolus 1,000 mL    sodium chloride 0.9% bolus 1,000 mL    sodium chloride 0.9% bolus 1,000 mL    tropicamide 1% ophthalmic solution 1 drop     Current Outpatient Medications on File Prior to Encounter   Medication Sig    acetaminophen (TYLENOL) 325 MG tablet Take 2 tablets (650 mg total) by mouth every 6 (six) hours as needed for Pain.    albuterol (PROVENTIL/VENTOLIN HFA) 90 mcg/actuation inhaler Inhale 2 puffs into the lungs every 6 (six) hours as needed for Wheezing. Rescue    calcitonin, salmon, (FORTICAL) 200 unit/actuation nasal spray 1 spray by Nasal route once daily. for 7 days    calcium-vitamin D3-vitamin K 650 mg-12.5 mcg-40 mcg Chew Chew 1 by mouth twice daily    docusate calcium (SURFAK) 240 mg  capsule Take 240 mg by mouth 3 (three) times daily.    donepeziL (ARICEPT) 10 MG tablet Take 1 tablet (10 mg total) by mouth once daily.    famotidine (PEPCID) 40 MG tablet Take 1 tablet (40 mg total) by mouth once daily.    ferrous sulfate 325 (65 FE) MG EC tablet Take 1 tablet (325 mg total) by mouth once daily.    guaiFENesin (MUCINEX) 600 mg 12 hr tablet Take 2 tablets (1,200 mg total) by mouth 2 (two) times daily. (Patient taking differently: Take 1,200 mg by mouth every 12 (twelve) hours.)    Immune Globulin G, IGG,-PRO-IGA 10 % injection, Privigen, (PRIVIGEN) 10 % Soln Infuse 20 g into the vein every 4 weeks.    levothyroxine (SYNTHROID) 88 MCG tablet Take 1 tablet (88 mcg total) by mouth before breakfast.    loratadine (CLARITIN) 10 mg tablet Take 1 tablet (10 mg total) by mouth once daily.    montelukast (SINGULAIR) 10 mg tablet Take 1 tablet (10 mg total) by mouth every evening.    pedi multivit no.7/folic acid (FLINTSTONES MULTI-VIT GUMMIES ORAL) Chew 2 gummies by mouth every day    polyethylene glycol (GLYCOLAX) 17 gram PwPk Take 17 g by mouth once daily.    pumpkin seed extract/soy germ (AZO BLADDER CONTROL ORAL) Take 1 tablet by mouth 2 (two) times a day.    [DISCONTINUED] aspirin (ECOTRIN) 81 MG EC tablet Take 1 tablet (81 mg total) by mouth once daily. (Patient taking differently: Take 81 mg by mouth once daily. Stop after 8/18/22)    [DISCONTINUED] clopidogreL (PLAVIX) 75 mg tablet Take 1 tablet (75 mg total) by mouth once daily.    [DISCONTINUED] XARELTO 20 mg Tab TAKE 1 TAB BY MOUTH EVERY EVENING WITH DINNER (BEGIN AFTER ELIQUIS IS COMPLETED)     Family History       Problem Relation (Age of Onset)    Breast cancer Paternal Grandmother    Heart disease Father, Brother    Stroke Father          Tobacco Use    Smoking status: Never    Smokeless tobacco: Never   Substance and Sexual Activity    Alcohol use: No    Drug use: No    Sexual activity: Not Currently     Review of Systems   Unable to perform  ROS: Dementia   Objective:     Vital Signs (Most Recent):  Temp: 97 °F (36.1 °C) (02/05/23 1446)  Pulse: (!) 59 (02/05/23 2106)  Resp: 12 (02/05/23 2106)  BP: (!) 149/82 (02/05/23 2106)  SpO2: 100 % (02/05/23 2106)   Vital Signs (24h Range):  Temp:  [97 °F (36.1 °C)] 97 °F (36.1 °C)  Pulse:  [56-60] 59  Resp:  [12-16] 12  SpO2:  [97 %-100 %] 100 %  BP: (108-149)/(50-82) 149/82     Weight: 39.9 kg (87 lb 15.4 oz)  Body mass index is 16.09 kg/m².    Physical Exam  Vitals reviewed.   Constitutional:       General: She is not in acute distress.     Comments: Thin, frail appearing, aphasic, minimally responsive   HENT:      Head: Normocephalic and atraumatic.      Mouth/Throat:      Mouth: Mucous membranes are moist.      Pharynx: Oropharynx is clear.   Eyes:      Pupils: Pupils are equal, round, and reactive to light.   Cardiovascular:      Rate and Rhythm: Regular rhythm. Bradycardia present.   Pulmonary:      Effort: Pulmonary effort is normal. No respiratory distress.      Breath sounds: Normal breath sounds. No wheezing.   Abdominal:      General: Bowel sounds are normal. There is no distension.      Palpations: Abdomen is soft.      Tenderness: There is no abdominal tenderness.   Musculoskeletal:      Right lower leg: No edema.      Left lower leg: No edema.      Comments: Significant kyphosis, patient resting on her side with arms bent across her chest and leg bent. Resistant to straightening legs and arms.   Skin:     General: Skin is warm and dry.      Capillary Refill: Capillary refill takes less than 2 seconds.      Findings: No bruising or rash.   Neurological:      Motor: Weakness present.         CRANIAL NERVES     CN III, IV, VI   Pupils are equal, round, and reactive to light.     Significant Labs: All pertinent labs within the past 24 hours have been reviewed.    Significant Imaging: I have reviewed all pertinent imaging results/findings within the past 24 hours.

## 2023-02-06 NOTE — ASSESSMENT & PLAN NOTE
Hx of Alzheimer's dementia currently residing in nursing home. Patient is aphasic at baseline, able to nod y/n to questions. Currently using wheelchair to ambulate due to decline in the last six months.     Current medications include donepezil 10 mg QD. No sedating, night time medications noted in chart.      Plan:  Continue donepezil 10 mg QD

## 2023-02-06 NOTE — HPI
Ms. Crews is a 72 year old woman with a history of AVNRT, Alzheimer's dementia, C2-C3 anterolisthesis with cord compression, SAH (2016), CVID, s/p Watchman device [facility] of [facility] mental status, lethargy over the past week. Patient is at baseline aphasic, but will normally nod her head (y/no) to questions. On patient interview, patient unable to participate and non-responsive to questions. Further history obtained from , Pavel, over the phone. He states that she has not been herself for the past few days, with increased lethargy and decreased responsiveness noted. She is agitated at night and has been receiving more psychiatric medication, to prevent her crying out but  states he is unsure how much medication is causing her lethargy during the day. She has contractures in her lower extremities and  states she normally keeps them in bent. She was previously evaluated by neurosurgery for cord compression but surgery was deferred at that time. Per , her arm weakness seems to have progressed since then. From July 2022 she has declined in health. She was initially ambulator with a walker, but after falls she has progressed to a gerichair and now in a wheelchair. She was meant to have a left arm splint placed but this has not arrived yet. On exam, patient's arms remain flexed at abdomen.     On admission to the ED, vital signs stable with HR 59, afebrile, /82. Labs significant for hypernatremia (149). Lactate 1.5 ,WBC 5.67, BUN/Cr 27/0.6. TSH 30.608. CT head and neck with no acute abnormalities. CXR did not show evidence of consolidation, but noted kyphosis, remote rib fractures and chronic appearing elevation of the left distal clavicle with respect to the acromion. UA significant for positive nitrites, 2+ leukocytes, many bacteria.

## 2023-02-06 NOTE — ASSESSMENT & PLAN NOTE
72 year old with Alzhemer's dementia, severe osteoporosis, C2-C3 anterolisthesis, hypothyroidism who presents from nursing home with increased lethargy and altered mental status. Labs concerning for hypernatremia and UTI.    On exam patient does not appear to have suprapubic tenderness, no elevated WBC, lactate 1.5. patient had a previous UTI in September 2022 with E.coli, treated with ceftriaxone.    Received 1L NS and 1 dose ceftriaxone in the ED.    Plan:  - Continue ceftriaxone for UTI  - Monitor urine output  - Monitor BUN/Cr with CMP

## 2023-02-06 NOTE — SUBJECTIVE & OBJECTIVE
Medications Prior to Admission   Medication Sig Dispense Refill Last Dose    acetaminophen (TYLENOL) 325 MG tablet Take 2 tablets (650 mg total) by mouth every 6 (six) hours as needed for Pain.  0     albuterol (PROVENTIL/VENTOLIN HFA) 90 mcg/actuation inhaler Inhale 2 puffs into the lungs every 6 (six) hours as needed for Wheezing. Rescue 18 g 1     calcitonin, salmon, (FORTICAL) 200 unit/actuation nasal spray 1 spray by Nasal route once daily. for 7 days 3.7 mL 1     calcium-vitamin D3-vitamin K 650 mg-12.5 mcg-40 mcg Chew Chew 1 by mouth twice daily       docusate calcium (SURFAK) 240 mg capsule Take 240 mg by mouth 3 (three) times daily.       donepeziL (ARICEPT) 10 MG tablet Take 1 tablet (10 mg total) by mouth once daily. 30 tablet 11     famotidine (PEPCID) 40 MG tablet Take 1 tablet (40 mg total) by mouth once daily. 30 tablet 5     ferrous sulfate 325 (65 FE) MG EC tablet Take 1 tablet (325 mg total) by mouth once daily. 30 tablet 5     guaiFENesin (MUCINEX) 600 mg 12 hr tablet Take 2 tablets (1,200 mg total) by mouth 2 (two) times daily. (Patient taking differently: Take 1,200 mg by mouth every 12 (twelve) hours.) 120 tablet 6     Immune Globulin G, IGG,-PRO-IGA 10 % injection, Privigen, (PRIVIGEN) 10 % Soln Infuse 20 g into the vein every 4 weeks. 200 mL 0     levothyroxine (SYNTHROID) 88 MCG tablet Take 1 tablet (88 mcg total) by mouth before breakfast. 90 tablet 1     loratadine (CLARITIN) 10 mg tablet Take 1 tablet (10 mg total) by mouth once daily. 30 tablet 5     montelukast (SINGULAIR) 10 mg tablet Take 1 tablet (10 mg total) by mouth every evening. 90 tablet 1     pedi multivit no.7/folic acid (FLINTSTONES MULTI-VIT GUMMIES ORAL) Chew 2 gummies by mouth every day       polyethylene glycol (GLYCOLAX) 17 gram PwPk Take 17 g by mouth once daily.  0     pumpkin seed extract/soy germ (AZO BLADDER CONTROL ORAL) Take 1 tablet by mouth 2 (two) times a day.          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    Cataract     Chondrocalcinosis, cause unspecified, involving hand(712.34) 07/12/2011     Dx updated per 2019 IMO Load    Chronic sinusitis 04/06/2017    Colonic constipation 06/05/2013    Concussion 10/27/2016    Cord compression 07/2022    C2-C3 anterolisthesis with cord compression    CVID (common variable immunodeficiency)     Gait disturbance 03/14/2017 2/2023 wheelchair/bed bound    Irritable bowel syndrome 12/18/2015    Major depressive disorder, recurrent episode, in full remission 08/19/2010    Osteoarthritis     Postoperative hypothyroidism 12/18/2015    Presence of Watchman left atrial appendage closure device 05/14/2022    Rectal prolapse 06/05/2013    Reflux esophagitis 02/07/2010    Strabismus     Subarachnoid hemorrhage 10/30/2016    2016, 7/2022  L temporal SAH    Underweight 01/23/2013     Past Surgical History:   Procedure Laterality Date    BREAST CYST ASPIRATION      x2    CATARACT EXTRACTION W/  INTRAOCULAR LENS IMPLANT Left 3/11/2019    Procedure: EXTRACTION, CATARACT, WITH IOL INSERTION;  Surgeon: Vera Sams MD;  Location: [facility] OR;  Service: Ophthalmology;  Laterality: Left;    CATARACT EXTRACTION W/  INTRAOCULAR LENS IMPLANT Right 4/15/2019    Procedure: EXTRACTION, CATARACT, WITH IOL INSERTION LASER;  Surgeon: eVra Sams MD;  Location: [facility] OR;  Service: Ophthalmology;  Laterality: Right;    COLON  SURGERY      EYE SURGERY      FRACTURE SURGERY      NASAL SEPTUM SURGERY      OCCLUSION OF LEFT ATRIAL APPENDAGE N/A 2/18/2022    Procedure: Left atrial appendage occlusion;  Surgeon: Chase Gill MD;  Location: [facility] EP LAB;  Service: Cardiology;  Laterality: N/A;  afib, watchman, BSCI, osiris, anes, MB/EH, 3prep    OPEN REDUCTION AND INTERNAL FIXATION (ORIF) OF INJURY OF SACROILIAC JOINT Bilateral 1/20/2021    Procedure: ORIF, SACROILIAC JOINT;  Surgeon: Alcides Tamez MD;  Location: [facility] OR [address] [place] [address];  Service: Orthopedics;  Laterality: Bilateral;    RADIOFREQUENCY ABLATION      RECTAL PROLAPSE REPAIR  june 2013    STRABISMUS SURGERY      TONSILLECTOMY      TRANSESOPHAGEAL ECHOCARDIOGRAPHY N/A 2/18/2022    Procedure: ECHOCARDIOGRAM, TRANSESOPHAGEAL;  Surgeon: Nils Diagnostic Provider;  Location: [facility] EP LAB;  Service: Cardiology;  Laterality: N/A;     Family History       Problem Relation (Age of Onset)    Breast cancer Paternal Grandmother    Heart disease Father, Brother    Stroke Father          Tobacco Use    Smoking status: Never    Smokeless tobacco: Never   Substance and Sexual Activity    Alcohol use: No    Drug use: No    Sexual activity: Not Currently     Review of Systems   Unable to perform ROS: Dementia   Objective:     Weight: 45.2 kg (99 lb 10.4 oz)  Body mass index is 18.23 kg/m².  Vital Signs (Most Recent):  Temp: 97 °F (36.1 °C) (02/06/23 0835)  Pulse: 68 (02/06/23 0835)  Resp: 18 (02/06/23 0835)  BP: 139/67 (02/06/23 0835)  SpO2: 99 % (02/06/23 0835) Vital Signs (24h Range):  Temp:  [97 °F (36.1 °C)-97.1 °F (36.2 °C)] 97 °F (36.1 °C)  Pulse:  [56-68] 68  Resp:  [12-18] 18  SpO2:  [97 %-100 %] 99 %  BP: (108-149)/(50-82) 139/67                          Physical Exam    Neurosurgery Physical Exam  General: cachetic, frail female   Head: normocephalic, atraumatic  Neurologic: Alert and oriented to self only. Nonsensical speech   GCS: Motor: 6/Verbal: 4/Eyes: 4 GCS Total: 14  Mental  Status: Awake, Alert, Oriented x 1  Language: No aphasia  Speech: No dysarthria  Cranial nerves: face symmetric, tongue midline, CN II-XII grossly intact.   Eyes: pupils equal, round, reactive to light with accommodation, EOMI.   Pulmonary: normal respirations, no signs of respiratory distress  Abdomen: soft, non-distended, not tender to palpation  Skin: Skin is warm, dry and intact.  Sensory: intact to light touch throughout    Motor Strength:Contractures to BUE/BLE. Intermittently follows commands with prompting. Difficult to assess individual muscle groups due to AMS and contractures.    Plascencia's: positive on right, absent on left     Cervical:   ROM: extremely limited, patient with chin tucked to chest on rest   Midline TTP: positive         Significant Labs:  Recent Labs   Lab 02/05/23 1758 02/06/23  0016 02/06/23  0503 02/06/23  0810   GLU 79  --  68*  --    * 147* 143  143 143   K 3.8  --  3.7  --    *  --  112*  --    CO2 26  --  22*  --    BUN 27*  --  23  --    CREATININE 0.6  --  0.5  --    CALCIUM 9.0  --  8.2*  --    MG  --   --  1.8  --      Recent Labs   Lab 02/05/23 1758 02/06/23  0503   WBC 5.67 4.87   HGB 12.0 11.0*   HCT 38.8 37.3    198     No results for input(s): LABPT, INR, APTT in the last 48 hours.  Microbiology Results (last 7 days)       Procedure Component Value Units Date/Time    Blood culture #2 **CANNOT BE ORDERED STAT** [[id number]] Collected: 02/05/23 1758    Order Status: Completed Specimen: Blood from Other (Specify) Updated: 02/06/23 0315     Blood Culture, Routine No Growth to date    Blood culture #1 **CANNOT BE ORDERED STAT** [[id number]] Collected: 02/05/23 1758    Order Status: Completed Specimen: Blood from Other (Specify) Updated: 02/06/23 0315     Blood Culture, Routine No Growth to date    Urine culture [[id number]] Collected: 02/05/23 1847    Order Status: No result Specimen: Urine Updated: 02/05/23 1925          All pertinent labs from the last 24  hours have been reviewed.    Significant Diagnostics:  CT Head Without Contrast    Result Date: 2/5/2023  1. There is extensive motion artifact and significantly abnormal positioning, limiting evaluation of the brain parenchyma.  Allowing for this, no convincing acute intracranial abnormalities noting sequela of chronic microvascular ischemic change and senescent change.  Correlation and follow-up is advised. Electronically signed by: Marc Amor MD Date:    02/05/2023 Time:    17:59    X-Ray Chest AP Portable    Result Date: 2/5/2023  Overall limited quality study without obvious acute abnormality. Electronically signed by: Earnest Kerr MD Date:    02/05/2023 Time:    20:40    X-Ray Chest AP Portable    Result Date: 2/5/2023  Limited examination as above. Electronically signed by: Krishna Torres MD Date:    02/05/2023 Time:    17:53

## 2023-02-07 PROBLEM — R62.7 ADULT FAILURE TO THRIVE: Status: ACTIVE | Noted: 2023-02-07

## 2023-02-07 PROBLEM — Z71.89 GOALS OF CARE, COUNSELING/DISCUSSION: Status: ACTIVE | Noted: 2023-02-07

## 2023-02-07 PROBLEM — Z71.89 ADVANCE CARE PLANNING: Status: ACTIVE | Noted: 2023-02-07

## 2023-02-07 PROBLEM — Z51.5 PALLIATIVE CARE ENCOUNTER: Status: ACTIVE | Noted: 2023-02-07

## 2023-02-07 PROBLEM — R53.81 DEBILITY: Status: ACTIVE | Noted: 2023-02-07

## 2023-02-07 PROBLEM — E87.0 HYPERNATREMIA: Status: RESOLVED | Noted: 2023-02-05 | Resolved: 2023-02-07

## 2023-02-07 LAB
ALBUMIN SERPL BCP-MCNC: 2.3 G/DL (ref 3.5–5.2)
ALP SERPL-CCNC: 77 U/L (ref 55–135)
ALT SERPL W/O P-5'-P-CCNC: 15 U/L (ref 10–44)
ANION GAP SERPL CALC-SCNC: 11 MMOL/L (ref 8–16)
AST SERPL-CCNC: 15 U/L (ref 10–40)
BACTERIA UR CULT: ABNORMAL
BASOPHILS # BLD AUTO: 0.01 K/UL (ref 0–0.2)
BASOPHILS NFR BLD: 0.2 % (ref 0–1.9)
BILIRUB SERPL-MCNC: 0.3 MG/DL (ref 0.1–1)
BUN SERPL-MCNC: 18 MG/DL (ref 8–23)
CALCIUM SERPL-MCNC: 7.8 MG/DL (ref 8.7–10.5)
CHLORIDE SERPL-SCNC: 113 MMOL/L (ref 95–110)
CO2 SERPL-SCNC: 19 MMOL/L (ref 23–29)
CREAT SERPL-MCNC: 0.5 MG/DL (ref 0.5–1.4)
DIFFERENTIAL METHOD: ABNORMAL
EOSINOPHIL # BLD AUTO: 0.4 K/UL (ref 0–0.5)
EOSINOPHIL NFR BLD: 6.5 % (ref 0–8)
ERYTHROCYTE [DISTWIDTH] IN BLOOD BY AUTOMATED COUNT: 15.8 % (ref 11.5–14.5)
EST. GFR  (NO RACE VARIABLE): >60 ML/MIN/1.73 M^2
GLUCOSE SERPL-MCNC: 53 MG/DL (ref 70–110)
HCT VFR BLD AUTO: 37.6 % (ref 37–48.5)
HGB BLD-MCNC: 11.8 G/DL (ref 12–16)
IMM GRANULOCYTES # BLD AUTO: 0.02 K/UL (ref 0–0.04)
IMM GRANULOCYTES NFR BLD AUTO: 0.4 % (ref 0–0.5)
LYMPHOCYTES # BLD AUTO: 1 K/UL (ref 1–4.8)
LYMPHOCYTES NFR BLD: 17.1 % (ref 18–48)
MAGNESIUM SERPL-MCNC: 1.7 MG/DL (ref 1.6–2.6)
MCH RBC QN AUTO: 30.2 PG (ref 27–31)
MCHC RBC AUTO-ENTMCNC: 31.4 G/DL (ref 32–36)
MCV RBC AUTO: 96 FL (ref 82–98)
MONOCYTES # BLD AUTO: 0.2 K/UL (ref 0.3–1)
MONOCYTES NFR BLD: 4 % (ref 4–15)
NEUTROPHILS # BLD AUTO: 4 K/UL (ref 1.8–7.7)
NEUTROPHILS NFR BLD: 71.8 % (ref 38–73)
NRBC BLD-RTO: 0 /100 WBC
PHOSPHATE SERPL-MCNC: 2.4 MG/DL (ref 2.7–4.5)
PLATELET # BLD AUTO: 282 K/UL (ref 150–450)
PMV BLD AUTO: 10.9 FL (ref 9.2–12.9)
POTASSIUM SERPL-SCNC: 3.8 MMOL/L (ref 3.5–5.1)
PROT SERPL-MCNC: 5.2 G/DL (ref 6–8.4)
RBC # BLD AUTO: 3.91 M/UL (ref 4–5.4)
SODIUM SERPL-SCNC: 143 MMOL/L (ref 136–145)
WBC # BLD AUTO: 5.56 K/UL (ref 3.9–12.7)

## 2023-02-07 PROCEDURE — 63600175 PHARM REV CODE 636 W HCPCS

## 2023-02-07 PROCEDURE — 83735 ASSAY OF MAGNESIUM: CPT

## 2023-02-07 PROCEDURE — 25000003 PHARM REV CODE 250: Performed by: STUDENT IN AN ORGANIZED HEALTH CARE EDUCATION/TRAINING PROGRAM

## 2023-02-07 PROCEDURE — 84100 ASSAY OF PHOSPHORUS: CPT

## 2023-02-07 PROCEDURE — 25000003 PHARM REV CODE 250

## 2023-02-07 PROCEDURE — 99232 PR SUBSEQUENT HOSPITAL CARE,LEVL II: ICD-10-PCS | Mod: GC,,, | Performed by: HOSPITALIST

## 2023-02-07 PROCEDURE — 99232 SBSQ HOSP IP/OBS MODERATE 35: CPT | Mod: GC,,, | Performed by: HOSPITALIST

## 2023-02-07 PROCEDURE — 99205 OFFICE O/P NEW HI 60 MIN: CPT | Mod: ,,, | Performed by: CLINICAL NURSE SPECIALIST

## 2023-02-07 PROCEDURE — 80053 COMPREHEN METABOLIC PANEL: CPT

## 2023-02-07 PROCEDURE — 11000001 HC ACUTE MED/SURG PRIVATE ROOM

## 2023-02-07 PROCEDURE — 99205 PR OFFICE/OUTPT VISIT, NEW, LEVL V, 60-74 MIN: ICD-10-PCS | Mod: ,,, | Performed by: CLINICAL NURSE SPECIALIST

## 2023-02-07 PROCEDURE — 99497 PR ADVNCD CARE PLAN 30 MIN: ICD-10-PCS | Mod: 25,,, | Performed by: CLINICAL NURSE SPECIALIST

## 2023-02-07 PROCEDURE — 85025 COMPLETE CBC W/AUTO DIFF WBC: CPT

## 2023-02-07 PROCEDURE — 99497 ADVNCD CARE PLAN 30 MIN: CPT | Mod: 25,,, | Performed by: CLINICAL NURSE SPECIALIST

## 2023-02-07 PROCEDURE — 36415 COLL VENOUS BLD VENIPUNCTURE: CPT

## 2023-02-07 RX ORDER — SODIUM,POTASSIUM PHOSPHATES 280-250MG
2 POWDER IN PACKET (EA) ORAL ONCE
Status: COMPLETED | OUTPATIENT
Start: 2023-02-07 | End: 2023-02-07

## 2023-02-07 RX ORDER — TRAZODONE HYDROCHLORIDE 50 MG/1
150 TABLET ORAL NIGHTLY
Status: DISCONTINUED | OUTPATIENT
Start: 2023-02-07 | End: 2023-02-08 | Stop reason: HOSPADM

## 2023-02-07 RX ORDER — SERTRALINE HYDROCHLORIDE 50 MG/1
50 TABLET, FILM COATED ORAL 2 TIMES DAILY
Status: DISCONTINUED | OUTPATIENT
Start: 2023-02-07 | End: 2023-02-08 | Stop reason: HOSPADM

## 2023-02-07 RX ADMIN — SERTRALINE HYDROCHLORIDE 50 MG: 50 TABLET ORAL at 12:02

## 2023-02-07 RX ADMIN — SERTRALINE HYDROCHLORIDE 50 MG: 50 TABLET ORAL at 09:02

## 2023-02-07 RX ADMIN — DONEPEZIL HYDROCHLORIDE 10 MG: 10 TABLET ORAL at 09:02

## 2023-02-07 RX ADMIN — POTASSIUM & SODIUM PHOSPHATES POWDER PACK 280-160-250 MG 2 PACKET: 280-160-250 PACK at 12:02

## 2023-02-07 RX ADMIN — ENOXAPARIN SODIUM 30 MG: 30 INJECTION SUBCUTANEOUS at 05:02

## 2023-02-07 RX ADMIN — LEVOTHYROXINE SODIUM 100 MCG: 100 TABLET ORAL at 06:02

## 2023-02-07 RX ADMIN — ACETAMINOPHEN 650 MG: 325 TABLET ORAL at 09:02

## 2023-02-07 RX ADMIN — POLYETHYLENE GLYCOL 3350 17 G: 17 POWDER, FOR SOLUTION ORAL at 09:02

## 2023-02-07 RX ADMIN — FAMOTIDINE 40 MG: 20 TABLET ORAL at 09:02

## 2023-02-07 RX ADMIN — TRAZODONE HYDROCHLORIDE 150 MG: 50 TABLET ORAL at 09:02

## 2023-02-07 RX ADMIN — CEFTRIAXONE 1 G: 1 INJECTION, POWDER, FOR SOLUTION INTRAMUSCULAR; INTRAVENOUS at 09:02

## 2023-02-07 NOTE — PROGRESS NOTES
[facility] Medicine  Progress Note    Patient Name: Yvette Crews  MRN: [id number]  Patient Class: OP- Observation   Admission Date: 2/5/2023  Length of Stay: 0 days  Attending Physician: Jay Carrasco MD  Primary Care Provider: Jeison Rodney MD        Subjective:     Principal Problem:Acute cystitis without hematuria        HPI:  Ms. Crews is a 72 year old woman with a history of AVNRT, Alzheimer's dementia, C2-C3 anterolisthesis with cord compression, SAH (2016), CVID, s/p Watchman device who presents to the ED from [facility] with concerns of altered mental status, lethargy over the past week. Patient is at baseline aphasic, but will normally nod her head (y/no) to questions. On patient interview, patient unable to participate and non-responsive to questions. Further history obtained from , Pavel, over the phone. He states that she has not been herself for the past few days, with increased lethargy and decreased responsiveness noted. She is agitated at night and has been receiving more psychiatric medication, to prevent her crying out but  states he is unsure how much medication is causing her lethargy during the day. She has contractures in her lower extremities and  states she normally keeps them in bent. She was previously evaluated by neurosurgery for cord compression but surgery was deferred at that time. Per , her arm weakness seems to have progressed since then. From July 2022 she has declined in health. She was initially ambulator with a walker, but after falls she has progressed to a gerichair and now in a wheelchair. She was meant to have a left arm splint placed but this has not arrived yet. On exam, patient's arms remain flexed at abdomen.     On admission to the ED, vital signs stable with HR 59, afebrile, /82. Labs significant for hypernatremia (149). Lactate 1.5 ,WBC 5.67, BUN/Cr 27/0.6. TSH 30.608. CT head and neck with no acute  abnormalities. CXR did not show evidence of consolidation, but noted kyphosis, remote rib fractures and chronic appearing elevation of the left distal clavicle with respect to the acromion. UA significant for positive nitrites, 2+ leukocytes, many bacteria.      Overview/Hospital Course:  Admitted to Hospital Medicine with acute encephalopathy in the setting of urinary tract infection. Started on Ceftriaxone. TSH elevated, T4 depressed - increased Levothyroxine dose from home dose with understanding that it may be related to present infection. NSGY was consulted for evaluation given history of chronic cord compression with extremity contractures and declining functionality since July 2022. NSGY felt patient is too high risk given her frailty, declining health, and medical comorbidities, and recommended against surgical intervention. NSGY team relayed these recommendations to  and Medicine team. Optimizing nutritional status with supplementation and rehydration in setting of malnutrition.  Disposition: TBD, return to nursing home      Interval History: NAEON. Patient tolerating abx for UTI. Will start GOC discussion with .     Review of Systems   Unable to perform ROS: Dementia   Objective:     Vital Signs (Most Recent):  Temp: 98.4 °F (36.9 °C) (02/07/23 1218)  Pulse: 101 (02/07/23 1218)  Resp: 17 (02/07/23 1218)  BP: 128/68 (02/07/23 1218)  SpO2: 98 % (02/07/23 1218) Vital Signs (24h Range):  Temp:  [96.5 °F (35.8 °C)-98.4 °F (36.9 °C)] 98.4 °F (36.9 °C)  Pulse:  [] 101  Resp:  [16-18] 17  SpO2:  [97 %-100 %] 98 %  BP: (122-134)/(66-79) 128/68     Weight: 45.2 kg (99 lb 10.4 oz)  Body mass index is 18.23 kg/m².    Intake/Output Summary (Last 24 hours) at 2/7/2023 1252  Last data filed at 2/6/2023 2245  Gross per 24 hour   Intake 50 ml   Output --   Net 50 ml      Physical Exam  Vitals reviewed.   Constitutional:       General: She is not in acute distress.     Comments: Thin, frail appearing,  aphasic, minimally responsive   HENT:      Head: Normocephalic and atraumatic.      Mouth/Throat:      Mouth: Mucous membranes are moist.      Pharynx: Oropharynx is clear.   Eyes:      Pupils: Pupils are equal, round, and reactive to light.   Cardiovascular:      Rate and Rhythm: Regular rhythm. Bradycardia present.   Pulmonary:      Effort: Pulmonary effort is normal. No respiratory distress.      Breath sounds: Normal breath sounds. No wheezing.   Abdominal:      General: Bowel sounds are normal. There is no distension.      Palpations: Abdomen is soft.      Tenderness: There is no abdominal tenderness.   Musculoskeletal:      Right lower leg: No edema.      Left lower leg: No edema.      Comments: Significant kyphosis, patient resting on her side with arms bent across her chest and leg bent. Resistant to straightening legs and arms.   Skin:     General: Skin is warm and dry.      Capillary Refill: Capillary refill takes less than 2 seconds.      Findings: No bruising or rash.   Neurological:      Motor: Weakness present.       Significant Labs: All pertinent labs within the past 24 hours have been reviewed.  Urine Culture:   Recent Labs   Lab 02/05/23  1847   LABURIN PRESUMPTIVE E COLI  >100,000 cfu/ml  Identification and susceptibility pending  No other significant isolate  *     Urine Studies:   Recent Labs   Lab 02/05/23  1847   COLORU Yellow   APPEARANCEUA Hazy*   PHUR 6.0   SPECGRAV >1.030*   PROTEINUA Trace*   GLUCUA Negative   KETONESU Negative   BILIRUBINUA Negative   OCCULTUA Negative   NITRITE Positive*   LEUKOCYTESUR 2+*   RBCUA 1   WBCUA 31*   BACTERIA Many*   HYALINECASTS 21*       Significant Imaging: I have reviewed all pertinent imaging results/findings within the past 24 hours.      Assessment/Plan:      * Acute cystitis without hematuria  72 year old with Alzhemer's dementia, severe osteoporosis, C2-C3 anterolisthesis, hypothyroidism who presents from nursing home with increased lethargy and  altered mental status. Labs concerning for hypernatremia and UTI.    On exam patient does not appear to have suprapubic tenderness, no elevated WBC, lactate 1.5. patient had a previous UTI in September 2022 with E.coli, treated with ceftriaxone.    Received 1L NS and 1 dose ceftriaxone in the ED.    Plan:  - Continue ceftriaxone for UTI  - Monitor urine output  - Monitor BUN/Cr with CMP        Adult failure to thrive  Palliative care consulted for GOC discussion regarding overall decline in functional status given debility.       Upper extremity weakness  Worsening upper extremity weakness noted per  in the past few months. Was previously seen by neurosurgery for C2-C3 anterolisthesis with severe cord compression in August 2022 and discussion of surgery at that time was postponed in favor of medical management at the time. Patient was able to use arms at that time, and has since has noted weakness, does not feed herself. Worsened weakness is concerning for progressive degenerative changes to the cervical spine.    Plan:  -F/u NSGY consult       Closed wedge compression fracture of T3 vertebra  See osteoporosis      Open displaced fracture of acromial end of left clavicle  See osteoporosis      Late onset Alzheimer's dementia without behavioral disturbance  Hx of Alzheimer's dementia currently residing in nursing home. Patient is aphasic at baseline, able to nod y/n to questions. Currently using wheelchair to ambulate due to decline in the last six months.     Current medications include donepezil 10 mg QD. No sedating, night time medications noted in chart.      Plan:  Continue donepezil 10 mg QD      CVID (common variable immunodeficiency)  Last infusion received in September 2022. Sees Dr. Salgado in Allergy/Immunology clinic.      Encephalopathy, metabolic  See UTI      Postoperative hypothyroidism  Acquired hypothyroidism with hx of MNG s/p total thyroidectomy 1/25/17  Patient takes synthroid 88 mcg  daily. TSH 4 months ago within normal limits, on admission significantly elevated to 30.6 concerning for inadequate dosage. Patient is also taking famotidine.    Plan:  -Increase levothyroxine dosage to 100 mcg and recheck in 4-6 weeks. TSH may be falsely elevated in setting of infection      Age-related osteoporosis with current pathological fracture with routine healing  Hx of osteoporosis with T3 compression fracture, C2-C3 anterolisthesis and concern for compression, remote rib fractures, clavicular fracture. Was following with Dr. Samano and taking reclast, last administered in June? 2022. No new fractures noted on X-ray imaging. Patient has weakness in upper extremities concerning for worsening changes to cervical spine. Patient has had difficulty getting to appointments and arranging transportation    Plan:  -f/u Neurosug consult for arm weakness  - f/u plan with outpatient Dr. Samano regarding treatment plans for osteoporosis      VTE Risk Mitigation (From admission, onward)         Ordered     enoxaparin injection 30 mg  Daily         02/06/23 1038     IP VTE HIGH RISK PATIENT  Once         02/05/23 2000     Place sequential compression device  Until discontinued         02/05/23 2000                Discharge Planning   SANTIAGO: 2/8/2023     Code Status: Partial Code   Is the patient medically ready for discharge?: No    Reason for patient still in hospital (select all that apply): Treatment and Consult recommendations                     Zain Escobar MD  Department of Hospital Medicine   [facility] - Surgery

## 2023-02-07 NOTE — ASSESSMENT & PLAN NOTE
Impression: Pt is a 71 y/o  woman with a history of AVNRT, Alzheimer's dementia, C2-C3 anterolisthesis with cord compression, SAH (2016), CVID, s/p Watchman device who presented to the ED from [facility] with concerns of altered mental status, lethargy over the past week. Pt is debilitated, contracs of care/tures noted to all extremities. Pt babbles some and moans. Pt is a partial code.     Reason for consult: ACP/Goal of care/hospice. Communicated with Dr. Carrasco and team.     Goals of care/ACP:    No family at bedside. Pt unable to participate in conversation. Spoke to bedside nurse.   Called and spoke to pt's , Pavel. Per Pavel, pt lives at [facility] and he lives in the assisted living part at [facility]. Per pt's  pt has declined sharply since July 4th week. Per pt's  she will say a few words and moans off and on. Per pt's  he has chronic pain issues and hard to visit in hospital.    Code status: Partial  MPOA: Pt's  is NOK.     Symptom management:     Pain r/t to chronic issues severe cord compression, stenosis, contractures.   Pt moaning when turned or touches.     Recs: Would have low dose opioids prn pain.     Debility r/t to stenosis, contractures, spinal cord compression.   Frequent turing.     Plan:   in agreement to hospice at NH.   Communicated with primary care team.

## 2023-02-07 NOTE — ASSESSMENT & PLAN NOTE
Admitted with altered mental status, UTI from nursning home. Found to be hypernatremic to 149. Patient has not been able to feed herself and has late onset Alzheimer's dementia. Hypernatremia likely secondary to decreased PO intake. Received 1L IV fluids in ED for concerns of sepsis with UTI. Vitals signs stable.     Plan:  - Continue maintenance fluids 100ml/hr for 10 hours  -Monitor sodium q4h  -f/u urine studies    RESOLVED

## 2023-02-07 NOTE — PT/OT/SLP PROGRESS
Occupational Therapy  Screen and Discharge    Patient Name:  Yvette Crews   MRN:  [id number]    OT consult orders received for evaluation. Upon chart review, patient is dependent at baseline with known BUE and BLE flexion contractures at rest. Patient requires transfers to<>from wheelchair and is unable to complete ADLs independently. OT to bedside for assessment, confirming baseline, patient with pain with any attempts at mobility. Orders discontinued at this time, patient is not a candidate for skilled therapy services.    2/7/2023

## 2023-02-07 NOTE — PT/OT/SLP PROGRESS
Physical Therapy Screen      Patient Name:  Yvette Crews   MRN:  [id number]    Patient not seen today secondary to  (pt not evaluated due to notes in chart , reading that pt was contracted and total assist for care. PT visually assessed pt and determined no skilled PT is needed. pt is at previous functional level with significant contractures x 4 extremities.).    2/7/2023

## 2023-02-07 NOTE — HPI
Pt is a 72 year old woman with a history of AVNRT, Alzheimer's dementia, C2-C3 anterolisthesis with cord compression, SAH (2016), CVID, s/p Watchman device who presented to the ED from  [facility] with concerns of altered mental status, lethargy over the past week. Patient is at baseline aphasic, but will normally nod her head (y/no) to questions. Per chart review on admit, patient unable to participate and non-responsive to questions. Further history obtained from , Pavel, over the phone. He stated that she has not been herself for the past few days, with increased lethargy and decreased responsiveness noted. She is agitated at night and has been receiving more psychiatric medication, to prevent her crying out but  states he is unsure how much medication is causing her lethargy during the day. She has contractures in her lower extremities and  stated she normally keeps them bent. She was previously evaluated by neurosurgery for cord compression but surgery was deferred at that time. Per , her arm weakness seems to have progressed since then. From July 2022 she has declined in health. She was initially ambulator with a walker, but after falls she has progressed to a gerichair and now in a wheelchair. She was meant to have a left arm splint placed but this has not arrived yet. On exam, patient's arms remain flexed at abdomen.      On admission to the ED, vital signs stable with HR 59, afebrile, /82. Labs significant for hypernatremia (149). Lactate 1.5 ,WBC 5.67, BUN/Cr 27/0.6. TSH 30.608. CT head and neck with no acute abnormalities. CXR did not show evidence of consolidation, but noted kyphosis, remote rib fractures and chronic appearing elevation of the left distal clavicle with respect to the acromion. UA significant for positive nitrites, 2+ leukocytes, many bacteria.        Overview/Hospital Course per chart review:   Admitted to Hospital Medicine with acute  encephalopathy in the setting of urinary tract infection. Started on Ceftriaxone. TSH elevated, T4 depressed - increased Levothyroxine dose from home dose with understanding that it may be related to present infection. NSGY was consulted for evaluation given history of chronic cord compression with extremity contractures and declining functionality since July 2022. NSGY felt patient is too high risk given her frailty, declining health, and medical comorbidities, and recommended against surgical intervention. NSGY team relayed these recommendations to  and Medicine team. Optimizing nutritional status with supplementation and rehydration in setting of malnutrition.  Disposition: TBD, return to nursing home with hospice.

## 2023-02-07 NOTE — SUBJECTIVE & OBJECTIVE
Interval History: NAEON. Patient tolerating abx for UTI. Will start GOC discussion with .     Review of Systems   Unable to perform ROS: Dementia   Objective:     Vital Signs (Most Recent):  Temp: 98.4 °F (36.9 °C) (02/07/23 1218)  Pulse: 101 (02/07/23 1218)  Resp: 17 (02/07/23 1218)  BP: 128/68 (02/07/23 1218)  SpO2: 98 % (02/07/23 1218) Vital Signs (24h Range):  Temp:  [96.5 °F (35.8 °C)-98.4 °F (36.9 °C)] 98.4 °F (36.9 °C)  Pulse:  [] 101  Resp:  [16-18] 17  SpO2:  [97 %-100 %] 98 %  BP: (122-134)/(66-79) 128/68     Weight: 45.2 kg (99 lb 10.4 oz)  Body mass index is 18.23 kg/m².    Intake/Output Summary (Last 24 hours) at 2/7/2023 1252  Last data filed at 2/6/2023 2245  Gross per 24 hour   Intake 50 ml   Output --   Net 50 ml      Physical Exam  Vitals reviewed.   Constitutional:       General: She is not in acute distress.     Comments: Thin, frail appearing, aphasic, minimally responsive   HENT:      Head: Normocephalic and atraumatic.      Mouth/Throat:      Mouth: Mucous membranes are moist.      Pharynx: Oropharynx is clear.   Eyes:      Pupils: Pupils are equal, round, and reactive to light.   Cardiovascular:      Rate and Rhythm: Regular rhythm. Bradycardia present.   Pulmonary:      Effort: Pulmonary effort is normal. No respiratory distress.      Breath sounds: Normal breath sounds. No wheezing.   Abdominal:      General: Bowel sounds are normal. There is no distension.      Palpations: Abdomen is soft.      Tenderness: There is no abdominal tenderness.   Musculoskeletal:      Right lower leg: No edema.      Left lower leg: No edema.      Comments: Significant kyphosis, patient resting on her side with arms bent across her chest and leg bent. Resistant to straightening legs and arms.   Skin:     General: Skin is warm and dry.      Capillary Refill: Capillary refill takes less than 2 seconds.      Findings: No bruising or rash.   Neurological:      Motor: Weakness present.       Significant  Labs: All pertinent labs within the past 24 hours have been reviewed.  Urine Culture:   Recent Labs   Lab 02/05/23 1847   LABURIN PRESUMPTIVE E COLI  >100,000 cfu/ml  Identification and susceptibility pending  No other significant isolate  *     Urine Studies:   Recent Labs   Lab 02/05/23 1847   COLORU Yellow   APPEARANCEUA Hazy*   PHUR 6.0   SPECGRAV >1.030*   PROTEINUA Trace*   GLUCUA Negative   KETONESU Negative   BILIRUBINUA Negative   OCCULTUA Negative   NITRITE Positive*   LEUKOCYTESUR 2+*   RBCUA 1   WBCUA 31*   BACTERIA Many*   HYALINECASTS 21*       Significant Imaging: I have reviewed all pertinent imaging results/findings within the past 24 hours.

## 2023-02-07 NOTE — SUBJECTIVE & OBJECTIVE
Interval History:   in agreement to hospice at NH.     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    Cataract     Chondrocalcinosis, cause unspecified, involving hand(712.34) 07/12/2011     Dx updated per 2019 IMO Load    Chronic sinusitis 04/06/2017    Colonic constipation 06/05/2013    Concussion 10/27/2016    Cord compression 07/2022    C2-C3 anterolisthesis with cord compression    CVID (common variable immunodeficiency)     Gait disturbance 03/14/2017 2/2023 wheelchair/bed bound    Irritable bowel syndrome 12/18/2015    Major depressive disorder, recurrent episode, in full remission 08/19/2010    Osteoarthritis     Postoperative hypothyroidism 12/18/2015    Presence of Watchman left atrial appendage closure device 05/14/2022    Rectal prolapse 06/05/2013    Reflux esophagitis 02/07/2010    Strabismus     Subarachnoid hemorrhage 10/30/2016    2016, 7/2022  L temporal SAH    Underweight 01/23/2013       Past Surgical History:   Procedure Laterality Date    BREAST CYST ASPIRATION      x2    CATARACT EXTRACTION W/  INTRAOCULAR LENS IMPLANT Left 3/11/2019    Procedure: EXTRACTION, CATARACT, WITH IOL INSERTION;  Surgeon: Vera Sams MD;  Location: [facility] OR;  Service: Ophthalmology;  Laterality: Left;    CATARACT EXTRACTION W/  INTRAOCULAR LENS IMPLANT Right 4/15/2019    Procedure: EXTRACTION, CATARACT, WITH IOL INSERTION LASER;  Surgeon: Vera Sams MD;  Location: [facility] OR;  Service: Ophthalmology;  Laterality: Right;    COLON SURGERY      EYE SURGERY      FRACTURE SURGERY      NASAL SEPTUM SURGERY       OCCLUSION OF LEFT ATRIAL APPENDAGE N/A 2/18/2022    Procedure: Left atrial appendage occlusion;  Surgeon: Chase Gill MD;  Location: [facility] EP LAB;  Service: Cardiology;  Laterality: N/A;  afib, watchman, BSCI, osiris, anes, MB/EH, 3prep    OPEN REDUCTION AND INTERNAL FIXATION (ORIF) OF INJURY OF SACROILIAC JOINT Bilateral 1/20/2021    Procedure: ORIF, SACROILIAC JOINT;  Surgeon: Alcides Tamez MD;  Location: [facility] OR [address] [place] [address];  Service: Orthopedics;  Laterality: Bilateral;    RADIOFREQUENCY ABLATION      RECTAL PROLAPSE REPAIR  june 2013    STRABISMUS SURGERY      TONSILLECTOMY      TRANSESOPHAGEAL ECHOCARDIOGRAPHY N/A 2/18/2022    Procedure: ECHOCARDIOGRAM, TRANSESOPHAGEAL;  Surgeon: Nils Diagnostic Provider;  Location: [facility] EP LAB;  Service: Cardiology;  Laterality: N/A;       Review of patient's allergies indicates:   Allergen Reactions    Adhesive      Tape tears skin    Buspar [buspirone]      headaches    Escitalopram oxalate Nausea Only     hyponatremia      Rifampin Rash       Medications:  Continuous Infusions:  Scheduled Meds:   cefTRIAXone (ROCEPHIN) IVPB  1 g Intravenous Q24H    donepeziL  10 mg Oral Daily    enoxaparin  30 mg Subcutaneous Daily    famotidine  40 mg Oral Daily    levothyroxine  100 mcg Oral Before breakfast    polyethylene glycol  17 g Oral Daily    sertraline  50 mg Oral BID    traZODone  150 mg Oral QHS     PRN Meds:acetaminophen, dextrose 10%, dextrose 10%, glucagon (human recombinant), glucose, glucose, naloxone, sodium chloride 0.9%    Family History       Problem Relation (Age of Onset)    Breast cancer Paternal Grandmother    Heart disease Father, Brother    Stroke Father          Tobacco Use    Smoking status: Never    Smokeless tobacco: Never   Substance and Sexual Activity    Alcohol use: No    Drug use: No    Sexual activity: Not Currently       Review of Systems   Unable to perform ROS: Acuity of condition   Objective:     Vital Signs (Most Recent):  Temp: 98.4  °F (36.9 °C) (02/07/23 1218)  Pulse: 95 (02/07/23 1307)  Resp: 17 (02/07/23 1218)  BP: 128/68 (02/07/23 1218)  SpO2: 98 % (02/07/23 1218) Vital Signs (24h Range):  Temp:  [96.5 °F (35.8 °C)-98.4 °F (36.9 °C)] 98.4 °F (36.9 °C)  Pulse:  [] 95  Resp:  [16-18] 17  SpO2:  [97 %-100 %] 98 %  BP: (122-134)/(66-79) 128/68     Weight: 45.2 kg (99 lb 10.4 oz)  Body mass index is 18.23 kg/m².    Physical Exam  Constitutional:       Appearance: She is underweight. She is ill-appearing.   HENT:      Head: Normocephalic and atraumatic.   Eyes:      General: Lids are normal.      Conjunctiva/sclera: Conjunctivae normal.   Neck:      Comments: Bent down- kyphosis noted  Pulmonary:      Effort: Pulmonary effort is normal. No respiratory distress.   Musculoskeletal:      Comments: Contractures noted to all extremities   Skin:     General: Skin is warm and dry.   Neurological:      Mental Status: She is lethargic.   Psychiatric:      Comments: Pt moans and babbles a few incoherent sounds       Review of Symptoms      Symptom Assessment (ESAS 0-10 Scale)  Unable to complete assessment due to Acuity of condition         Pain Assessment in Advanced Demential Scale (PAINAD)   Breathing - Independent of vocalization:  0  Negative vocalization:  1  Facial expression:  0  Body language:  0  Consolability:  0  Total:  1    Performance Status:  10    Psychosocial/Cultural:   See Palliative Psychosocial Note: No  Pt [facility] at [facility]. Pt's  lives at assisted living NH. Pt has a daughter that lives close by.   **Primary  to Follow**  Palliative Care  Consult: No      Advance Care Planning   Advance Directives:   Living Will: Yes        Copy on chart: Yes    Medical Power of : Yes    Agent's Name:  Pt's  Pavel Crews    Decision Making:  Family answered questions and Patient unable to communicate due to disease severity/cognitive impairment       Significant Labs: All pertinent labs  within the past 24 hours have been reviewed.  CBC:   Recent Labs   Lab 02/07/23 0352   WBC 5.56   HGB 11.8*   HCT 37.6   MCV 96        BMP:  Recent Labs   Lab 02/07/23 0352   GLU 53*      K 3.8   *   CO2 19*   BUN 18   CREATININE 0.5   CALCIUM 7.8*   MG 1.7     LFT:  Lab Results   Component Value Date    AST 15 02/07/2023    ALKPHOS 77 02/07/2023    BILITOT 0.3 02/07/2023     Albumin:   Albumin   Date Value Ref Range Status   02/07/2023 2.3 (L) 3.5 - 5.2 g/dL Final     Protein:   Total Protein   Date Value Ref Range Status   02/07/2023 5.2 (L) 6.0 - 8.4 g/dL Final     Lactic acid:   Lab Results   Component Value Date    LACTATE 0.8 02/06/2023    LACTATE 1.5 02/05/2023       Significant Imaging: I have reviewed all pertinent imaging results/findings within the past 24 hours.

## 2023-02-07 NOTE — ASSESSMENT & PLAN NOTE
Palliative care consulted for GOC discussion regarding overall decline in functional status given debility.

## 2023-02-07 NOTE — PLAN OF CARE
02/07/23 1529   Post-Acute Status   Post-Acute Authorization Hospice   Hospice Status Referrals Sent     KINZA called [facility], pt lives on the 3rd floor nursing (phone #[phone], fax #[phone]). SW faxed referral to [facility] via [facility] for review. SW will follow up as needed.    3:54 PM  Harvey carpenter/ [facility] called (#[phone]) following patient for d/c.    Claudia Llanes LMSW  Case Management   [facility]   Ext. [phone]

## 2023-02-07 NOTE — MEDICAL/APP STUDENT
[facility] Medicine Student   Progress Note  [facility]    Admit Date: 2/5/2023  Hospital Day: 0  02/07/2023  11:20 AM    SUBJECTIVE:   Ms. Yvette Crews is a 72 y.o. female with a relevant medical history of Alzheimer's dementia, AVNRT (s/p ablation 2012), AF (s/p Watchman device placement 02/2022), C2-C3 anterolisthesis with cord compression, SAH (2016), CVID, severe osteoporosis, and hypothyroidism (2/2 total thyroidectomy 2017), who is being followed up for Acute cystitis without hematuria after presenting to the ED for altered mental status on 2/5/23.    Interval history: No acute events overnight. Vitals stable, no fevers.    Review of Systems   Unable to perform ROS: Dementia     Please refer to the H&P for past medical, family, and social history.    OBJECTIVE:     Vital Signs Recent:  Temp: 96.7 °F (35.9 °C) (02/07/23 0700)  Pulse: 84 (02/07/23 0700)  Resp: 16 (02/07/23 0700)  BP: 134/77 (02/07/23 0700)  SpO2: 98 % (02/07/23 0700)  Oxygen Documentation:                Device (Oxygen Therapy): room air         Vital Signs Range (Last 24H):  Temp:  [96.5 °F (35.8 °C)-97.6 °F (36.4 °C)]   Pulse:  []   Resp:  [16-18]   BP: (122-134)/(66-79)   SpO2:  [97 %-100 %]        I & O (Last 24H):  Intake/Output Summary (Last 24 hours) at 2/7/2023 1203  Last data filed at 2/6/2023 2245  Gross per 24 hour   Intake 50 ml   Output --   Net 50 ml        Physical Exam:  Physical Exam  Vitals reviewed.   Constitutional:       General: She is not in acute distress.  HENT:      Head: Normocephalic and atraumatic.   Eyes:      Comments: L eye appears crusted partially shut. Patient only opens R eye.   Cardiovascular:      Rate and Rhythm: Normal rate and regular rhythm.   Pulmonary:      Effort: Pulmonary effort is normal.      Breath sounds: Normal breath sounds.   Musculoskeletal:         General: No swelling.      Comments: BUE and BLE contractures present   Skin:     General: Skin is warm and dry.    Neurological:      Mental Status: She is alert.      Comments: Patient is aphasic. Oriented x 1 to person only using Y/N questions.         Labs:   Recent Labs   Lab 02/06/23  0503 02/06/23  0810 02/06/23  1242 02/07/23  0352     143 143 143 143   K 3.7  --  3.8 3.8   *  --  113* 113*   CO2 22*  --  22* 19*   BUN 23  --  21 18   CREATININE 0.5  --  0.6 0.5   GLU 68*  --  79 53*   CALCIUM 8.2*  --  7.8* 7.8*   MG 1.8  --   --  1.7   PHOS 2.7  --   --  2.4*     Recent Labs   Lab 02/05/23  1758 02/06/23  0503 02/07/23  0352   ALKPHOS 88 74 77   ALT 23 17 15   AST 22 19 15   ALBUMIN 2.7* 2.4* 2.3*   PROT 6.3 5.4* 5.2*   BILITOT 0.2 0.2 0.3     Recent Labs   Lab 02/05/23 1758 02/06/23  0503 02/07/23  0352   WBC 5.67 4.87 5.56   HGB 12.0 11.0* 11.8*   HCT 38.8 37.3 37.6    198 282       Diagnostic Results:  EKG from 2/5/23 shows junctional rhythm, low voltage QRS, and nonspecific T wave abnormality.     Scheduled Meds:   cefTRIAXone (ROCEPHIN) IVPB  1 g Intravenous Q24H    donepeziL  10 mg Oral Daily    enoxaparin  30 mg Subcutaneous Daily    famotidine  40 mg Oral Daily    levothyroxine  100 mcg Oral Before breakfast    polyethylene glycol  17 g Oral Daily    potassium, sodium phosphates  2 packet Oral Once    sertraline  50 mg Oral BID    traZODone  150 mg Oral QHS     Continuous Infusions:  PRN Meds:acetaminophen, dextrose 10%, dextrose 10%, glucagon (human recombinant), glucose, glucose, naloxone, sodium chloride 0.9%    ASSESSMENT/PLAN:   Ms. Yvette Crews is a 72 y.o. female with a relevant medical history of Alzheimer's dementia, AVNRT (s/p ablation 2012), AF (s/p Watchman device placement 02/2022), C2-C3 anterolisthesis with cord compression, SAH (2016), CVID, severe osteoporosis, and hypothyroidism (2/2 total thyroidectomy 2017), who is being followed up for Acute cystitis without hematuria.    Active Hospital Problems    Diagnosis  POA    *Acute cystitis without hematuria [N30.00]   Yes    Upper extremity weakness [R29.898]  Yes    Hypernatremia [E87.0]  Yes    Closed wedge compression fracture of T3 vertebra [S22.030A]  Yes    Cervical spinal cord compression [G95.20]  Yes    Open displaced fracture of acromial end of left clavicle [S42.032B]  Yes    Late onset Alzheimer's dementia without behavioral disturbance [G30.1, F02.80]  Yes     Chronic    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]  Yes     Chronic    Encephalopathy, metabolic [G93.41]  Yes    Postoperative hypothyroidism [E89.0]  Yes     Chronic    Moderate protein malnutrition [E44.0]  Yes      Resolved Hospital Problems   No resolved problems to display.       Acute cystitis without hematuria  ASSESSMENT:  72 year old woman with Alzheimer's dementia, C2-C3 anterolisthesis, severe osteoporosis, hypothyroidism presented from nursing home with increasing lethargy and altered mental status. UA concerning for UTI. Patient had a previous E. coli UTI in September 2022. As of 2/7/23, urine culture shows presumptive E. coli, pending identification and susceptibility.  PLAN:  -continue IV ceftriaxone  -continue to monitor BUN and creatinine with CMP  -continue IV fluids as oral intake continues to be poor  -plan to change antibiotics to oral route at discharge     2. Cervical stenosis of spinal canal   ASSESSMENT: Patient has known C2-3 spondylolisthesis with severe cord compression. At this admission, there was a reported increase in UE weakness, functional decline since July 2022. Neurosurgery consulted. CTH negative for acute changes. NSGY determined that the patient is not a candidate for surgical intervention. Discussed goals of care with the patient's .  PLAN:  -continue C collar when patient is out of bed due to fall risk (per NSGY recommendation)  -consult palliative care for further conversation with the patient's  around goals of care and possible hospice planning     3. Hypernatremia  ASSESSMENT: Admitted from  nursing home with altered mental status and UTI. Found to be hypernatremic to 149 on 2/5/23. She has Alzheimer's dementia and has been unable to feed herself, hypernatremia thought to be secondary to decreased PO intake. She received 1L IV fluids in ED, as well as maintenance fluids 100ml/hr for 10 hours. Vital signs have remained stable. Since 2/6/23, her sodium has remained stable at 143.  PLAN:  -hypernatremia has resolved with administration of IV fluids  -continue maintenance fluids  -monitor sodium daily with CMP     4. Postoperative hypothyroidism  ASSESSMENT: Acquired hypothyroidism 2/2 hx of MNG s/p total thyroidectomy 1/25/17. Patient takes levothyroxine 88mcg daily. On admission, TSH elevated to 30.6, free T4 0.68. TSH was within normal limits 4 months ago.  PLAN:  -increase levothyroxine dose to 100 mcg  -recheck TSH in 4-6 weeks     5. DVT prophylaxis  PLAN:  -enoxaparin injection 30mg daily    Discharge planning:   Plan to discharge back to nursing home when able. Partial code status - no chest compressions.

## 2023-02-07 NOTE — PLAN OF CARE
[facility]  Department of Hospital Medicine  [address] [place] [address]  [phone] after hours  [phone] fax    HOSPICE  ORDERS    02/07/2023    Admit to Hospice:  Inpatient Service     Diagnoses:   Active Hospital Problems    Diagnosis  POA    *Acute cystitis without hematuria [N30.00]  Yes    Adult failure to thrive [R62.7]  Unknown    Palliative care encounter [Z51.5]  Not Applicable    Advance care planning [Z71.89]  Not Applicable    Goals of care, counseling/discussion [Z71.89]  Not Applicable    Debility [R53.81]  Unknown    Upper extremity weakness [R29.898]  Yes    Closed wedge compression fracture of T3 vertebra [S22.030A]  Yes    Cervical spinal cord compression [G95.20]  Yes    Open displaced fracture of acromial end of left clavicle [S42.032B]  Yes    Late onset Alzheimer's dementia without behavioral disturbance [G30.1, F02.80]  Yes     Chronic    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]  Yes     Chronic    Encephalopathy, metabolic [G93.41]  Yes    Postoperative hypothyroidism [E89.0]  Yes     Chronic    Moderate protein malnutrition [E44.0]  Yes      Resolved Hospital Problems    Diagnosis Date Resolved POA    Hypernatremia [E87.0] 02/07/2023 Yes       Hospice Qualifying Diagnoses:        Patient has a life expectancy < 6 months due to:  Primary Hospice Diagnosis: Alzheimer Disease     Comorbid Conditions Contributing to Decline: Severe malnutrition, chronic cord compression (C2-C3), debility (bed bound)        Vital Signs: Routine per Hospice Protocol.     Code Status: Partial Code     Allergies:   Review of patient's allergies indicates:   Allergen Reactions    Adhesive      Tape tears skin    Buspar [buspirone]      headaches    Escitalopram oxalate Nausea Only     hyponatremia      Rifampin Rash       Diet: Puree, thin with standard aspiration precautions     Activities: As tolerated, fall precautions     Goals of Care Treatment  Preferences:  Code Status: Partial Code    Health care agent: Pt's  Pavel Crews  Health care agent number: No value filed.    Living Will: Yes              Nursing: Per Hospice Routine.        Routine Skin for Bedridden Patients: Apply moisture barrier cream to all skin folds and   wet areas in perineal area daily and after baths and all bowel movements.          Oxygen: Room air     Other Miscellaneous Care:        Medications:        Medication List        ASK your doctor about these medications      acetaminophen 325 MG tablet  Commonly known as: TYLENOL  Take 2 tablets (650 mg total) by mouth every 6 (six) hours as needed for Pain.     calcium-vitamin D3-vitamin K 650 mg-12.5 mcg-40 mcg Chew  Chew 1 by mouth twice daily     docusate calcium 240 mg capsule  Commonly known as: SURFAK  Take 240 mg by mouth 3 (three) times daily.  Ask about: Which instructions should I use?     donepeziL 10 MG tablet  Commonly known as: ARICEPT  Take 1 tablet (10 mg total) by mouth once daily.     famotidine 40 MG tablet  Commonly known as: PEPCID  Take 1 tablet (40 mg total) by mouth once daily.     ferrous sulfate 325 (65 FE) MG EC tablet  Take 1 tablet (325 mg total) by mouth once daily.     guaiFENesin 600 mg 12 hr tablet  Commonly known as: MUCINEX  Take 2 tablets (1,200 mg total) by mouth 2 (two) times daily.     Immune Globulin G (IGG)-PRO-IGA 10 % injection (Privigen) 10 % Soln  Commonly known as: PRIVIGEN  Infuse 20 g into the vein every 4 weeks.     levothyroxine 88 MCG tablet  Commonly known as: SYNTHROID  Take 1 tablet (88 mcg total) by mouth before breakfast.     metoprolol tartrate 25 MG tablet  Commonly known as: LOPRESSOR  Take 25 mg by mouth 2 (two) times daily.     MILK OF MAGNESIA 400 mg/5 mL Susp  Generic drug: magnesium hydroxide 400 mg/5 ml  Take 30 mLs by mouth daily as needed (Constipation).     polyethylene glycol 17 gram Pwpk  Commonly known as: GLYCOLAX  Take 17 g by mouth once daily.      sertraline 50 MG tablet  Commonly known as: ZOLOFT  Take 50 mg by mouth 2 (two) times daily.     * traZODone 50 MG tablet  Commonly known as: DESYREL  Take 50 mg by mouth every morning.     * traZODone 150 MG tablet  Commonly known as: DESYREL  Take 150 mg by mouth every evening.           * This list has 2 medication(s) that are the same as other medications prescribed for you. Read the directions carefully, and ask your doctor or other care provider to review them with you.                    DIABETES CARE: Nurse to perform and educate diabetic management with blood glucose monitoring:         Fingerstick blood sugar every 6 hours if patient is unable to eat    Report CBG < 60 or > 350 to physician.         Insulin Sliding Scale         Glucose  Novolog Insulin Subcutaneous        0 - 60   Orange juice or glucose tablet      No insulin   201-250  2 units   251-300  4 units   301-350  6 units   351-400  8 units   >400   10 units then call physician      Future Orders:  Hospice Medical Director may dictate new orders for comfortable care measures & sign death certificate.        _________________________________  Zain Escobar MD  02/07/2023

## 2023-02-07 NOTE — CONSULTS
[facility] - Surgery  Palliative Medicine  Consult Note    Patient Name: Yvette Crews  MRN: [id number]  Admission Date: 2/5/2023  Hospital Length of Stay: 0 days  Code Status: Partial Code   Attending Provider: Jay Carrasco MD  Consulting Provider: AYAAN Szymanski  Primary Care Physician: Jeison Rodney MD  Principal Problem:Acute cystitis without hematuria    Patient information was obtained from spouse/SO and primary team.      Inpatient consult to Palliative Care  Consult performed by: Silva Cruz, CNS  Consult ordered by: Zain Escobar MD        Assessment/Plan:     Palliative care encounter  Impression: Pt is a 71 y/o  woman with a history of AVNRT, Alzheimer's dementia, C2-C3 anterolisthesis with cord compression, SAH (2016), CVID, s/p Watchman device who presented to the ED from [facility] with concerns of altered mental status, lethargy over the past week. Pt is debilitated, contracs of care/tures noted to all extremities. Pt babbles some and moans. Pt is a partial code.     Reason for consult: ACP/Goal of care/hospice. Communicated with Dr. Carrasco and team.     Goals of care/ACP:    No family at bedside. Pt unable to participate in conversation. Spoke to bedside nurse.   Called and spoke to pt's , Pavel. Per Pavel, pt lives at [facility] and he lives in the assisted living part at [facility]. Per pt's  pt has declined sharply since July 4th week. Per pt's  she will say a few words and moans off and on. Per pt's  he has chronic pain issues and hard to visit in hospital.    Code status: Partial  MPOA: Pt's  is NOK.     Symptom management:     Pain r/t to chronic issues severe cord compression, stenosis, contractures.   Pt moaning when turned or touches.     Recs: Would have low dose opioids prn pain.     Debility r/t to stenosis, contractures, spinal cord compression.   Frequent turing.     Plan:   in agreement to hospice at NH.    Communicated with primary care team.          Thank you for your consult. I will follow-up with patient. Please contact us if you have any additional questions.    Subjective:     HPI:   Pt is a 72 year old woman with a history of AVNRT, Alzheimer's dementia, C2-C3 anterolisthesis with cord compression, SAH (2016), CVID, s/p Watchman device who presented to the ED from  [facility] with concerns of altered mental status, lethargy over the past week. Patient is at baseline aphasic, but will normally nod her head (y/no) to questions. Per chart review on admit, patient unable to participate and non-responsive to questions. Further history obtained from , Pavel, over the phone. He stated that she has not been herself for the past few days, with increased lethargy and decreased responsiveness noted. She is agitated at night and has been receiving more psychiatric medication, to prevent her crying out but  states he is unsure how much medication is causing her lethargy during the day. She has contractures in her lower extremities and  stated she normally keeps them bent. She was previously evaluated by neurosurgery for cord compression but surgery was deferred at that time. Per , her arm weakness seems to have progressed since then. From July 2022 she has declined in health. She was initially ambulator with a walker, but after falls she has progressed to a gerichair and now in a wheelchair. She was meant to have a left arm splint placed but this has not arrived yet. On exam, patient's arms remain flexed at abdomen.      On admission to the ED, vital signs stable with HR 59, afebrile, /82. Labs significant for hypernatremia (149). Lactate 1.5 ,WBC 5.67, BUN/Cr 27/0.6. TSH 30.608. CT head and neck with no acute abnormalities. CXR did not show evidence of consolidation, but noted kyphosis, remote rib fractures and chronic appearing elevation of the left distal clavicle with  respect to the acromion. UA significant for positive nitrites, 2+ leukocytes, many bacteria.        Overview/Hospital Course per chart review:   Admitted to Hospital Medicine with acute encephalopathy in the setting of urinary tract infection. Started on Ceftriaxone. TSH elevated, T4 depressed - increased Levothyroxine dose from home dose with understanding that it may be related to present infection. NSGY was consulted for evaluation given history of chronic cord compression with extremity contractures and declining functionality since July 2022. NSGY felt patient is too high risk given her frailty, declining health, and medical comorbidities, and recommended against surgical intervention. NSGY team relayed these recommendations to  and Medicine team. Optimizing nutritional status with supplementation and rehydration in setting of malnutrition.  Disposition: TBD, return to nursing home with hospice.       Hospital Course:  No notes on file    Interval History:   in agreement to hospice at NH.     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    Cataract     Chondrocalcinosis, cause unspecified, involving hand(712.34) 07/12/2011     Dx updated per 2019 IMO Load    Chronic sinusitis 04/06/2017    Colonic constipation 06/05/2013    Concussion 10/27/2016    Cord compression 07/2022    C2-C3 anterolisthesis with cord compression    CVID (common variable immunodeficiency)     Gait disturbance 03/14/2017 2/2023 wheelchair/bed bound    Irritable bowel syndrome 12/18/2015    Major depressive  disorder, recurrent episode, in full remission 08/19/2010    Osteoarthritis     Postoperative hypothyroidism 12/18/2015    Presence of Watchman left atrial appendage closure device 05/14/2022    Rectal prolapse 06/05/2013    Reflux esophagitis 02/07/2010    Strabismus     Subarachnoid hemorrhage 10/30/2016    2016, 7/2022  L temporal SAH    Underweight 01/23/2013       Past Surgical History:   Procedure Laterality Date    BREAST CYST ASPIRATION      x2    CATARACT EXTRACTION W/  INTRAOCULAR LENS IMPLANT Left 3/11/2019    Procedure: EXTRACTION, CATARACT, WITH IOL INSERTION;  Surgeon: Vera Sams MD;  Location: [facility];  Service: Ophthalmology;  Laterality: Left;    CATARACT EXTRACTION W/  INTRAOCULAR LENS IMPLANT Right 4/15/2019    Procedure: EXTRACTION, CATARACT, WITH IOL INSERTION LASER;  Surgeon: Vera Sams MD;  Location: [facility];  Service: Ophthalmology;  Laterality: Right;    COLON SURGERY      EYE SURGERY      FRACTURE SURGERY      NASAL SEPTUM SURGERY      OCCLUSION OF LEFT ATRIAL APPENDAGE N/A 2/18/2022    Procedure: Left atrial appendage occlusion;  Surgeon: Chase Gill MD;  Location: [facility] EP [facility];  Service: Cardiology;  Laterality: N/A;  afib, watchman, BSCI, osiris, anes, MB/EH, 3prep    OPEN REDUCTION AND INTERNAL FIXATION (ORIF) OF INJURY OF SACROILIAC JOINT Bilateral 1/20/2021    Procedure: ORIF, SACROILIAC JOINT;  Surgeon: Alcides Tamez MD;  [address];  Service: Orthopedics;  Laterality: Bilateral;    RADIOFREQUENCY ABLATION      RECTAL PROLAPSE REPAIR  june 2013    STRABISMUS SURGERY      TONSILLECTOMY      TRANSESOPHAGEAL ECHOCARDIOGRAPHY N/A 2/18/2022    Procedure: ECHOCARDIOGRAM, TRANSESOPHAGEAL;  Surgeon: Nils Diagnostic Provider;  Location: [facility] EP [facility];  Service: Cardiology;  Laterality: N/A;       Review of patient's allergies indicates:   Allergen Reactions    Adhesive      Tape tears skin    Buspar [buspirone]      headaches     Escitalopram oxalate Nausea Only     hyponatremia      Rifampin Rash       Medications:  Continuous Infusions:  Scheduled Meds:   cefTRIAXone (ROCEPHIN) IVPB  1 g Intravenous Q24H    donepeziL  10 mg Oral Daily    enoxaparin  30 mg Subcutaneous Daily    famotidine  40 mg Oral Daily    levothyroxine  100 mcg Oral Before breakfast    polyethylene glycol  17 g Oral Daily    sertraline  50 mg Oral BID    traZODone  150 mg Oral QHS     PRN Meds:acetaminophen, dextrose 10%, dextrose 10%, glucagon (human recombinant), glucose, glucose, naloxone, sodium chloride 0.9%    Family History       Problem Relation (Age of Onset)    Breast cancer Paternal Grandmother    Heart disease Father, Brother    Stroke Father          Tobacco Use    Smoking status: Never    Smokeless tobacco: Never   Substance and Sexual Activity    Alcohol use: No    Drug use: No    Sexual activity: Not Currently       Review of Systems   Unable to perform ROS: Acuity of condition   Objective:     Vital Signs (Most Recent):  Temp: 98.4 °F (36.9 °C) (02/07/23 1218)  Pulse: 95 (02/07/23 1307)  Resp: 17 (02/07/23 1218)  BP: 128/68 (02/07/23 1218)  SpO2: 98 % (02/07/23 1218) Vital Signs (24h Range):  Temp:  [96.5 °F (35.8 °C)-98.4 °F (36.9 °C)] 98.4 °F (36.9 °C)  Pulse:  [] 95  Resp:  [16-18] 17  SpO2:  [97 %-100 %] 98 %  BP: (122-134)/(66-79) 128/68     Weight: 45.2 kg (99 lb 10.4 oz)  Body mass index is 18.23 kg/m².    Physical Exam  Constitutional:       Appearance: She is underweight. She is ill-appearing.   HENT:      Head: Normocephalic and atraumatic.   Eyes:      General: Lids are normal.      Conjunctiva/sclera: Conjunctivae normal.   Neck:      Comments: Bent down- kyphosis noted  Pulmonary:      Effort: Pulmonary effort is normal. No respiratory distress.   Musculoskeletal:      Comments: Contractures noted to all extremities   Skin:     General: Skin is warm and dry.   Neurological:      Mental Status: She is lethargic.    Psychiatric:      Comments: Pt moans and babbles a few incoherent sounds       Review of Symptoms      Symptom Assessment (ESAS 0-10 Scale)  Unable to complete assessment due to Acuity of condition         Pain Assessment in Advanced Demential Scale (PAINAD)   Breathing - Independent of vocalization:  0  Negative vocalization:  1  Facial expression:  0  Body language:  0  Consolability:  0  Total:  1    Performance Status:  10    Psychosocial/Cultural:   See Palliative Psychosocial Note: No  Pt lives at [facility]. Pt's  lives at assisted living NH. Pt has a daughter that lives close by.   **Primary  to Follow**  Palliative Care  Consult: No      Advance Care Planning   Advance Directives:   Living Will: Yes        Copy on chart: Yes    Medical Power of : Yes    Agent's Name:  Pt's  Pavel Crews    Decision Making:  Family answered questions and Patient unable to communicate due to disease severity/cognitive impairment       Significant Labs: All pertinent labs within the past 24 hours have been reviewed.  CBC:   Recent Labs   Lab 02/07/23 0352   WBC 5.56   HGB 11.8*   HCT 37.6   MCV 96        BMP:  Recent Labs   Lab 02/07/23 0352   GLU 53*      K 3.8   *   CO2 19*   BUN 18   CREATININE 0.5   CALCIUM 7.8*   MG 1.7     LFT:  Lab Results   Component Value Date    AST 15 02/07/2023    ALKPHOS 77 02/07/2023    BILITOT 0.3 02/07/2023     Albumin:   Albumin   Date Value Ref Range Status   02/07/2023 2.3 (L) 3.5 - 5.2 g/dL Final     Protein:   Total Protein   Date Value Ref Range Status   02/07/2023 5.2 (L) 6.0 - 8.4 g/dL Final     Lactic acid:   Lab Results   Component Value Date    LACTATE 0.8 02/06/2023    LACTATE 1.5 02/05/2023       Significant Imaging: I have reviewed all pertinent imaging results/findings within the past 24 hours.        20 minutes of ACP completed.       Silva Cruz, CNS  Palliative Medicine  [facility]  [facility]

## 2023-02-07 NOTE — PLAN OF CARE
[facility] - Surgery  Initial Discharge Assessment       Primary Care Provider: Jeison Rodney MD    Admission Diagnosis: Hypernatremia [E87.0]  Altered mental status [R41.82]  Acute cystitis without hematuria [N30.00]  Chest pain [R07.9]    Admission Date: 2/5/2023  Expected Discharge Date: 2/8/2023    Discharge Barriers Identified: None    Payor: MEDICARE / Plan: MEDICARE PART A & B / Product Type: Government /     Extended Emergency Contact Information  Primary Emergency Contact: Pavel Crews  Address: [address] [place] [address] [id number] [place] [address]  Mobile Phone: [phone]  Relation: Spouse    Discharge Plan A: Return to nursing home  Discharge Plan B: Return to Nursing Home, Hospice/home      [facility] - [address]  [address]  [place] [address]  Phone: [phone] Fax: [phone]      Initial Assessment (most recent)       Adult Discharge Assessment - 02/07/23 0836          Discharge Assessment    Assessment Type Discharge Planning Assessment     Confirmed/corrected address, phone number and insurance Yes     Confirmed Demographics Correct on Facesheet     Source of Information health record     Facility Arrived From: [facility]     Do you expect to return to your current living situation? Yes     Do you take prescription medications? Yes     Do you have prescription coverage? Yes     Do you have any problems affording any of your prescribed medications? No     Is the patient taking medications as prescribed? yes     Are you on dialysis? No     Do you take coumadin? No     Discharge Plan A Return to nursing home     Discharge Plan B Return to Nursing Home;Hospice/home     DME Needed Upon Discharge  none     Discharge Barriers Identified None                   Patient resident of [facility] and is expected to return to facility upon hospital discharge. Ambulance transportation will need  to be set up upon hospital discharge

## 2023-02-08 VITALS
TEMPERATURE: 97 F | HEIGHT: 62 IN | WEIGHT: 99.63 LBS | SYSTOLIC BLOOD PRESSURE: 113 MMHG | BODY MASS INDEX: 18.33 KG/M2 | DIASTOLIC BLOOD PRESSURE: 59 MMHG | OXYGEN SATURATION: 96 % | HEART RATE: 61 BPM | RESPIRATION RATE: 20 BRPM

## 2023-02-08 PROBLEM — R52 PAIN: Status: ACTIVE | Noted: 2023-02-08

## 2023-02-08 LAB
ALBUMIN SERPL BCP-MCNC: 2.4 G/DL (ref 3.5–5.2)
ALP SERPL-CCNC: 85 U/L (ref 55–135)
ALT SERPL W/O P-5'-P-CCNC: 15 U/L (ref 10–44)
ANION GAP SERPL CALC-SCNC: 13 MMOL/L (ref 8–16)
AST SERPL-CCNC: 17 U/L (ref 10–40)
BASOPHILS # BLD AUTO: 0.01 K/UL (ref 0–0.2)
BASOPHILS NFR BLD: 0.2 % (ref 0–1.9)
BILIRUB SERPL-MCNC: 0.3 MG/DL (ref 0.1–1)
BUN SERPL-MCNC: 22 MG/DL (ref 8–23)
CALCIUM SERPL-MCNC: 8.1 MG/DL (ref 8.7–10.5)
CHLORIDE SERPL-SCNC: 113 MMOL/L (ref 95–110)
CO2 SERPL-SCNC: 19 MMOL/L (ref 23–29)
CREAT SERPL-MCNC: 0.6 MG/DL (ref 0.5–1.4)
DIFFERENTIAL METHOD: ABNORMAL
EOSINOPHIL # BLD AUTO: 0.3 K/UL (ref 0–0.5)
EOSINOPHIL NFR BLD: 4.5 % (ref 0–8)
ERYTHROCYTE [DISTWIDTH] IN BLOOD BY AUTOMATED COUNT: 15.9 % (ref 11.5–14.5)
EST. GFR  (NO RACE VARIABLE): >60 ML/MIN/1.73 M^2
GLUCOSE SERPL-MCNC: 73 MG/DL (ref 70–110)
HCT VFR BLD AUTO: 39.8 % (ref 37–48.5)
HGB BLD-MCNC: 12.3 G/DL (ref 12–16)
IMM GRANULOCYTES # BLD AUTO: 0.02 K/UL (ref 0–0.04)
IMM GRANULOCYTES NFR BLD AUTO: 0.3 % (ref 0–0.5)
LYMPHOCYTES # BLD AUTO: 0.8 K/UL (ref 1–4.8)
LYMPHOCYTES NFR BLD: 11.3 % (ref 18–48)
MAGNESIUM SERPL-MCNC: 1.9 MG/DL (ref 1.6–2.6)
MCH RBC QN AUTO: 30.2 PG (ref 27–31)
MCHC RBC AUTO-ENTMCNC: 30.9 G/DL (ref 32–36)
MCV RBC AUTO: 98 FL (ref 82–98)
MONOCYTES # BLD AUTO: 0.2 K/UL (ref 0.3–1)
MONOCYTES NFR BLD: 3.3 % (ref 4–15)
NEUTROPHILS # BLD AUTO: 5.3 K/UL (ref 1.8–7.7)
NEUTROPHILS NFR BLD: 80.4 % (ref 38–73)
NRBC BLD-RTO: 0 /100 WBC
PHOSPHATE SERPL-MCNC: 3.1 MG/DL (ref 2.7–4.5)
PLATELET # BLD AUTO: 254 K/UL (ref 150–450)
PMV BLD AUTO: 10.5 FL (ref 9.2–12.9)
POTASSIUM SERPL-SCNC: 3.6 MMOL/L (ref 3.5–5.1)
PROT SERPL-MCNC: 5.5 G/DL (ref 6–8.4)
RBC # BLD AUTO: 4.07 M/UL (ref 4–5.4)
SODIUM SERPL-SCNC: 145 MMOL/L (ref 136–145)
WBC # BLD AUTO: 6.62 K/UL (ref 3.9–12.7)

## 2023-02-08 PROCEDURE — 83735 ASSAY OF MAGNESIUM: CPT

## 2023-02-08 PROCEDURE — 99233 PR SUBSEQUENT HOSPITAL CARE,LEVL III: ICD-10-PCS | Mod: ,,, | Performed by: CLINICAL NURSE SPECIALIST

## 2023-02-08 PROCEDURE — 25000003 PHARM REV CODE 250

## 2023-02-08 PROCEDURE — 36415 COLL VENOUS BLD VENIPUNCTURE: CPT

## 2023-02-08 PROCEDURE — 85025 COMPLETE CBC W/AUTO DIFF WBC: CPT

## 2023-02-08 PROCEDURE — 99238 HOSP IP/OBS DSCHRG MGMT 30/<: CPT | Mod: GC,,, | Performed by: HOSPITALIST

## 2023-02-08 PROCEDURE — 99233 SBSQ HOSP IP/OBS HIGH 50: CPT | Mod: ,,, | Performed by: CLINICAL NURSE SPECIALIST

## 2023-02-08 PROCEDURE — 25000003 PHARM REV CODE 250: Performed by: STUDENT IN AN ORGANIZED HEALTH CARE EDUCATION/TRAINING PROGRAM

## 2023-02-08 PROCEDURE — 84100 ASSAY OF PHOSPHORUS: CPT

## 2023-02-08 PROCEDURE — 99238 PR HOSPITAL DISCHARGE DAY,<30 MIN: ICD-10-PCS | Mod: GC,,, | Performed by: HOSPITALIST

## 2023-02-08 PROCEDURE — 80053 COMPREHEN METABOLIC PANEL: CPT

## 2023-02-08 RX ORDER — CEFPODOXIME PROXETIL 100 MG/1
200 TABLET, FILM COATED ORAL EVERY 12 HOURS
Qty: 8 TABLET | Refills: 0 | Status: CANCELLED
Start: 2023-02-08 | End: 2023-02-10

## 2023-02-08 RX ORDER — AMOXICILLIN AND CLAVULANATE POTASSIUM 500; 125 MG/1; MG/1
1 TABLET, FILM COATED ORAL 2 TIMES DAILY
Qty: 4 TABLET | Refills: 0 | Status: SHIPPED | OUTPATIENT
Start: 2023-02-08 | End: 2023-02-08 | Stop reason: HOSPADM

## 2023-02-08 RX ORDER — AMOXICILLIN AND CLAVULANATE POTASSIUM 875; 125 MG/1; MG/1
1 TABLET, FILM COATED ORAL 2 TIMES DAILY
Qty: 4 TABLET | Refills: 0 | Status: SHIPPED | OUTPATIENT
Start: 2023-02-08 | End: 2023-02-10

## 2023-02-08 RX ORDER — AMOXICILLIN AND CLAVULANATE POTASSIUM 875; 125 MG/1; MG/1
1 TABLET, FILM COATED ORAL 2 TIMES DAILY
Status: DISCONTINUED | OUTPATIENT
Start: 2023-02-08 | End: 2023-02-08 | Stop reason: HOSPADM

## 2023-02-08 RX ADMIN — SERTRALINE HYDROCHLORIDE 50 MG: 50 TABLET ORAL at 09:02

## 2023-02-08 RX ADMIN — LEVOTHYROXINE SODIUM 100 MCG: 100 TABLET ORAL at 05:02

## 2023-02-08 RX ADMIN — POLYETHYLENE GLYCOL 3350 17 G: 17 POWDER, FOR SOLUTION ORAL at 09:02

## 2023-02-08 RX ADMIN — FAMOTIDINE 40 MG: 20 TABLET ORAL at 09:02

## 2023-02-08 RX ADMIN — DONEPEZIL HYDROCHLORIDE 10 MG: 10 TABLET ORAL at 09:02

## 2023-02-08 RX ADMIN — AMOXICILLIN AND CLAVULANATE POTASSIUM 1 TABLET: 875; 125 TABLET, FILM COATED ORAL at 09:02

## 2023-02-08 NOTE — ASSESSMENT & PLAN NOTE
Impression: Pt is a 73 y/o  woman with a history of AVNRT, Alzheimer's dementia, C2-C3 anterolisthesis with cord compression, SAH (2016), CVID, s/p Watchman device who presented to the ED from [facility] with concerns of altered mental status, lethargy over the past week. Pt is debilitated, contracs of care/tures noted to all extremities. Pt babbles some and moans. Pt is a partial code.     Reason for consult: ACP/Goal of care/hospice. Communicated with Dr. Carrasco and team.     Goals of care/ACP:    Today: No family at bedside. Plan is home hospice at NH. SW arranging.     2/7/23  No family at bedside. Pt unable to participate in conversation. Spoke to bedside nurse.   Called and spoke to pt's , Pavel. Per Pavel, pt lives at [facility] and he lives in the assisted living part at [facility]. Per pt's  pt has declined sharply since July 4th week. Per pt's  she will say a few words and moans off and on. Per pt's  he has chronic pain issues and hard to visit in hospital.    Code status: Partial  MPOA: Pt's  is NOK.     Symptom management:     Pain r/t to chronic issues severe cord compression, stenosis, contractures.   Pt moaning when turned or touches.     Recs: Would have low dose opioids prn pain.   Consider Roxanol 2.6 mg q 4 hrs prn pain.     Debility r/t to stenosis, contractures, spinal cord compression.   Frequent turing.     Plan:   in agreement to hospice at NH.   Communicated with primary care team.

## 2023-02-08 NOTE — ASSESSMENT & PLAN NOTE
72 year old with Alzhemer's dementia, severe osteoporosis, C2-C3 anterolisthesis, hypothyroidism who presents from nursing home with increased lethargy and altered mental status. Labs concerning for hypernatremia and UTI.    On exam patient does not appear to have suprapubic tenderness, no elevated WBC, lactate 1.5. patient had a previous UTI in September 2022 with E.coli, treated with ceftriaxone.    Received 1L NS and 1 dose ceftriaxone in the ED.    Plan:  - Transitioning to Amox-Clav for UTI from CTX  - Monitor urine output  - Monitor BUN/Cr with CMP

## 2023-02-08 NOTE — ASSESSMENT & PLAN NOTE
Nutrition consulted. Most recent weight and BMI monitored-     Malnutrition Type and Energy Intake  Malnutrition Type: acute illness or injury  Energy Intake: severe energy intake    Malnutrition (Moderate to Severe)  Weight Loss (Malnutrition):  (none noted)  Subcutaneous Fat (Malnutrition): other (see comments)  Muscle Mass (Malnutrition): other (see comments)    Final Summary  Subcutaneous Fat Loss (Final Summary): severe protein-calorie malnutrition  Muscle Loss Evaluation (Final Summary): severe protein-calorie malnutrition         Measurements:  Wt Readings from Last 1 Encounters:   02/05/23 45.2 kg (99 lb 10.4 oz)   Body mass index is 18.23 kg/m².    Recommendations: Recommendation/Intervention: 1.) Recommend continuing with current Cardiac diet order, encouraging PO intake 2.) Recommend continuing with providing Boost Plus TID to provide 1080kcal and 42gPRO, encouraging intake. 3.) RD to monitor.  Goals: To meet % of EEN/EPN by next RD f/u    Patient has been screened and assessed by RD. RD will follow patient.

## 2023-02-08 NOTE — PHYSICIAN QUERY
PT Name: Yvette Crews  MR #: [id number]    DOCUMENTATION CLARIFICATION   Zahida Delgado RN, CCDS    [email or web address]    Documentation Excellence    This form is a permanent document in the medical record.     Query Date: February 8, 2023    By submitting this query, we are merely seeking further clarification of documentation..   Please utilize your independent clinical judgment when addressing the question(s) below.    The medical record contains the following:   Indicators  Supporting Clinical Findings Location in Medical Record   x Energy Intake Energy Intake: < 50% of estimated energy requirement for 1 wk  Energy Intake: severe energy intake    Energy Calories Required: not meeting needs  Protein Required: not meeting needs  % Intake of Estimated Energy Needs: 25 - 50 %  % Meal Intake: 25 - 50 %   RD CN 2/6    Weight Loss     x Fat Loss Body Fat Depletion: severe depletion of orbital, triceps, thoracic and lumbar region     Subcutaneous Fat Loss (Final Summary): severe protein-calorie malnutrition   RD CN 2/6    Muscle Loss Muscle Mass Depletion:severe depletion of temples, clavicle region, scapular region, and lower extremities     Muscle Loss Evaluation (Final Summary): severe protein-calorie malnutrition  RD CN 2/6    Edema/Fluid Accumulation      Reduced  Strength (by dynamometer)     x Weight, BMI, Usual Body Weight Temp: 97 °F (36.1 °C)                    Weight (lb): 99.65 lb  Ideal Body Weight (IBW), Female: 110 lb  % Ideal Body Weight, Female (lb): 90.59 %  BMI (Calculated): 18.2  BMI Grade: 17 - 18.4 protein-energy malnutrition grade I RD CN 2/6    Delayed Wound Healing     x Registered Dietician Diagnosis Severe Protein-Calorie Malnutrition  Malnutrition in the context of Acute Illness/Injury    Related to (etiology):  physiological demands  Pt slightly disoriented related to Alzheimer's    RD CN 2/6   x Acute or Chronic Illness observed severe wasting in the temporal, acromion  process, clavicle and orbital regions    Acute cystitis without hematuria.    hyperNAtremia                              Hx:  alzheimer's dementia              SAH (subarachnoid hemorrhage)   History noted  Presence of Watchman left atrial appendage closure device  Late onset Alzheimer's dementia without behavioral disturbance  CVID (common variable immunodeficiency) RD CN 2/6        H&P    Social or Environmental Circumstances     x Treatment 1.) Recommend continuing with current Cardiac diet order, encouraging PO intake   2.) Recommend continuing with providing Boost Plus TID to provide 1080kcal and        42g PRO, encouraging intake.    3.) RD to monitor.    Optimizing nutritional status with supplementation and rehydration in setting of malnutrition. RD CN 2/6 2/7 Hosp PN     x Other Adult FTT    Moderate protein malnutrition   Current Assessment & Plan 2/5/2023                                                              Hospital Encounter Written 2/8/2023  8:36 AM    Nutrition consulted. Most recent weight and BMI monitored-      Malnutrition (Moderate to Severe)  Weight Loss (Malnutrition):  (none noted)  Subcutaneous Fat (Malnutrition): other (see comments)  Muscle Mass (Malnutrition): other (see comments)     Final Summary  Subcutaneous Fat Loss (Final Summary): severe protein-calorie malnutrition  Muscle Loss Evaluation (Final Summary): severe protein-calorie malnutrition   2/7 Hosp PN    Active Hosp Problem List on Hosp PN 2/7     Academy of Nutrition and Dietetics (Academy) and the American Society for Parenteral and Enteral Nutrition (A.S.P.E.N.) Clinical Characteristics to support Malnutrition   Malnutrition in the Context of Acute Illness or Injury Malnutrition in the Context of Chronic Illness or Injury Malnutrition in the Context of Social or Environmental Circumstances   Malnutrition Level Moderate Severe Moderate Severe   Moderate   Severe   Energy Intake <75%                   >7 days  <50%                   >5 days <75%           >1 month <75%                      >1 month   <75% for >3 months   <50% for >1 month   Weight Loss   1-2% in 1 week >2% in 1 week 5% in 1 month >5% in 1 month 5% in 1 month >5% in 1 month    5% in 1 month >5% in 1 month 7.5% in 3 months >7.5% in 3 months 7.5% in 3 months >7.5% in 3 months    7.5% in 3 months >7.5% in 3 months 10% in 6 months >10% in 6 months 10% in 6 months >10% in 6 months        20% in 1 year                    >20% in 1 year                                                                  20% in 1 year                            >20% in 1 year                                                  Subcutaneous Fat Loss Mild  Moderate  Mild  Severe    Mild   Severe   Muscle Loss Mild  Moderate  Mild  Severe    Mild   Severe   Edema/Fluid Accumulation Mild Moderate to severe  Mild  Severe   Mild   Severe   Reduced  Strength         (based on standards supplied by  of dynamometer) N/A Measurably reduced N/A Measurably reduced N/A Measurably reduced     Criteria for mild malnutrition is defined as 1 characteristic outlined above within the established moderate or severe parameters.  A minimum of 2 out of the 6 characteristics noted above are recommended for a diagnosis of moderate or severe malnutrition.  Chronic illness/injury is a disease/condition lasting 3 months or longer.    The noted clinical guidelines are only system guidelines and do not replace the providers clinical judgment.    Provider, please clarify malnutrition diagnosis & degree associated with above clinical findings.    [x  ] Severe Malnutrition - a minimum of 2 of the 6 severe malnutrition characteristics noted above      [  ] Other malnutrition degree - specify: ____________     [  ] Other - specify: _______     [  ] Clinically Undetermined       Please document in your progress notes daily for the duration of treatment until resolved and  include in your discharge  summary.    References:    ANTWAN Alexandre, & MILAN Davidson (2022, April). Assessment and management of anorexia and cachexia in palliative care. Retrieved May 23, 2022, from https://www.eLong.com/contents/assessment-and-management-of-anorexia-and-cachexia-in-palliative-care?nzzinDss=0027&source=see_link     FANI Beth, PhD, RD, PANFILO, LILA Mallory, PhD, RN, VETO Helton MD, PhD, Girma VALE A., MS, RD, [facility], LAXMI Armendariz, MS, RD, The Academy Malnutrition Work Group, The A.S.P.E.N. Board of Directors. (2012). Consensus Statement: Academy of Nutrition and Dietetics and American Society for Parenteral and Enteral Nutrition: Characteristics Recommended for the Identification and Documentation of Adult Malnutrition (Undernutrition). Journal of Parenteral and Enteral Nutrition, 36(3), 275-283. doi:10.1177/8386588622683208     Form No. 84639

## 2023-02-08 NOTE — NURSING
Report called to patient's nurse GOVIND Burrows at [facility]. Lines removed. Awaiting transportation

## 2023-02-08 NOTE — PLAN OF CARE
[facility]  Department of Hospital Medicine  [address] [place] [address]  [phone] after hours  [phone] fax    HOSPICE  ORDERS    02/08/2023    Admit to Hospice:  Inpatient Service    Diagnoses:   Active Hospital Problems    Diagnosis  POA    *Acute cystitis without hematuria [N30.00]  Yes    Adult failure to thrive [R62.7]  Unknown    Palliative care encounter [Z51.5]  Not Applicable    Advance care planning [Z71.89]  Not Applicable    Goals of care, counseling/discussion [Z71.89]  Not Applicable    Debility [R53.81]  Unknown    Upper extremity weakness [R29.898]  Yes    Closed wedge compression fracture of T3 vertebra [S22.030A]  Yes    Cervical spinal cord compression [G95.20]  Yes    Open displaced fracture of acromial end of left clavicle [S42.032B]  Yes    Late onset Alzheimer's dementia without behavioral disturbance [G30.1, F02.80]  Yes     Chronic    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]  Yes     Chronic    Encephalopathy, metabolic [G93.41]  Yes    Postoperative hypothyroidism [E89.0]  Yes     Chronic    Moderate protein malnutrition [E44.0]  Yes      Resolved Hospital Problems    Diagnosis Date Resolved POA    Hypernatremia [E87.0] 02/07/2023 Yes       Hospice Qualifying Diagnoses:        Patient has a life expectancy < 6 months due to:  Primary Hospice Diagnosis: Alzheimer Disease  Comorbid Conditions Contributing to Decline: Severe malnutrition, chronic cord compression (C2-C3), Failure to thrive, Debility/Deconditioning (bedbound)    Vital Signs: Routine per Hospice Protocol.    Code Status: Partial Code    Allergies:   Review of patient's allergies indicates:   Allergen Reactions    Adhesive      Tape tears skin    Buspar [buspirone]      headaches    Escitalopram oxalate Nausea Only     hyponatremia      Rifampin Rash       Diet: Puree, think with standard aspiration precautions     Activities: As tolerated    Goals of Care Treatment  Preferences:  Code Status: Partial Code    Health care agent: Pt's  Pavel Crews  Health care agent number: No value filed.    Living Will: Yes              Nursing: Per Hospice Routine    Routine Skin for Bedridden Patients: Apply moisture barrier cream to all skin folds and   wet areas in perineal area daily and after baths and all bowel movements.    Oxygen: As needed for comfort       Medications:        Medication List        START taking these medications      amoxicillin-clavulanate 875-125mg 875-125 mg per tablet  Commonly known as: AUGMENTIN  Take 1 tablet by mouth 2 (two) times daily. for 2 days            CHANGE how you take these medications      famotidine 40 MG tablet  Commonly known as: PEPCID  Take 1 tablet (40 mg total) by mouth once daily.  What changed: when to take this     traZODone 150 MG tablet  Commonly known as: DESYREL  Take 150 mg by mouth every evening.  What changed: Another medication with the same name was removed. Continue taking this medication, and follow the directions you see here.            CONTINUE taking these medications      acetaminophen 325 MG tablet  Commonly known as: TYLENOL  Take 2 tablets (650 mg total) by mouth every 6 (six) hours as needed for Pain.     donepeziL 10 MG tablet  Commonly known as: ARICEPT  Take 1 tablet (10 mg total) by mouth once daily.     Immune Globulin G (IGG)-PRO-IGA 10 % injection (Privigen) 10 % Soln  Commonly known as: PRIVIGEN  Infuse 20 g into the vein every 4 weeks.     levothyroxine 88 MCG tablet  Commonly known as: SYNTHROID  Take 1 tablet (88 mcg total) by mouth before breakfast.     polyethylene glycol 17 gram Pwpk  Commonly known as: GLYCOLAX  Take 17 g by mouth once daily.     sertraline 50 MG tablet  Commonly known as: ZOLOFT  Take 50 mg by mouth 2 (two) times daily.            STOP taking these medications      calcium-vitamin D3-vitamin K 650 mg-12.5 mcg-40 mcg Chew     docusate calcium 240 mg capsule  Commonly known as:  SURFAK     ferrous sulfate 325 (65 FE) MG EC tablet     guaiFENesin 600 mg 12 hr tablet  Commonly known as: MUCINEX     metoprolol tartrate 25 MG tablet  Commonly known as: LOPRESSOR     MILK OF MAGNESIA 400 mg/5 mL Susp  Generic drug: magnesium hydroxide 400 mg/5 ml                DIABETES CARE:   Nurse to perform and educate diabetic management with blood glucose monitoring:        Fingerstick blood sugar every 6 hours if patient is unable to eat    Report CBG < 60 or > 350 to physician.         Insulin Sliding Scale         Glucose  Novolog Insulin Subcutaneous        0 - 60   Orange juice or glucose tablet      No insulin   201-250  2 units   251-300  4 units   301-350  6 units   351-400  8 units   >400   10 units then call physician      Future Orders:  Hospice Medical Director may dictate new orders for comfortable care measures & sign death certificate.        _________________________________  Abhijeet Pressley DO  02/08/2023

## 2023-02-08 NOTE — ASSESSMENT & PLAN NOTE
Hx of Alzheimer's dementia currently residing in nursing home. Patient is aphasic at baseline, able to nod y/n to questions. Currently using wheelchair to ambulate due to decline in the last six months.     Current medications include donepezil 10 mg QD. No sedating, night time medications noted in chart.      Plan:  - Continue donepezil 10 mg QD

## 2023-02-08 NOTE — ASSESSMENT & PLAN NOTE
Worsening upper extremity weakness noted per  in the past few months. Was previously seen by neurosurgery for C2-C3 anterolisthesis with severe cord compression in August 2022 and discussion of surgery at that time was postponed in favor of medical management at the time. Patient was able to use arms at that time, and has since has noted weakness, does not feed herself. Worsened weakness is concerning for progressive degenerative changes to the cervical spine.    Plan:  -NSGY consulted, no plans for intervention  - PT/OT consulted  - Palliative care consulted, pursuing hospice care

## 2023-02-08 NOTE — PLAN OF CARE
10:18 am  Spoke with Jackeline at [facility], they have accepted patient and  has signed paperwork. They are ready to see pt. At [facility].    11:59 am  Spoke with Facility SW Kallie Medina who states ok to set up transportation to facility - [facility]     -[facility] ambulance transportation set for 1pm    Notified nurse and patient  Pavel of above.    Nurse Ny to call report to Stormy FAUST at [phone] ext [phone]. If unable to reach Stormy, please call Kallie ECHOLS at [phone]

## 2023-02-08 NOTE — PT/OT/SLP PROGRESS
Speech Language Pathology  Discharge    Yvette Crews  MRN: [id number]    Patient not seen today secondary to transitioning to comfort measures. No further ST warranted.   2/8/2023

## 2023-02-08 NOTE — DISCHARGE SUMMARY
[facility]an [facility] - Surgery  [facility] Medicine  Discharge Summary      Patient Name: Yvette Crews  MRN: [id number]  SE: [id number]  Patient Class: IP- Inpatient  Admission Date: 2/5/2023  Hospital Length of Stay: 1 days  Discharge Date and Time:  02/08/2023 8:44 AM  Attending Physician: Jay Carrasco MD   Discharging Provider: Abhijeet Pressley DO  Primary Care Provider: Jeison Rodney MD  [facility] Medicine Team: [facility] MED 1 Abhijeet Pressley DO  Primary Care Team: [facility] MED 1    HPI:   Ms. Crews is a 72 year old woman with a history of AVNRT, Alzheimer's dementia, C2-C3 anterolisthesis with cord compression, SAH (2016), CVID, s/p Watchman device who presents to the ED from [facility] with concerns of altered mental status, lethargy over the past week. Patient is at baseline aphasic, but will normally nod her head (y/no) to questions. On patient interview, patient unable to participate and non-responsive to questions. Further history obtained from , Pavel, over the phone. He states that she has not been herself for the past few days, with increased lethargy and decreased responsiveness noted. She is agitated at night and has been receiving more psychiatric medication, to prevent her crying out but  states he is unsure how much medication is causing her lethargy during the day. She has contractures in her lower extremities and  states she normally keeps them in bent. She was previously evaluated by neurosurgery for cord compression but surgery was deferred at that time. Per , her arm weakness seems to have progressed since then. From July 2022 she has declined in health. She was initially ambulator with a walker, but after falls she has progressed to a gerichair and now in a wheelchair. She was meant to have a left arm splint placed but this has not arrived yet. On exam, patient's arms remain flexed at abdomen.     On admission to the ED, vital signs stable with HR 59,  "afebrile, /82. Labs significant for hypernatremia (149). Lactate 1.5 ,WBC 5.67, BUN/Cr 27/0.6. TSH 30.608. CT head and neck with no acute abnormalities. CXR did not show evidence of consolidation, but noted kyphosis, remote rib fractures and chronic appearing elevation of the left distal clavicle with respect to the acromion. UA significant for positive nitrites, 2+ leukocytes, many bacteria.      * No surgery found *      Hospital Course:   Admitted to Hospital Medicine with acute encephalopathy in the setting of urinary tract infection. Started on Ceftriaxone. TSH elevated, T4 depressed - increased Levothyroxine dose from home dose with understanding that it may be related to present infection. NSGY was consulted for evaluation given history of chronic cord compression with extremity contractures and declining functionality since July 2022. NSGY felt patient is too high risk given her frailty, declining health, and medical comorbidities, and recommended against surgical intervention. NSGY team relayed these recommendations to  and Medicine team. Optimizing nutritional status with supplementation and rehydration in setting of malnutrition. GOC discussion with , consulted Palliative Care to provide further resources and education for long-term care planning going forward.  agreeable to hospice. Ceftriaxone transitioned to Amoxicillin-Clavulanate for UTI course completion. Medically stable for discharge to nursing home with hospice.       Goals of Care Treatment Preferences:  Code Status: Partial Code    Health care agent: Pt's  Pavel Crews  Health care agent number: No value filed.    Living Will: Yes          /61 (BP Location: Right arm, Patient Position: Lying)   Pulse 81   Temp 96.7 °F (35.9 °C) (Axillary)   Resp 18   Ht 5' 2" (1.575 m)   Wt 45.2 kg (99 lb 10.4 oz)   SpO2 96%   BMI 18.23 kg/m²     Physical Exam  Vitals reviewed.   Constitutional:       General: She " is not in acute distress.     Comments: Thin, frail appearing   HENT:      Head: Normocephalic and atraumatic.      Mouth/Throat:      Mouth: Mucous membranes are moist.      Pharynx: Oropharynx is clear.   Eyes:      Pupils: Pupils are equal, round, and reactive to light.   Cardiovascular:      Rate and Rhythm: Regular rhythm. Bradycardia present.   Pulmonary:      Effort: Pulmonary effort is normal. No respiratory distress.      Breath sounds: Normal breath sounds. No wheezing.   Abdominal:      General: Bowel sounds are normal. There is no distension.      Palpations: Abdomen is soft.      Tenderness: There is no abdominal tenderness.   Musculoskeletal:      Right lower leg: No edema.      Left lower leg: No edema.      Comments: Significant kyphosis, patient resting on her side with arms bent across her chest and leg bent. Resistant to straightening legs and arms.   Skin:     General: Skin is warm and dry.      Capillary Refill: Capillary refill takes less than 2 seconds.      Findings: No bruising or rash.   Neurological:      Motor: Weakness present.   Psychiatric:         Cognition and Memory: Cognition is impaired.      Comments: Aphasic, minimally verbally responsive     Consults:   Consults (From admission, onward)          Status Ordering Provider     Inpatient consult to Palliative Care  Once        Provider:  (Not yet assigned)    Completed NANCY BENNETT     Inpatient consult to Registered Dietitian/Nutritionist  Once        Provider:  (Not yet assigned)    Completed FRANCISCA ROBBINS     Inpatient consult to Neurosurgery  Once        Provider:  (Not yet assigned)    Completed HELENA KELLY            No new Assessment & Plan notes have been filed under this hospital service since the last note was generated.  Service: Hospital Medicine    Final Active Diagnoses:    Diagnosis Date Noted POA    PRINCIPAL PROBLEM:  Acute cystitis without hematuria [N30.00] 02/05/2023 Yes    Adult failure to thrive  [R62.7] 02/07/2023 Unknown    Palliative care encounter [Z51.5] 02/07/2023 Not Applicable    Advance care planning [Z71.89] 02/07/2023 Not Applicable    Goals of care, counseling/discussion [Z71.89] 02/07/2023 Not Applicable    Debility [R53.81] 02/07/2023 Unknown    Upper extremity weakness [R29.898] 02/05/2023 Yes    Closed wedge compression fracture of T3 vertebra [S22.030A] 10/04/2022 Yes    Cervical spinal cord compression [G95.20] 07/11/2022 Yes    Open displaced fracture of acromial end of left clavicle [S42.032B] 07/03/2022 Yes    Late onset Alzheimer's dementia without behavioral disturbance [G30.1, F02.80] 11/02/2021 Yes     Chronic    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] 06/03/2021 Yes     Chronic    Encephalopathy, metabolic [G93.41]  Yes    Postoperative hypothyroidism [E89.0] 12/18/2015 Yes     Chronic    Moderate protein malnutrition [E44.0] 01/23/2013 Yes      Problems Resolved During this Admission:    Diagnosis Date Noted Date Resolved POA    Hypernatremia [E87.0] 02/05/2023 02/07/2023 Yes       Discharged Condition: good    Disposition:     Follow Up:    Patient Instructions:      Notify your health care provider if you experience any of the following:  increased confusion or weakness     Notify your health care provider if you experience any of the following:  worsening rash     Notify your health care provider if you experience any of the following:  persistent dizziness, light-headedness, or visual disturbances     Notify your health care provider if you experience any of the following:  severe persistent headache     Notify your health care provider if you experience any of the following:  difficulty breathing or increased cough     Notify your health care provider if you experience any of the following:  redness, tenderness, or signs of infection (pain, swelling, redness, odor or green/yellow discharge around incision site)     Notify your health care provider if you experience any  of the following:  severe uncontrolled pain     Notify your health care provider if you experience any of the following:  persistent nausea and vomiting or diarrhea     Notify your health care provider if you experience any of the following:  temperature >100.4     Activity as tolerated       Significant Diagnostic Studies: Labs:   CMP   Recent Labs   Lab 02/06/23  1242 02/07/23  0352    143   K 3.8 3.8   * 113*   CO2 22* 19*   GLU 79 53*   BUN 21 18   CREATININE 0.6 0.5   CALCIUM 7.8* 7.8*   PROT  --  5.2*   ALBUMIN  --  2.3*   BILITOT  --  0.3   ALKPHOS  --  77   AST  --  15   ALT  --  15   ANIONGAP 8 11    and CBC   Recent Labs   Lab 02/07/23  0352   WBC 5.56   HGB 11.8*   HCT 37.6          Pending Diagnostic Studies:       Procedure Component Value Units Date/Time    CBC with Automated Differential [[id number]]     Order Status: Sent Lab Status: No result     Specimen: Blood     Comprehensive Metabolic Panel (CMP) [[id number]]     Order Status: Sent Lab Status: No result     Specimen: Blood     Magnesium [[id number]]     Order Status: Sent Lab Status: No result     Specimen: Blood     Phosphorus [[id number]]     Order Status: Sent Lab Status: No result     Specimen: Blood            Medications:  Reconciled Home Medications:      Medication List        START taking these medications      amoxicillin-clavulanate 875-125mg 875-125 mg per tablet  Commonly known as: AUGMENTIN  Take 1 tablet by mouth 2 (two) times daily. for 2 days            CHANGE how you take these medications      famotidine 40 MG tablet  Commonly known as: PEPCID  Take 1 tablet (40 mg total) by mouth once daily.  What changed: when to take this     traZODone 150 MG tablet  Commonly known as: DESYREL  Take 150 mg by mouth every evening.  What changed: Another medication with the same name was removed. Continue taking this medication, and follow the directions you see here.            CONTINUE taking these medications       acetaminophen 325 MG tablet  Commonly known as: TYLENOL  Take 2 tablets (650 mg total) by mouth every 6 (six) hours as needed for Pain.     donepeziL 10 MG tablet  Commonly known as: ARICEPT  Take 1 tablet (10 mg total) by mouth once daily.     Immune Globulin G (IGG)-PRO-IGA 10 % injection (Privigen) 10 % Soln  Commonly known as: PRIVIGEN  Infuse 20 g into the vein every 4 weeks.     levothyroxine 88 MCG tablet  Commonly known as: SYNTHROID  Take 1 tablet (88 mcg total) by mouth before breakfast.     polyethylene glycol 17 gram Pwpk  Commonly known as: GLYCOLAX  Take 17 g by mouth once daily.     sertraline 50 MG tablet  Commonly known as: ZOLOFT  Take 50 mg by mouth 2 (two) times daily.            STOP taking these medications      calcium-vitamin D3-vitamin K 650 mg-12.5 mcg-40 mcg Chew     docusate calcium 240 mg capsule  Commonly known as: SURFAK     ferrous sulfate 325 (65 FE) MG EC tablet     guaiFENesin 600 mg 12 hr tablet  Commonly known as: MUCINEX     metoprolol tartrate 25 MG tablet  Commonly known as: LOPRESSOR     MILK OF MAGNESIA 400 mg/5 mL Susp  Generic drug: magnesium hydroxide 400 mg/5 ml              Indwelling Lines/Drains at time of discharge:   Lines/Drains/Airways       None                   Time spent on the discharge of patient: 35 minutes         Abhijeet Pressley DO  Department of Hospital Medicine  [facility] - Surgery

## 2023-02-08 NOTE — SUBJECTIVE & OBJECTIVE
Interval History:   in agreement to hospice at NH. [facility] used.     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    Cataract     Chondrocalcinosis, cause unspecified, involving hand(712.34) 07/12/2011     Dx updated per 2019 IMO Load    Chronic sinusitis 04/06/2017    Colonic constipation 06/05/2013    Concussion 10/27/2016    Cord compression 07/2022    C2-C3 anterolisthesis with cord compression    CVID (common variable immunodeficiency)     Gait disturbance 03/14/2017 2/2023 wheelchair/bed bound    Irritable bowel syndrome 12/18/2015    Major depressive disorder, recurrent episode, in full remission 08/19/2010    Osteoarthritis     Postoperative hypothyroidism 12/18/2015    Presence of Watchman left atrial appendage closure device 05/14/2022    Rectal prolapse 06/05/2013    Reflux esophagitis 02/07/2010    Strabismus     Subarachnoid hemorrhage 10/30/2016    2016, 7/2022  L temporal SAH    Underweight 01/23/2013       Past Surgical History:   Procedure Laterality Date    BREAST CYST ASPIRATION      x2    CATARACT EXTRACTION W/  INTRAOCULAR LENS IMPLANT Left 3/11/2019    Procedure: EXTRACTION, CATARACT, WITH IOL INSERTION;  Surgeon: Vera Sams MD;  Location: [facility] OR;  Service: Ophthalmology;  Laterality: Left;    CATARACT EXTRACTION W/  INTRAOCULAR LENS IMPLANT Right 4/15/2019    Procedure: EXTRACTION, CATARACT, WITH IOL INSERTION LASER;  Surgeon: Vera Sams MD;  Location: [facility] OR;  Service: Ophthalmology;  Laterality: Right;    COLON SURGERY      EYE SURGERY      FRACTURE SURGERY       NASAL SEPTUM SURGERY      OCCLUSION OF LEFT ATRIAL APPENDAGE N/A 2/18/2022    Procedure: Left atrial appendage occlusion;  Surgeon: Chase Gill MD;  Location: [facility] EP LAB;  Service: Cardiology;  Laterality: N/A;  afib, watchman, BSCI, osiris, anes, MB/EH, 3prep    OPEN REDUCTION AND INTERNAL FIXATION (ORIF) OF INJURY OF SACROILIAC JOINT Bilateral 1/20/2021    Procedure: ORIF, SACROILIAC JOINT;  Surgeon: Alcides Tamez MD;  Location: [facility] OR [address] [place] [address];  Service: Orthopedics;  Laterality: Bilateral;    RADIOFREQUENCY ABLATION      RECTAL PROLAPSE REPAIR  june 2013    STRABISMUS SURGERY      TONSILLECTOMY      TRANSESOPHAGEAL ECHOCARDIOGRAPHY N/A 2/18/2022    Procedure: ECHOCARDIOGRAM, TRANSESOPHAGEAL;  Surgeon: Nils Diagnostic Provider;  Location: [facility] EP LAB;  Service: Cardiology;  Laterality: N/A;       Review of patient's allergies indicates:   Allergen Reactions    Adhesive      Tape tears skin    Buspar [buspirone]      headaches    Escitalopram oxalate Nausea Only     hyponatremia      Rifampin Rash       Medications:  Continuous Infusions:  Scheduled Meds:   amoxicillin-clavulanate 875-125mg  1 tablet Oral BID    donepeziL  10 mg Oral Daily    enoxaparin  30 mg Subcutaneous Daily    famotidine  40 mg Oral Daily    levothyroxine  100 mcg Oral Before breakfast    polyethylene glycol  17 g Oral Daily    sertraline  50 mg Oral BID    traZODone  150 mg Oral QHS     PRN Meds:acetaminophen, dextrose 10%, dextrose 10%, glucagon (human recombinant), glucose, glucose, naloxone, sodium chloride 0.9%    Family History       Problem Relation (Age of Onset)    Breast cancer Paternal Grandmother    Heart disease Father, Brother    Stroke Father          Tobacco Use    Smoking status: Never    Smokeless tobacco: Never   Substance and Sexual Activity    Alcohol use: No    Drug use: No    Sexual activity: Not Currently       Review of Systems   Unable to perform ROS: Acuity of condition   Objective:     Vital Signs  (Most Recent):  Temp: 96.7 °F (35.9 °C) (02/08/23 0406)  Pulse: 81 (02/08/23 0406)  Resp: 18 (02/08/23 0406)  BP: 112/61 (02/08/23 0406)  SpO2: 96 % (02/08/23 0406) Vital Signs (24h Range):  Temp:  [96.2 °F (35.7 °C)-98.4 °F (36.9 °C)] 96.7 °F (35.9 °C)  Pulse:  [] 81  Resp:  [17-18] 18  SpO2:  [95 %-98 %] 96 %  BP: (112-136)/(61-78) 112/61     Weight: 45.2 kg (99 lb 10.4 oz)  Body mass index is 18.23 kg/m².    Physical Exam  Constitutional:       Appearance: She is underweight. She is ill-appearing.   HENT:      Head: Normocephalic and atraumatic.   Eyes:      General: Lids are normal.      Conjunctiva/sclera: Conjunctivae normal.   Neck:      Comments: Bent down- kyphosis noted  Pulmonary:      Effort: Pulmonary effort is normal. No respiratory distress.   Musculoskeletal:      Comments: Contractures noted to all extremities   Skin:     General: Skin is warm and dry.   Neurological:      Mental Status: She is lethargic.   Psychiatric:      Comments: Pt moans and babbles a few incoherent sounds       Review of Symptoms      Symptom Assessment (ESAS 0-10 Scale)  Pain:  0  Dyspnea:  0  Anxiety:  0  Nausea:  0  Depression:  0  Anorexia:  0  Fatigue:  0  Insomnia:  0  Restlessness:  0  Agitation:  0 due to Acuity of condition         Pain Assessment in Advanced Demential Scale (PAINAD)   Breathing - Independent of vocalization:  0  Negative vocalization:  1  Facial expression:  0  Body language:  0  Consolability:  0  Total:  1    Performance Status:  10    Psychosocial/Cultural:   See Palliative Psychosocial Note: No  Pt lives at [facility]. Pt's  lives at assisted living NH. Pt has a daughter that lives close by.   **Primary  to Follow**  Palliative Care  Consult: No      Advance Care Planning          Significant Labs: All pertinent labs within the past 24 hours have been reviewed.  CBC:   Recent Labs   Lab 02/08/23  0852   WBC 6.62   HGB 12.3   HCT 39.8   MCV 98           BMP:  Recent Labs   Lab 02/08/23 0852   GLU 73      K 3.6   *   CO2 19*   BUN 22   CREATININE 0.6   CALCIUM 8.1*   MG 1.9       LFT:  Lab Results   Component Value Date    AST 17 02/08/2023    ALKPHOS 85 02/08/2023    BILITOT 0.3 02/08/2023     Albumin:   Albumin   Date Value Ref Range Status   02/08/2023 2.4 (L) 3.5 - 5.2 g/dL Final     Protein:   Total Protein   Date Value Ref Range Status   02/08/2023 5.5 (L) 6.0 - 8.4 g/dL Final     Lactic acid:   Lab Results   Component Value Date    LACTATE 0.8 02/06/2023    LACTATE 1.5 02/05/2023       Significant Imaging: I have reviewed all pertinent imaging results/findings within the past 24 hours.

## 2023-02-08 NOTE — PROGRESS NOTES
[facility] - [facility]  Palliative Medicine  Progress Note    Patient Name: Yvette Crews  MRN: [id number]  Admission Date: 2/5/2023  Hospital Length of Stay: 1 days  Code Status: Partial Code   Attending Provider: Jay Carrasco MD  Consulting Provider: Silva Cruz, CNS  Primary Care Physician: Jeison Rodney MD  Principal Problem:Acute cystitis without hematuria    Patient information was obtained from patient and primary team.      Assessment/Plan:     Palliative care encounter  Impression: Pt is a 71 y/o  woman with a history of AVNRT, Alzheimer's dementia, C2-C3 anterolisthesis with cord compression, SAH (2016), CVID, s/p Watchman device who presented to the ED from [facility] with concerns of altered mental status, lethargy over the past week. Pt is debilitated, contracs of care/tures noted to all extremities. Pt babbles some and moans. Pt is a partial code.     Reason for consult: ACP/Goal of care/hospice. Communicated with Dr. Carrasco and team.     Goals of care/ACP:    Today: No family at bedside. Plan is home hospice at NH. SW arranging.     2/7/23  No family at bedside. Pt unable to participate in conversation. Spoke to bedside nurse.   Called and spoke to pt's , Pavel. Per Pavel, pt lives at [facility] and he lives in the assisted living part at [facility]. Per pt's  pt has declined sharply since July 4th week. Per pt's  she will say a few words and moans off and on. Per pt's  he has chronic pain issues and hard to visit in hospital.    Code status: Partial  MPOA: Pt's  is NOK.     Symptom management:     Pain r/t to chronic issues severe cord compression, stenosis, contractures.   Pt moaning when turned or touches.     Recs: Would have low dose opioids prn pain.   Consider Roxanol 2.6 mg q 4 hrs prn pain.     Debility r/t to stenosis, contractures, spinal cord compression.   Frequent turing.     Plan:   in agreement to hospice at NH.    Communicated with primary care team.          I will follow-up with patient. Please contact us if you have any additional questions.    Subjective:     Chief Complaint:   Chief Complaint   Patient presents with    Altered Mental Status     Pt arrives via EMS from [facility] for altered mental status x 2 days. Staff states she is at baseline, not talking as much, not wanting to eat or drink. Pt contracted at baseline.        HPI:   Pt is a 72 year old woman with a history of AVNRT, Alzheimer's dementia, C2-C3 anterolisthesis with cord compression, SAH (2016), CVID, s/p Watchman device who presented to the ED from  [facility] with concerns of altered mental status, lethargy over the past week. Patient is at baseline aphasic, but will normally nod her head (y/no) to questions. Per chart review on admit, patient unable to participate and non-responsive to questions. Further history obtained from , Pavel, over the phone. He stated that she has not been herself for the past few days, with increased lethargy and decreased responsiveness noted. She is agitated at night and has been receiving more psychiatric medication, to prevent her crying out but  states he is unsure how much medication is causing her lethargy during the day. She has contractures in her lower extremities and  stated she normally keeps them bent. She was previously evaluated by neurosurgery for cord compression but surgery was deferred at that time. Per , her arm weakness seems to have progressed since then. From July 2022 she has declined in health. She was initially ambulator with a walker, but after falls she has progressed to a gerichair and now in a wheelchair. She was meant to have a left arm splint placed but this has not arrived yet. On exam, patient's arms remain flexed at abdomen.      On admission to the ED, vital signs stable with HR 59, afebrile, /82. Labs significant for hypernatremia (149).  Lactate 1.5 ,WBC 5.67, BUN/Cr 27/0.6. TSH 30.608. CT head and neck with no acute abnormalities. CXR did not show evidence of consolidation, but noted kyphosis, remote rib fractures and chronic appearing elevation of the left distal clavicle with respect to the acromion. UA significant for positive nitrites, 2+ leukocytes, many bacteria.        Overview/Hospital Course per chart review:   Admitted to Hospital Medicine with acute encephalopathy in the setting of urinary tract infection. Started on Ceftriaxone. TSH elevated, T4 depressed - increased Levothyroxine dose from home dose with understanding that it may be related to present infection. NSGY was consulted for evaluation given history of chronic cord compression with extremity contractures and declining functionality since July 2022. NSGY felt patient is too high risk given her frailty, declining health, and medical comorbidities, and recommended against surgical intervention. NSGY team relayed these recommendations to  and Medicine team. Optimizing nutritional status with supplementation and rehydration in setting of malnutrition.  Disposition: TBD, return to nursing home with hospice.       Hospital Course:  No notes on file    Interval History:   in agreement to hospice at NH. [facility] used.     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    Cataract     Chondrocalcinosis, cause unspecified, involving hand(712.34) 07/12/2011     Dx updated per 2019 IMO Load    Chronic sinusitis 04/06/2017    Colonic constipation 06/05/2013     Concussion 10/27/2016    Cord compression 07/2022    C2-C3 anterolisthesis with cord compression    CVID (common variable immunodeficiency)     Gait disturbance 03/14/2017 2/2023 wheelchair/bed bound    Irritable bowel syndrome 12/18/2015    Major depressive disorder, recurrent episode, in full remission 08/19/2010    Osteoarthritis     Postoperative hypothyroidism 12/18/2015    Presence of Watchman left atrial appendage closure device 05/14/2022    Rectal prolapse 06/05/2013    Reflux esophagitis 02/07/2010    Strabismus     Subarachnoid hemorrhage 10/30/2016    2016, 7/2022  L temporal SAH    Underweight 01/23/2013       Past Surgical History:   Procedure Laterality Date    BREAST CYST ASPIRATION      x2    CATARACT EXTRACTION W/  INTRAOCULAR LENS IMPLANT Left 3/11/2019    Procedure: EXTRACTION, CATARACT, WITH IOL INSERTION;  Surgeon: Vera Sams MD;  Location: [facility] OR;  Service: Ophthalmology;  Laterality: Left;    CATARACT EXTRACTION W/  INTRAOCULAR LENS IMPLANT Right 4/15/2019    Procedure: EXTRACTION, CATARACT, WITH IOL INSERTION LASER;  Surgeon: Vera Sams MD;  Location: [facility] OR;  Service: Ophthalmology;  Laterality: Right;    COLON SURGERY      EYE SURGERY      FRACTURE SURGERY      NASAL SEPTUM SURGERY      OCCLUSION OF LEFT ATRIAL APPENDAGE N/A 2/18/2022    Procedure: Left atrial appendage occlusion;  Surgeon: Chase Gill MD;  Location: [facility] EP LAB;  Service: Cardiology;  Laterality: N/A;  afib, watchman, BSCI, osiris, anes, MB/EH, 3prep    OPEN REDUCTION AND INTERNAL FIXATION (ORIF) OF INJURY OF SACROILIAC JOINT Bilateral 1/20/2021    Procedure: ORIF, SACROILIAC JOINT;  Surgeon: Alcides Tamez MD;  Location: [address] FLR;  Service: Orthopedics;  Laterality: Bilateral;    RADIOFREQUENCY ABLATION      RECTAL PROLAPSE REPAIR  june 2013    STRABISMUS SURGERY      TONSILLECTOMY      TRANSESOPHAGEAL ECHOCARDIOGRAPHY N/A 2/18/2022    Procedure:  ECHOCARDIOGRAM, TRANSESOPHAGEAL;  Surgeon: [facility] Diagnostic Provider;  Location: [facility] EP LAB;  Service: Cardiology;  Laterality: N/A;       Review of patient's allergies indicates:   Allergen Reactions    Adhesive      Tape tears skin    Buspar [buspirone]      headaches    Escitalopram oxalate Nausea Only     hyponatremia      Rifampin Rash       Medications:  Continuous Infusions:  Scheduled Meds:   amoxicillin-clavulanate 875-125mg  1 tablet Oral BID    donepeziL  10 mg Oral Daily    enoxaparin  30 mg Subcutaneous Daily    famotidine  40 mg Oral Daily    levothyroxine  100 mcg Oral Before breakfast    polyethylene glycol  17 g Oral Daily    sertraline  50 mg Oral BID    traZODone  150 mg Oral QHS     PRN Meds:acetaminophen, dextrose 10%, dextrose 10%, glucagon (human recombinant), glucose, glucose, naloxone, sodium chloride 0.9%    Family History       Problem Relation (Age of Onset)    Breast cancer Paternal Grandmother    Heart disease Father, Brother    Stroke Father          Tobacco Use    Smoking status: Never    Smokeless tobacco: Never   Substance and Sexual Activity    Alcohol use: No    Drug use: No    Sexual activity: Not Currently       Review of Systems   Unable to perform ROS: Acuity of condition   Objective:     Vital Signs (Most Recent):  Temp: 96.7 °F (35.9 °C) (02/08/23 0406)  Pulse: 81 (02/08/23 0406)  Resp: 18 (02/08/23 0406)  BP: 112/61 (02/08/23 0406)  SpO2: 96 % (02/08/23 0406) Vital Signs (24h Range):  Temp:  [96.2 °F (35.7 °C)-98.4 °F (36.9 °C)] 96.7 °F (35.9 °C)  Pulse:  [] 81  Resp:  [17-18] 18  SpO2:  [95 %-98 %] 96 %  BP: (112-136)/(61-78) 112/61     Weight: 45.2 kg (99 lb 10.4 oz)  Body mass index is 18.23 kg/m².    Physical Exam  Constitutional:       Appearance: She is underweight. She is ill-appearing.   HENT:      Head: Normocephalic and atraumatic.   Eyes:      General: Lids are normal.      Conjunctiva/sclera: Conjunctivae normal.   Neck:      Comments:  Bent down- kyphosis noted  Pulmonary:      Effort: Pulmonary effort is normal. No respiratory distress.   Musculoskeletal:      Comments: Contractures noted to all extremities   Skin:     General: Skin is warm and dry.   Neurological:      Mental Status: She is lethargic.   Psychiatric:      Comments: Pt moans and babbles a few incoherent sounds       Review of Symptoms      Symptom Assessment (ESAS 0-10 Scale)  Pain:  0  Dyspnea:  0  Anxiety:  0  Nausea:  0  Depression:  0  Anorexia:  0  Fatigue:  0  Insomnia:  0  Restlessness:  0  Agitation:  0 due to Acuity of condition         Pain Assessment in Advanced Demential Scale (PAINAD)   Breathing - Independent of vocalization:  0  Negative vocalization:  1  Facial expression:  0  Body language:  0  Consolability:  0  Total:  1    Performance Status:  10    Psychosocial/Cultural:   See Palliative Psychosocial Note: No  Pt lives at [facility]. Pt's  lives at assisted living NH. Pt has a daughter that lives close by.   **Primary  to Follow**  Palliative Care  Consult: No      Advance Care Planning          Significant Labs: All pertinent labs within the past 24 hours have been reviewed.  CBC:   Recent Labs   Lab 02/08/23  0852   WBC 6.62   HGB 12.3   HCT 39.8   MCV 98          BMP:  Recent Labs   Lab 02/08/23 0852   GLU 73      K 3.6   *   CO2 19*   BUN 22   CREATININE 0.6   CALCIUM 8.1*   MG 1.9       LFT:  Lab Results   Component Value Date    AST 17 02/08/2023    ALKPHOS 85 02/08/2023    BILITOT 0.3 02/08/2023     Albumin:   Albumin   Date Value Ref Range Status   02/08/2023 2.4 (L) 3.5 - 5.2 g/dL Final     Protein:   Total Protein   Date Value Ref Range Status   02/08/2023 5.5 (L) 6.0 - 8.4 g/dL Final     Lactic acid:   Lab Results   Component Value Date    LACTATE 0.8 02/06/2023    LACTATE 1.5 02/05/2023       Significant Imaging: I have reviewed all pertinent imaging results/findings within the past 24  hours.      > 50% of   min visit spent in chart review, face to face discussion of goals of care, charting, communication with attending team,  symptom assessment, coordination of care and emotional support    Silva Cruz, CNS  Palliative Medicine  [facility]

## 2023-02-08 NOTE — PROGRESS NOTES
[facility] Medicine  Progress Note    Patient Name: Yvette Crews  MRN: [id number]  Patient Class: IP- Inpatient   Admission Date: 2/5/2023  Length of Stay: 1 days  Attending Physician: Jay Carrasco MD  Primary Care Provider: Jeison Rodney MD        Subjective:     Principal Problem:Acute cystitis without hematuria        HPI:  Ms. Crews is a 72 year old woman with a history of AVNRT, Alzheimer's dementia, C2-C3 anterolisthesis with cord compression, SAH (2016), CVID, s/p Watchman device who presents to the ED from [facility] [place] with concerns of altered mental status, lethargy over the past week. Patient is at baseline aphasic, but will normally nod her head (y/no) to questions. On patient interview, patient unable to participate and non-responsive to questions. Further history obtained from , Pavel, over the phone. He states that she has not been herself for the past few days, with increased lethargy and decreased responsiveness noted. She is agitated at night and has been receiving more psychiatric medication, to prevent her crying out but  states he is unsure how much medication is causing her lethargy during the day. She has contractures in her lower extremities and  states she normally keeps them in bent. She was previously evaluated by neurosurgery for cord compression but surgery was deferred at that time. Per , her arm weakness seems to have progressed since then. From July 2022 she has declined in health. She was initially ambulator with a walker, but after falls she has progressed to a gerichair and now in a wheelchair. She was meant to have a left arm splint placed but this has not arrived yet. On exam, patient's arms remain flexed at abdomen.     On admission to the ED, vital signs stable with HR 59, afebrile, /82. Labs significant for hypernatremia (149). Lactate 1.5 ,WBC 5.67, BUN/Cr 27/0.6. TSH 30.608. CT head and neck with no acute  abnormalities. CXR did not show evidence of consolidation, but noted kyphosis, remote rib fractures and chronic appearing elevation of the left distal clavicle with respect to the acromion. UA significant for positive nitrites, 2+ leukocytes, many bacteria.      Overview/Hospital Course:  Admitted to Hospital Medicine with acute encephalopathy in the setting of urinary tract infection. Started on Ceftriaxone. TSH elevated, T4 depressed - increased Levothyroxine dose from home dose with understanding that it may be related to present infection. NSGY was consulted for evaluation given history of chronic cord compression with extremity contractures and declining functionality since July 2022. NSGY felt patient is too high risk given her frailty, declining health, and medical comorbidities, and recommended against surgical intervention. NSGY team relayed these recommendations to  and Medicine team. Optimizing nutritional status with supplementation and rehydration in setting of malnutrition. GOC discussion with , consulted Palliative Care to provide further resources and education for long-term care planning going forward.  agreeable to hospice. Ceftriaxone transitioned to Amoxicillin-Clavulanate for UTI course completion. Medically stable for discharge to nursing home with hospice.      Interval History: NAEON. Patient remains afebrile, HDS. She responds to voice and is able to nod her head when asked if feeling well this morning. Transitioning CTX to Amox-Clav PO for completion of UTI abx course. No family at bedside. Medically stable for discharge, anticipating soon.    Review of Systems   Unable to perform ROS: Dementia   Objective:     Vital Signs (Most Recent):  Temp: 96.7 °F (35.9 °C) (02/08/23 0406)  Pulse: 81 (02/08/23 0406)  Resp: 18 (02/08/23 0406)  BP: 112/61 (02/08/23 0406)  SpO2: 96 % (02/08/23 0406) Vital Signs (24h Range):  Temp:  [96.2 °F (35.7 °C)-98.4 °F (36.9 °C)] 96.7 °F (35.9  °C)  Pulse:  [] 81  Resp:  [17-18] 18  SpO2:  [95 %-98 %] 96 %  BP: (112-136)/(61-78) 112/61     Weight: 45.2 kg (99 lb 10.4 oz)  Body mass index is 18.23 kg/m².  No intake or output data in the 24 hours ending 02/08/23 0833   Physical Exam  Vitals reviewed.   Constitutional:       General: She is not in acute distress.     Comments: Thin, frail appearing   HENT:      Head: Normocephalic and atraumatic.      Mouth/Throat:      Mouth: Mucous membranes are moist.      Pharynx: Oropharynx is clear.   Eyes:      Pupils: Pupils are equal, round, and reactive to light.   Cardiovascular:      Rate and Rhythm: Regular rhythm. Bradycardia present.   Pulmonary:      Effort: Pulmonary effort is normal. No respiratory distress.      Breath sounds: Normal breath sounds. No wheezing.   Abdominal:      General: Bowel sounds are normal. There is no distension.      Palpations: Abdomen is soft.      Tenderness: There is no abdominal tenderness.   Musculoskeletal:      Right lower leg: No edema.      Left lower leg: No edema.      Comments: Significant kyphosis, patient resting on her side with arms bent across her chest and leg bent. Resistant to straightening legs and arms.   Skin:     General: Skin is warm and dry.      Capillary Refill: Capillary refill takes less than 2 seconds.      Findings: No bruising or rash.   Neurological:      Motor: Weakness present.   Psychiatric:         Cognition and Memory: Cognition is impaired.      Comments: Aphasic, minimally verbally responsive       Significant Labs: All pertinent labs within the past 24 hours have been reviewed.    Significant Imaging: I have reviewed all pertinent imaging results/findings within the past 24 hours.      Assessment/Plan:      * Acute cystitis without hematuria  72 year old with Alzhemer's dementia, severe osteoporosis, C2-C3 anterolisthesis, hypothyroidism who presents from nursing home with increased lethargy and altered mental status. Labs concerning  for hypernatremia and UTI.    On exam patient does not appear to have suprapubic tenderness, no elevated WBC, lactate 1.5. patient had a previous UTI in September 2022 with E.coli, treated with ceftriaxone.    Received 1L NS and 1 dose ceftriaxone in the ED.    Plan:  - Transitioning to Amox-Clav for UTI from CTX  - Monitor urine output  - Monitor BUN/Cr with CMP        Debility  See cervical spine cord compression      Advance care planning        Palliative care encounter  Palliative Care consulted, greatly appreciated      Adult failure to thrive  Palliative care consulted for [facility] discussion regarding overall decline in functional status given debility. Pursuing hospice care      Upper extremity weakness  Worsening upper extremity weakness noted per  in the past few months. Was previously seen by neurosurgery for C2-C3 anterolisthesis with severe cord compression in August 2022 and discussion of surgery at that time was postponed in favor of medical management at the time. Patient was able to use arms at that time, and has since has noted weakness, does not feed herself. Worsened weakness is concerning for progressive degenerative changes to the cervical spine.    Plan:  -NSGY consulted, no plans for intervention  - PT/OT consulted  - Palliative care consulted, pursuing hospice care      Closed wedge compression fracture of T3 vertebra  See osteoporosis      Cervical spinal cord compression  Patient with chronic C2-C3 cord compression syndrome since July 2022 previously evaluated by NSGY and determined not to be a surgical candidate at the time    Plan:  - NSGY consulted, evaluated   - Given her frailty, declining health, and medical comorbidities, patient was determined to be too high risk for surgical intervention  - PT/OT consulted      Open displaced fracture of acromial end of left clavicle  See osteoporosis      Late onset Alzheimer's dementia without behavioral disturbance  Hx of Alzheimer's  dementia currently residing in nursing home. Patient is aphasic at baseline, able to nod y/n to questions. Currently using wheelchair to ambulate due to decline in the last six months.     Current medications include donepezil 10 mg QD. No sedating, night time medications noted in chart.      Plan:  - Continue donepezil 10 mg QD      CVID (common variable immunodeficiency)  Last infusion received in September 2022. Sees Dr. Salgado in Allergy/Immunology clinic.      Encephalopathy, metabolic  See UTI      Postoperative hypothyroidism  Acquired hypothyroidism with hx of MNG s/p total thyroidectomy 1/25/17  Patient takes synthroid 88 mcg daily. TSH 4 months ago within normal limits, on admission significantly elevated to 30.6 concerning for inadequate dosage. Patient is also taking famotidine.    Plan:  - Continue levothyroxine dosage to 100 mcg and recheck in 4-6 weeks. TSH may be falsely elevated in setting of infection      Age-related osteoporosis with current pathological fracture with routine healing  Hx of osteoporosis with T3 compression fracture, C2-C3 anterolisthesis and concern for compression, remote rib fractures, clavicular fracture. Was following with Dr. Samano and taking reclast, last administered in June? 2022. No new fractures noted on X-ray imaging. Patient has weakness in upper extremities concerning for worsening changes to cervical spine. Patient has had difficulty getting to appointments and arranging transportation    Plan:  -f/u Neurosug consult for arm weakness  - f/u plan with outpatient Dr. Samano regarding treatment plans for osteoporosis      Moderate protein malnutrition  Nutrition consulted. Most recent weight and BMI monitored-     Malnutrition Type and Energy Intake  Malnutrition Type: acute illness or injury  Energy Intake: severe energy intake    Malnutrition (Moderate to Severe)  Weight Loss (Malnutrition):  (none noted)  Subcutaneous Fat (Malnutrition): other (see  comments)  Muscle Mass (Malnutrition): other (see comments)    Final Summary  Subcutaneous Fat Loss (Final Summary): severe protein-calorie malnutrition  Muscle Loss Evaluation (Final Summary): severe protein-calorie malnutrition         Measurements:  Wt Readings from Last 1 Encounters:   02/05/23 45.2 kg (99 lb 10.4 oz)   Body mass index is 18.23 kg/m².    Recommendations: Recommendation/Intervention: 1.) Recommend continuing with current Cardiac diet order, encouraging PO intake 2.) Recommend continuing with providing Boost Plus TID to provide 1080kcal and 42gPRO, encouraging intake. 3.) RD to monitor.  Goals: To meet % of EEN/EPN by next RD f/u    Patient has been screened and assessed by RD. RD will follow patient.      VTE Risk Mitigation (From admission, onward)         Ordered     enoxaparin injection 30 mg  Daily         02/06/23 1038     IP VTE HIGH RISK PATIENT  Once         02/05/23 2000     Place sequential compression device  Until discontinued         02/05/23 2000                Discharge Planning   SANTIAGO: 2/8/2023     Code Status: Partial Code   Is the patient medically ready for discharge?: No    Reason for patient still in hospital (select all that apply): Treatment and Pending disposition  Discharge Plan A: Return to nursing home                  Abhijeet Pressley DO  Department of Hospital Medicine   [facility] - Surgery

## 2023-02-08 NOTE — ASSESSMENT & PLAN NOTE
Patient with chronic C2-C3 cord compression syndrome since July 2022 previously evaluated by NSGY and determined not to be a surgical candidate at the time    Plan:  - NSGY consulted, evaluated   - Given her frailty, declining health, and medical comorbidities, patient was determined to be too high risk for surgical intervention  - PT/OT consulted

## 2023-02-08 NOTE — ASSESSMENT & PLAN NOTE
Palliative care consulted for GOC discussion regarding overall decline in functional status given debility. Pursuing hospice care

## 2023-02-08 NOTE — SUBJECTIVE & OBJECTIVE
Interval History: NAEON. Patient remains afebrile, HDS. She responds to voice and is able to nod her head when asked if feeling well this morning. Transitioning CTX to Amox-Clav PO for completion of UTI abx course. No family at bedside. Medically stable for discharge, anticipating soon.    Review of Systems   Unable to perform ROS: Dementia   Objective:     Vital Signs (Most Recent):  Temp: 96.7 °F (35.9 °C) (02/08/23 0406)  Pulse: 81 (02/08/23 0406)  Resp: 18 (02/08/23 0406)  BP: 112/61 (02/08/23 0406)  SpO2: 96 % (02/08/23 0406) Vital Signs (24h Range):  Temp:  [96.2 °F (35.7 °C)-98.4 °F (36.9 °C)] 96.7 °F (35.9 °C)  Pulse:  [] 81  Resp:  [17-18] 18  SpO2:  [95 %-98 %] 96 %  BP: (112-136)/(61-78) 112/61     Weight: 45.2 kg (99 lb 10.4 oz)  Body mass index is 18.23 kg/m².  No intake or output data in the 24 hours ending 02/08/23 0833   Physical Exam  Vitals reviewed.   Constitutional:       General: She is not in acute distress.     Comments: Thin, frail appearing   HENT:      Head: Normocephalic and atraumatic.      Mouth/Throat:      Mouth: Mucous membranes are moist.      Pharynx: Oropharynx is clear.   Eyes:      Pupils: Pupils are equal, round, and reactive to light.   Cardiovascular:      Rate and Rhythm: Regular rhythm. Bradycardia present.   Pulmonary:      Effort: Pulmonary effort is normal. No respiratory distress.      Breath sounds: Normal breath sounds. No wheezing.   Abdominal:      General: Bowel sounds are normal. There is no distension.      Palpations: Abdomen is soft.      Tenderness: There is no abdominal tenderness.   Musculoskeletal:      Right lower leg: No edema.      Left lower leg: No edema.      Comments: Significant kyphosis, patient resting on her side with arms bent across her chest and leg bent. Resistant to straightening legs and arms.   Skin:     General: Skin is warm and dry.      Capillary Refill: Capillary refill takes less than 2 seconds.      Findings: No bruising or  rash.   Neurological:      Motor: Weakness present.   Psychiatric:         Cognition and Memory: Cognition is impaired.      Comments: Aphasic, minimally verbally responsive       Significant Labs: All pertinent labs within the past 24 hours have been reviewed.    Significant Imaging: I have reviewed all pertinent imaging results/findings within the past 24 hours.

## 2023-02-09 NOTE — PLAN OF CARE
[facility]  Discharge Final Note    Primary Care Provider: Jeison Rodney MD    Expected Discharge Date: 2/8/2023    Final Discharge Note (most recent)       Final Note - 02/08/23 1348          Final Note    Assessment Type Final Discharge Note     Anticipated Discharge Disposition Home-Health Care [facility] Resources/Appts/Education Provided Provided patient/caregiver with written discharge plan information;Appointments scheduled by Navigator/Coordinator

## 2023-02-10 LAB
BACTERIA BLD CULT: NORMAL
BACTERIA BLD CULT: NORMAL

## 2023-04-28 NOTE — TELEPHONE ENCOUNTER
Informed pt that Dr. Green would like to do her TSH in 8 weeks. Pt states that she will call back to schedule lab.   
Sent mychart message, please set up repeat TSH in 8 weeks.  
PAST SURGICAL HISTORY:  No significant past surgical history

## 2023-05-02 NOTE — PLAN OF CARE
Pt educated on handwashing and infection control. Pt verbalized understanding;        Doxycycline Counseling:  Patient counseled regarding possible photosensitivity and increased risk for sunburn.  Patient instructed to avoid sunlight, if possible.  When exposed to sunlight, patients should wear protective clothing, sunglasses, and sunscreen.  The patient was instructed to call the office immediately if the following severe adverse effects occur:  hearing changes, easy bruising/bleeding, severe headache, or vision changes.  The patient verbalized understanding of the proper use and possible adverse effects of doxycycline.  All of the patient's questions and concerns were addressed.

## 2023-07-20 NOTE — TELEPHONE ENCOUNTER
Left message to notify patient that we are awaiting approval from her insurance company and will call her once we have it approved.      ----- Message from Delmy Cadena sent at 3/20/2020  8:42 AM CDT -----  Contact: patient   [phone]-please call above patient said she have been talking to the nurse need to know what the doctor is going to do waiting on a call back thanks.      63-year-old female past medical history of paroxysmal A-fib and hypothyroidism presenting with right-sided numbness.  Patient states that  for the past 10 days she had been taking oral metoprolol for an episode of atrial fibrillation.  Patient states that this afternoon she noticed that her palpitations resolved, this evening around [id number] AM patient noticed sudden onset numbness extending from her right mid scalp down to her right shoulder.  Patient immediately took aspirin and presented to the emergency department as she is concerned for stroke.  Patient states that the numbness resolved shortly after arrival to the department, is no longer having the symptoms.  Denies fever neck pain weakness numbness cough shortness of breath chest pain.

## 2023-08-04 NOTE — PROGRESS NOTES
Care Everywhere: updated  Immunization: updated  Health Maintenance: updated  Media Review:   Legacy Review:   DIS:  Order placed:   Upcoming appts:  EFAX:  Task Tickets:  Referrals:       5 (moderate pain)

## 2024-02-22 NOTE — PROGRESS NOTES
Pt received dose of IVIG PRIVIGEN 20 GRAMS.  Infusion completed at a rate of 99cc/hr.  Pt tolerated well and left in NAD.   
denies

## 2024-04-08 NOTE — PATIENT INSTRUCTIONS
Patient encouraged to keep appointment for consult in surgery to discuss resection of the thyroid nodule.    NOTE:  Bronchiectasis and immunodeficiency are stable with current medical therapy,   and do not preclude consideration for anesthesia and surgery if this course chosen.  
Detail Level: Zone

## 2024-11-19 NOTE — PROGRESS NOTES
[facility]  Orthopedics  Progress Note    Patient Name: Yvette Crews  MRN: [id number]  Admission Date: 7/3/2022  Hospital Length of Stay: 0 days  Attending Provider: Coral Chavis MD  Primary Care Provider: Sally Green MD    Subjective:     Principal Problem:Displaced fracture of lateral end of left clavicle, initial encounter for open fracture    Principal Orthopedic Problem: Same    Interval History: Pt seen and examined at bedside. NAEON. Pain controlled. VSS, AF.     Review of patient's allergies indicates:   Allergen Reactions    Adhesive      Tape tears skin    Buspar [buspirone]      headaches    Escitalopram oxalate Nausea Only     hyponatremia      Rifampin Rash       Current Facility-Administered Medications   Medication    acetaminophen tablet 650 mg    acetaminophen tablet 650 mg    albuterol-ipratropium 2.5 mg-0.5 mg/3 mL nebulizer solution 3 mL    aluminum-magnesium hydroxide-simethicone 200-200-20 mg/5 mL suspension 30 mL    aspirin EC tablet 81 mg    ceFAZolin 2 gram in dextrose 5% 100 mL IVPB (premix)    celecoxib capsule 100 mg    dextrose 10% bolus 125 mL    dextrose 10% bolus 250 mL    diphenhydrAMINE injection 25 mg    donepeziL tablet 10 mg    enoxaparin injection 40 mg    famotidine tablet 40 mg    ferrous sulfate tablet 1 each    glucagon (human recombinant) injection 1 mg    glucose chewable tablet 16 g    glucose chewable tablet 24 g    levothyroxine tablet 88 mcg    melatonin tablet 6 mg    methocarbamoL tablet 500 mg    montelukast tablet 10 mg    naloxone 0.4 mg/mL injection 0.02 mg    ondansetron disintegrating tablet 8 mg    oxyCODONE immediate release tablet 5 mg    polyethylene glycol packet 17 g    prochlorperazine injection Soln 5 mg    simethicone chewable tablet 80 mg    sodium chloride 0.9% flush 10 mL     Current Outpatient Medications   Medication Sig    acetaminophen (TYLENOL ORAL) Take by mouth once daily.     albuterol (PROVENTIL/VENTOLIN HFA) 90 mcg/actuation inhaler Inhale 2 puffs into the lungs every 6 (six) hours as needed for Wheezing. Rescue (Patient not taking: Reported on 5/31/2022)    aspirin (ECOTRIN) 81 MG EC tablet Take 1 tablet (81 mg total) by mouth once daily.    bacitracin-polymyxin b (POLYSPORIN) ophthalmic ointment     calcium-vitamin D3 (OS-ELAINE 500 + D3) 500 mg(1,250mg) -200 unit per tablet Take 1 tablet by mouth 2 (two) times daily.    calcium-vitamin D3-vitamin K 650 mg-12.5 mcg-40 mcg Chew Take by mouth.    docusate sodium (COLACE) 250 MG capsule Take 250 mg by mouth 2 (two) times daily as needed for Constipation.    donepeziL (ARICEPT) 10 MG tablet Take 1 tablet (10 mg total) by mouth once daily.    famotidine (PEPCID) 40 MG tablet Take 1 tablet (40 mg total) by mouth once daily.    famotidine (PEPCID) 40 MG tablet Take 1 tablet (40 mg total) by mouth once daily.    ferrous sulfate 325 (65 FE) MG EC tablet Take 1 tablet (325 mg total) by mouth once daily.    ferrous sulfate 325 (65 FE) MG EC tablet Take 1 tablet (325 mg total) by mouth once daily.    guaiFENesin (MUCINEX) 600 mg 12 hr tablet Take 2 tablets (1,200 mg total) by mouth 2 (two) times daily.    Immune Globulin G, IGG,-PRO-IGA 10 % injection, Privigen, (PRIVIGEN) 10 % Soln Infuse 20 g into the vein every 4 weeks.    levothyroxine (SYNTHROID) 88 MCG tablet Take 1 tablet (88 mcg total) by mouth before breakfast.    loratadine (CLARITIN) 10 mg tablet Take 1 tablet (10 mg total) by mouth once daily.    montelukast (SINGULAIR) 10 mg tablet Take 1 tablet (10 mg total) by mouth every evening.    multivit with calcium,iron,min (MULTIPLE VITAMIN, WOMENS ORAL) Take by mouth once daily.    multivit-min/ferrous fumarate (MULTI VITAMIN ORAL) Take by mouth.    mupirocin (BACTROBAN) 2 % ointment APPLY ONE application TWICE DAILY FOR 7 DAYS    naproxen (NAPROSYN ORAL) Take 200 mg by mouth. As needed every other day    phenazopyridine  (PYRIDIUM) 95 MG tablet Take 95 mg by mouth 2 (two) times a day.    polyethylene glycol (GLYCOLAX) 17 gram PwPk Take 17 g by mouth once daily. (Patient taking differently: Take 17 g by mouth as needed. As needed once a week or so)    pumpkin seed extract/soy germ (AZO BLADDER CONTROL ORAL) Take by mouth.    sars-cov-2, covid-19, (MODERNA COVID-19) 100 mcg/0.5 ml injection Inject into the muscle.     Facility-Administered Medications Ordered in Other Encounters   Medication    balanced salt irrigation solution 1 drop    moxifloxacin 0.5 % ophthalmic solution 1 drop    phenylephrine HCL 2.5% ophthalmic solution 1 drop    sodium chloride 0.9% bolus 1,000 mL    sodium chloride 0.9% bolus 1,000 mL    sodium chloride 0.9% bolus 1,000 mL    tropicamide 1% ophthalmic solution 1 drop     Objective:     Vital Signs (Most Recent):  Temp: 97.7 °F (36.5 °C) (07/04/22 0717)  Pulse: 62 (07/04/22 0717)  Resp: 20 (07/04/22 0717)  BP: 133/74 (07/04/22 0717)  SpO2: 99 % (07/04/22 0717) Vital Signs (24h Range):  Temp:  [97.5 °F (36.4 °C)-98 °F (36.7 °C)] 97.7 °F (36.5 °C)  Pulse:  [52-92] 62  Resp:  [14-20] 20  SpO2:  [98 %-100 %] 99 %  BP: (127-162)/(65-91) 133/74     Weight: 39.9 kg (87 lb 15.4 oz)     Body mass index is 16.09 kg/m².      Intake/Output Summary (Last 24 hours) at 7/4/2022 0720  Last data filed at 7/4/2022 0507  Gross per 24 hour   Intake 200 ml   Output --   Net 200 ml       Ortho/SPM Exam  Left Upper extremity  Inspection  - Small sub-centimeter abrasion/skin tear to the posterior clavicle with bruising infection to the abrasion, no skin tenting  - No swelling  - No ecchymosis, erythema, or signs of cellulitis  Palpation  - TTP to the distal clavicle  Range of motion  - AROM and PROM of the elbow, wrist, and hand intact without pain, able to roll shoulder with minimal pain  Stability  - No evidence of joint dislocation or abnormal laxity  Neurovascular  - AIN/PIN/Radial/Median/Ulnar Nerves assessed in  "isolation without deficit  - Able to give thumbs up, make "OK" sign, cross IF/LF, abduct/adduct fingers, make fist  - SILT throughout  - Compartments soft  - Radial artery palpated  - Muscle tone normal, decreased muscle mass     Significant Labs: CBC:   Recent Labs   Lab 07/03/22 2014 07/04/22  0350   WBC 5.62 7.17   HGB 11.2* 11.9*   HCT 34.0* 36.6*    205     CMP:   Recent Labs   Lab 07/03/22 2014 07/04/22  0350   * 132*   K 4.1 4.5    102   CO2 24 24   GLU 80 78   BUN 17 16   CREATININE 0.6 0.7   CALCIUM 8.8 8.8   ANIONGAP 6* 6*   EGFRNONAA >60.0 >60.0     All pertinent labs within the past 24 hours have been reviewed.    Significant Imaging: I have reviewed all pertinent imaging results/findings.  Results for orders placed or performed during the hospital encounter of 07/03/22 (from the past 2160 hour(s))   CT Shoulder Without Contrast Left    Impression    1. Acute displaced fracture of the distal inferior left clavicle.  Associated widening of the left acromial clavicular interval, concerning for AC subluxation versus separation.  2. Mild anterior subluxation of the glenohumeral joint.  Moderate left shoulder effusion.  3. Left shoulder osteoarthritis as above.  4.  Additional findings as above.  This report was flagged in Epic as abnormal.    Electronically signed by resident: Charles Bennett  Date:    07/03/2022  Time:    23:28    Electronically signed by: Kj Lorenzana MD  Date:    07/04/2022  Time:    00:56   CT 3D RECON WITH INDEPENDENT WS    Impression    1. Acute displaced fracture of the distal inferior left clavicle.  Associated widening of the left acromial clavicular interval, concerning for AC subluxation versus separation.  2. Mild anterior subluxation of the glenohumeral joint.  Moderate left shoulder effusion.  3. Left shoulder osteoarthritis as above.  4.  Additional findings as above.  This report was flagged in Epic as abnormal.    Electronically signed by resident: " Charles Bennett  Date:    07/03/2022  Time:    23:28    Electronically signed by: Kj Lorenzana MD  Date:    07/04/2022  Time:    00:56   CT Cervical Spine Without Contrast    Impression    New anterior wedging of the T3 vertebral body, with sclerotic superior endplate change.  Finding may reflect acute/subacute compression fracture.  Consider further evaluation with MRI thoracic spine, as clinically indicated.    Stable T2 vertebral body mild remote compression deformity.    Stable 8 mm anterolisthesis of C2 on C3 with moderate canal stenosis at this level.    Additional findings as above.    Electronically signed by resident: Charles Bennett  Date:    07/03/2022  Time:    19:56    Electronically signed by: Krishna Torres MD  Date:    07/03/2022  Time:    21:28   CT Head Without Contrast    Impression    Left parieto-occipital scalp mild soft tissue swelling/contusion without displaced skull fracture or acute intracranial abnormality identified.    Involutional change and chronic microvascular ischemic change.      Electronically signed by: Ethan Snowden MD  Date:    07/03/2022  Time:    19:53     *Note: Due to a large number of results and/or encounters for the requested time period, some results have not been displayed. A complete set of results can be found in Results Review.     Results for orders placed or performed during the hospital encounter of 07/03/22 (from the past 2160 hour(s))   MRI Thoracic Spine Without Contrast    Impression    1. Multiple age-indeterminate, likely remote, compression fractures involving T8, T10, L2, and L3.  Correlate with point tenderness to assess acuity.  2. Associated 6 mm retropulsion at T8 resulting in mild spinal canal stenosis and abutment of the thoracic cord.  No cord signal abnormality.      Electronically signed by: Lc Caballero  Date:    07/04/2022  Time:    00:22     *Note: Due to a large number of results and/or encounters for the requested time period, some results have  not been displayed. A complete set of results can be found in Results Review.         Assessment/Plan:     * Displaced fracture of lateral end of left clavicle, initial encounter for open fracture  Yvette Crews is a 72 y.o. female with a distal third clavicle fracture, grade I open, NVI.     - Ancef in ED  - Irrigated with 1L of NS and betadine solution  - NWB LUE  - Sling LUE  - Observation for 24 hours of IV abx, ancef  - Discharge on bactrim after completion  - CT shoulder to rule out any other skeletal abnormalities not captured on X ray  - Orthopedics will continue to follow  - Follow up in clinic in one week, pt will be contacted with appointment details            Logan Caballero MD  Orthopedics  [facility]   Bleeding that does not stop/Swelling that gets worse/Pain not relieved by Medications/Fever greater than (need to indicate Fahrenheit or Celsius)/Wound/Surgical Site with redness, or foul smelling discharge or pus/Numbness, tingling, color or temperature change to extremity/Nausea and vomiting that does not stop/Unable to urinate/Excessive diarrhea/Inability to tolerate liquids or foods/Increased irritability or sluggishness

## (undated) DEVICE — Device

## (undated) DEVICE — TRAY MINOR GEN SURG

## (undated) DEVICE — SET INTRO PERFRMR SHTH 16FR

## (undated) DEVICE — DIALATOR COONS TAPER 12F 20CM

## (undated) DEVICE — ADHESIVE DERMABOND ADVANCED

## (undated) DEVICE — SYS WATCHMAN FXD CURVE DBL US

## (undated) DEVICE — SYR SLIP TIP 1CC

## (undated) DEVICE — DRAPE C-ARMOR EQUIPMENT COVER

## (undated) DEVICE — SUT 3-0 12-18IN SILK

## (undated) DEVICE — SOL BETADINE 5%

## (undated) DEVICE — SEE MEDLINE ITEM 152532

## (undated) DEVICE — NDL TRNSSPTL BRK-1 18GA 71CM

## (undated) DEVICE — DRESSING TRANS 4X4 TEGADERM

## (undated) DEVICE — DRESSING TRANS 4X4 3/4

## (undated) DEVICE — SOL 9P NACL IRR PIC IL

## (undated) DEVICE — SYR 30CC LUER LOCK

## (undated) DEVICE — DRESSING AQUACEL RIBBON 2X45CM

## (undated) DEVICE — GLOVE BIOGEL ORTHOPEDIC 7

## (undated) DEVICE — SUT VICRYL PLUS 2-0 CT1 18

## (undated) DEVICE — CORD BIPOLAR 12 FOOT

## (undated) DEVICE — NDL HYPO A BEVEL 22X1 1/2

## (undated) DEVICE — PACK DRAPE UNIVERSAL CONVERTOR

## (undated) DEVICE — NDL HYPO REG 25G X 1 1/2

## (undated) DEVICE — DRAPE STERI-DRAPE 1000 17X11IN

## (undated) DEVICE — PAD DEFIB CADENCE ADULT R2

## (undated) DEVICE — SEE MEDLINE ITEM 154981

## (undated) DEVICE — SUT 4-0 12-18IN SILK BLACK

## (undated) DEVICE — SET BASIN 48X48IN 6000ML RING

## (undated) DEVICE — GAUZE SPONGE 4X4 12PLY

## (undated) DEVICE — CLIP LIGACLIP XTRA TITANIUM

## (undated) DEVICE — WARMER DRAPE STERILE LF

## (undated) DEVICE — SUT PROLENE 5/0 RB-1 36 IN

## (undated) DEVICE — CONTAINER SPECIMEN STRL 4OZ

## (undated) DEVICE — CASSETTE INFINITI

## (undated) DEVICE — SUT PROLENE 4-0 RB-1 BL MO

## (undated) DEVICE — STERILE WATER 1000ML BOTTLE

## (undated) DEVICE — SCRUB HIBICLENS 4% CHG 4OZ

## (undated) DEVICE — GLOVE BIOGEL ECLIPSE SZ 6.5

## (undated) DEVICE — HEMOSTAT SURGICEL FIBRLR 1X2IN

## (undated) DEVICE — CLOSURE SKIN STERI STRIP 1/2X4

## (undated) DEVICE — PACK EP DRAPE

## (undated) DEVICE — CATH ANGIO DIAG PIG 6FX110

## (undated) DEVICE — SUT PROLENE 6-0 30 RB-2

## (undated) DEVICE — SUT MONOCRYL 3-0 PS-2 UND

## (undated) DEVICE — PROBE SIMULATOR KRAFF

## (undated) DEVICE — DRESSING TELFA STRL 4X3 LF

## (undated) DEVICE — KIT CUSTOM MANIFOLD

## (undated) DEVICE — ELECTRODE REM PLYHSV RETURN 9

## (undated) DEVICE — GUIDEWIRE 2.8MM FLUTES 450MM
Type: IMPLANTABLE DEVICE | Site: PELVIS | Status: NON-FUNCTIONAL
Removed: 2021-01-20

## (undated) DEVICE — SHEARS HARMONIC CRVD 9 CM

## (undated) DEVICE — SUT LIGACLIP SMALL XTRA

## (undated) DEVICE — TRAY MINOR ORTHO

## (undated) DEVICE — SEE MEDLINE ITEM 152622

## (undated) DEVICE — DRAPE INCISE IOBAN 2 23X33IN

## (undated) DEVICE — ADHESIVE MASTISOL VIAL 48/BX

## (undated) DEVICE — SEE MEDLINE ITEM 157194

## (undated) DEVICE — MARKER SKIN STND TIP BLUE BARR

## (undated) DEVICE — GUIDEWIRE AMPLATZ XSTIFF 260CM

## (undated) DEVICE — MARKER SKIN RULER FINE TIP

## (undated) DEVICE — GAUZE SPONGE PEANUT STRL

## (undated) DEVICE — POSITIONER HEAD DONUT 9IN FOAM

## (undated) DEVICE — INTRO SWARTZ SL2 8.5FR 63CM

## (undated) DEVICE — SEE MEDLINE ITEM 157144

## (undated) DEVICE — DRAPE C ARM 42 X 120 10/BX

## (undated) DEVICE — SEE MEDLINE ITEM 157131

## (undated) DEVICE — ELECTRODE BLADE INSULATED 1 IN

## (undated) DEVICE — SEE MEDLINE ITEM 157116

## (undated) DEVICE — PACK UNIVERSAL SPLIT II

## (undated) DEVICE — SEE MEDLINE ITEM 157150

## (undated) DEVICE — SUT MONOCRYL 5-0 P-3 UND 18

## (undated) DEVICE — DRAPE STERI INSTRUMENT 1018

## (undated) DEVICE — KIT PROBE COVER WITH GEL

## (undated) DEVICE — GUIDE PIN 2.8MM DIA TRCR POINT
Type: IMPLANTABLE DEVICE | Site: PELVIS | Status: NON-FUNCTIONAL
Removed: 2021-01-20